# Patient Record
Sex: MALE | Race: WHITE | Employment: UNEMPLOYED | URBAN - METROPOLITAN AREA
[De-identification: names, ages, dates, MRNs, and addresses within clinical notes are randomized per-mention and may not be internally consistent; named-entity substitution may affect disease eponyms.]

---

## 2017-01-17 ENCOUNTER — HOSPITAL ENCOUNTER (OUTPATIENT)
Dept: CT IMAGING | Age: 57
Discharge: HOME | End: 2017-01-17
Attending: INTERNAL MEDICINE | Admitting: INTERNAL MEDICINE

## 2017-01-17 DIAGNOSIS — H92.02 OTALGIA OF LEFT EAR: ICD-10-CM

## 2017-01-17 DIAGNOSIS — M54.2 NECK PAIN: ICD-10-CM

## 2017-01-17 DIAGNOSIS — H92.02 LEFT EAR PAIN: ICD-10-CM

## 2017-01-17 RX ORDER — 0.9 % SODIUM CHLORIDE 0.9 %
10 VIAL (ML) INJECTION
Status: COMPLETED | OUTPATIENT
Start: 2017-01-17 | End: 2017-01-17

## 2017-01-17 RX ADMIN — Medication 10 ML: at 15:41

## 2017-03-10 ENCOUNTER — TELEPHONE (OUTPATIENT)
Dept: CARDIOLOGY CLINIC | Age: 57
End: 2017-03-10

## 2017-06-07 ENCOUNTER — TELEPHONE (OUTPATIENT)
Dept: CARDIOLOGY CLINIC | Age: 57
End: 2017-06-07

## 2017-06-07 DIAGNOSIS — I25.118 CORONARY ARTERY DISEASE OF NATIVE HEART WITH STABLE ANGINA PECTORIS, UNSPECIFIED VESSEL OR LESION TYPE (HCC): Primary | ICD-10-CM

## 2017-06-07 DIAGNOSIS — I10 ESSENTIAL HYPERTENSION: ICD-10-CM

## 2017-06-08 ENCOUNTER — PROCEDURE VISIT (OUTPATIENT)
Dept: CARDIOLOGY CLINIC | Age: 57
End: 2017-06-08

## 2017-06-08 DIAGNOSIS — I10 ESSENTIAL HYPERTENSION: ICD-10-CM

## 2017-06-08 DIAGNOSIS — R06.02 SOB (SHORTNESS OF BREATH): ICD-10-CM

## 2017-06-08 DIAGNOSIS — I25.118 CORONARY ARTERY DISEASE OF NATIVE HEART WITH STABLE ANGINA PECTORIS, UNSPECIFIED VESSEL OR LESION TYPE (HCC): ICD-10-CM

## 2017-06-08 DIAGNOSIS — R06.02 SOB (SHORTNESS OF BREATH): Primary | ICD-10-CM

## 2017-06-08 LAB
LV EF: 50 %
LVEF MODALITY: NORMAL

## 2017-06-08 PROCEDURE — 93015 CV STRESS TEST SUPVJ I&R: CPT | Performed by: INTERNAL MEDICINE

## 2017-06-08 PROCEDURE — 96372 THER/PROPH/DIAG INJ SC/IM: CPT | Performed by: INTERNAL MEDICINE

## 2017-06-08 PROCEDURE — A9500 TC99M SESTAMIBI: HCPCS | Performed by: INTERNAL MEDICINE

## 2017-06-08 PROCEDURE — 78452 HT MUSCLE IMAGE SPECT MULT: CPT | Performed by: INTERNAL MEDICINE

## 2017-06-09 ENCOUNTER — TELEPHONE (OUTPATIENT)
Dept: CARDIOLOGY CLINIC | Age: 57
End: 2017-06-09

## 2017-09-29 ENCOUNTER — HOSPITAL ENCOUNTER (OUTPATIENT)
Dept: MRI IMAGING | Age: 57
Discharge: HOME | End: 2017-09-29
Attending: PHYSICIAN ASSISTANT | Admitting: PHYSICIAN ASSISTANT

## 2017-09-29 DIAGNOSIS — S39.012A STRAIN OF LUMBAR REGION, INITIAL ENCOUNTER: ICD-10-CM

## 2017-10-19 ENCOUNTER — HOSPITAL ENCOUNTER (OUTPATIENT)
Dept: PHYSICAL THERAPY | Age: 57
Discharge: HOME | End: 2017-10-31
Attending: PHYSICIAN ASSISTANT | Admitting: PHYSICIAN ASSISTANT

## 2017-10-19 ASSESSMENT — PAIN DESCRIPTION - FREQUENCY: FREQUENCY: CONTINUOUS

## 2017-10-19 ASSESSMENT — PAIN SCALES - GENERAL: PAINLEVEL_OUTOF10: 7

## 2017-10-19 ASSESSMENT — PAIN DESCRIPTION - LOCATION: LOCATION: BACK

## 2017-10-19 ASSESSMENT — PAIN DESCRIPTION - DESCRIPTORS: DESCRIPTORS: ACHING

## 2017-10-19 ASSESSMENT — PAIN DESCRIPTION - PAIN TYPE: TYPE: CHRONIC PAIN
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
## 2017-10-23 ENCOUNTER — HOSPITAL ENCOUNTER (OUTPATIENT)
Dept: PHYSICAL THERAPY | Age: 57
Discharge: HOME | End: 2017-10-23
Admitting: PHYSICIAN ASSISTANT

## 2017-10-25 ENCOUNTER — HOSPITAL ENCOUNTER (OUTPATIENT)
Dept: PHYSICAL THERAPY | Age: 57
Discharge: HOME | End: 2017-10-25
Admitting: PHYSICIAN ASSISTANT

## 2017-10-25 NOTE — FLOWSHEET NOTE
Physical Therapy Daily Treatment Note  Date:  10/25/2017    Patient Name:  Josiah Basilio by Daniela Mcgill  :  1960  MRN: [id number]  Restrictions/Precautions:    Diagnosis: Strain of muscle, tendon, and fascia of LB    Treatment Diagnosis: Pain R LB, decreased AROM and functional use of LB; decreased sit, stand, and gait tolerance  Insurance/Certification information:  [facility], 18 visits max by 17  Next Physician Visit:  Referring Physician:    Raciel Poon  Visit# / total visits: 3 /  18 max by 17                Initial Pain level: 7/10      Subjective:  Pt continues to complain of constant numbness in his toes. Back was just a little sore after previous traction. Any changes to Ambulatory Summary Sheet?no             Goals:  Short term goals  Time Frame for Short term goals: Up to 6 wks. Short term goal 1: Ind with HEP with good compliance  Short term goal 2: Oswestry score 22/50  Short term goal 3: Pt. will be able to sit 30 min with 4/10 pain max. 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 stretches 10 ct ,x 5 reps 10 ct x 5 reps 10 ct x 5 reps    Prone prop chest on 2 pillows   Up to 10 min continue continue     Clam #1  3 ct, 3 x10 reps 3 ct, 3 x10 reps 3 ct, 3 x10 reps     Trial supine PTx.  Start with 100# pull, legs resting on stool   Intermittent 100#/60# x 15 min legs on stool Static 100# x 15 min, legs on stool    If unable to do PTx. , prone IFC to LB                                                                    Progress/goals assessment (every 10 visits) by  PT           HEP: As above Reviewed and encouraged compliance Continue as instructed    Objective   findings:  No change in numbness following traction. Minimal change in numbness 3rd toe following traction    Provider interaction:   Monitoring to ensure safe and effective activity performance Monitoring to ensure safe and effective activity performance    Response to intervention:   Limited range with ex due to R LB pain LB a little sore following traction. LB sore from traction, did have a little less numbness 3rd toe following traction. Plan for Next Session: As above Continue as tolerated. Increase poundage as tolerated with traction. Continue as tolerated. Increase poundage as tolerated with traction. Modality/intervention used:  [x] Therapeutic Exercise  [] Modalities:  [] Therapeutic Activity     [] Ultrasound  [] Elec  Stim  [] Gait Training      [] Cervical Traction [x] Lumbar Traction  [] Neuromuscular Re-education    [] Cold/hotpack [] Iontophoresis   [x] Instruction in HEP      [] Vasopneumatic     [] Manual Therapy               [x] Self care home management                    (    ) Dry needling    Post Tx Pain Rating: LB soreness following traction but did have a little less numbness 3rd toe.     Plan:(Fequency/duration/# of visits)   [x] Continue per plan of care [] Alter current plan   [x] Plan of care initiated [] Hold pending MD visit [] Discharge         Time In: 2089  Time Out: 8641  Timed Code Treatment Minutes:  21  Total Treatment Minutes: 41    Electronically signed by:  Raghavendra Jacobs 10/25/2017, 4:44 PM

## 2017-10-30 ENCOUNTER — HOSPITAL ENCOUNTER (OUTPATIENT)
Dept: PHYSICAL THERAPY | Age: 57
Discharge: HOME | End: 2017-10-30
Admitting: PHYSICIAN ASSISTANT

## 2017-10-30 NOTE — FLOWSHEET NOTE
Progress/goals assessment (every 10 visits) by  PT           HEP: As above Reviewed and encouraged compliance Continue as instructed Reports compliance   Objective   findings:  No change in numbness following traction. Minimal change in numbness 3rd toe following traction Less numbness in 2nd and 3rd toes. No change with numbness in his great toe. Provider interaction:   Monitoring to ensure safe and effective activity performance Monitoring to ensure safe and effective activity performance Monitoring to ensure safe and effective activity performance   Response to intervention:   Limited range with ex due to R LB pain LB a little sore following traction. LB sore from traction, did have a little less numbness 3rd toe following traction. LB soreness following traction   Plan for Next Session: As above Continue as tolerated. Increase poundage as tolerated with traction. Continue as tolerated. Increase poundage as tolerated with traction. Continue as tolerated. Increase poundage as tolerated with traction. Modality/intervention used:  [x] Therapeutic Exercise  [] Modalities:  [] Therapeutic Activity     [] Ultrasound  [] Elec  Stim  [] Gait Training      [] Cervical Traction [x] Lumbar Traction  [] Neuromuscular Re-education    [] Cold/hotpack [] Iontophoresis   [x] Instruction in HEP      [] Vasopneumatic     [] Manual Therapy               [x] Self care home management                    (    ) Dry needling    Post Tx Pain Ratin/10 but did have a little less numbness 2nd and 3rd toes.     Plan:(Fequency/duration/# of visits)   [x] Continue per plan of care [] Alter current plan   [x] Plan of care initiated [] Hold pending MD visit [] Discharge         Time In: 1731  Time Out: 8683  Timed Code Treatment Minutes:  23  Total Treatment Minutes: 41    Electronically signed by:  Vera Chua 10/30/2017, 4:45 PM

## 2017-11-01 ENCOUNTER — HOSPITAL ENCOUNTER (OUTPATIENT)
Dept: OTHER | Age: 57
Discharge: HOME | End: 2017-11-30
Attending: PHYSICIAN ASSISTANT | Admitting: PHYSICIAN ASSISTANT

## 2017-11-01 ENCOUNTER — HOSPITAL ENCOUNTER (OUTPATIENT)
Dept: PHYSICAL THERAPY | Age: 57
Discharge: HOME | End: 2017-11-01
Admitting: PHYSICIAN ASSISTANT

## 2017-11-01 NOTE — FLOWSHEET NOTE
Physical Therapy Daily Treatment Note  Date:  2017    Patient Name:  John Stoner by Vicki Bull  :  1960  MRN: [id number]  Restrictions/Precautions:    Diagnosis: Strain of muscle, tendon, and fascia of LB    Treatment Diagnosis: Pain R LB, decreased AROM and functional use of LB; decreased sit, stand, and gait tolerance  Insurance/Certification information:  [facility], 18 visits max by 17  Next Physician Visit:  Referring Physician:    Kvng Mendoza  Visit# / total visits:  max by 17                Initial Pain level: 8/10 right LB     Subjective:  Pt reports of pain right LB and numbness in his right great toe and 2nd and 3rd toes. Did have less numbness in his 3rd toe following last session. Any changes to Ambulatory Summary Sheet?no             Goals:  Short term goals  Time Frame for Short term goals: Up to 6 wks. Short term goal 1: Ind with HEP with good compliance  Short term goal 2: Oswestry score 22/50  Short term goal 3: Pt. will be able to sit 30 min with 4/10 pain max. 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 stretches 10 ct ,x 5 reps 10 ct x 5 reps 10 ct x 5 reps 10 ct x 5 reps    Prone prop chest on 2 pillows   Up to 10 min continue continue Reports compliance     Clam #1  3 ct, 3 x10 reps 3 ct, 3 x10 reps 3 ct, 3 x10 reps 3 ct, 3 x10 reps     Trial supine PTx.  Start with 100# pull, legs resting on stool   Intermittent 100#/60# x 15 min legs on stool Static 100# x 15 min, legs on stool Static 110# x 15 min, legs on stool Static 115# x 15 min, legs on stool   If unable to do PTx. , prone IFC to LB                                                                           Progress/goals assessment (every 10 visits) by  PT            HEP: As above Reviewed and encouraged compliance Continue as instructed Reports compliance Continue as tolerated   Objective   findings:  No change in numbness following traction. Minimal change in numbness 3rd toe following traction Less numbness in 2nd and 3rd toes. No change with numbness in his great toe. Less numbness 3rd toe. Provider interaction:   Monitoring to ensure safe and effective activity performance Monitoring to ensure safe and effective activity performance Monitoring to ensure safe and effective activity performance Monitoring to ensure safe and effective activity performance   Response to intervention:   Limited range with ex due to R LB pain LB a little sore following traction. LB sore from traction, did have a little less numbness 3rd toe following traction. LB soreness following traction Less pain   Plan for Next Session: As above Continue as tolerated. Increase poundage as tolerated with traction. Continue as tolerated. Increase poundage as tolerated with traction. Continue as tolerated. Increase poundage as tolerated with traction. Continue as tolerated. Increase poundage as tolerated with traction.      Modality/intervention used:  [] Therapeutic Exercise  [] Modalities:  [] Therapeutic Activity     [] Ultrasound  [] Elec  Stim  [] Gait Training      [] Cervical Traction [x] Lumbar Traction  [] Neuromuscular Re-education    [] Cold/hotpack [] Iontophoresis   [] Instruction in HEP      [] Vasopneumatic     [] Manual Therapy               [x] Self care home management                    (    ) Dry needling    Post Tx Pain Ratin/10 right LB, less numbness 3rd toe    Plan:(Fequency/duration/# of visits)   [x] Continue per plan of care [] Alter current plan   [x] Plan of care initiated [] Hold pending MD visit [] Discharge         Time In: 9846  Time Out: 1710  Timed Code Treatment Minutes:     Total Treatment Minutes: 28    Electronically signed by:  Parag Luke 11/1/2017, 4:42 PM

## 2017-11-06 ENCOUNTER — HOSPITAL ENCOUNTER (OUTPATIENT)
Dept: PHYSICAL THERAPY | Age: 57
Discharge: HOME | End: 2017-11-06
Admitting: PHYSICIAN ASSISTANT

## 2017-11-06 NOTE — FLOWSHEET NOTE
Physical Therapy Daily Treatment Note  Date:  2017    Patient Name:  Alethea Schwartz by Genny Michelle  :  1960  MRN: [id number]  Restrictions/Precautions:    Diagnosis: Strain of muscle, tendon, and fascia of LB    Treatment Diagnosis: Pain R LB, decreased AROM and functional use of LB; decreased sit, stand, and gait tolerance  Insurance/Certification information:  [facility], 18 visits max by 17  Next Physician Visit:  Referring Physician:    Rodolfo Duenas  Visit# / total visits:  max by 17                Initial Pain level: 8/10  LB yesterday and today     Subjective:  Pt states he did feel better after previous session lasting until yesterday. No numbness or tingling in his 2nd and 3rd toes. Only having in his great toe. Any changes to Ambulatory Summary Sheet?no             Goals:  Short term goals  Time Frame for Short term goals: Up to 6 wks. Short term goal 1: Ind with HEP with good compliance  Short term goal 2: Oswestry score 22/50  Short term goal 3: Pt. will be able to sit 30 min with 4/10 pain max. 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 stretches 10 ct ,x 5 reps 10 ct x 5 reps 10 ct x 5 reps 10 ct x 5 reps     Prone prop chest on 2 pillows   Up to 10 min continue continue Reports compliance      Clam #1  3 ct, 3 x10 reps 3 ct, 3 x10 reps 3 ct, 3 x10 reps 3 ct, 3 x10 reps      Trial supine PTx.  Start with 100# pull, legs resting on stool   Intermittent 100#/60# x 15 min legs on stool Static 100# x 15 min, legs on stool Static 110# x

## 2017-11-13 ENCOUNTER — HOSPITAL ENCOUNTER (OUTPATIENT)
Dept: PHYSICAL THERAPY | Age: 57
Discharge: HOME | End: 2017-11-13
Admitting: PHYSICIAN ASSISTANT

## 2017-11-13 NOTE — FLOWSHEET NOTE
Physical Therapy Daily Treatment Note  Date:  2017    Patient Name:  Chris Sell by Dimple Sierra  :  1960  MRN: [id number]  Restrictions/Precautions:    Diagnosis: Strain of muscle, tendon, and fascia of LB    Treatment Diagnosis: Pain R LB, decreased AROM and functional use of LB; decreased sit, stand, and gait tolerance  Insurance/Certification information:  [facility], 18 visits max by 17  Next Physician Visit:  Referring Physician:  Sara THEODORE EXPIRATION DATE  Visit# / total visits:  max by 17                Initial Pain level: 8/10  LB      Subjective:  Pt states his back and leg was sore after previous session lasting until the next evening when his leg stopped hurting. Continues to c/o tingling in his great and 2nd toe. Is scheduled to see a physician in [facility] on [facility]. Any changes to Ambulatory Summary Sheet?no             Goals:  Short term goals  Time Frame for Short term goals: Up to 6 wks. Short term goal 1: Ind with HEP with good compliance  Short term goal 2: Oswestry score 22/50  Short term goal 3: Pt. will be able to sit 30 min with 4/10 pain max. 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 Sup trunk rotat. stretches 10 ct ,x 5 reps 10 ct x 5 reps 10 ct x 5 reps 10 ct x 5 reps       Prone prop chest on 2 pillows   Up to 10 min continue continue Reports compliance        Clam #1  3 ct, 3 x10 reps 3 ct, 3 x10 reps 3 ct, 3 x10 reps 3 ct, 3 x10 reps        Trial supine PTx.  Start with Continue as tolerated. Increase poundage as tolerated with traction. Continue as tolerated. Increase poundage as tolerated with traction. Increase poundage as tolerated with traction. Increase traction pull 5# session, up to 1/2 body wt. Pt wanted to keep traction the same today due to increase pain following previous session. Possibly try adding 5# next visit if improved tolerance. Modality/intervention used:  [] Therapeutic Exercise  [] Modalities:  [] Therapeutic Activity     [] Ultrasound  [] Elec  Stim  [] Gait Training      [] Cervical Traction [x] Lumbar Traction  [] Neuromuscular Re-education    [] Cold/hotpack [] Iontophoresis   [] Instruction in HEP      [] Vasopneumatic     [] Manual Therapy               [] Self care home management                    (    ) Dry needling    Post Tx Pain Rating: persisted LB pain and tingling great toe and 2nd toe.     Plan:(Fequency/duration/# of visits)   [x] Continue per plan of care [] Alter current plan   [] Plan of care initiated [] Hold pending MD visit [] Discharge         Time In: 4813  Time Out: 5249  Timed Code Treatment Minutes:  30  Total Treatment Minutes: 30    Electronically signed by:  Emanuel Masterson 11/13/2017, 4:35 PM

## 2017-11-16 ENCOUNTER — HOSPITAL ENCOUNTER (OUTPATIENT)
Dept: PHYSICAL THERAPY | Age: 57
Discharge: HOME | End: 2017-11-16
Admitting: PHYSICIAN ASSISTANT

## 2017-11-16 NOTE — FLOWSHEET NOTE
Physical Therapy Daily Treatment Note  Date:  2017    Patient Name:  Christopher Kerr by Jeni Carmina  :  1960  MRN: [id number]  Restrictions/Precautions:    Diagnosis: Strain of muscle, tendon, and fascia of LB    Treatment Diagnosis: Pain R LB, decreased AROM and functional use of LB; decreased sit, stand, and gait tolerance  Insurance/Certification information:  [facility], 18 visits max by 17  Next Physician Visit:  Referring Physician:  Mackenzie Zaragoza  POC EXPIRATION DATE  Visit# / total visits:   max by 17                Initial Pain level: 8/10  LB      Subjective:  Pt states he will have some pain down the R leg following the traction but reports it eases after a few hours. Big toe still numb. Any changes to Ambulatory Summary Sheet?no             Goals:  Short term goals  Time Frame for Short term goals: Up to 6 wks. Short term goal 1: Ind with HEP with good compliance  Short term goal 2: Oswestry score 22/50  Short term goal 3: Pt. will be able to sit 30 min with 4/10 pain max. Patient's goal:  Skilled PT activities: Date 17 (6) 17  #7 17 (8) 17 #9     Outcome measure used  oswestry        On visit# 1       Posture and body mechanics instr with cues for reaching over mat to retreive phone          Supine knee to chest   Doing all home ex Ind, at home. Sup trunk rotat. stretches       Prone prop chest on 2 pillows          Clam #1         Trial supine PTx.  Start with 100# pull, legs resting on stool  Static 120# x 15 min, legs on stool Static 125# x 15 min, legs on stool Static 125# x 15 min, legs on stool Static 130# x 15 min with 5 min set up, legs on stool   If unable to do PTx. , prone IFC to LB                                                                    Progress/goals assessment (every 10 visits) by  PT           HEP: Reports compliance  Reports compliance continue cont   Objective   findings: No numbness or tingling in his 2nd and 3rd

## 2017-11-20 ENCOUNTER — HOSPITAL ENCOUNTER (OUTPATIENT)
Dept: PHYSICAL THERAPY | Age: 57
Discharge: HOME | End: 2017-11-20
Admitting: PHYSICIAN ASSISTANT

## 2017-11-29 ENCOUNTER — HOSPITAL ENCOUNTER (OUTPATIENT)
Dept: PHYSICAL THERAPY | Age: 57
Discharge: HOME | End: 2017-11-29
Admitting: PHYSICIAN ASSISTANT

## 2017-11-29 NOTE — FLOWSHEET NOTE
Static 130# x 17 min, pelvic Tx. Belt won't hold on  Knott room, had to reset pt 4 times and eventually transfer pt to Left room to get pelvic belt to hold. Static pelvic traction 130# x 17 min w/ 5 min set    If unable to do PTx. , prone IFC to LB                                                                                         Progress/goals assessment (every 10 visits) by  PT              HEP: Reports compliance  Reports compliance continue cont continue Cont cont   Objective   findings: No numbness or tingling in his 2nd and 3rd toes. Only having in his great toe. Limping on R leg after PTx. LB pain remained 8/10, some discomfort back of knee and still had tingling great toe and 2nd toe. Some slight decrease of pain to 7/10 but the first 2 toes on the R foot still numb Numbness/ tingling persistent  Numbness/ tingling persistent R foot medial toes    Numbness continues with L foot pain beginning   Provider interaction:  Monitoring to ensure safe and effective activity performance Monitor comfort during increase of SPTx. Monitor comfort during increase of SPTx. Monitor comfort during increase of SPTx. Monitor comfort during  SPTx. Belt kept slipping; pt. Reports recently has had more pain in buttocks Set up of pelvic traction   Response to intervention:   LB sore following traction As above As above Slight decrease LB soreness following traction Stomach soreness at end of PTx. More comfortable today on traction   Plan for Next Session: Increase poundage as tolerated with traction. Increase traction pull 5# session, up to 1/2 body wt. Pt wanted to keep traction the same today due to increase pain following previous session. Possibly try adding 5# next visit if improved tolerance. cont continue Cont, use [facility] room for pelvic tx.  Pt's C-9 ends 11/30/17 so this is pt's final treatment     Modality/intervention used:  [] Therapeutic Exercise  [] Modalities:  [] Therapeutic Activity     [] Ultrasound  [] Elec  Stim  [] Gait Training      [] Cervical Traction [x] Lumbar Traction  [] Neuromuscular Re-education    [] Cold/hotpack [] Iontophoresis   [] Instruction in HEP      [] Vasopneumatic     [] Manual Therapy               [] Self care home management                    (    ) Dry needling    Post Tx Pain Rating: not taken     Plan:(Fequency/duration/# of visits)   [] Continue per plan of care [] Alter current plan   [] Plan of care initiated [x] Hold pending MD visit [] Discharge         Time In: 105  Time Out:135  Timed Code Treatment Minutes: 0  Total Treatment Minutes: 30    Electronically signed by:  aKrla Leslie 11/29/2017, 1:08 PM

## 2017-12-01 ENCOUNTER — HOSPITAL ENCOUNTER (OUTPATIENT)
Dept: OTHER | Age: 57
Discharge: HOME | End: 2017-12-29
Attending: PHYSICIAN ASSISTANT | Admitting: PHYSICIAN ASSISTANT

## 2018-01-03 ENCOUNTER — HOSPITAL ENCOUNTER (OUTPATIENT)
Dept: PHYSICAL THERAPY | Age: 58
Discharge: HOME | End: 2018-01-31
Attending: PHYSICIAN ASSISTANT | Admitting: PHYSICIAN ASSISTANT

## 2018-01-03 ASSESSMENT — PAIN SCALES - GENERAL: PAINLEVEL_OUTOF10: 8

## 2018-01-03 ASSESSMENT — PAIN DESCRIPTION - PAIN TYPE: TYPE: CHRONIC PAIN

## 2018-01-03 NOTE — PROGRESS NOTES
Observation/Palpation  Posture: Fair  Palpation: R upper and mid trap/rhomboid, levator, scalenes. B suboccipitals. Observation: forward head. AROM RUE (degrees)  RUE AROM : WFL  AROM LUE (degrees)  LUE AROM : WFL  Spine  Cervical: 30 ext, 40 flex, 40B rot. 15 R sb, 10 Lsb. Lumbar: flex fingertip to knee. Ext 5 deg. Strength RLE  Comment: hip flex 4, quad 4, HS 4, DF 4-. Strength LLE  Comment: hip flex 4, quad 4+, HS 4+, DF 5. Strength RUE  Comment: shoulder 4/5  Strength LUE  Comment: shoulder 4/5     Additional Measures  Special Tests: R>L pain with FADER. Limited RELL B. Prone prop no change in LE symtpoms. Cannot be supine due to breathing/swallowing/acid reflux. Pain with cervical compression. pain getting to quadrant position B. Assessment   Conditions Requiring Skilled Therapeutic Intervention  Treatment Diagnosis: cervical, lumbar strain/sprain  Decision Making: Medium Complexity         Plan        G-Code  PT G-Codes  Functional Assessment Tool Used: ndi  Score: 30%  Functional Limitation: Changing and maintaining body position  Changing and Maintaining Body Position Current Status (): At least 20 percent but less than 40 percent impaired, limited or restricted  Changing and Maintaining Body Position Goal Status (): At least 1 percent but less than 20 percent impaired, limited or restricted    OutComes Score                                           Goals  Long term goals  Time Frame for Long term goals : 5 weeks  Long term goal 1: I in home program.  Long term goal 2: turn head/look over shoulders with minimal pain/difficulty. Long term goal 3: sleep without waking more than 1x/night due to neck pain. Long term goal 4: NDI of less than 20%  Patient Goals   Patient goals : decrease pain, sleep better, return to work.            Naheed Betancourt, PT

## 2018-01-05 ENCOUNTER — HOSPITAL ENCOUNTER (OUTPATIENT)
Dept: PHYSICAL THERAPY | Age: 58
Discharge: HOME | End: 2018-01-05
Admitting: PHYSICIAN ASSISTANT

## 2018-01-09 ENCOUNTER — HOSPITAL ENCOUNTER (OUTPATIENT)
Dept: PHYSICAL THERAPY | Age: 58
Discharge: HOME | End: 2018-01-09
Admitting: PHYSICIAN ASSISTANT

## 2018-01-11 ENCOUNTER — HOSPITAL ENCOUNTER (OUTPATIENT)
Dept: PHYSICAL THERAPY | Age: 58
Discharge: HOME | End: 2018-01-11
Admitting: PHYSICIAN ASSISTANT

## 2018-01-11 NOTE — FLOWSHEET NOTE
bridging 3 ct x 10     Upper back stretch 20 sec x 2 20 sec x 2       Chin tucks 5ct x 10 5 ct x 10 reps       rows         scap retraction and depression  Green TB 5 ct hold x 10 reps Try more ex next visit ending with the ES cybex rows 35# w/ 3 ct 2 x 10    CC lat pulls w/ 3 ct, 7 plates 2 x 10  cybex back ext 50 # x 10 but caused a twinge in the neck     Shoulder ER tband  Green TB B x 10 reps      cervical and lumbar lordosis posture training   Cervical pillow to put in pillow caase when sleeping provided        Corner/pec stretch           Habd        towel mobilization/AAROM for rotation          US prn  1 MHz, 1.5 w/cm2 x 10 min with 5 min set up, to B cervical, UT       Estim prn    Premod with 2 pads on the R levator/UT and 2 pads on the R LS x 15 min with 5 min set up     Progress/goals assessment (every 10 visits) by  PT           HEP: Upper back stretch, post scalene stretch, chin tuck, standing back ext.  cont cont cont   Objective   findings:  Tight B cervical garrett Tight R UT and R LS/glute ms Good tolerance of most of the new pre's except the back ext   Provider interaction:  Ed on findings, effects of posture on muscle tension/strain. Centralization/peripheralizatoin Monitored to ensure safe and effective performance STM to the R cervical paraspinals/UT and the mid to R LS into the glute x 41 min Verbal and manual cueing on proper performance of the prescribed exercise or of the designated task   Response to intervention:    No change in pain No pain in neck but pain the same in the LB but nothing down the leg Less pain following the ES   Plan for Next Session:  Cont with POC Cont with POC Advance strengthening as tolerated.   Monitor response to ES     Modality/intervention used:  [] Therapeutic Exercise  [] Modalities:  [] Therapeutic Activity     [x] Ultrasound  [] Elec  Stim  [] Gait Training      [] Cervical Traction [] Lumbar Traction  [] Neuromuscular Re-education    [] Cold/hotpack []

## 2018-01-15 ENCOUNTER — HOSPITAL ENCOUNTER (OUTPATIENT)
Dept: MRI IMAGING | Age: 58
Discharge: HOME | End: 2018-01-15
Attending: PHYSICIAN ASSISTANT | Admitting: PHYSICIAN ASSISTANT

## 2018-01-15 DIAGNOSIS — S16.1XXA STRAIN OF NECK MUSCLE, INITIAL ENCOUNTER: ICD-10-CM

## 2018-01-15 DIAGNOSIS — S16.1XXA STRAIN OF MUSCLE, FASCIA AND TENDON AT NECK LEVEL, INITIAL ENCOUNTER: ICD-10-CM

## 2018-01-16 ENCOUNTER — HOSPITAL ENCOUNTER (OUTPATIENT)
Dept: PHYSICAL THERAPY | Age: 58
Discharge: HOME | End: 2018-01-16
Admitting: PHYSICIAN ASSISTANT

## 2018-01-17 ENCOUNTER — HOSPITAL ENCOUNTER (OUTPATIENT)
Dept: PHYSICAL THERAPY | Age: 58
Discharge: HOME | End: 2018-01-17
Admitting: PHYSICIAN ASSISTANT

## 2018-01-17 NOTE — FLOWSHEET NOTE
bracing 10 ct x 10  DLSP: abd bracing w/ march  DLSP: low bridging 3 ct x 10 DLSP: abd bracing 10 ct x 10  DLSP: abd bracing w/ march 2 x 10  DLSP: low bridging 3 ct x 10 SB: pelvic tilts f/b;s/s;cw/ccw x 1 min ea  SB: marching x 1 min  SB: bridging 3 ct 2 x 10  SB: DKTC w/ legs on SB 3 ct 2 x 10     Upper back stretch 20 sec x 2 20 sec x 2         Chin tucks 5ct x 10 5 ct x 10 reps         rows           scap retraction and depression  Green TB 5 ct hold x 10 reps Try more ex next visit ending with the ES cybex rows 35# w/ 3 ct 2 x 10    CC lat pulls w/ 3 ct, 7 plates 2 x 10  cybex back ext 50 # x 10 but caused a twinge in the neck cybex rows 35# w/ 3 ct 2 x 10    CC lat pulls w/ 3 ct, 7 plates 2 x 10      Shoulder ER tband  Green TB B x 10 reps        cervical and lumbar lordosis posture training   Cervical pillow to put in pillow caase when sleeping provided          Corner/pec stretch             Habd          towel mobilization/AAROM for rotation            US prn  1 MHz, 1.5 w/cm2 x 10 min with 5 min set up, to B cervical, UT         Estim prn    Premod with 2 pads on the R levator/UT and 2 pads on the R LS x 15 min with 5 min set up B levator and B LS x 20 min with 5 min set up R LS and B UT x 15 min with 3 min set up     Progress/goals assessment (every 10 visits) by  PT             HEP: Upper back stretch, post scalene stretch, chin tuck, standing back ext.  cont cont cont cont cont   Objective   findings:  Tight B cervical garrett Tight R UT and R LS/glute ms Good tolerance of most of the new pre's except the back ext Pt was having more leg pain with the bridging today Good tolerance of the SB ex's today   Provider interaction:  Ed on findings, effects of posture on muscle tension/strain.   Centralization/peripheralizatoin Monitored to ensure safe and effective performance STM to the R cervical paraspinals/UT and the mid to R LS into the glute x 41 min Verbal and manual cueing on proper performance of the prescribed exercise or of the designated task Verbal and manual cueing on proper performance of the prescribed exercise or of the designated task Verbal and manual cueing on proper performance of the prescribed exercise or of the designated task   Response to intervention:    No change in pain No pain in neck but pain the same in the LB but nothing down the leg Less pain following the ES No pain  Pain in the R HS   Plan for Next Session:  Cont with POC Cont with POC Advance strengthening as tolerated. Monitor response to ES Advance strengthening as tolerated. Monitor response to ES Advance strengthening as tolerated. Monitor response to ES. Focus treatments on cervical and upper back.       Modality/intervention used:  [x] Therapeutic Exercise  [] Modalities:  [] Therapeutic Activity     [] Ultrasound  [x] Elec  Stim  [] Gait Training      [] Cervical Traction [] Lumbar Traction  [] Neuromuscular Re-education    [] Cold/hotpack [] Iontophoresis   [] Instruction in HEP      [] Vasopneumatic     [] Manual Therapy               [] Self care home management                    (    ) Dry needling    Post Tx Pain Ratin/10 R HS, tingling in the neck and LB    Plan:(Fequency/duration/# of visits)   [x] Continue per plan of care  [] Alter current plan   [] Plan of care initiated [] Hold pending MD visit [] Discharge         Time In:  210  Time Out: 310   Timed Code Treatment Minutes: 42  Total Treatment Minutes: 60     Electronically signed by:  Chana Rand 2018, 2:04 PM

## 2018-01-18 ENCOUNTER — HOSPITAL ENCOUNTER (OUTPATIENT)
Dept: PHYSICAL THERAPY | Age: 58
Discharge: HOME | End: 2018-01-18
Admitting: PHYSICIAN ASSISTANT

## 2018-01-25 ENCOUNTER — HOSPITAL ENCOUNTER (OUTPATIENT)
Dept: PHYSICAL THERAPY | Age: 58
Discharge: HOME | End: 2018-01-25
Admitting: PHYSICIAN ASSISTANT
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
## 2018-01-30 ENCOUNTER — HOSPITAL ENCOUNTER (OUTPATIENT)
Dept: PHYSICAL THERAPY | Age: 58
Discharge: HOME | End: 2018-01-30
Admitting: PHYSICIAN ASSISTANT
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
## 2018-02-01 ENCOUNTER — HOSPITAL ENCOUNTER (OUTPATIENT)
Dept: OTHER | Age: 58
Discharge: HOME | End: 2018-02-28
Attending: PHYSICIAN ASSISTANT | Admitting: PHYSICIAN ASSISTANT

## 2018-03-01 ENCOUNTER — HOSPITAL ENCOUNTER (OUTPATIENT)
Dept: OTHER | Age: 58
Discharge: HOME | End: 2018-03-26
Attending: PHYSICIAN ASSISTANT | Admitting: PHYSICIAN ASSISTANT

## 2018-04-23 ENCOUNTER — TELEPHONE (OUTPATIENT)
Dept: GASTROENTEROLOGY | Age: 58
End: 2018-04-23

## 2018-12-11 ENCOUNTER — APPOINTMENT (OUTPATIENT)
Dept: GENERAL RADIOLOGY | Age: 58
End: 2018-12-11
Payer: COMMERCIAL

## 2018-12-11 LAB
ANION GAP SERPL CALCULATED.3IONS-SCNC: 19 MMOL/L (ref 4–16)
BASOPHILS ABSOLUTE: 0 K/CU MM
BASOPHILS RELATIVE PERCENT: 0.4 % (ref 0–1)
BUN BLDV-MCNC: 16 MG/DL (ref 6–23)
CALCIUM SERPL-MCNC: 10.1 MG/DL (ref 8.3–10.6)
CHLORIDE BLD-SCNC: 100 MMOL/L (ref 99–110)
CO2: 20 MMOL/L (ref 21–32)
CREAT SERPL-MCNC: 1.1 MG/DL (ref 0.9–1.3)
DIFFERENTIAL TYPE: ABNORMAL
EOSINOPHILS ABSOLUTE: 0 K/CU MM
EOSINOPHILS RELATIVE PERCENT: 0 % (ref 0–3)
GFR AFRICAN AMERICAN: >60 ML/MIN/1.73M2
GFR NON-AFRICAN AMERICAN: >60 ML/MIN/1.73M2
GLUCOSE BLD-MCNC: 339 MG/DL (ref 70–99)
HCT VFR BLD CALC: 48.2 % (ref 42–52)
HEMOGLOBIN: 15.5 GM/DL (ref 13.5–18)
IMMATURE NEUTROPHIL %: 0.9 % (ref 0–0.43)
LYMPHOCYTES ABSOLUTE: 1 K/CU MM
LYMPHOCYTES RELATIVE PERCENT: 10.2 % (ref 24–44)
MCH RBC QN AUTO: 28.9 PG (ref 27–31)
MCHC RBC AUTO-ENTMCNC: 32.2 % (ref 32–36)
MCV RBC AUTO: 89.8 FL (ref 78–100)
MONOCYTES ABSOLUTE: 0.2 K/CU MM
MONOCYTES RELATIVE PERCENT: 1.6 % (ref 0–4)
NUCLEATED RBC %: 0 %
PDW BLD-RTO: 13.5 % (ref 11.7–14.9)
PLATELET # BLD: 304 K/CU MM (ref 140–440)
PMV BLD AUTO: 11 FL (ref 7.5–11.1)
POTASSIUM SERPL-SCNC: 3.5 MMOL/L (ref 3.5–5.1)
RBC # BLD: 5.37 M/CU MM (ref 4.6–6.2)
SEGMENTED NEUTROPHILS ABSOLUTE COUNT: 8.5 K/CU MM
SEGMENTED NEUTROPHILS RELATIVE PERCENT: 86.9 % (ref 36–66)
SODIUM BLD-SCNC: 139 MMOL/L (ref 135–145)
TOTAL IMMATURE NEUTOROPHIL: 0.09 K/CU MM
TOTAL NUCLEATED RBC: 0 K/CU MM
TROPONIN T: <0.01 NG/ML
WBC # BLD: 9.8 K/CU MM (ref 4–10.5)

## 2018-12-11 PROCEDURE — 71045 X-RAY EXAM CHEST 1 VIEW: CPT

## 2018-12-11 PROCEDURE — 84484 ASSAY OF TROPONIN QUANT: CPT

## 2018-12-11 PROCEDURE — 80048 BASIC METABOLIC PNL TOTAL CA: CPT

## 2018-12-11 PROCEDURE — 36415 COLL VENOUS BLD VENIPUNCTURE: CPT

## 2018-12-11 PROCEDURE — 93005 ELECTROCARDIOGRAM TRACING: CPT | Performed by: EMERGENCY MEDICINE

## 2018-12-11 PROCEDURE — 85025 COMPLETE CBC W/AUTO DIFF WBC: CPT

## 2018-12-11 ASSESSMENT — PAIN SCALES - GENERAL: PAINLEVEL_OUTOF10: 8

## 2018-12-11 ASSESSMENT — PAIN DESCRIPTION - LOCATION: LOCATION: CHEST

## 2018-12-11 ASSESSMENT — PAIN DESCRIPTION - PAIN TYPE: TYPE: ACUTE PAIN

## 2018-12-12 ENCOUNTER — TELEPHONE (OUTPATIENT)
Dept: CARDIOLOGY CLINIC | Age: 58
End: 2018-12-12

## 2018-12-12 ENCOUNTER — HOSPITAL ENCOUNTER (OUTPATIENT)
Age: 58
Setting detail: OBSERVATION
Discharge: HOME | End: 2018-12-12
Attending: EMERGENCY MEDICINE | Admitting: INTERNAL MEDICINE
Payer: COMMERCIAL

## 2018-12-12 ENCOUNTER — APPOINTMENT (OUTPATIENT)
Dept: NUCLEAR MEDICINE | Age: 58
End: 2018-12-12
Payer: COMMERCIAL

## 2018-12-12 VITALS
TEMPERATURE: 98.2 F | WEIGHT: 250.9 LBS | RESPIRATION RATE: 13 BRPM | OXYGEN SATURATION: 98 % | DIASTOLIC BLOOD PRESSURE: 116 MMHG | SYSTOLIC BLOOD PRESSURE: 171 MMHG | BODY MASS INDEX: 31.2 KG/M2 | HEIGHT: 75 IN | HEART RATE: 80 BPM

## 2018-12-12 DIAGNOSIS — R07.9 CHEST PAIN, UNSPECIFIED TYPE: Primary | ICD-10-CM

## 2018-12-12 DIAGNOSIS — R73.9 HYPERGLYCEMIA: ICD-10-CM

## 2018-12-12 LAB
ESTIMATED AVERAGE GLUCOSE: 134 MG/DL
GLUCOSE BLD-MCNC: 269 MG/DL (ref 70–99)
GLUCOSE BLD-MCNC: 274 MG/DL (ref 70–99)
HBA1C MFR BLD: 6.3 % (ref 4.2–6.3)
LV EF: 39 %
LVEF MODALITY: NORMAL
TROPONIN T: <0.01 NG/ML

## 2018-12-12 PROCEDURE — G0378 HOSPITAL OBSERVATION PER HR: HCPCS

## 2018-12-12 PROCEDURE — A9500 TC99M SESTAMIBI: HCPCS | Performed by: INTERNAL MEDICINE

## 2018-12-12 PROCEDURE — 78452 HT MUSCLE IMAGE SPECT MULT: CPT

## 2018-12-12 PROCEDURE — 93010 ELECTROCARDIOGRAM REPORT: CPT | Performed by: INTERNAL MEDICINE

## 2018-12-12 PROCEDURE — 82962 GLUCOSE BLOOD TEST: CPT

## 2018-12-12 PROCEDURE — 99243 OFF/OP CNSLTJ NEW/EST LOW 30: CPT | Performed by: INTERNAL MEDICINE

## 2018-12-12 PROCEDURE — 2580000003 HC RX 258: Performed by: INTERNAL MEDICINE

## 2018-12-12 PROCEDURE — 6370000000 HC RX 637 (ALT 250 FOR IP): Performed by: INTERNAL MEDICINE

## 2018-12-12 PROCEDURE — 3430000000 HC RX DIAGNOSTIC RADIOPHARMACEUTICAL: Performed by: INTERNAL MEDICINE

## 2018-12-12 PROCEDURE — 99285 EMERGENCY DEPT VISIT HI MDM: CPT

## 2018-12-12 PROCEDURE — 6360000002 HC RX W HCPCS: Performed by: INTERNAL MEDICINE

## 2018-12-12 PROCEDURE — 6370000000 HC RX 637 (ALT 250 FOR IP): Performed by: EMERGENCY MEDICINE

## 2018-12-12 PROCEDURE — 96361 HYDRATE IV INFUSION ADD-ON: CPT

## 2018-12-12 PROCEDURE — 93017 CV STRESS TEST TRACING ONLY: CPT

## 2018-12-12 PROCEDURE — 96365 THER/PROPH/DIAG IV INF INIT: CPT

## 2018-12-12 PROCEDURE — 83036 HEMOGLOBIN GLYCOSYLATED A1C: CPT

## 2018-12-12 PROCEDURE — 2580000003 HC RX 258: Performed by: EMERGENCY MEDICINE

## 2018-12-12 PROCEDURE — 36415 COLL VENOUS BLD VENIPUNCTURE: CPT

## 2018-12-12 PROCEDURE — 84484 ASSAY OF TROPONIN QUANT: CPT

## 2018-12-12 RX ORDER — ROPINIROLE 1 MG/1
1 TABLET, FILM COATED ORAL ONCE
Status: COMPLETED | OUTPATIENT
Start: 2018-12-12 | End: 2018-12-12

## 2018-12-12 RX ORDER — ROPINIROLE 1 MG/1
1 TABLET, FILM COATED ORAL 3 TIMES DAILY
Status: DISCONTINUED | OUTPATIENT
Start: 2018-12-12 | End: 2018-12-12 | Stop reason: HOSPADM

## 2018-12-12 RX ORDER — LISINOPRIL 20 MG/1
20 TABLET ORAL DAILY
Status: DISCONTINUED | OUTPATIENT
Start: 2018-12-12 | End: 2018-12-12

## 2018-12-12 RX ORDER — METOPROLOL SUCCINATE 100 MG/1
200 TABLET, EXTENDED RELEASE ORAL DAILY
Status: DISCONTINUED | OUTPATIENT
Start: 2018-12-12 | End: 2018-12-12 | Stop reason: HOSPADM

## 2018-12-12 RX ORDER — ASPIRIN 81 MG/1
324 TABLET, CHEWABLE ORAL ONCE
Status: COMPLETED | OUTPATIENT
Start: 2018-12-12 | End: 2018-12-12

## 2018-12-12 RX ORDER — RANOLAZINE 500 MG/1
500 TABLET, EXTENDED RELEASE ORAL 2 TIMES DAILY
Status: DISCONTINUED | OUTPATIENT
Start: 2018-12-12 | End: 2018-12-12 | Stop reason: HOSPADM

## 2018-12-12 RX ORDER — HYDROCODONE BITARTRATE AND ACETAMINOPHEN 5; 325 MG/1; MG/1
1 TABLET ORAL EVERY 4 HOURS PRN
Status: DISCONTINUED | OUTPATIENT
Start: 2018-12-12 | End: 2018-12-12 | Stop reason: HOSPADM

## 2018-12-12 RX ORDER — HYDRALAZINE HYDROCHLORIDE 20 MG/ML
10 INJECTION INTRAMUSCULAR; INTRAVENOUS EVERY 6 HOURS PRN
Status: DISCONTINUED | OUTPATIENT
Start: 2018-12-12 | End: 2018-12-12

## 2018-12-12 RX ORDER — ISOSORBIDE DINITRATE 20 MG/1
20 TABLET ORAL 3 TIMES DAILY
Status: DISCONTINUED | OUTPATIENT
Start: 2018-12-12 | End: 2018-12-12 | Stop reason: HOSPADM

## 2018-12-12 RX ORDER — NICOTINE 21 MG/24HR
1 PATCH, TRANSDERMAL 24 HOURS TRANSDERMAL DAILY
Status: DISCONTINUED | OUTPATIENT
Start: 2018-12-12 | End: 2018-12-12 | Stop reason: HOSPADM

## 2018-12-12 RX ORDER — METHOCARBAMOL 500 MG/1
500 TABLET, FILM COATED ORAL 4 TIMES DAILY
Status: DISCONTINUED | OUTPATIENT
Start: 2018-12-12 | End: 2018-12-12 | Stop reason: HOSPADM

## 2018-12-12 RX ORDER — SIMVASTATIN 40 MG
40 TABLET ORAL NIGHTLY
Status: DISCONTINUED | OUTPATIENT
Start: 2018-12-12 | End: 2018-12-12 | Stop reason: HOSPADM

## 2018-12-12 RX ORDER — GLIPIZIDE 10 MG/1
10 TABLET ORAL
Status: DISCONTINUED | OUTPATIENT
Start: 2018-12-12 | End: 2018-12-12 | Stop reason: HOSPADM

## 2018-12-12 RX ORDER — CLOPIDOGREL BISULFATE 75 MG/1
75 TABLET ORAL DAILY
Status: DISCONTINUED | OUTPATIENT
Start: 2018-12-12 | End: 2018-12-12 | Stop reason: HOSPADM

## 2018-12-12 RX ORDER — DILTIAZEM HYDROCHLORIDE 240 MG/1
240 CAPSULE, COATED, EXTENDED RELEASE ORAL DAILY
Status: DISCONTINUED | OUTPATIENT
Start: 2018-12-12 | End: 2018-12-12 | Stop reason: HOSPADM

## 2018-12-12 RX ORDER — LISINOPRIL 40 MG/1
40 TABLET ORAL DAILY
Status: DISCONTINUED | OUTPATIENT
Start: 2018-12-12 | End: 2018-12-12 | Stop reason: HOSPADM

## 2018-12-12 RX ORDER — DOXAZOSIN MESYLATE 1 MG/1
2 TABLET ORAL 2 TIMES DAILY
Status: DISCONTINUED | OUTPATIENT
Start: 2018-12-12 | End: 2018-12-12 | Stop reason: HOSPADM

## 2018-12-12 RX ORDER — 0.9 % SODIUM CHLORIDE 0.9 %
1000 INTRAVENOUS SOLUTION INTRAVENOUS ONCE
Status: COMPLETED | OUTPATIENT
Start: 2018-12-12 | End: 2018-12-12

## 2018-12-12 RX ADMIN — REGADENOSON 0.4 MG: 0.08 INJECTION, SOLUTION INTRAVENOUS at 09:46

## 2018-12-12 RX ADMIN — ISOSORBIDE DINITRATE 20 MG: 20 TABLET ORAL at 08:23

## 2018-12-12 RX ADMIN — LISINOPRIL 40 MG: 40 TABLET ORAL at 08:23

## 2018-12-12 RX ADMIN — DILTIAZEM HYDROCHLORIDE 240 MG: 240 CAPSULE, COATED, EXTENDED RELEASE ORAL at 08:23

## 2018-12-12 RX ADMIN — Medication 30 MILLICURIE: at 11:05

## 2018-12-12 RX ADMIN — SODIUM CHLORIDE 1000 ML: 9 INJECTION, SOLUTION INTRAVENOUS at 02:26

## 2018-12-12 RX ADMIN — Medication 10 MILLICURIE: at 11:05

## 2018-12-12 RX ADMIN — ROPINIROLE HYDROCHLORIDE 1 MG: 1 TABLET, FILM COATED ORAL at 04:13

## 2018-12-12 RX ADMIN — HYDRALAZINE HYDROCHLORIDE 10 MG: 20 INJECTION INTRAMUSCULAR; INTRAVENOUS at 04:39

## 2018-12-12 RX ADMIN — HYDROCODONE BITARTRATE AND ACETAMINOPHEN 1 TABLET: 5; 325 TABLET ORAL at 03:47

## 2018-12-12 RX ADMIN — ASPIRIN 81 MG 324 MG: 81 TABLET ORAL at 01:22

## 2018-12-12 RX ADMIN — METOPROLOL SUCCINATE 200 MG: 100 TABLET, EXTENDED RELEASE ORAL at 12:42

## 2018-12-12 ASSESSMENT — PAIN SCALES - GENERAL
PAINLEVEL_OUTOF10: 4
PAINLEVEL_OUTOF10: 9

## 2018-12-12 ASSESSMENT — PAIN DESCRIPTION - LOCATION: LOCATION: BACK

## 2018-12-12 ASSESSMENT — PAIN DESCRIPTION - DESCRIPTORS: DESCRIPTORS: ACHING

## 2018-12-12 ASSESSMENT — PAIN DESCRIPTION - PAIN TYPE: TYPE: ACUTE PAIN

## 2018-12-12 NOTE — ED PROVIDER NOTES
Triage Chief Complaint:   Chest Pain    [place]:  Sienna Quiles is a 63 y.o. male that presents with now resolved chest pain. He states that around 7 PM he was sitting at home when he had substernal chest pain that was sharp in nature radiating to both arms associated with shortness of breath. No triggering event, alleviating or exacerbating factors. He states that this lasted for a few hours and resolved by the time he came to the emergency department. He has not had pain like this before in the past.  He denies any fever, cough, dizziness, palpitations, abdominal pain, nausea, vomiting, lower extremity pain or swelling. Patient has extensive cardiac history. ROS:  At least 14 systems reviewed and otherwise acutely negative except as in the [address] Past Medical History:   Diagnosis Date    Atrial fibrillation [facility]) 11/2013    CAD (coronary artery disease)     Diabetes mellitus ([facility] [address]     GERD (gastroesophageal reflux disease)     H/O cardiovascular stress test 11/20/13, 3/20/2013    11/13-Observed defect is consistent with diaphragmatic attenuation. EF 58%. 3/13 -EF 51%. No ischemia. Normal Study.  H/O cardiovascular stress test 06/08/2017    EF 50% normal study    H/O Doppler ultrasound 10/14/2010    10/2010-Bilateral venous doppler of lower extremies- No DVTs.  H/O percutaneous left heart catheterization 12/22/15    No evidence of hemodynamically significant coronary artery disease    History of coronary artery stent placement 11/13/2013 11/13 -RCA - 3 stents placed    History of exercise stress test 06/08/2017    treadmill    History of Holter monitoring 1/4/2016    predominant rhythm sinus    Hyperlipidemia     Hypertension     S/P cardiac catheterization 11/13/2013 11/13/2013-EF 55%, distal LAD mild disease,CX stent patent,but distal to stent 50% stenosis. RCA severe disease.     Status post angioplasty with stent      Past Surgical History:   Procedure Laterality Date    the absence of a cardiologist)  Sinus tachycardia with normal axis. ST elevation in aVR with diffuse subtle ST depression. This is largely unchanged from 1/6/17. Incomplete right bundle-branch block. No pathologic Q waves. QTC is slightly prolonged. MDM:  Plan of care is discussed thoroughly with the patient and family if present. If performed, all imaging and lab work also discussed with patient. All relevant prior results and chart reviewed if available. Patient presents with hours of chest pain concerning for possible acute coronary syndrome given the patient's extensive cardiac history. He is hypertensive here. Initial EKG is nondiagnostic and initial troponin testing protocols are within normal limits. At the present time, I doubt pulmonary embolus secondary to the lack of tachycardia, tachypnea, or hypoxia. Similarly, I doubt aortic dissection or abdominal aortic aneurysm secondary to history and description of pain, the patient's nonfocal vascular examination in all four extremities, and CXR unremarkable for signs of mediastinal widening. I also doubt pneumothorax given good bilateral breath sounds and chest x-ray with good lung markings out to the periphery. No signs suggestive of pneumonia on history or physical exam as well. Pericarditis and myocarditis unlikely based on history, EKG findings, and normal cardiac markers. Despite this, I cannot reliably exclude a cardiac etiology here in the emergency department. Therefore, I do feel it is most reasonable to admit the patient for further evaluation, management, and possible provocative testing/further intervention. Patient is agreeable with this plan of care. Of note, the patient's metabolic panel is significant for hyperglycemia with slightly elevated anion gap. The patient is non-insulin-dependent. He'll be started on IV fluids at this time. Clinical Impression:  1. Chest pain, unspecified type    2.  Hyperglycemia      (Please

## 2018-12-12 NOTE — ED NOTES
[facility]     Read Date Phone Pager   Joe Overton Dec 11, 2018 [phone]    Routing History     Priority Sent On From To Message Type    12/11/2018 11:29 PM Jerel, [facility] Incoming Radiology Results From Annia Pineda MD Results    12/11/2018 11:24 PM [facility] Incoming Lab Results From Princess [facility]) Madan Li MD Results   Radiation Dose Estimates     No radiation information found for this patient   Narrative   EXAMINATION:   SINGLE XRAY VIEW OF THE CHEST       12/11/2018 10:28 pm       COMPARISON:   Chest radiograph dated August 8, 2017       HISTORY:   ORDERING SYSTEM PROVIDED HISTORY: chest pain   TECHNOLOGIST PROVIDED HISTORY:   Reason for exam:->chest pain   Ordering Physician Provided Reason for Exam: chest pain   Acuity: Unknown   Type of Exam: Initial   Additional signs and symptoms: none   Relevant Medical/Surgical History: A-FIB, CAD       FINDINGS:   The lungs are without acute focal process.  There is no effusion or   pneumothorax. The cardiomediastinal silhouette is without acute process.  The   osseous structures are without acute process.           Impression   No acute process.              Alma Pete RN  12/12/18 [id number]

## 2018-12-12 NOTE — CONSULTS
process. The osseous structures are without acute process. No acute process. All labs, medications and tests reviewed by myself including data  from outside source , patient and available family . Continue all other medications of all above medical condition listed as is. Impression:  Active Problems:    Chest pain  Resolved Problems:    * No resolved hospital problems. *      Assessment: 63 y. o.year old with PMH of  has a past medical history of Atrial fibrillation ([facility] [address] CAD (coronary artery disease); Diabetes mellitus ([facility] [address] GERD (gastroesophageal reflux disease); H/O cardiovascular stress test; H/O cardiovascular stress test; H/O Doppler ultrasound; H/O percutaneous left heart catheterization; History of coronary artery stent placement; History of exercise stress test; History of Holter monitoring; Hyperlipidemia; Hypertension; S/P cardiac catheterization; and Status post angioplasty with stent. Plan and Recommendations:      Chest Pain: serial cardiac enzymes, EKG reviewed, will get stress test   HTN: stable, uncontrolled Start cardura 1 mg bid ,add hctz, , is he compliant with meds ? Ok to discharge of stress test is normal   DVT prophylaxis if no contraindication  6. Dyslipidemia: continue statins           Thank you  much for consult and giving us the opportunity in contributing in the care of this patient. Please feel free to call me for any questions.        Blenda Bernheim, MD, 12/12/2018 7:12 AM

## 2018-12-14 NOTE — DISCHARGE SUMMARY
artifact. Imaging Results    Summed scores     - Summed stress score: 7     - Summed rest score: 3     - Summed difference score:    4   Rest ejection  Ejection fraction:39 %  EDV :159 ml  ESV :97 ml  Stroke volume :62 ml  Medical History   Accession#:  [id number]  Admission Data Admission date: 12/12/2018 Admission Time: 00:55 Hospital Status: Outpatient. Significant Diagnostic Studies at discharge:   CBC:   Lab Results   Component Value Date    WBC 9.8 12/11/2018    RBC 5.37 12/11/2018    HGB 15.5 12/11/2018    HCT 48.2 12/11/2018    MCV 89.8 12/11/2018    MCH 28.9 12/11/2018    MCHC 32.2 12/11/2018    RDW 13.5 12/11/2018     12/11/2018    MPV 11.0 12/11/2018     Sodium:    Lab Results   Component Value Date     12/11/2018       Patient Instructions:     Medication List      CHANGE how you take these medications    lisinopril 10 MG tablet  Commonly known as:  PRINIVIL;ZESTRIL  Take 1 tablet by mouth daily  What changed:  how much to take        CONTINUE taking these medications    clopidogrel 300 MG Tabs  Commonly known as:  PLAVIX     diltiazem 240 MG extended release capsule  Commonly known as:  CARDIZEM CD     glipiZIDE 5 MG tablet  Commonly known as:  GLUCOTROL     HYDROcodone-acetaminophen 5-325 MG per tablet  Commonly known as:  NORCO  Take 1-2 tablets by mouth every 4 hours as needed for Pain     isosorbide dinitrate 20 MG tablet  Commonly known as:  ISORDIL  Take 1 tablet by mouth 3 times daily     metFORMIN 500 MG tablet  Commonly known as:  GLUCOPHAGE     methocarbamol 500 MG tablet  Commonly known as:  ROBAXIN     methylPREDNISolone 4 MG tablet  Commonly known as:  MEDROL DOSEPACK  Take by mouth.     metoprolol succinate 200 MG extended release tablet  Commonly known as:  TOPROL XL  Take 1 tablet by mouth daily     nitroGLYCERIN 0.4 MG SL tablet  Commonly known as:  NITROSTAT  Place 1 tablet under the tongue once for 1 dose.      predniSONE 10 MG tablet  Commonly known as:  DELTASONE  5

## 2018-12-17 LAB
EKG ATRIAL RATE: 109 BPM
EKG DIAGNOSIS: NORMAL
EKG P AXIS: 64 DEGREES
EKG P-R INTERVAL: 172 MS
EKG Q-T INTERVAL: 368 MS
EKG QRS DURATION: 104 MS
EKG QTC CALCULATION (BAZETT): 495 MS
EKG R AXIS: -8 DEGREES
EKG T AXIS: 81 DEGREES
EKG VENTRICULAR RATE: 109 BPM

## 2019-03-01 ENCOUNTER — HOSPITAL ENCOUNTER (EMERGENCY)
Age: 59
Discharge: HOME | End: 2019-03-02
Attending: EMERGENCY MEDICINE
Payer: COMMERCIAL

## 2019-03-01 ENCOUNTER — APPOINTMENT (OUTPATIENT)
Dept: GENERAL RADIOLOGY | Age: 59
End: 2019-03-01
Payer: COMMERCIAL

## 2019-03-01 VITALS
HEART RATE: 83 BPM | DIASTOLIC BLOOD PRESSURE: 105 MMHG | TEMPERATURE: 99.8 F | RESPIRATION RATE: 27 BRPM | HEIGHT: 75 IN | OXYGEN SATURATION: 100 % | WEIGHT: 250 LBS | SYSTOLIC BLOOD PRESSURE: 154 MMHG | BODY MASS INDEX: 31.08 KG/M2

## 2019-03-01 DIAGNOSIS — J44.1 COPD WITH EXACERBATION (HCC): Primary | ICD-10-CM

## 2019-03-01 DIAGNOSIS — J10.1 INFLUENZA A: ICD-10-CM

## 2019-03-01 LAB
ALBUMIN SERPL-MCNC: 4.1 GM/DL (ref 3.4–5)
ALP BLD-CCNC: 64 IU/L (ref 40–128)
ALT SERPL-CCNC: 8 U/L (ref 10–40)
ANION GAP SERPL CALCULATED.3IONS-SCNC: 10 MMOL/L (ref 4–16)
AST SERPL-CCNC: 14 IU/L (ref 15–37)
BASOPHILS ABSOLUTE: 0.1 K/CU MM
BASOPHILS RELATIVE PERCENT: 0.6 % (ref 0–1)
BILIRUB SERPL-MCNC: 0.5 MG/DL (ref 0–1)
BUN BLDV-MCNC: 28 MG/DL (ref 6–23)
CALCIUM SERPL-MCNC: 9.5 MG/DL (ref 8.3–10.6)
CHLORIDE BLD-SCNC: 93 MMOL/L (ref 99–110)
CO2: 30 MMOL/L (ref 21–32)
CREAT SERPL-MCNC: 1.5 MG/DL (ref 0.9–1.3)
DIFFERENTIAL TYPE: ABNORMAL
EOSINOPHILS ABSOLUTE: 0.1 K/CU MM
EOSINOPHILS RELATIVE PERCENT: 1.3 % (ref 0–3)
GFR AFRICAN AMERICAN: 58 ML/MIN/1.73M2
GFR NON-AFRICAN AMERICAN: 48 ML/MIN/1.73M2
GLUCOSE BLD-MCNC: 155 MG/DL (ref 70–99)
HCT VFR BLD CALC: 38.6 % (ref 42–52)
HEMOGLOBIN: 12.9 GM/DL (ref 13.5–18)
IMMATURE NEUTROPHIL %: 0.3 % (ref 0–0.43)
LYMPHOCYTES ABSOLUTE: 1.1 K/CU MM
LYMPHOCYTES RELATIVE PERCENT: 11.6 % (ref 24–44)
MAGNESIUM: 1.8 MG/DL (ref 1.8–2.4)
MCH RBC QN AUTO: 30.1 PG (ref 27–31)
MCHC RBC AUTO-ENTMCNC: 33.4 % (ref 32–36)
MCV RBC AUTO: 90 FL (ref 78–100)
MONOCYTES ABSOLUTE: 1 K/CU MM
MONOCYTES RELATIVE PERCENT: 10.4 % (ref 0–4)
NUCLEATED RBC %: 0 %
PDW BLD-RTO: 13.6 % (ref 11.7–14.9)
PLATELET # BLD: 198 K/CU MM (ref 140–440)
PMV BLD AUTO: 10.4 FL (ref 7.5–11.1)
POTASSIUM SERPL-SCNC: 3.1 MMOL/L (ref 3.5–5.1)
RAPID INFLUENZA  B AGN: NEGATIVE
RAPID INFLUENZA A AGN: POSITIVE
RBC # BLD: 4.29 M/CU MM (ref 4.6–6.2)
SEGMENTED NEUTROPHILS ABSOLUTE COUNT: 7.1 K/CU MM
SEGMENTED NEUTROPHILS RELATIVE PERCENT: 75.8 % (ref 36–66)
SODIUM BLD-SCNC: 133 MMOL/L (ref 135–145)
TOTAL IMMATURE NEUTOROPHIL: 0.03 K/CU MM
TOTAL NUCLEATED RBC: 0 K/CU MM
TOTAL PROTEIN: 7.1 GM/DL (ref 6.4–8.2)
TROPONIN T: <0.01 NG/ML
WBC # BLD: 9.4 K/CU MM (ref 4–10.5)

## 2019-03-01 PROCEDURE — 94761 N-INVAS EAR/PLS OXIMETRY MLT: CPT

## 2019-03-01 PROCEDURE — 84484 ASSAY OF TROPONIN QUANT: CPT

## 2019-03-01 PROCEDURE — 93005 ELECTROCARDIOGRAM TRACING: CPT | Performed by: EMERGENCY MEDICINE

## 2019-03-01 PROCEDURE — 99284 EMERGENCY DEPT VISIT MOD MDM: CPT

## 2019-03-01 PROCEDURE — 80053 COMPREHEN METABOLIC PANEL: CPT

## 2019-03-01 PROCEDURE — 83735 ASSAY OF MAGNESIUM: CPT

## 2019-03-01 PROCEDURE — 6370000000 HC RX 637 (ALT 250 FOR IP): Performed by: EMERGENCY MEDICINE

## 2019-03-01 PROCEDURE — 87804 INFLUENZA ASSAY W/OPTIC: CPT

## 2019-03-01 PROCEDURE — 94640 AIRWAY INHALATION TREATMENT: CPT

## 2019-03-01 PROCEDURE — 85025 COMPLETE CBC W/AUTO DIFF WBC: CPT

## 2019-03-01 RX ORDER — POTASSIUM BICARBONATE 25 MEQ/1
TABLET, EFFERVESCENT ORAL
Status: DISCONTINUED
Start: 2019-03-01 | End: 2019-03-02 | Stop reason: HOSPADM

## 2019-03-01 RX ORDER — HYDROCODONE BITARTRATE AND ACETAMINOPHEN 5; 325 MG/1; MG/1
TABLET ORAL
Status: DISCONTINUED
Start: 2019-03-01 | End: 2019-03-02 | Stop reason: HOSPADM

## 2019-03-01 RX ORDER — OSELTAMIVIR PHOSPHATE 75 MG/1
CAPSULE ORAL
Status: DISCONTINUED
Start: 2019-03-01 | End: 2019-03-02 | Stop reason: HOSPADM

## 2019-03-01 RX ORDER — PREDNISONE 20 MG/1
40 TABLET ORAL ONCE
Status: COMPLETED | OUTPATIENT
Start: 2019-03-01 | End: 2019-03-01

## 2019-03-01 RX ORDER — SODIUM CHLORIDE 9 MG/ML
INJECTION, SOLUTION INTRAVENOUS
Status: DISCONTINUED
Start: 2019-03-01 | End: 2019-03-02 | Stop reason: HOSPADM

## 2019-03-01 RX ORDER — IPRATROPIUM BROMIDE AND ALBUTEROL SULFATE 2.5; .5 MG/3ML; MG/3ML
3 SOLUTION RESPIRATORY (INHALATION) ONCE
Status: COMPLETED | OUTPATIENT
Start: 2019-03-01 | End: 2019-03-01

## 2019-03-01 RX ADMIN — IPRATROPIUM BROMIDE AND ALBUTEROL SULFATE 3 AMPULE: .5; 3 SOLUTION RESPIRATORY (INHALATION) at 22:44

## 2019-03-01 RX ADMIN — PREDNISONE 40 MG: 20 TABLET ORAL at 23:00

## 2019-03-01 ASSESSMENT — PAIN DESCRIPTION - LOCATION: LOCATION: GENERALIZED

## 2019-03-01 ASSESSMENT — PAIN DESCRIPTION - PAIN TYPE: TYPE: ACUTE PAIN

## 2019-03-01 ASSESSMENT — PAIN SCALES - GENERAL: PAINLEVEL_OUTOF10: 10

## 2019-03-02 PROCEDURE — 93010 ELECTROCARDIOGRAM REPORT: CPT | Performed by: INTERNAL MEDICINE

## 2019-03-02 RX ORDER — HYDROCODONE BITARTRATE AND ACETAMINOPHEN 5; 325 MG/1; MG/1
2 TABLET ORAL ONCE
Status: DISCONTINUED | OUTPATIENT
Start: 2019-03-01 | End: 2019-03-02 | Stop reason: HOSPADM

## 2019-03-19 LAB
EKG ATRIAL RATE: 79 BPM
EKG DIAGNOSIS: NORMAL
EKG P AXIS: 12 DEGREES
EKG P-R INTERVAL: 164 MS
EKG Q-T INTERVAL: 404 MS
EKG QRS DURATION: 108 MS
EKG QTC CALCULATION (BAZETT): 463 MS
EKG R AXIS: 8 DEGREES
EKG T AXIS: 95 DEGREES
EKG VENTRICULAR RATE: 79 BPM

## 2019-05-02 ENCOUNTER — OFFICE VISIT (OUTPATIENT)
Dept: PHYSICAL MEDICINE AND REHAB | Age: 59
End: 2019-05-02
Payer: COMMERCIAL

## 2019-05-02 DIAGNOSIS — G56.20 ULNAR NEUROPATHY AT WRIST, UNSPECIFIED LATERALITY: ICD-10-CM

## 2019-05-02 DIAGNOSIS — M79.602 PARESTHESIA AND PAIN OF BOTH UPPER EXTREMITIES: ICD-10-CM

## 2019-05-02 DIAGNOSIS — M79.601 PARESTHESIA AND PAIN OF BOTH UPPER EXTREMITIES: ICD-10-CM

## 2019-05-02 DIAGNOSIS — G56.03 BILATERAL CARPAL TUNNEL SYNDROME: Primary | ICD-10-CM

## 2019-05-02 DIAGNOSIS — R20.2 PARESTHESIA AND PAIN OF BOTH UPPER EXTREMITIES: ICD-10-CM

## 2019-05-02 PROCEDURE — 95885 MUSC TST DONE W/NERV TST LIM: CPT | Performed by: PHYSICAL MEDICINE & REHABILITATION

## 2019-05-02 PROCEDURE — 95911 NRV CNDJ TEST 9-10 STUDIES: CPT | Performed by: PHYSICAL MEDICINE & REHABILITATION

## 2019-05-02 PROCEDURE — 99999 PR OFFICE/OUTPT VISIT,PROCEDURE ONLY: CPT | Performed by: PHYSICAL MEDICINE & REHABILITATION
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
## 2019-06-10 ENCOUNTER — TELEPHONE (OUTPATIENT)
Dept: CARDIOLOGY CLINIC | Age: 59
End: 2019-06-10

## 2019-06-10 DIAGNOSIS — R94.30 EJECTION FRACTION < 50%: Primary | ICD-10-CM

## 2019-06-10 NOTE — TELEPHONE ENCOUNTER
Request for DOT letter received from [address] [place] [address]. 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 Patient happened to come in to office. I advised him that he needs to see doctor. Patient agreed. Appt Fri 6/14/19 @ 9:00 with Dr. Regi Mcghee. Patient's last EF per NM 39%. Order put in for Echo.

## 2019-06-10 NOTE — TELEPHONE ENCOUNTER
Patient came to the office this morning wanting to speak to you about the status of his Cardiac Clearance letter. He would like for you to call him when you can at [phone].

## 2019-06-11 NOTE — TELEPHONE ENCOUNTER
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   Appt made for 6/14/19 @ 9:00

## 2019-06-14 ENCOUNTER — OFFICE VISIT (OUTPATIENT)
Dept: CARDIOLOGY CLINIC | Age: 59
End: 2019-06-14
Payer: COMMERCIAL

## 2019-06-14 VITALS
SYSTOLIC BLOOD PRESSURE: 162 MMHG | BODY MASS INDEX: 32.45 KG/M2 | HEART RATE: 64 BPM | WEIGHT: 251 LBS | OXYGEN SATURATION: 93 % | DIASTOLIC BLOOD PRESSURE: 98 MMHG

## 2019-06-14 DIAGNOSIS — I25.10 CORONARY ARTERY DISEASE INVOLVING NATIVE CORONARY ARTERY OF NATIVE HEART WITHOUT ANGINA PECTORIS: ICD-10-CM

## 2019-06-14 DIAGNOSIS — R94.30 EJECTION FRACTION < 50%: Primary | ICD-10-CM

## 2019-06-14 PROCEDURE — 4004F PT TOBACCO SCREEN RCVD TLK: CPT | Performed by: INTERNAL MEDICINE

## 2019-06-14 PROCEDURE — G8427 DOCREV CUR MEDS BY ELIG CLIN: HCPCS | Performed by: INTERNAL MEDICINE

## 2019-06-14 PROCEDURE — G8417 CALC BMI ABV UP PARAM F/U: HCPCS | Performed by: INTERNAL MEDICINE

## 2019-06-14 PROCEDURE — G8598 ASA/ANTIPLAT THER USED: HCPCS | Performed by: INTERNAL MEDICINE

## 2019-06-14 PROCEDURE — 93000 ELECTROCARDIOGRAM COMPLETE: CPT | Performed by: INTERNAL MEDICINE

## 2019-06-14 PROCEDURE — 99214 OFFICE O/P EST MOD 30 MIN: CPT | Performed by: INTERNAL MEDICINE

## 2019-06-14 PROCEDURE — 3017F COLORECTAL CA SCREEN DOC REV: CPT | Performed by: INTERNAL MEDICINE

## 2019-06-14 RX ORDER — CARVEDILOL 25 MG/1
25 TABLET ORAL 2 TIMES DAILY WITH MEALS
Status: ON HOLD | COMMUNITY
End: 2019-07-31 | Stop reason: HOSPADM

## 2019-06-14 RX ORDER — CHLORTHALIDONE 50 MG/1
50 TABLET ORAL DAILY
COMMUNITY
End: 2019-07-12

## 2019-06-14 RX ORDER — GLIMEPIRIDE 4 MG/1
4 TABLET ORAL
Status: ON HOLD | COMMUNITY
End: 2020-01-07 | Stop reason: SDUPTHER

## 2019-06-14 NOTE — PROGRESS NOTES
Eliza Watt MD        OFFICE  FOLLOWUP NOTE    Chief complaints: patient is here for management of CAD, DM, HTN, DYSLPIDEMIA    Subjective: patient feels better, no chest pain, no shortness of breath, no dizziness, no palpitations    HPI Joel Kent is a 63 y. o.year old who  has a past medical history of Atrial fibrillation ([facility] [address] CAD (coronary artery disease), Diabetes mellitus ([facility] [address] GERD (gastroesophageal reflux disease), H/O cardiovascular stress test, H/O cardiovascular stress test, H/O Doppler ultrasound, H/O percutaneous left heart catheterization, History of coronary artery stent placement, History of exercise stress test, History of Holter monitoring, Hyperlipidemia, Hypertension, S/P cardiac catheterization, and Status post angioplasty with stent. and presents for management of CAD, DM, HTN, and dyslpidemia, which are well controlled except for HTN, he will need DOT physical.  His stress test last yr was abnormal, his LVEF 39% with global hypokinesis and had possible inferior wall hypokinesis.       Current Outpatient Medications   Medication Sig Dispense Refill    aspirin 81 MG tablet TAKE 1 TABLET BY MOUTH EVERY DAY      carvedilol (COREG) 25 MG tablet Take 25 mg by mouth 2 times daily (with meals)      chlorthalidone (HYGROTEN) 50 MG tablet Take 50 mg by mouth daily      glimepiride (AMARYL) 4 MG tablet Take 4 mg by mouth every morning (before breakfast)      clopidogrel (PLAVIX) 300 MG TABS Take 300 mg by mouth daily      methocarbamol (ROBAXIN) 500 MG tablet Take 500 mg by mouth 4 times daily      simvastatin (ZOCOR) 40 MG tablet Take 1 tablet by mouth nightly 30 tablet 3    lisinopril (PRINIVIL;ZESTRIL) 10 MG tablet Take 1 tablet by mouth daily (Patient taking differently: Take 20 mg by mouth daily ) 30 tablet 3    metFORMIN (GLUCOPHAGE) 500 MG tablet Take 500 mg by mouth daily (with breakfast) Indications: unsure of dosage taken       nitroGLYCERIN (NITROSTAT) 0.4 MG SL tablet Place 1 tablet under the tongue once for 1 dose. 25 tablet 3     No current facility-administered medications for this visit. Allergies: Patient has no known allergies. Past Medical History:   Diagnosis Date    Atrial fibrillation [facility]) 11/2013    CAD (coronary artery disease)     Diabetes mellitus ([address]     GERD (gastroesophageal reflux disease)     H/O cardiovascular stress test 11/20/13, 3/20/2013    11/13-Observed defect is consistent with diaphragmatic attenuation. EF 58%. 3/13 -EF 51%. No ischemia. Normal Study.  H/O cardiovascular stress test 06/08/2017    EF 50% normal study    H/O Doppler ultrasound 10/14/2010    10/2010-Bilateral venous doppler of lower extremies- No DVTs.  H/O percutaneous left heart catheterization 12/22/15    No evidence of hemodynamically significant coronary artery disease    History of coronary artery stent placement 11/13/2013 11/13 -RCA - 3 stents placed    History of exercise stress test 06/08/2017    treadmill    History of Holter monitoring 1/4/2016    predominant rhythm sinus    Hyperlipidemia     Hypertension     S/P cardiac catheterization 11/13/2013 11/13/2013-EF 55%, distal LAD mild disease,CX stent patent,but distal to stent 50% stenosis. RCA severe disease.  Status post angioplasty with stent      Past Surgical History:   Procedure Laterality Date    APPENDECTOMY      CARDIAC CATHETERIZATION  11/13/2013 11/13/2013-EF 55%, distal LAD mild disease,CX stent patent,but distal to stent 50% stenosis. RCA severe disease.     CORONARY ANGIOPLASTY WITH STENT PLACEMENT      4 stents- RCA X3; CX X1    CORONARY ANGIOPLASTY WITH STENT PLACEMENT  11/13/2013 11/13/2013 -3 stents placed to RCA- Dr Dena Low History   Problem Relation Age of Onset    Cancer Mother     Diabetes Mother     Heart Disease Mother     High Blood Pressure Mother     Stroke Mother     Cancer Father     Heart Disease Father     High Blood Pressure Father     Stroke Father     Cancer Brother      Social History     Tobacco Use    Smoking status: Current Every Day Smoker     Packs/day: 4.00     Years: 44.00     Pack years: 176.00    Smokeless tobacco: Never Used   Substance Use Topics    Alcohol use: No      [unfilled]  Review of Systems:   · Constitutional: No Fever or Weight Loss   · Eyes: No Decreased Vision  · ENT: No Headaches, Hearing Loss or Vertigo  · Cardiovascular: No chest pain, dyspnea on exertion, palpitations or loss of consciousness  · Respiratory: No cough or wheezing    · Gastrointestinal: No abdominal pain, appetite loss, blood in stools, constipation, diarrhea or heartburn  · Genitourinary: No dysuria, trouble voiding, or hematuria  · Musculoskeletal:  No gait disturbance, weakness or joint complaints  · Integumentary: No rash or pruritis  · Neurological: No TIA or stroke symptoms  · Psychiatric: No anxiety or depression  · Endocrine: No malaise, fatigue or temperature intolerance  · Hematologic/Lymphatic: No bleeding problems, blood clots or swollen lymph nodes  · Allergic/Immunologic: No nasal congestion or hives  All systems negative except as marked. Objective:  BP (!) 162/98   Pulse 64   Wt 251 lb (113.9 kg)   SpO2 93%   BMI 32.45 kg/m²   Wt Readings from Last 3 Encounters:   06/14/19 251 lb (113.9 kg)   06/04/19 252 lb 12.8 oz (114.7 kg)   03/01/19 250 lb (113.4 kg)     Body mass index is 32.45 kg/m². GENERAL - Alert, oriented, pleasant, in no apparent distress,normal grooming  HEENT - pupils are reactive to light and accomodation, cornea intact, conjunctive normal color, ears are normal in exam,throat exam in normal, teeth, gum and palate are normal, oral mucosa is normal without any notation of pallor or cyanosis  Neck - Supple. No jugular venous distention noted.  No carotid bruits, no apical -carotid delay  Respiratory:  Normal breath sounds, No respiratory distress, No wheezing, No chest tenderness. ,no use of accessory muscles, diaphragm movement is normal  Cardiovascular: (PMI) apex non displaced,no lifts no thrills, no s3,no s4, Normal heart rate, Normal rhythm, No murmurs, No rubs, No gallops. Carotid arteries pulse and amplitude are normal no bruit, no abdominal bruit noted ( normal abdominal aorta ausculation), femoral arteries pulse and amplitude are normal no bruit, pedal pulses are normal  Femoral pulses have normal amplitude, no bruits   Extremities - No cyanosis, clubbing, or significant edema, no varicose veins    Abdomen - No masses, tenderness, or organomegaly, no hepato-splenomegally, no bruits  Musculoskeletal - No significant edema, no kyphosis or scoliosis, no deformity in any extremity noted, muscle strength and tone are normal  Skin: no ulcer,no scar,no stasis dermatitis   Neurologic - alert oriented times 3,Cranial nerves II through XII are grossly intact. There were no gross focal neurologic abnormalities. All sensory and motor nerves examined and were normal  Psychiatric: normal mood and affect    Lab Results   Component Value Date    CKTOTAL 107 03/10/2013    CKMB 2.4 03/10/2013    TROPONINI 0.014 06/09/2014    TROPONINI 0.015 12/03/2011     BNP:    Lab Results   Component Value Date    BNP 17 03/09/2013     PT/INR:  No results found for: PTINR  Lab Results   Component Value Date    LABA1C 6.3 12/12/2018     No results found for: CHOL, TRIG, HDL, LDLCALC, LDLDIRECT  Lab Results   Component Value Date    ALT 8 (L) 03/01/2019    AST 14 (L) 03/01/2019     TSH:  No results found for: TSH    Impression:  Dioni Almazan is a 63 y. o.year old who  has a past medical history of Atrial fibrillation ([facility] [address] CAD (coronary artery disease), Diabetes mellitus ([facility] [address] GERD (gastroesophageal reflux disease), H/O cardiovascular stress test, H/O cardiovascular stress test, H/O Doppler ultrasound, H/O percutaneous left heart catheterization, History of coronary artery stent placement, History of exercise stress test, History of Holter monitoring, Hyperlipidemia, Hypertension, S/P cardiac catheterization, and Status post angioplasty with stent. and presents with     Plan:  1. CAD: h.o RCA stent in 2013, had 2 caths after that, his last stress test was abnormal,discussed about getting [facility] however, he prefers to get stress test, will get stress test and echo Continue aspirin and plavix , continue statins, ACEinhibitors, betablockers  2. 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 DM: stable continue metformin   4. Dyslipidemia: continue statins  5. HTN: stable, continue lopressor and lisinopril medicatons  6. Health maintenance: exerise and diet  All labs, medications and tests reviewed, continue all other medications of all above medical condition listed as is.

## 2019-06-17 ENCOUNTER — PROCEDURE VISIT (OUTPATIENT)
Dept: CARDIOLOGY CLINIC | Age: 59
End: 2019-06-17
Payer: COMMERCIAL

## 2019-06-17 DIAGNOSIS — Z98.61 POST PTCA: ICD-10-CM

## 2019-06-17 DIAGNOSIS — I25.10 CORONARY ARTERY DISEASE INVOLVING NATIVE CORONARY ARTERY OF NATIVE HEART WITHOUT ANGINA PECTORIS: ICD-10-CM

## 2019-06-17 LAB
LV EF: 46 %
LVEF MODALITY: NORMAL

## 2019-06-17 PROCEDURE — 93015 CV STRESS TEST SUPVJ I&R: CPT | Performed by: INTERNAL MEDICINE

## 2019-06-17 PROCEDURE — 78452 HT MUSCLE IMAGE SPECT MULT: CPT | Performed by: INTERNAL MEDICINE

## 2019-06-17 PROCEDURE — A9500 TC99M SESTAMIBI: HCPCS | Performed by: INTERNAL MEDICINE

## 2019-06-18 ENCOUNTER — TELEPHONE (OUTPATIENT)
Dept: CARDIOLOGY CLINIC | Age: 59
End: 2019-06-18

## 2019-06-18 NOTE — TELEPHONE ENCOUNTER
Called patient to schedule echo. Left message for patient to call to schedule, authorization #[id number] EXP 7/29/19, weight 251 lbs.

## 2019-06-19 ENCOUNTER — TELEPHONE (OUTPATIENT)
Dept: CARDIOLOGY CLINIC | Age: 59
End: 2019-06-19

## 2019-06-21 ENCOUNTER — TELEPHONE (OUTPATIENT)
Dept: CARDIOLOGY CLINIC | Age: 59
End: 2019-06-21

## 2019-06-21 NOTE — TELEPHONE ENCOUNTER
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 Patient agreed.

## 2019-06-26 ENCOUNTER — PROCEDURE VISIT (OUTPATIENT)
Dept: CARDIOLOGY CLINIC | Age: 59
End: 2019-06-26
Payer: COMMERCIAL

## 2019-06-26 DIAGNOSIS — I25.10 CORONARY ARTERY DISEASE INVOLVING NATIVE CORONARY ARTERY OF NATIVE HEART WITHOUT ANGINA PECTORIS: ICD-10-CM

## 2019-06-26 LAB
LV EF: 53 %
LVEF MODALITY: NORMAL

## 2019-06-26 PROCEDURE — 93306 TTE W/DOPPLER COMPLETE: CPT | Performed by: INTERNAL MEDICINE

## 2019-06-28 ENCOUNTER — TELEPHONE (OUTPATIENT)
Dept: CARDIOLOGY CLINIC | Age: 59
End: 2019-06-28

## 2019-07-11 ENCOUNTER — APPOINTMENT (OUTPATIENT)
Dept: GENERAL RADIOLOGY | Age: 59
End: 2019-07-11

## 2019-07-11 ENCOUNTER — HOSPITAL ENCOUNTER (EMERGENCY)
Age: 59
Discharge: LEFT BEFORE DISCHARGE | End: 2019-07-11
Attending: EMERGENCY MEDICINE
Payer: COMMERCIAL

## 2019-07-11 VITALS
SYSTOLIC BLOOD PRESSURE: 173 MMHG | HEIGHT: 74 IN | WEIGHT: 251 LBS | DIASTOLIC BLOOD PRESSURE: 83 MMHG | HEART RATE: 58 BPM | TEMPERATURE: 98.5 F | RESPIRATION RATE: 12 BRPM | OXYGEN SATURATION: 99 % | BODY MASS INDEX: 32.21 KG/M2

## 2019-07-11 DIAGNOSIS — Z53.29 LEFT AGAINST MEDICAL ADVICE: ICD-10-CM

## 2019-07-11 DIAGNOSIS — R77.8 ELEVATED TROPONIN I MEASUREMENT: ICD-10-CM

## 2019-07-11 DIAGNOSIS — N17.9 AKI (ACUTE KIDNEY INJURY) (HCC): ICD-10-CM

## 2019-07-11 DIAGNOSIS — R07.9 CHEST PAIN, UNSPECIFIED TYPE: Primary | ICD-10-CM

## 2019-07-11 LAB
ALBUMIN SERPL-MCNC: 4.5 GM/DL (ref 3.4–5)
ALP BLD-CCNC: 84 IU/L (ref 40–129)
ALT SERPL-CCNC: 10 U/L (ref 10–40)
ANION GAP SERPL CALCULATED.3IONS-SCNC: 12 MMOL/L (ref 4–16)
AST SERPL-CCNC: 14 IU/L (ref 15–37)
BASOPHILS ABSOLUTE: 0.1 K/CU MM
BASOPHILS RELATIVE PERCENT: 0.8 % (ref 0–1)
BILIRUB SERPL-MCNC: 0.2 MG/DL (ref 0–1)
BUN BLDV-MCNC: 32 MG/DL (ref 6–23)
CALCIUM SERPL-MCNC: 10.1 MG/DL (ref 8.3–10.6)
CHLORIDE BLD-SCNC: 101 MMOL/L (ref 99–110)
CO2: 25 MMOL/L (ref 21–32)
CREAT SERPL-MCNC: 2.6 MG/DL (ref 0.9–1.3)
DIFFERENTIAL TYPE: ABNORMAL
EOSINOPHILS ABSOLUTE: 0.5 K/CU MM
EOSINOPHILS RELATIVE PERCENT: 3.3 % (ref 0–3)
GFR AFRICAN AMERICAN: 31 ML/MIN/1.73M2
GFR NON-AFRICAN AMERICAN: 25 ML/MIN/1.73M2
GLUCOSE BLD-MCNC: 116 MG/DL (ref 70–99)
HCT VFR BLD CALC: 40.9 % (ref 42–52)
HEMOGLOBIN: 13.3 GM/DL (ref 13.5–18)
IMMATURE NEUTROPHIL %: 0.4 % (ref 0–0.43)
LYMPHOCYTES ABSOLUTE: 2.9 K/CU MM
LYMPHOCYTES RELATIVE PERCENT: 21.7 % (ref 24–44)
MCH RBC QN AUTO: 30 PG (ref 27–31)
MCHC RBC AUTO-ENTMCNC: 32.5 % (ref 32–36)
MCV RBC AUTO: 92.1 FL (ref 78–100)
MONOCYTES ABSOLUTE: 0.8 K/CU MM
MONOCYTES RELATIVE PERCENT: 6.3 % (ref 0–4)
NUCLEATED RBC %: 0 %
PDW BLD-RTO: 13.2 % (ref 11.7–14.9)
PLATELET # BLD: 239 K/CU MM (ref 140–440)
PMV BLD AUTO: 10.7 FL (ref 7.5–11.1)
POTASSIUM SERPL-SCNC: 3.7 MMOL/L (ref 3.5–5.1)
RBC # BLD: 4.44 M/CU MM (ref 4.6–6.2)
SEGMENTED NEUTROPHILS ABSOLUTE COUNT: 9.1 K/CU MM
SEGMENTED NEUTROPHILS RELATIVE PERCENT: 67.5 % (ref 36–66)
SODIUM BLD-SCNC: 138 MMOL/L (ref 135–145)
TOTAL IMMATURE NEUTOROPHIL: 0.06 K/CU MM
TOTAL NUCLEATED RBC: 0 K/CU MM
TOTAL PROTEIN: 7.2 GM/DL (ref 6.4–8.2)
TROPONIN T: 0.03 NG/ML
WBC # BLD: 13.4 K/CU MM (ref 4–10.5)

## 2019-07-11 PROCEDURE — 85025 COMPLETE CBC W/AUTO DIFF WBC: CPT

## 2019-07-11 PROCEDURE — 6370000000 HC RX 637 (ALT 250 FOR IP): Performed by: EMERGENCY MEDICINE

## 2019-07-11 PROCEDURE — 84484 ASSAY OF TROPONIN QUANT: CPT

## 2019-07-11 PROCEDURE — 99285 EMERGENCY DEPT VISIT HI MDM: CPT

## 2019-07-11 PROCEDURE — 93005 ELECTROCARDIOGRAM TRACING: CPT | Performed by: EMERGENCY MEDICINE

## 2019-07-11 PROCEDURE — 80053 COMPREHEN METABOLIC PANEL: CPT

## 2019-07-11 PROCEDURE — 71046 X-RAY EXAM CHEST 2 VIEWS: CPT

## 2019-07-11 PROCEDURE — 36415 COLL VENOUS BLD VENIPUNCTURE: CPT

## 2019-07-11 RX ORDER — NITROGLYCERIN 0.4 MG/1
0.4 TABLET SUBLINGUAL EVERY 5 MIN PRN
Status: DISCONTINUED | OUTPATIENT
Start: 2019-07-11 | End: 2019-07-12 | Stop reason: HOSPADM

## 2019-07-11 RX ORDER — ASPIRIN 81 MG/1
324 TABLET, CHEWABLE ORAL ONCE
Status: COMPLETED | OUTPATIENT
Start: 2019-07-11 | End: 2019-07-11

## 2019-07-11 RX ADMIN — ASPIRIN 81 MG 324 MG: 81 TABLET ORAL at 23:12

## 2019-07-11 ASSESSMENT — PAIN DESCRIPTION - LOCATION: LOCATION: CHEST

## 2019-07-11 ASSESSMENT — PAIN SCALES - GENERAL: PAINLEVEL_OUTOF10: 10

## 2019-07-11 ASSESSMENT — PAIN DESCRIPTION - PAIN TYPE: TYPE: ACUTE PAIN

## 2019-07-12 ENCOUNTER — OFFICE VISIT (OUTPATIENT)
Dept: CARDIOLOGY CLINIC | Age: 59
End: 2019-07-12
Payer: COMMERCIAL

## 2019-07-12 VITALS
DIASTOLIC BLOOD PRESSURE: 82 MMHG | OXYGEN SATURATION: 98 % | SYSTOLIC BLOOD PRESSURE: 136 MMHG | BODY MASS INDEX: 32.23 KG/M2 | WEIGHT: 251 LBS | HEART RATE: 74 BPM

## 2019-07-12 DIAGNOSIS — N17.9 ACUTE RENAL FAILURE, UNSPECIFIED ACUTE RENAL FAILURE TYPE (HCC): Primary | ICD-10-CM

## 2019-07-12 PROCEDURE — G8427 DOCREV CUR MEDS BY ELIG CLIN: HCPCS | Performed by: INTERNAL MEDICINE

## 2019-07-12 PROCEDURE — 4004F PT TOBACCO SCREEN RCVD TLK: CPT | Performed by: INTERNAL MEDICINE

## 2019-07-12 PROCEDURE — 99214 OFFICE O/P EST MOD 30 MIN: CPT | Performed by: INTERNAL MEDICINE

## 2019-07-12 PROCEDURE — 3017F COLORECTAL CA SCREEN DOC REV: CPT | Performed by: INTERNAL MEDICINE

## 2019-07-12 PROCEDURE — 93010 ELECTROCARDIOGRAM REPORT: CPT | Performed by: INTERNAL MEDICINE

## 2019-07-12 PROCEDURE — G8417 CALC BMI ABV UP PARAM F/U: HCPCS | Performed by: INTERNAL MEDICINE

## 2019-07-12 PROCEDURE — G8598 ASA/ANTIPLAT THER USED: HCPCS | Performed by: INTERNAL MEDICINE

## 2019-07-12 RX ORDER — NITROGLYCERIN 0.4 MG/1
0.4 TABLET SUBLINGUAL ONCE
Qty: 25 TABLET | Refills: 3 | Status: ON HOLD | OUTPATIENT
Start: 2019-07-12 | End: 2020-01-07 | Stop reason: HOSPADM

## 2019-07-12 ASSESSMENT — ENCOUNTER SYMPTOMS
BACK PAIN: 0
COUGH: 0
SHORTNESS OF BREATH: 1
NAUSEA: 1

## 2019-07-12 NOTE — PROGRESS NOTES
(NITROSTAT) 0.4 MG SL tablet Place 1 tablet under the tongue once for 1 dose. 25 tablet 3     No current facility-administered medications for this visit. Allergies: Patient has no known allergies. Past Medical History:   Diagnosis Date    Atrial fibrillation [facility]) 11/2013    CAD (coronary artery disease)     Diabetes mellitus ([facility] [address]     GERD (gastroesophageal reflux disease)     H/O cardiovascular stress test 11/20/13, 3/20/2013    11/13-Observed defect is consistent with diaphragmatic attenuation. EF 58%. 3/13 -EF 51%. No ischemia. Normal Study.  H/O cardiovascular stress test 06/08/2017    EF 50% normal study    H/O cardiovascular stress test 06/17/2019    Normal NM    H/O Doppler ultrasound 10/14/2010    10/2010-Bilateral venous doppler of lower extremies- No DVTs.  H/O echocardiogram 06/26/2019    Left ventricular systolic function is normal with an ejection fraction of 50-55%. Mild concentric left ventricular hypertrophy. Grade I diastolic dysfunction. No significant valvular disease noted. No evidence of pericardial effusion. Essentially unremarkable echo.  H/O percutaneous left heart catheterization 12/22/15    No evidence of hemodynamically significant coronary artery disease    History of coronary artery stent placement 11/13/2013 11/13 -RCA - 3 stents placed    History of exercise stress test 06/08/2017    treadmill    History of Holter monitoring 1/4/2016    predominant rhythm sinus    Hyperlipidemia     Hypertension     S/P cardiac catheterization 11/13/2013 11/13/2013-EF 55%, distal LAD mild disease,CX stent patent,but distal to stent 50% stenosis. RCA severe disease.  Status post angioplasty with stent      Past Surgical History:   Procedure Laterality Date    APPENDECTOMY      CARDIAC CATHETERIZATION  11/13/2013 11/13/2013-EF 55%, distal LAD mild disease,CX stent patent,but distal to stent 50% stenosis. RCA severe disease.     CORONARY ANGIOPLASTY WITH STENT PLACEMENT      4 stents- RCA X3; CX X1    CORONARY ANGIOPLASTY WITH STENT PLACEMENT  11/13/2013 11/13/2013 -3 stents placed to RCA- Dr Neo Guadalupe History   Problem Relation Age of Onset    Cancer Mother     Diabetes Mother     Heart Disease Mother     High Blood Pressure Mother     Stroke Mother     Cancer Father     Heart Disease Father     High Blood Pressure Father     Stroke Father     Cancer Brother      Social History     Tobacco Use    Smoking status: Current Every Day Smoker     Packs/day: 4.00     Years: 44.00     Pack years: 176.00    Smokeless tobacco: Never Used   Substance Use Topics    Alcohol use: No      [unfilled]  Review of Systems:   · Constitutional: No Fever or Weight Loss   · Eyes: No Decreased Vision  · ENT: No Headaches, Hearing Loss or Vertigo  · Cardiovascular: No chest pain, dyspnea on exertion, palpitations or loss of consciousness  · Respiratory: No cough or wheezing    · Gastrointestinal: No abdominal pain, appetite loss, blood in stools, constipation, diarrhea or heartburn  · Genitourinary: No dysuria, trouble voiding, or hematuria  · Musculoskeletal:  No gait disturbance, weakness or joint complaints  · Integumentary: No rash or pruritis  · Neurological: No TIA or stroke symptoms  · Psychiatric: No anxiety or depression  · Endocrine: No malaise, fatigue or temperature intolerance  · Hematologic/Lymphatic: No bleeding problems, blood clots or swollen lymph nodes  · Allergic/Immunologic: No nasal congestion or hives  All systems negative except as marked. Objective:  /82   Pulse 74   Wt 251 lb (113.9 kg)   SpO2 98%   BMI 32.23 kg/m²   Wt Readings from Last 3 Encounters:   07/12/19 251 lb (113.9 kg)   07/11/19 251 lb (113.9 kg)   06/14/19 251 lb (113.9 kg)     Body mass index is 32.23 kg/m².   GENERAL - Alert, oriented, pleasant, in no apparent distress,normal grooming  HEENT - pupils are reactive to light and accomodation,

## 2019-07-12 NOTE — ED PROVIDER NOTES
HPI  52-year-old gentleman with a history of diabetes, hypertension, coronary artery disease status post stent, prior A. madiha presents to the emergency room for chest pain that occurred for past 2 days and is intermittent in nature and associated with worsening when he exerts himself. Reports to be in the bilateral chest region with numbness in the bilateral arms. He reports that about 2 to 3 hours prior to ED arrival he was mowing his lawn and felt the chest pain. Symptoms have since resolved completely. Chest pain is reported as pressure without radiation to his back. He has not tried any medications for the pain. Smokes currently. Past Medical History:   Diagnosis Date    Atrial fibrillation [facility]) 11/2013    CAD (coronary artery disease)     Diabetes mellitus ([facility] [address]     GERD (gastroesophageal reflux disease)     H/O cardiovascular stress test 11/20/13, 3/20/2013    11/13-Observed defect is consistent with diaphragmatic attenuation. EF 58%. 3/13 -EF 51%. No ischemia. Normal Study.  H/O cardiovascular stress test 06/08/2017    EF 50% normal study    H/O cardiovascular stress test 06/17/2019    Normal NM    H/O Doppler ultrasound 10/14/2010    10/2010-Bilateral venous doppler of lower extremies- No DVTs.  H/O echocardiogram 06/26/2019    Left ventricular systolic function is normal with an ejection fraction of 50-55%. Mild concentric left ventricular hypertrophy. Grade I diastolic dysfunction. No significant valvular disease noted. No evidence of pericardial effusion. Essentially unremarkable echo.     H/O percutaneous left heart catheterization 12/22/15    No evidence of hemodynamically significant coronary artery disease    History of coronary artery stent placement 11/13/2013 11/13 -RCA - 3 stents placed    History of exercise stress test 06/08/2017    treadmill    History of Holter monitoring 1/4/2016    predominant rhythm sinus    Hyperlipidemia     Hypertension     S/P cardiac reviewed. Procedures    MDM         61-year-old male presents to the emergency room for chest pain that started today at his concerning nature as it occurs with exertion. He does have significant comorbidities with stent placed in the past.  He reports the pain is completely resolved out medical intervention. He gave himself 81 mg aspirin prior to arrival.  Gave him a full dose of aspirin here. EKG was reviewed and compared to prior. EKG Interpretation. Normal sinus rhythm, heart rate 62, normal axis, normal sinus rhythm, QRS duration 100, QTc 460, reviewed prior old EKG on March 2019 and actually appears improved with less ST changes seen in V4 to V6 which at that time was noted to have more downsloping depression    On chart review he is noted to had a nuclear medicine stress test on June 17. It was read as normal tracer uptake during rest and stress. He also had a echocardiogram on June 26 that shows normal ejection fraction with grade 1 diastolic dysfunction. It is noted to have a troponin of 0.034 with worsening renal dysfunction. I discussed with the patient that he needs to be admitted into the hospital for further evaluation for his chest pain as well as his kidney dysfunction. He reports that since his chest pain has resolved he would like to go home. He is alert and oriented and has no slurred speech. Steady gait on ambulation. He reports that he understands the benefit risk and alternative treatment. He reports he will follow-up with his family and cardiologist physician. He was told to return for any changes to his symptoms or concerns. Patient signed out AMA.      Problem List Items Addressed This Visit     Chest pain - Primary      Other Visit Diagnoses     Left against medical advice        MARLYN (acute kidney injury) ([facility] [address]        Elevated troponin I measurement               Daphene Mohs, MD  07/12/19 0055

## 2019-07-18 ENCOUNTER — APPOINTMENT (OUTPATIENT)
Dept: GENERAL RADIOLOGY | Age: 59
End: 2019-07-18

## 2019-07-18 ENCOUNTER — HOSPITAL ENCOUNTER (EMERGENCY)
Age: 59
Discharge: HOME | End: 2019-07-18
Payer: COMMERCIAL

## 2019-07-18 VITALS
SYSTOLIC BLOOD PRESSURE: 156 MMHG | HEIGHT: 75 IN | BODY MASS INDEX: 31.08 KG/M2 | OXYGEN SATURATION: 99 % | WEIGHT: 250 LBS | DIASTOLIC BLOOD PRESSURE: 66 MMHG | TEMPERATURE: 98.7 F | HEART RATE: 56 BPM | RESPIRATION RATE: 18 BRPM

## 2019-07-18 DIAGNOSIS — R07.9 CHEST PAIN, UNSPECIFIED TYPE: Primary | ICD-10-CM

## 2019-07-18 DIAGNOSIS — N17.9 AKI (ACUTE KIDNEY INJURY) (HCC): ICD-10-CM

## 2019-07-18 LAB
ALBUMIN SERPL-MCNC: 4.2 GM/DL (ref 3.4–5)
ALP BLD-CCNC: 79 IU/L (ref 40–128)
ALT SERPL-CCNC: 8 U/L (ref 10–40)
ANION GAP SERPL CALCULATED.3IONS-SCNC: 14 MMOL/L (ref 4–16)
AST SERPL-CCNC: 14 IU/L (ref 15–37)
BASOPHILS ABSOLUTE: 0.1 K/CU MM
BASOPHILS RELATIVE PERCENT: 0.8 % (ref 0–1)
BILIRUB SERPL-MCNC: 0.2 MG/DL (ref 0–1)
BUN BLDV-MCNC: 37 MG/DL (ref 6–23)
CALCIUM SERPL-MCNC: 9.4 MG/DL (ref 8.3–10.6)
CHLORIDE BLD-SCNC: 103 MMOL/L (ref 99–110)
CO2: 24 MMOL/L (ref 21–32)
CREAT SERPL-MCNC: 2.4 MG/DL (ref 0.9–1.3)
DIFFERENTIAL TYPE: ABNORMAL
EKG ATRIAL RATE: 62 BPM
EKG DIAGNOSIS: NORMAL
EKG P AXIS: 31 DEGREES
EKG P-R INTERVAL: 168 MS
EKG Q-T INTERVAL: 454 MS
EKG QRS DURATION: 100 MS
EKG QTC CALCULATION (BAZETT): 460 MS
EKG R AXIS: 2 DEGREES
EKG T AXIS: 31 DEGREES
EKG VENTRICULAR RATE: 62 BPM
EOSINOPHILS ABSOLUTE: 0.4 K/CU MM
EOSINOPHILS RELATIVE PERCENT: 3.2 % (ref 0–3)
GFR AFRICAN AMERICAN: 34 ML/MIN/1.73M2
GFR NON-AFRICAN AMERICAN: 28 ML/MIN/1.73M2
GLUCOSE BLD-MCNC: 139 MG/DL (ref 70–99)
HCT VFR BLD CALC: 37.8 % (ref 42–52)
HEMOGLOBIN: 12.5 GM/DL (ref 13.5–18)
IMMATURE NEUTROPHIL %: 0.4 % (ref 0–0.43)
LYMPHOCYTES ABSOLUTE: 2.2 K/CU MM
LYMPHOCYTES RELATIVE PERCENT: 18.1 % (ref 24–44)
MCH RBC QN AUTO: 30.1 PG (ref 27–31)
MCHC RBC AUTO-ENTMCNC: 33.1 % (ref 32–36)
MCV RBC AUTO: 91.1 FL (ref 78–100)
MONOCYTES ABSOLUTE: 0.7 K/CU MM
MONOCYTES RELATIVE PERCENT: 5.4 % (ref 0–4)
NUCLEATED RBC %: 0 %
PDW BLD-RTO: 13.2 % (ref 11.7–14.9)
PLATELET # BLD: 224 K/CU MM (ref 140–440)
PMV BLD AUTO: 10.9 FL (ref 7.5–11.1)
POTASSIUM SERPL-SCNC: 3.8 MMOL/L (ref 3.5–5.1)
RBC # BLD: 4.15 M/CU MM (ref 4.6–6.2)
SEGMENTED NEUTROPHILS ABSOLUTE COUNT: 8.8 K/CU MM
SEGMENTED NEUTROPHILS RELATIVE PERCENT: 72.1 % (ref 36–66)
SODIUM BLD-SCNC: 141 MMOL/L (ref 135–145)
TOTAL IMMATURE NEUTOROPHIL: 0.05 K/CU MM
TOTAL NUCLEATED RBC: 0 K/CU MM
TOTAL PROTEIN: 7.4 GM/DL (ref 6.4–8.2)
TROPONIN T: <0.01 NG/ML
TROPONIN T: <0.01 NG/ML
WBC # BLD: 12.2 K/CU MM (ref 4–10.5)

## 2019-07-18 PROCEDURE — 2580000003 HC RX 258: Performed by: NURSE PRACTITIONER

## 2019-07-18 PROCEDURE — 93005 ELECTROCARDIOGRAM TRACING: CPT | Performed by: PHYSICIAN ASSISTANT

## 2019-07-18 PROCEDURE — 36415 COLL VENOUS BLD VENIPUNCTURE: CPT

## 2019-07-18 PROCEDURE — 6370000000 HC RX 637 (ALT 250 FOR IP): Performed by: NURSE PRACTITIONER

## 2019-07-18 PROCEDURE — 84484 ASSAY OF TROPONIN QUANT: CPT

## 2019-07-18 PROCEDURE — 93005 ELECTROCARDIOGRAM TRACING: CPT | Performed by: EMERGENCY MEDICINE

## 2019-07-18 PROCEDURE — 71046 X-RAY EXAM CHEST 2 VIEWS: CPT

## 2019-07-18 PROCEDURE — 85025 COMPLETE CBC W/AUTO DIFF WBC: CPT

## 2019-07-18 PROCEDURE — 99285 EMERGENCY DEPT VISIT HI MDM: CPT

## 2019-07-18 PROCEDURE — 80053 COMPREHEN METABOLIC PANEL: CPT

## 2019-07-18 RX ORDER — NICOTINE 21 MG/24HR
1 PATCH, TRANSDERMAL 24 HOURS TRANSDERMAL DAILY
Status: DISCONTINUED | OUTPATIENT
Start: 2019-07-18 | End: 2019-07-18 | Stop reason: HOSPADM

## 2019-07-18 RX ORDER — 0.9 % SODIUM CHLORIDE 0.9 %
1000 INTRAVENOUS SOLUTION INTRAVENOUS ONCE
Status: COMPLETED | OUTPATIENT
Start: 2019-07-18 | End: 2019-07-18

## 2019-07-18 RX ADMIN — SODIUM CHLORIDE 1000 ML: 9 INJECTION, SOLUTION INTRAVENOUS at 13:27

## 2019-07-18 ASSESSMENT — PAIN DESCRIPTION - LOCATION: LOCATION: CHEST

## 2019-07-18 NOTE — ED NOTES
Pt discharged, verbalizes understanding of instructions, ambulates self out with all belongings.      Ronda Marques RN  07/18/19 1600

## 2019-07-19 PROCEDURE — 93010 ELECTROCARDIOGRAM REPORT: CPT | Performed by: INTERNAL MEDICINE

## 2019-07-20 LAB
EKG ATRIAL RATE: 59 BPM
EKG DIAGNOSIS: NORMAL
EKG P AXIS: 24 DEGREES
EKG P-R INTERVAL: 172 MS
EKG Q-T INTERVAL: 454 MS
EKG QRS DURATION: 106 MS
EKG QTC CALCULATION (BAZETT): 449 MS
EKG R AXIS: -1 DEGREES
EKG T AXIS: -2 DEGREES
EKG VENTRICULAR RATE: 59 BPM

## 2019-07-20 PROCEDURE — 93010 ELECTROCARDIOGRAM REPORT: CPT | Performed by: INTERNAL MEDICINE

## 2019-07-22 ENCOUNTER — OFFICE VISIT (OUTPATIENT)
Dept: CARDIOLOGY CLINIC | Age: 59
End: 2019-07-22

## 2019-07-22 VITALS
SYSTOLIC BLOOD PRESSURE: 120 MMHG | HEART RATE: 64 BPM | HEIGHT: 75 IN | WEIGHT: 252.4 LBS | DIASTOLIC BLOOD PRESSURE: 84 MMHG | BODY MASS INDEX: 31.38 KG/M2

## 2019-07-22 DIAGNOSIS — R07.9 CHEST PAIN, UNSPECIFIED TYPE: ICD-10-CM

## 2019-07-22 DIAGNOSIS — I10 ESSENTIAL HYPERTENSION: Primary | ICD-10-CM

## 2019-07-22 LAB
EKG ATRIAL RATE: 50 BPM
EKG DIAGNOSIS: NORMAL
EKG P AXIS: 14 DEGREES
EKG P-R INTERVAL: 184 MS
EKG Q-T INTERVAL: 476 MS
EKG QRS DURATION: 106 MS
EKG QTC CALCULATION (BAZETT): 433 MS
EKG R AXIS: 1 DEGREES
EKG T AXIS: 3 DEGREES
EKG VENTRICULAR RATE: 50 BPM

## 2019-07-22 PROCEDURE — 99212 OFFICE O/P EST SF 10 MIN: CPT | Performed by: NURSE PRACTITIONER

## 2019-07-22 NOTE — PROGRESS NOTES
distal LAD mild disease,CX stent patent,but distal to stent 50% stenosis. RCA severe disease.  CORONARY ANGIOPLASTY WITH STENT PLACEMENT      4 stents- RCA X3; CX X1    CORONARY ANGIOPLASTY WITH STENT PLACEMENT  11/13/2013 11/13/2013 -3 stents placed to RCA- Dr Jackie Gage History   Problem Relation Age of Onset    Cancer Mother     Diabetes Mother     Heart Disease Mother     High Blood Pressure Mother     Stroke Mother     Cancer Father     Heart Disease Father     High Blood Pressure Father     Stroke Father     Cancer Brother      Social History     Tobacco Use    Smoking status: Current Every Day Smoker     Packs/day: 3.00     Years: 44.00     Pack years: 132.00    Smokeless tobacco: Never Used   Substance Use Topics    Alcohol use: No        Review of Systems - History obtained from the patient  General: negative for - fatigue, malaise, night sweats, weight gain  Psychological: negative for - anxiety, depression, sleep disturbances  Ophthalmic: negative for - blurry vision, loss of vision  ENT: negative for - headaches, vertigo, visual changes  Hematological and Lymphatic: negative for - bleeding problems, blood clots, bruising, fatigue or pallor  Endocrine: positive for-malaise/lethargy negative for - , palpitations, unexpected weight changes  Respiratory: negative for - cough, orthopnea, shortness of breath or tachypnea  Cardiovascular: positive for - chest pain, and fatigue.  No dyspnea on exertion, edema or palpitations noted  Gastrointestinal: no abdominal pain, change in bowel habits, or black or bloody stools  Genito-Urinary: no dysuria, trouble voiding, or hematuria  Musculoskeletal: negative for - gait disturbance, pain both arms and chest at times, no swelling   Neurological: negative for - confusion, dizziness, impaired coordination/balance or numbness/tingling      Objective:      Physical Exam:  /84   Pulse 64   Ht 6' 3\" (1.905 m)   Wt Testing ordered:  N/A       Chest pain    * Needs heart cath but nephrology clearance before. Continue current medications    Primary and secondary prevention discussed with patient:   -Low sodium cardiac diet     Patient's cardiovascular risk history includes: LDL goal is under 160 cardiovascular       Patient seen, interviewed and examined. Testing was reviewed. Patient visit and plan reviewed with      Lifestyle and risk factor modificatons discussed. Various goals are discussed and questions answered. Continue current medications. Appropriate prescriptions are addressed. Questions answered and patient verbalizes understanding. Call for any problems, questions, or concerns. Pt is to follow up in 2 months for Cardiac management after being evaluated by nephrology. Instructed on nitroglycerin administration if patient has CP and to go to the nearest emergency room. Electronically signed by Sarah Avelar.  TREY Beckham CNP on 7/22/2019 at 3:47 PM

## 2019-07-23 ENCOUNTER — TELEPHONE (OUTPATIENT)
Dept: CARDIOLOGY CLINIC | Age: 59
End: 2019-07-23

## 2019-07-23 NOTE — TELEPHONE ENCOUNTER
Faxed referral, demographics, ins card, and office note to Dr. Odalys Stafford office at Fax# [phone].

## 2019-07-24 ENCOUNTER — HOSPITAL ENCOUNTER (OUTPATIENT)
Age: 59
Discharge: HOME | End: 2019-07-24

## 2019-07-24 LAB
FOLATE: 10.6 NG/ML (ref 3.1–17.5)
VITAMIN B-12: 595.7 PG/ML (ref 211–911)

## 2019-07-24 PROCEDURE — 82746 ASSAY OF FOLIC ACID SERUM: CPT

## 2019-07-24 PROCEDURE — 82607 VITAMIN B-12: CPT

## 2019-07-24 PROCEDURE — 36415 COLL VENOUS BLD VENIPUNCTURE: CPT

## 2019-07-25 ENCOUNTER — HOSPITAL ENCOUNTER (OUTPATIENT)
Dept: ULTRASOUND IMAGING | Age: 59
Discharge: HOME | End: 2019-07-25

## 2019-07-25 DIAGNOSIS — N17.9 ACUTE RENAL FAILURE, UNSPECIFIED ACUTE RENAL FAILURE TYPE (HCC): ICD-10-CM

## 2019-07-25 DIAGNOSIS — I73.9 CLAUDICATION OF LOWER EXTREMITY (HCC): ICD-10-CM

## 2019-07-25 PROCEDURE — 76770 US EXAM ABDO BACK WALL COMP: CPT

## 2019-07-25 PROCEDURE — 93922 UPR/L XTREMITY ART 2 LEVELS: CPT

## 2019-07-30 ENCOUNTER — APPOINTMENT (OUTPATIENT)
Dept: GENERAL RADIOLOGY | Age: 59
DRG: 246 | End: 2019-07-30

## 2019-07-30 ENCOUNTER — HOSPITAL ENCOUNTER (INPATIENT)
Age: 59
LOS: 1 days | Discharge: HOME | DRG: 246 | End: 2019-07-31
Attending: EMERGENCY MEDICINE | Admitting: INTERNAL MEDICINE
Payer: COMMERCIAL

## 2019-07-30 DIAGNOSIS — I21.3 ST ELEVATION MYOCARDIAL INFARCTION (STEMI), UNSPECIFIED ARTERY (HCC): Primary | ICD-10-CM

## 2019-07-30 PROBLEM — I21.9 MYOCARDIAL INFARCTION ACUTE (HCC): Status: ACTIVE | Noted: 2019-07-30

## 2019-07-30 PROBLEM — R11.0 NAUSEA: Status: ACTIVE | Noted: 2019-07-30

## 2019-07-30 LAB
ACTIVATED CLOTTING TIME, LOW RANGE: 140 SEC
ACTIVATED CLOTTING TIME, LOW RANGE: 363 SEC
ALBUMIN SERPL-MCNC: 4.3 GM/DL (ref 3.4–5)
ALP BLD-CCNC: 62 IU/L (ref 40–129)
ALT SERPL-CCNC: 7 U/L (ref 10–40)
ANION GAP SERPL CALCULATED.3IONS-SCNC: 12 MMOL/L (ref 4–16)
ANION GAP SERPL CALCULATED.3IONS-SCNC: 13 MMOL/L (ref 4–16)
AST SERPL-CCNC: 13 IU/L (ref 15–37)
BASOPHILS ABSOLUTE: 0.2 K/CU MM
BASOPHILS RELATIVE PERCENT: 1 % (ref 0–1)
BILIRUB SERPL-MCNC: 0.3 MG/DL (ref 0–1)
BUN BLDV-MCNC: 28 MG/DL (ref 6–23)
BUN BLDV-MCNC: 31 MG/DL (ref 6–23)
CALCIUM SERPL-MCNC: 8.9 MG/DL (ref 8.3–10.6)
CALCIUM SERPL-MCNC: 9.9 MG/DL (ref 8.3–10.6)
CHLORIDE BLD-SCNC: 102 MMOL/L (ref 99–110)
CHLORIDE BLD-SCNC: 104 MMOL/L (ref 99–110)
CO2: 24 MMOL/L (ref 21–32)
CO2: 24 MMOL/L (ref 21–32)
CREAT SERPL-MCNC: 1.4 MG/DL (ref 0.9–1.3)
CREAT SERPL-MCNC: 1.9 MG/DL (ref 0.9–1.3)
DIFFERENTIAL TYPE: ABNORMAL
EOSINOPHILS ABSOLUTE: 0.5 K/CU MM
EOSINOPHILS RELATIVE PERCENT: 3.3 % (ref 0–3)
ESTIMATED AVERAGE GLUCOSE: 143 MG/DL
GFR AFRICAN AMERICAN: 44 ML/MIN/1.73M2
GFR AFRICAN AMERICAN: >60 ML/MIN/1.73M2
GFR NON-AFRICAN AMERICAN: 37 ML/MIN/1.73M2
GFR NON-AFRICAN AMERICAN: 52 ML/MIN/1.73M2
GLUCOSE BLD-MCNC: 157 MG/DL (ref 70–99)
GLUCOSE BLD-MCNC: 158 MG/DL (ref 70–99)
GLUCOSE BLD-MCNC: 177 MG/DL (ref 70–99)
GLUCOSE BLD-MCNC: 245 MG/DL (ref 70–99)
GLUCOSE BLD-MCNC: 288 MG/DL (ref 70–99)
HBA1C MFR BLD: 6.6 % (ref 4.2–6.3)
HCT VFR BLD CALC: 38.1 % (ref 42–52)
HEMOGLOBIN: 12.8 GM/DL (ref 13.5–18)
IMMATURE NEUTROPHIL %: 0.4 % (ref 0–0.43)
LV EF: 55 %
LVEF MODALITY: NORMAL
LYMPHOCYTES ABSOLUTE: 4 K/CU MM
LYMPHOCYTES RELATIVE PERCENT: 25.3 % (ref 24–44)
MAGNESIUM: 1.9 MG/DL (ref 1.8–2.4)
MCH RBC QN AUTO: 30 PG (ref 27–31)
MCHC RBC AUTO-ENTMCNC: 33.6 % (ref 32–36)
MCV RBC AUTO: 89.4 FL (ref 78–100)
MONOCYTES ABSOLUTE: 1.2 K/CU MM
MONOCYTES RELATIVE PERCENT: 7.8 % (ref 0–4)
NUCLEATED RBC %: 0 %
PDW BLD-RTO: 13.5 % (ref 11.7–14.9)
PLATELET # BLD: 271 K/CU MM (ref 140–440)
PMV BLD AUTO: 11.2 FL (ref 7.5–11.1)
POTASSIUM SERPL-SCNC: 3.4 MMOL/L (ref 3.5–5.1)
POTASSIUM SERPL-SCNC: 3.5 MMOL/L (ref 3.5–5.1)
RBC # BLD: 4.26 M/CU MM (ref 4.6–6.2)
SEGMENTED NEUTROPHILS ABSOLUTE COUNT: 9.8 K/CU MM
SEGMENTED NEUTROPHILS RELATIVE PERCENT: 62.2 % (ref 36–66)
SODIUM BLD-SCNC: 138 MMOL/L (ref 135–145)
SODIUM BLD-SCNC: 141 MMOL/L (ref 135–145)
TOTAL IMMATURE NEUTOROPHIL: 0.06 K/CU MM
TOTAL NUCLEATED RBC: 0 K/CU MM
TOTAL PROTEIN: 7.6 GM/DL (ref 6.4–8.2)
TROPONIN T: <0.01 NG/ML
WBC # BLD: 15.7 K/CU MM (ref 4–10.5)

## 2019-07-30 PROCEDURE — 96374 THER/PROPH/DIAG INJ IV PUSH: CPT

## 2019-07-30 PROCEDURE — C1769 GUIDE WIRE: HCPCS

## 2019-07-30 PROCEDURE — C9113 INJ PANTOPRAZOLE SODIUM, VIA: HCPCS | Performed by: RADIOLOGY

## 2019-07-30 PROCEDURE — 2720000010 HC SURG SUPPLY STERILE

## 2019-07-30 PROCEDURE — B2111ZZ FLUOROSCOPY OF MULTIPLE CORONARY ARTERIES USING LOW OSMOLAR CONTRAST: ICD-10-PCS | Performed by: INTERNAL MEDICINE

## 2019-07-30 PROCEDURE — 6360000002 HC RX W HCPCS

## 2019-07-30 PROCEDURE — 4A023N7 MEASUREMENT OF CARDIAC SAMPLING AND PRESSURE, LEFT HEART, PERCUTANEOUS APPROACH: ICD-10-PCS | Performed by: INTERNAL MEDICINE

## 2019-07-30 PROCEDURE — 2709999900 HC NON-CHARGEABLE SUPPLY

## 2019-07-30 PROCEDURE — 2580000003 HC RX 258: Performed by: INTERNAL MEDICINE

## 2019-07-30 PROCEDURE — 83735 ASSAY OF MAGNESIUM: CPT

## 2019-07-30 PROCEDURE — C1874 STENT, COATED/COV W/DEL SYS: HCPCS

## 2019-07-30 PROCEDURE — C1894 INTRO/SHEATH, NON-LASER: HCPCS

## 2019-07-30 PROCEDURE — 6360000002 HC RX W HCPCS: Performed by: RADIOLOGY

## 2019-07-30 PROCEDURE — 80053 COMPREHEN METABOLIC PANEL: CPT

## 2019-07-30 PROCEDURE — 2580000003 HC RX 258

## 2019-07-30 PROCEDURE — 85025 COMPLETE CBC W/AUTO DIFF WBC: CPT

## 2019-07-30 PROCEDURE — C8929 TTE W OR WO FOL WCON,DOPPLER: HCPCS

## 2019-07-30 PROCEDURE — 2500000003 HC RX 250 WO HCPCS

## 2019-07-30 PROCEDURE — 2100000000 HC CCU R&B

## 2019-07-30 PROCEDURE — 93010 ELECTROCARDIOGRAM REPORT: CPT | Performed by: INTERNAL MEDICINE

## 2019-07-30 PROCEDURE — 93458 L HRT ARTERY/VENTRICLE ANGIO: CPT | Performed by: INTERNAL MEDICINE

## 2019-07-30 PROCEDURE — 027035Z DILATION OF CORONARY ARTERY, ONE ARTERY WITH TWO DRUG-ELUTING INTRALUMINAL DEVICES, PERCUTANEOUS APPROACH: ICD-10-PCS | Performed by: INTERNAL MEDICINE

## 2019-07-30 PROCEDURE — 93458 L HRT ARTERY/VENTRICLE ANGIO: CPT

## 2019-07-30 PROCEDURE — 80048 BASIC METABOLIC PNL TOTAL CA: CPT

## 2019-07-30 PROCEDURE — 93005 ELECTROCARDIOGRAM TRACING: CPT | Performed by: EMERGENCY MEDICINE

## 2019-07-30 PROCEDURE — 6370000000 HC RX 637 (ALT 250 FOR IP)

## 2019-07-30 PROCEDURE — 83036 HEMOGLOBIN GLYCOSYLATED A1C: CPT

## 2019-07-30 PROCEDURE — 96375 TX/PRO/DX INJ NEW DRUG ADDON: CPT

## 2019-07-30 PROCEDURE — 6360000004 HC RX CONTRAST MEDICATION: Performed by: INTERNAL MEDICINE

## 2019-07-30 PROCEDURE — 6360000002 HC RX W HCPCS: Performed by: INTERNAL MEDICINE

## 2019-07-30 PROCEDURE — 92941 PRQ TRLML REVSC TOT OCCL AMI: CPT

## 2019-07-30 PROCEDURE — 6370000000 HC RX 637 (ALT 250 FOR IP): Performed by: INTERNAL MEDICINE

## 2019-07-30 PROCEDURE — B2151ZZ FLUOROSCOPY OF LEFT HEART USING LOW OSMOLAR CONTRAST: ICD-10-PCS | Performed by: INTERNAL MEDICINE

## 2019-07-30 PROCEDURE — 6360000004 HC RX CONTRAST MEDICATION

## 2019-07-30 PROCEDURE — 84484 ASSAY OF TROPONIN QUANT: CPT

## 2019-07-30 PROCEDURE — 99285 EMERGENCY DEPT VISIT HI MDM: CPT

## 2019-07-30 PROCEDURE — 85347 COAGULATION TIME ACTIVATED: CPT

## 2019-07-30 PROCEDURE — 99291 CRITICAL CARE FIRST HOUR: CPT | Performed by: INTERNAL MEDICINE

## 2019-07-30 PROCEDURE — 99253 IP/OBS CNSLTJ NEW/EST LOW 45: CPT | Performed by: INTERNAL MEDICINE

## 2019-07-30 PROCEDURE — 92941 PRQ TRLML REVSC TOT OCCL AMI: CPT | Performed by: INTERNAL MEDICINE

## 2019-07-30 PROCEDURE — C1725 CATH, TRANSLUMIN NON-LASER: HCPCS

## 2019-07-30 PROCEDURE — 82962 GLUCOSE BLOOD TEST: CPT

## 2019-07-30 PROCEDURE — 33210 INSERT ELECTRD/PM CATH SNGL: CPT

## 2019-07-30 RX ORDER — SODIUM CHLORIDE 9 MG/ML
INJECTION, SOLUTION INTRAVENOUS
Status: COMPLETED
Start: 2019-07-30 | End: 2019-07-30

## 2019-07-30 RX ORDER — DOPAMINE HYDROCHLORIDE 160 MG/100ML
2.5 INJECTION, SOLUTION INTRAVENOUS CONTINUOUS
Status: DISCONTINUED | OUTPATIENT
Start: 2019-07-30 | End: 2019-07-31

## 2019-07-30 RX ORDER — ATORVASTATIN CALCIUM 40 MG/1
80 TABLET, FILM COATED ORAL NIGHTLY
Status: DISCONTINUED | OUTPATIENT
Start: 2019-07-30 | End: 2019-07-31 | Stop reason: HOSPADM

## 2019-07-30 RX ORDER — PANTOPRAZOLE SODIUM 40 MG/10ML
40 INJECTION, POWDER, LYOPHILIZED, FOR SOLUTION INTRAVENOUS ONCE
Status: COMPLETED | OUTPATIENT
Start: 2019-07-30 | End: 2019-07-30

## 2019-07-30 RX ORDER — ATROPINE SULFATE 0.1 MG/ML
INJECTION INTRAVENOUS
Status: DISCONTINUED
Start: 2019-07-30 | End: 2019-07-30 | Stop reason: WASHOUT

## 2019-07-30 RX ORDER — DEXTROSE MONOHYDRATE 25 G/50ML
12.5 INJECTION, SOLUTION INTRAVENOUS PRN
Status: DISCONTINUED | OUTPATIENT
Start: 2019-07-30 | End: 2019-07-31 | Stop reason: HOSPADM

## 2019-07-30 RX ORDER — SODIUM CHLORIDE 9 MG/ML
INJECTION, SOLUTION INTRAVENOUS CONTINUOUS
Status: DISCONTINUED | OUTPATIENT
Start: 2019-07-30 | End: 2019-07-31 | Stop reason: HOSPADM

## 2019-07-30 RX ORDER — DEXTROSE MONOHYDRATE 50 MG/ML
100 INJECTION, SOLUTION INTRAVENOUS PRN
Status: DISCONTINUED | OUTPATIENT
Start: 2019-07-30 | End: 2019-07-31 | Stop reason: HOSPADM

## 2019-07-30 RX ORDER — POTASSIUM CHLORIDE 20 MEQ/1
10 TABLET, EXTENDED RELEASE ORAL ONCE
Status: COMPLETED | OUTPATIENT
Start: 2019-07-30 | End: 2019-07-30

## 2019-07-30 RX ORDER — MORPHINE SULFATE 4 MG/ML
4 INJECTION, SOLUTION INTRAMUSCULAR; INTRAVENOUS ONCE
Status: COMPLETED | OUTPATIENT
Start: 2019-07-30 | End: 2019-07-30

## 2019-07-30 RX ORDER — ONDANSETRON 2 MG/ML
INJECTION INTRAMUSCULAR; INTRAVENOUS
Status: COMPLETED
Start: 2019-07-30 | End: 2019-07-30

## 2019-07-30 RX ORDER — SUCRALFATE 1 G/1
1 TABLET ORAL EVERY 6 HOURS SCHEDULED
Status: DISCONTINUED | OUTPATIENT
Start: 2019-07-30 | End: 2019-07-31 | Stop reason: HOSPADM

## 2019-07-30 RX ORDER — MORPHINE SULFATE 4 MG/ML
2 INJECTION, SOLUTION INTRAMUSCULAR; INTRAVENOUS ONCE
Status: COMPLETED | OUTPATIENT
Start: 2019-07-30 | End: 2019-07-30

## 2019-07-30 RX ORDER — OXYCODONE HYDROCHLORIDE AND ACETAMINOPHEN 5; 325 MG/1; MG/1
1 TABLET ORAL EVERY 6 HOURS PRN
Status: DISCONTINUED | OUTPATIENT
Start: 2019-07-30 | End: 2019-07-31 | Stop reason: HOSPADM

## 2019-07-30 RX ORDER — METOCLOPRAMIDE HYDROCHLORIDE 5 MG/ML
10 INJECTION INTRAMUSCULAR; INTRAVENOUS EVERY 6 HOURS PRN
Status: DISCONTINUED | OUTPATIENT
Start: 2019-07-30 | End: 2019-07-31 | Stop reason: HOSPADM

## 2019-07-30 RX ORDER — NICOTINE POLACRILEX 4 MG
15 LOZENGE BUCCAL PRN
Status: DISCONTINUED | OUTPATIENT
Start: 2019-07-30 | End: 2019-07-31 | Stop reason: HOSPADM

## 2019-07-30 RX ORDER — ONDANSETRON 2 MG/ML
4 INJECTION INTRAMUSCULAR; INTRAVENOUS EVERY 6 HOURS PRN
Status: DISCONTINUED | OUTPATIENT
Start: 2019-07-30 | End: 2019-07-30

## 2019-07-30 RX ORDER — SODIUM CHLORIDE 0.9 % (FLUSH) 0.9 %
10 SYRINGE (ML) INJECTION EVERY 12 HOURS SCHEDULED
Status: DISCONTINUED | OUTPATIENT
Start: 2019-07-30 | End: 2019-07-31 | Stop reason: HOSPADM

## 2019-07-30 RX ORDER — ONDANSETRON 2 MG/ML
4 INJECTION INTRAMUSCULAR; INTRAVENOUS ONCE
Status: COMPLETED | OUTPATIENT
Start: 2019-07-30 | End: 2019-07-30

## 2019-07-30 RX ORDER — 0.9 % SODIUM CHLORIDE 0.9 %
1000 INTRAVENOUS SOLUTION INTRAVENOUS ONCE
Status: COMPLETED | OUTPATIENT
Start: 2019-07-30 | End: 2019-07-30

## 2019-07-30 RX ORDER — ASPIRIN 81 MG/1
81 TABLET, CHEWABLE ORAL DAILY
Status: DISCONTINUED | OUTPATIENT
Start: 2019-07-31 | End: 2019-07-31 | Stop reason: HOSPADM

## 2019-07-30 RX ORDER — SODIUM CHLORIDE 0.9 % (FLUSH) 0.9 %
10 SYRINGE (ML) INJECTION PRN
Status: DISCONTINUED | OUTPATIENT
Start: 2019-07-30 | End: 2019-07-31 | Stop reason: HOSPADM

## 2019-07-30 RX ORDER — MORPHINE SULFATE 4 MG/ML
INJECTION, SOLUTION INTRAMUSCULAR; INTRAVENOUS
Status: COMPLETED
Start: 2019-07-30 | End: 2019-07-30

## 2019-07-30 RX ORDER — NICOTINE 21 MG/24HR
1 PATCH, TRANSDERMAL 24 HOURS TRANSDERMAL DAILY
Status: DISCONTINUED | OUTPATIENT
Start: 2019-07-30 | End: 2019-07-31 | Stop reason: HOSPADM

## 2019-07-30 RX ORDER — NITROGLYCERIN 20 MG/100ML
INJECTION INTRAVENOUS
Status: COMPLETED
Start: 2019-07-30 | End: 2019-07-30

## 2019-07-30 RX ORDER — NITROGLYCERIN 20 MG/100ML
5 INJECTION INTRAVENOUS CONTINUOUS
Status: DISCONTINUED | OUTPATIENT
Start: 2019-07-30 | End: 2019-07-31

## 2019-07-30 RX ORDER — ONDANSETRON 2 MG/ML
4 INJECTION INTRAMUSCULAR; INTRAVENOUS EVERY 4 HOURS PRN
Status: DISCONTINUED | OUTPATIENT
Start: 2019-07-30 | End: 2019-07-31 | Stop reason: HOSPADM

## 2019-07-30 RX ADMIN — MORPHINE SULFATE 4 MG: 4 INJECTION, SOLUTION INTRAMUSCULAR; INTRAVENOUS at 11:23

## 2019-07-30 RX ADMIN — NITROGLYCERIN 5 MCG/MIN: 20 INJECTION INTRAVENOUS at 13:30

## 2019-07-30 RX ADMIN — INSULIN LISPRO 6 UNITS: 100 INJECTION, SOLUTION INTRAVENOUS; SUBCUTANEOUS at 14:40

## 2019-07-30 RX ADMIN — ATORVASTATIN CALCIUM 80 MG: 40 TABLET, FILM COATED ORAL at 20:26

## 2019-07-30 RX ADMIN — OXYCODONE HYDROCHLORIDE AND ACETAMINOPHEN 1 TABLET: 5; 325 TABLET ORAL at 20:26

## 2019-07-30 RX ADMIN — PERFLUTREN 1.43 MG: 6.52 INJECTION, SUSPENSION INTRAVENOUS at 16:25

## 2019-07-30 RX ADMIN — ONDANSETRON 4 MG: 2 INJECTION INTRAMUSCULAR; INTRAVENOUS at 18:30

## 2019-07-30 RX ADMIN — METOCLOPRAMIDE 10 MG: 5 INJECTION, SOLUTION INTRAMUSCULAR; INTRAVENOUS at 20:26

## 2019-07-30 RX ADMIN — SUCRALFATE 1 G: 1 TABLET ORAL at 15:24

## 2019-07-30 RX ADMIN — POTASSIUM CHLORIDE 10 MEQ: 20 TABLET, EXTENDED RELEASE ORAL at 13:52

## 2019-07-30 RX ADMIN — INSULIN LISPRO 2 UNITS: 100 INJECTION, SOLUTION INTRAVENOUS; SUBCUTANEOUS at 18:20

## 2019-07-30 RX ADMIN — SODIUM CHLORIDE 1000 ML: 9 INJECTION, SOLUTION INTRAVENOUS at 11:24

## 2019-07-30 RX ADMIN — SODIUM CHLORIDE: 9 INJECTION, SOLUTION INTRAVENOUS at 13:14

## 2019-07-30 RX ADMIN — Medication 1000 ML: at 11:24

## 2019-07-30 RX ADMIN — ONDANSETRON 4 MG: 2 INJECTION INTRAMUSCULAR; INTRAVENOUS at 20:26

## 2019-07-30 RX ADMIN — METOCLOPRAMIDE 10 MG: 5 INJECTION, SOLUTION INTRAMUSCULAR; INTRAVENOUS at 14:31

## 2019-07-30 RX ADMIN — ONDANSETRON 4 MG: 2 INJECTION INTRAMUSCULAR; INTRAVENOUS at 11:22

## 2019-07-30 RX ADMIN — Medication 10 ML: at 13:53

## 2019-07-30 RX ADMIN — MORPHINE SULFATE 2 MG: 4 INJECTION, SOLUTION INTRAMUSCULAR; INTRAVENOUS at 18:31

## 2019-07-30 RX ADMIN — TIROFIBAN 0.15 MCG/KG/MIN: 5 INJECTION, SOLUTION INTRAVENOUS at 13:49

## 2019-07-30 RX ADMIN — PANTOPRAZOLE SODIUM 40 MG: 40 INJECTION, POWDER, FOR SOLUTION INTRAVENOUS at 13:53

## 2019-07-30 RX ADMIN — DOPAMINE HYDROCHLORIDE IN DEXTROSE 10 MCG/KG/MIN: 1.6 INJECTION, SOLUTION INTRAVENOUS at 13:00

## 2019-07-30 RX ADMIN — DOPAMINE HYDROCHLORIDE IN DEXTROSE 5 MCG/KG/MIN: 1.6 INJECTION, SOLUTION INTRAVENOUS at 20:24

## 2019-07-30 ASSESSMENT — PAIN SCALES - GENERAL
PAINLEVEL_OUTOF10: 8
PAINLEVEL_OUTOF10: 10
PAINLEVEL_OUTOF10: 3
PAINLEVEL_OUTOF10: 3
PAINLEVEL_OUTOF10: 10
PAINLEVEL_OUTOF10: 8
PAINLEVEL_OUTOF10: 6
PAINLEVEL_OUTOF10: 3
PAINLEVEL_OUTOF10: 6
PAINLEVEL_OUTOF10: 3
PAINLEVEL_OUTOF10: 6

## 2019-07-30 ASSESSMENT — PAIN DESCRIPTION - ONSET
ONSET: ON-GOING
ONSET: PROGRESSIVE
ONSET: ON-GOING

## 2019-07-30 ASSESSMENT — PAIN DESCRIPTION - PROGRESSION
CLINICAL_PROGRESSION: NOT CHANGED
CLINICAL_PROGRESSION: NOT CHANGED
CLINICAL_PROGRESSION: GRADUALLY WORSENING
CLINICAL_PROGRESSION: GRADUALLY IMPROVING
CLINICAL_PROGRESSION: GRADUALLY WORSENING

## 2019-07-30 ASSESSMENT — PAIN DESCRIPTION - LOCATION
LOCATION: CHEST
LOCATION: CHEST
LOCATION: ARM;BACK;CHEST
LOCATION: CHEST

## 2019-07-30 ASSESSMENT — PAIN DESCRIPTION - ORIENTATION
ORIENTATION: MID
ORIENTATION: RIGHT;LEFT
ORIENTATION: MID;LEFT
ORIENTATION: MID;LEFT
ORIENTATION: LEFT;MID

## 2019-07-30 ASSESSMENT — PAIN DESCRIPTION - FREQUENCY
FREQUENCY: CONTINUOUS

## 2019-07-30 ASSESSMENT — PAIN DESCRIPTION - PAIN TYPE
TYPE: SURGICAL PAIN;ACUTE PAIN
TYPE: ACUTE PAIN

## 2019-07-30 ASSESSMENT — PAIN DESCRIPTION - DESCRIPTORS
DESCRIPTORS: SHARP
DESCRIPTORS: SHARP
DESCRIPTORS: ACHING
DESCRIPTORS: CONSTANT
DESCRIPTORS: ACHING

## 2019-07-30 ASSESSMENT — PAIN - FUNCTIONAL ASSESSMENT
PAIN_FUNCTIONAL_ASSESSMENT: ACTIVITIES ARE NOT PREVENTED

## 2019-07-30 NOTE — ED NOTES
Bed: ED-28  Expected date:   Expected time:   Means of arrival:   Comments:  triage     Fanny Dvaies RN  07/30/19 5027

## 2019-07-30 NOTE — FLOWSHEET NOTE
Late entry     Left femoral arterial sheath removed as ordered. . Manual pressure held for 20 minutes until hemostasis achieved. Dressing applied. 2nd RN POLY Arrieta RN at bedside. Atropine available at bedside, not needed, will return to [facility]. HR 49-61 during procedure. 1741- left groin venous sheath removed as ordered. Manual pressure held for 10 minutes until hemostasis achieved. Dressing applied. 2nd RN remained at bedside. VSS. Tolerated both procedures well. Patient eduated on strict bedrest for 6 hours post sheath removal and need to keep left leg straight.      Rene Russ RN 6:02 PM

## 2019-07-30 NOTE — ED PROVIDER NOTES
partner: Not on file     Emotionally abused: Not on file     Physically abused: Not on file     Forced sexual activity: Not on file   Other Topics Concern    Not on file   Social History Narrative    Not on file     Current Facility-Administered Medications   Medication Dose Route Frequency Provider Last Rate Last Dose    0.9 % sodium chloride bolus  1,000 mL Intravenous Once Emily Be  mL/hr at 07/30/19 1124 1,000 mL at 07/30/19 1124     Current Outpatient Medications   Medication Sig Dispense Refill    nitroGLYCERIN (NITROSTAT) 0.4 MG SL tablet Place 1 tablet under the tongue once for 1 dose 25 tablet 3    aspirin 81 MG tablet TAKE 1 TABLET BY MOUTH EVERY DAY      carvedilol (COREG) 25 MG tablet Take 25 mg by mouth 2 times daily (with meals)      glimepiride (AMARYL) 4 MG tablet Take 4 mg by mouth every morning (before breakfast)      clopidogrel (PLAVIX) 300 MG TABS Take 300 mg by mouth daily      simvastatin (ZOCOR) 40 MG tablet Take 1 tablet by mouth nightly 30 tablet 3    metFORMIN (GLUCOPHAGE) 500 MG tablet Take 500 mg by mouth daily (with breakfast) Indications: unsure of dosage taken        No Known Allergies    Nursing Notes Reviewed    Physical Exam:  Triage VS:    ED Triage Vitals   Enc Vitals Group      BP 07/30/19 1119 (!) 86/55      Pulse 07/30/19 1117 (!) 36      Resp 07/30/19 1117 26      Temp --       Temp src --       SpO2 07/30/19 1119 100 %      Weight 07/30/19 1117 252 lb (114.3 kg)      Height 07/30/19 1117 6' 3\" (1.905 m)      Head Circumference --       Peak Flow --       Pain Score --       Pain Loc --       Pain Edu? --       Excl. in [address]? --        My pulse ox interpretation is - normal    General appearance: Moderate distress, pale and diaphoretic  Skin:  Warm. diaphoretic  Eye:  Extraocular movements intact. Ears, nose, mouth and throat:  Oral mucosa moist   Neck:  Trachea midline. Extremity:  No swelling. Normal ROM     Heart:  Re bradycardic.     Perfusion: Chest Standard (2 Vw)    Result Date: 7/11/2019  EXAMINATION: TWO XRAY VIEWS OF THE CHEST 7/11/2019 9:13 pm COMPARISON: None. HISTORY: ORDERING SYSTEM PROVIDED HISTORY: chest pain TECHNOLOGIST PROVIDED HISTORY: Reason for exam:->chest pain Reason for Exam: chest pain Acuity: Unknown Type of Exam: Initial Additional signs and symptoms: none Relevant Medical/Surgical History: CAD, A-FIB FINDINGS: No infiltrate or consolidation or effusion is identified. The heart size is within normal limits. There is a mild dextrocurvature of the thoracic spine. No acute abnormality detected. Vl Lora Bilateral Limited 1-2 Levels    Result Date: 7/25/2019  EXAMINATION: LORA OF THE BILATERAL LOWER EXTREMITIES 7/25/2019 1:13 pm TECHNIQUE: Bilateral ankle brachial index with PVR waveforms. COMPARISON: None. HISTORY: ORDERING SYSTEM PROVIDED HISTORY: Claudication of lower extremity ([address] TECHNOLOGIST PROVIDED HISTORY: Reason for Exam: lower extremity claudication Acuity: Unknown Type of Exam: Initial FINDINGS: The LORA on the right is 1. 18. The LORA on the left is 1.06. PVR waveforms are within normal limits bilaterally. Normal ankle brachial index bilaterally. Us Retroperitoneal Complete    Result Date: 7/25/2019  EXAMINATION: RETROPERITONEAL ULTRASOUND OF THE KIDNEYS AND URINARY BLADDER 7/25/2019 COMPARISON: None HISTORY: ORDERING SYSTEM PROVIDED HISTORY: Acute renal failure, unspecified acute renal failure type [facility]) TECHNOLOGIST PROVIDED HISTORY: Reason for Exam: MARLYN Acuity: Unknown Type of Exam: Initial FINDINGS: Kidneys: The right kidney measures 12.8 cm in length and the left kidney measures 12.4 cm in length. There are a few tiny punctate hyperechoic foci noted within the right kidney without significant shadowing. No obstruction. No hydronephrosis is seen. No renal mass is identified. Bladder: Unremarkable appearance of the bladder.   No significant post void residual. Prevoid residual volume of 167 mL, with a postvoid residual volume of 7 mL. Normal renal length, echotexture and cortical thickness without focal mass or hydronephrosis. Few tiny punctate hyperechoic foci without shadowing seen in the right kidney. These could represent nonobstructive calculi. Unremarkable appearing bladder. No significant postvoid residual.         MDM:  Patient presented with chest pain. initial EKG consistent with an ST elevation MI, STEMI alert initiated,Patient is bradycardic, hypotensive was placed on the monitor, IV fluids initiated cardiology at bedside Cath Lab being prepared given his bradycardia and hypotension, did verbally request dopamine, prior to us receiving it patient was taken to the Cath Lab in critical condition    Total critical care time provided today was 21 minutes. This excludes seperately billable procedures and family discussion time. Critical care time provided for obtaining history, conducting a physical exam, performing and monitoring interventions, ordering, collecting and interpreting tests, and establishing medical decision-making. There was a potential for life/limb threatening pathology requiring close evaluation and intervention with concern for patient decompensation. Clinical Impression:  1. ST elevation myocardial infarction (STEMI), unspecified artery ([facility] [address]      Disposition referral (if applicable):  No follow-up provider specified. Disposition medications (if applicable):  New Prescriptions    No medications on file       Comment: Please note this report has been produced using speech recognition software and may contain errors related to that system including errors in grammar, punctuation, and spelling, as well as words and phrases that may be inappropriate. Efforts were made to edit the dictations.         Nikhil Muniz MD  07/30/19 0532

## 2019-07-30 NOTE — CONSULTS
Medication Dose Route Frequency Provider Last Rate Last Dose    0.9 % sodium chloride bolus  1,000 mL Intravenous Once Ashtyn Torres  mL/hr at 07/30/19 1124 1,000 mL at 07/30/19 1124     Current Outpatient Medications   Medication Sig Dispense Refill    nitroGLYCERIN (NITROSTAT) 0.4 MG SL tablet Place 1 tablet under the tongue once for 1 dose 25 tablet 3    aspirin 81 MG tablet TAKE 1 TABLET BY MOUTH EVERY DAY      carvedilol (COREG) 25 MG tablet Take 25 mg by mouth 2 times daily (with meals)      glimepiride (AMARYL) 4 MG tablet Take 4 mg by mouth every morning (before breakfast)      clopidogrel (PLAVIX) 300 MG TABS Take 300 mg by mouth daily      simvastatin (ZOCOR) 40 MG tablet Take 1 tablet by mouth nightly 30 tablet 3    metFORMIN (GLUCOPHAGE) 500 MG tablet Take 500 mg by mouth daily (with breakfast) Indications: unsure of dosage taken             Review of Systems:    · Constitutional: No Fever or Weight Loss    · Eyes: No Decreased Vision  · ENT: No Headaches, Hearing Loss or Vertigo  · Cardiovascular: + chest pain, dyspnea on exertion, palpitations or loss of consciousness  · Respiratory: No cough or wheezing    · Gastrointestinal: No abdominal pain, appetite loss, blood in stools, constipation, diarrhea or heartburn  · Genitourinary: No dysuria, trouble voiding, or hematuria  · Musculoskeletal: No gait disturbance, weakness or joint complaints  · Integumentary: No rash or pruritis  · Neurological: No TIA or stroke symptoms  · Psychiatric: No anxiety or depression  · Endocrine: No malaise, fatigue or temperature intolerance  · Hematologic/Lymphatic: No bleeding problems, blood clots or swollen lymph nodes  · Allergic/Immunologic: No nasal congestion or hives  All systems negative except as marked.      ·    ·    ·      Physical Examination:    Vitals:    07/30/19 1122   BP: (!) 81/55   Pulse: (!) 37   Resp: 15   SpO2: 100%      Wt Readings from Last 3 Encounters:   07/30/19 252 lb (114.3
patient is a 63 y.o. male in No acute distress. EYE: EOMI, Gross visual field was normal. Pupils reactive, The conjunctive was normal, with no erythema. ENT: no lymphadenopathy, oropharynx is without erythema, edema, or exudates, and moist mucus membranes. The nasal mucosa, septum and turbinates were normal without inflammation or edema. Neck: There was no mass on palpitation, tracheal position was in the middle of the neck and there was no enlarged thyroid. There was no JVD. Lungs: The respiratory was not in labor and the patient did not use accessory muscle. Clear to auscultation bilaterally, no wheeze/crackles. Cardiovascular: Regular rate and rhythm, normal S1 & S2, no murmurs, rubs or gallops appreciated. Peripheral pulses were normal and no tenderness. Abdomen: Soft, non-tender, no rebound or guarding or peritoneal features, no masses, no hepatosplenomegaly. Extremities: upper and lower extremities were warm and dry, no clubbing, cyanosis, edema. Neuro: CN II-XII were intact grossly. Sensation was normal on all extremities and the muscle strength was normal and symmetry. Rectum:  There was no fistular, fissure, external hemorrhoid, tenderness, abscess, erythema or discharge    Labs:  CBC  WBC   Date Value Ref Range Status   07/30/2019 15.7 (H) 4.0 - 10.5 K/CU MM Final     Hemoglobin   Date Value Ref Range Status   07/30/2019 12.8 (L) 13.5 - 18.0 GM/DL Final     Hematocrit   Date Value Ref Range Status   07/30/2019 38.1 (L) 42 - 52 % Final     MCV   Date Value Ref Range Status   07/30/2019 89.4 78 - 100 FL Final        Glucose   Date Value Ref Range Status   07/30/2019 177 (H) 70 - 99 MG/DL Final     CO2   Date Value Ref Range Status   07/30/2019 24 21 - 32 MMOL/L Final     BUN   Date Value Ref Range Status   07/30/2019 31 (H) 6 - 23 MG/DL Final     Lab Results   Component Value Date    ALT 7 07/30/2019    AST 13 07/30/2019     No results found for: AMYLASE  No results found for: LIPASE  No results

## 2019-07-31 ENCOUNTER — TELEPHONE (OUTPATIENT)
Dept: CARDIOLOGY CLINIC | Age: 59
End: 2019-07-31

## 2019-07-31 VITALS
DIASTOLIC BLOOD PRESSURE: 82 MMHG | TEMPERATURE: 97.7 F | BODY MASS INDEX: 31.03 KG/M2 | OXYGEN SATURATION: 99 % | SYSTOLIC BLOOD PRESSURE: 148 MMHG | HEIGHT: 75 IN | HEART RATE: 67 BPM | RESPIRATION RATE: 11 BRPM | WEIGHT: 249.56 LBS

## 2019-07-31 LAB
ALBUMIN SERPL-MCNC: 4.4 GM/DL (ref 3.4–5)
ALP BLD-CCNC: 63 IU/L (ref 40–129)
ALT SERPL-CCNC: 11 U/L (ref 10–40)
ANION GAP SERPL CALCULATED.3IONS-SCNC: 16 MMOL/L (ref 4–16)
AST SERPL-CCNC: 60 IU/L (ref 15–37)
BILIRUB SERPL-MCNC: 0.5 MG/DL (ref 0–1)
BILIRUBIN DIRECT: 0.2 MG/DL (ref 0–0.3)
BILIRUBIN, INDIRECT: 0.3 MG/DL (ref 0–0.7)
BUN BLDV-MCNC: 24 MG/DL (ref 6–23)
CALCIUM SERPL-MCNC: 9.6 MG/DL (ref 8.3–10.6)
CHLORIDE BLD-SCNC: 103 MMOL/L (ref 99–110)
CHOLESTEROL: 181 MG/DL
CO2: 23 MMOL/L (ref 21–32)
CREAT SERPL-MCNC: 1.3 MG/DL (ref 0.9–1.3)
ESTIMATED AVERAGE GLUCOSE: 143 MG/DL
GFR AFRICAN AMERICAN: >60 ML/MIN/1.73M2
GFR NON-AFRICAN AMERICAN: 57 ML/MIN/1.73M2
GLUCOSE BLD-MCNC: 118 MG/DL (ref 70–99)
GLUCOSE BLD-MCNC: 127 MG/DL (ref 70–99)
GLUCOSE BLD-MCNC: 171 MG/DL (ref 70–99)
GLUCOSE BLD-MCNC: 185 MG/DL (ref 70–99)
HBA1C MFR BLD: 6.6 % (ref 4.2–6.3)
HCT VFR BLD CALC: 38.3 % (ref 42–52)
HDLC SERPL-MCNC: 36 MG/DL
HEMOGLOBIN: 12.4 GM/DL (ref 13.5–18)
LDL CHOLESTEROL DIRECT: 132 MG/DL
MAGNESIUM: 2 MG/DL (ref 1.8–2.4)
MCH RBC QN AUTO: 29.5 PG (ref 27–31)
MCHC RBC AUTO-ENTMCNC: 32.4 % (ref 32–36)
MCV RBC AUTO: 91.2 FL (ref 78–100)
PDW BLD-RTO: 13.3 % (ref 11.7–14.9)
PLATELET # BLD: 242 K/CU MM (ref 140–440)
PMV BLD AUTO: 11.4 FL (ref 7.5–11.1)
POTASSIUM SERPL-SCNC: 3.7 MMOL/L (ref 3.5–5.1)
RBC # BLD: 4.2 M/CU MM (ref 4.6–6.2)
SODIUM BLD-SCNC: 142 MMOL/L (ref 135–145)
TOTAL PROTEIN: 7.5 GM/DL (ref 6.4–8.2)
TRIGL SERPL-MCNC: 162 MG/DL
WBC # BLD: 14.5 K/CU MM (ref 4–10.5)

## 2019-07-31 PROCEDURE — 6370000000 HC RX 637 (ALT 250 FOR IP): Performed by: INTERNAL MEDICINE

## 2019-07-31 PROCEDURE — 83036 HEMOGLOBIN GLYCOSYLATED A1C: CPT

## 2019-07-31 PROCEDURE — 82248 BILIRUBIN DIRECT: CPT

## 2019-07-31 PROCEDURE — 99231 SBSQ HOSP IP/OBS SF/LOW 25: CPT | Performed by: INTERNAL MEDICINE

## 2019-07-31 PROCEDURE — 83721 ASSAY OF BLOOD LIPOPROTEIN: CPT

## 2019-07-31 PROCEDURE — 83735 ASSAY OF MAGNESIUM: CPT

## 2019-07-31 PROCEDURE — 82962 GLUCOSE BLOOD TEST: CPT

## 2019-07-31 PROCEDURE — 80053 COMPREHEN METABOLIC PANEL: CPT

## 2019-07-31 PROCEDURE — 2580000003 HC RX 258: Performed by: INTERNAL MEDICINE

## 2019-07-31 PROCEDURE — 80061 LIPID PANEL: CPT

## 2019-07-31 PROCEDURE — 85027 COMPLETE CBC AUTOMATED: CPT

## 2019-07-31 PROCEDURE — 99232 SBSQ HOSP IP/OBS MODERATE 35: CPT | Performed by: INTERNAL MEDICINE

## 2019-07-31 RX ORDER — ATORVASTATIN CALCIUM 80 MG/1
80 TABLET, FILM COATED ORAL NIGHTLY
Qty: 30 TABLET | Refills: 3 | Status: SHIPPED | OUTPATIENT
Start: 2019-07-31 | End: 2019-07-31

## 2019-07-31 RX ORDER — CARVEDILOL 3.12 MG/1
3.12 TABLET ORAL 2 TIMES DAILY WITH MEALS
Qty: 60 TABLET | Refills: 3 | Status: SHIPPED | OUTPATIENT
Start: 2019-07-31 | End: 2019-07-31

## 2019-07-31 RX ORDER — PRASUGREL 10 MG/1
10 TABLET, FILM COATED ORAL DAILY
Qty: 30 TABLET | Refills: 1 | Status: ON HOLD | OUTPATIENT
Start: 2019-08-01 | End: 2020-01-07 | Stop reason: HOSPADM

## 2019-07-31 RX ORDER — ATORVASTATIN CALCIUM 80 MG/1
80 TABLET, FILM COATED ORAL NIGHTLY
Qty: 30 TABLET | Refills: 3 | Status: ON HOLD | OUTPATIENT
Start: 2019-07-31 | End: 2020-01-07 | Stop reason: SDUPTHER

## 2019-07-31 RX ORDER — CARVEDILOL 3.12 MG/1
3.12 TABLET ORAL 2 TIMES DAILY WITH MEALS
Status: DISCONTINUED | OUTPATIENT
Start: 2019-07-31 | End: 2019-07-31 | Stop reason: HOSPADM

## 2019-07-31 RX ORDER — CARVEDILOL 3.12 MG/1
3.12 TABLET ORAL 2 TIMES DAILY WITH MEALS
Qty: 60 TABLET | Refills: 3 | Status: ON HOLD | OUTPATIENT
Start: 2019-07-31 | End: 2020-01-07 | Stop reason: HOSPADM

## 2019-07-31 RX ORDER — PRASUGREL 10 MG/1
10 TABLET, FILM COATED ORAL DAILY
Status: DISCONTINUED | OUTPATIENT
Start: 2019-07-31 | End: 2019-07-31 | Stop reason: HOSPADM

## 2019-07-31 RX ORDER — PRASUGREL 10 MG/1
10 TABLET, FILM COATED ORAL DAILY
Qty: 30 TABLET | Refills: 1 | Status: SHIPPED | OUTPATIENT
Start: 2019-08-01 | End: 2019-07-31

## 2019-07-31 RX ADMIN — OXYCODONE HYDROCHLORIDE AND ACETAMINOPHEN 1 TABLET: 5; 325 TABLET ORAL at 06:36

## 2019-07-31 RX ADMIN — SODIUM CHLORIDE: 9 INJECTION, SOLUTION INTRAVENOUS at 13:02

## 2019-07-31 RX ADMIN — INSULIN LISPRO 2 UNITS: 100 INJECTION, SOLUTION INTRAVENOUS; SUBCUTANEOUS at 12:09

## 2019-07-31 RX ADMIN — PRASUGREL HYDROCHLORIDE 10 MG: 10 TABLET, FILM COATED ORAL at 09:21

## 2019-07-31 RX ADMIN — SUCRALFATE 1 G: 1 TABLET ORAL at 00:05

## 2019-07-31 RX ADMIN — ASPIRIN 81 MG 81 MG: 81 TABLET ORAL at 09:22

## 2019-07-31 RX ADMIN — INSULIN LISPRO 1 UNITS: 100 INJECTION, SOLUTION INTRAVENOUS; SUBCUTANEOUS at 00:05

## 2019-07-31 RX ADMIN — SUCRALFATE 1 G: 1 TABLET ORAL at 06:37

## 2019-07-31 RX ADMIN — SUCRALFATE 1 G: 1 TABLET ORAL at 12:09

## 2019-07-31 RX ADMIN — CARVEDILOL 3.12 MG: 3.12 TABLET, FILM COATED ORAL at 09:20

## 2019-07-31 ASSESSMENT — PAIN SCALES - GENERAL
PAINLEVEL_OUTOF10: 0
PAINLEVEL_OUTOF10: 0
PAINLEVEL_OUTOF10: 6
PAINLEVEL_OUTOF10: 3

## 2019-07-31 NOTE — CARE COORDINATION
Noted that pt does not have insurance. VIVIANE/Prudence has met with pt and she informed CM that he refused financial assistance.   Referral made to [facility]. TE

## 2019-07-31 NOTE — PROGRESS NOTES
Seen by Cath Lab nurse. Patient is alert and oriented and conversational. Discussed STEMI; involving right coronary artery and how this has affected the heart muscle. We discussed how to watch for signs and symptoms of Heart Failure and Pneumonia along with education on how to use the Heart Failure and Pneumonia stoplight. Reinforced the importance of early symptoms recognition and calling their doctor when in the yellow and red zone. Educational material left with patient in reference to discussed material. Pt and family voiced understood. Questions asked and answered.
Skin assessment completed. Scabs noted on outer left ankle and a few on left leg.
hours) at 7/31/2019 0450  Last data filed at 7/31/2019 0200  Gross per 24 hour   Intake 2412 ml   Output 2725 ml   Net -313 ml       TELEMETRY: Sinus     Physical Exam:  Constitutional:  Well developed, Well nourished, No acute distress, Non-toxic appearance. HENT:  Normocephalic, Atraumatic, Bilateral external ears normal, Oropharynx moist, No oral exudates, Nose normal. Neck- Normal range of motion, No tenderness, Supple, No stridor. Eyes:  PERRL, EOMI, Conjunctiva normal, No discharge. Respiratory:  Normal breath sounds, No respiratory distress, No wheezing, No chest tenderness. ,no use of accessory muscles, diaphragm movement is normal  Cardiovascular: (PMI) apex non displaced,no lifts no thrills, no s3,no s4, Normal heart rate, Normal rhythm, No murmurs, No rubs, No gallops. Carotid arteries pulse and amplitude are normal no bruit, no abdominal bruit noted ( normal abdominal aorta ausculation), femoral arteries pulse and amplitude are normal no bruit, pedal pulses are normal  GI:  Bowel sounds normal, Soft, No tenderness, No masses, No pulsatile masses. : External genitalia appear normal, No masses or lesions. No discharge. No CVA tenderness. Musculoskeletal:  Intact distal pulses, No edema, No tenderness, No cyanosis, No clubbing. Good range of motion in all major joints. No tenderness to palpation or major deformities noted. Back- No tenderness. Integument:  Warm, Dry, No erythema, No rash. Lymphatic:  No lymphadenopathy noted. Neurologic:  Alert & oriented x 3, Normal motor function, Normal sensory function, No focal deficits noted.    Psychiatric:  Affect normal, Judgment normal, Mood normal.     Medications:    prasugrel  10 mg Oral Daily    carvedilol  3.125 mg Oral BID WC    sodium chloride flush  10 mL Intravenous 2 times per day    aspirin  81 mg Oral Daily    insulin lispro  0-12 Units Subcutaneous TID WC    insulin lispro  0-6 Units Subcutaneous Nightly    sucralfate  1 g Oral
MG/DL Final     CO2   Date Value Ref Range Status   07/31/2019 23 21 - 32 MMOL/L Final     BUN   Date Value Ref Range Status   07/31/2019 24 (H) 6 - 23 MG/DL Final     Lab Results   Component Value Date    ALT 11 07/31/2019    AST 60 07/31/2019     No results found for: AMYLASE  No results found for: LIPASE  No results found for: ESR  No components found for: CREACTIVEPR  No results found for: BEAN No components found for: ANTISMA, ANTIMITO  No results found for: CEA  No components found for: OCCULTBLOOD, OCCBLDSINPOC, OCCULTBLOODS, OCCBLD1, OCCBLD2, OCCBLD3, OCCULTBLOOD  No results found for: IRON, FERRITIN  No results found for: HAV    Assessment     Assessment and Plan:    Nausea vomiting yesterday that had already spontaneously resolved. It might be due to sedation, acute MI, reaction to heart catheterization. Since the patient no longer had nausea vomiting, I am signing off the case    Yoandy Sutton MD PhD [facility]  [address] [place] [address]  0ffice: [phone]  Fax: [phone]

## 2019-08-01 ENCOUNTER — TELEPHONE (OUTPATIENT)
Dept: CARDIOLOGY CLINIC | Age: 59
End: 2019-08-01

## 2019-08-06 ENCOUNTER — OFFICE VISIT (OUTPATIENT)
Dept: CARDIOLOGY CLINIC | Age: 59
End: 2019-08-06

## 2019-08-06 VITALS
DIASTOLIC BLOOD PRESSURE: 60 MMHG | OXYGEN SATURATION: 93 % | HEIGHT: 75 IN | HEART RATE: 68 BPM | WEIGHT: 248.8 LBS | BODY MASS INDEX: 30.93 KG/M2 | SYSTOLIC BLOOD PRESSURE: 100 MMHG

## 2019-08-06 DIAGNOSIS — Z98.61 S/P PTCA (PERCUTANEOUS TRANSLUMINAL CORONARY ANGIOPLASTY): Primary | ICD-10-CM

## 2019-08-06 LAB
EKG ATRIAL RATE: 60 BPM
EKG DIAGNOSIS: NORMAL
EKG Q-T INTERVAL: 496 MS
EKG QRS DURATION: 110 MS
EKG QTC CALCULATION (BAZETT): 389 MS
EKG R AXIS: 11 DEGREES
EKG T AXIS: 108 DEGREES
EKG VENTRICULAR RATE: 37 BPM

## 2019-08-06 PROCEDURE — 99214 OFFICE O/P EST MOD 30 MIN: CPT | Performed by: INTERNAL MEDICINE

## 2019-08-06 NOTE — PROGRESS NOTES
[facility]) 11/2013    CAD (coronary artery disease)     COPD (chronic obstructive pulmonary disease) [facility] [address]     Diabetes mellitus ([facility] [address]     GERD (gastroesophageal reflux disease)     H/O cardiovascular stress test 11/20/13, 3/20/2013    11/13-Observed defect is consistent with diaphragmatic attenuation. EF 58%. 3/13 -EF 51%. No ischemia. Normal Study.  H/O cardiovascular stress test 06/08/2017    EF 50% normal study    H/O cardiovascular stress test 06/17/2019    Normal NM    H/O Doppler ultrasound 10/14/2010    10/2010-Bilateral venous doppler of lower extremies- No DVTs.  H/O echocardiogram 06/26/2019    Left ventricular systolic function is normal with an ejection fraction of 50-55%. Mild concentric left ventricular hypertrophy. Grade I diastolic dysfunction. No significant valvular disease noted. No evidence of pericardial effusion. Essentially unremarkable echo.  H/O percutaneous left heart catheterization 12/22/15    No evidence of hemodynamically significant coronary artery disease    History of coronary artery stent placement 11/13/2013 11/13 -RCA - 3 stents placed    History of exercise stress test 06/08/2017    treadmill    History of Holter monitoring 1/4/2016    predominant rhythm sinus    Hyperlipidemia     Hypertension     S/P cardiac catheterization 11/13/2013 11/13/2013-EF 55%, distal LAD mild disease,CX stent patent,but distal to stent 50% stenosis. RCA severe disease.  Status post angioplasty with stent      Past Surgical History:   Procedure Laterality Date    APPENDECTOMY      CARDIAC CATHETERIZATION  11/13/2013 11/13/2013-EF 55%, distal LAD mild disease,CX stent patent,but distal to stent 50% stenosis. RCA severe disease.     COLONOSCOPY      CORONARY ANGIOPLASTY WITH STENT PLACEMENT      4 stents- RCA X3; CX X1    CORONARY ANGIOPLASTY WITH STENT PLACEMENT  11/13/2013 11/13/2013 -3 stents placed to RCA- Dr Katelynn Bangura

## 2019-08-09 ENCOUNTER — TELEPHONE (OUTPATIENT)
Dept: CARDIOLOGY CLINIC | Age: 59
End: 2019-08-09

## 2019-08-16 ENCOUNTER — TELEPHONE (OUTPATIENT)
Dept: CARDIOLOGY CLINIC | Age: 59
End: 2019-08-16

## 2019-08-23 ENCOUNTER — TELEPHONE (OUTPATIENT)
Dept: CARDIOLOGY CLINIC | Age: 59
End: 2019-08-23

## 2020-01-05 ENCOUNTER — APPOINTMENT (OUTPATIENT)
Dept: GENERAL RADIOLOGY | Age: 60
DRG: 291 | End: 2020-01-05

## 2020-01-05 ENCOUNTER — HOSPITAL ENCOUNTER (INPATIENT)
Age: 60
LOS: 2 days | Discharge: HOME | DRG: 291 | End: 2020-01-07
Attending: EMERGENCY MEDICINE | Admitting: INTERNAL MEDICINE
Payer: COMMERCIAL

## 2020-01-05 ENCOUNTER — APPOINTMENT (OUTPATIENT)
Dept: CT IMAGING | Age: 60
DRG: 291 | End: 2020-01-05

## 2020-01-05 PROBLEM — J90 PLEURAL EFFUSION, BILATERAL: Status: ACTIVE | Noted: 2020-01-05

## 2020-01-05 PROBLEM — I16.0 HYPERTENSIVE URGENCY: Status: ACTIVE | Noted: 2020-01-05

## 2020-01-05 PROBLEM — I50.9 NEW ONSET OF CONGESTIVE HEART FAILURE (HCC): Status: ACTIVE | Noted: 2020-01-05

## 2020-01-05 PROBLEM — J44.1 COPD WITH ACUTE EXACERBATION (HCC): Status: ACTIVE | Noted: 2020-01-05

## 2020-01-05 PROBLEM — J81.0 ACUTE PULMONARY EDEMA (HCC): Status: ACTIVE | Noted: 2020-01-05

## 2020-01-05 LAB
ALBUMIN SERPL-MCNC: 3.6 GM/DL (ref 3.4–5)
ALP BLD-CCNC: 109 IU/L (ref 40–129)
ALT SERPL-CCNC: 16 U/L (ref 10–40)
ANION GAP SERPL CALCULATED.3IONS-SCNC: 9 MMOL/L (ref 4–16)
AST SERPL-CCNC: 19 IU/L (ref 15–37)
BASOPHILS ABSOLUTE: 0.1 K/CU MM
BASOPHILS RELATIVE PERCENT: 1.3 % (ref 0–1)
BILIRUB SERPL-MCNC: 0.2 MG/DL (ref 0–1)
BUN BLDV-MCNC: 22 MG/DL (ref 6–23)
CALCIUM SERPL-MCNC: 9.1 MG/DL (ref 8.3–10.6)
CHLORIDE BLD-SCNC: 100 MMOL/L (ref 99–110)
CO2: 25 MMOL/L (ref 21–32)
CREAT SERPL-MCNC: 1.2 MG/DL (ref 0.9–1.3)
DIFFERENTIAL TYPE: ABNORMAL
EKG ATRIAL RATE: 92 BPM
EKG DIAGNOSIS: NORMAL
EKG Q-T INTERVAL: 380 MS
EKG QRS DURATION: 104 MS
EKG QTC CALCULATION (BAZETT): 452 MS
EKG R AXIS: 12 DEGREES
EKG T AXIS: 112 DEGREES
EKG VENTRICULAR RATE: 85 BPM
EOSINOPHILS ABSOLUTE: 0.4 K/CU MM
EOSINOPHILS RELATIVE PERCENT: 4 % (ref 0–3)
GFR AFRICAN AMERICAN: >60 ML/MIN/1.73M2
GFR NON-AFRICAN AMERICAN: >60 ML/MIN/1.73M2
GLUCOSE BLD-MCNC: 137 MG/DL (ref 70–99)
GLUCOSE BLD-MCNC: 198 MG/DL (ref 70–99)
HCT VFR BLD CALC: 40.1 % (ref 42–52)
HEMOGLOBIN: 12.6 GM/DL (ref 13.5–18)
IMMATURE NEUTROPHIL %: 0.2 % (ref 0–0.43)
LYMPHOCYTES ABSOLUTE: 2.1 K/CU MM
LYMPHOCYTES RELATIVE PERCENT: 21.8 % (ref 24–44)
MCH RBC QN AUTO: 28.6 PG (ref 27–31)
MCHC RBC AUTO-ENTMCNC: 31.4 % (ref 32–36)
MCV RBC AUTO: 90.9 FL (ref 78–100)
MONOCYTES ABSOLUTE: 0.6 K/CU MM
MONOCYTES RELATIVE PERCENT: 6.4 % (ref 0–4)
NUCLEATED RBC %: 0 %
PDW BLD-RTO: 13.9 % (ref 11.7–14.9)
PLATELET # BLD: 228 K/CU MM (ref 140–440)
PMV BLD AUTO: 11.1 FL (ref 7.5–11.1)
POTASSIUM SERPL-SCNC: 3.6 MMOL/L (ref 3.5–5.1)
PRO-BNP: 2793 PG/ML
RBC # BLD: 4.41 M/CU MM (ref 4.6–6.2)
SEGMENTED NEUTROPHILS ABSOLUTE COUNT: 6.4 K/CU MM
SEGMENTED NEUTROPHILS RELATIVE PERCENT: 66.3 % (ref 36–66)
SODIUM BLD-SCNC: 134 MMOL/L (ref 135–145)
TOTAL IMMATURE NEUTOROPHIL: 0.02 K/CU MM
TOTAL NUCLEATED RBC: 0 K/CU MM
TOTAL PROTEIN: 6.7 GM/DL (ref 6.4–8.2)
TROPONIN T: <0.01 NG/ML
WBC # BLD: 9.6 K/CU MM (ref 4–10.5)

## 2020-01-05 PROCEDURE — 6370000000 HC RX 637 (ALT 250 FOR IP): Performed by: PHYSICIAN ASSISTANT

## 2020-01-05 PROCEDURE — 84484 ASSAY OF TROPONIN QUANT: CPT

## 2020-01-05 PROCEDURE — 6370000000 HC RX 637 (ALT 250 FOR IP): Performed by: NURSE PRACTITIONER

## 2020-01-05 PROCEDURE — 36415 COLL VENOUS BLD VENIPUNCTURE: CPT

## 2020-01-05 PROCEDURE — 93010 ELECTROCARDIOGRAM REPORT: CPT | Performed by: INTERNAL MEDICINE

## 2020-01-05 PROCEDURE — 85025 COMPLETE CBC W/AUTO DIFF WBC: CPT

## 2020-01-05 PROCEDURE — 2580000003 HC RX 258: Performed by: NURSE PRACTITIONER

## 2020-01-05 PROCEDURE — 2580000003 HC RX 258: Performed by: PHYSICIAN ASSISTANT

## 2020-01-05 PROCEDURE — 6360000004 HC RX CONTRAST MEDICATION: Performed by: PHYSICIAN ASSISTANT

## 2020-01-05 PROCEDURE — 94640 AIRWAY INHALATION TREATMENT: CPT

## 2020-01-05 PROCEDURE — 87486 CHLMYD PNEUM DNA AMP PROBE: CPT

## 2020-01-05 PROCEDURE — 87633 RESP VIRUS 12-25 TARGETS: CPT

## 2020-01-05 PROCEDURE — 2060000000 HC ICU INTERMEDIATE R&B

## 2020-01-05 PROCEDURE — 99285 EMERGENCY DEPT VISIT HI MDM: CPT

## 2020-01-05 PROCEDURE — 96374 THER/PROPH/DIAG INJ IV PUSH: CPT

## 2020-01-05 PROCEDURE — 87798 DETECT AGENT NOS DNA AMP: CPT

## 2020-01-05 PROCEDURE — 71045 X-RAY EXAM CHEST 1 VIEW: CPT

## 2020-01-05 PROCEDURE — 6360000002 HC RX W HCPCS: Performed by: PHYSICIAN ASSISTANT

## 2020-01-05 PROCEDURE — 80053 COMPREHEN METABOLIC PANEL: CPT

## 2020-01-05 PROCEDURE — 87581 M.PNEUMON DNA AMP PROBE: CPT

## 2020-01-05 PROCEDURE — 83036 HEMOGLOBIN GLYCOSYLATED A1C: CPT

## 2020-01-05 PROCEDURE — 93005 ELECTROCARDIOGRAM TRACING: CPT | Performed by: EMERGENCY MEDICINE

## 2020-01-05 PROCEDURE — 84443 ASSAY THYROID STIM HORMONE: CPT

## 2020-01-05 PROCEDURE — 82962 GLUCOSE BLOOD TEST: CPT

## 2020-01-05 PROCEDURE — 83880 ASSAY OF NATRIURETIC PEPTIDE: CPT

## 2020-01-05 PROCEDURE — 94761 N-INVAS EAR/PLS OXIMETRY MLT: CPT

## 2020-01-05 PROCEDURE — 71275 CT ANGIOGRAPHY CHEST: CPT

## 2020-01-05 PROCEDURE — 6360000002 HC RX W HCPCS: Performed by: NURSE PRACTITIONER

## 2020-01-05 RX ORDER — AMLODIPINE BESYLATE 5 MG/1
5 TABLET ORAL DAILY
Status: DISCONTINUED | OUTPATIENT
Start: 2020-01-06 | End: 2020-01-07

## 2020-01-05 RX ORDER — ASPIRIN 81 MG/1
324 TABLET, CHEWABLE ORAL ONCE
Status: COMPLETED | OUTPATIENT
Start: 2020-01-05 | End: 2020-01-05

## 2020-01-05 RX ORDER — IPRATROPIUM BROMIDE AND ALBUTEROL SULFATE 2.5; .5 MG/3ML; MG/3ML
1 SOLUTION RESPIRATORY (INHALATION) ONCE
Status: COMPLETED | OUTPATIENT
Start: 2020-01-05 | End: 2020-01-05

## 2020-01-05 RX ORDER — DEXTROSE MONOHYDRATE 50 MG/ML
100 INJECTION, SOLUTION INTRAVENOUS PRN
Status: DISCONTINUED | OUTPATIENT
Start: 2020-01-05 | End: 2020-01-07 | Stop reason: HOSPADM

## 2020-01-05 RX ORDER — SODIUM CHLORIDE 0.9 % (FLUSH) 0.9 %
10 SYRINGE (ML) INJECTION PRN
Status: DISCONTINUED | OUTPATIENT
Start: 2020-01-05 | End: 2020-01-05

## 2020-01-05 RX ORDER — IPRATROPIUM BROMIDE AND ALBUTEROL SULFATE 2.5; .5 MG/3ML; MG/3ML
1 SOLUTION RESPIRATORY (INHALATION)
Status: DISCONTINUED | OUTPATIENT
Start: 2020-01-06 | End: 2020-01-07 | Stop reason: HOSPADM

## 2020-01-05 RX ORDER — LISINOPRIL 5 MG/1
5 TABLET ORAL DAILY
Status: DISCONTINUED | OUTPATIENT
Start: 2020-01-06 | End: 2020-01-07 | Stop reason: HOSPADM

## 2020-01-05 RX ORDER — NITROGLYCERIN 0.4 MG/1
0.4 TABLET SUBLINGUAL ONCE
Status: CANCELLED | OUTPATIENT
Start: 2020-01-05 | End: 2020-01-05

## 2020-01-05 RX ORDER — ATORVASTATIN CALCIUM 40 MG/1
80 TABLET, FILM COATED ORAL NIGHTLY
Status: DISCONTINUED | OUTPATIENT
Start: 2020-01-05 | End: 2020-01-07 | Stop reason: HOSPADM

## 2020-01-05 RX ORDER — CARVEDILOL 6.25 MG/1
6.25 TABLET ORAL 2 TIMES DAILY WITH MEALS
Status: DISCONTINUED | OUTPATIENT
Start: 2020-01-06 | End: 2020-01-06

## 2020-01-05 RX ORDER — ONDANSETRON 2 MG/ML
4 INJECTION INTRAMUSCULAR; INTRAVENOUS EVERY 6 HOURS PRN
Status: DISCONTINUED | OUTPATIENT
Start: 2020-01-05 | End: 2020-01-07 | Stop reason: HOSPADM

## 2020-01-05 RX ORDER — NICOTINE 21 MG/24HR
1 PATCH, TRANSDERMAL 24 HOURS TRANSDERMAL DAILY
Status: DISCONTINUED | OUTPATIENT
Start: 2020-01-06 | End: 2020-01-07 | Stop reason: HOSPADM

## 2020-01-05 RX ORDER — CARVEDILOL 3.12 MG/1
3.12 TABLET ORAL ONCE
Status: COMPLETED | OUTPATIENT
Start: 2020-01-05 | End: 2020-01-05

## 2020-01-05 RX ORDER — CLOPIDOGREL BISULFATE 75 MG/1
75 TABLET ORAL ONCE
Status: COMPLETED | OUTPATIENT
Start: 2020-01-05 | End: 2020-01-05

## 2020-01-05 RX ORDER — PRASUGREL 10 MG/1
10 TABLET, FILM COATED ORAL DAILY
Status: CANCELLED | OUTPATIENT
Start: 2020-01-05

## 2020-01-05 RX ORDER — FUROSEMIDE 10 MG/ML
20 INJECTION INTRAMUSCULAR; INTRAVENOUS ONCE
Status: COMPLETED | OUTPATIENT
Start: 2020-01-05 | End: 2020-01-05

## 2020-01-05 RX ORDER — CLINDAMYCIN HYDROCHLORIDE 150 MG/1
300 CAPSULE ORAL ONCE
Status: COMPLETED | OUTPATIENT
Start: 2020-01-05 | End: 2020-01-05

## 2020-01-05 RX ORDER — DEXTROSE MONOHYDRATE 25 G/50ML
12.5 INJECTION, SOLUTION INTRAVENOUS PRN
Status: DISCONTINUED | OUTPATIENT
Start: 2020-01-05 | End: 2020-01-07 | Stop reason: HOSPADM

## 2020-01-05 RX ORDER — PREDNISONE 20 MG/1
40 TABLET ORAL DAILY
Status: DISCONTINUED | OUTPATIENT
Start: 2020-01-08 | End: 2020-01-06

## 2020-01-05 RX ORDER — AMLODIPINE BESYLATE 5 MG/1
5 TABLET ORAL ONCE
Status: COMPLETED | OUTPATIENT
Start: 2020-01-05 | End: 2020-01-05

## 2020-01-05 RX ORDER — SODIUM CHLORIDE 0.9 % (FLUSH) 0.9 %
10 SYRINGE (ML) INJECTION PRN
Status: DISCONTINUED | OUTPATIENT
Start: 2020-01-05 | End: 2020-01-07 | Stop reason: HOSPADM

## 2020-01-05 RX ORDER — METHYLPREDNISOLONE SODIUM SUCCINATE 40 MG/ML
40 INJECTION, POWDER, LYOPHILIZED, FOR SOLUTION INTRAMUSCULAR; INTRAVENOUS EVERY 6 HOURS
Status: DISCONTINUED | OUTPATIENT
Start: 2020-01-06 | End: 2020-01-06

## 2020-01-05 RX ORDER — FUROSEMIDE 10 MG/ML
40 INJECTION INTRAMUSCULAR; INTRAVENOUS DAILY
Status: DISCONTINUED | OUTPATIENT
Start: 2020-01-06 | End: 2020-01-06

## 2020-01-05 RX ORDER — NITROGLYCERIN 0.4 MG/1
0.4 TABLET SUBLINGUAL EVERY 5 MIN PRN
Status: DISCONTINUED | OUTPATIENT
Start: 2020-01-05 | End: 2020-01-07 | Stop reason: HOSPADM

## 2020-01-05 RX ORDER — GUAIFENESIN 600 MG/1
600 TABLET, EXTENDED RELEASE ORAL 2 TIMES DAILY
Status: DISCONTINUED | OUTPATIENT
Start: 2020-01-05 | End: 2020-01-07 | Stop reason: HOSPADM

## 2020-01-05 RX ORDER — ASPIRIN 81 MG/1
81 TABLET, CHEWABLE ORAL DAILY
Status: DISCONTINUED | OUTPATIENT
Start: 2020-01-06 | End: 2020-01-07 | Stop reason: HOSPADM

## 2020-01-05 RX ORDER — CLOPIDOGREL BISULFATE 75 MG/1
75 TABLET ORAL DAILY
Status: DISCONTINUED | OUTPATIENT
Start: 2020-01-06 | End: 2020-01-06

## 2020-01-05 RX ORDER — SODIUM CHLORIDE 0.9 % (FLUSH) 0.9 %
10 SYRINGE (ML) INJECTION EVERY 12 HOURS SCHEDULED
Status: DISCONTINUED | OUTPATIENT
Start: 2020-01-05 | End: 2020-01-07 | Stop reason: HOSPADM

## 2020-01-05 RX ORDER — ISOSORBIDE MONONITRATE 30 MG/1
30 TABLET, EXTENDED RELEASE ORAL DAILY
Status: DISCONTINUED | OUTPATIENT
Start: 2020-01-06 | End: 2020-01-07 | Stop reason: HOSPADM

## 2020-01-05 RX ORDER — NICOTINE POLACRILEX 4 MG
15 LOZENGE BUCCAL PRN
Status: DISCONTINUED | OUTPATIENT
Start: 2020-01-05 | End: 2020-01-07 | Stop reason: HOSPADM

## 2020-01-05 RX ORDER — PANTOPRAZOLE SODIUM 40 MG/1
40 TABLET, DELAYED RELEASE ORAL
Status: DISCONTINUED | OUTPATIENT
Start: 2020-01-06 | End: 2020-01-07 | Stop reason: HOSPADM

## 2020-01-05 RX ADMIN — CARVEDILOL 3.12 MG: 3.12 TABLET, FILM COATED ORAL at 20:35

## 2020-01-05 RX ADMIN — SODIUM CHLORIDE, PRESERVATIVE FREE 10 ML: 5 INJECTION INTRAVENOUS at 23:01

## 2020-01-05 RX ADMIN — AMLODIPINE BESYLATE 5 MG: 5 TABLET ORAL at 20:34

## 2020-01-05 RX ADMIN — IOPAMIDOL 80 ML: 755 INJECTION, SOLUTION INTRAVENOUS at 16:40

## 2020-01-05 RX ADMIN — CLINDAMYCIN HYDROCHLORIDE 300 MG: 150 CAPSULE ORAL at 20:34

## 2020-01-05 RX ADMIN — ATORVASTATIN CALCIUM 80 MG: 40 TABLET, FILM COATED ORAL at 23:01

## 2020-01-05 RX ADMIN — ASPIRIN 81 MG 324 MG: 81 TABLET ORAL at 14:48

## 2020-01-05 RX ADMIN — IPRATROPIUM BROMIDE AND ALBUTEROL SULFATE 1 AMPULE: .5; 3 SOLUTION RESPIRATORY (INHALATION) at 13:19

## 2020-01-05 RX ADMIN — GUAIFENESIN 600 MG: 600 TABLET, EXTENDED RELEASE ORAL at 23:07

## 2020-01-05 RX ADMIN — METHYLPREDNISOLONE SODIUM SUCCINATE 40 MG: 40 INJECTION, POWDER, FOR SOLUTION INTRAMUSCULAR; INTRAVENOUS at 23:01

## 2020-01-05 RX ADMIN — AZITHROMYCIN MONOHYDRATE 500 MG: 500 INJECTION, POWDER, LYOPHILIZED, FOR SOLUTION INTRAVENOUS at 23:18

## 2020-01-05 RX ADMIN — CLOPIDOGREL 75 MG: 75 TABLET, FILM COATED ORAL at 20:34

## 2020-01-05 RX ADMIN — FUROSEMIDE 20 MG: 10 INJECTION, SOLUTION INTRAMUSCULAR; INTRAVENOUS at 16:22

## 2020-01-05 RX ADMIN — Medication 10 ML: at 16:41

## 2020-01-05 RX ADMIN — CARVEDILOL 3.12 MG: 3.12 TABLET, FILM COATED ORAL at 16:24

## 2020-01-05 ASSESSMENT — PAIN SCALES - GENERAL
PAINLEVEL_OUTOF10: 0
PAINLEVEL_OUTOF10: 4
PAINLEVEL_OUTOF10: 0

## 2020-01-05 ASSESSMENT — PAIN DESCRIPTION - PAIN TYPE: TYPE: ACUTE PAIN

## 2020-01-05 ASSESSMENT — PAIN DESCRIPTION - DESCRIPTORS: DESCRIPTORS: DISCOMFORT

## 2020-01-05 ASSESSMENT — PAIN DESCRIPTION - FREQUENCY: FREQUENCY: CONTINUOUS

## 2020-01-05 ASSESSMENT — PAIN DESCRIPTION - LOCATION: LOCATION: CHEST

## 2020-01-05 NOTE — ED NOTES
Provider notified that pt being upset and results have been back. Provider stated \"ok\".       Gato Ojeda RN  01/05/20 0119

## 2020-01-05 NOTE — ED NOTES
Provider called this RN about pts repeat troponin that was to be collected at 1630. Another RN charted it off and collected at 1511. This RN was unaware that a repeat was ordered and the provider did not communicate this with RN until this point. Pt agreed to have this RN draw a repeat Troponin. Pt upset that provider has not been in to speak with them about anything. This has been communicated to provider multiple times.       Ruby Herrera RN  01/05/20 50 Rusty Nichols RN  01/05/20 6362

## 2020-01-05 NOTE — LETTER
[address] [address]  Phone: [phone]             January 7, 2020    Patient: Betina Nash   YOB: 1960   Date of Visit: 1/5/2020       To Whom It May Concern:    Dinorah Chow was seen and treated in our facility  beginning 1/5/2020 until 01/07/2020 . He may return to work on 01/10/2020.       Sincerely,       Vangie Vizcarra RN         Signature:__________________________________

## 2020-01-05 NOTE — ED NOTES
Provider updated that pt wants to leave and does not want an IV and was only here for his breathing, provider also updated on pts elevated BP. Provider states she will get to patient when she can. Pt updated on POC.       Souleymane Lugo RN  01/05/20 8097

## 2020-01-05 NOTE — ED PROVIDER NOTES
As triage Physician Assistant, I performed a medical screening history and physical exam on this patient. HISTORY OF PRESENT ILLNESS  Alberto Abarca is a 63 y.o. male  who presents with chest heaviness and shortness of breath. Onset 3 days. Patient has a history of diabetes mellitus, hyperlipidemia, hypertension, and smokes 3 packs cigarettes daily. PHYSICAL EXAM  BP (!) 184/122 Comment: has not taken BP meds in two weeks  Pulse 90   Temp 98.5 °F (36.9 °C) (Oral)   Resp 19   Ht 6' 3\" (1.905 m)   Wt 240 lb (108.9 kg)   SpO2 95%   BMI 30.00 kg/m²         On exam, the patient appears well-hydrated, well-nourished, and in no acute distress. Mucous membranes are moist. Breathing is unlabored. Skin is dry. Mental status appears normal. Moves all extremities, and is without facial droop. Speech is clear. Any necessary Labs, Imaging, and/or treatment were initiated and patient moved to an emergency department exam room. Comment: Please note this report has been produced using speech recognition software and may contain errors related to that system including errors in grammar, punctuation, and spelling, as well as words and phrases that may be inappropriate. If there are any questions or concerns please feel free to contact the dictating provider for clarification.        Suri Schroeder, [address]  01/05/20 3205

## 2020-01-05 NOTE — ED PROVIDER NOTES
EMERGENCY DEPARTMENT ENCOUNTER    [facility] EMERGENCY DEPARTMENT        TRIAGE CHIEF COMPLAINT:   Chest Pain (FOR 3 DAYS , HAS BEEN OUT OF MEDS FOR 2 WEEKS) and Shortness of Breath (SOB FOR 3 DAYS)      TUAN Lord is a 63 y.o. male that presents with shortness of breath and cough. Onset was prior to arrival x1 week ago. Patient states he has a history of CAD with stent, A. Fib, COPD, HTN and HLD. Follows with cardiology at the [place] [id number]. He reports he has been out of all his medications for the last 2 weeks. States his shortness of breath is progressively worsening, worse with exertion and laying down flat. He has no known history of CHF. Believes that he supposed be on Plavix however on chart review, he is noted to be on Effient and aspirin, no other anticoagulants noted. Has not seen cardiology since summer 2019. Denies any fever but states cough is been productive with yellow sputum. He is a persistent every day smoker. He does report yesterday he did have an episode of chest pressure as if \"something was sitting on my chest\" which has resolved and has not returned. Denying any nausea, vomiting, dizziness or diaphoresis. No calf pain or swelling or previous history of DVT/PE. Denying any palpitations.     ROS:  General:  No fevers, no chills, no weakness  Cardiovascular:  See HPI    Respiratory:  See HPI   Gastrointestinal:  No pain, no nausea, no vomiting, no diarrhea  Musculoskeletal:  No muscle pain, no joint pain  Skin:  No rash, no pruritis, no easy bruising  Neurologic:  No speech problems, no headache, no extremity numbness, no extremity tingling, no extremity weakness  Psychiatric:  No anxiety  Genitourinary:  No dysuria, no hematuria  Extremities:  No edema    Past Medical History:   Diagnosis Date    Atrial fibrillation ([address] 11/2013    CAD (coronary artery disease)     COPD (chronic obstructive pulmonary disease) ([address]     Diabetes mellitus ([address]     GERD (gastroesophageal reflux disease)     H/O cardiovascular stress test 11/20/13, 3/20/2013    11/13-Observed defect is consistent with diaphragmatic attenuation. EF 58%. 3/13 -EF 51%. No ischemia. Normal Study.  H/O cardiovascular stress test 06/08/2017    EF 50% normal study    H/O cardiovascular stress test 06/17/2019    Normal NM    H/O Doppler ultrasound 10/14/2010    10/2010-Bilateral venous doppler of lower extremies- No DVTs.  H/O echocardiogram 06/26/2019    Left ventricular systolic function is normal with an ejection fraction of 50-55%. Mild concentric left ventricular hypertrophy. Grade I diastolic dysfunction. No significant valvular disease noted. No evidence of pericardial effusion. Essentially unremarkable echo.  H/O percutaneous left heart catheterization 12/22/15    No evidence of hemodynamically significant coronary artery disease    History of coronary artery stent placement 11/13/2013 11/13 -RCA - 3 stents placed    History of exercise stress test 06/08/2017    treadmill    History of Holter monitoring 1/4/2016    predominant rhythm sinus    Hyperlipidemia     Hypertension     S/P cardiac catheterization 11/13/2013 11/13/2013-EF 55%, distal LAD mild disease,CX stent patent,but distal to stent 50% stenosis. RCA severe disease.  Status post angioplasty with stent      Past Surgical History:   Procedure Laterality Date    APPENDECTOMY      CARDIAC CATHETERIZATION  11/13/2013 11/13/2013-EF 55%, distal LAD mild disease,CX stent patent,but distal to stent 50% stenosis. RCA severe disease.     COLONOSCOPY      CORONARY ANGIOPLASTY WITH STENT PLACEMENT      4 stents- RCA X3; CX X1    CORONARY ANGIOPLASTY WITH STENT PLACEMENT  11/13/2013 11/13/2013 -3 stents placed to RCA- Dr Honorio Jarrell     Family History   Problem Relation Age of Onset    Cancer Mother     Diabetes Mother     Heart Disease Mother     High Blood Pressure Mother     Stroke Mother     Cancer Father     Heart Disease Father     High Blood Pressure Father     Stroke Father     Cancer Brother      Social History     Socioeconomic History    Marital status:      Spouse name: Not on file    Number of children: Not on file    Years of education: Not on file    Highest education level: Not on file   Occupational History    Occupation:    Social Needs    Financial resource strain: Not on file    Food insecurity:     Worry: Not on file     Inability: Not on file    Transportation needs:     Medical: Not on file     Non-medical: Not on file   Tobacco Use    Smoking status: Current Every Day Smoker     Packs/day: 4.00     Years: 44.00     Pack years: 176.00    Smokeless tobacco: Never Used   Substance and Sexual Activity    Alcohol use: No    Drug use: No    Sexual activity: Yes     Partners: Female   Lifestyle    Physical activity:     Days per week: Not on file     Minutes per session: Not on file    Stress: Not on file   Relationships    Social connections:     Talks on phone: Not on file     Gets together: Not on file     Attends Religion service: Not on file     Active member of club or organization: Not on file     Attends meetings of clubs or organizations: Not on file     Relationship status: Not on file    Intimate partner violence:     Fear of current or ex partner: Not on file     Emotionally abused: Not on file     Physically abused: Not on file     Forced sexual activity: Not on file   Other Topics Concern    Not on file   Social History Narrative    Not on file     Current Facility-Administered Medications   Medication Dose Route Frequency Provider Last Rate Last Dose    sodium chloride flush 0.9 % injection 10 mL  10 mL Intravenous PRN Korinne Lusterio, PA-C   10 mL at 01/05/20 1641    carvedilol (COREG) tablet 3.125 mg  3.125 mg Oral Once OLIVIA Yu        amLODIPine (NORVASC) tablet 5 mg  5 mg Oral Once Trini Sewell PA-C        clopidogrel (PLAVIX) tablet 75 mg  75 mg Oral Once Trini Sewell PA-C        clindamycin (CLEOCIN) capsule 300 mg  300 mg Oral Once Trini Sewell PA-C         Current Outpatient Medications   Medication Sig Dispense Refill    atorvastatin (LIPITOR) 80 MG tablet Take 1 tablet by mouth nightly 30 tablet 3    carvedilol (COREG) 3.125 MG tablet Take 1 tablet by mouth 2 times daily (with meals) 60 tablet 3    prasugrel (EFFIENT) 10 MG TABS Take 1 tablet by mouth daily 30 tablet 1    nitroGLYCERIN (NITROSTAT) 0.4 MG SL tablet Place 1 tablet under the tongue once for 1 dose 25 tablet 3    aspirin 81 MG tablet TAKE 1 TABLET BY MOUTH EVERY DAY      glimepiride (AMARYL) 4 MG tablet Take 4 mg by mouth every morning (before breakfast)      metFORMIN (GLUCOPHAGE) 500 MG tablet Take 500 mg by mouth daily (with breakfast) Indications: unsure of dosage taken        No Known Allergies    Nursing Notes Reviewed  PHYSICAL EXAM    VITAL SIGNS: BP (!) 187/119   Pulse 70 Comment: irregular   Temp 98.5 °F (36.9 °C) (Oral)   Resp 16   Ht 6' 3\" (1.905 m)   Wt 240 lb (108.9 kg)   SpO2 98%   BMI 30.00 kg/m²    Constitutional:  Well developed, Well nourished, In no acute distress  Head:  Normocephalic, Atraumatic  Eyes:  EOMI. Sclera clear. Conjunctiva normal, No discharge. Neck/Lymphatics: Supple, no JVD, No lymphadenopathy  Cardiovascular:  RRR, Normal S1 & S2 Peripheral Vascular: Distal pulses 2+, Capillary refill <2seconds  Respiratory: My lung auscultation exam is following a DuoNeb ordered in triage. Respirations nonlabored. 95% on room air. Coarse diminished air exchange with scattered rales. No audible wheezing or rhonchi. No retractions or accessory muscle use. Abdomen: Bowel sounds normal in all quadrants, Soft, Non tender/Nondistended, No palpable abdominal masses.   Musculoskeletal: BUE/BLE symmetrical without atrophy Phosphatase 109 40 - 129 IU/L    GFR Non-African American >60 >60 mL/min/1.73m2    GFR African American >60 >60 mL/min/1.73m2    Anion Gap 9 4 - 16   Troponin   Result Value Ref Range    Troponin T <0.010 <0.01 NG/ML   Brain Natriuretic Peptide   Result Value Ref Range    Pro-BNP 2,793 (H) <300 PG/ML   Troponin   Result Value Ref Range    Troponin T <0.010 <0.01 NG/ML   EKG 12 Lead   Result Value Ref Range    Ventricular Rate 85 BPM    Atrial Rate 92 BPM    QRS Duration 104 ms    Q-T Interval 380 ms    QTc Calculation (Bazett) 452 ms    R Axis 12 degrees    T Axis 112 degrees    Diagnosis       Atrial fibrillation  Nonspecific T wave abnormality  Abnormal ECG  When compared with ECG of 30-JUL-2019 11:08,  Atrial fibrillation has replaced Junctional rhythm  Vent. rate has increased BY  48 BPM  ST no longer elevated in Inferior leads  ST less depressed in Lateral leads  T wave inversion no longer evident in Anterior leads  QT has lengthened  Confirmed by HAWK Polanco ([id number]) on 1/5/2020 5:44:47 PM          Radiographs (if obtained):  [] The following radiograph was interpreted by myself in the absence of a radiologist:   [] Radiologist's Report Reviewed:  CTA CHEST W CONTRAST   Preliminary Result   No acute pulmonary embolism. Enlarged main pulmonary artery may be seen in   setting of chronic pulmonary hypertension. Cardiomegaly with findings suggestive of interstitial pulmonary edema. Mild   bilateral pleural effusions. Opacification of multiple bilateral lower lobe bronchi. Possibility of small   volume aspiration is not excluded. Mild emphysema. Nonspecific thickening of the bilateral adrenal glands, left greater than   right. Mediastinal, and bilateral hilar lymphadenopathy is nonspecific by imaging. Consider short interval follow-up versus acute symptoms have resolved.          XR CHEST PORTABLE   Final Result   Mild increased interstitial opacities present bilaterally, lower lobe   predominant. These findings could represent evolving interstitial edema or   evolving atypical pneumonitis. Radiographic follow-up recommended to assure   resolution.               CTA CHEST W CONTRAST (Preliminary result)   Result time 01/05/20 17:13:58   Preliminary result by Selma Galeano MD (01/05/20 17:13:58)                Impression:    No acute pulmonary embolism.  Enlarged main pulmonary artery may be seen in  setting of chronic pulmonary hypertension. Cardiomegaly with findings suggestive of interstitial pulmonary edema.  Mild  bilateral pleural effusions. Opacification of multiple bilateral lower lobe bronchi.  Possibility of small  volume aspiration is not excluded. Mild emphysema. Nonspecific thickening of the bilateral adrenal glands, left greater than  right. Mediastinal, and bilateral hilar lymphadenopathy is nonspecific by imaging. Consider short interval follow-up versus acute symptoms have resolved. Narrative:    EXAMINATION:  CTA OF THE CHEST 1/5/2020 4:37 pm    TECHNIQUE:  CTA of the chest was performed after the administration of intravenous  contrast.  Multiplanar reformatted images are provided for review.  MIP  images are provided for review. Dose modulation, iterative reconstruction,  and/or weight based adjustment of the mA/kV was utilized to reduce the  radiation dose to as low as reasonably achievable. COMPARISON:  January 8, 2016. HISTORY:  ORDERING SYSTEM PROVIDED HISTORY: Shortness of breath, elevated BNP wtih h/o  afib, off blood thinners for 3 days  TECHNOLOGIST PROVIDED HISTORY:  Reason for exam:->Shortness of breath, elevated BNP wtih h/o afib, off blood  thinners for 3 days  Reason for Exam: Shortness of breath, elevated BNP wtih h/o afib, off blood  thinners for 3 days  Acuity: Acute  Type of Exam: Initial  Relevant Medical/Surgical History: 80 ML ISOVUE 370 USED    FINDINGS:  Pulmonary Arteries:  The main pulmonary artery measures 3.7 increased interstitial opacities present bilaterally, lower lobe  predominant.  These findings could represent evolving interstitial edema or  evolving atypical pneumonitis.  Radiographic follow-up recommended to assure  resolution. Narrative:    EXAMINATION:  ONE XRAY VIEW OF THE CHEST    1/5/2020 1:28 pm    COMPARISON:  July 18, 2019, July 11, 2019    HISTORY:  ORDERING SYSTEM PROVIDED HISTORY: Chest pain  TECHNOLOGIST PROVIDED HISTORY:  Reason for exam:->Chest pain  Reason for Exam: chest pain;SOB;cough  Acuity: Acute  Type of Exam: Initial  Additional signs and symptoms: x 1 week    FINDINGS:  The cardiomediastinal silhouette is mildly enlarged. There are subtle increased interstitial opacities present bilaterally with  lower lobe predominance.  These appear new from previous exam.  No dense  lobar consolidation or air bronchograms. No pleural effusion or pneumothorax.                        EKG Interpretation  Please see ED physician's note for EKG interpretation      Chart review shows recent radiographs:  Xr Chest Portable    Result Date: 1/5/2020  EXAMINATION: ONE XRAY VIEW OF THE CHEST 1/5/2020 1:28 pm COMPARISON: July 18, 2019, July 11, 2019 HISTORY: ORDERING SYSTEM PROVIDED HISTORY: Chest pain TECHNOLOGIST PROVIDED HISTORY: Reason for exam:->Chest pain Reason for Exam: chest pain;SOB;cough Acuity: Acute Type of Exam: Initial Additional signs and symptoms: x 1 week FINDINGS: The cardiomediastinal silhouette is mildly enlarged. There are subtle increased interstitial opacities present bilaterally with lower lobe predominance. These appear new from previous exam.  No dense lobar consolidation or air bronchograms. No pleural effusion or pneumothorax. Mild increased interstitial opacities present bilaterally, lower lobe predominant. These findings could represent evolving interstitial edema or evolving atypical pneumonitis. Radiographic follow-up recommended to assure resolution. ED COURSE & MEDICAL DECISION MAKING       Vital signs and nursing notes reviewed during ED course. I have independently evaluated this patient . Supervising physician - Dr. Theo Bartholomew - was present in ED and available for consultation throughout entirety of patient's care. All pertinent Lab data and radiographic results reviewed with patient at bedside. The patient and/or the family were informed of the results of any tests/labs/imaging, the treatment plan, and time was allotted to answer questions. Clinical Impression:  1. Chest pain, unspecified type    2. Orthopnea    3. Elevated brain natriuretic peptide (BNP) level    4. Bilateral pleural effusion    5. Hypertension, unspecified type    6. Abnormal chest CT        Patient presents for shortness of breath, orthopnea, and chest pressure. Workup was initiated at triage. On exam,  well-appearing 27-year-old male, afebrile in no acute distress. 96% on room air. My lung auscultation exam was following a DuoNeb given at triage. Lungs with coarse air exchange with scattered bibasilar rales. No pitting edema or asymmetry of the lower legs. Abdomen exam is unremarkable. Did call patient's pharmacy to verify medications. Last refill of Plavix was given in September 2019 for 3 months, no other anticoagulation therapy. Did order his home dose Coreg 3.125 mg. CBC without significant derangement. Troponin is within normal limits however BNP is elevated 2793 which he has not had previously. EKG without acute ischemic changes. Chest x-ray shows mild increased interstitial opacities bilaterally findings concerning for interstitial edema versus an evolving atypical pneumonitis. Given patient's elevated BNP, shortness of breath and orthopnea, do have concern for possible CHF however he has been noncompliant with his home medications and does have a history of A. fib so went forward with a CTA PE study.  On chart review, last cardiac catheterization attending physician - Dr. Arnulfo Lombardo - who has also seen and evaluated this patient. He/she does agree that admission is reasonable at this time. Please see his/her note for additional details of their evaluation. Diagnosis and plan discussed in detail with patient who understands and agrees. 1945:  Hospitalist NP came to the ED to evaluate patient and saw that he was not in the room. Upon discussion with his ED nurse Vijay Walker., patient had asked to leave to smoke however had an IV in place. Patient then asked for his IV to be removed to allow him to go outside to smoke and then he would return. ED nurse then removed his IV and patient left the ED with the promise to return back and have IV replaced for admission plan. At time of this dictation, patient has not returned to the ED. Disposition referral (if applicable):  No follow-up provider specified.     Disposition medications (if applicable):  New Prescriptions    No medications on file         (Please note that portions of this note may have been completed with a voice recognition program. Efforts were made to edit the dictations but occasionally words are mis-transcribed.)         Gaston Christy PA-C  01/05/20 1934       Gaston Christy PA-C  01/05/20 1959

## 2020-01-06 LAB
ADENOVIRUS DETECTION BY PCR: NOT DETECTED
AMPHETAMINES: NEGATIVE
ANION GAP SERPL CALCULATED.3IONS-SCNC: 13 MMOL/L (ref 4–16)
BARBITURATE SCREEN URINE: NEGATIVE
BENZODIAZEPINE SCREEN, URINE: NEGATIVE
BORDETELLA PERTUSSIS PCR: NOT DETECTED
BUN BLDV-MCNC: 17 MG/DL (ref 6–23)
CALCIUM SERPL-MCNC: 9.2 MG/DL (ref 8.3–10.6)
CANNABINOID SCREEN URINE: NEGATIVE
CHLAMYDOPHILA PNEUMONIA PCR: NOT DETECTED
CHLORIDE BLD-SCNC: 100 MMOL/L (ref 99–110)
CHOLESTEROL: 153 MG/DL
CO2: 24 MMOL/L (ref 21–32)
COCAINE METABOLITE: ABNORMAL
CORONAVIRUS 229E PCR: NOT DETECTED
CORONAVIRUS HKU1 PCR: NOT DETECTED
CORONAVIRUS NL63 PCR: NOT DETECTED
CORONAVIRUS OC43 PCR: NOT DETECTED
CREAT SERPL-MCNC: 1.1 MG/DL (ref 0.9–1.3)
EKG DIAGNOSIS: NORMAL
EKG Q-T INTERVAL: 420 MS
EKG QRS DURATION: 108 MS
EKG QTC CALCULATION (BAZETT): 475 MS
EKG R AXIS: -3 DEGREES
EKG T AXIS: 86 DEGREES
EKG VENTRICULAR RATE: 77 BPM
ESTIMATED AVERAGE GLUCOSE: 151 MG/DL
GFR AFRICAN AMERICAN: >60 ML/MIN/1.73M2
GFR NON-AFRICAN AMERICAN: >60 ML/MIN/1.73M2
GLUCOSE BLD-MCNC: 217 MG/DL (ref 70–99)
GLUCOSE BLD-MCNC: 223 MG/DL (ref 70–99)
GLUCOSE BLD-MCNC: 233 MG/DL (ref 70–99)
GLUCOSE BLD-MCNC: 249 MG/DL (ref 70–99)
GLUCOSE BLD-MCNC: 266 MG/DL (ref 70–99)
HBA1C MFR BLD: 6.9 % (ref 4.2–6.3)
HCT VFR BLD CALC: 41.4 % (ref 42–52)
HDLC SERPL-MCNC: 43 MG/DL
HEMOGLOBIN: 13.2 GM/DL (ref 13.5–18)
HUMAN METAPNEUMOVIRUS PCR: NOT DETECTED
INFLUENZA A BY PCR: NOT DETECTED
INFLUENZA A H1 (2009) PCR: NOT DETECTED
INFLUENZA A H1 PANDEMIC PCR: NOT DETECTED
INFLUENZA A H3 PCR: NOT DETECTED
INFLUENZA B BY PCR: NOT DETECTED
LDL CHOLESTEROL DIRECT: 111 MG/DL
MAGNESIUM: 2 MG/DL (ref 1.8–2.4)
MCH RBC QN AUTO: 28.8 PG (ref 27–31)
MCHC RBC AUTO-ENTMCNC: 31.9 % (ref 32–36)
MCV RBC AUTO: 90.4 FL (ref 78–100)
MYCOPLASMA PNEUMONIAE PCR: NOT DETECTED
OPIATES, URINE: NEGATIVE
OXYCODONE: ABNORMAL
PARAINFLUENZA 1 PCR: NOT DETECTED
PARAINFLUENZA 2 PCR: NOT DETECTED
PARAINFLUENZA 3 PCR: NOT DETECTED
PARAINFLUENZA 4 PCR: NOT DETECTED
PDW BLD-RTO: 13.9 % (ref 11.7–14.9)
PHENCYCLIDINE, URINE: NEGATIVE
PLATELET # BLD: 234 K/CU MM (ref 140–440)
PMV BLD AUTO: 11.8 FL (ref 7.5–11.1)
POTASSIUM SERPL-SCNC: 3.8 MMOL/L (ref 3.5–5.1)
RBC # BLD: 4.58 M/CU MM (ref 4.6–6.2)
RHINOVIRUS ENTEROVIRUS PCR: NOT DETECTED
RSV PCR: NOT DETECTED
SODIUM BLD-SCNC: 137 MMOL/L (ref 135–145)
TRIGL SERPL-MCNC: 54 MG/DL
TROPONIN T: <0.01 NG/ML
TSH HIGH SENSITIVITY: 1.43 UIU/ML (ref 0.27–4.2)
WBC # BLD: 9.2 K/CU MM (ref 4–10.5)

## 2020-01-06 PROCEDURE — 84484 ASSAY OF TROPONIN QUANT: CPT

## 2020-01-06 PROCEDURE — 2060000000 HC ICU INTERMEDIATE R&B

## 2020-01-06 PROCEDURE — 82962 GLUCOSE BLOOD TEST: CPT

## 2020-01-06 PROCEDURE — 90686 IIV4 VACC NO PRSV 0.5 ML IM: CPT | Performed by: INTERNAL MEDICINE

## 2020-01-06 PROCEDURE — 6370000000 HC RX 637 (ALT 250 FOR IP): Performed by: NURSE PRACTITIONER

## 2020-01-06 PROCEDURE — 93010 ELECTROCARDIOGRAM REPORT: CPT | Performed by: INTERNAL MEDICINE

## 2020-01-06 PROCEDURE — 83721 ASSAY OF BLOOD LIPOPROTEIN: CPT

## 2020-01-06 PROCEDURE — 80048 BASIC METABOLIC PNL TOTAL CA: CPT

## 2020-01-06 PROCEDURE — 2700000000 HC OXYGEN THERAPY PER DAY

## 2020-01-06 PROCEDURE — G0008 ADMIN INFLUENZA VIRUS VAC: HCPCS | Performed by: INTERNAL MEDICINE

## 2020-01-06 PROCEDURE — 85027 COMPLETE CBC AUTOMATED: CPT

## 2020-01-06 PROCEDURE — 94761 N-INVAS EAR/PLS OXIMETRY MLT: CPT

## 2020-01-06 PROCEDURE — 36415 COLL VENOUS BLD VENIPUNCTURE: CPT

## 2020-01-06 PROCEDURE — 83735 ASSAY OF MAGNESIUM: CPT

## 2020-01-06 PROCEDURE — 80307 DRUG TEST PRSMV CHEM ANLYZR: CPT

## 2020-01-06 PROCEDURE — 6360000002 HC RX W HCPCS: Performed by: NURSE PRACTITIONER

## 2020-01-06 PROCEDURE — 93005 ELECTROCARDIOGRAM TRACING: CPT | Performed by: NURSE PRACTITIONER

## 2020-01-06 PROCEDURE — 2580000003 HC RX 258: Performed by: NURSE PRACTITIONER

## 2020-01-06 PROCEDURE — 6360000002 HC RX W HCPCS: Performed by: INTERNAL MEDICINE

## 2020-01-06 PROCEDURE — 99253 IP/OBS CNSLTJ NEW/EST LOW 45: CPT | Performed by: INTERNAL MEDICINE

## 2020-01-06 PROCEDURE — 94640 AIRWAY INHALATION TREATMENT: CPT

## 2020-01-06 PROCEDURE — 80061 LIPID PANEL: CPT

## 2020-01-06 RX ORDER — FUROSEMIDE 10 MG/ML
20 INJECTION INTRAMUSCULAR; INTRAVENOUS ONCE
Status: COMPLETED | OUTPATIENT
Start: 2020-01-06 | End: 2020-01-06

## 2020-01-06 RX ORDER — FUROSEMIDE 10 MG/ML
40 INJECTION INTRAMUSCULAR; INTRAVENOUS 2 TIMES DAILY
Status: DISCONTINUED | OUTPATIENT
Start: 2020-01-06 | End: 2020-01-07

## 2020-01-06 RX ORDER — SPIRONOLACTONE 25 MG/1
25 TABLET ORAL DAILY
Status: DISCONTINUED | OUTPATIENT
Start: 2020-01-06 | End: 2020-01-07 | Stop reason: HOSPADM

## 2020-01-06 RX ORDER — CARVEDILOL 12.5 MG/1
12.5 TABLET ORAL 2 TIMES DAILY WITH MEALS
Status: DISCONTINUED | OUTPATIENT
Start: 2020-01-06 | End: 2020-01-07 | Stop reason: HOSPADM

## 2020-01-06 RX ORDER — PREDNISONE 20 MG/1
20 TABLET ORAL DAILY
Status: DISCONTINUED | OUTPATIENT
Start: 2020-01-06 | End: 2020-01-07 | Stop reason: HOSPADM

## 2020-01-06 RX ORDER — LANOLIN ALCOHOL/MO/W.PET/CERES
3 CREAM (GRAM) TOPICAL NIGHTLY PRN
Status: DISCONTINUED | OUTPATIENT
Start: 2020-01-06 | End: 2020-01-07 | Stop reason: HOSPADM

## 2020-01-06 RX ORDER — LORAZEPAM 2 MG/ML
0.5 INJECTION INTRAMUSCULAR ONCE
Status: COMPLETED | OUTPATIENT
Start: 2020-01-06 | End: 2020-01-06

## 2020-01-06 RX ADMIN — IPRATROPIUM BROMIDE AND ALBUTEROL SULFATE 1 AMPULE: .5; 3 SOLUTION RESPIRATORY (INHALATION) at 16:23

## 2020-01-06 RX ADMIN — SODIUM CHLORIDE, PRESERVATIVE FREE 10 ML: 5 INJECTION INTRAVENOUS at 20:13

## 2020-01-06 RX ADMIN — HYDRALAZINE HYDROCHLORIDE 10 MG: 20 INJECTION INTRAMUSCULAR; INTRAVENOUS at 04:28

## 2020-01-06 RX ADMIN — INSULIN LISPRO 4 UNITS: 100 INJECTION, SOLUTION INTRAVENOUS; SUBCUTANEOUS at 18:02

## 2020-01-06 RX ADMIN — INSULIN LISPRO 4 UNITS: 100 INJECTION, SOLUTION INTRAVENOUS; SUBCUTANEOUS at 08:52

## 2020-01-06 RX ADMIN — SODIUM CHLORIDE, PRESERVATIVE FREE 10 ML: 5 INJECTION INTRAVENOUS at 08:59

## 2020-01-06 RX ADMIN — APIXABAN 5 MG: 5 TABLET, FILM COATED ORAL at 20:12

## 2020-01-06 RX ADMIN — METHYLPREDNISOLONE SODIUM SUCCINATE 40 MG: 40 INJECTION, POWDER, FOR SOLUTION INTRAMUSCULAR; INTRAVENOUS at 04:59

## 2020-01-06 RX ADMIN — IPRATROPIUM BROMIDE AND ALBUTEROL SULFATE 1 AMPULE: .5; 3 SOLUTION RESPIRATORY (INHALATION) at 20:41

## 2020-01-06 RX ADMIN — MELATONIN 3 MG: at 23:02

## 2020-01-06 RX ADMIN — FUROSEMIDE 40 MG: 10 INJECTION, SOLUTION INTRAMUSCULAR; INTRAVENOUS at 18:02

## 2020-01-06 RX ADMIN — FUROSEMIDE 20 MG: 10 INJECTION, SOLUTION INTRAVENOUS at 01:09

## 2020-01-06 RX ADMIN — HYDRALAZINE HYDROCHLORIDE 10 MG: 20 INJECTION INTRAMUSCULAR; INTRAVENOUS at 00:21

## 2020-01-06 RX ADMIN — ATORVASTATIN CALCIUM 80 MG: 40 TABLET, FILM COATED ORAL at 20:12

## 2020-01-06 RX ADMIN — GUAIFENESIN 600 MG: 600 TABLET, EXTENDED RELEASE ORAL at 20:12

## 2020-01-06 RX ADMIN — IPRATROPIUM BROMIDE AND ALBUTEROL SULFATE 1 AMPULE: .5; 3 SOLUTION RESPIRATORY (INHALATION) at 12:01

## 2020-01-06 RX ADMIN — FUROSEMIDE 40 MG: 10 INJECTION, SOLUTION INTRAMUSCULAR; INTRAVENOUS at 08:59

## 2020-01-06 RX ADMIN — IPRATROPIUM BROMIDE AND ALBUTEROL SULFATE 1 AMPULE: .5; 3 SOLUTION RESPIRATORY (INHALATION) at 08:38

## 2020-01-06 RX ADMIN — AZITHROMYCIN MONOHYDRATE 500 MG: 500 INJECTION, POWDER, LYOPHILIZED, FOR SOLUTION INTRAVENOUS at 23:02

## 2020-01-06 RX ADMIN — CARVEDILOL 12.5 MG: 12.5 TABLET, FILM COATED ORAL at 18:02

## 2020-01-06 RX ADMIN — IPRATROPIUM BROMIDE AND ALBUTEROL SULFATE 1 AMPULE: .5; 3 SOLUTION RESPIRATORY (INHALATION) at 00:45

## 2020-01-06 RX ADMIN — INFLUENZA A VIRUS A/BRISBANE/02/2018 IVR-190 (H1N1) ANTIGEN (PROPIOLACTONE INACTIVATED), INFLUENZA A VIRUS A/KANSAS/14/2017 X-327 (H3N2) ANTIGEN (PROPIOLACTONE INACTIVATED), INFLUENZA B VIRUS B/MARYLAND/15/2016 ANTIGEN (PROPIOLACTONE INACTIVATED), INFLUENZA B VIRUS B/PHUKET/3073/2013 BVR-1B ANTIGEN (PROPIOLACTONE INACTIVATED) 0.5 ML: 15; 15; 15; 15 INJECTION, SUSPENSION INTRAMUSCULAR at 08:54

## 2020-01-06 RX ADMIN — ENOXAPARIN SODIUM 40 MG: 40 INJECTION SUBCUTANEOUS at 08:54

## 2020-01-06 RX ADMIN — LORAZEPAM 0.5 MG: 2 INJECTION INTRAMUSCULAR; INTRAVENOUS at 01:09

## 2020-01-06 ASSESSMENT — PAIN SCALES - GENERAL
PAINLEVEL_OUTOF10: 0

## 2020-01-06 NOTE — ED PROVIDER NOTES
I independently examined and evaluated Jeet Murry. In brief their history revealed shortness of breath, cough. States symptoms for a week. States he has been out of his medications. Denies chest pain, reports pressure yesterday. None since then. Their focused exam revealed awake, alert, well-hydrated, well-nourished, and in no acute distress. Mucous membranes are moist. Speech is clear. Breathing is unlabored. Skin is dry. Mental status is normal. The patient has normal gait, moves all extremities, and is without facial droop. ED course: 23-year-old male with history of coronary artery disease, atrial fibrillation, COPD who presented with shortness of breath and cough. Was found to have evidence of fluid overload. Case discussed with patient's cardiologist and was recommended to be admitted to the hospital for diuresis, further cardiac evaluation. Was given Lasix in the emergency department. Troponin was negative. Reasons for admission discussed with the patient in depth. He does not want to stay, tells me if he sits down here for another hour he will leave [address] Did discuss that we have not fully ruled out any cardiac abnormalities with him, this may be cardiac etiology, would recommend further cardiac evaluation, however he does need he will leave if he waits for another hour. He feels comfortable with the level of risk, he is awake and alert not intoxicated, capable of making decision to leave [address] Did recommend if he decides to do this that he follow-up tomorrow with cardiology or return immediately for reevaluation. All diagnostic, treatment, and disposition decisions were made by myself in conjunction with the Advanced Practice Provider. For all further details of the patient's emergency department visit, please see the Advanced Practice Provider's documentation.       Caryn Kiser,   01/05/20 8038

## 2020-01-06 NOTE — ED NOTES
Pt states to ED doctor and this RN that if he does not have a bed in 1 hour he is going to leave     Bernardo Purvis, CORRIE  01/05/20 0609

## 2020-01-06 NOTE — CONSULTS
Nutrition Education    Type and Reason for Visit: Consult, Patient Education(heart failure)    Nutrition Assessment:  Provided/reviewed Heart Healthy Eating Nutrition Therapy (Nutrition Care Manual), focusing on limiting sodium to 500 mg/meal    · Verbally reviewed information with Patient. · Written educational materials provided. · Contact name and number provided. · Refer to Patient Education activity for more details.     Electronically signed by Suellen Robles RD, LD on 1/6/20 at 1:08 PM    Contact Number: [phone]

## 2020-01-06 NOTE — CARE COORDINATION
Simulation Cart Utilized:    CHF Book provided (Learning to Live with Heart failure):  [x] Yes  [] No     CHF zones/weighing daily:  [x] Yes  [] No     Type of Heart Failure/EF review:  [x] Yes  [] No     Contributing factor review:  [x] Yes  [] No     Medication review:  [x] ACE/ARB/ARNI  [x] BB  [x] Diuretic  [] Vasodilators  [x] Aldosterone blockers  [x] Nitrate  [] Lanoxin  [] Amiodarone  [] Midodrine  [x] Anticoagulant  [] Medications to avoid  [] Other:    Low sodium diet:  [x] Why should I limit sodium  [x] Low sodium food list:  [x] Foods to avoid  [x] Hidden sources of sodium  [x] Reading a food label   [] Low sodium cookbook provided    Fluid Restriction:  [x] Yes  [] No    Proper use of digital scale:  [x] Yes  [] No  I provided the patient with a scale:  [x] Yes  [] No (Patient has a functioning scale at home)    Exercise/Cardiac Rehab review:  [x] Yes  [] No   Consult placed for Cardiac Rehab: (EF less than 40%)  [] Yes  [x] No    Interactive Notebook review: (www. [email or web address].org)  [x] Yes  [] No     Follow up at CHF Clinic:  [] Yes  [x] No  Patient unable to make this appointment. Immunizations:  [x] Influenza vaccine   [] Prevnar 13 vaccine  [] Pneumococcal 23 vaccine    How do you care for yourself at home in regards to your CHF? Daily weights? [] Yes  [x] No    Sodium restriction? [] Yes  [x] No  Amount:    Fluid restriction? [] Yes  [x] No  Amount:    CHF Zones utilized:  [] Yes  [x] No    Do you currently smoke? [x] Yes      Quit date:          [] No  Smoking cessation consulted:  [x] Yes  [] No    Post Hospital care:  What Time do you prefer your follow-up appointments? [] AM  [x] PM    Do you have transportation to your appointments:  [x] Yes  [] No    Date: 1/6/20      Time spent educating: Not documented          [x] Dietary  [] [facility] smoking cessation  [] CNP  [] Physician    Date: 1/6/20            Time: 3:00  20 minutes of education provided  CN visit done.    Introduced myself and explained my role as CN. Educational information and my contact information provided. Education provided over following a low sodium diet, smoking cessation, weighing daily and following the CHF zones. Discussed the importance of medication compliance. Patient ran out of his medications before admit. Patient states several times during our visit that he should not do things he does as far as his health is concerned. No plans noted at this time to stop these behaviors. He does plan to limit his sodium intake by not using the salt shaker. A Fib recommendations discussed:  1. Avoid stimulants ( Caffeine products, Chocolate and decongestants)  2. Avoid alcohol products  3. Avoid stress  4. Avoid overworking. (Get plenty of rest balanced with activity.)  5. Take your blood thinners as directed to avoid a stroke. No questions verbalized at this time. Follow up made at the walk in clinic on  at 6 pm. Patient states he cannot miss work for appointments and he works late. This is the latest appointment available. Our pharmacy selected for medication refills. Patient will need assistance with meds. CN will follow. Date: 20          Time: 12:40  Time spent educatin minutes  Patient is being discharged to home. CN provided Teaching following the Heart Failure book, the HF Zones, and the Sodium Content pamphlet. We reviewed the HF zones, signs and symptoms to report on day 1 of onset, medication compliance, daily weights, low sodium diet, limiting fluids and smoking cessation. The patient's contributing risk factors for heart failure are identified as: HTN, COPD, AFib RVR, PHTN and CAD. Advised patient you can reduce the risk for heart failure exacerbations by modifying/controlling the risk factors they have. Pt informed to take medications as prescribed, follow a cardiac heart healthy / low sodium diet, limit fluids, exercise regularly and not to smoke.    Follow up made

## 2020-01-06 NOTE — PROGRESS NOTES
bedside labs and imaging reviewed  Patient denies chest pain or shortness of breath  Patient wants to see his cardiologist  No fever , no cough      Objective:   No intake or output data in the 24 hours ending 01/06/20 0850   Vitals:   Vitals:    01/06/20 0843   BP:    Pulse:    Resp:    Temp:    SpO2: 94%     Physical Exam:   GEN Awake.  Alert , not in respiratory distress, not in pain  HEENT: PEERLA, , supple neck,   Chest: air entry equal bilaterally, no wheezing , decreased breathing bilaterally with basal crepitation  Heart: S1 and S2 heard, no murmur, no gallop or rub, regular rate  Abdomen: soft, ND , Nt, +BS  Extremities: no cyanosis, tenderness or erythema, peripheral pulses audible  1+ pitting edema  Neurology: alert, oriented x3, able to move 4 limbs    Medications:   Medications:    aspirin  81 mg Oral Daily    atorvastatin  80 mg Oral Nightly    carvedilol  6.25 mg Oral BID WC    amLODIPine  5 mg Oral Daily    isosorbide mononitrate  30 mg Oral Daily    lisinopril  5 mg Oral Daily    furosemide  40 mg Intravenous Daily    sodium chloride flush  10 mL Intravenous 2 times per day    pantoprazole  40 mg Oral QAM AC    nicotine  1 patch Transdermal Daily    clopidogrel  75 mg Oral Daily    enoxaparin  40 mg Subcutaneous Daily    ipratropium-albuterol  1 ampule Inhalation Q4H WA    methylPREDNISolone  40 mg Intravenous Q6H    Followed by   Arley Gonzalez ON 1/8/2020] predniSONE  40 mg Oral Daily    azithromycin  500 mg Intravenous Q24H    insulin lispro  0-12 Units Subcutaneous TID     insulin lispro  0-6 Units Subcutaneous Nightly    guaiFENesin  600 mg Oral BID    influenza virus vaccine  0.5 mL Intramuscular Once      Infusions:    dextrose       PRN Meds: nitroGLYCERIN, 0.4 mg, Q5 Min PRN  sodium chloride flush, 10 mL, PRN  magnesium hydroxide, 30 mL, Daily PRN  ondansetron, 4 mg, Q6H PRN  glucose, 15 g, PRN  dextrose, 12.5 g, PRN  glucagon (rDNA), 1 mg, PRN  dextrose, 100 mL/hr,

## 2020-01-06 NOTE — H&P
History and Physical  TREY Chand-BC   Internal Medicine Hospitalist      Name:  Mary Merritt /Age/Sex: 1960  (63 y.o. male)   MRN & CSN:  [id number] & [id number] Admission Date/Time: 2020  1:46 PM   Location:  ED[facility]/ED[facility]31 PCP: Arabella Bearden MD       Hospital Day: 1      Supervising Physician: Dr. Josh Hayes      Chief Complaint: Chest Pain (FOR 3 DAYS , HAS BEEN OUT OF MEDS FOR 2 WEEKS) and Shortness of Breath (SOB FOR 3 DAYS)     Assessment and Plan:   Mary Merritt is a 63 y.o.  male who presents with New onset of congestive heart failure ([address]     SOB, multifactorial cause - could be due to new onset CHF, pulmonary edema vs COPD exacerbation but doubt for aspiration pneumonia - denied nausea and vomiting.  Suspected, New onset of CHF - denied history of heart failure, SOB, B/L leg 3+ pitting edema, CTA chest shows Cardiomegaly with findings suggestive of interstitial pulmonary edema and mild bilateral pleural effusions, BNP 2,793 (last was 122), last Echo EF 55%.  Acute pulmonary edema    Pleural effusion, bilateral         - admit inpatient, telemetry monitoring            - Cardiology, Dr. Gerard Martins consulted in ED         - c/w ASA, Lipitor, increased Coreg to 6 per Cardio         - start Lasix daily, Lisinopril                  - daily weights, monitor I/O         - consult heart failure nurse/coordinator         - check lab works in AM including rpt ECG         - monitor BNP         - pending TSH, Echo     SOB, could also be r/t Acute COPD exacerbation - expiratory wheezing throughout, CXR findings could represent evolving interstitial edema or evolving atypical pneumonitis, no fever, with nonproductive cough.          - ABx: Azithromycin         - Duoneb/HHN treatment         - Solumedrol, Mucinex         - continuous pulse ox monitoring         - pending resp PCR     Chest pain, r/o ACS - known CAD, multiple stents placement last was July, initial trop neg, last stress return home upon discharge. MDM [] Low, [] Moderate,[x]  High  Patient's risk as above due to:      [x] One or more chronic illnesses with mild exacerbation progression      [x] Two or more stable chronic illnesses      [] Undiagnosed new problem with uncertain prognosis      [] Elective major surgery      []Prescription drug management     History of Present Illness:     Principal Problem: New onset of congestive heart failure ([facility]  Jeet Murry is a 63 y.o. male who presents to the ED with complaints of worsening shortness of breath with nonproductive cough, and chest pain/discomfort related to breathing. Patient has past medical history of CAD, Afib, COPD, GERD, DM type 2, hyperlipidemia, hypertension, and  tobacco abuse. Patient reports that he is not taking any of his home medications for over 2 weeks. Stated symptoms started a week ago and progressively worsening, worse with exertion and laying down flat. He follows with cardiology at the [place] [id number]. He denied history of CHF. Given patient's elevated BNP, shortness of breath and orthopnea, do have concern for possible CHF however he has been noncompliant with his home medications and does have a history of A. Fib, not on any anticoagulant, CTA done in ED-negative for PE. Last refill of Plavix was given in September 2019 for 3 months, and not seen by cardiologist since summer 2019. Per patient's chart, last cardiac catheterization was on 7/30/19 for PCI of RCA after presenting with STEMI of RCA but patient noted to have multivessel disease including 70% LCX stenosis, 50% distal left main and a patent LAD stent with diagonal branch with 80% stenosis. Last echocardiogram was 7/30/19 show normal LV function with EF at 55%. Patient denies palpitation, fever, chills, headache, paresthesias, abdominal pain, nausea, vomiting, changes in bowels, dysuria, hematuria, and B/L weakness. Upon interview, the patient provided the history as above.     ED Course: Discussed case with ED physician prior to admission. ROS: Ten point ROS reviewed and negative, unless as noted above per HPI. Objective:   No intake or output data in the 24 hours ending 01/05/20 2039     Vitals: Temp/Oral 98.5  Vitals:    01/05/20 1551 01/05/20 1559 01/05/20 1624 01/05/20 1807   BP:  (!) 213/124  (!) 187/119   Pulse: 81  82 70   Resp: 18   16   Temp:       TempSrc:       SpO2: 95%   98%   Weight:       Height:         Physical Exam: 01/05/20    GEN  -Awake, alert, appearing anxious male, sitting upright in bed, cooperative, able to give adequate history. Appears given age. EYES -Pupils are equally round. No vision changes. No scleral erythema, discharge, or conjunctivitis. HENT -MM are moist. Oral pharynx without exudates, no evidence of thrush. NECK -Supple, no apparent thyromegaly or masses. RESP -LS CTA equal bilat, no wheezes, rales or rhonchi. Symmetric chest movement. No respiratory distress noted. C/V  -S1/S2 auscultated. RRR without appreciable M/R/G. No JVD or carotid bruits. Peripheral pulses equal bilaterally and palpable. Peripheral pulses equal bilaterally and palpable. B/L leg 3+ pitting edema. No reproducible chest wall tenderness. GI  -Abdomen is soft, round, non-distended, no significant tenderness. No masses or guarding. + BS in all quadrants. Rectal exam deferred.   -Maddox catheter is not present. LYMPH  -No palpable cervical lymphadenopathy and no hepatosplenomegaly. No petechiae or ecchymoses. MS  -B/L extremities strong muscles strength. Full movements. No gross joint deformities. No swelling, intact sensation symmetrical.   SKIN  -Normal coloration, warm, dry. No open wounds or ulcers. NEURO  -CN 2-12 appear grossly intact, normal speech, no lateralizing weakness. PSYC  -Awake, alert, oriented x 4. Anxious.      Past Medical History:      Past Medical History:   Diagnosis Date    Atrial fibrillation ([address] 11/2013    CAD (coronary artery disease)     mother. Soc HX:   Social History     Socioeconomic History    Marital status:      Spouse name: None    Number of children: None    Years of education: None    Highest education level: None   Occupational History    Occupation:    Social Needs    Financial resource strain: None    Food insecurity:     Worry: None     Inability: None    Transportation needs:     Medical: None     Non-medical: None   Tobacco Use    Smoking status: Current Every Day Smoker     Packs/day: 4.00     Years: 44.00     Pack years: 176.00    Smokeless tobacco: Never Used   Substance and Sexual Activity    Alcohol use: No    Drug use: No    Sexual activity: Yes     Partners: Female   Lifestyle    Physical activity:     Days per week: None     Minutes per session: None    Stress: None   Relationships    Social connections:     Talks on phone: None     Gets together: None     Attends Qu[employer] service: None     Active member of club or organization: None     Attends meetings of clubs or organizations: None     Relationship status: None    Intimate partner violence:     Fear of current or ex partner: None     Emotionally abused: None     Physically abused: None     Forced sexual activity: None   Other Topics Concern    None   Social History Narrative    None     TOBACCO:   reports that he has been smoking. He has a 176.00 pack-year smoking history. He has never used smokeless tobacco.  ETOH:   reports no history of alcohol use. Drugs:  reports no history of drug use. Allergies: No Known Allergies  Medications:   Medications:    Infusions:   PRN Meds: sodium chloride flush, 10 mL, PRN      Prior to Admission Meds:  Prior to Admission medications    Medication Sig Start Date End Date Taking?  Authorizing Provider   atorvastatin (LIPITOR) 80 MG tablet Take 1 tablet by mouth nightly 7/31/19   Isaiah Mejia MD   carvedilol (COREG) 3.125 MG tablet Take 1 tablet by mouth 2 times daily (with meals) 7/31/19 Viki Kaplan MD   prasugrel (EFFIENT) 10 MG TABS Take 1 tablet by mouth daily 8/1/19   Viki Kaplan MD   nitroGLYCERIN (NITROSTAT) 0.4 MG SL tablet Place 1 tablet under the tongue once for 1 dose 7/12/19 7/12/19  Rene Nelson MD   aspirin 81 MG tablet TAKE 1 TABLET BY MOUTH EVERY DAY 5/20/19   Historical Provider, MD   glimepiride (AMARYL) 4 MG tablet Take 4 mg by mouth every morning (before breakfast)    Historical Provider, MD   metFORMIN (GLUCOPHAGE) 500 MG tablet Take 500 mg by mouth daily (with breakfast) Indications: unsure of dosage taken     Historical Provider, MD     Data:     Laboratory this visit:  Reviewed  Recent Labs     01/05/20  1320   WBC 9.6   HGB 12.6*   HCT 40.1*         Recent Labs     01/05/20  1320   *   K 3.6      CO2 25   BUN 22   CREATININE 1.2     Recent Labs     01/05/20  1320   AST 19   ALT 16   BILITOT 0.2   ALKPHOS 109     No results for input(s): INR in the last 72 hours. No results for input(s): CKTOTAL, CKMB, CKMBINDEX in the last 72 hours. Invalid input(s): Daron December input(s): PRO-BNP    Radiology this visit:  Reviewed. Xr Chest Portable    Result Date: 1/5/2020  EXAMINATION: ONE XRAY VIEW OF THE CHEST 1/5/2020 1:28 pm COMPARISON: July 18, 2019, July 11, 2019 HISTORY: ORDERING SYSTEM PROVIDED HISTORY: Chest pain TECHNOLOGIST PROVIDED HISTORY: Reason for exam:->Chest pain Reason for Exam: chest pain;SOB;cough Acuity: Acute Type of Exam: Initial Additional signs and symptoms: x 1 week FINDINGS: The cardiomediastinal silhouette is mildly enlarged. There are subtle increased interstitial opacities present bilaterally with lower lobe predominance. These appear new from previous exam.  No dense lobar consolidation or air bronchograms. No pleural effusion or pneumothorax. Mild increased interstitial opacities present bilaterally, lower lobe predominant.   These findings could represent evolving interstitial edema or evolving

## 2020-01-07 ENCOUNTER — APPOINTMENT (OUTPATIENT)
Dept: GENERAL RADIOLOGY | Age: 60
DRG: 291 | End: 2020-01-07

## 2020-01-07 VITALS
WEIGHT: 239.8 LBS | OXYGEN SATURATION: 96 % | DIASTOLIC BLOOD PRESSURE: 122 MMHG | BODY MASS INDEX: 29.82 KG/M2 | HEART RATE: 68 BPM | RESPIRATION RATE: 17 BRPM | TEMPERATURE: 97.7 F | SYSTOLIC BLOOD PRESSURE: 176 MMHG | HEIGHT: 75 IN

## 2020-01-07 LAB
ALBUMIN SERPL-MCNC: 3.6 GM/DL (ref 3.4–5)
ALP BLD-CCNC: 84 IU/L (ref 40–128)
ALT SERPL-CCNC: 14 U/L (ref 10–40)
ANION GAP SERPL CALCULATED.3IONS-SCNC: 10 MMOL/L (ref 4–16)
AST SERPL-CCNC: 12 IU/L (ref 15–37)
BACTERIA: NEGATIVE /HPF
BILIRUB SERPL-MCNC: 0.4 MG/DL (ref 0–1)
BILIRUBIN URINE: NEGATIVE MG/DL
BLOOD, URINE: NEGATIVE
BUN BLDV-MCNC: 27 MG/DL (ref 6–23)
CALCIUM SERPL-MCNC: 9.1 MG/DL (ref 8.3–10.6)
CHLORIDE BLD-SCNC: 103 MMOL/L (ref 99–110)
CLARITY: CLEAR
CO2: 27 MMOL/L (ref 21–32)
COLOR: YELLOW
CREAT SERPL-MCNC: 1.4 MG/DL (ref 0.9–1.3)
CREATININE URINE: 85.5 MG/DL (ref 39–259)
GFR AFRICAN AMERICAN: >60 ML/MIN/1.73M2
GFR NON-AFRICAN AMERICAN: 52 ML/MIN/1.73M2
GLUCOSE BLD-MCNC: 117 MG/DL (ref 70–99)
GLUCOSE BLD-MCNC: 133 MG/DL (ref 70–99)
GLUCOSE BLD-MCNC: 160 MG/DL (ref 70–99)
GLUCOSE, URINE: NEGATIVE MG/DL
HCT VFR BLD CALC: 39.4 % (ref 42–52)
HEMOGLOBIN: 12.5 GM/DL (ref 13.5–18)
KETONES, URINE: NEGATIVE MG/DL
LEUKOCYTE ESTERASE, URINE: NEGATIVE
LV EF: 33 %
LVEF MODALITY: NORMAL
MAGNESIUM: 2.1 MG/DL (ref 1.8–2.4)
MCH RBC QN AUTO: 28.7 PG (ref 27–31)
MCHC RBC AUTO-ENTMCNC: 31.7 % (ref 32–36)
MCV RBC AUTO: 90.6 FL (ref 78–100)
NITRITE URINE, QUANTITATIVE: NEGATIVE
PDW BLD-RTO: 14 % (ref 11.7–14.9)
PH, URINE: 6 (ref 5–8)
PHOSPHORUS: 5 MG/DL (ref 2.5–4.9)
PLATELET # BLD: 233 K/CU MM (ref 140–440)
PMV BLD AUTO: 11.6 FL (ref 7.5–11.1)
POTASSIUM SERPL-SCNC: 3.5 MMOL/L (ref 3.5–5.1)
PROT/CREAT RATIO, UR: ABNORMAL
PROTEIN UA: 100 MG/DL
RBC # BLD: 4.35 M/CU MM (ref 4.6–6.2)
RBC URINE: ABNORMAL /HPF (ref 0–3)
SODIUM BLD-SCNC: 140 MMOL/L (ref 135–145)
SPECIFIC GRAVITY UA: 1.01 (ref 1–1.03)
TOTAL PROTEIN: 6.1 GM/DL (ref 6.4–8.2)
TRICHOMONAS: ABNORMAL /HPF
URINE TOTAL PROTEIN: 75.9 MG/DL
UROBILINOGEN, URINE: 1 MG/DL (ref 0.2–1)
WBC # BLD: 12.5 K/CU MM (ref 4–10.5)
WBC UA: ABNORMAL /HPF (ref 0–2)

## 2020-01-07 PROCEDURE — 99232 SBSQ HOSP IP/OBS MODERATE 35: CPT | Performed by: INTERNAL MEDICINE

## 2020-01-07 PROCEDURE — 6370000000 HC RX 637 (ALT 250 FOR IP): Performed by: NURSE PRACTITIONER

## 2020-01-07 PROCEDURE — 6360000002 HC RX W HCPCS: Performed by: NURSE PRACTITIONER

## 2020-01-07 PROCEDURE — 80048 BASIC METABOLIC PNL TOTAL CA: CPT

## 2020-01-07 PROCEDURE — 93306 TTE W/DOPPLER COMPLETE: CPT

## 2020-01-07 PROCEDURE — 94761 N-INVAS EAR/PLS OXIMETRY MLT: CPT

## 2020-01-07 PROCEDURE — 2580000003 HC RX 258: Performed by: NURSE PRACTITIONER

## 2020-01-07 PROCEDURE — 81001 URINALYSIS AUTO W/SCOPE: CPT

## 2020-01-07 PROCEDURE — APPSS60 APP SPLIT SHARED TIME 46-60 MINUTES: Performed by: NURSE PRACTITIONER

## 2020-01-07 PROCEDURE — 85027 COMPLETE CBC AUTOMATED: CPT

## 2020-01-07 PROCEDURE — 94664 DEMO&/EVAL PT USE INHALER: CPT

## 2020-01-07 PROCEDURE — 80053 COMPREHEN METABOLIC PANEL: CPT

## 2020-01-07 PROCEDURE — 36415 COLL VENOUS BLD VENIPUNCTURE: CPT

## 2020-01-07 PROCEDURE — 82570 ASSAY OF URINE CREATININE: CPT

## 2020-01-07 PROCEDURE — 84156 ASSAY OF PROTEIN URINE: CPT

## 2020-01-07 PROCEDURE — 84100 ASSAY OF PHOSPHORUS: CPT

## 2020-01-07 PROCEDURE — 83735 ASSAY OF MAGNESIUM: CPT

## 2020-01-07 PROCEDURE — 71045 X-RAY EXAM CHEST 1 VIEW: CPT

## 2020-01-07 PROCEDURE — 94640 AIRWAY INHALATION TREATMENT: CPT

## 2020-01-07 PROCEDURE — 82962 GLUCOSE BLOOD TEST: CPT

## 2020-01-07 PROCEDURE — 6370000000 HC RX 637 (ALT 250 FOR IP): Performed by: HOSPITALIST

## 2020-01-07 RX ORDER — SPIRONOLACTONE 25 MG/1
25 TABLET ORAL DAILY
Qty: 30 TABLET | Refills: 0 | Status: SHIPPED | OUTPATIENT
Start: 2020-01-08 | End: 2020-02-24 | Stop reason: SDUPTHER

## 2020-01-07 RX ORDER — CARVEDILOL 12.5 MG/1
12.5 TABLET ORAL 2 TIMES DAILY WITH MEALS
Qty: 60 TABLET | Refills: 0 | Status: SHIPPED | OUTPATIENT
Start: 2020-01-07 | End: 2020-02-24

## 2020-01-07 RX ORDER — ISOSORBIDE MONONITRATE 30 MG/1
30 TABLET, EXTENDED RELEASE ORAL DAILY
Qty: 30 TABLET | Refills: 0 | Status: SHIPPED | OUTPATIENT
Start: 2020-01-08 | End: 2020-02-24 | Stop reason: SDUPTHER

## 2020-01-07 RX ORDER — NICOTINE 21 MG/24HR
1 PATCH, TRANSDERMAL 24 HOURS TRANSDERMAL DAILY
Qty: 15 PATCH | Refills: 0 | Status: SHIPPED | OUTPATIENT
Start: 2020-01-08 | End: 2020-02-24

## 2020-01-07 RX ORDER — ASPIRIN 81 MG/1
81 TABLET, CHEWABLE ORAL DAILY
Qty: 30 TABLET | Refills: 0 | Status: SHIPPED | OUTPATIENT
Start: 2020-01-08 | End: 2020-06-05

## 2020-01-07 RX ORDER — NITROGLYCERIN 0.4 MG/1
TABLET SUBLINGUAL
Qty: 20 TABLET | Refills: 0 | Status: SHIPPED | OUTPATIENT
Start: 2020-01-07 | End: 2020-02-24 | Stop reason: SDUPTHER

## 2020-01-07 RX ORDER — AMLODIPINE BESYLATE 10 MG/1
10 TABLET ORAL DAILY
Status: DISCONTINUED | OUTPATIENT
Start: 2020-01-08 | End: 2020-01-07 | Stop reason: HOSPADM

## 2020-01-07 RX ORDER — AMLODIPINE BESYLATE 10 MG/1
10 TABLET ORAL DAILY
Qty: 30 TABLET | Refills: 0 | Status: SHIPPED | OUTPATIENT
Start: 2020-01-08 | End: 2020-02-24 | Stop reason: SDUPTHER

## 2020-01-07 RX ORDER — ATORVASTATIN CALCIUM 80 MG/1
80 TABLET, FILM COATED ORAL DAILY
Qty: 30 TABLET | Refills: 0 | Status: SHIPPED | OUTPATIENT
Start: 2020-01-07 | End: 2020-02-24

## 2020-01-07 RX ORDER — FUROSEMIDE 20 MG/1
20 TABLET ORAL DAILY
Status: DISCONTINUED | OUTPATIENT
Start: 2020-01-07 | End: 2020-01-07

## 2020-01-07 RX ORDER — LISINOPRIL 5 MG/1
5 TABLET ORAL DAILY
Qty: 30 TABLET | Refills: 0 | Status: SHIPPED | OUTPATIENT
Start: 2020-01-08 | End: 2020-02-24 | Stop reason: SDUPTHER

## 2020-01-07 RX ORDER — GLIMEPIRIDE 4 MG/1
4 TABLET ORAL
Qty: 30 TABLET | Refills: 0 | Status: SHIPPED | OUTPATIENT
Start: 2020-01-07 | End: 2020-02-24 | Stop reason: SDUPTHER

## 2020-01-07 RX ADMIN — FUROSEMIDE 40 MG: 10 INJECTION, SOLUTION INTRAMUSCULAR; INTRAVENOUS at 07:53

## 2020-01-07 RX ADMIN — CARVEDILOL 12.5 MG: 12.5 TABLET, FILM COATED ORAL at 07:53

## 2020-01-07 RX ADMIN — IPRATROPIUM BROMIDE AND ALBUTEROL SULFATE 1 AMPULE: .5; 3 SOLUTION RESPIRATORY (INHALATION) at 08:08

## 2020-01-07 RX ADMIN — ISOSORBIDE MONONITRATE 30 MG: 30 TABLET, EXTENDED RELEASE ORAL at 07:53

## 2020-01-07 RX ADMIN — PREDNISONE 20 MG: 20 TABLET ORAL at 07:53

## 2020-01-07 RX ADMIN — ASPIRIN 81 MG 81 MG: 81 TABLET ORAL at 07:53

## 2020-01-07 RX ADMIN — LISINOPRIL 5 MG: 5 TABLET ORAL at 07:53

## 2020-01-07 RX ADMIN — IPRATROPIUM BROMIDE AND ALBUTEROL SULFATE 1 AMPULE: .5; 3 SOLUTION RESPIRATORY (INHALATION) at 11:48

## 2020-01-07 RX ADMIN — PANTOPRAZOLE SODIUM 40 MG: 40 TABLET, DELAYED RELEASE ORAL at 06:10

## 2020-01-07 RX ADMIN — GUAIFENESIN 600 MG: 600 TABLET, EXTENDED RELEASE ORAL at 07:53

## 2020-01-07 RX ADMIN — SODIUM CHLORIDE, PRESERVATIVE FREE 10 ML: 5 INJECTION INTRAVENOUS at 07:52

## 2020-01-07 RX ADMIN — AMLODIPINE BESYLATE 5 MG: 5 TABLET ORAL at 07:53

## 2020-01-07 RX ADMIN — APIXABAN 5 MG: 5 TABLET, FILM COATED ORAL at 07:53

## 2020-01-07 RX ADMIN — SPIRONOLACTONE 25 MG: 25 TABLET ORAL at 07:53

## 2020-01-07 ASSESSMENT — PAIN SCALES - GENERAL: PAINLEVEL_OUTOF10: 0

## 2020-01-07 NOTE — CONSULTS
excluded. 4. Mild emphysema. 5. Mediastinal, and bilateral hilar lymphadenopathy is nonspecific by  imaging, but may be reactive. Consider short interval follow-up versus acute  symptoms have resolved. 6. Nonspecific thickening of the bilateral adrenal glands,   left greater than right. Medications:   Scheduled Meds:   spironolactone  25 mg Oral Daily    predniSONE  20 mg Oral Daily    apixaban  5 mg Oral BID    carvedilol  12.5 mg Oral BID WC    aspirin  81 mg Oral Daily    atorvastatin  80 mg Oral Nightly    amLODIPine  5 mg Oral Daily    isosorbide mononitrate  30 mg Oral Daily    lisinopril  5 mg Oral Daily    sodium chloride flush  10 mL Intravenous 2 times per day    pantoprazole  40 mg Oral QAM AC    nicotine  1 patch Transdermal Daily    ipratropium-albuterol  1 ampule Inhalation Q4H WA    azithromycin  500 mg Intravenous Q24H    insulin lispro  0-12 Units Subcutaneous TID WC    insulin lispro  0-6 Units Subcutaneous Nightly    guaiFENesin  600 mg Oral BID     Continuous Infusions:   dextrose       PRN Meds:.melatonin, nitroGLYCERIN, sodium chloride flush, magnesium hydroxide, ondansetron, glucose, dextrose, glucagon (rDNA), dextrose, hydrALAZINE (APRESOLINE) ivpb    Allergies:  Patient has no known allergies. Family History:       Problem Relation Age of Onset   [facility] Cancer Mother     Diabetes Mother     Heart Disease Mother     High Blood Pressure Mother     Stroke Mother     Cancer Father     Heart Disease Father     High Blood Pressure Father     Stroke Father     Cancer Brother          Physical Exam:    Vitals: BP (!) 176/122   Pulse 68   Temp 97.7 °F (36.5 °C) (Oral)   Resp 17   Ht 6' 3\" (1.905 m)   Wt 239 lb 12.8 oz (108.8 kg)   SpO2 96%   BMI 29.97 kg/m²     General appearance:  awake and answering questions appropriately  HEENT: Head: Normal, normocephalic, atraumatic.   Neck: supple, symmetrical, trachea midline  Cardiovascular: S1 and S2: normal / no rub  Pulmonary: diminished lung sounds bilaterally  Abdomen:  soft / non-tender   Extremities:  Trace edema to bilateral lower legs     CBC:   Recent Labs     01/05/20  1320 01/06/20  0406 01/07/20  0415   WBC 9.6 9.2 12.5*   HGB 12.6* 13.2* 12.5*    234 233     BMP:    Recent Labs     01/05/20  1320 01/06/20  0406 01/07/20  0415   * 137 140   K 3.6 3.8 3.5    100 103   CO2 25 24 27   BUN 22 17 27*   CREATININE 1.2 1.1 1.4*   GLUCOSE 137* 249* 133*     Hepatic:   Recent Labs     01/05/20  1320 01/07/20  0415   AST 19 12*   ALT 16 14   BILITOT 0.2 0.4   ALKPHOS 109 84     Troponin: No results for input(s): TROPONINI in the last 72 hours. Mg, Phos:   Recent Labs     01/06/20  0406 01/07/20  0415   MG 2.0 2.1   PHOS  --  5.0*       ABGs: No results found for: PHART, PO2ART, DMW4UUC  INR: No results for input(s): INR in the last 72 hours. -----------------------------------------------------------------  Patient Active Problem List   Diagnosis Code    Chest pain R07.9    Atrial fibrillation with rapid ventricular response ([facility]) I64.51    DM w/o complication type II ([facility] E11.9    New onset atrial fibrillation ([facility]) I48.91    CAD (coronary artery disease) I25.10    Post PTCA Z98.61    Unstable angina ([facility] I20.0    HTN (hypertension) I10    Angina pectoris syndrome ([facility])-unstable I20.9    Smoking F17.200    COPD (chronic obstructive pulmonary disease) ([facility]) J44.9    Atrial fibrillation with rapid ventricular response ([facility]) I48.91    Chest pain R07.9    STEMI (ST elevation myocardial infarction) ([facility] I21.3    Myocardial infarction acute (HCC) I21.9    Nausea R11.0    New onset of congestive heart failure (HCC) I50.9    COPD with acute exacerbation (HCC) J44.1    Acute pulmonary edema (HCC) J81.0    Pleural effusion, bilateral J90    Hypertensive urgency I16.0     Assessment and Recommendations     Impression   1.   MARLYN on probable stage 3 CKD  -likely multi-factorial etiology for up for dc with mild renal insuffiency and renal asked evaluate.      Objective:   Vitals: BP (!) 176/122   Pulse 68   Temp 97.7 °F (36.5 °C) (Oral)   Resp 17   Ht 6' 3\" (1.905 m)   Wt 239 lb 12.8 oz (108.8 kg)   SpO2 96%   BMI 29.97 kg/m²   Awake weak  Soft nt  Trace edema    Assessment and Plan:  IMP:  As stated above    Plan     1 as cocaine wear off the bp will improve and maintain meds for bp  2 anxiious and monitor  3 with chf maintain aldactone and ace which can increase creat slightly falsely, diureiss as need  4 on eliquis  5 drug counseling  6 work up adrenal etiology htn as outpt  7 ssi  8 fu cardaic plan outpt  Ok for [employer] and work up             Electronically signed by Cipriano Guardado MD on 1/7/2020 at 2:47 PM

## 2020-01-07 NOTE — DISCHARGE SUMMARY
Discharge Summary    Name:  Alberto Abarca /Age/Sex: 1960  (63 y.o. male)   MRN & CSN:  [id number] & [id number] Admission Date/Time: 2020  1:46 PM   Attending:  Wandy Luke MD Discharging Physician: Wandy Luke MD     Hospital Course:   Alberto Abarca is a 63 y.o.  male  who presents with New onset of congestive heart failure ([facility]    81years old male with history of hypertension, artery disease history of atrial fibrillation type 2 diabetes hyperlipidemia, tobacco abuse was admitted because of chest pain and acute shortness of breath which is likely secondary to CHF exacerbation with pulmonary edema, patient is noncompliant with the medication he does not have  insurance and is not taking anything at home, was found also to have hypertensive urgency. Resumed on blood pressure medication and started on IV Lasix for effective diuresis  Cardiologist consulted  Echocardiogram show ejection fraction 30 to 35% consistent with a acute systolic CHF  Cardiologist cleared the patient to be discharged home as serial cardiac enzymes trended negative EKG nonacute  Cardiologist recommend to stop Lasix and continue Aldactone 25 mg daily, continue Coreg and ACE inhibitor  Continue on blood pressure medication amlodipine  For atrial fibrillation has [address] continue on Coreg and Eliquis for anticoagulation  Continue on statin for dyslipidemia  Nephrologist also consulted for the serum creatinine 1.4 which is close to his baseline. At time of discharge the patient denied shortness of breath or chest pain hemodynamically stable  For smoking abuse counseled and prescribed nicotine patch          The patient expressed appropriate understanding of and agreement with the discharge recommendations, medications, and plan.      Consults this admission:  IP CONSULT TO CARDIOLOGY  IP CONSULT TO HOSPITALIST  IP CONSULT TO HEART FAILURE NURSE/COORDINATOR  IP CONSULT TO DIETITIAN  IP CONSULT TO CARDIOLOGY  IP male, sitting upright in bed in no apparent distress. Appears given age. EYES Pupils are equally round. No scleral erythema, discharge, or conjunctivitis. HENT Mucous membranes are moist. Oral pharynx without exudates, no evidence of thrush. NECK Supple, no apparent thyromegaly or masses. RESP Clear to auscultation, no wheezes, rales or rhonchi. Symmetric chest movement while on room air. CARDIO/VASC S1/S2 auscultated. Regular rate without appreciable murmurs, rubs, or gallops. No JVD or carotid bruits. Peripheral pulses equal bilaterally and palpable. No peripheral edema. GI Abdomen is soft without significant tenderness, masses, or guarding. Bowel sounds are normoactive. Rectal exam deferred. NEURO Cranial nerves appear grossly intact, normal speech, no lateralizing weakness. PSYCH Awake, alert, oriented x 4. Affect appropriate.     BMP/CBC  Recent Labs     01/05/20  1320 01/06/20  0406 01/07/20  0415   * 137 140   K 3.6 3.8 3.5    100 103   CO2 25 24 27   BUN 22 17 27*   CREATININE 1.2 1.1 1.4*   WBC 9.6 9.2 12.5*   HCT 40.1* 41.4* 39.4*    234 233       IMAGING:      Discharge Time of 35 minutes    Electronically signed by Elly Yanez MD on 1/7/2020 at 12:14 PM

## 2020-01-07 NOTE — CARE COORDINATION
Received referral for possible assistance with medications at discharge although patient is currently on [employer] flagged list.  Patient was helped with medications at discharge in July 2019 and informed that he would need to complete an application prior to receiving further assistance. Per [employer] policy voucher assistance is only offered one time at discharge and for new medications only. Ongoing assistance must be verified by eligibility and is limited to a monthly amount and not all medications are covered. Patient likely going home on several new medications and will need help with her previous meds as he has been out of them for a few weeks. I have placed an application in the mail for him to complete and return with all required documents.

## 2020-01-07 NOTE — CONSULTS
Case Management contacted DM educator to assist with resources for BG monitor and supplies. [address] notified of need and patient can  at   [address]  Miquel Major RN, BSN, CDE

## 2020-01-07 NOTE — PROGRESS NOTES
CLINICAL PHARMACY NOTE: MEDS TO [address] Select Patient?: No  Total # of Prescriptions Filled: 8   The following medications were delivered to the patient:  · Amlodipine 10mg  · Lisinopril 5mg  · Nitroglycerin 0.4mg sl  · Spironolactone 25mg  · Isosorbide mononitrate 30mg  · Glimepiride 4mg  · Carvedilol 12.5mg  · Eliquis 5mg  Total # of Interventions Completed: 2  Time Spent (min): 45    Additional Documentation:  Med assist contacted and covered all but the Eliquis today. We used a one time  coupon to cover the Eliquis. Med assist would not cover the lipitor nor the diabetic supplies as the lipitor is free at [address] [facility] and the health department can give him a diabetic machine and supplies.

## 2020-01-07 NOTE — PROGRESS NOTES
Cardiology Progress Note     Today's Plan: Okay for discharge    Admit Date:  1/5/2020    Consult reason/ Seen today for: Chest pain    Subjective and  Overnight Events:  Patient denies chest pain, no shortness of breath, no dizziness, and no edema noted. Hhvcjgorz-I-exr controlled rated  Oxygen-RA    Assessment / Plan / Recommendation:     1. Chest Pain: serial cardiac enzymes trend negative, EKG reviewed, further plan as per enzymes and clinical course. Will get echo.   2. CHF: Acute  Diastolic decompensated heart failure. Appears to be euvolemic. Will stop Lasix and continue aldactone. Please continue Gudelines reccommended medical thearpy including Coreg, ACE, and  Aldactone daily. Strict Is and Os and Daily weights. 3. CAD: Cath 7/19-3 stents of RCA, LMCA- 50% left main, LAD-stent patent but diagonal branch 80% stenosis, Cx-70% proximal stenosis. Medication compliance is of concern due to financial difficulties and lack of insurance. Further work-up and evaluation of treatment plan discussed with patient. Patient to follow up with cardiology in 1-2 weeks. Patient educated on compliance. 4. HTN: stable, continue present medications   5. Atrial Fibrillation: Chads Vasc score is 4,  Plan rate control and attempt rhythm control . Patient currently is on Coreg for rate control and Eliquis for anticoagulation.    6.   Dyslipidemia: continue statins  7. Okay for patient to be discharged, please continue all current medications on   Discharge. Future revascularization of  lcx , diagonal disease if needed. To  be followed up on as outpatient. History of Presenting Illness:    Chief complain on admission : 63 y. o.year old who is admitted for  Chief Complaint   Patient presents with    Chest Pain     FOR 3 DAYS , HAS BEEN OUT OF MEDS FOR 2 WEEKS    Shortness of Breath     SOB FOR 3 DAYS        Past medical history:    has a past medical history of Atrial fibrillation ([facility] [address] CAD (coronary artery disease), COPD (chronic obstructive pulmonary disease) ([facility] [address] Diabetes mellitus ([facility] [address] GERD (gastroesophageal reflux disease), H/O cardiovascular stress test, H/O cardiovascular stress test, H/O cardiovascular stress test, H/O Doppler ultrasound, H/O echocardiogram, H/O percutaneous left heart catheterization, History of coronary artery stent placement, History of exercise stress test, History of Holter monitoring, Hyperlipidemia, Hypertension, S/P cardiac catheterization, and Status post angioplasty with stent. Past surgical history:   has a past surgical history that includes Coronary angioplasty with stent; Appendectomy; Femur fracture surgery (1984); Coronary angioplasty with stent (11/13/2013); Cardiac catheterization (11/13/2013); Colonoscopy; and Mandible fracture surgery (1984). Social History:   reports that he has been smoking. He has a 176.00 pack-year smoking history. He has never used smokeless tobacco. He reports that he does not drink alcohol or use drugs. Family history:  family history includes Cancer in his brother, father, and mother; Diabetes in his mother; Heart Disease in his father and mother; High Blood Pressure in his father and mother; Stroke in his father and mother.     No Known Allergies    Review of Systems:   All 14 systems were reviewed and are negative  Except for the positive findings which are documented     BP (!) 176/122   Pulse 68   Temp 97.7 °F (36.5 °C) (Oral)   Resp 17   Ht 6' 3\" (1.905 m)   Wt 239 lb 12.8 oz (108.8 kg)   SpO2 96%   BMI 29.97 kg/m²       Intake/Output Summary (Last 24 hours) at 1/7/2020 1013  Last data filed at 1/7/2020 0901  Gross per 24 hour   Intake 1180 ml   Output --   Net 1180 ml       Physical Exam:  Constitutional:  Well developed, Well nourished, No acute distress  HENT:  Normocephalic, Atraumatic, Bilateral external ears normal,  Nose normal.   Neck- trachea is midline  Eyes:  PERRL, Conjunctiva normal  Respiratory:  Normal breath sounds, No respiratory distress, No wheezing, No chest tenderness. Cardiovascular:  Normal heart rate, a-fib, no murmurs appreciated, No rubs appreciated, No gallops appreciated, JVP not elevated  Abdomen/GI:  Soft, No tenderness  Musculoskeletal:  Intact distal pulses, no edema, No tenderness, No cyanosis, No clubbing. Integument:  Warm, Dry  Lymphatic:  No lymphadenopathy noted. Neurologic:  Alert & oriented  Psychiatric:  Affect and Mood :pleasant     Medications:    spironolactone  25 mg Oral Daily    predniSONE  20 mg Oral Daily    apixaban  5 mg Oral BID    carvedilol  12.5 mg Oral BID WC    aspirin  81 mg Oral Daily    atorvastatin  80 mg Oral Nightly    amLODIPine  5 mg Oral Daily    isosorbide mononitrate  30 mg Oral Daily    lisinopril  5 mg Oral Daily    sodium chloride flush  10 mL Intravenous 2 times per day    pantoprazole  40 mg Oral QAM AC    nicotine  1 patch Transdermal Daily    ipratropium-albuterol  1 ampule Inhalation Q4H WA    azithromycin  500 mg Intravenous Q24H    insulin lispro  0-12 Units Subcutaneous TID WC    insulin lispro  0-6 Units Subcutaneous Nightly    guaiFENesin  600 mg Oral BID      dextrose       melatonin, nitroGLYCERIN, sodium chloride flush, magnesium hydroxide, ondansetron, glucose, dextrose, glucagon (rDNA), dextrose, hydrALAZINE (APRESOLINE) ivpb    Lab Data:  CBC:   Recent Labs     01/05/20  1320 01/06/20  0406 01/07/20  0415   WBC 9.6 9.2 12.5*   HGB 12.6* 13.2* 12.5*   HCT 40.1* 41.4* 39.4*   MCV 90.9 90.4 90.6    234 233     BMP:   Recent Labs     01/05/20  1320 01/06/20  0406 01/07/20  0415   * 137 140   K 3.6 3.8 3.5    100 103   CO2 25 24 27   PHOS  --   --  5.0*   BUN 22 17 27*   CREATININE 1.2 1.1 1.4*     PT/INR: No results for input(s): PROTIME, INR in the last 72 hours.   BNP:    Recent Labs     01/05/20  1320   PROBNP 2,793*     TROPONIN:   Recent the assessment, diagnosis, and treatment plan with changes made by me as follows     CARDIOLOGY ATTENDING ADDENDUM    HPI:  I have reviewed the above HPI  And agree with above   Venice Duffy is a 63 y. o.year old who and presents with had concerns including Chest Pain (FOR 3 DAYS , HAS BEEN OUT OF MEDS FOR 2 WEEKS) and Shortness of Breath (SOB FOR 3 DAYS). Chief Complaint   Patient presents with    Chest Pain     FOR 3 DAYS , HAS BEEN OUT OF MEDS FOR 2 WEEKS    Shortness of Breath     SOB FOR 3 DAYS     Interval history:  No C/O    Physical Exam:  General:  Awake, alert, NAD  Head:normal  Eye:normal  Neck:  No JVD   Chest:  Clear to auscultation, respiration easy  Cardiovascular:  RRR S1S2  Abdomen:   nontender  Extremities:  NO edema  Pulses; palpable  Neuro: grossly normal      MEDICAL DECISION MAKING;    I agree with the above plan, which was planned by myself and discussed with NP.     Julian Duffy MD [facility]

## 2020-01-07 NOTE — CONSULTS
consciousness  · Respiratory: No cough or wheezing. SOB    · Gastrointestinal: No abdominal pain, appetite loss, blood in stools, constipation, diarrhea or heartburn  · Genitourinary: No dysuria, trouble voiding, or hematuria  · Musculoskeletal:  No gait disturbance, weakness or joint complaints  · Integumentary: No rash or pruritis  · Neurological: No TIA or stroke symptoms  · Psychiatric:Positive for anxiety, no  depression  · Endocrine: No malaise, fatigue or temperature intolerance  · Hematologic/Lymphatic: No bleeding problems, blood clots or swollen lymph nodes  · Allergic/Immunologic: No nasal congestion or hives    Physical Examination:    BP (!) 152/91   Pulse 91   Temp 97.7 °F (36.5 °C) (Oral)   Resp 15   Ht 6' 3\" (1.905 m)   Wt 240 lb (108.9 kg)   SpO2 96%   BMI 30.00 kg/m²      General Appearance:  Non-obese/Well Nourished  1. Skin: It is warm & dry. No rashes noted. 2. Eyes: No conjunctival Pallor seen. No jaundice noted. 3. HEENT: atraumatic / normocephalic. EOMI. Neck is supple there is no elevation of JVD. No thyromegaly  4. Respiratory:  · Resp Assessment: Normal  · Resp Auscultation: Normal breath sounds without dullness  5. Cardiovascular:  · Auscultation: Normal  · Carotid Arteries: Normal  · Abdominal Aorta: Normal   · Pedal Pulses: 2+ and equal   6. Abdomen:  · No masses or tenderness  · Liver/Spleen: No Abnormalities Noted, no organomegaly. 7. Musculoskeletal: No joint deformities. No muscle wasting  8. Extremities:  ·  No Cyanosis or Clubbing. No significant edema   9. Rectal / genital: deferred.   10. Neurological/Psychiatric:  · Oriented to time, place, and person  · Non-anxious    Lab Review   Recent Labs     01/06/20  0406   WBC 9.2   HGB 13.2*   HCT 41.4*         Recent Labs     01/06/20  0406      K 3.8      CO2 24   BUN 17   CREATININE 1.1     Recent Labs     01/05/20  1320   AST 19   ALT 16   BILITOT 0.2   ALKPHOS 109     No results for input(s): TROPONINI in the last 72 hours. Lab Results   Component Value Date    BNP 17 03/09/2013     Lab Results   Component Value Date    INR 1.08 01/07/2016    PROTIME 12.3 01/07/2016       EKG:  Atrial fibrillation   Inferior infarct , age undetermined   Abnormal ECG     Chest Xray:    1. No acute pulmonary embolism.  Enlarged main pulmonary artery may be seen   in setting of chronic pulmonary hypertension.       2. Cardiomegaly with findings suggestive of interstitial pulmonary edema. Mild bilateral pleural effusions.       3. Opacification of multiple bilateral lower lobe bronchi.  Possibility of   small volume aspiration is not excluded.       4. Mild emphysema.       5. Mediastinal, and bilateral hilar lymphadenopathy is nonspecific by   imaging, but may be reactive. Consider short interval follow-up versus acute   symptoms have resolved.       6. Nonspecific thickening of the bilateral adrenal glands, left greater than   right. ECHO:   Technically difficult examination due to patient immobility. Definity given during procedure. Left ventricular systolic function is normal   Ejection fraction is visually estimated at 55%. Mild left ventricular hypertrophy. Mild-to-moderate dilated left ventricle. Mildly dilated left atrium. Moderately dilated right ventricle. Mild mitral regurgitation is present. Mild tricuspid regurgitation. RVSP is 26 mmHg. No evidence of pericardial effusion.     Assessment:  Patient Active Problem List   Diagnosis Code    Chest pain R07.9    Atrial fibrillation with rapid ventricular response ([facility]) S63.75    DM w/o complication type II ([facility] [address] E11.9    New onset atrial fibrillation ([facility]) I48.91    CAD (coronary artery disease) I25.10    Post PTCA Z98.61    Unstable angina ([facility]) I20.0    HTN (hypertension) I10    Angina pectoris syndrome ([facility])-unstable I20.9    Smoking F17.200    COPD (chronic obstructive pulmonary disease) ([facility]) J44.9    Atrial fibrillation with rapid

## 2020-01-07 NOTE — CARE COORDINATION
Chart reviewed, in to see pt for dc planning. Introduced self and board updated. Pt was admitted for CP and SOB. States he lives at home with his wife and is independent with all needs. Explained he follows with PCP and reliable transportation per himself. Patient does not have insurance nor any money to pay for his prescriptions. States he gets paid on Friday and hopes to have enough money to buy his prescriptions and requests help with at least a few days if not a month of scripts. Explained he has approximately 60 more days until he is eligible for insurance per his employer and that he is still catching up on bills from being unemployed as he just started this job 30 days ago. Provided patient with Eliquis coupon and verified per previous AVS he has not been on it prior. Reviewed the [facility] do not help list and he is on it. Spoke with Mega Byrd with [facility] [employer] and explained situation and she states he was helped twice last year and is not eligible without an application. Saw Dr. Gt Kaur and requested scripts ASAP for cost. CM following. 10:35 AM  CM reminded Dr. Gt Kaur of need for scripts to request [employer] to help with cost of medications. 11:53 AM  Spoke with Cheo ABRAMS of [id number] and explained situation. She requested cost of medications. CM awaiting scripts per Dr. Gt Kaur. Updated Abi DENTON CN for CHF of this. 12:29 PM  Received scripts from Dr. Gt Kaur and the outpatient pharmacy is closed for lunch. CM following.       2:27 PM  Received a call from the outpatient pharmacy who states the total for all prescriptions including glucometer/strips/lancets except aspirin and nicotine patch is $172.78 and without the glucometer/supplies is $116.84. Noted patient had medication which is free at [facility], verified with the outpatient pharmacy and patient new total is $102.86.    This total was communicated to [address] [place] [address], awaiting update on

## 2020-01-09 ENCOUNTER — OFFICE VISIT (OUTPATIENT)
Dept: FAMILY MEDICINE CLINIC | Age: 60
End: 2020-01-09

## 2020-01-09 VITALS
HEIGHT: 74 IN | SYSTOLIC BLOOD PRESSURE: 130 MMHG | WEIGHT: 250.8 LBS | HEART RATE: 50 BPM | TEMPERATURE: 98.8 F | DIASTOLIC BLOOD PRESSURE: 92 MMHG | BODY MASS INDEX: 32.19 KG/M2 | OXYGEN SATURATION: 93 %

## 2020-01-09 PROCEDURE — 1111F DSCHRG MED/CURRENT MED MERGE: CPT | Performed by: NURSE PRACTITIONER

## 2020-01-09 PROCEDURE — 99215 OFFICE O/P EST HI 40 MIN: CPT | Performed by: NURSE PRACTITIONER

## 2020-01-09 ASSESSMENT — PATIENT HEALTH QUESTIONNAIRE - PHQ9
1. LITTLE INTEREST OR PLEASURE IN DOING THINGS: 0
SUM OF ALL RESPONSES TO PHQ9 QUESTIONS 1 & 2: 0
2. FEELING DOWN, DEPRESSED OR HOPELESS: 0
SUM OF ALL RESPONSES TO PHQ QUESTIONS 1-9: 0
SUM OF ALL RESPONSES TO PHQ QUESTIONS 1-9: 0

## 2020-01-09 NOTE — PROGRESS NOTES
Take 1 tablet by mouth daily 30 tablet 0    glimepiride (AMARYL) 4 MG tablet Take 1 tablet by mouth every morning (before breakfast) 30 tablet 0    metFORMIN (GLUCOPHAGE) 500 MG tablet Take 1 tablet by mouth daily (with breakfast) Indications: unsure of dosage taken Start on metformin after serum creatinine normalize 60 tablet 0    nicotine (NICODERM CQ) 21 MG/24HR Place 1 patch onto the skin daily for 15 days 15 patch 0        Medications patient taking as of now reconciled against medications ordered at time of hospital discharge: Yes    Chief Complaint   Patient presents with    [facility] 1/7/20, CHF, Afib       HPI    Inpatient course: Discharge summary reviewed- see chart. Interval history/Current status: STABLE/IMPROVING    Review of Systems   Constitutional: Positive for fatigue. Negative for activity change, chills, fever and unexpected weight change. HENT: Negative. Respiratory: Negative. Cardiovascular: Negative. Neurological: Negative. Vitals:    01/09/20 1753   BP: (!) 130/92   Pulse: 50   Temp: 98.8 °F (37.1 °C)   TempSrc: Oral   SpO2: 93%   Weight: 250 lb 12.8 oz (113.8 kg)   Height: 6' 2.25\" (1.886 m)     Body mass index is 31.98 kg/m². Wt Readings from Last 3 Encounters:   01/09/20 250 lb 12.8 oz (113.8 kg)   01/07/20 239 lb 12.8 oz (108.8 kg)   08/06/19 248 lb 12.8 oz (112.9 kg)     BP Readings from Last 3 Encounters:   01/09/20 (!) 130/92   01/07/20 (!) 176/122   08/06/19 100/60       Physical Exam  Vitals signs reviewed. Constitutional:       Appearance: He is well-developed. HENT:      Head: Normocephalic and atraumatic. Cardiovascular:      Rate and Rhythm: Normal rate and regular rhythm. Heart sounds: Normal heart sounds. Pulmonary:      Effort: Pulmonary effort is normal.      Breath sounds: Normal breath sounds. Musculoskeletal:      Right lower leg: No edema. Left lower leg: No edema.    Skin:     General: Skin is warm and dry. Neurological:      Mental Status: He is alert and oriented to person, place, and time. Psychiatric:         Mood and Affect: Mood normal.         Behavior: Behavior normal.             Assessment/Plan:  1. New onset atrial fibrillation ([facility] [address]    2. New onset of congestive heart failure ([facility] [address]    - ME DISCHARGE MEDS RECONCILED W/ CURRENT OUTPATIENT MED LIST  *IN HOSPITAL NOTE WAS NOTED PT HAD NOT BEEN ON MEDICATION. DID NOT HAVE INSURANCE AND COULD NOT AFFORD. DURING THIS TCV PT STATES HAS 30 DAYS OF MEDICATIONS. UNDERSTANDS ALL MEDICATIONS. DISCUSSED HF EDUCATION/ SNS/SX, VERBALIZES UNDERSTANDING. DISCUSSED PLAN FOR AFTER 30 DAY RX. STATES HAS STARTED BACK AT WORK AND BELIEVES WILL HAVE INSURANCE AT THAT TIME. STATES IF NOT BELIEVES WILL BE ABLE TO AFFORD AT THAT TIME. ALSO STATES HAS FILLED OUT PAPERWORK FOR MED ASSIST, BUT HAS NOT HEARD FEEDBACK.          Medical Decision Making: low complexity

## 2020-01-13 ASSESSMENT — ENCOUNTER SYMPTOMS: RESPIRATORY NEGATIVE: 1

## 2020-02-17 ENCOUNTER — TELEPHONE (OUTPATIENT)
Dept: CARDIOLOGY CLINIC | Age: 60
End: 2020-02-17

## 2020-02-24 ENCOUNTER — PROCEDURE VISIT (OUTPATIENT)
Dept: CARDIOLOGY CLINIC | Age: 60
End: 2020-02-24
Payer: COMMERCIAL

## 2020-02-24 ENCOUNTER — OFFICE VISIT (OUTPATIENT)
Dept: CARDIOLOGY CLINIC | Age: 60
End: 2020-02-24
Payer: COMMERCIAL

## 2020-02-24 VITALS
DIASTOLIC BLOOD PRESSURE: 82 MMHG | BODY MASS INDEX: 31.23 KG/M2 | HEART RATE: 80 BPM | WEIGHT: 251.2 LBS | SYSTOLIC BLOOD PRESSURE: 128 MMHG | HEIGHT: 75 IN

## 2020-02-24 LAB
LV EF: 38 %
LVEF MODALITY: NORMAL

## 2020-02-24 PROCEDURE — 93015 CV STRESS TEST SUPVJ I&R: CPT | Performed by: INTERNAL MEDICINE

## 2020-02-24 PROCEDURE — 78452 HT MUSCLE IMAGE SPECT MULT: CPT | Performed by: INTERNAL MEDICINE

## 2020-02-24 PROCEDURE — 93000 ELECTROCARDIOGRAM COMPLETE: CPT | Performed by: INTERNAL MEDICINE

## 2020-02-24 PROCEDURE — 99214 OFFICE O/P EST MOD 30 MIN: CPT | Performed by: INTERNAL MEDICINE

## 2020-02-24 PROCEDURE — A9500 TC99M SESTAMIBI: HCPCS | Performed by: INTERNAL MEDICINE

## 2020-02-24 RX ORDER — LISINOPRIL 5 MG/1
5 TABLET ORAL DAILY
Qty: 30 TABLET | Refills: 0 | Status: SHIPPED | OUTPATIENT
Start: 2020-02-24 | End: 2020-04-03

## 2020-02-24 RX ORDER — CHLORTHALIDONE 50 MG/1
TABLET ORAL
COMMUNITY
Start: 2020-02-17 | End: 2020-02-24 | Stop reason: SDUPTHER

## 2020-02-24 RX ORDER — ISOSORBIDE MONONITRATE 30 MG/1
30 TABLET, EXTENDED RELEASE ORAL DAILY
Qty: 30 TABLET | Refills: 0 | Status: SHIPPED | OUTPATIENT
Start: 2020-02-24 | End: 2020-05-26

## 2020-02-24 RX ORDER — CHLORTHALIDONE 25 MG/1
TABLET ORAL
Qty: 90 TABLET | Refills: 2 | Status: SHIPPED | OUTPATIENT
Start: 2020-02-24 | End: 2020-05-26 | Stop reason: SDUPTHER

## 2020-02-24 RX ORDER — GLIMEPIRIDE 4 MG/1
4 TABLET ORAL
Qty: 30 TABLET | Refills: 0 | Status: SHIPPED | OUTPATIENT
Start: 2020-02-24 | End: 2020-06-05

## 2020-02-24 RX ORDER — CARVEDILOL 25 MG/1
25 TABLET ORAL DAILY
COMMUNITY
End: 2020-02-24 | Stop reason: SDUPTHER

## 2020-02-24 RX ORDER — AMLODIPINE BESYLATE 10 MG/1
10 TABLET ORAL DAILY
Qty: 30 TABLET | Refills: 0 | Status: SHIPPED | OUTPATIENT
Start: 2020-02-24 | End: 2020-05-26 | Stop reason: SDUPTHER

## 2020-02-24 RX ORDER — SPIRONOLACTONE 25 MG/1
25 TABLET ORAL DAILY
Qty: 30 TABLET | Refills: 0 | Status: SHIPPED | OUTPATIENT
Start: 2020-02-24 | End: 2020-05-26

## 2020-02-24 RX ORDER — CARVEDILOL 25 MG/1
25 TABLET ORAL 2 TIMES DAILY
Qty: 180 TABLET | Refills: 3 | Status: SHIPPED | OUTPATIENT
Start: 2020-02-24 | End: 2020-05-26 | Stop reason: SDUPTHER

## 2020-02-24 RX ORDER — NITROGLYCERIN 0.4 MG/1
TABLET SUBLINGUAL
Qty: 20 TABLET | Refills: 0 | Status: ON HOLD | OUTPATIENT
Start: 2020-02-24 | End: 2020-07-07 | Stop reason: HOSPADM

## 2020-02-24 NOTE — LETTER
[address] [facility] [employer]  [address] [facility] [address]  [place], [address] [facility]  Phone: [phone]    Fax [phone]           Chanell Newman MD, Master Shearer MD, Pavel Dahl MD, MD Tashi Nunez MD Chalmer Homes, MD MALLARD FILLMORE GATES APRN-CNP               Katarina Alex, APRN-CNP         Erin Bond APRN-CNP               David Stein APRN-CNP       February 24, 2020       Patient: Esequiel Moreira   YOB: 1960   Date of Visit: 2/24/2020       To Whom It May Concern:    Patient Beronica Fay had his 2-part Nuclear Medicine stress test done today in my office to evaluate his cardiac health. If you have any questions or concerns, please don't hesitate to call.     Respectfully,          Zakiya Gong MD

## 2020-02-24 NOTE — PROGRESS NOTES
[id number] Score for Atrial Fibrillation Stroke Risk   Risk   Factors  Component Value   C CHF Yes 1   H HTN Yes 1   A2 Age >= 75 No,  (60 y.o.) 0   D DM Yes 1   S2 Prior Stroke/TIA No 0   V Vascular Disease Yes 1   A Age 74-69 No,  (60 y.o.) 0   Sc Sex male 0    [id number]  Score  4   Score last updated 8/00/27 6:45 AM    Click here for a link to the UpToDate guideline \"Atrial Fibrillation: Anticoagulation therapy to prevent embolization    Disclaimer: Risk Score calculation is dependent on accuracy of patient problem list and past encounter diagnosis.

## 2020-02-24 NOTE — LETTER
CLINICAL STAFF DOCUMENTATION    Stephanie Kraus  1960  [id number]    Have you had any Chest Pain - No    Have you had any Shortness of Breath - Yes  If Yes - When on exertion    Have you had any dizziness - No     Have you had any palpitations - No    Do you have any edema - No    Check Venous \"LEG PROBLEM Questionnaire\"    Do you have a surgery or procedure scheduled in the near future - No  If Yes- DO EKG  If Yes - Who is the surgery with?   Phone number of physician   Fax number of physician  Type of surgery   BE SURE TO GIVE THIS INFORMATION to [facility]    Ask patient if they want to sign up for MyChart if they are not already signed up    Check to see if we have an E-MAIL on file for the patient    Check medication list thoroughly!!!  BE SURE TO ASK PATIENT IF THEY NEED MEDICATION REFILLS

## 2020-02-24 NOTE — PROGRESS NOTES
Jessica Estrella MD        OFFICE  FOLLOWUP NOTE    Chief complaints: patient is here for management of CAD, DM, HTN, DYSLPIDEMIA, AFIB    Subjective: + shortness of breath, no dizziness, no palpitations    ADRIANE Trimble is a 63 y. o.year old who  has a past medical history of Atrial fibrillation ([facility] [address] CAD (coronary artery disease), COPD (chronic obstructive pulmonary disease) ([facility] [address] Diabetes mellitus ([facility] [address] GERD (gastroesophageal reflux disease), H/O cardiovascular stress test, H/O cardiovascular stress test, H/O cardiovascular stress test, H/O Doppler ultrasound, H/O echocardiogram, H/O percutaneous left heart catheterization, History of coronary artery stent placement, History of exercise stress test, History of Holter monitoring, Hyperlipidemia, Hypertension, S/P cardiac catheterization, and Status post angioplasty with stent. and presents for management of CAD, DM, HTN, DYSLPIDEMIA which are well controlled  Patient had [facility] last time 7/19 with his RCA stemi, he had distal left main 50% stenosis, LCX 70%, diagonal branch 80%, HE is compliant now with his medications, he has mixed up his coreg prescriptions  From 3 pharmacies and takes approximately 60mg coreg daily. He still smokes and has shortness of breath, his LVEF has decreased to 35% on echo which could be afib artifact and started on eliquis    Current Outpatient Medications   Medication Sig Dispense Refill    carvedilol (COREG) 25 MG tablet Take 25 mg by mouth daily      chlorthalidone (HYGROTEN) 50 MG tablet TAKE 1 TABLET BY MOUTH ONE TIME A DAY      apixaban (ELIQUIS) 5 MG TABS tablet Take 1 tablet by mouth 2 times daily 28 tablet 0    aspirin 81 MG chewable tablet Take 1 tablet by mouth daily 30 tablet 0    isosorbide mononitrate (IMDUR) 30 MG extended release tablet Take 1 tablet by mouth daily 30 tablet 0    nitroGLYCERIN (NITROSTAT) 0.4 MG SL tablet up to max of 3 total doses.  If no relief after 1 dose, call 911. 20 tablet 0    lisinopril (PRINIVIL;ZESTRIL) 5 MG tablet Take 1 tablet by mouth daily 30 tablet 0    carvedilol (COREG) 12.5 MG tablet Take 1 tablet by mouth 2 times daily (with meals) (Patient taking differently: Take 12.5 mg by mouth 2 times daily ) 60 tablet 0    amLODIPine (NORVASC) 10 MG tablet Take 1 tablet by mouth daily 30 tablet 0    spironolactone (ALDACTONE) 25 MG tablet Take 1 tablet by mouth daily 30 tablet 0    glimepiride (AMARYL) 4 MG tablet Take 1 tablet by mouth every morning (before breakfast) 30 tablet 0    metFORMIN (GLUCOPHAGE) 500 MG tablet Take 1 tablet by mouth daily (with breakfast) Indications: unsure of dosage taken Start on metformin after serum creatinine normalize 60 tablet 0     No current facility-administered medications for this visit. Allergies: Patient has no known allergies. Past Medical History:   Diagnosis Date    Atrial fibrillation ([facility] [address] 11/2013    CAD (coronary artery disease)     COPD (chronic obstructive pulmonary disease) ([facility] [address]     Diabetes mellitus ([facility] [address]     GERD (gastroesophageal reflux disease)     H/O cardiovascular stress test 11/20/13, 3/20/2013    11/13-Observed defect is consistent with diaphragmatic attenuation. EF 58%. 3/13 -EF 51%. No ischemia. Normal Study.  H/O cardiovascular stress test 06/08/2017    EF 50% normal study    H/O cardiovascular stress test 06/17/2019    Normal NM    H/O Doppler ultrasound 10/14/2010    10/2010-Bilateral venous doppler of lower extremies- No DVTs.  H/O echocardiogram 06/26/2019    Left ventricular systolic function is normal with an ejection fraction of 50-55%. Mild concentric left ventricular hypertrophy. Grade I diastolic dysfunction. No significant valvular disease noted. No evidence of pericardial effusion. Essentially unremarkable echo.     H/O percutaneous left heart catheterization 12/22/15    No evidence of hemodynamically significant coronary artery disease    History of coronary artery stent placement 11/13/2013 11/13 -RCA - 3 stents placed    History of exercise stress test 06/08/2017    treadmill    History of Holter monitoring 1/4/2016    predominant rhythm sinus    Hyperlipidemia     Hypertension     S/P cardiac catheterization 11/13/2013 11/13/2013-EF 55%, distal LAD mild disease,CX stent patent,but distal to stent 50% stenosis. RCA severe disease.  Status post angioplasty with stent      Past Surgical History:   Procedure Laterality Date    APPENDECTOMY      CARDIAC CATHETERIZATION  11/13/2013 11/13/2013-EF 55%, distal LAD mild disease,CX stent patent,but distal to stent 50% stenosis. RCA severe disease.     COLONOSCOPY      CORONARY ANGIOPLASTY WITH STENT PLACEMENT      4 stents- RCA X3; CX X1    CORONARY ANGIOPLASTY WITH STENT PLACEMENT  11/13/2013 11/13/2013 -3 stents placed to RCA- Dr Corey Hoyos     Family History   Problem Relation Age of Onset    Cancer Mother     Diabetes Mother     Heart Disease Mother     High Blood Pressure Mother     Stroke Mother     Cancer Father     Heart Disease Father     High Blood Pressure Father     Stroke Father     Cancer Brother      Social History     Tobacco Use    Smoking status: Current Every Day Smoker     Packs/day: 3.00     Years: 45.00     Pack years: 135.00    Smokeless tobacco: Never Used   Substance Use Topics    Alcohol use: No      [unfilled]  Review of Systems:   · Constitutional: No Fever or Weight Loss   · Eyes: No Decreased Vision  · ENT: No Headaches, Hearing Loss or Vertigo  · Cardiovascular: No chest pain, +dyspnea on exertion, palpitations or loss of consciousness  · Respiratory: No cough or wheezing    · Gastrointestinal: No abdominal pain, appetite loss, blood in stools, constipation, diarrhea or heartburn  · Genitourinary: No dysuria, trouble voiding, or hematuria  · Musculoskeletal:  No gait disturbance, weakness or joint complaints  · Integumentary: No 4. Dyslipidemia: continue statins  1. HTN: stable, continue coreg, will only give him 25mg bid coreg medicatons  2. Health maintenance: exerise and diet  All labs, medications and tests reviewed, continue all other medications of all above medical condition listed as is.     [unfilled]

## 2020-02-27 ENCOUNTER — TELEPHONE (OUTPATIENT)
Dept: CARDIOLOGY CLINIC | Age: 60
End: 2020-02-27

## 2020-03-06 ENCOUNTER — OFFICE VISIT (OUTPATIENT)
Dept: CARDIOLOGY CLINIC | Age: 60
End: 2020-03-06
Payer: COMMERCIAL

## 2020-03-06 VITALS
BODY MASS INDEX: 31.63 KG/M2 | WEIGHT: 254.4 LBS | HEART RATE: 58 BPM | DIASTOLIC BLOOD PRESSURE: 110 MMHG | HEIGHT: 75 IN | SYSTOLIC BLOOD PRESSURE: 200 MMHG

## 2020-03-06 PROCEDURE — 93000 ELECTROCARDIOGRAM COMPLETE: CPT | Performed by: INTERNAL MEDICINE

## 2020-03-06 PROCEDURE — 99214 OFFICE O/P EST MOD 30 MIN: CPT | Performed by: INTERNAL MEDICINE

## 2020-03-06 NOTE — PROGRESS NOTES
James Black MD        OFFICE  FOLLOWUP NOTE    Chief complaints: patient is here for management of  CAD, DM, HTN, DYSLPIDEMIA, AFIB    Stress test follow up    Subjective: patient feels better, no chest pain, +shortness of breath, no dizziness, no palpitations    ADRIANE Lo is a 63 y. o.year old who  has a past medical history of Atrial fibrillation ([facility] [address] CAD (coronary artery disease), COPD (chronic obstructive pulmonary disease) ([facility] [address] [facility] GERD (gastroesophageal reflux disease), H/O cardiovascular stress test, H/O cardiovascular stress test, H/O cardiovascular stress test, H/O Doppler ultrasound, H/O echocardiogram, H/O percutaneous left heart catheterization, History of coronary artery stent placement, History of exercise stress test, History of Holter monitoring, History of nuclear stress test, Hyperlipidemia, Hypertension, S/P cardiac catheterization, and Status post angioplasty with stent. and presents for management of  CAD, DM, HTN, DYSLPIDEMIA, AFIB which are well controlled  He had stress test which was abnormal    Current Outpatient Medications   Medication Sig Dispense Refill    metFORMIN (GLUCOPHAGE) 500 MG tablet Take 1 tablet by mouth daily (with breakfast) Indications: unsure of dosage taken Start on metformin after serum creatinine normalize 60 tablet 0    amLODIPine (NORVASC) 10 MG tablet Take 1 tablet by mouth daily 30 tablet 0    chlorthalidone (HYGROTON) 25 MG tablet TAKE 1 TABLET BY MOUTH ONE TIME A DAY 90 tablet 2    isosorbide mononitrate (IMDUR) 30 MG extended release tablet Take 1 tablet by mouth daily 30 tablet 0    nitroGLYCERIN (NITROSTAT) 0.4 MG SL tablet up to max of 3 total doses.  If no relief after 1 dose, call 911. 20 tablet 0    glimepiride (AMARYL) 4 MG tablet Take 1 tablet by mouth every morning (before breakfast) 30 tablet 0    lisinopril (PRINIVIL;ZESTRIL) 5 MG tablet Take 1 tablet by mouth daily 30 tablet 0    spironolactone (ALDACTONE) 25 MG tablet Take 1 tablet by mouth daily 30 tablet 0    carvedilol (COREG) 25 MG tablet Take 1 tablet by mouth 2 times daily 180 tablet 3    apixaban (ELIQUIS) 5 MG TABS tablet Take 1 tablet by mouth 2 times daily 28 tablet 0    aspirin 81 MG chewable tablet Take 1 tablet by mouth daily 30 tablet 0     No current facility-administered medications for this visit. Allergies: Patient has no known allergies. Past Medical History:   Diagnosis Date    Atrial fibrillation ([facility] [address] 11/2013    CAD (coronary artery disease)     [facility]     Diabetes mellitus ([facility]     GERD (gastroesophageal reflux disease)     H/O cardiovascular stress test 11/20/13, 3/20/2013    11/13-Observed defect is consistent with diaphragmatic attenuation. EF 58%. 3/13 -EF 51%. No ischemia. Normal Study.  H/O cardiovascular stress test 06/08/2017    EF 50% normal study    H/O cardiovascular stress test 06/17/2019    Normal NM    H/O Doppler ultrasound 10/14/2010    10/2010-Bilateral venous doppler of lower extremies- No DVTs.  H/O echocardiogram 06/26/2019    Left ventricular systolic function is normal with an ejection fraction of 50-55%. Mild concentric left ventricular hypertrophy. Grade I diastolic dysfunction. No significant valvular disease noted. No evidence of pericardial effusion. Essentially unremarkable echo.  H/O percutaneous left heart catheterization 12/22/15    No evidence of hemodynamically significant coronary artery disease    History of coronary artery stent placement 11/13/2013 11/13 -RCA - 3 stents placed    History of exercise stress test 06/08/2017    treadmill    History of Holter monitoring 1/4/2016    predominant rhythm sinus    History of nuclear stress test 02/24/2020    EF 38%,Myocardial perfusion scan shows moderate size, severe intensity, non  reversible perfusion defect in inferior wall.     Hyperlipidemia     Hypertension     S/P cardiac catheterization 11/13/2013 11/13/2013-EF 55%, distal LAD mild disease,CX stent patent,but distal to stent 50% stenosis. RCA severe disease.  Status post angioplasty with stent      Past Surgical History:   Procedure Laterality Date    APPENDECTOMY      CARDIAC CATHETERIZATION  11/13/2013 11/13/2013-EF 55%, distal LAD mild disease,CX stent patent,but distal to stent 50% stenosis. RCA severe disease.     COLONOSCOPY      CORONARY ANGIOPLASTY WITH STENT PLACEMENT      4 stents- RCA X3; CX X1    CORONARY ANGIOPLASTY WITH STENT PLACEMENT  11/13/2013 11/13/2013 -3 stents placed to RCA- Dr Frank Shows     Family History   Problem Relation Age of Onset    Cancer Mother     Diabetes Mother     Heart Disease Mother     High Blood Pressure Mother     Stroke Mother     Cancer Father     Heart Disease Father     High Blood Pressure Father     Stroke Father     Cancer Brother      Social History     Tobacco Use    Smoking status: Current Every Day Smoker     Packs/day: 3.00     Years: 45.00     Pack years: 135.00    Smokeless tobacco: Never Used   Substance Use Topics    Alcohol use: No      [unfilled]  Review of Systems:   · Constitutional: No Fever or Weight Loss   · Eyes: No Decreased Vision  · ENT: No Headaches, Hearing Loss or Vertigo  · Cardiovascular: No chest pain, dyspnea on exertion, palpitations or loss of consciousness  · Respiratory: No cough or wheezing    · Gastrointestinal: No abdominal pain, appetite loss, blood in stools, constipation, diarrhea or heartburn  · Genitourinary: No dysuria, trouble voiding, or hematuria  · Musculoskeletal:  No gait disturbance, weakness or joint complaints  · Integumentary: No rash or pruritis  · Neurological: No TIA or stroke symptoms  · Psychiatric: No anxiety or depression  · Endocrine: No malaise, fatigue or temperature intolerance  · Hematologic/Lymphatic: No bleeding problems, blood clots or swollen lymph nodes  · Allergic/Immunologic: No nasal congestion or hives  All systems negative except as marked. Objective:  BP (!) 200/110   Pulse 58   Ht 6' 3\" (1.905 m)   Wt 254 lb 6.4 oz (115.4 kg)   BMI 31.80 kg/m²   Wt Readings from Last 3 Encounters:   03/06/20 254 lb 6.4 oz (115.4 kg)   02/24/20 251 lb 3.2 oz (113.9 kg)   01/09/20 250 lb 12.8 oz (113.8 kg)     Body mass index is 31.8 kg/m². GENERAL - Alert, oriented, pleasant, in no apparent distress,normal grooming  HEENT - pupils are reactive to light and accomodation, cornea intact, conjunctive normal color, ears are normal in exam,throat exam in normal, teeth, gum and palate are normal, oral mucosa is normal without any notation of pallor or cyanosis  Neck - Supple. No jugular venous distention noted. No carotid bruits, no apical -carotid delay  Respiratory:  Normal breath sounds, No respiratory distress, No wheezing, No chest tenderness. ,no use of accessory muscles, diaphragm movement is normal  Cardiovascular: (PMI) apex non displaced,no lifts no thrills, no s3,no s4, Normal heart rate, Normal rhythm, No murmurs, No rubs, No gallops. Carotid arteries pulse and amplitude are normal no bruit, no abdominal bruit noted ( normal abdominal aorta ausculation), femoral arteries pulse and amplitude are normal no bruit, pedal pulses are normal  Femoral pulses have normal amplitude, no bruits   Extremities - No cyanosis, clubbing, or significant edema, no varicose veins    Abdomen - No masses, tenderness, or organomegaly, no hepato-splenomegally, no bruits  Musculoskeletal - No significant edema, no kyphosis or scoliosis, no deformity in any extremity noted, muscle strength and tone are normal  Skin: no ulcer,no scar,no stasis dermatitis   Neurologic - alert oriented times 3,Cranial nerves II through XII are grossly intact. There were no gross focal neurologic abnormalities.  All sensory and motor nerves examined and were normal  Psychiatric:

## 2020-03-06 NOTE — LETTER
CLINICAL STAFF DOCUMENTATION    Magi Levi  1960  [id number]    Have you had any Chest Pain - Yes nothing new    Have you had any Shortness of Breath - Yes    Have you had any dizziness - No    Have you had any palpitations - No    Do you have any edema - NO    Do you have a surgery or procedure scheduled in the near future - No

## 2020-03-27 ENCOUNTER — TELEPHONE (OUTPATIENT)
Dept: CARDIOLOGY CLINIC | Age: 60
End: 2020-03-27

## 2020-04-03 ENCOUNTER — OFFICE VISIT (OUTPATIENT)
Dept: CARDIOLOGY CLINIC | Age: 60
End: 2020-04-03
Payer: COMMERCIAL

## 2020-04-03 VITALS
DIASTOLIC BLOOD PRESSURE: 100 MMHG | HEIGHT: 75 IN | SYSTOLIC BLOOD PRESSURE: 160 MMHG | HEART RATE: 91 BPM | BODY MASS INDEX: 32.67 KG/M2 | WEIGHT: 262.8 LBS

## 2020-04-03 PROCEDURE — 99214 OFFICE O/P EST MOD 30 MIN: CPT | Performed by: INTERNAL MEDICINE

## 2020-04-03 RX ORDER — LISINOPRIL 5 MG/1
10 TABLET ORAL DAILY
Qty: 30 TABLET | Refills: 0 | Status: SHIPPED | OUTPATIENT
Start: 2020-04-03 | End: 2020-04-03 | Stop reason: SDUPTHER

## 2020-04-03 RX ORDER — LISINOPRIL 5 MG/1
10 TABLET ORAL DAILY
Qty: 60 TABLET | Refills: 0 | Status: SHIPPED | OUTPATIENT
Start: 2020-04-03 | End: 2020-05-26 | Stop reason: SDUPTHER

## 2020-04-03 NOTE — PROGRESS NOTES
Take 1 tablet by mouth 2 times daily 180 tablet 3    aspirin 81 MG chewable tablet Take 1 tablet by mouth daily 30 tablet 0    metFORMIN (GLUCOPHAGE) 500 MG tablet Take 1 tablet by mouth daily (with breakfast) Indications: unsure of dosage taken Start on metformin after serum creatinine normalize 60 tablet 0     No current facility-administered medications for this visit. Allergies: Patient has no known allergies. Past Medical History:   Diagnosis Date    Atrial fibrillation ([facility] 11/2013    CAD (coronary artery disease)     COPD (chronic obstructive pulmonary disease) ([facility]     Diabetes mellitus ([facility]     GERD (gastroesophageal reflux disease)     H/O cardiovascular stress test 11/20/13, 3/20/2013    11/13-Observed defect is consistent with diaphragmatic attenuation. EF 58%. 3/13 -EF 51%. No ischemia. Normal Study.  H/O cardiovascular stress test 06/08/2017    EF 50% normal study    H/O cardiovascular stress test 06/17/2019    Normal NM    H/O Doppler ultrasound 10/14/2010    10/2010-Bilateral venous doppler of lower extremies- No DVTs.  H/O echocardiogram 06/26/2019    Left ventricular systolic function is normal with an ejection fraction of 50-55%. Mild concentric left ventricular hypertrophy. Grade I diastolic dysfunction. No significant valvular disease noted. No evidence of pericardial effusion. Essentially unremarkable echo.  H/O percutaneous left heart catheterization 12/22/15    No evidence of hemodynamically significant coronary artery disease    History of coronary artery stent placement 11/13/2013 11/13 -RCA - 3 stents placed    History of exercise stress test 06/08/2017    treadmill    History of Holter monitoring 1/4/2016    predominant rhythm sinus    History of nuclear stress test 02/24/2020    EF 38%,Myocardial perfusion scan shows moderate size, severe intensity, non  reversible perfusion defect in inferior wall.     Hyperlipidemia     Hypertension     S/P cardiac normal mood and affect    Lab Results   Component Value Date    CKTOTAL 107 03/10/2013    CKMB 2.4 03/10/2013    TROPONINI 0.014 06/09/2014    TROPONINI 0.015 12/03/2011     BNP:    Lab Results   Component Value Date    BNP 17 03/09/2013     PT/INR:  No results found for: PTINR  Lab Results   Component Value Date    LABA1C 6.9 (H) 01/05/2020    LABA1C 6.6 (H) 07/31/2019     Lab Results   Component Value Date    CHOL 153 01/06/2020    TRIG 54 01/06/2020    HDL 43 01/06/2020    LDLDIRECT 111 (H) 01/06/2020     Lab Results   Component Value Date    ALT 14 01/07/2020    AST 12 (L) 01/07/2020     TSH:  No results found for: TSH    Impression:  Carlee Houser is a 63 y. o.year old who  has a past medical history of Atrial fibrillation ([facility] [address] CAD (coronary artery disease), COPD (chronic obstructive pulmonary disease) ([facility] [address] Diabetes mellitus ([facility] [address] GERD (gastroesophageal reflux disease), H/O cardiovascular stress test, H/O cardiovascular stress test, H/O cardiovascular stress test, H/O Doppler ultrasound, H/O echocardiogram, H/O percutaneous left heart catheterization, History of coronary artery stent placement, History of exercise stress test, History of Holter monitoring, History of nuclear stress test, Hyperlipidemia, Hypertension, S/P cardiac catheterization, and Status post angioplasty with stent. and presents with     Plan:  1. CAD:and shortness of breath. Stress test showed depressed LVEF 38% he still smokes and had distal left main 50% STENOSIS, LCX 70% stenosis and diagonal branch 80%, h/o  RCA stent  For STEM continue statins, betablockers, EFFIENT, HIS STRESS TEST showed inferior wall infarction with his recent RCA stemi, however, anterior wall had good perfusion therefore will hold off on  CABG for his distal left main disease, he stopped cocaine 32 days ago.  His LVEF IS 30-35%, WILL INCREASE LISINOPRIL TO 10MG DAILY  2. PATIENT WANTS CLEARANCE for his , will get his paper works from his job and see if he can

## 2020-04-15 ENCOUNTER — TELEPHONE (OUTPATIENT)
Dept: CARDIOLOGY CLINIC | Age: 60
End: 2020-04-15

## 2020-04-23 ENCOUNTER — PROCEDURE VISIT (OUTPATIENT)
Dept: CARDIOLOGY CLINIC | Age: 60
End: 2020-04-23
Payer: COMMERCIAL

## 2020-04-23 LAB
LV EF: 58 %
LVEF MODALITY: NORMAL

## 2020-04-23 PROCEDURE — A9560 TC99M LABELED RBC: HCPCS | Performed by: INTERNAL MEDICINE

## 2020-04-23 PROCEDURE — 78472 GATED HEART PLANAR SINGLE: CPT | Performed by: INTERNAL MEDICINE

## 2020-04-24 ENCOUNTER — TELEPHONE (OUTPATIENT)
Dept: CARDIOLOGY CLINIC | Age: 60
End: 2020-04-24

## 2020-04-24 RX ORDER — ISOSORBIDE MONONITRATE 30 MG/1
TABLET, EXTENDED RELEASE ORAL
Qty: 30 TABLET | Refills: 0 | OUTPATIENT
Start: 2020-04-24

## 2020-04-24 RX ORDER — LISINOPRIL 5 MG/1
TABLET ORAL
Qty: 30 TABLET | Refills: 0 | OUTPATIENT
Start: 2020-04-24

## 2020-04-24 RX ORDER — GLIMEPIRIDE 4 MG/1
TABLET ORAL
Qty: 30 TABLET | Refills: 0 | OUTPATIENT
Start: 2020-04-24

## 2020-04-24 RX ORDER — SPIRONOLACTONE 25 MG/1
TABLET ORAL
Qty: 30 TABLET | Refills: 0 | OUTPATIENT
Start: 2020-04-24

## 2020-04-24 RX ORDER — AMLODIPINE BESYLATE 10 MG/1
TABLET ORAL
Qty: 30 TABLET | Refills: 0 | OUTPATIENT
Start: 2020-04-24

## 2020-04-24 NOTE — TELEPHONE ENCOUNTER
Left ventricular ejection fraction of 58%. no wall motio abnormality noted    Frequent PACs noted during gating.    Supervising physician Dr. Hastings Bussing results to patient. DOT letter per dr Soledad Mckeon.  Patient to  today

## 2020-05-04 ENCOUNTER — OFFICE VISIT (OUTPATIENT)
Dept: PRIMARY CARE CLINIC | Age: 60
End: 2020-05-04
Payer: COMMERCIAL

## 2020-05-04 VITALS — HEART RATE: 74 BPM | OXYGEN SATURATION: 98 % | TEMPERATURE: 97.1 F

## 2020-05-04 PROCEDURE — 99212 OFFICE O/P EST SF 10 MIN: CPT | Performed by: FAMILY MEDICINE

## 2020-05-04 NOTE — PROGRESS NOTES
are sick, wash your hands before and after you interact with pets and wear a facemask. Call ahead before visiting your doctor  If you have a medical appointment, call the healthcare provider and tell them that you have or may have COVID-19. This will help the healthcare providers office take steps to keep other people from getting infected or exposed. Wear a facemask  You should wear a facemask when you are around other people (e.g., sharing a room or vehicle) or pets and before you enter a healthcare providers office. If you are not able to wear a facemask (for example, because it causes trouble breathing), then people who live with you should not stay in the same room with you, or they should wear a facemask if they enter your room. Cover your coughs and sneezes  Cover your mouth and nose with a tissue when you cough or sneeze. Throw used tissues in a lined trash can. Immediately wash your hands with soap and water for at least 20 seconds or, if soap and water are not available, clean your hands with an alcohol-based hand  that contains at least 60% alcohol. Clean your hands often  Wash your hands often with soap and water for at least 20 seconds, especially after blowing your nose, coughing, or sneezing; going to the bathroom; and before eating or preparing food. If soap and water are not readily available, use an alcohol-based hand  with at least 60% alcohol, covering all surfaces of your hands and rubbing them together until they feel dry. Soap and water are the best option if hands are visibly dirty. Avoid touching your eyes, nose, and mouth with unwashed hands. Avoid sharing personal household items  You should not share dishes, drinking glasses, cups, eating utensils, towels, or bedding with other people or pets in your home. After using these items, they should be washed thoroughly with soap and water.   Clean all high-touch surfaces everyday  High touch surfaces include counters,

## 2020-05-11 ENCOUNTER — OFFICE VISIT (OUTPATIENT)
Dept: CARDIOLOGY CLINIC | Age: 60
End: 2020-05-11
Payer: COMMERCIAL

## 2020-05-11 ENCOUNTER — HOSPITAL ENCOUNTER (OUTPATIENT)
Dept: GENERAL RADIOLOGY | Age: 60
Discharge: HOME | End: 2020-05-11
Payer: COMMERCIAL

## 2020-05-11 ENCOUNTER — HOSPITAL ENCOUNTER (OUTPATIENT)
Age: 60
Discharge: HOME | End: 2020-05-11
Payer: COMMERCIAL

## 2020-05-11 VITALS
HEART RATE: 100 BPM | BODY MASS INDEX: 33.05 KG/M2 | WEIGHT: 265.8 LBS | DIASTOLIC BLOOD PRESSURE: 100 MMHG | HEIGHT: 75 IN | SYSTOLIC BLOOD PRESSURE: 180 MMHG

## 2020-05-11 PROCEDURE — 93000 ELECTROCARDIOGRAM COMPLETE: CPT | Performed by: INTERNAL MEDICINE

## 2020-05-11 PROCEDURE — 71046 X-RAY EXAM CHEST 2 VIEWS: CPT

## 2020-05-11 PROCEDURE — 99215 OFFICE O/P EST HI 40 MIN: CPT | Performed by: INTERNAL MEDICINE

## 2020-05-11 NOTE — LETTER
Martin Goldsmith  1960  [id number]    Have you had any Chest Pain - Yes  If Yes DO EKG - How does it feel - Sharp Pain   How long does the pain last - minutes   How long have you been having the pain - Day's   Did you take a    And did it relieve the pain -     Have you had any Shortness of Breath - Yes  If Yes - When at rest and on exertion    Have you had any dizziness - No  If Yes DO ORTHOSTATIC BP - when do you feel dizzy    How long does it last     Have you had any palpitations - No  If Yes DO EKG - Do you feel your heart   How long does it last -      Is the patient on any of the following medications -   If Yes DO EKG    Do you have any edema - swelling in     If Yes - CHECK TO SEE IF THE EDEMA IS PITTING  How long have they been having edema -   If Yes - Have they worn compression stockings     Check Venous \"LEG PROBLEM Questionnaire\"    Do you have a surgery or procedure scheduled in the near future - No  If Yes- DO EKG  If Yes - Who is the surgery with?    Phone number of physician   Fax number of physician   Type of surgery   BE SURE TO GIVE THIS INFORMATION to Arron Soliman    Ask patient if they want to sign up for [facility] if they are not already signed up    Check to see if we have an E-MAIL on file for the patient    Check medication list thoroughly!!!  BE SURE TO ASK PATIENT IF THEY NEED MEDICATION REFILLS

## 2020-05-11 NOTE — PROGRESS NOTES
(NITROSTAT) 0.4 MG SL tablet up to max of 3 total doses. If no relief after 1 dose, call 911. 20 tablet 0    glimepiride (AMARYL) 4 MG tablet Take 1 tablet by mouth every morning (before breakfast) 30 tablet 0    spironolactone (ALDACTONE) 25 MG tablet Take 1 tablet by mouth daily 30 tablet 0    carvedilol (COREG) 25 MG tablet Take 1 tablet by mouth 2 times daily 180 tablet 3    aspirin 81 MG chewable tablet Take 1 tablet by mouth daily 30 tablet 0     No current facility-administered medications for this visit. Allergies: Patient has no known allergies. Past Medical History:   Diagnosis Date    Atrial fibrillation ([facility] 11/2013    CAD (coronary artery disease)     [facility]     Diabetes mellitus ([facility]     GERD (gastroesophageal reflux disease)     H/O cardiovascular stress test 11/20/13, 3/20/2013    11/13-Observed defect is consistent with diaphragmatic attenuation. EF 58%. 3/13 -EF 51%. No ischemia. Normal Study.  H/O cardiovascular stress test 06/08/2017    EF 50% normal study    H/O cardiovascular stress test 06/17/2019    Normal NM    H/O Doppler ultrasound 10/14/2010    10/2010-Bilateral venous doppler of lower extremies- No DVTs.  H/O echocardiogram 06/26/2019    Left ventricular systolic function is normal with an ejection fraction of 50-55%. Mild concentric left ventricular hypertrophy. Grade I diastolic dysfunction. No significant valvular disease noted. No evidence of pericardial effusion. Essentially unremarkable echo.     H/O percutaneous left heart catheterization 12/22/15    No evidence of hemodynamically significant coronary artery disease    Heart imaging 04/23/2020    muga - ef 58%    History of coronary artery stent placement 11/13/2013 11/13 -RCA - 3 stents placed    History of exercise stress test 06/08/2017    treadmill    History of Holter monitoring 1/4/2016    predominant rhythm sinus    History of nuclear stress test weakness or joint complaints  · Integumentary: No rash or pruritis  · Neurological: No TIA or stroke symptoms  · Psychiatric: No anxiety or depression  · Endocrine: No malaise, fatigue or temperature intolerance  · Hematologic/Lymphatic: No bleeding problems, blood clots or swollen lymph nodes  · Allergic/Immunologic: No nasal congestion or hives  All systems negative except as marked. Objective:  BP (!) 180/100   Pulse 100   Ht 6' 3\" (1.905 m)   Wt 265 lb 12.8 oz (120.6 kg)   BMI 33.22 kg/m²   Wt Readings from Last 3 Encounters:   05/11/20 265 lb 12.8 oz (120.6 kg)   04/03/20 262 lb 12.8 oz (119.2 kg)   03/06/20 254 lb 6.4 oz (115.4 kg)     Body mass index is 33.22 kg/m². GENERAL - Alert, oriented, pleasant, in no apparent distress,normal grooming  HEENT - pupils are reactive to light and accomodation, cornea intact, conjunctive normal color, ears are normal in exam,throat exam in normal, teeth, gum and palate are normal, oral mucosa is normal without any notation of pallor or cyanosis  Neck - Supple. No jugular venous distention noted. No carotid bruits, no apical -carotid delay  Respiratory: + rt lung ronchi,No respiratory distress, No wheezing, No chest tenderness. ,no use of accessory muscles, diaphragm movement is normal  Cardiovascular: (PMI) apex non displaced,no lifts no thrills, no s3,no s4, Normal heart rate, Normal rhythm, No murmurs, No rubs, No gallops.  Carotid arteries pulse and amplitude are normal no bruit, no abdominal bruit noted ( normal abdominal aorta ausculation), femoral arteries pulse and amplitude are normal no bruit, pedal pulses are normal  Femoral pulses have normal amplitude, no bruits   Extremities - No cyanosis, clubbing, or significant edema, no varicose veins    Abdomen - No masses, tenderness, or organomegaly, no hepato-splenomegally, no bruits  Musculoskeletal - No significant edema, no kyphosis or scoliosis, no deformity in any extremity noted, muscle strength and tone are normal  Skin: no ulcer,no scar,no stasis dermatitis   Neurologic - alert oriented times 3,Cranial nerves II through XII are grossly intact. There were no gross focal neurologic abnormalities. All sensory and motor nerves examined and were normal  Psychiatric: normal mood and affect    Lab Results   Component Value Date    CKTOTAL 107 03/10/2013    CKMB 2.4 03/10/2013    TROPONINI 0.014 06/09/2014    TROPONINI 0.015 12/03/2011     BNP:    Lab Results   Component Value Date    BNP 17 03/09/2013     PT/INR:  No results found for: PTINR  Lab Results   Component Value Date    LABA1C 6.9 (H) 01/05/2020    LABA1C 6.6 (H) 07/31/2019     Lab Results   Component Value Date    CHOL 153 01/06/2020    TRIG 54 01/06/2020    HDL 43 01/06/2020    LDLDIRECT 111 (H) 01/06/2020     Lab Results   Component Value Date    ALT 14 01/07/2020    AST 12 (L) 01/07/2020     TSH:  No results found for: TSH    Impression:  Robyn Alex is a 62 y. o.year old who  has a past medical history of Atrial fibrillation ([facility] CAD (coronary artery disease), COPD (chronic obstructive pulmonary disease) ([facility] Diabetes mellitus ([facility] GERD (gastroesophageal reflux disease), H/O cardiovascular stress test, H/O cardiovascular stress test, H/O cardiovascular stress test, H/O Doppler ultrasound, H/O echocardiogram, H/O percutaneous left heart catheterization, Heart imaging, History of coronary artery stent placement, History of exercise stress test, History of Holter monitoring, History of nuclear stress test, Hyperlipidemia, Hypertension, S/P cardiac catheterization, and Status post angioplasty with stent. and presents with     Plan:  1. Shortness of breath: cxr ordered, recommend to get COVID testing  2. CAD:and shortness of breath.  Stress test showed depressed LVEF 38% he still smokes and had distal left main 50% STENOSIS, LCX 70% stenosis and diagonal branch 80%, h/o  RCA stent  For STEM continue statins, betablockers, EFFIENT, HIS STRESS TEST showed inferior wall

## 2020-05-15 ENCOUNTER — TELEPHONE (OUTPATIENT)
Dept: CARDIOLOGY CLINIC | Age: 60
End: 2020-05-15

## 2020-05-15 NOTE — TELEPHONE ENCOUNTER
Pt having chest pains has eased up some   Pain going up neck , sob    no arm or jaw pain   Please call pt and advise.

## 2020-05-22 ENCOUNTER — HOSPITAL ENCOUNTER (EMERGENCY)
Age: 60
Discharge: LEFT BEFORE DISCHARGE | End: 2020-05-22
Attending: EMERGENCY MEDICINE
Payer: COMMERCIAL

## 2020-05-22 ENCOUNTER — OFFICE VISIT (OUTPATIENT)
Dept: PRIMARY CARE CLINIC | Age: 60
End: 2020-05-22

## 2020-05-22 ENCOUNTER — APPOINTMENT (OUTPATIENT)
Dept: GENERAL RADIOLOGY | Age: 60
End: 2020-05-22
Payer: COMMERCIAL

## 2020-05-22 VITALS
RESPIRATION RATE: 22 BRPM | BODY MASS INDEX: 33.32 KG/M2 | HEIGHT: 75 IN | HEART RATE: 86 BPM | SYSTOLIC BLOOD PRESSURE: 184 MMHG | OXYGEN SATURATION: 96 % | DIASTOLIC BLOOD PRESSURE: 112 MMHG | WEIGHT: 268 LBS | TEMPERATURE: 98 F

## 2020-05-22 LAB
ALBUMIN SERPL-MCNC: 3.5 GM/DL (ref 3.4–5)
ALP BLD-CCNC: 102 IU/L (ref 40–128)
ALT SERPL-CCNC: 10 U/L (ref 10–40)
ANION GAP SERPL CALCULATED.3IONS-SCNC: 13 MMOL/L (ref 4–16)
AST SERPL-CCNC: 15 IU/L (ref 15–37)
BASOPHILS ABSOLUTE: 0.1 K/CU MM
BASOPHILS RELATIVE PERCENT: 1.3 % (ref 0–1)
BILIRUB SERPL-MCNC: 0.2 MG/DL (ref 0–1)
BUN BLDV-MCNC: 13 MG/DL (ref 6–23)
CALCIUM SERPL-MCNC: 9 MG/DL (ref 8.3–10.6)
CHLORIDE BLD-SCNC: 101 MMOL/L (ref 99–110)
CO2: 26 MMOL/L (ref 21–32)
CREAT SERPL-MCNC: 1.4 MG/DL (ref 0.9–1.3)
DIFFERENTIAL TYPE: ABNORMAL
EOSINOPHILS ABSOLUTE: 0.4 K/CU MM
EOSINOPHILS RELATIVE PERCENT: 4.3 % (ref 0–3)
GFR AFRICAN AMERICAN: >60 ML/MIN/1.73M2
GFR NON-AFRICAN AMERICAN: 52 ML/MIN/1.73M2
GLUCOSE BLD-MCNC: 263 MG/DL (ref 70–99)
HCT VFR BLD CALC: 40.9 % (ref 42–52)
HEMOGLOBIN: 13.6 GM/DL (ref 13.5–18)
IMMATURE NEUTROPHIL %: 0.3 % (ref 0–0.43)
LYMPHOCYTES ABSOLUTE: 2.3 K/CU MM
LYMPHOCYTES RELATIVE PERCENT: 24.1 % (ref 24–44)
MAGNESIUM: 1.9 MG/DL (ref 1.8–2.4)
MCH RBC QN AUTO: 30.7 PG (ref 27–31)
MCHC RBC AUTO-ENTMCNC: 33.3 % (ref 32–36)
MCV RBC AUTO: 92.3 FL (ref 78–100)
MONOCYTES ABSOLUTE: 0.7 K/CU MM
MONOCYTES RELATIVE PERCENT: 6.9 % (ref 0–4)
NUCLEATED RBC %: 0 %
PDW BLD-RTO: 14.4 % (ref 11.7–14.9)
PLATELET # BLD: 230 K/CU MM (ref 140–440)
PMV BLD AUTO: 11.4 FL (ref 7.5–11.1)
POTASSIUM SERPL-SCNC: 3.5 MMOL/L (ref 3.5–5.1)
PRO-BNP: 1697 PG/ML
RBC # BLD: 4.43 M/CU MM (ref 4.6–6.2)
SEGMENTED NEUTROPHILS ABSOLUTE COUNT: 6.1 K/CU MM
SEGMENTED NEUTROPHILS RELATIVE PERCENT: 63.1 % (ref 36–66)
SODIUM BLD-SCNC: 140 MMOL/L (ref 135–145)
TOTAL IMMATURE NEUTOROPHIL: 0.03 K/CU MM
TOTAL NUCLEATED RBC: 0 K/CU MM
TOTAL PROTEIN: 6.4 GM/DL (ref 6.4–8.2)
TROPONIN T: 0.01 NG/ML
WBC # BLD: 9.7 K/CU MM (ref 4–10.5)

## 2020-05-22 PROCEDURE — 84484 ASSAY OF TROPONIN QUANT: CPT

## 2020-05-22 PROCEDURE — 83735 ASSAY OF MAGNESIUM: CPT

## 2020-05-22 PROCEDURE — 85025 COMPLETE CBC W/AUTO DIFF WBC: CPT

## 2020-05-22 PROCEDURE — 93005 ELECTROCARDIOGRAM TRACING: CPT | Performed by: EMERGENCY MEDICINE

## 2020-05-22 PROCEDURE — 80053 COMPREHEN METABOLIC PANEL: CPT

## 2020-05-22 PROCEDURE — 99285 EMERGENCY DEPT VISIT HI MDM: CPT

## 2020-05-22 PROCEDURE — U0002 COVID-19 LAB TEST NON-CDC: HCPCS

## 2020-05-22 PROCEDURE — 71045 X-RAY EXAM CHEST 1 VIEW: CPT

## 2020-05-22 PROCEDURE — 83880 ASSAY OF NATRIURETIC PEPTIDE: CPT

## 2020-05-22 PROCEDURE — 93010 ELECTROCARDIOGRAM REPORT: CPT | Performed by: INTERNAL MEDICINE

## 2020-05-22 ASSESSMENT — ENCOUNTER SYMPTOMS
WHEEZING: 0
ABDOMINAL PAIN: 0
NAUSEA: 0
SORE THROAT: 0
BACK PAIN: 0
COUGH: 1
VOMITING: 0
SHORTNESS OF BREATH: 1
RHINORRHEA: 0
EYE REDNESS: 0

## 2020-05-22 ASSESSMENT — PAIN DESCRIPTION - PAIN TYPE: TYPE: ACUTE PAIN

## 2020-05-22 ASSESSMENT — PAIN SCALES - GENERAL: PAINLEVEL_OUTOF10: 7

## 2020-05-22 ASSESSMENT — PAIN DESCRIPTION - LOCATION: LOCATION: CHEST

## 2020-05-22 NOTE — ED PROVIDER NOTES
Blood Pressure Father     Stroke Father     Cancer Brother      Social History     Socioeconomic History    Marital status:      Spouse name: Not on file    Number of children: Not on file    Years of education: Not on file    Highest education level: Not on file   Occupational History    Occupation:    Social Needs    Financial resource strain: Not on file    Food insecurity     Worry: Not on file     Inability: Not on file    Transportation needs     Medical: Not on file     Non-medical: Not on file   Tobacco Use    Smoking status: Current Every Day Smoker     Packs/day: 1.50     Years: 45.00     Pack years: 67.50    Smokeless tobacco: Never Used   Substance and Sexual Activity    Alcohol use: No     Comment: caffeine 1 cup of coffee a day and at least 6 bottles of pop a day    Drug use: No    Sexual activity: Yes     Partners: Female   Lifestyle    Physical activity     Days per week: Not on file     Minutes per session: Not on file    Stress: Not on file   Relationships    Social connections     Talks on phone: Not on file     Gets together: Not on file     Attends [employer] service: Not on file     Active member of club or organization: Not on file     Attends meetings of clubs or organizations: Not on file     Relationship status: Not on file    Intimate partner violence     Fear of current or ex partner: Not on file     Emotionally abused: Not on file     Physically abused: Not on file     Forced sexual activity: Not on file   Other Topics Concern    Not on file   Social History Narrative    Not on file     No current facility-administered medications for this encounter.       Current Outpatient Medications   Medication Sig Dispense Refill    lisinopril (PRINIVIL;ZESTRIL) 5 MG tablet Take 2 tablets by mouth daily 60 tablet 0    apixaban (ELIQUIS) 5 MG TABS tablet Take 1 tablet by mouth 2 times daily 28 tablet 0    metFORMIN (GLUCOPHAGE) 500 MG tablet Take 1 tablet by for patient though it is rate controlled. Basic labs are obtained and show a slightly elevated creatinine level though this appears to be at patient's baseline. BNP is slightly elevated though improved from previous. Patient does not appear short of breath even though is complaining of this and has normal oxygen saturations on room air throughout his stay in the emergency department. Troponin is detectable, though only minimally so. He has not had a detectable troponin previously. As patient has contact with dual workers and has multiple core morbidities a COVID test was sent. Chest x-ray shows no acute abnormalities. No mediastinal widening. Equal and intact peripheral pulses. Pain is not tearing in quality or migratory. No neurologic deficits. I do not suspect an aortic dissection. Clinical history not consistent with a pulmonary embolism and he has few risk factors for pulmonary embolism. He is maintaining normal oxygen saturations on room air in the emergency department, I have a low suspicion that pulmonary embolism is causing patient symptoms. Heart Score = 5   0-3 Delta troponin and discharge  4-6 Observation chest pain protocol  7+ Admission to cardiology  History   Highly suspicious -- +2   Moderately suspicious -- +1   Slightly suspicious -- 0   EKG   Significant ST depression -- +2   Nonspecific repolarization disturbance -- +1   Normal -- 0   Age   ? 65 -- +2   45-65 -- +1   ? 45 -- 0  Risk Factors   Risk factors include:   Hypercholesterolemia   Hypertension   Diabetes Mellitus   Cigarette smoking   Positive family history   Obesity (BMI > 30)  ? 3 risk factors or history of atherosclerotic disease -- +2   1-2 risk factors -- +1   No risk factors known -- 0  Troponin   ?  3× normal limit -- +2   1-3× normal limit -- +1   ? normal limit -- 0  *The HEART Score is a prospectively studied scoring system to help emergency physicians risk-stratify chest pain patients who are at risk for all-cause mortality, myocardial infarction, or coronary revascularization in the next 6 weeks. Low risk patients have a score 0-3 and have a less than 2% risk of major event at 6 weeks. *    Given patient's her risk factors and elevated heart score, I recommended admission to the hospital for further cardiac evaluation. Patient does not want to stay and states he will follow-up with his cardiologist.  I offered patient a repeat troponin to at least confirm the troponin levels going down and not up but he does not want to stay for repeat testing either. I discussed the risks of this including heart attack, disability or even death. Patient understands these risk factors and would like to go home in spite of them. He will leave against medical advice. Patient advised that leaving the hospital is not recommended and could result in worsening injury, illness, or death. Patient expressed an understanding and was able to repeat my warning back to me. I believe that the patient fully understood the conversation and is capable of making this decision. I informed the patient that they are welcome to return to the ED for further evaluation if they choose to do so. I did don appropriate PPE (including N95 face mask, protective eye ware/safety glasses, gloves, hair covering, and - isolation gown), as recommended by the health facility/national standard best practice, during my bedside interactions with the patient. Clinical Impression:  1. Chest pain, unspecified type    2. Shortness of breath          Alondra Jimenez MD       Please note that portions of this note may have been complete with a voice recognition program.  Effortswere made to edit the dictations, but occasional words are mis-transcribed.           Alondra Jimenez MD  05/22/20 [id number]

## 2020-05-22 NOTE — ED TRIAGE NOTES
Pt states he came to the er this a.m. due to increased soa, per pt he is always soa but it got worse yesterday he could not lay down and sleep .  Skin w/d pink a/ox4 no distress

## 2020-05-23 LAB
SARS-COV-2: NOT DETECTED
SOURCE: NORMAL

## 2020-05-26 ENCOUNTER — OFFICE VISIT (OUTPATIENT)
Dept: CARDIOLOGY CLINIC | Age: 60
End: 2020-05-26
Payer: COMMERCIAL

## 2020-05-26 VITALS
HEART RATE: 84 BPM | BODY MASS INDEX: 32.67 KG/M2 | SYSTOLIC BLOOD PRESSURE: 130 MMHG | HEIGHT: 75 IN | WEIGHT: 262.8 LBS | DIASTOLIC BLOOD PRESSURE: 82 MMHG

## 2020-05-26 PROBLEM — I50.22 CHRONIC SYSTOLIC CONGESTIVE HEART FAILURE (HCC): Status: ACTIVE | Noted: 2020-05-26

## 2020-05-26 PROCEDURE — 99214 OFFICE O/P EST MOD 30 MIN: CPT | Performed by: NURSE PRACTITIONER

## 2020-05-26 RX ORDER — CHLORTHALIDONE 25 MG/1
TABLET ORAL
Qty: 90 TABLET | Refills: 2 | Status: SHIPPED | OUTPATIENT
Start: 2020-05-26 | End: 2020-06-05 | Stop reason: ALTCHOICE

## 2020-05-26 RX ORDER — CARVEDILOL 25 MG/1
25 TABLET ORAL 2 TIMES DAILY
Qty: 180 TABLET | Refills: 3 | Status: ON HOLD | OUTPATIENT
Start: 2020-05-26 | End: 2020-07-07 | Stop reason: HOSPADM

## 2020-05-26 RX ORDER — LISINOPRIL 5 MG/1
5 TABLET ORAL DAILY
Qty: 30 TABLET | Refills: 3 | Status: SHIPPED | OUTPATIENT
Start: 2020-05-26 | End: 2020-06-05 | Stop reason: ALTCHOICE

## 2020-05-26 RX ORDER — ISOSORBIDE MONONITRATE 30 MG/1
30 TABLET, EXTENDED RELEASE ORAL DAILY
Qty: 30 TABLET | Refills: 0 | Status: SHIPPED | OUTPATIENT
Start: 2020-05-26 | End: 2020-06-24 | Stop reason: SDUPTHER

## 2020-05-26 RX ORDER — AMLODIPINE BESYLATE 10 MG/1
10 TABLET ORAL DAILY
Qty: 30 TABLET | Refills: 0 | Status: SHIPPED | OUTPATIENT
Start: 2020-05-26 | End: 2020-06-24 | Stop reason: SDUPTHER

## 2020-05-26 NOTE — PROGRESS NOTES
[address] [facility],Third [address]  Phone: [phone]    Fax [phone]                  Mary Carnye MD, Cheri Easton MD, Michela Murray MD, Maryjane Hoops, MD Marney Libman, MD     [place], MD Joslyn Skiff, TREY Krause, TREY Soto, TREY Watson, APRCLEMENT    CARDIOLOGY  NOTE      5/26/2020    RE: Portia Boland  (1960)                               TO:  Dr. Maribel Kurtz MD  The primary cardiologist is Dr. Adalid Starkey     CC:   1. Longstanding persistent atrial fibrillation    2. Coronary artery disease of native heart with stable angina pectoris, unspecified vessel or lesion type ([facility]    3. Essential hypertension    4. Smoking    5. Chronic systolic congestive heart failure ([facility]    6. Type 2 diabetes mellitus without complication, without long-term current use of insulin ([facility]        Patient denies all of the following:  Chest Pain  Palpitations  Edema  Dizziness  Syncope      HPI: Thank you for involving me in taking care of your patient Portia Boland, who is a  62y.o. year old male with a history as listed above. Patient presents to the office for follow up on CAD, HTN, tobacco abuse, CHF, DM, A-fib, and hyperlipidemia. Shortness of breath has gotten worse over the last couple weeks. Patient was COVID tested which was negative. Patient has had multiple episodes in the last couple months of recurrent shortness of breath and chest pain. He reports being out of medications for a couple months. He had a relapse in April with cocaine abuse. Patient has multivessel CAD which is being monitored. Patient is an active male who does not walk regularly. Patient is not compliant with his medications.      Vitals:    05/26/20 1618   BP: 130/82   Pulse: 84       Current Outpatient Medications   Medication Sig Dispense Refill    isosorbide mononitrate (IMDUR) 30 MG extended release tablet Take 1 tablet by is normal with an ejection fraction of 50-55%. Mild concentric left ventricular hypertrophy. Grade I diastolic dysfunction. No significant valvular disease noted. No evidence of pericardial effusion. Essentially unremarkable echo.  H/O percutaneous left heart catheterization 12/22/15    No evidence of hemodynamically significant coronary artery disease    Heart imaging 04/23/2020    muga - ef 58%    History of coronary artery stent placement 11/13/2013 11/13 -RCA - 3 stents placed    History of exercise stress test 06/08/2017    treadmill    History of Holter monitoring 1/4/2016    predominant rhythm sinus    History of nuclear stress test 02/24/2020    EF 38%,Myocardial perfusion scan shows moderate size, severe intensity, non  reversible perfusion defect in inferior wall.  Hyperlipidemia     Hypertension     S/P cardiac catheterization 11/13/2013 11/13/2013-EF 55%, distal LAD mild disease,CX stent patent,but distal to stent 50% stenosis. RCA severe disease.  Status post angioplasty with stent      Past Surgical History:   Procedure Laterality Date    APPENDECTOMY      CARDIAC CATHETERIZATION  11/13/2013 11/13/2013-EF 55%, distal LAD mild disease,CX stent patent,but distal to stent 50% stenosis. RCA severe disease.     COLONOSCOPY      CORONARY ANGIOPLASTY WITH STENT PLACEMENT      4 stents- RCA X3; CX X1    CORONARY ANGIOPLASTY WITH STENT PLACEMENT  11/13/2013 11/13/2013 -3 stents placed to RCA- Dr Radha Zhang     Family History   Problem Relation Age of Onset    Cancer Mother     Diabetes Mother     Heart Disease Mother     High Blood Pressure Mother     Stroke Mother     Cancer Father     Heart Disease Father     High Blood Pressure Father     Stroke Father     Cancer Brother      Social History     Tobacco Use    Smoking status: Current Every Day Smoker     Packs/day: 1.50     Years: 45.00     Pack years: 67.50  Smokeless tobacco: Never Used   Substance Use Topics    Alcohol use: No     Comment: caffeine 1 cup of coffee a day and at least 6 bottles of pop a day        Review of Systems - History obtained from the patient  General: negative for - fatigue, malaise, night sweats, weight gain  Psychological: negative for - anxiety, depression, sleep disturbances  Ophthalmic: negative for - blurry vision, loss of vision  ENT: negative for - headaches, vertigo, visual changes  Hematological and Lymphatic: negative for - bleeding problems, blood clots, bruising, fatigue or pallor  Endocrine: negative for - malaise/lethargy, palpitations, unexpected weight changes  Respiratory: negative for - cough, orthopnea, shortness of breath or tachypnea  Cardiovascular: negative for - chest pain, dyspnea on exertion, edema or palpitations  Gastrointestinal: no abdominal pain, change in bowel habits, or black or bloody stools  Genito-Urinary: no dysuria, trouble voiding, or hematuria  Musculoskeletal: negative for - gait disturbance, pain, swelling    Neurological: negative for - confusion, dizziness, impaired coordination/balance or numbness/tingling    Objective:      Physical Exam:  /82   Pulse 84   Ht 6' 3\" (1.905 m)   Wt 262 lb 12.8 oz (119.2 kg)   BMI 32.85 kg/m²   Wt Readings from Last 3 Encounters:   05/26/20 262 lb 12.8 oz (119.2 kg)   05/22/20 268 lb (121.6 kg)   05/11/20 265 lb 12.8 oz (120.6 kg)     Body mass index is 32.85 kg/m². GENERAL - Alert, oriented, pleasant, in no apparent distress. Head unremarkable  Eyes - pupils equal and reactive to light - bilateral conjunctiva are pink: sclera are white   ENT - external ears intact, nose is intact:  tongue is midline pink and moist  Neck - Supple. No jugular venous distention noted. No carotid bruits appreciated. Cardiovascular - Normal S1 and S2:  murmur appreciated No, No gallop.  Regular rate- Yes,  rhythm regular-No.   Extremities - No cyanosis, clubbing, no edema to lower legs. Pulmonary - No respiratory distress. No wheezes or rales. Chest is clear  Pulses: Bilateral radial and pedal pulses normal  Abdomen -  Soft no tenderness, non distended   Musculoskeletal - Normal movement of all extremities   Neurologic - alert and oriented: There are no gross focal neurologic abnormalities. Skin-  No rash: No echymosis   Affect- normal mood and pleasant       DATA:  Lab Results   Component Value Date    CKTOTAL 107 03/10/2013    CKMB 2.4 03/10/2013    TROPONINI 0.014 06/09/2014    TROPONINI 0.015 12/03/2011     BNP:    Lab Results   Component Value Date    BNP 17 03/09/2013     PT/INR:  No results found for: PTINR  Lab Results   Component Value Date    LABA1C 6.9 (H) 01/05/2020    LABA1C 6.6 (H) 07/31/2019     Lab Results   Component Value Date    CHOL 153 01/06/2020    TRIG 54 01/06/2020    HDL 43 01/06/2020    LDLDIRECT 111 (H) 01/06/2020     Lab Results   Component Value Date    ALT 10 05/22/2020    AST 15 05/22/2020     TSH:  No results found for: TSH    Echo:1/7/2020  Left ventricular systolic function is abnormal.   Ejection fraction is visually estimated at 30 to 35 %. Moderate left ventricular hypertrophy. Cannot assess diastolic function due to AFib. Moderately dilated atrium bilaterally. No evidence of any pericardial effusion. Inferior vena cava is dilated, measuring at 2.8 cm, and does not collapse   with respiration. Cath: 7/30/19  PATIENT presenting with STEMI of RCA, s/p successful PCI of RCA   with LEOLA, HAS multivessel disease as described above. The ASCVD Risk score (Wellington Parikh, et al., 2013) failed to calculate for the following reasons: The patient has a prior MI or stroke diagnosis    Assessment/ Plan:     CAD (coronary artery disease)  -Patient unsure of medications. Cath in 7/30/19 PCI of RCA with multivessel disease. LMCA: Abnormal.50% distal Left main, LAD: Diffuse irregularity. patent LAD stent, diagonal branch has 80%

## 2020-05-26 NOTE — ASSESSMENT & PLAN NOTE
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 Being drug free would also be a requirement.     -Optimize medications at this time.

## 2020-05-26 NOTE — LETTER
Madison Maharaj Syed Townsendboris  1960  [id number]    Have you had any Chest Pain - No  If Yes DO EKG - How does it feel -   How long does the pain last -   How long have you been having the pain -    Did you take a    And did it relieve the pain -     Have you had any Shortness of Breath - Yes  If Yes - When on exertion    Have you had any dizziness - No  If Yes DO ORTHOSTATIC BP - when do you feel dizzy    How long does it last     Have you had any palpitations - No  If Yes DO EKG - Do you feel your heart   How long does it last -      Is the patient on any of the following medications -   If Yes DO EKG    Do you have any edema - swelling in     If Yes - CHECK TO SEE IF THE EDEMA IS PITTING  How long have they been having edema -   If Yes - Have they worn compression stockings     Check Venous \"LEG PROBLEM Questionnaire\"    Do you have a surgery or procedure scheduled in the near future - No  If Yes- DO EKG  If Yes - Who is the surgery with?    Phone number of physician   Fax number of physician   Type of surgery   BE SURE TO GIVE THIS INFORMATION to [facility]    Ask patient if they want to sign up for amaysimhart if they are not already signed up    Check to see if we have an E-MAIL on file for the patient    Check medication list thoroughly!!!  BE SURE TO ASK PATIENT IF THEY NEED MEDICATION REFILLS

## 2020-05-29 LAB
EKG DIAGNOSIS: NORMAL
EKG Q-T INTERVAL: 406 MS
EKG QRS DURATION: 102 MS
EKG QTC CALCULATION (BAZETT): 510 MS
EKG R AXIS: -17 DEGREES
EKG T AXIS: 103 DEGREES
EKG VENTRICULAR RATE: 95 BPM

## 2020-06-02 ENCOUNTER — HOSPITAL ENCOUNTER (OUTPATIENT)
Age: 60
Setting detail: OBSERVATION
Discharge: LEFT BEFORE DISCHARGE | End: 2020-06-02
Attending: EMERGENCY MEDICINE | Admitting: INTERNAL MEDICINE
Payer: COMMERCIAL

## 2020-06-02 ENCOUNTER — APPOINTMENT (OUTPATIENT)
Dept: GENERAL RADIOLOGY | Age: 60
End: 2020-06-02
Payer: COMMERCIAL

## 2020-06-02 ENCOUNTER — APPOINTMENT (OUTPATIENT)
Dept: CT IMAGING | Age: 60
End: 2020-06-02
Payer: COMMERCIAL

## 2020-06-02 VITALS
HEART RATE: 90 BPM | TEMPERATURE: 98.5 F | DIASTOLIC BLOOD PRESSURE: 115 MMHG | WEIGHT: 262 LBS | OXYGEN SATURATION: 100 % | RESPIRATION RATE: 13 BRPM | HEIGHT: 75 IN | BODY MASS INDEX: 32.58 KG/M2 | SYSTOLIC BLOOD PRESSURE: 162 MMHG

## 2020-06-02 PROBLEM — G45.9 TIA (TRANSIENT ISCHEMIC ATTACK): Status: ACTIVE | Noted: 2020-06-02

## 2020-06-02 LAB
ALBUMIN SERPL-MCNC: 4.4 GM/DL (ref 3.4–5)
ALP BLD-CCNC: 84 IU/L (ref 40–129)
ALT SERPL-CCNC: 11 U/L (ref 10–40)
ANION GAP SERPL CALCULATED.3IONS-SCNC: 11 MMOL/L (ref 4–16)
APTT: 39.2 SECONDS (ref 25.1–37.1)
AST SERPL-CCNC: 17 IU/L (ref 15–37)
BASOPHILS ABSOLUTE: 0.1 K/CU MM
BASOPHILS RELATIVE PERCENT: 1.1 % (ref 0–1)
BILIRUB SERPL-MCNC: 0.5 MG/DL (ref 0–1)
BUN BLDV-MCNC: 29 MG/DL (ref 6–23)
CALCIUM SERPL-MCNC: 9.5 MG/DL (ref 8.3–10.6)
CHLORIDE BLD-SCNC: 97 MMOL/L (ref 99–110)
CO2: 30 MMOL/L (ref 21–32)
CREAT SERPL-MCNC: 2.4 MG/DL (ref 0.9–1.3)
DIFFERENTIAL TYPE: ABNORMAL
EOSINOPHILS ABSOLUTE: 0.4 K/CU MM
EOSINOPHILS RELATIVE PERCENT: 3.1 % (ref 0–3)
GFR AFRICAN AMERICAN: 34 ML/MIN/1.73M2
GFR NON-AFRICAN AMERICAN: 28 ML/MIN/1.73M2
GLUCOSE BLD-MCNC: 191 MG/DL (ref 70–99)
GLUCOSE BLD-MCNC: 215 MG/DL (ref 70–99)
HCT VFR BLD CALC: 43.9 % (ref 42–52)
HEMOGLOBIN: 14.6 GM/DL (ref 13.5–18)
IMMATURE NEUTROPHIL %: 0.6 % (ref 0–0.43)
INR BLD: 1.2 INDEX
LYMPHOCYTES ABSOLUTE: 2.4 K/CU MM
LYMPHOCYTES RELATIVE PERCENT: 20.2 % (ref 24–44)
MAGNESIUM: 1.9 MG/DL (ref 1.8–2.4)
MCH RBC QN AUTO: 30.7 PG (ref 27–31)
MCHC RBC AUTO-ENTMCNC: 33.3 % (ref 32–36)
MCV RBC AUTO: 92.4 FL (ref 78–100)
MONOCYTES ABSOLUTE: 0.8 K/CU MM
MONOCYTES RELATIVE PERCENT: 6.7 % (ref 0–4)
NUCLEATED RBC %: 0 %
PDW BLD-RTO: 14 % (ref 11.7–14.9)
PLATELET # BLD: 260 K/CU MM (ref 140–440)
PMV BLD AUTO: 11.6 FL (ref 7.5–11.1)
POTASSIUM SERPL-SCNC: 4.2 MMOL/L (ref 3.5–5.1)
PRO-BNP: 1108 PG/ML
PROTHROMBIN TIME: 14.5 SECONDS (ref 11.7–14.5)
RBC # BLD: 4.75 M/CU MM (ref 4.6–6.2)
SEGMENTED NEUTROPHILS ABSOLUTE COUNT: 8.1 K/CU MM
SEGMENTED NEUTROPHILS RELATIVE PERCENT: 68.3 % (ref 36–66)
SODIUM BLD-SCNC: 138 MMOL/L (ref 135–145)
TOTAL CK: 61 IU/L (ref 38–174)
TOTAL IMMATURE NEUTOROPHIL: 0.07 K/CU MM
TOTAL NUCLEATED RBC: 0 K/CU MM
TOTAL PROTEIN: 7.6 GM/DL (ref 6.4–8.2)
TROPONIN T: 0.02 NG/ML
TROPONIN T: <0.01 NG/ML
TSH HIGH SENSITIVITY: 1.39 UIU/ML (ref 0.27–4.2)
WBC # BLD: 11.8 K/CU MM (ref 4–10.5)

## 2020-06-02 PROCEDURE — G0378 HOSPITAL OBSERVATION PER HR: HCPCS

## 2020-06-02 PROCEDURE — 71045 X-RAY EXAM CHEST 1 VIEW: CPT

## 2020-06-02 PROCEDURE — 85730 THROMBOPLASTIN TIME PARTIAL: CPT

## 2020-06-02 PROCEDURE — 6360000004 HC RX CONTRAST MEDICATION: Performed by: EMERGENCY MEDICINE

## 2020-06-02 PROCEDURE — 6370000000 HC RX 637 (ALT 250 FOR IP): Performed by: NURSE PRACTITIONER

## 2020-06-02 PROCEDURE — 85610 PROTHROMBIN TIME: CPT

## 2020-06-02 PROCEDURE — 82962 GLUCOSE BLOOD TEST: CPT

## 2020-06-02 PROCEDURE — 82550 ASSAY OF CK (CPK): CPT

## 2020-06-02 PROCEDURE — 70450 CT HEAD/BRAIN W/O DYE: CPT

## 2020-06-02 PROCEDURE — 84484 ASSAY OF TROPONIN QUANT: CPT

## 2020-06-02 PROCEDURE — 83735 ASSAY OF MAGNESIUM: CPT

## 2020-06-02 PROCEDURE — 99285 EMERGENCY DEPT VISIT HI MDM: CPT

## 2020-06-02 PROCEDURE — 83880 ASSAY OF NATRIURETIC PEPTIDE: CPT

## 2020-06-02 PROCEDURE — 84443 ASSAY THYROID STIM HORMONE: CPT

## 2020-06-02 PROCEDURE — 85025 COMPLETE CBC W/AUTO DIFF WBC: CPT

## 2020-06-02 PROCEDURE — 80053 COMPREHEN METABOLIC PANEL: CPT

## 2020-06-02 PROCEDURE — 93005 ELECTROCARDIOGRAM TRACING: CPT | Performed by: EMERGENCY MEDICINE

## 2020-06-02 PROCEDURE — 2580000003 HC RX 258: Performed by: NURSE PRACTITIONER

## 2020-06-02 PROCEDURE — 36415 COLL VENOUS BLD VENIPUNCTURE: CPT

## 2020-06-02 PROCEDURE — 70496 CT ANGIOGRAPHY HEAD: CPT

## 2020-06-02 RX ORDER — CARVEDILOL 25 MG/1
25 TABLET ORAL 2 TIMES DAILY
Status: DISCONTINUED | OUTPATIENT
Start: 2020-06-02 | End: 2020-06-02 | Stop reason: HOSPADM

## 2020-06-02 RX ORDER — ONDANSETRON 2 MG/ML
4 INJECTION INTRAMUSCULAR; INTRAVENOUS EVERY 6 HOURS PRN
Status: DISCONTINUED | OUTPATIENT
Start: 2020-06-02 | End: 2020-06-02 | Stop reason: HOSPADM

## 2020-06-02 RX ORDER — PANTOPRAZOLE SODIUM 40 MG/1
40 TABLET, DELAYED RELEASE ORAL
Status: DISCONTINUED | OUTPATIENT
Start: 2020-06-03 | End: 2020-06-02 | Stop reason: HOSPADM

## 2020-06-02 RX ORDER — GLIMEPIRIDE 2 MG/1
4 TABLET ORAL
Status: DISCONTINUED | OUTPATIENT
Start: 2020-06-03 | End: 2020-06-02 | Stop reason: HOSPADM

## 2020-06-02 RX ORDER — PROMETHAZINE HYDROCHLORIDE 25 MG/1
12.5 TABLET ORAL EVERY 6 HOURS PRN
Status: DISCONTINUED | OUTPATIENT
Start: 2020-06-02 | End: 2020-06-02 | Stop reason: HOSPADM

## 2020-06-02 RX ORDER — LANOLIN ALCOHOL/MO/W.PET/CERES
3 CREAM (GRAM) TOPICAL NIGHTLY PRN
Status: DISCONTINUED | OUTPATIENT
Start: 2020-06-02 | End: 2020-06-02 | Stop reason: HOSPADM

## 2020-06-02 RX ORDER — AMLODIPINE BESYLATE 10 MG/1
10 TABLET ORAL DAILY
Status: DISCONTINUED | OUTPATIENT
Start: 2020-06-02 | End: 2020-06-02 | Stop reason: HOSPADM

## 2020-06-02 RX ORDER — CHLORTHALIDONE 25 MG/1
25 TABLET ORAL DAILY
Status: DISCONTINUED | OUTPATIENT
Start: 2020-06-02 | End: 2020-06-02 | Stop reason: HOSPADM

## 2020-06-02 RX ORDER — DEXTROSE MONOHYDRATE 50 MG/ML
100 INJECTION, SOLUTION INTRAVENOUS PRN
Status: DISCONTINUED | OUTPATIENT
Start: 2020-06-02 | End: 2020-06-02 | Stop reason: HOSPADM

## 2020-06-02 RX ORDER — LABETALOL 20 MG/4 ML (5 MG/ML) INTRAVENOUS SYRINGE
10 EVERY 10 MIN PRN
Status: DISCONTINUED | OUTPATIENT
Start: 2020-06-02 | End: 2020-06-02 | Stop reason: HOSPADM

## 2020-06-02 RX ORDER — ISOSORBIDE MONONITRATE 30 MG/1
30 TABLET, EXTENDED RELEASE ORAL DAILY
Status: DISCONTINUED | OUTPATIENT
Start: 2020-06-02 | End: 2020-06-02 | Stop reason: HOSPADM

## 2020-06-02 RX ORDER — NITROGLYCERIN 0.4 MG/1
0.4 TABLET SUBLINGUAL EVERY 5 MIN PRN
Status: DISCONTINUED | OUTPATIENT
Start: 2020-06-02 | End: 2020-06-02 | Stop reason: HOSPADM

## 2020-06-02 RX ORDER — NICOTINE POLACRILEX 4 MG
15 LOZENGE BUCCAL PRN
Status: DISCONTINUED | OUTPATIENT
Start: 2020-06-02 | End: 2020-06-02 | Stop reason: HOSPADM

## 2020-06-02 RX ORDER — ATORVASTATIN CALCIUM 40 MG/1
80 TABLET, FILM COATED ORAL NIGHTLY
Status: DISCONTINUED | OUTPATIENT
Start: 2020-06-02 | End: 2020-06-02 | Stop reason: HOSPADM

## 2020-06-02 RX ORDER — POLYETHYLENE GLYCOL 3350 17 G/17G
17 POWDER, FOR SOLUTION ORAL DAILY PRN
Status: DISCONTINUED | OUTPATIENT
Start: 2020-06-02 | End: 2020-06-02 | Stop reason: HOSPADM

## 2020-06-02 RX ORDER — SODIUM CHLORIDE 0.9 % (FLUSH) 0.9 %
10 SYRINGE (ML) INJECTION EVERY 12 HOURS SCHEDULED
Status: DISCONTINUED | OUTPATIENT
Start: 2020-06-02 | End: 2020-06-02 | Stop reason: HOSPADM

## 2020-06-02 RX ORDER — SODIUM CHLORIDE 0.9 % (FLUSH) 0.9 %
10 SYRINGE (ML) INJECTION PRN
Status: DISCONTINUED | OUTPATIENT
Start: 2020-06-02 | End: 2020-06-02 | Stop reason: HOSPADM

## 2020-06-02 RX ORDER — DEXTROSE MONOHYDRATE 25 G/50ML
12.5 INJECTION, SOLUTION INTRAVENOUS PRN
Status: DISCONTINUED | OUTPATIENT
Start: 2020-06-02 | End: 2020-06-02 | Stop reason: HOSPADM

## 2020-06-02 RX ORDER — NICOTINE 21 MG/24HR
1 PATCH, TRANSDERMAL 24 HOURS TRANSDERMAL DAILY
Status: DISCONTINUED | OUTPATIENT
Start: 2020-06-02 | End: 2020-06-02 | Stop reason: HOSPADM

## 2020-06-02 RX ORDER — ASPIRIN 81 MG/1
81 TABLET, CHEWABLE ORAL DAILY
Status: DISCONTINUED | OUTPATIENT
Start: 2020-06-02 | End: 2020-06-02 | Stop reason: HOSPADM

## 2020-06-02 RX ORDER — SODIUM CHLORIDE 9 MG/ML
INJECTION, SOLUTION INTRAVENOUS CONTINUOUS
Status: DISCONTINUED | OUTPATIENT
Start: 2020-06-02 | End: 2020-06-02 | Stop reason: HOSPADM

## 2020-06-02 RX ADMIN — SODIUM CHLORIDE: 9 INJECTION, SOLUTION INTRAVENOUS at 18:42

## 2020-06-02 RX ADMIN — IOPAMIDOL 80 ML: 755 INJECTION, SOLUTION INTRAVENOUS at 13:49

## 2020-06-02 ASSESSMENT — ENCOUNTER SYMPTOMS
EYES NEGATIVE: 1
RESPIRATORY NEGATIVE: 1
ALLERGIC/IMMUNOLOGIC NEGATIVE: 1
GASTROINTESTINAL NEGATIVE: 1

## 2020-06-02 NOTE — ED NOTES
Spoke with Domenica RILEY with the hospitalist group. States that she has not seen the pt as yet. States that she does not want to come discuss options with the pt as she does not wish to charge pt with hospitalist services if the pt is going to leave AMA. Niles Araya RN  06/02/20 0088  Dr. Gerson Sarmiento notified.      Niles Araya RN  06/02/20 [id number]

## 2020-06-02 NOTE — H&P
Years of education: None    Highest education level: None   Occupational History    Occupation:    Social Needs    Financial resource strain: None    Food insecurity     Worry: None     Inability: None    Transportation needs     Medical: None     Non-medical: None   Tobacco Use    Smoking status: Current Every Day Smoker     Packs/day: 1.50     Years: 45.00     Pack years: 67.50    Smokeless tobacco: Never Used   Substance and Sexual Activity    Alcohol use: No     Comment: caffeine 1 cup of coffee a day and at least 6 bottles of pop a day    Drug use: Yes     Types: Cocaine     Comment: Used last two weeks ago.  Sexual activity: Yes     Partners: Female   Lifestyle    Physical activity     Days per week: None     Minutes per session: None    Stress: None   Relationships    Social connections     Talks on phone: None     Gets together: None     Attends Gnosticist service: None     Active member of club or organization: None     Attends meetings of clubs or organizations: None     Relationship status: None    Intimate partner violence     Fear of current or ex partner: None     Emotionally abused: None     Physically abused: None     Forced sexual activity: None   Other Topics Concern    None   Social History Narrative    None     TOBACCO:   reports that he has been smoking. He has a 67.50 pack-year smoking history. He has never used smokeless tobacco.  ETOH:   reports no history of alcohol use. Drugs:  reports current drug use. Drug: Cocaine. Allergies: No Known Allergies  Medications:   Medications:    nicotine  1 patch Transdermal Daily      Infusions:   PRN Meds:    Prior to Admission Meds:  Prior to Admission medications    Medication Sig Start Date End Date Taking? Authorizing Provider   isosorbide mononitrate (IMDUR) 30 MG extended release tablet Take 1 tablet by mouth daily 5/26/20 6/25/20  Julio Snyder, APRN - CNP   lisinopril (PRINIVIL;ZESTRIL) 5 MG tablet Take 1 stroke alert/L sided weakness Acuity: Acute Type of Exam: Initial FINDINGS: The lungs are without acute focal process. There is no effusion or pneumothorax. The cardiomediastinal silhouette is stable. The osseous structures are stable. No acute process. Xr Chest Portable    Result Date: 5/22/2020  EXAMINATION: ONE XRAY VIEW OF THE CHEST 5/22/2020 2:46 am COMPARISON: 05/11/2020 HISTORY: Acute chest pain and shortness of breath. FINDINGS: Cardiomediastinal silhouette and pulmonary vasculature are normal. No consolidation, pleural effusion, or pneumothorax. No acute abnormality. Cta Head Neck W Contrast    Result Date: 6/2/2020  EXAMINATION: CTA OF THE HEAD AND NECK WITH CONTRAST 6/2/2020 1:29 pm: TECHNIQUE: CTA of the head and neck was performed with the administration of intravenous contrast. Multiplanar reformatted images are provided for review. MIP images are provided for review. Stenosis of the internal carotid arteries measured using NASCET criteria. Dose modulation, iterative reconstruction, and/or weight based adjustment of the mA/kV was utilized to reduce the radiation dose to as low as reasonably achievable. COMPARISON: None. HISTORY: ORDERING SYSTEM PROVIDED HISTORY: stroke TECHNOLOGIST PROVIDED HISTORY: Reason for exam:->stroke Reason for Exam: stroke Acuity: Unknown Type of Exam: Unknown FINDINGS: CTA NECK: AORTIC ARCH/ARCH VESSELS: No dissection or arterial injury. No significant stenosis of the brachiocephalic or subclavian arteries. CAROTID ARTERIES: No dissection, arterial injury, or hemodynamically significant stenosis by NASCET criteria. VERTEBRAL ARTERIES: No dissection, arterial injury, or significant stenosis. SOFT TISSUES: The lung apices are clear. No cervical or superior mediastinal lymphadenopathy. The larynx and pharynx are unremarkable. No acute abnormality of the salivary and thyroid glands. BONES: No acute osseous abnormality.   There is moderate cervical spondylosis

## 2020-06-02 NOTE — ED NOTES
Pt now agreeable to admission. [facility] notified.      Radha Lee RN  06/02/20 [id number]

## 2020-06-02 NOTE — CODE DOCUMENTATION
Pt presents to the ED with complaint of Left Sided Numbness, Weakness, Left Sided Facial Droop, and Slurred Speech. Pt reports LKW at 0900 today.

## 2020-06-02 NOTE — ED PROVIDER NOTES
[facility]      TRIAGE CHIEF COMPLAINT:   Aphasia and Numbness (Left Sided)      [place]:  Maranda Sanchez is a 62 y.o. male that presents with a complaint of left-sided weakness numbness difficulty talking. Patient feels like he may have Bell's palsy symptoms started 9 AM.  He has a history of CAD is on Eliquis denies history of stroke. On arrival patient has slurred speech left-sided paresthesias stroke alert called on arrival.  Patient taken to CT scan he denies any headache chest pain shortness of breath nausea vomiting dental pain new back pain. No other questions or concerns. No vision changes    REVIEW OF SYSTEMS:  At least 10 systems reviewed and otherwise acutely negative except as in the [address] Review of Systems   Constitutional: Negative. HENT: Negative. Eyes: Negative. Respiratory: Negative. Cardiovascular: Negative. Gastrointestinal: Negative. Endocrine: Negative. Genitourinary: Negative. Musculoskeletal: Negative. Skin: Negative. Allergic/Immunologic: Negative. Neurological: Positive for facial asymmetry, speech difficulty, weakness and numbness. Hematological: Negative. Psychiatric/Behavioral: Negative. All other systems reviewed and are negative. Past Medical History:   Diagnosis Date    Atrial fibrillation ([facility] 11/2013    CAD (coronary artery disease)     COPD (chronic obstructive pulmonary disease) ([facility]     Diabetes mellitus ([facility]     GERD (gastroesophageal reflux disease)     H/O cardiovascular stress test 11/20/13, 3/20/2013    11/13-Observed defect is consistent with diaphragmatic attenuation. EF 58%. 3/13 -EF 51%. No ischemia. Normal Study.  H/O cardiovascular stress test 06/08/2017    EF 50% normal study    H/O cardiovascular stress test 06/17/2019    Normal NM    H/O Doppler ultrasound 10/14/2010    10/2010-Bilateral venous doppler of lower extremies- No DVTs.     H/O echocardiogram 06/26/2019    Left Number of children: Not on file    Years of education: Not on file    Highest education level: Not on file   Occupational History    Occupation:    Social Needs    Financial resource strain: Not on file    Food insecurity     Worry: Not on file     Inability: Not on file   [employer] needs     Medical: Not on file     Non-medical: Not on file   Tobacco Use    Smoking status: Current Every Day Smoker     Packs/day: 1.50     Years: 45.00     Pack years: 67.50    Smokeless tobacco: Never Used   Substance and Sexual Activity    Alcohol use: No     Comment: caffeine 1 cup of coffee a day and at least 6 bottles of pop a day    Drug use: Yes     Types: Cocaine     Comment: Used last two weeks ago.  Sexual activity: Yes     Partners: Female   Lifestyle    Physical activity     Days per week: Not on file     Minutes per session: Not on file    Stress: Not on file   Relationships    Social connections     Talks on phone: Not on file     Gets together: Not on file     Attends Taoism service: Not on file     Active member of club or organization: Not on file     Attends meetings of clubs or organizations: Not on file     Relationship status: Not on file    Intimate partner violence     Fear of current or ex partner: Not on file     Emotionally abused: Not on file     Physically abused: Not on file     Forced sexual activity: Not on file   Other Topics Concern    Not on file   Social History Narrative    Not on file     No current facility-administered medications for this encounter.       Current Outpatient Medications   Medication Sig Dispense Refill    isosorbide mononitrate (IMDUR) 30 MG extended release tablet Take 1 tablet by mouth daily 30 tablet 0    lisinopril (PRINIVIL;ZESTRIL) 5 MG tablet Take 1 tablet by mouth daily 30 tablet 3    amLODIPine (NORVASC) 10 MG tablet Take 1 tablet by mouth daily 30 tablet 0    chlorthalidone (HYGROTON) 25 MG tablet TAKE 1 TABLET BY MOUTH ONE tablet up to max of 3 total doses. If no relief after 1 dose, call 911. 20 tablet 0    glimepiride (AMARYL) 4 MG tablet Take 1 tablet by mouth every morning (before breakfast) 30 tablet 0    aspirin 81 MG chewable tablet Take 1 tablet by mouth daily 30 tablet 0       Nursing Notes Reviewed    VITAL SIGNS:  ED Triage Vitals   Enc Vitals Group      BP 06/02/20 1314 (!) 126/96      Pulse 06/02/20 1314 77      Resp 06/02/20 1314 12      Temp --       Temp src --       SpO2 06/02/20 1314 97 %      Weight 06/02/20 1317 262 lb (118.8 kg)      Height 06/02/20 1317 6' 3\" (1.905 m)      Head Circumference --       Peak Flow --       Pain Score --       Pain Loc --       Pain Edu? --       Excl. in [address]? --        PHYSICAL EXAM:  Physical Exam  Vitals signs and nursing note reviewed. Constitutional:       General: He is not in acute distress. Appearance: Normal appearance. He is well-developed and well-groomed. He is not ill-appearing, toxic-appearing or diaphoretic. HENT:      Head: Normocephalic and atraumatic. Right Ear: External ear normal.      Left Ear: External ear normal.      Mouth/Throat:      Mouth: Mucous membranes are moist.      Pharynx: No oropharyngeal exudate or posterior oropharyngeal erythema. Eyes:      General: No scleral icterus. Right eye: No discharge. Left eye: No discharge. Extraocular Movements: Extraocular movements intact. Conjunctiva/sclera: Conjunctivae normal.   Neck:      Musculoskeletal: Full passive range of motion without pain and normal range of motion. Normal range of motion. No edema, erythema, neck rigidity, crepitus, injury or pain with movement. Vascular: No JVD. Cardiovascular:      Rate and Rhythm: Normal rate and regular rhythm. Pulses: Normal pulses. Heart sounds: Normal heart sounds. No murmur. No friction rub. No gallop. Pulmonary:      Effort: Pulmonary effort is normal. No respiratory distress. Breath sounds:  No medications on file          [employer], [facility],   06/02/20 [id number]

## 2020-06-03 ENCOUNTER — OFFICE VISIT (OUTPATIENT)
Dept: CARDIOLOGY CLINIC | Age: 60
End: 2020-06-03
Payer: COMMERCIAL

## 2020-06-03 VITALS
BODY MASS INDEX: 32.38 KG/M2 | HEART RATE: 76 BPM | SYSTOLIC BLOOD PRESSURE: 118 MMHG | WEIGHT: 260.4 LBS | DIASTOLIC BLOOD PRESSURE: 82 MMHG | HEIGHT: 75 IN

## 2020-06-03 PROCEDURE — 99214 OFFICE O/P EST MOD 30 MIN: CPT | Performed by: NURSE PRACTITIONER

## 2020-06-03 PROCEDURE — 93010 ELECTROCARDIOGRAM REPORT: CPT | Performed by: INTERNAL MEDICINE

## 2020-06-03 ASSESSMENT — ENCOUNTER SYMPTOMS
PHOTOPHOBIA: 0
SHORTNESS OF BREATH: 1
ABDOMINAL PAIN: 0
EYE DISCHARGE: 0
BLOATING: 0
COUGH: 0

## 2020-06-03 NOTE — PROGRESS NOTES
percutaneous left heart catheterization 12/22/15    No evidence of hemodynamically significant coronary artery disease    Heart imaging 04/23/2020    muga - ef 58%    History of coronary artery stent placement 11/13/2013 11/13 -RCA - 3 stents placed    History of exercise stress test 06/08/2017    treadmill    History of Holter monitoring 1/4/2016    predominant rhythm sinus    History of nuclear stress test 02/24/2020    EF 38%,Myocardial perfusion scan shows moderate size, severe intensity, non  reversible perfusion defect in inferior wall.  Hyperlipidemia     Hypertension     S/P cardiac catheterization 11/13/2013 11/13/2013-EF 55%, distal LAD mild disease,CX stent patent,but distal to stent 50% stenosis. RCA severe disease.  Status post angioplasty with stent      Past Surgical History:   Procedure Laterality Date    APPENDECTOMY      CARDIAC CATHETERIZATION  11/13/2013 11/13/2013-EF 55%, distal LAD mild disease,CX stent patent,but distal to stent 50% stenosis. RCA severe disease.  COLONOSCOPY      CORONARY ANGIOPLASTY WITH STENT PLACEMENT      4 stents- RCA X3; CX X1    CORONARY ANGIOPLASTY WITH STENT PLACEMENT  11/13/2013 11/13/2013 -3 stents placed to RCA- Dr Georges Lutz     Family History   Problem Relation Age of Onset    Cancer Mother     Diabetes Mother     Heart Disease Mother     High Blood Pressure Mother     Stroke Mother     Cancer Father     Heart Disease Father     High Blood Pressure Father     Stroke Father     Cancer Brother      Social History     Tobacco Use    Smoking status: Current Every Day Smoker     Packs/day: 1.50     Years: 45.00     Pack years: 67.50    Smokeless tobacco: Never Used   Substance Use Topics    Alcohol use: No     Comment: caffeine 1 cup of coffee a day and at least 6 bottles of pop a day        Review of Systems   Constitution: Negative for diaphoresis and fever. with symptoms of stroke  He refused admission  CT negative for acute CVA  He did not stay for MRI  Discussed importance of follow up with patient regarding CVA- he declines going back to ED at this time states he does not like hospitals   He continues with subtle  left facial droop; His speech is slow-  MRI without contrast ordered :  Bun 29  Creatinine 2.4  GFR 28  Request neurology consult - order placed - Dr Aleks Lamb, CLOV[facility] L,     Atrial fib   Irregular rate and rhythm   He is  on anticoagulation  He is to continue anti rhythmic / rate control medications  Consider ISMAEL to r/o cardio-embolic source      CAD    H/o: pci  stable  Had two RX bottles of coreg - 25 mg and 3.125- was taking both - to take 25 mg coreg bid  Will continue with Imdur and asa    HTN    Controlled   Had two bottle of lisinopril  5 mg and 20 mg tablets- states he has been taking both  Will hold the 20 mg dose as creatinine is elevated- continue with 5 mg lisinopril   Creatinine is elevated  Will recheck BP in 1 months to reassess   Keep well hydrated    Hyperlipidemia  At or near goal Yes    He is to continue current medications    illicit drug use  Admits to using cocaine about 2 weeks ago  [place] given     To remain off work until at least Monday         Lifestyle and risk factor modificatons discussed. Various goals are discussed and questions answered. Continue current medications. Appropriate prescriptions are addressed. Questions answered and patient verbalizes understanding. Call for any problems, questions, or concerns.   OV

## 2020-06-03 NOTE — LETTER
[address] [address]  [address] [address]  Phone: [phone]  Fax: [phone]    TREY Welch CNP        Yesica 3, 2020     Patient: Portia Boland   YOB: 1960   Date of Visit: 6/3/2020       To Whom It May Concern: It is my medical opinion that Rachel Duncan may return to work on 06/08/2020. If you have any questions or concerns, please don't hesitate to call.     Sincerely,        TREY Welch CNP

## 2020-06-03 NOTE — LETTER
CLINICAL STAFF DOCUMENTATION    Vaishnavi Valles  1960  [id number]    Have you had any Chest Pain - No    Have you had any Shortness of Breath - Yes  If Yes - When at rest and on exertion     Have you had any dizziness- No   Have you had any palpitations - No    Do you have any edema - No  Do you have a surgery or procedure scheduled in the near future - No

## 2020-06-04 ENCOUNTER — TELEPHONE (OUTPATIENT)
Dept: CARDIOLOGY CLINIC | Age: 60
End: 2020-06-04

## 2020-06-04 ENCOUNTER — HOSPITAL ENCOUNTER (OUTPATIENT)
Dept: MRI IMAGING | Age: 60
Discharge: HOME | End: 2020-06-04
Payer: COMMERCIAL

## 2020-06-04 LAB
GFR AFRICAN AMERICAN: 31 ML/MIN/1.73M2
GFR NON-AFRICAN AMERICAN: 25 ML/MIN/1.73M2
POC CREATININE: 2.6 MG/DL (ref 0.9–1.3)

## 2020-06-04 PROCEDURE — 70551 MRI BRAIN STEM W/O DYE: CPT

## 2020-06-04 RX ORDER — CYCLOBENZAPRINE HCL 10 MG
TABLET ORAL
Status: ON HOLD | COMMUNITY
Start: 2020-05-28 | End: 2020-07-07 | Stop reason: HOSPADM

## 2020-06-05 PROBLEM — N18.30 CHRONIC KIDNEY DISEASE, STAGE III (MODERATE) (HCC): Status: ACTIVE | Noted: 2020-06-05

## 2020-06-09 LAB
EKG ATRIAL RATE: 394 BPM
EKG DIAGNOSIS: NORMAL
EKG Q-T INTERVAL: 428 MS
EKG QRS DURATION: 108 MS
EKG QTC CALCULATION (BAZETT): 481 MS
EKG R AXIS: -17 DEGREES
EKG T AXIS: 31 DEGREES
EKG VENTRICULAR RATE: 76 BPM

## 2020-06-24 RX ORDER — AMLODIPINE BESYLATE 10 MG/1
10 TABLET ORAL DAILY
Qty: 30 TABLET | Refills: 11 | Status: SHIPPED | OUTPATIENT
Start: 2020-06-24 | End: 2020-07-23

## 2020-06-24 RX ORDER — ISOSORBIDE MONONITRATE 30 MG/1
30 TABLET, EXTENDED RELEASE ORAL DAILY
Qty: 30 TABLET | Refills: 11 | Status: ON HOLD | OUTPATIENT
Start: 2020-06-24 | End: 2020-07-07 | Stop reason: HOSPADM

## 2020-06-29 ENCOUNTER — APPOINTMENT (OUTPATIENT)
Dept: ULTRASOUND IMAGING | Age: 60
DRG: 229 | End: 2020-06-29
Payer: COMMERCIAL

## 2020-06-29 ENCOUNTER — HOSPITAL ENCOUNTER (INPATIENT)
Age: 60
LOS: 8 days | Discharge: HOME HEALTH | DRG: 229 | End: 2020-07-07
Attending: INTERNAL MEDICINE | Admitting: INTERNAL MEDICINE
Payer: COMMERCIAL

## 2020-06-29 ENCOUNTER — APPOINTMENT (OUTPATIENT)
Dept: GENERAL RADIOLOGY | Age: 60
DRG: 229 | End: 2020-06-29
Payer: COMMERCIAL

## 2020-06-29 LAB
ALBUMIN SERPL-MCNC: 4 GM/DL (ref 3.4–5)
ALP BLD-CCNC: 136 IU/L (ref 40–128)
ALT SERPL-CCNC: 10 U/L (ref 10–40)
ANION GAP SERPL CALCULATED.3IONS-SCNC: 10 MMOL/L (ref 4–16)
AST SERPL-CCNC: 17 IU/L (ref 15–37)
BACTERIA: NEGATIVE /HPF
BASE EXCESS MIXED: 2.8 (ref 0–1.2)
BASOPHILS ABSOLUTE: 0.1 K/CU MM
BASOPHILS RELATIVE PERCENT: 1.4 % (ref 0–1)
BILIRUB SERPL-MCNC: 0.2 MG/DL (ref 0–1)
BILIRUBIN URINE: NEGATIVE MG/DL
BLOOD, URINE: NEGATIVE
BUN BLDV-MCNC: 27 MG/DL (ref 6–23)
CALCIUM SERPL-MCNC: 9.8 MG/DL (ref 8.3–10.6)
CARBON MONOXIDE, BLOOD: 3.6 % (ref 0–5)
CHLORIDE BLD-SCNC: 101 MMOL/L (ref 99–110)
CLARITY: CLEAR
CO2 CONTENT: 28.4 MMOL/L (ref 19–24)
CO2: 27 MMOL/L (ref 21–32)
COLOR: YELLOW
COMMENT: ABNORMAL
CREAT SERPL-MCNC: 1.5 MG/DL (ref 0.9–1.3)
DIFFERENTIAL TYPE: ABNORMAL
EOSINOPHILS ABSOLUTE: 0.4 K/CU MM
EOSINOPHILS RELATIVE PERCENT: 4.2 % (ref 0–3)
ESTIMATED AVERAGE GLUCOSE: 209 MG/DL
GFR AFRICAN AMERICAN: 58 ML/MIN/1.73M2
GFR NON-AFRICAN AMERICAN: 48 ML/MIN/1.73M2
GLUCOSE BLD-MCNC: 221 MG/DL (ref 70–99)
GLUCOSE BLD-MCNC: 233 MG/DL (ref 70–99)
GLUCOSE BLD-MCNC: 238 MG/DL (ref 70–99)
GLUCOSE BLD-MCNC: 303 MG/DL (ref 70–99)
GLUCOSE BLD-MCNC: 353 MG/DL (ref 70–99)
GLUCOSE, URINE: >500 MG/DL
HBA1C MFR BLD: 8.9 % (ref 4.2–6.3)
HCO3 ARTERIAL: 27.2 MMOL/L (ref 18–23)
HCT VFR BLD CALC: 42.8 % (ref 42–52)
HEMOGLOBIN: 14.6 GM/DL (ref 13.5–18)
IMMATURE NEUTROPHIL %: 0.3 % (ref 0–0.43)
KETONES, URINE: NEGATIVE MG/DL
LEUKOCYTE ESTERASE, URINE: NEGATIVE
LV EF: 38 %
LV EF: 43 %
LVEF MODALITY: NORMAL
LVEF MODALITY: NORMAL
LYMPHOCYTES ABSOLUTE: 2.7 K/CU MM
LYMPHOCYTES RELATIVE PERCENT: 28.3 % (ref 24–44)
MCH RBC QN AUTO: 30.7 PG (ref 27–31)
MCHC RBC AUTO-ENTMCNC: 34.1 % (ref 32–36)
MCV RBC AUTO: 90.1 FL (ref 78–100)
METHEMOGLOBIN ARTERIAL: 0.8 %
MONOCYTES ABSOLUTE: 0.6 K/CU MM
MONOCYTES RELATIVE PERCENT: 6.5 % (ref 0–4)
NITRITE URINE, QUANTITATIVE: NEGATIVE
NUCLEATED RBC %: 0 %
O2 SATURATION: 92.4 % (ref 96–97)
PCO2 ARTERIAL: 40 MMHG (ref 32–45)
PDW BLD-RTO: 13.2 % (ref 11.7–14.9)
PH BLOOD: 7.44 (ref 7.34–7.45)
PH, URINE: 5 (ref 5–8)
PLATELET # BLD: 229 K/CU MM (ref 140–440)
PMV BLD AUTO: 11.6 FL (ref 7.5–11.1)
PO2 ARTERIAL: 67 MMHG (ref 75–100)
POTASSIUM SERPL-SCNC: 3.4 MMOL/L (ref 3.5–5.1)
PROTEIN UA: 30 MG/DL
RBC # BLD: 4.75 M/CU MM (ref 4.6–6.2)
RBC URINE: ABNORMAL /HPF (ref 0–3)
SEGMENTED NEUTROPHILS ABSOLUTE COUNT: 5.6 K/CU MM
SEGMENTED NEUTROPHILS RELATIVE PERCENT: 59.3 % (ref 36–66)
SODIUM BLD-SCNC: 138 MMOL/L (ref 135–145)
SPECIFIC GRAVITY UA: 1.03 (ref 1–1.03)
SQUAMOUS EPITHELIAL: <1 /HPF
TOTAL IMMATURE NEUTOROPHIL: 0.03 K/CU MM
TOTAL NUCLEATED RBC: 0 K/CU MM
TOTAL PROTEIN: 7.7 GM/DL (ref 6.4–8.2)
TRICHOMONAS: ABNORMAL /HPF
TROPONIN T: 0.03 NG/ML
TROPONIN T: <0.01 NG/ML
TROPONIN T: <0.01 NG/ML
UROBILINOGEN, URINE: NORMAL MG/DL (ref 0.2–1)
WBC # BLD: 9.5 K/CU MM (ref 4–10.5)
WBC UA: <1 /HPF (ref 0–2)

## 2020-06-29 PROCEDURE — 85025 COMPLETE CBC W/AUTO DIFF WBC: CPT

## 2020-06-29 PROCEDURE — C1894 INTRO/SHEATH, NON-LASER: HCPCS

## 2020-06-29 PROCEDURE — 99285 EMERGENCY DEPT VISIT HI MDM: CPT

## 2020-06-29 PROCEDURE — 83735 ASSAY OF MAGNESIUM: CPT

## 2020-06-29 PROCEDURE — 93010 ELECTROCARDIOGRAM REPORT: CPT | Performed by: INTERNAL MEDICINE

## 2020-06-29 PROCEDURE — 83036 HEMOGLOBIN GLYCOSYLATED A1C: CPT

## 2020-06-29 PROCEDURE — 93971 EXTREMITY STUDY: CPT

## 2020-06-29 PROCEDURE — 81001 URINALYSIS AUTO W/SCOPE: CPT

## 2020-06-29 PROCEDURE — 93880 EXTRACRANIAL BILAT STUDY: CPT

## 2020-06-29 PROCEDURE — 86901 BLOOD TYPING SEROLOGIC RH(D): CPT

## 2020-06-29 PROCEDURE — 93005 ELECTROCARDIOGRAM TRACING: CPT | Performed by: EMERGENCY MEDICINE

## 2020-06-29 PROCEDURE — 94010 BREATHING CAPACITY TEST: CPT

## 2020-06-29 PROCEDURE — 2500000003 HC RX 250 WO HCPCS

## 2020-06-29 PROCEDURE — 2580000003 HC RX 258: Performed by: INTERNAL MEDICINE

## 2020-06-29 PROCEDURE — 82962 GLUCOSE BLOOD TEST: CPT

## 2020-06-29 PROCEDURE — 80053 COMPREHEN METABOLIC PANEL: CPT

## 2020-06-29 PROCEDURE — 36415 COLL VENOUS BLD VENIPUNCTURE: CPT

## 2020-06-29 PROCEDURE — 82803 BLOOD GASES ANY COMBINATION: CPT

## 2020-06-29 PROCEDURE — 4A023N7 MEASUREMENT OF CARDIAC SAMPLING AND PRESSURE, LEFT HEART, PERCUTANEOUS APPROACH: ICD-10-PCS | Performed by: INTERNAL MEDICINE

## 2020-06-29 PROCEDURE — 93306 TTE W/DOPPLER COMPLETE: CPT

## 2020-06-29 PROCEDURE — 6360000002 HC RX W HCPCS

## 2020-06-29 PROCEDURE — 6370000000 HC RX 637 (ALT 250 FOR IP): Performed by: SURGERY

## 2020-06-29 PROCEDURE — 93458 L HRT ARTERY/VENTRICLE ANGIO: CPT | Performed by: INTERNAL MEDICINE

## 2020-06-29 PROCEDURE — G0480 DRUG TEST DEF 1-7 CLASSES: HCPCS

## 2020-06-29 PROCEDURE — 2000000000 HC ICU R&B

## 2020-06-29 PROCEDURE — 6370000000 HC RX 637 (ALT 250 FOR IP): Performed by: PHYSICIAN ASSISTANT

## 2020-06-29 PROCEDURE — 2709999900 HC NON-CHARGEABLE SUPPLY

## 2020-06-29 PROCEDURE — 6360000004 HC RX CONTRAST MEDICATION

## 2020-06-29 PROCEDURE — 71046 X-RAY EXAM CHEST 2 VIEWS: CPT

## 2020-06-29 PROCEDURE — 86900 BLOOD TYPING SEROLOGIC ABO: CPT

## 2020-06-29 PROCEDURE — 99254 IP/OBS CNSLTJ NEW/EST MOD 60: CPT | Performed by: INTERNAL MEDICINE

## 2020-06-29 PROCEDURE — 80307 DRUG TEST PRSMV CHEM ANLYZR: CPT

## 2020-06-29 PROCEDURE — 86922 COMPATIBILITY TEST ANTIGLOB: CPT

## 2020-06-29 PROCEDURE — B2151ZZ FLUOROSCOPY OF LEFT HEART USING LOW OSMOLAR CONTRAST: ICD-10-PCS | Performed by: INTERNAL MEDICINE

## 2020-06-29 PROCEDURE — 6370000000 HC RX 637 (ALT 250 FOR IP): Performed by: INTERNAL MEDICINE

## 2020-06-29 PROCEDURE — P9047 ALBUMIN (HUMAN), 25%, 50ML: HCPCS

## 2020-06-29 PROCEDURE — 93458 L HRT ARTERY/VENTRICLE ANGIO: CPT

## 2020-06-29 PROCEDURE — 94640 AIRWAY INHALATION TREATMENT: CPT

## 2020-06-29 PROCEDURE — 6360000002 HC RX W HCPCS: Performed by: PHYSICIAN ASSISTANT

## 2020-06-29 PROCEDURE — 6370000000 HC RX 637 (ALT 250 FOR IP): Performed by: THORACIC SURGERY (CARDIOTHORACIC VASCULAR SURGERY)

## 2020-06-29 PROCEDURE — B2141ZZ FLUOROSCOPY OF RIGHT HEART USING LOW OSMOLAR CONTRAST: ICD-10-PCS | Performed by: INTERNAL MEDICINE

## 2020-06-29 PROCEDURE — 94761 N-INVAS EAR/PLS OXIMETRY MLT: CPT

## 2020-06-29 PROCEDURE — B2111ZZ FLUOROSCOPY OF MULTIPLE CORONARY ARTERIES USING LOW OSMOLAR CONTRAST: ICD-10-PCS | Performed by: INTERNAL MEDICINE

## 2020-06-29 PROCEDURE — 87081 CULTURE SCREEN ONLY: CPT

## 2020-06-29 PROCEDURE — 86850 RBC ANTIBODY SCREEN: CPT

## 2020-06-29 PROCEDURE — 2580000003 HC RX 258: Performed by: NURSE PRACTITIONER

## 2020-06-29 PROCEDURE — 84484 ASSAY OF TROPONIN QUANT: CPT

## 2020-06-29 RX ORDER — ASPIRIN 81 MG/1
81 TABLET, CHEWABLE ORAL DAILY
Status: DISCONTINUED | OUTPATIENT
Start: 2020-06-30 | End: 2020-06-29

## 2020-06-29 RX ORDER — IPRATROPIUM BROMIDE AND ALBUTEROL SULFATE 2.5; .5 MG/3ML; MG/3ML
SOLUTION RESPIRATORY (INHALATION)
Status: DISPENSED
Start: 2020-06-29 | End: 2020-06-30

## 2020-06-29 RX ORDER — NITROGLYCERIN 0.4 MG/1
0.4 TABLET SUBLINGUAL EVERY 5 MIN PRN
Status: DISCONTINUED | OUTPATIENT
Start: 2020-06-29 | End: 2020-07-02 | Stop reason: SDUPTHER

## 2020-06-29 RX ORDER — CEFAZOLIN SODIUM 2 G/50ML
2 SOLUTION INTRAVENOUS
Status: CANCELLED | OUTPATIENT
Start: 2020-07-01 | End: 2020-07-01

## 2020-06-29 RX ORDER — PROMETHAZINE HYDROCHLORIDE 25 MG/1
12.5 TABLET ORAL EVERY 6 HOURS PRN
Status: DISCONTINUED | OUTPATIENT
Start: 2020-06-29 | End: 2020-07-03

## 2020-06-29 RX ORDER — ACETAMINOPHEN 650 MG/1
650 SUPPOSITORY RECTAL EVERY 6 HOURS PRN
Status: DISCONTINUED | OUTPATIENT
Start: 2020-06-29 | End: 2020-06-29

## 2020-06-29 RX ORDER — ONDANSETRON 2 MG/ML
4 INJECTION INTRAMUSCULAR; INTRAVENOUS EVERY 6 HOURS PRN
Status: DISCONTINUED | OUTPATIENT
Start: 2020-06-29 | End: 2020-07-03

## 2020-06-29 RX ORDER — SODIUM CHLORIDE 0.9 % (FLUSH) 0.9 %
10 SYRINGE (ML) INJECTION PRN
Status: DISCONTINUED | OUTPATIENT
Start: 2020-06-29 | End: 2020-07-03

## 2020-06-29 RX ORDER — SODIUM CHLORIDE 9 MG/ML
INJECTION, SOLUTION INTRAVENOUS CONTINUOUS
Status: DISCONTINUED | OUTPATIENT
Start: 2020-06-29 | End: 2020-06-29

## 2020-06-29 RX ORDER — OXYCODONE HYDROCHLORIDE AND ACETAMINOPHEN 5; 325 MG/1; MG/1
1 TABLET ORAL ONCE
Status: COMPLETED | OUTPATIENT
Start: 2020-06-29 | End: 2020-06-29

## 2020-06-29 RX ORDER — ISOSORBIDE MONONITRATE 30 MG/1
30 TABLET, EXTENDED RELEASE ORAL DAILY
Status: DISCONTINUED | OUTPATIENT
Start: 2020-06-29 | End: 2020-07-03

## 2020-06-29 RX ORDER — HYDRALAZINE HYDROCHLORIDE 20 MG/ML
10 INJECTION INTRAMUSCULAR; INTRAVENOUS EVERY 4 HOURS PRN
Status: DISCONTINUED | OUTPATIENT
Start: 2020-06-29 | End: 2020-07-03

## 2020-06-29 RX ORDER — CHLORHEXIDINE GLUCONATE 0.12 MG/ML
30 RINSE ORAL SEE ADMIN INSTRUCTIONS
Status: CANCELLED | OUTPATIENT
Start: 2020-06-30

## 2020-06-29 RX ORDER — LIDOCAINE 4 G/G
1 PATCH TOPICAL DAILY
Status: DISCONTINUED | OUTPATIENT
Start: 2020-06-29 | End: 2020-07-03

## 2020-06-29 RX ORDER — POLYETHYLENE GLYCOL 3350 17 G/17G
17 POWDER, FOR SOLUTION ORAL DAILY PRN
Status: DISCONTINUED | OUTPATIENT
Start: 2020-06-29 | End: 2020-07-03

## 2020-06-29 RX ORDER — NICOTINE POLACRILEX 4 MG
15 LOZENGE BUCCAL PRN
Status: DISCONTINUED | OUTPATIENT
Start: 2020-06-29 | End: 2020-07-03

## 2020-06-29 RX ORDER — SODIUM CHLORIDE 0.9 % (FLUSH) 0.9 %
10 SYRINGE (ML) INJECTION EVERY 12 HOURS SCHEDULED
Status: DISCONTINUED | OUTPATIENT
Start: 2020-06-29 | End: 2020-07-03

## 2020-06-29 RX ORDER — SODIUM PHOSPHATE, DIBASIC AND SODIUM PHOSPHATE, MONOBASIC 7; 19 G/133ML; G/133ML
1 ENEMA RECTAL ONCE
Status: COMPLETED | OUTPATIENT
Start: 2020-06-30 | End: 2020-07-03

## 2020-06-29 RX ORDER — SODIUM CHLORIDE 0.9 % (FLUSH) 0.9 %
10 SYRINGE (ML) INJECTION PRN
Status: DISCONTINUED | OUTPATIENT
Start: 2020-06-29 | End: 2020-06-29

## 2020-06-29 RX ORDER — DEXTROSE MONOHYDRATE 25 G/50ML
12.5 INJECTION, SOLUTION INTRAVENOUS PRN
Status: DISCONTINUED | OUTPATIENT
Start: 2020-06-29 | End: 2020-07-03

## 2020-06-29 RX ORDER — CARVEDILOL 3.12 MG/1
3.12 TABLET ORAL ONCE
Status: CANCELLED | OUTPATIENT
Start: 2020-07-01

## 2020-06-29 RX ORDER — AMLODIPINE BESYLATE 10 MG/1
10 TABLET ORAL DAILY
Status: DISCONTINUED | OUTPATIENT
Start: 2020-06-29 | End: 2020-07-03

## 2020-06-29 RX ORDER — CARVEDILOL 25 MG/1
25 TABLET ORAL 2 TIMES DAILY WITH MEALS
Status: DISCONTINUED | OUTPATIENT
Start: 2020-06-29 | End: 2020-07-03

## 2020-06-29 RX ORDER — ACETAMINOPHEN 325 MG/1
650 TABLET ORAL EVERY 4 HOURS PRN
Status: DISCONTINUED | OUTPATIENT
Start: 2020-06-29 | End: 2020-07-03

## 2020-06-29 RX ORDER — SODIUM CHLORIDE 9 MG/ML
INJECTION, SOLUTION INTRAVENOUS CONTINUOUS
Status: DISCONTINUED | OUTPATIENT
Start: 2020-06-29 | End: 2020-06-30

## 2020-06-29 RX ORDER — DEXTROSE MONOHYDRATE 50 MG/ML
100 INJECTION, SOLUTION INTRAVENOUS PRN
Status: DISCONTINUED | OUTPATIENT
Start: 2020-06-29 | End: 2020-07-03

## 2020-06-29 RX ORDER — CYCLOBENZAPRINE HCL 10 MG
10 TABLET ORAL 3 TIMES DAILY PRN
Status: DISCONTINUED | OUTPATIENT
Start: 2020-06-29 | End: 2020-07-03

## 2020-06-29 RX ORDER — ASPIRIN 81 MG/1
324 TABLET, CHEWABLE ORAL ONCE
Status: COMPLETED | OUTPATIENT
Start: 2020-06-29 | End: 2020-06-29

## 2020-06-29 RX ORDER — ATORVASTATIN CALCIUM 40 MG/1
40 TABLET, FILM COATED ORAL NIGHTLY
Status: DISCONTINUED | OUTPATIENT
Start: 2020-06-29 | End: 2020-07-02 | Stop reason: DRUGHIGH

## 2020-06-29 RX ORDER — IPRATROPIUM BROMIDE AND ALBUTEROL SULFATE 2.5; .5 MG/3ML; MG/3ML
1 SOLUTION RESPIRATORY (INHALATION) EVERY 4 HOURS PRN
Status: DISCONTINUED | OUTPATIENT
Start: 2020-06-29 | End: 2020-07-03

## 2020-06-29 RX ORDER — SODIUM CHLORIDE 0.9 % (FLUSH) 0.9 %
10 SYRINGE (ML) INJECTION EVERY 12 HOURS SCHEDULED
Status: DISCONTINUED | OUTPATIENT
Start: 2020-06-29 | End: 2020-06-29

## 2020-06-29 RX ORDER — ACETAMINOPHEN 325 MG/1
650 TABLET ORAL EVERY 6 HOURS PRN
Status: DISCONTINUED | OUTPATIENT
Start: 2020-06-29 | End: 2020-06-29

## 2020-06-29 RX ORDER — TRAZODONE HYDROCHLORIDE 50 MG/1
50 TABLET ORAL NIGHTLY PRN
Status: DISCONTINUED | OUTPATIENT
Start: 2020-06-29 | End: 2020-07-03

## 2020-06-29 RX ORDER — NICOTINE 21 MG/24HR
1 PATCH, TRANSDERMAL 24 HOURS TRANSDERMAL DAILY
Status: DISCONTINUED | OUTPATIENT
Start: 2020-06-29 | End: 2020-07-03

## 2020-06-29 RX ORDER — ASPIRIN 81 MG/1
81 TABLET ORAL DAILY
Status: DISCONTINUED | OUTPATIENT
Start: 2020-06-29 | End: 2020-07-03

## 2020-06-29 RX ORDER — CHLORHEXIDINE GLUCONATE 4 G/100ML
SOLUTION TOPICAL SEE ADMIN INSTRUCTIONS
Status: CANCELLED | OUTPATIENT
Start: 2020-06-30

## 2020-06-29 RX ADMIN — TRAZODONE HYDROCHLORIDE 50 MG: 50 TABLET ORAL at 22:12

## 2020-06-29 RX ADMIN — HYDRALAZINE HYDROCHLORIDE 10 MG: 20 INJECTION INTRAMUSCULAR; INTRAVENOUS at 13:23

## 2020-06-29 RX ADMIN — IPRATROPIUM BROMIDE AND ALBUTEROL SULFATE 1 AMPULE: .5; 3 SOLUTION RESPIRATORY (INHALATION) at 22:32

## 2020-06-29 RX ADMIN — CYCLOBENZAPRINE 10 MG: 10 TABLET, FILM COATED ORAL at 22:39

## 2020-06-29 RX ADMIN — SODIUM CHLORIDE: 9 INJECTION, SOLUTION INTRAVENOUS at 08:59

## 2020-06-29 RX ADMIN — ASPIRIN 81 MG: 81 TABLET, COATED ORAL at 13:25

## 2020-06-29 RX ADMIN — OXYCODONE HYDROCHLORIDE AND ACETAMINOPHEN 1 TABLET: 5; 325 TABLET ORAL at 11:54

## 2020-06-29 RX ADMIN — INSULIN LISPRO 2 UNITS: 100 INJECTION, SOLUTION INTRAVENOUS; SUBCUTANEOUS at 08:55

## 2020-06-29 RX ADMIN — SODIUM CHLORIDE: 9 INJECTION, SOLUTION INTRAVENOUS at 12:44

## 2020-06-29 RX ADMIN — SODIUM CHLORIDE: 9 INJECTION, SOLUTION INTRAVENOUS at 22:39

## 2020-06-29 RX ADMIN — INSULIN LISPRO 2 UNITS: 100 INJECTION, SOLUTION INTRAVENOUS; SUBCUTANEOUS at 13:17

## 2020-06-29 RX ADMIN — ASPIRIN 81 MG CHEWABLE TABLET 324 MG: 81 TABLET CHEWABLE at 01:48

## 2020-06-29 RX ADMIN — SODIUM CHLORIDE, PRESERVATIVE FREE 10 ML: 5 INJECTION INTRAVENOUS at 09:01

## 2020-06-29 RX ADMIN — ATORVASTATIN CALCIUM 40 MG: 40 TABLET, FILM COATED ORAL at 22:13

## 2020-06-29 RX ADMIN — CARVEDILOL 25 MG: 25 TABLET, FILM COATED ORAL at 17:51

## 2020-06-29 RX ADMIN — INSULIN LISPRO 2 UNITS: 100 INJECTION, SOLUTION INTRAVENOUS; SUBCUTANEOUS at 17:55

## 2020-06-29 ASSESSMENT — PAIN SCALES - GENERAL
PAINLEVEL_OUTOF10: 0
PAINLEVEL_OUTOF10: 8
PAINLEVEL_OUTOF10: 4
PAINLEVEL_OUTOF10: 3
PAINLEVEL_OUTOF10: 8
PAINLEVEL_OUTOF10: 7
PAINLEVEL_OUTOF10: 5

## 2020-06-29 ASSESSMENT — HEART SCORE: ECG: 1

## 2020-06-29 ASSESSMENT — PAIN DESCRIPTION - ONSET: ONSET: ON-GOING

## 2020-06-29 ASSESSMENT — PAIN DESCRIPTION - LOCATION: LOCATION: BACK

## 2020-06-29 ASSESSMENT — PAIN DESCRIPTION - PAIN TYPE: TYPE: CHRONIC PAIN

## 2020-06-29 ASSESSMENT — PAIN DESCRIPTION - DESCRIPTORS: DESCRIPTORS: ACHING;DISCOMFORT

## 2020-06-29 ASSESSMENT — PAIN DESCRIPTION - ORIENTATION: ORIENTATION: LOWER

## 2020-06-29 ASSESSMENT — PAIN - FUNCTIONAL ASSESSMENT: PAIN_FUNCTIONAL_ASSESSMENT: ACTIVITIES ARE NOT PREVENTED

## 2020-06-29 ASSESSMENT — PAIN DESCRIPTION - FREQUENCY: FREQUENCY: INTERMITTENT

## 2020-06-29 ASSESSMENT — PAIN DESCRIPTION - PROGRESSION: CLINICAL_PROGRESSION: NOT CHANGED

## 2020-06-29 NOTE — ED NOTES
Pt resting in bed, no needs voiced at this time. Will continue to monitor.      Son Oropeza RN  06/29/20 [id number]

## 2020-06-29 NOTE — PROGRESS NOTES
DR Phoebe Macias  Here to talked to patient regarding  CABG surgery on Wednesday. B/p  High.   New order for  Hydralazine, will give when available

## 2020-06-29 NOTE — ED PROVIDER NOTES
eMERGENCY dEPARTMENT eNCOUnter        PCP: Tripp Urias MD    [address] [facility]    Chief Complaint   Patient presents with    Chest Pain     Pt was not seen by physician    HPI    Luisa Hendricks is a 62 y.o. male who presents with chest pain. Patient reports that around 6:00 yesterday evening he was eating dinner when he developed some substernal left-sided chest pain. Did take some nitro at home. Pain radiated into his left arm. He took 3 doses of nitro and then presented to the ED. He does state that this is now starting to ease up but he still has chest tightness. He denied associated shortness of breath. He does take Eliquis secondary to history of atrial fibrillation. He does also have history of hypertension and is a current smoker. Does have history of cardiac catheterization and stent placement last year. Primary cardiologist is Dr. Tiffany Moreno. REVIEW OF SYSTEMS    Constitutional:  Denies fever, chills. HENT:  Denies sore throat or ear pain   Cardiovascular:  See HPI. Denies palpitations,  Denies syncope   Respiratory:    See HPI. Denies shortness of breath, or hemoptysis    GI:  Denies abdominal pain, nausea, vomiting, or diarrhea  :  Denies any urinary symptoms   Musculoskeletal:   Denies back pain,   Skin:  Denies rash  Neurologic:  Denies lightheadedness, dizziness, headache, focal weakness or sensory changes   Endocrine:  Denies polyuria or polydypsia   Lymphatic:  Denies swollen glands     All other review of systems are negative  See HPI and nursing notes for additional information     PAST MEDICAL & SURGICAL HISTORY    Past Medical History:   Diagnosis Date    Atrial fibrillation ([facility] [address] 11/2013    CAD (coronary artery disease)     COPD (chronic obstructive pulmonary disease) ([facility] [address]     Diabetes mellitus ([facility] [address]     GERD (gastroesophageal reflux disease)     H/O cardiovascular stress test 11/20/13, 3/20/2013    11/13-Observed defect is consistent with diaphragmatic attenuation.  EF 58%. 3/13 -EF 51%. No ischemia. Normal Study.  H/O cardiovascular stress test 06/08/2017    EF 50% normal study    H/O cardiovascular stress test 06/17/2019    Normal NM    H/O Doppler ultrasound 10/14/2010    10/2010-Bilateral venous doppler of lower extremies- No DVTs.  H/O echocardiogram 06/26/2019    Left ventricular systolic function is normal with an ejection fraction of 50-55%. Mild concentric left ventricular hypertrophy. Grade I diastolic dysfunction. No significant valvular disease noted. No evidence of pericardial effusion. Essentially unremarkable echo.  H/O percutaneous left heart catheterization 12/22/15    No evidence of hemodynamically significant coronary artery disease    Heart imaging 04/23/2020    muga - ef 58%    History of coronary artery stent placement 11/13/2013 11/13 -RCA - 3 stents placed    History of exercise stress test 06/08/2017    treadmill    History of Holter monitoring 1/4/2016    predominant rhythm sinus    History of nuclear stress test 02/24/2020    EF 38%,Myocardial perfusion scan shows moderate size, severe intensity, non  reversible perfusion defect in inferior wall.  Hyperlipidemia     Hypertension     S/P cardiac catheterization 11/13/2013 11/13/2013-EF 55%, distal LAD mild disease,CX stent patent,but distal to stent 50% stenosis. RCA severe disease.  Status post angioplasty with stent      Past Surgical History:   Procedure Laterality Date    APPENDECTOMY      CARDIAC CATHETERIZATION  11/13/2013 11/13/2013-EF 55%, distal LAD mild disease,CX stent patent,but distal to stent 50% stenosis. RCA severe disease.     COLONOSCOPY      CORONARY ANGIOPLASTY WITH STENT PLACEMENT      4 stents- RCA X3; CX X1    CORONARY ANGIOPLASTY WITH STENT PLACEMENT  11/13/2013 11/13/2013 -3 stents placed to RCA- Dr Raza Álvarez Other Topics Concern    None   Social History Narrative    None     Family History   Problem Relation Age of Onset    Cancer Mother     Diabetes Mother     Heart Disease Mother     High Blood Pressure Mother     Stroke Mother     Cancer Father     Heart Disease Father     High Blood Pressure Father     Stroke Father     Cancer Brother        PHYSICAL EXAM    VITAL SIGNS: BP (!) 152/99   Pulse 74   Temp 98.2 °F (36.8 °C) (Oral)   Resp 16   Ht 6' 3\" (1.905 m)   Wt 257 lb (116.6 kg)   SpO2 98%   BMI 32.12 kg/m²    Constitutional:  Well developed, well nourished, no acute distress   HENT:  Atraumatic, moist mucus membranes  Neck/Lymphatics: supple, no JVD, no swollen nodes  Respiratory:  Lungs Clear, no retractions   Cardiovascular:  Rate regular, irregularly irregular Rhythm,  no murmurs/rubs/gallops  No carotid bruits or murmurs heard in carotids. Vascular: Radial pulses 2+ equal bilaterally  GI:  Soft, nontender, normal bowel sounds  Musculoskeletal:   No edema, no deformities  Integument:  Skin warm and dry, no petechiae   Neurologic:  Alert & oriented, normal speech  Psych: Pleasant affect, no hallucinations      EKG Interpretation  Please see ED physician's note for EKG interpretation      RADIOLOGY/PROCEDURES    CXR:   Xr Chest Standard (2 Vw)    Result Date: 6/29/2020  EXAMINATION: TWO XRAY VIEWS OF THE CHEST 6/29/2020 1:02 am COMPARISON: Chest radiograph dated June 2, 2020 HISTORY: ORDERING SYSTEM PROVIDED HISTORY: cp TECHNOLOGIST PROVIDED HISTORY: Reason for exam:->cp Reason for Exam: chest  pain Acuity: Acute Type of Exam: Initial FINDINGS: The lungs are without acute focal process. There is no effusion or pneumothorax. The cardiomediastinal silhouette is without acute process. The osseous structures are without acute process. No acute process.      Ct Head Wo Contrast    Result Date: 6/2/2020  EXAMINATION: CT OF THE HEAD WITHOUT CONTRAST  6/2/2020 1:24 pm TECHNIQUE: CT of the head was performed without the administration of intravenous contrast. Dose modulation, iterative reconstruction, and/or weight based adjustment of the mA/kV was utilized to reduce the radiation dose to as low as reasonably achievable. COMPARISON: 09/08/2017 HISTORY: ORDERING SYSTEM PROVIDED HISTORY: stroke? aphasia/L facial droop TECHNOLOGIST PROVIDED HISTORY: Reason for exam:->stroke? aphasia/L facial droop Has a \"code stroke\" or \"stroke alert\" been called? ->Yes Reason for Exam: stroke? aphasia/L facial droop Acuity: Acute Type of Exam: Initial FINDINGS: BRAIN/VENTRICLES: The ventricles and sulci are diffusely enlarged. Low attenuation is seen in the periventricular and subcortical white matter. No acute intracranial hemorrhage or acute infarct is identified. ORBITS: The visualized portion of the orbits demonstrate no acute abnormality. SINUSES: The visualized paranasal sinuses and mastoid air cells demonstrate no acute abnormality. SOFT TISSUES/SKULL:  No acute abnormality of the visualized skull or soft tissues. No acute intracranial abnormality. Findings were discussed with Rosina Robertson at 1:32 pm on 6/2/2020. Xr Chest Portable    Result Date: 6/2/2020  EXAMINATION: ONE XRAY VIEW OF THE CHEST 6/2/2020 1:27 pm COMPARISON: 05/22/2020 HISTORY: ORDERING SYSTEM PROVIDED HISTORY: stroke alert/L sided weakness TECHNOLOGIST PROVIDED HISTORY: Reason for exam:->stroke alert/L sided weakness Reason for Exam: stroke alert/L sided weakness Acuity: Acute Type of Exam: Initial FINDINGS: The lungs are without acute focal process. There is no effusion or pneumothorax. The cardiomediastinal silhouette is stable. The osseous structures are stable. No acute process.      Cta Head Neck W Contrast    Result Date: 6/2/2020  EXAMINATION: CTA OF THE HEAD AND NECK WITH CONTRAST 6/2/2020 1:29 pm: TECHNIQUE: CTA of the head and neck was performed with the administration of intravenous contrast. Multiplanar reformatted images FL    Differential Type AUTOMATED DIFFERENTIAL     Segs Relative 59.3 36 - 66 %    Lymphocytes % 28.3 24 - 44 %    Monocytes % 6.5 (H) 0 - 4 %    Eosinophils % 4.2 (H) 0 - 3 %    Basophils % 1.4 (H) 0 - 1 %    Segs Absolute 5.6 K/CU MM    Lymphocytes Absolute 2.7 K/CU MM    Monocytes Absolute 0.6 K/CU MM    Eosinophils Absolute 0.4 K/CU MM    Basophils Absolute 0.1 K/CU MM    Nucleated RBC % 0.0 %    Total Nucleated RBC 0.0 K/CU MM    Total Immature Neutrophil 0.03 K/CU MM    Immature Neutrophil % 0.3 0 - 0.43 %   CMP   Result Value Ref Range    Sodium 138 135 - 145 MMOL/L    Potassium 3.4 (L) 3.5 - 5.1 MMOL/L    Chloride 101 99 - 110 mMol/L    CO2 27 21 - 32 MMOL/L    BUN 27 (H) 6 - 23 MG/DL    CREATININE 1.5 (H) 0.9 - 1.3 MG/DL    Glucose 303 (H) 70 - 99 MG/DL    Calcium 9.8 8.3 - 10.6 MG/DL    Alb 4.0 3.4 - 5.0 GM/DL    Total Protein 7.7 6.4 - 8.2 GM/DL    Total Bilirubin 0.2 0.0 - 1.0 MG/DL    ALT 10 10 - 40 U/L    AST 17 15 - 37 IU/L    Alkaline Phosphatase 136 (H) 40 - 128 IU/L    GFR Non- 48 (L) >60 mL/min/1.73m2    GFR  58 (L) >60 mL/min/1.73m2    Anion Gap 10 4 - 16   Troponin   Result Value Ref Range    Troponin T <0.010 <0.01 NG/ML   EKG 12 Lead   Result Value Ref Range    Ventricular Rate 92 BPM    Atrial Rate 129 BPM    QRS Duration 104 ms    Q-T Interval 400 ms    QTc Calculation (Bazett) 494 ms    R Axis 15 degrees    T Axis 91 degrees    Diagnosis       Atrial fibrillation  Nonspecific ST abnormality  Abnormal QRS-T angle, consider primary T wave abnormality  Abnormal ECG  When compared with ECG of 02-JUN-2020 13:51,  ST now depressed in Anterior leads             ED COURSE & MEDICAL DECISION MAKING     Vital signs and nursing notes reviewed during ED course. Patient care and presentation staffed with supervising MD.  All pertinent Lab data and radiographic results reviewed with patient at bedside.        The patient and/or the family were informed of the results of any tests/labs/imaging, the treatment plan, and time was allotted to answer questions. See chart for details of medications given during the ED stay. Differential Diagnosis: Acute bronchitis, Musculoskeletal chest pain, Pleurisy, Pulmonary edema, Congestive Heart Failure, Myocardial Infarction, Pulmonary Embolus, Thoracic Dissection, Pericarditis, Pericardial Effusion, Pneumonia, Pneumothorax, Boerhaave's syndrome, other GI cause, other. Clinical  IMPRESSION    1. Chest pain, unspecified type    2. ST segment changes on electrocardiogram      Patient presents as above. Heart score is 5. Attending physician did interpret EKG does see some new lateral ST depression that is new since earlier in June of this year. Considering patient's complaint of chest pain, continued pain, heart score will admit him for ACS rule out. Spoke to patient about this plan and he is in agreement. Spoke to the hospitalist service who graciously agreed to admit. Comment: Please note this report has been produced using speech recognition software and may contain errors related to that system including errors in grammar, punctuation, and spelling, as well as words and phrases that may be inappropriate. If there are any questions or concerns please feel free to contact the dictating provider for clarification.       Rubi Vance PA-C  06/29/20 2198

## 2020-06-29 NOTE — PROGRESS NOTES
Pt belongings taken to room 2128. Belongings include cell phone, wallet, black shorts, red shirt, and brown slippers. Pt also has bag of medications. Taken down to room 2128 as well.

## 2020-06-29 NOTE — H&P
History and Physical  Internal Medicine Hospitalist      Name:  Gustavo Christopher /Age/Sex: 1960  (62 y.o. male)   MRN & CSN:  [id number] & [id number] Admission Date/Time: 2020  1:21 AM   Location:  ED18/ED-18 PCP: Meaghan Pickering MD       Hospital Day: 1          Chief Complaint: Chest Pain     Assessment and Plan:     --- Chest pain ? ACS  History of multivessel CAD   Had RCA STEMI s/p PCI 2019. Initial troponin negative. ECG shows atrial fibrillation with what looks like new ST depression in anterior leads. Already received 325 mg aspirin in the ED  Plan  Admit  For observation  Continue aspirin 81 mg daily, statin  Trend troponin  Cardiology consultation  Keep n.p.o. for possible stress test later this morning. Other diagnoses  Current active cigarette smoker, 1.5 pack/day  Hypertension -continue home antihypertensives. Hyperlipidemia -statin. Type 2 diabetes mellitus, well controlled. Hemoglobin A1c 6.9%. We will cover blood sugars with sliding scale insulin. Chronic atrial fibrillation- rate controlled on beta-blockers. Continue apixaban. CKD 3- creatinine at baseline. Current diagnosis and plan of management discussed with the patient at the time of admission in lay language who agree to the above plan and disposition of admission for further care. All concerns and questions addressed. Diet  n.p.o.   DVT Prophylaxis [] Lovenox, []  Heparin, [] SCDs, [] Ambulation  [x] Long term AC   GI Prophylaxis [] PPI,  [] H2 Blocker,  [] Carafate,  [] Diet,  [] No GI prophylaxis, N/A: patient is not under significant medical stress, non-ICU or is receiving a diet/tube feeds   Code Status  full code   Disposition  home after observation   MDM [] Low, [x] Moderate,[]  High     History of Present Illness:     Principal Problem: Chest pain x1 day   Gustavo Christopher is a 62 y.o. male who presents to the ED with above complaint.   Patient has PMH of atrial fibrillation, CAD s/p PCI, GERD, Medical History:      Past Medical History:   Diagnosis Date    Atrial fibrillation ([facility] [address] 11/2013    CAD (coronary artery disease)     COPD (chronic obstructive pulmonary disease) ([facility] [address]     Diabetes mellitus ([facility] [address]     GERD (gastroesophageal reflux disease)     H/O cardiovascular stress test 11/20/13, 3/20/2013    11/13-Observed defect is consistent with diaphragmatic attenuation. EF 58%. 3/13 -EF 51%. No ischemia. Normal Study.  H/O cardiovascular stress test 06/08/2017    EF 50% normal study    H/O cardiovascular stress test 06/17/2019    Normal NM    H/O Doppler ultrasound 10/14/2010    10/2010-Bilateral venous doppler of lower extremies- No DVTs.  H/O echocardiogram 06/26/2019    Left ventricular systolic function is normal with an ejection fraction of 50-55%. Mild concentric left ventricular hypertrophy. Grade I diastolic dysfunction. No significant valvular disease noted. No evidence of pericardial effusion. Essentially unremarkable echo.  H/O percutaneous left heart catheterization 12/22/15    No evidence of hemodynamically significant coronary artery disease    Heart imaging 04/23/2020    muga - ef 58%    History of coronary artery stent placement 11/13/2013 11/13 -RCA - 3 stents placed    History of exercise stress test 06/08/2017    treadmill    History of Holter monitoring 1/4/2016    predominant rhythm sinus    History of nuclear stress test 02/24/2020    EF 38%,Myocardial perfusion scan shows moderate size, severe intensity, non  reversible perfusion defect in inferior wall.  Hyperlipidemia     Hypertension     S/P cardiac catheterization 11/13/2013 11/13/2013-EF 55%, distal LAD mild disease,CX stent patent,but distal to stent 50% stenosis. RCA severe disease.  Status post angioplasty with stent      Past Surgery History:  Patient  has a past surgical history that includes Coronary angioplasty with stent; Appendectomy; Femur fracture surgery (1984);  Coronary angioplasty use.  Drugs:  reports current drug use. Drug: Cocaine. Allergies: No Known Allergies  Medications:   Medications:    Infusions:   PRN Meds:   Prior to Admission Meds:  Prior to Admission medications    Medication Sig Start Date End Date Taking? Authorizing Provider   amLODIPine (NORVASC) 10 MG tablet Take 1 tablet by mouth daily 6/24/20   TREY Rosas CNP   apixaban (ELIQUIS) 5 MG TABS tablet Take 1 tablet by mouth 2 times daily 6/24/20   TREY Rosas CNP   isosorbide mononitrate (IMDUR) 30 MG extended release tablet Take 1 tablet by mouth daily 6/24/20 7/24/20  TREY Rosas CNP   cyclobenzaprine (FLEXERIL) 10 MG tablet  5/28/20   Historical Provider, MD   carvedilol (COREG) 25 MG tablet Take 1 tablet by mouth 2 times daily 5/26/20   TREY Rosas CNP   nitroGLYCERIN (NITROSTAT) 0.4 MG SL tablet up to max of 3 total doses. If no relief after 1 dose, call 911. 2/24/20   Mateus Campos MD     Data:     Laboratory this visit:  Reviewed  Recent Labs     06/29/20  0030   WBC 9.5   HGB 14.6   HCT 42.8         Recent Labs     06/29/20  0030      K 3.4*      CO2 27   BUN 27*   CREATININE 1.5*     Recent Labs     06/29/20  0030   AST 17   ALT 10   BILITOT 0.2   ALKPHOS 136*     No results for input(s): INR in the last 72 hours. No results for input(s): CKTOTAL, CKMB, CKMBINDEX in the last 72 hours. Invalid input(s): Tonja Cook input(s): PRO-BNP    Radiology this visit:  Reviewed. Xr Chest Standard (2 Vw)    Result Date: 6/29/2020  EXAMINATION: TWO XRAY VIEWS OF THE CHEST 6/29/2020 1:02 am COMPARISON: Chest radiograph dated June 2, 2020 HISTORY: ORDERING SYSTEM PROVIDED HISTORY: cp TECHNOLOGIST PROVIDED HISTORY: Reason for exam:->cp Reason for Exam: chest  pain Acuity: Acute Type of Exam: Initial FINDINGS: The lungs are without acute focal process. There is no effusion or pneumothorax.  The cardiomediastinal silhouette is without acute Contrast    Result Date: 6/2/2020  EXAMINATION: CTA OF THE HEAD AND NECK WITH CONTRAST 6/2/2020 1:29 pm: TECHNIQUE: CTA of the head and neck was performed with the administration of intravenous contrast. Multiplanar reformatted images are provided for review. MIP images are provided for review. Stenosis of the internal carotid arteries measured using NASCET criteria. Dose modulation, iterative reconstruction, and/or weight based adjustment of the mA/kV was utilized to reduce the radiation dose to as low as reasonably achievable. COMPARISON: None. HISTORY: ORDERING SYSTEM PROVIDED HISTORY: stroke TECHNOLOGIST PROVIDED HISTORY: Reason for exam:->stroke Reason for Exam: stroke Acuity: Unknown Type of Exam: Unknown FINDINGS: CTA NECK: AORTIC ARCH/ARCH VESSELS: No dissection or arterial injury. No significant stenosis of the brachiocephalic or subclavian arteries. CAROTID ARTERIES: No dissection, arterial injury, or hemodynamically significant stenosis by NASCET criteria. VERTEBRAL ARTERIES: No dissection, arterial injury, or significant stenosis. SOFT TISSUES: The lung apices are clear. No cervical or superior mediastinal lymphadenopathy. The larynx and pharynx are unremarkable. No acute abnormality of the salivary and thyroid glands. BONES: No acute osseous abnormality. There is moderate cervical spondylosis with bilateral facet arthropathy, worst on the right at C3-4. There is metallic surgical wire at the left mandibular angle. CTA HEAD: ANTERIOR CIRCULATION: No significant stenosis of the intracranial internal carotid, anterior cerebral, or middle cerebral arteries. No aneurysm. POSTERIOR CIRCULATION: No significant stenosis of the vertebral, basilar, or posterior cerebral arteries. No aneurysm. OTHER: No dural venous sinus thrombosis on this non-dedicated study. BRAIN: No mass effect or midline shift. No extra-axial fluid collection. The gray-white differentiation is maintained.      Unremarkable CTA of the

## 2020-06-29 NOTE — ED PROVIDER NOTES
EKG: Atrial fibrillation with normal axis. ST depression in the lateral leads. No ST elevation. QTc is 494. No pathologic Q waves.   New changes from 6/2/2020     Tabitha Lagunas MD  06/29/20 0111       Tabitha Lagunas MD  06/29/20 0111

## 2020-06-29 NOTE — PLAN OF CARE
Problem: Cardiac:  Goal: Ability to maintain an adequate cardiac output will improve  Description: Ability to maintain an adequate cardiac output will improve  6/29/2020 1001 by Reji Almonte RN  Outcome: Ongoing  6/29/2020 0446 by Elizabeth Reyse RN  Outcome: Ongoing  Goal: Complications related to the disease process, condition or treatment will be avoided or minimized  Description: Complications related to the disease process, condition or treatment will be avoided or minimized  6/29/2020 1001 by Reji Almonte RN  Outcome: Ongoing  6/29/2020 0446 by Elizabeth Reyes RN  Outcome: Ongoing  Goal: Hemodynamic stability will improve  Description: Hemodynamic stability will improve  6/29/2020 1001 by Reji Almonte RN  Outcome: Ongoing  6/29/2020 0446 by Elizabeth Reyes RN  Outcome: Ongoing  Goal: Risk factors for ineffective tissue perfusion will decrease  Description: Risk factors for ineffective tissue perfusion will decrease  6/29/2020 1001 by Reji Almonte RN  Outcome: Ongoing  6/29/2020 0446 by Elizabeth Reyes RN  Outcome: Ongoing     Problem: Fluid Volume:  Goal: Will show no signs or symptoms of fluid imbalance  Description: Will show no signs or symptoms of fluid imbalance  6/29/2020 1001 by Reji Almonte RN  Outcome: Ongoing  6/29/2020 0446 by Elizabeth Reyes RN  Outcome: Ongoing     Problem: Infection:  Goal: Will remain free from infection  Description: Will remain free from infection  6/29/2020 1001 by Reji Almonte RN  Outcome: Ongoing  6/29/2020 0446 by Elizabeth Reyes RN  Outcome: Ongoing     Problem: Safety:  Goal: Free from accidental physical injury  Description: Free from accidental physical injury  6/29/2020 1001 by Reji Almonte RN  Outcome: Ongoing  6/29/2020 0446 by Elizabeth Reyes RN  Outcome: Ongoing  Goal: Free from intentional harm  Description: Free from intentional harm  6/29/2020 1001 by Reji Almonte RN  Outcome: Ongoing  6/29/2020 0446 by Elizabeth Reyes RN  Outcome: Ongoing Problem: Daily Care:  Goal: Daily care needs are met  Description: Daily care needs are met  6/29/2020 1001 by Babak Verdin RN  Outcome: Ongoing  6/29/2020 0446 by John Pena RN  Outcome: Ongoing     Problem: Pain:  Goal: Patient's pain/discomfort is manageable  Description: Patient's pain/discomfort is manageable  6/29/2020 1001 by Babak Verdin RN  Outcome: Ongoing  6/29/2020 0446 by John Pena RN  Outcome: Ongoing     Problem: Skin Integrity:  Goal: Skin integrity will stabilize  Description: Skin integrity will stabilize  6/29/2020 1001 by Babak Verdin RN  Outcome: Ongoing  6/29/2020 0446 by John Pena RN  Outcome: Ongoing     Problem: Discharge Planning:  Goal: Patients continuum of care needs are met  Description: Patients continuum of care needs are met  6/29/2020 1001 by Babak Verdin RN  Outcome: Ongoing  6/29/2020 0446 by John Pena RN  Outcome: Ongoing

## 2020-06-29 NOTE — PROGRESS NOTES
Pt arrived to room 3126 from ED, via bed. Pt walked to unit bed. Pt was connected to unit telemetry. Pt is being admitted for c/o chest pain. No current s/s of distress.

## 2020-06-29 NOTE — PROGRESS NOTES
Patient admitted to room 2128. Assessed cath groin site with cath lab RN's, dressing clean,dry, and intact.

## 2020-06-29 NOTE — CONSULTS
CARDIOLOGY CONSULT NOTE    Reason for consultation: Chest pain     Referring physician: Eligio Cotto MD      Primary care physician: Stephani Ram MD        Dear Eligio Cotto MD   Thanks for the consult.     History of present illness:Josiah is a 62 y. o.year old who  presents with  chest pain for last few days, happening daily, intermittent for 15 to 20 mins and aggravated with activity substernal also,not reproducible with palpation, radiated to shoulder, 6/10, non tender to touch,associated with shortness of breath, + sweating, nausea, did not get NTG in ED. No fever, no chills, no nausea no vomiting. Blood pressure, cholesterol, blood glucose and weight are well controlled.     Chief Complaint   Patient presents with    Chest Pain       Past medical history:    has a past medical history of Atrial fibrillation ([facility] [address] CAD (coronary artery disease), COPD (chronic obstructive pulmonary disease) ([facility] [address] Diabetes mellitus ([facility] [address] GERD (gastroesophageal reflux disease), H/O cardiovascular stress test, H/O cardiovascular stress test, H/O cardiovascular stress test, H/O Doppler ultrasound, H/O echocardiogram, H/O percutaneous left heart catheterization, Heart imaging, History of coronary artery stent placement, History of exercise stress test, History of Holter monitoring, History of nuclear stress test, Hyperlipidemia, Hypertension, S/P cardiac catheterization, and Status post angioplasty with stent. Past surgical history:   has a past surgical history that includes Coronary angioplasty with stent; Appendectomy; Femur fracture surgery (1984); Coronary angioplasty with stent (11/13/2013); Cardiac catheterization (11/13/2013); Colonoscopy; and Mandible fracture surgery (1984). Social History:   reports that he has been smoking. He has a 67.50 pack-year smoking history. He has never used smokeless tobacco. He reports current drug use. Drug: Cocaine. He reports that he does not drink alcohol.   Family history: CREATININE  0.8           Recent Labs       09/28/16  0010    AST  12*    ALT  10    BILITOT  0.4    ALKPHOS  124       No results for input(s): TROPONINI in the last 72 hours. No results found for: BNP  No results found for: INR, PROTIME        EKG:nsr     Chest Xray:nad     ECHO:pending  Labs, echo, meds reviewed  Assessment:      Recommendations:       1. Chest pain CAD:and shortness of breath. [facility] scheduled, Stress test showed depressed LVEF 38% he still smokes and had distal left main 50% STENOSIS, LCX 70% stenosis and diagonal branch 80%, h/o  RCA stent  For STEM continue statins, betablockers, EFFIENT, HIS STRESS TEST showed inferior wall infarction with his recent RCA stemi, however, anterior wall had good perfusion therefore will hold off on  CABG for his distal left main disease, he stopped cocaine .   2. AFIB; on eliquis, last dose eliquis was yesterday morning  3. DM: stable continue metformin   4. Dyslipidemia: continue statins  5. HTN: UNstable, continue coreg, will only give him 25mg bid coreg, lisnopril and HCTZ, IMDUR, aldactone, recommend to recheck blood pressure again and call me with the blood pressure  1.  Health maintenance: exerise and diet  All labs, medications and tests reviewed, continue all other medications of all above medical condition listed as is.          Isai Shafer MD,   Isai Shafer MD, 6/29/2020 8:32 AM

## 2020-06-29 NOTE — ED TRIAGE NOTES
Pt presents to ED c/o midsternal CP radiating to left arm since 1800. Pt rates pain 8/10. Pt is alert and oriented. Took three doses of nitro, no relief.

## 2020-06-29 NOTE — CONSULTS
Department of Cardiovascular & Thoracic Surgery   Consult Note        Reason for Consult:  CAD  Requesting Physician:  Dr. CHANTELL ORTIZ [facility]    Date of Consult: 20      History Obtained From:  patient, electronic medical record,     HISTORY OF PRESENT ILLNESS:    The patient is a  62 y.o. male who presented with chest pain that did not resolve after taking nitro yesterday. He said it came up after he ate a large steak. No N/V. No F/C. Admits to dyspnea and orthopnea and some issues with peripheral edema. Of note, he has a h/o of cocaine use and admits last use was just 2 wks ago. His sister also had CAD and underwent CABG, but  2 wks postop suspected due cocaine use. [facility] which was signif for LM, Cx, LAD, and RCA disease. Cardiologist requesting consult for evaluation of CAD. Past Medical History:        Diagnosis Date    Atrial fibrillation ([address] 2013    CAD (coronary artery disease)     COPD (chronic obstructive pulmonary disease) ([address]     Diabetes mellitus ([facility] [address]     GERD (gastroesophageal reflux disease)     H/O cardiovascular stress test 13, 3/20/2013    11/13-Observed defect is consistent with diaphragmatic attenuation. EF 58%. 3/13 -EF 51%. No ischemia. Normal Study.  H/O cardiovascular stress test 2017    EF 50% normal study    H/O cardiovascular stress test 2019    Normal NM    H/O Doppler ultrasound 10/14/2010    10/2010-Bilateral venous doppler of lower extremies- No DVTs.  H/O echocardiogram 2019    Left ventricular systolic function is normal with an ejection fraction of 50-55%. Mild concentric left ventricular hypertrophy. Grade I diastolic dysfunction. No significant valvular disease noted. No evidence of pericardial effusion. Essentially unremarkable echo.     H/O percutaneous left heart catheterization 12/22/15    No evidence of hemodynamically significant coronary artery disease    Heart imaging 2020    muga - ef 58%    History of coronary artery stent placement 11/13/2013 11/13 -RCA - 3 stents placed    History of exercise stress test 06/08/2017    treadmill    History of Holter monitoring 1/4/2016    predominant rhythm sinus    History of nuclear stress test 02/24/2020    EF 38%,Myocardial perfusion scan shows moderate size, severe intensity, non  reversible perfusion defect in inferior wall.  Hyperlipidemia     Hypertension     S/P cardiac catheterization 11/13/2013 11/13/2013-EF 55%, distal LAD mild disease,CX stent patent,but distal to stent 50% stenosis. RCA severe disease.  Status post angioplasty with stent        Past Surgical History:        Procedure Laterality Date    APPENDECTOMY      CARDIAC CATHETERIZATION  11/13/2013 11/13/2013-EF 55%, distal LAD mild disease,CX stent patent,but distal to stent 50% stenosis. RCA severe disease.     COLONOSCOPY      CORONARY ANGIOPLASTY WITH STENT PLACEMENT      4 stents- RCA X3; CX X1    CORONARY ANGIOPLASTY WITH STENT PLACEMENT  11/13/2013 11/13/2013 -3 stents placed to RCA- Dr Felecia Lerma       Current Medications:   Current Facility-Administered Medications: amLODIPine (NORVASC) tablet 10 mg, 10 mg, Oral, Daily  carvedilol (COREG) tablet 25 mg, 25 mg, Oral, BID   isosorbide mononitrate (IMDUR) extended release tablet 30 mg, 30 mg, Oral, Daily  polyethylene glycol (GLYCOLAX) packet 17 g, 17 g, Oral, Daily PRN  promethazine (PHENERGAN) tablet 12.5 mg, 12.5 mg, Oral, Q6H PRN **OR** ondansetron (ZOFRAN) injection 4 mg, 4 mg, Intravenous, Q6H PRN  atorvastatin (LIPITOR) tablet 40 mg, 40 mg, Oral, Nightly  glucose (GLUTOSE) 40 % oral gel 15 g, 15 g, Oral, PRN  dextrose 50 % IV solution, 12.5 g, Intravenous, PRN  glucagon (rDNA) injection 1 mg, 1 mg, Intramuscular, PRN  dextrose 5 % solution, 100 mL/hr, Intravenous, PRN  insulin lispro (HUMALOG) injection vial 0-6 Units, 0-6 Units, Subcutaneous, TID   insulin lispro (HUMALOG) injection vial 0-3 Units, 0-3 Units, Subcutaneous, Nightly  0.9 % sodium chloride infusion, , Intravenous, Continuous  sodium chloride flush 0.9 % injection 10 mL, 10 mL, Intravenous, 2 times per day  sodium chloride flush 0.9 % injection 10 mL, 10 mL, Intravenous, PRN  acetaminophen (TYLENOL) tablet 650 mg, 650 mg, Oral, Q4H PRN  sodium chloride flush 0.9 % injection 10 mL, 10 mL, Intravenous, 2 times per day  sodium chloride flush 0.9 % injection 10 mL, 10 mL, Intravenous, PRN  nitroGLYCERIN (NITROSTAT) SL tablet 0.4 mg, 0.4 mg, Sublingual, Q5 Min PRN  aspirin EC tablet 81 mg, 81 mg, Oral, Daily  [START ON 6/30/2020] fleet rectal enema 1 enema, 1 enema, Rectal, Once  hydrALAZINE (APRESOLINE) injection 10 mg, 10 mg, Intravenous, Q4H PRN    Allergies:  No Known Allergies    Social History:   Social History     Socioeconomic History    Marital status:      Spouse name: Not on file    Number of children: Not on file    Years of education: Not on file    Highest education level: Not on file   Occupational History    Occupation:    Social Needs    Financial resource strain: Not on file    Food insecurity     Worry: Not on file     Inability: Not on file    Transportation needs     Medical: Not on file     Non-medical: Not on file   Tobacco Use    Smoking status: Current Every Day Smoker     Packs/day: 1.50     Years: 45.00     Pack years: 67.50    Smokeless tobacco: Never Used   Substance and Sexual Activity    Alcohol use: No     Comment: caffeine 1 cup of coffee a day and at least 6 bottles of pop a day    Drug use: Yes     Types: Cocaine     Comment: Used last two weeks ago.     Sexual activity: Yes     Partners: Female   Lifestyle    Physical activity     Days per week: Not on file     Minutes per session: Not on file    Stress: Not on file   Relationships    Social connections     Talks on phone: Not on file     Gets together: Not on file     Attends [employer]

## 2020-06-30 ENCOUNTER — APPOINTMENT (OUTPATIENT)
Dept: GENERAL RADIOLOGY | Age: 60
DRG: 229 | End: 2020-06-30
Payer: COMMERCIAL

## 2020-06-30 PROBLEM — I25.10 3-VESSEL CORONARY ARTERY DISEASE: Status: ACTIVE | Noted: 2020-06-30

## 2020-06-30 LAB
AMPHETAMINES: NEGATIVE
ANION GAP SERPL CALCULATED.3IONS-SCNC: 8 MMOL/L (ref 4–16)
APTT: 33.4 SECONDS (ref 25.1–37.1)
BARBITURATE SCREEN URINE: NEGATIVE
BENZODIAZEPINE SCREEN, URINE: NEGATIVE
BUN BLDV-MCNC: 22 MG/DL (ref 6–23)
CALCIUM IONIZED: 4.6 MG/DL (ref 4.48–5.28)
CALCIUM SERPL-MCNC: 9.4 MG/DL (ref 8.3–10.6)
CANNABINOID SCREEN URINE: NEGATIVE
CHLORIDE BLD-SCNC: 102 MMOL/L (ref 99–110)
CO2: 28 MMOL/L (ref 21–32)
COCAINE METABOLITE: ABNORMAL
CREAT SERPL-MCNC: 1.4 MG/DL (ref 0.9–1.3)
CULTURE: NORMAL
GFR AFRICAN AMERICAN: >60 ML/MIN/1.73M2
GFR NON-AFRICAN AMERICAN: 52 ML/MIN/1.73M2
GLUCOSE BLD-MCNC: 207 MG/DL (ref 70–99)
GLUCOSE BLD-MCNC: 220 MG/DL (ref 70–99)
GLUCOSE BLD-MCNC: 248 MG/DL (ref 70–99)
GLUCOSE BLD-MCNC: 254 MG/DL (ref 70–99)
GLUCOSE BLD-MCNC: 255 MG/DL (ref 70–99)
HCT VFR BLD CALC: 44.2 % (ref 42–52)
HEMOGLOBIN: 14.6 GM/DL (ref 13.5–18)
INR BLD: 0.99 INDEX
IONIZED CA: 1.15 MMOL/L (ref 1.12–1.32)
Lab: NORMAL
MCH RBC QN AUTO: 30 PG (ref 27–31)
MCHC RBC AUTO-ENTMCNC: 33 % (ref 32–36)
MCV RBC AUTO: 90.9 FL (ref 78–100)
OPIATES, URINE: NEGATIVE
OXYCODONE: ABNORMAL
PDW BLD-RTO: 13.2 % (ref 11.7–14.9)
PHENCYCLIDINE, URINE: NEGATIVE
PLATELET # BLD: 193 K/CU MM (ref 140–440)
PMV BLD AUTO: 11.3 FL (ref 7.5–11.1)
POTASSIUM SERPL-SCNC: 3.6 MMOL/L (ref 3.5–5.1)
PROTHROMBIN TIME: 12 SECONDS (ref 11.7–14.5)
RBC # BLD: 4.86 M/CU MM (ref 4.6–6.2)
SODIUM BLD-SCNC: 138 MMOL/L (ref 135–145)
SPECIMEN: NORMAL
WBC # BLD: 8.7 K/CU MM (ref 4–10.5)

## 2020-06-30 PROCEDURE — 82330 ASSAY OF CALCIUM: CPT

## 2020-06-30 PROCEDURE — 94640 AIRWAY INHALATION TREATMENT: CPT

## 2020-06-30 PROCEDURE — 2580000003 HC RX 258: Performed by: PHYSICIAN ASSISTANT

## 2020-06-30 PROCEDURE — 6370000000 HC RX 637 (ALT 250 FOR IP): Performed by: THORACIC SURGERY (CARDIOTHORACIC VASCULAR SURGERY)

## 2020-06-30 PROCEDURE — 6370000000 HC RX 637 (ALT 250 FOR IP): Performed by: SURGERY

## 2020-06-30 PROCEDURE — 80307 DRUG TEST PRSMV CHEM ANLYZR: CPT

## 2020-06-30 PROCEDURE — 80048 BASIC METABOLIC PNL TOTAL CA: CPT

## 2020-06-30 PROCEDURE — 2580000003 HC RX 258: Performed by: INTERNAL MEDICINE

## 2020-06-30 PROCEDURE — 85730 THROMBOPLASTIN TIME PARTIAL: CPT

## 2020-06-30 PROCEDURE — 71046 X-RAY EXAM CHEST 2 VIEWS: CPT

## 2020-06-30 PROCEDURE — 6360000002 HC RX W HCPCS: Performed by: PHYSICIAN ASSISTANT

## 2020-06-30 PROCEDURE — 85610 PROTHROMBIN TIME: CPT

## 2020-06-30 PROCEDURE — 94761 N-INVAS EAR/PLS OXIMETRY MLT: CPT

## 2020-06-30 PROCEDURE — 2580000003 HC RX 258: Performed by: SURGERY

## 2020-06-30 PROCEDURE — 2500000003 HC RX 250 WO HCPCS: Performed by: SURGERY

## 2020-06-30 PROCEDURE — 82962 GLUCOSE BLOOD TEST: CPT

## 2020-06-30 PROCEDURE — 6360000002 HC RX W HCPCS: Performed by: SURGERY

## 2020-06-30 PROCEDURE — 94150 VITAL CAPACITY TEST: CPT

## 2020-06-30 PROCEDURE — 6370000000 HC RX 637 (ALT 250 FOR IP): Performed by: INTERNAL MEDICINE

## 2020-06-30 PROCEDURE — 2000000000 HC ICU R&B

## 2020-06-30 PROCEDURE — 85027 COMPLETE CBC AUTOMATED: CPT

## 2020-06-30 PROCEDURE — 2700000000 HC OXYGEN THERAPY PER DAY

## 2020-06-30 RX ORDER — NITROGLYCERIN 20 MG/100ML
3 INJECTION INTRAVENOUS CONTINUOUS
Status: DISCONTINUED | OUTPATIENT
Start: 2020-06-30 | End: 2020-06-30 | Stop reason: SDUPTHER

## 2020-06-30 RX ORDER — NITROGLYCERIN 20 MG/100ML
5 INJECTION INTRAVENOUS CONTINUOUS
Status: DISCONTINUED | OUTPATIENT
Start: 2020-06-30 | End: 2020-07-03

## 2020-06-30 RX ORDER — MORPHINE SULFATE 2 MG/ML
2 INJECTION, SOLUTION INTRAMUSCULAR; INTRAVENOUS ONCE
Status: COMPLETED | OUTPATIENT
Start: 2020-06-30 | End: 2020-06-30

## 2020-06-30 RX ORDER — MORPHINE SULFATE 2 MG/ML
2 INJECTION, SOLUTION INTRAMUSCULAR; INTRAVENOUS ONCE
Status: DISCONTINUED | OUTPATIENT
Start: 2020-06-30 | End: 2020-07-03

## 2020-06-30 RX ADMIN — SODIUM CHLORIDE, PRESERVATIVE FREE 10 ML: 5 INJECTION INTRAVENOUS at 08:18

## 2020-06-30 RX ADMIN — IPRATROPIUM BROMIDE AND ALBUTEROL SULFATE 1 AMPULE: .5; 3 SOLUTION RESPIRATORY (INHALATION) at 07:33

## 2020-06-30 RX ADMIN — NITROGLYCERIN 10 MCG/MIN: 20 INJECTION INTRAVENOUS at 08:27

## 2020-06-30 RX ADMIN — HYDRALAZINE HYDROCHLORIDE 10 MG: 20 INJECTION INTRAMUSCULAR; INTRAVENOUS at 00:13

## 2020-06-30 RX ADMIN — ISOSORBIDE MONONITRATE 30 MG: 30 TABLET, EXTENDED RELEASE ORAL at 08:12

## 2020-06-30 RX ADMIN — Medication: at 13:01

## 2020-06-30 RX ADMIN — IPRATROPIUM BROMIDE AND ALBUTEROL SULFATE 1 AMPULE: .5; 3 SOLUTION RESPIRATORY (INHALATION) at 19:13

## 2020-06-30 RX ADMIN — AMLODIPINE BESYLATE 10 MG: 10 TABLET ORAL at 08:12

## 2020-06-30 RX ADMIN — TRAZODONE HYDROCHLORIDE 50 MG: 50 TABLET ORAL at 21:50

## 2020-06-30 RX ADMIN — ASPIRIN 81 MG: 81 TABLET, COATED ORAL at 08:12

## 2020-06-30 RX ADMIN — CALCIUM GLUCONATE 1 G: 98 INJECTION, SOLUTION INTRAVENOUS at 14:35

## 2020-06-30 RX ADMIN — SODIUM CHLORIDE, PRESERVATIVE FREE 10 ML: 5 INJECTION INTRAVENOUS at 08:46

## 2020-06-30 RX ADMIN — CYCLOBENZAPRINE 10 MG: 10 TABLET, FILM COATED ORAL at 05:51

## 2020-06-30 RX ADMIN — CARVEDILOL 25 MG: 25 TABLET, FILM COATED ORAL at 08:12

## 2020-06-30 RX ADMIN — IPRATROPIUM BROMIDE AND ALBUTEROL SULFATE 1 AMPULE: .5; 3 SOLUTION RESPIRATORY (INHALATION) at 12:50

## 2020-06-30 RX ADMIN — HYDRALAZINE HYDROCHLORIDE 10 MG: 20 INJECTION INTRAMUSCULAR; INTRAVENOUS at 08:12

## 2020-06-30 RX ADMIN — CYCLOBENZAPRINE 10 MG: 10 TABLET, FILM COATED ORAL at 21:50

## 2020-06-30 RX ADMIN — MORPHINE SULFATE 2 MG: 2 INJECTION, SOLUTION INTRAMUSCULAR; INTRAVENOUS at 08:18

## 2020-06-30 RX ADMIN — SODIUM CHLORIDE: 9 INJECTION, SOLUTION INTRAVENOUS at 13:00

## 2020-06-30 RX ADMIN — CARVEDILOL 25 MG: 25 TABLET, FILM COATED ORAL at 18:18

## 2020-06-30 RX ADMIN — ATORVASTATIN CALCIUM 40 MG: 40 TABLET, FILM COATED ORAL at 20:15

## 2020-06-30 ASSESSMENT — PAIN SCALES - GENERAL
PAINLEVEL_OUTOF10: 0
PAINLEVEL_OUTOF10: 8
PAINLEVEL_OUTOF10: 5
PAINLEVEL_OUTOF10: 6
PAINLEVEL_OUTOF10: 0

## 2020-06-30 ASSESSMENT — PAIN DESCRIPTION - FREQUENCY: FREQUENCY: CONTINUOUS

## 2020-06-30 ASSESSMENT — PAIN DESCRIPTION - PROGRESSION: CLINICAL_PROGRESSION: GRADUALLY IMPROVING

## 2020-06-30 ASSESSMENT — PAIN DESCRIPTION - DESCRIPTORS: DESCRIPTORS: ACHING;CONSTANT

## 2020-06-30 ASSESSMENT — PAIN - FUNCTIONAL ASSESSMENT: PAIN_FUNCTIONAL_ASSESSMENT: PREVENTS OR INTERFERES SOME ACTIVE ACTIVITIES AND ADLS

## 2020-06-30 ASSESSMENT — PAIN DESCRIPTION - LOCATION: LOCATION: CHEST

## 2020-06-30 ASSESSMENT — PAIN DESCRIPTION - PAIN TYPE: TYPE: ACUTE PAIN

## 2020-06-30 ASSESSMENT — PAIN DESCRIPTION - ONSET: ONSET: ON-GOING

## 2020-06-30 ASSESSMENT — PAIN DESCRIPTION - ORIENTATION: ORIENTATION: LEFT;MID

## 2020-06-30 NOTE — CARE COORDINATION
CABG planned for 7/1. He is from home; stated [facility] PTA. He has a PCP and insurance that assist with Rx when needed. He was positive for cocaine and oxycodone on 6/29. Awaiting post op needs.  Dedrick Landau RN

## 2020-06-30 NOTE — PROGRESS NOTES
Patient very upset ab out  Not having surgery tomorrow. Trying to decide if he wants to leave AMA. Zoraida Mathis talking to patient about starting him on  meds to help his heart, b/p  And getting him fine tuned. Talked to him about having to have a negative urine tox for cocaine before they would be able to do the surgery. Patient very upset, ambulated  Patient in [place]. Discussed need to stay tonight so we can get him meds ajusted, so it will be safe to send him home. .  Starting to calm down, states he is not mad at anyone here, that he knows he has done it to himself. Returned to bed. Has agreed to stay tonight. Remains on NTG gtt. .  On monitors

## 2020-06-30 NOTE — PROGRESS NOTES
Continues to c/o chest pain. States better  After breathing treatment.   Started NTg gtt at 10/ min as per Dr Marcos Luna order

## 2020-06-30 NOTE — FLOWSHEET NOTE
Pt complaining of restless legs and states he takes flexeril. Pt also has BG of 353, Dr. Marlyn Carvalho on call made aware and orders received.

## 2020-06-30 NOTE — PROGRESS NOTES
Labs     06/30/20  0520   INR 0.99       Radiology Review:  preop imaging reviewed   UDS + for cocaine and oxycodone      ASSESSMENT:  Patient Active Problem List   Diagnosis    Chest pain    DM w/o complication type II (HCC)    Longstanding persistent atrial fibrillation    CAD (coronary artery disease)    Post PTCA    Unstable angina ([address]    HTN (hypertension)    Angina pectoris syndrome (HCC)-unstable    Smoking    COPD (chronic obstructive pulmonary disease) (HCC)    [facility] with [facility] response ([facility]    Chest pain    STEMI (ST elevation myocardial infarction) ([address]    Myocardial infarction acute ([address]    Nausea    New onset of congestive heart failure ([address]    COPD with acute exacerbation (HCC)    Acute pulmonary edema ([facility]    Pleural effusion, bilateral    Hypertensive urgency    Chronic systolic congestive heart failure ([address]    TIA (transient ischemic attack)    Chronic kidney disease, stage III (moderate) ([facility])    3-vessel coronary artery disease       CAD  Cocaine abuse    PLAN:     Lengthy discussion with pt about positive UDS for cocaine. We are cancelling surgery for tomorrow and will discuss with cardiology regarding plan for CABG in the future. Pt is threatening to leave AMA. Counseled him at length about the risk of MI or death if he leave as he has multiple blockages and ongoing CP. Discussed with Dr. Crow Lopez who will discuss further with cardiology. Adrianne Lisa PA-C      As above. Cocaine positive. Reviewed with team.  All are in agreement to cancel surgery in AM while future plans are carefully considered. Discussed with Dr. Pam Olivas. Concerns for cardiac event/periop complications after cocaine use is significant and thus careful consideration needs to be undertaken.

## 2020-06-30 NOTE — FLOWSHEET NOTE
Pt feeling short of breath and saturation at 98% on room air. 2L NC and pt states he still feels short of breath. Lungs diminished, MD paged for possible breathing tx.

## 2020-07-01 LAB
AMPHETAMINES: NEGATIVE
BARBITURATE SCREEN URINE: NEGATIVE
BENZODIAZEPINE SCREEN, URINE: NEGATIVE
CANNABINOID SCREEN URINE: NEGATIVE
COCAINE METABOLITE: ABNORMAL
GLUCOSE BLD-MCNC: 121 MG/DL (ref 70–99)
GLUCOSE BLD-MCNC: 234 MG/DL (ref 70–99)
GLUCOSE BLD-MCNC: 289 MG/DL (ref 70–99)
GLUCOSE BLD-MCNC: 384 MG/DL (ref 70–99)
OPIATES, URINE: NEGATIVE
OXYCODONE: NEGATIVE
PHENCYCLIDINE, URINE: NEGATIVE

## 2020-07-01 PROCEDURE — 2580000003 HC RX 258: Performed by: SURGERY

## 2020-07-01 PROCEDURE — 6370000000 HC RX 637 (ALT 250 FOR IP): Performed by: INTERNAL MEDICINE

## 2020-07-01 PROCEDURE — 80307 DRUG TEST PRSMV CHEM ANLYZR: CPT

## 2020-07-01 PROCEDURE — 6370000000 HC RX 637 (ALT 250 FOR IP): Performed by: SURGERY

## 2020-07-01 PROCEDURE — 2580000003 HC RX 258: Performed by: INTERNAL MEDICINE

## 2020-07-01 PROCEDURE — 2000000000 HC ICU R&B

## 2020-07-01 PROCEDURE — 94761 N-INVAS EAR/PLS OXIMETRY MLT: CPT

## 2020-07-01 PROCEDURE — 82962 GLUCOSE BLOOD TEST: CPT

## 2020-07-01 PROCEDURE — 6370000000 HC RX 637 (ALT 250 FOR IP): Performed by: THORACIC SURGERY (CARDIOTHORACIC VASCULAR SURGERY)

## 2020-07-01 PROCEDURE — 94640 AIRWAY INHALATION TREATMENT: CPT

## 2020-07-01 RX ADMIN — Medication: at 08:51

## 2020-07-01 RX ADMIN — AMLODIPINE BESYLATE 10 MG: 10 TABLET ORAL at 08:51

## 2020-07-01 RX ADMIN — CYCLOBENZAPRINE 10 MG: 10 TABLET, FILM COATED ORAL at 21:58

## 2020-07-01 RX ADMIN — ISOSORBIDE MONONITRATE 30 MG: 30 TABLET, EXTENDED RELEASE ORAL at 08:52

## 2020-07-01 RX ADMIN — SODIUM CHLORIDE, PRESERVATIVE FREE 10 ML: 5 INJECTION INTRAVENOUS at 21:59

## 2020-07-01 RX ADMIN — Medication: at 14:21

## 2020-07-01 RX ADMIN — CARVEDILOL 25 MG: 25 TABLET, FILM COATED ORAL at 18:52

## 2020-07-01 RX ADMIN — IPRATROPIUM BROMIDE AND ALBUTEROL SULFATE 1 AMPULE: .5; 3 SOLUTION RESPIRATORY (INHALATION) at 16:15

## 2020-07-01 RX ADMIN — SODIUM CHLORIDE, PRESERVATIVE FREE 10 ML: 5 INJECTION INTRAVENOUS at 08:52

## 2020-07-01 RX ADMIN — ASPIRIN 81 MG: 81 TABLET, COATED ORAL at 08:52

## 2020-07-01 RX ADMIN — ATORVASTATIN CALCIUM 40 MG: 40 TABLET, FILM COATED ORAL at 21:58

## 2020-07-01 RX ADMIN — TRAZODONE HYDROCHLORIDE 50 MG: 50 TABLET ORAL at 21:58

## 2020-07-01 RX ADMIN — CARVEDILOL 25 MG: 25 TABLET, FILM COATED ORAL at 08:52

## 2020-07-01 RX ADMIN — Medication: at 21:58

## 2020-07-01 ASSESSMENT — PAIN DESCRIPTION - ONSET
ONSET: ON-GOING
ONSET: ON-GOING

## 2020-07-01 ASSESSMENT — PAIN SCALES - GENERAL
PAINLEVEL_OUTOF10: 0
PAINLEVEL_OUTOF10: 1
PAINLEVEL_OUTOF10: 0
PAINLEVEL_OUTOF10: 6

## 2020-07-01 ASSESSMENT — PAIN DESCRIPTION - PAIN TYPE: TYPE: ACUTE PAIN

## 2020-07-01 ASSESSMENT — PAIN DESCRIPTION - LOCATION
LOCATION: CHEST
LOCATION: CHEST

## 2020-07-01 ASSESSMENT — PAIN DESCRIPTION - PROGRESSION
CLINICAL_PROGRESSION: GRADUALLY WORSENING
CLINICAL_PROGRESSION: GRADUALLY IMPROVING

## 2020-07-01 ASSESSMENT — PAIN DESCRIPTION - FREQUENCY
FREQUENCY: CONTINUOUS
FREQUENCY: INTERMITTENT

## 2020-07-01 ASSESSMENT — PAIN DESCRIPTION - DESCRIPTORS
DESCRIPTORS: ACHING
DESCRIPTORS: ACHING

## 2020-07-01 ASSESSMENT — PAIN DESCRIPTION - ORIENTATION
ORIENTATION: MID
ORIENTATION: MID;UPPER

## 2020-07-01 ASSESSMENT — PAIN - FUNCTIONAL ASSESSMENT
PAIN_FUNCTIONAL_ASSESSMENT: ACTIVITIES ARE NOT PREVENTED
PAIN_FUNCTIONAL_ASSESSMENT: ACTIVITIES ARE NOT PREVENTED

## 2020-07-01 NOTE — PLAN OF CARE
Problem: Cardiac:  Goal: Ability to maintain an adequate cardiac output will improve  Description: Ability to maintain an adequate cardiac output will improve  Outcome: Ongoing  Goal: Complications related to the disease process, condition or treatment will be avoided or minimized  Description: Complications related to the disease process, condition or treatment will be avoided or minimized  Outcome: Ongoing  Goal: Hemodynamic stability will improve  Description: Hemodynamic stability will improve  Outcome: Ongoing  Goal: Risk factors for ineffective tissue perfusion will decrease  Description: Risk factors for ineffective tissue perfusion will decrease  Outcome: Ongoing     Problem: Fluid Volume:  Goal: Will show no signs or symptoms of fluid imbalance  Description: Will show no signs or symptoms of fluid imbalance  Outcome: Ongoing     Problem: Infection:  Goal: Will remain free from infection  Description: Will remain free from infection  Outcome: Ongoing     Problem: Safety:  Goal: Free from accidental physical injury  Description: Free from accidental physical injury  Outcome: Ongoing  Goal: Free from intentional harm  Description: Free from intentional harm  Outcome: Ongoing     Problem: Daily Care:  Goal: Daily care needs are met  Description: Daily care needs are met  Outcome: Ongoing     Problem: Pain:  Goal: Patient's pain/discomfort is manageable  Description: Patient's pain/discomfort is manageable  Outcome: Ongoing     Problem: Skin Integrity:  Goal: Skin integrity will stabilize  Description: Skin integrity will stabilize  Outcome: Ongoing     Problem: Discharge Planning:  Goal: Patients continuum of care needs are met  Description: Patients continuum of care needs are met  Outcome: Ongoing

## 2020-07-01 NOTE — PROGRESS NOTES
Kenneth Perez here. Discussed positive  Urine tox screen. Plane to  Keep patient here, abnd do  Urine tox screemn in Am  Then plan for surgery. Order to stop NTG drip and  See if patient tolerated being off.   States to restart if patient develops chest pain

## 2020-07-01 NOTE — PROGRESS NOTES
Hospitalist Progress Note      Name:  Shilpi Vizcarra /Age/Sex: 1960  (62 y.o. male)   MRN & CSN:  [id number] & [id number] Admission Date/Time: 2020  1:21 AM   Location:  -A PCP: Michael Dee MD         Hospital Day: 3    Assessment and Plan:   Shilpi Vizcarra is a 62 y.o.  male  who presents with Angina pectoris syndrome ([address]       --- Chest pain due to NSTEMI  --- Multivessel disease on left heart cath 2020 [70% left main stenosis, 60 to 70% ostial LAD, 70 to 80% proximal LAD and 80 to 90% diagonal, 79 to 80% ostial left circumflex, 99% RCA stenosis]  Plan  Continue aspirin 81 mg daily, statin  On nitroglycerin drip for anginal pain control. Cardiology on board. CABG postponed for now because Ur tox 2020 was positive for cocaine. CT surgery to schedule CABG after U tox clear. Drug screen to be repeated today.        Other diagnoses  Current active cigarette smoker, 1.5 pack/day nicotine patch.-   Hypertension -continue amlodipine, Imdur and carvedilol  Hyperlipidemia -statin. Type 2 diabetes mellitus, well controlled. Hemoglobin A1c 6.9%. We will cover blood sugars with sliding scale insulin. Chronic atrial fibrillation- rate controlled on beta-blockers. On chronic anticoagulation with apixaban but currently held due to planned CABG. CKD 3- creatinine at baseline. Cocaine user    Diet DIET CARDIAC;   DVT Prophylaxis [] Lovenox, []  Heparin, [] SCDs, [] Ambulation   GI Prophylaxis [] PPI,  [] H2 Blocker,  [] Carafate,  [] Diet/Tube Feeds   Code Status Full Code   Disposition  to be determined   MDM [] Low, [] Moderate,[x]  High     History of Present Illness:     Chief Complaint: Angina pectoris syndrome [facility])    Patient seen and examined. No chest pain this morning but he is on nitroglycerin drip. CABG postponed until Utox clean. Ten point ROS reviewed negative, unless as noted above    Objective:        Intake/Output Summary (Last 24 hours) at 2020 [address] [facility] filed at 7/1/2020 1000  Gross per 24 hour   Intake 1537 ml   Output 1500 ml   Net 37 ml      Vitals:   Vitals:    07/01/20 1002   BP: 134/86   Pulse: 90   Resp: 27   Temp:    SpO2:      Physical Exam:   GEN Awake male, sitting upright in bed. RESP breathing comfortably on room air. CARDIO/VASC S1/S2 auscultated. Regular rate without appreciable murmurs. No peripheral edema. GI Abdomen is soft without significant tenderness, masses, or guarding. Bowel sounds are normoactive. MSK No gross joint deformities. SKIN Normal coloration, warm, dry. NEURO normal speech, no lateralizing weakness. PSYCH Awake, alert, oriented x 3.     Medications:   Medications:    morphine  2 mg Intravenous Once    mupirocin   Topical TID    amLODIPine  10 mg Oral Daily    carvedilol  25 mg Oral BID     isosorbide mononitrate  30 mg Oral Daily    atorvastatin  40 mg Oral Nightly    sodium chloride flush  10 mL Intravenous 2 times per day    sodium chloride flush  10 mL Intravenous 2 times per day    aspirin  81 mg Oral Daily    fleet  1 enema Rectal Once    lidocaine  1 patch Transdermal Daily    nicotine  1 patch Transdermal Daily    insulin lispro  0-18 Units Subcutaneous TID     insulin lispro  0-9 Units Subcutaneous Nightly      Infusions:    nitroGLYCERIN 10 mcg/min (06/30/20 0827)    dextrose       PRN Meds: polyethylene glycol, 17 g, Daily PRN  promethazine, 12.5 mg, Q6H PRN    Or  ondansetron, 4 mg, Q6H PRN  glucose, 15 g, PRN  dextrose, 12.5 g, PRN  glucagon (rDNA), 1 mg, PRN  dextrose, 100 mL/hr, PRN  sodium chloride flush, 10 mL, PRN  acetaminophen, 650 mg, Q4H PRN  sodium chloride flush, 10 mL, PRN  nitroGLYCERIN, 0.4 mg, Q5 Min PRN  hydrALAZINE, 10 mg, Q4H PRN  traZODone, 50 mg, Nightly PRN  ipratropium-albuterol, 1 ampule, Q4H PRN  cyclobenzaprine, 10 mg, TID PRN        CBC   Recent Labs     06/29/20  0030 06/30/20  0520   WBC 9.5 8.7   HGB 14.6 14.6   HCT 42.8 44.2    193 BMP   Recent Labs     06/29/20  0030 06/30/20  0520    138   K 3.4* 3.6    102   CO2 27 28   BUN 27* 22   CREATININE 1.5* 1.4*       Radiology report reviewed:   [facility]      Procedure Summary   1. Severe three vessel CAD with significant Left Main disease. 2. Regional & global WMA with moderate reduction of LV function. LVEF is 35 to 40 %.       Electronically signed by Burton Pugh MD on 7/1/2020 at 11:22 AM

## 2020-07-01 NOTE — PROGRESS NOTES
PATIENT NAME: Jayne Buenrostro    TODAY'S DATE: 07/01/20    Reason for today's visit: CAD    SUBJECTIVE:    Pt is feeling well. Having some mild SOB and wants a breathing treatment. Denies CP but still on nitro. OBJECTIVE:   VITALS:    Vitals:    07/01/20 1302   BP: (!) 136/93   Pulse: 76   Resp:    Temp:    SpO2:      INTAKE/OUTPUT:    Date 07/01/20 0000 - 07/01/20 2359   Shift 1285-5789 7622-0212 0957-2364 24 Hour Total   INTAKE   P.O.  550  550   I. V.(mL/kg/hr) 153(0.2)   153   Shift Total(mL/kg) 153(1.3) 550(4.7)  703(6)   OUTPUT   Urine(mL/kg/hr) 400(0.4) 550  950   Shift Total(mL/kg) 400(3.4) 550(4.7)  950(8.1)   Weight (kg) 116.7 116.7 116.7 116.7      Patient Vitals for the past 96 hrs (Last 3 readings):   Weight   06/29/20 1202 257 lb 4.4 oz (116.7 kg)   06/29/20 0020 257 lb (116.6 kg)       EXAM:  Constitutional: Blood pressure (!) 136/93, pulse 76, temperature 98.1 °F (36.7 °C), temperature source Oral, resp. rate 11, height 6' 3\" (1.905 m), weight 257 lb 4.4 oz (116.7 kg), SpO2 97 %. No apparent distress, appears stated age and cooperative. Neurologic: follows commands, no focal weakness noted   Lungs: Good respiratory effort. Clear to auscultation,   CV: Regular rate/ rhythm , no peripheral edema, feet warm and well perfused  GI: Soft, non-tender in all four quadrants, non-distended, + bowel sounds, spleen and liver no palpable masses   : bladder nondistended   MSK: no obvious deformity   Skin: warm, pink and dry       Data:  CBC:   Recent Labs     06/29/20  0030 06/30/20  0520   WBC 9.5 8.7   HGB 14.6 14.6   HCT 42.8 44.2    193     BMP:    Recent Labs     06/29/20  0030 06/30/20  0520    138   K 3.4* 3.6    102   CO2 27 28   BUN 27* 22   CREATININE 1.5* 1.4*   GLUCOSE 303* 207*     Hepatic:   Recent Labs     06/29/20  0030   AST 17   ALT 10   BILITOT 0.2   ALKPHOS 136*     Mag:    No results for input(s): MG in the last 72 hours.    Phos:   No results for input(s): PHOS in the last 72 hours. INR:   Recent Labs     06/30/20  0520   INR 0.99     UDS    Drug screen multi urine [[id number]] (Abnormal)    Collected: 07/01/20 1043    Updated: 07/01/20 1153    Specimen Source: Urine, clean catch     Cannabinoid Scrn, Ur NEGATIVE    Amphetamines NEGATIVE    Cocaine Metabolite UNCONFIRMED POSITIVEAbnormal     Benzodiazepine Screen, Urine NEGATIVE    Barbiturate Screen, Ur NEGATIVE    Opiates, Urine NEGATIVE    Phencyclidine, Urine NEGATIVE    Oxycodone NEGATIVE           ASSESSMENT:  Patient Active Problem List   Diagnosis    Chest pain    DM w/o complication type II (HCC)    Longstanding persistent atrial fibrillation    CAD (coronary artery disease)    Post PTCA    [facility]    HTN (hypertension)    Angina pectoris syndrome ([facility]unstable    Smoking    COPD (chronic obstructive pulmonary disease) ([facility]    Atrial fibrillation with rapid ventricular response ([facility]    Chest pain    STEMI (ST elevation myocardial infarction) ([facility]    Myocardial infarction acute ([facility]    Nausea    New onset of congestive heart failure ([facility])    COPD with acute exacerbation ([facility])    Acute pulmonary edema ([facility]    Pleural effusion, bilateral    Hypertensive urgency    Chronic systolic congestive heart failure ([facility])    TIA (transient ischemic attack)    Chronic kidney disease, stage III (moderate) ([facility])    3-vessel coronary artery disease       3 vessel CAD  Cocaine abuse    PLAN:     Repeat UDS today was again positive for cocaine. Will plan to repeat tomorrow and recheck labs. anesthesia is too high risk while positive for cocaine. Wean nitro gtt. Optimize medically.      Carolina Peterson PA-C

## 2020-07-02 ENCOUNTER — ANESTHESIA EVENT (OUTPATIENT)
Dept: OPERATING ROOM | Age: 60
DRG: 229 | End: 2020-07-02
Payer: COMMERCIAL

## 2020-07-02 LAB
AMPHETAMINES PLASMA: NEGATIVE NG/ML
AMPHETAMINES: NEGATIVE
ANION GAP SERPL CALCULATED.3IONS-SCNC: 10 MMOL/L (ref 4–16)
APTT: 33.3 SECONDS (ref 25.1–37.1)
APTT: 36.7 SECONDS (ref 25.1–37.1)
BACTERIA: NEGATIVE /HPF
BARBITURATE SCREEN URINE: NEGATIVE
BARBITURATES: NEGATIVE NG/ML
BENZODIAZEPINE SCREEN, URINE: NEGATIVE
BENZODIAZEPINES SCREEN SERUM: NEGATIVE NG/ML
BILIRUBIN URINE: NEGATIVE MG/DL
BLOOD, URINE: NEGATIVE
BUN BLDV-MCNC: 19 MG/DL (ref 6–23)
BUPRENORPHINE: NEGATIVE NG/ML
CALCIUM SERPL-MCNC: 9.3 MG/DL (ref 8.3–10.6)
CANNABINOID PLASMA: NEGATIVE NG/ML
CANNABINOID SCREEN URINE: NEGATIVE
CHLORIDE BLD-SCNC: 102 MMOL/L (ref 99–110)
CLARITY: CLEAR
CO2: 26 MMOL/L (ref 21–32)
COCAINE METABOLITE: NEGATIVE
COCAINE PLASMA: POSITIVE NG/ML
COLOR: ABNORMAL
CREAT SERPL-MCNC: 1.1 MG/DL (ref 0.9–1.3)
GFR AFRICAN AMERICAN: >60 ML/MIN/1.73M2
GFR NON-AFRICAN AMERICAN: >60 ML/MIN/1.73M2
GLUCOSE BLD-MCNC: 221 MG/DL (ref 70–99)
GLUCOSE BLD-MCNC: 225 MG/DL (ref 70–99)
GLUCOSE BLD-MCNC: 245 MG/DL (ref 70–99)
GLUCOSE BLD-MCNC: 250 MG/DL (ref 70–99)
GLUCOSE, URINE: >500 MG/DL
HCT VFR BLD CALC: 40.1 % (ref 42–52)
HEMOGLOBIN: 13 GM/DL (ref 13.5–18)
KETONES, URINE: NEGATIVE MG/DL
LEUKOCYTE ESTERASE, URINE: NEGATIVE
Lab: NORMAL
MCH RBC QN AUTO: 30 PG (ref 27–31)
MCHC RBC AUTO-ENTMCNC: 32.4 % (ref 32–36)
MCV RBC AUTO: 92.4 FL (ref 78–100)
METHADONE PLASMA: NEGATIVE NG/ML
METHAMPHETAMINE: NEGATIVE NG/ML
MUCUS: ABNORMAL HPF
NITRITE URINE, QUANTITATIVE: NEGATIVE
OPIATES PLASMA: NEGATIVE NG/ML
OPIATES, URINE: NEGATIVE
OXYCODONE: NEGATIVE
OXYCODONE: NEGATIVE NG/ML
PDW BLD-RTO: 13.1 % (ref 11.7–14.9)
PH, URINE: 6 (ref 5–8)
PHENCYCLIDINE PLASMA: NEGATIVE NG/ML
PHENCYCLIDINE, URINE: NEGATIVE
PLATELET # BLD: 195 K/CU MM (ref 140–440)
PMV BLD AUTO: 11.4 FL (ref 7.5–11.1)
POTASSIUM SERPL-SCNC: 3.7 MMOL/L (ref 3.5–5.1)
PROTEIN UA: NEGATIVE MG/DL
RBC # BLD: 4.34 M/CU MM (ref 4.6–6.2)
RBC URINE: ABNORMAL /HPF (ref 0–3)
SODIUM BLD-SCNC: 138 MMOL/L (ref 135–145)
SPECIFIC GRAVITY UA: 1 (ref 1–1.03)
TRICHOMONAS: ABNORMAL /HPF
UROBILINOGEN, URINE: NORMAL MG/DL (ref 0.2–1)
WBC # BLD: 8.3 K/CU MM (ref 4–10.5)
WBC UA: ABNORMAL /HPF (ref 0–2)

## 2020-07-02 PROCEDURE — 85730 THROMBOPLASTIN TIME PARTIAL: CPT

## 2020-07-02 PROCEDURE — 2000000000 HC ICU R&B

## 2020-07-02 PROCEDURE — 94761 N-INVAS EAR/PLS OXIMETRY MLT: CPT

## 2020-07-02 PROCEDURE — 85027 COMPLETE CBC AUTOMATED: CPT

## 2020-07-02 PROCEDURE — 86900 BLOOD TYPING SEROLOGIC ABO: CPT

## 2020-07-02 PROCEDURE — 81001 URINALYSIS AUTO W/SCOPE: CPT

## 2020-07-02 PROCEDURE — 6360000002 HC RX W HCPCS: Performed by: SURGERY

## 2020-07-02 PROCEDURE — 6360000002 HC RX W HCPCS: Performed by: PHYSICIAN ASSISTANT

## 2020-07-02 PROCEDURE — 6370000000 HC RX 637 (ALT 250 FOR IP): Performed by: SURGERY

## 2020-07-02 PROCEDURE — 86901 BLOOD TYPING SEROLOGIC RH(D): CPT

## 2020-07-02 PROCEDURE — 6370000000 HC RX 637 (ALT 250 FOR IP): Performed by: INTERNAL MEDICINE

## 2020-07-02 PROCEDURE — 80048 BASIC METABOLIC PNL TOTAL CA: CPT

## 2020-07-02 PROCEDURE — 86850 RBC ANTIBODY SCREEN: CPT

## 2020-07-02 PROCEDURE — 82962 GLUCOSE BLOOD TEST: CPT

## 2020-07-02 PROCEDURE — 80307 DRUG TEST PRSMV CHEM ANLYZR: CPT

## 2020-07-02 PROCEDURE — 2500000003 HC RX 250 WO HCPCS: Performed by: SURGERY

## 2020-07-02 PROCEDURE — 94640 AIRWAY INHALATION TREATMENT: CPT

## 2020-07-02 PROCEDURE — 87081 CULTURE SCREEN ONLY: CPT

## 2020-07-02 PROCEDURE — 6370000000 HC RX 637 (ALT 250 FOR IP): Performed by: THORACIC SURGERY (CARDIOTHORACIC VASCULAR SURGERY)

## 2020-07-02 RX ORDER — SODIUM CHLORIDE 0.9 % (FLUSH) 0.9 %
10 SYRINGE (ML) INJECTION PRN
Status: DISCONTINUED | OUTPATIENT
Start: 2020-07-02 | End: 2020-07-03

## 2020-07-02 RX ORDER — HEPARIN SODIUM 10000 [USP'U]/100ML
8.6 INJECTION, SOLUTION INTRAVENOUS CONTINUOUS
Status: DISCONTINUED | OUTPATIENT
Start: 2020-07-02 | End: 2020-07-03 | Stop reason: ALTCHOICE

## 2020-07-02 RX ORDER — HEPARIN SODIUM 1000 [USP'U]/ML
2000 INJECTION, SOLUTION INTRAVENOUS; SUBCUTANEOUS ONCE
Status: COMPLETED | OUTPATIENT
Start: 2020-07-02 | End: 2020-07-02

## 2020-07-02 RX ORDER — CHLORHEXIDINE GLUCONATE 0.12 MG/ML
30 RINSE ORAL SEE ADMIN INSTRUCTIONS
Status: COMPLETED | OUTPATIENT
Start: 2020-07-02 | End: 2020-07-03

## 2020-07-02 RX ORDER — SODIUM CHLORIDE 0.9 % (FLUSH) 0.9 %
10 SYRINGE (ML) INJECTION EVERY 12 HOURS SCHEDULED
Status: DISCONTINUED | OUTPATIENT
Start: 2020-07-02 | End: 2020-07-03

## 2020-07-02 RX ORDER — CARVEDILOL 3.12 MG/1
3.12 TABLET ORAL ONCE
Status: COMPLETED | OUTPATIENT
Start: 2020-07-03 | End: 2020-07-03

## 2020-07-02 RX ORDER — CEFAZOLIN SODIUM 2 G/50ML
2 SOLUTION INTRAVENOUS
Status: COMPLETED | OUTPATIENT
Start: 2020-07-03 | End: 2020-07-03

## 2020-07-02 RX ORDER — HEPARIN SODIUM 1000 [USP'U]/ML
4000 INJECTION, SOLUTION INTRAVENOUS; SUBCUTANEOUS PRN
Status: DISCONTINUED | OUTPATIENT
Start: 2020-07-02 | End: 2020-07-03 | Stop reason: ALTCHOICE

## 2020-07-02 RX ORDER — HEPARIN SODIUM 1000 [USP'U]/ML
2000 INJECTION, SOLUTION INTRAVENOUS; SUBCUTANEOUS PRN
Status: DISCONTINUED | OUTPATIENT
Start: 2020-07-02 | End: 2020-07-03 | Stop reason: ALTCHOICE

## 2020-07-02 RX ORDER — HEPARIN SODIUM 1000 [USP'U]/ML
4000 INJECTION, SOLUTION INTRAVENOUS; SUBCUTANEOUS ONCE
Status: COMPLETED | OUTPATIENT
Start: 2020-07-02 | End: 2020-07-02

## 2020-07-02 RX ORDER — ATORVASTATIN CALCIUM 10 MG/1
10 TABLET, FILM COATED ORAL NIGHTLY
Status: DISCONTINUED | OUTPATIENT
Start: 2020-07-02 | End: 2020-07-03

## 2020-07-02 RX ORDER — NITROGLYCERIN 0.4 MG/1
0.4 TABLET SUBLINGUAL EVERY 5 MIN PRN
Status: DISCONTINUED | OUTPATIENT
Start: 2020-07-02 | End: 2020-07-03

## 2020-07-02 RX ADMIN — HEPARIN SODIUM 4000 UNITS: 1000 INJECTION, SOLUTION INTRAVENOUS; SUBCUTANEOUS at 11:15

## 2020-07-02 RX ADMIN — ATORVASTATIN CALCIUM 10 MG: 10 TABLET, FILM COATED ORAL at 21:13

## 2020-07-02 RX ADMIN — Medication: at 10:00

## 2020-07-02 RX ADMIN — NITROGLYCERIN 60 MCG/MIN: 20 INJECTION INTRAVENOUS at 16:42

## 2020-07-02 RX ADMIN — IPRATROPIUM BROMIDE AND ALBUTEROL SULFATE 1 AMPULE: .5; 3 SOLUTION RESPIRATORY (INHALATION) at 15:14

## 2020-07-02 RX ADMIN — Medication: at 21:13

## 2020-07-02 RX ADMIN — AMLODIPINE BESYLATE 10 MG: 10 TABLET ORAL at 10:00

## 2020-07-02 RX ADMIN — ASPIRIN 81 MG: 81 TABLET, COATED ORAL at 10:00

## 2020-07-02 RX ADMIN — HEPARIN SODIUM AND DEXTROSE 8.6 UNITS/KG/HR: 10000; 5 INJECTION INTRAVENOUS at 11:16

## 2020-07-02 RX ADMIN — HEPARIN SODIUM 2000 UNITS: 1000 INJECTION INTRAVENOUS; SUBCUTANEOUS at 19:06

## 2020-07-02 RX ADMIN — ISOSORBIDE MONONITRATE 30 MG: 30 TABLET, EXTENDED RELEASE ORAL at 10:00

## 2020-07-02 ASSESSMENT — PAIN DESCRIPTION - PROGRESSION: CLINICAL_PROGRESSION: NOT CHANGED

## 2020-07-02 ASSESSMENT — PAIN SCALES - GENERAL
PAINLEVEL_OUTOF10: 2
PAINLEVEL_OUTOF10: 0
PAINLEVEL_OUTOF10: 1
PAINLEVEL_OUTOF10: 0

## 2020-07-02 ASSESSMENT — PAIN DESCRIPTION - ORIENTATION: ORIENTATION: MID

## 2020-07-02 ASSESSMENT — PAIN DESCRIPTION - DESCRIPTORS: DESCRIPTORS: SHARP

## 2020-07-02 ASSESSMENT — PAIN - FUNCTIONAL ASSESSMENT: PAIN_FUNCTIONAL_ASSESSMENT: ACTIVITIES ARE NOT PREVENTED

## 2020-07-02 ASSESSMENT — PAIN DESCRIPTION - LOCATION: LOCATION: CHEST

## 2020-07-02 ASSESSMENT — LIFESTYLE VARIABLES: SMOKING_STATUS: 1

## 2020-07-02 ASSESSMENT — PAIN DESCRIPTION - ONSET: ONSET: SUDDEN

## 2020-07-02 ASSESSMENT — PAIN DESCRIPTION - PAIN TYPE: TYPE: ACUTE PAIN

## 2020-07-02 ASSESSMENT — PAIN DESCRIPTION - FREQUENCY: FREQUENCY: INTERMITTENT

## 2020-07-02 NOTE — PROGRESS NOTES
Dr. Dickson Shook at bedside and explains surgery to patient. Consents signed. Patient verbalizes understanding.

## 2020-07-02 NOTE — PROGRESS NOTES
Patient is complaining of jaw pain on left side 2/10 described as tightness. Sara SOLORIO at bedside. Nitro increased to 40 mcg/min and verbal order to start heparin. LISS VAUGHAN [facility] PA notified surgeons.

## 2020-07-02 NOTE — PROGRESS NOTES
Patient is having some periods of heart rate in 30's with recovery to 80', patient complaining of slight chest pain and increased SOB. Sara SOLORIO on unit and aware. Nitro increased to 20 mcg/min.

## 2020-07-02 NOTE — FLOWSHEET NOTE
Spoke with patient about postop cardiac requirements for going home after he posed the question. Emotional support given. Questions asked and answered. Emotional support given. Teaching done about nicotine, cocaine and diabetes and the effect it has on his vessels. Patient stated understanding.  Will monitor

## 2020-07-02 NOTE — PROGRESS NOTES
Hospitalist Progress Note      Name:  Bk Cox /Age/Sex: 1960  (62 y.o. male)   MRN & CSN:  [id number] & [id number] Admission Date/Time: 2020  1:21 AM   Location:  -A PCP: Kevin Gonzalez MD         Hospital Day: 4    Assessment and Plan:   Bk Cox is a 62 y.o.  male  who presents with Angina pectoris syndrome ([address]        1) NSTEMI with Multi-vessel disease  -Left heart cath 2020 [70% left main stenosis, 60 to 70% ostial LAD, 70 to 80% proximal LAD and 80 to 90% diagonal, 79 to 80% ostial left circumflex, 99% RCA stenosis]  -Continue ASA, Lipitor, IV Heparin  -CTS on board; for CABG on 2020  -Last UDS today was neg        Other diagnoses  -Nicotine use disorder: continue NRT   -Hypertension; continue amlodipine, Imdur and carvedilol  -Hyperlipidemia: statin. -Type 2 diabetes mellitus, well controlled.  Hemoglobin A1c 6.9%.  Continue insulin coverage  -Chronic atrial fibrillation- rate controlled on beta-blockers.    On chronic anticoagulation with apixaban but currently held due to planned CABG. -CKD 3- creatinine at baseline.  -Cocaine use disorder: Counseled    Diet DIET CARDIAC;   DVT Prophylaxis [] Lovenox, []  Heparin, [] SCDs, [] Ambulation   GI Prophylaxis [] PPI,  [] H2 Blocker,  [] Carafate,  [] Diet/Tube Feeds   Code Status Full Code   Disposition TBD   MDM      History of Present Illness:     Patient was seen and examined  Denied further chest pain  No worsening SOB, palpitations  No fever, chills, N/V/D       Ten point ROS reviewed negative, unless as noted above    Objective: Intake/Output Summary (Last 24 hours) at 2020 1303  Last data filed at 2020 0900  Gross per 24 hour   Intake 1646 ml   Output 2650 ml   Net -1004 ml      Vitals:   Vitals:    20 1233   BP: (!) 147/95   Pulse: 73   Resp: 9   Temp:    SpO2:      Physical Exam:   GEN Awake male, sitting upright in bed in no apparent distress. Appears given age.   EYES Pupils are equally round. No scleral erythema, discharge, or conjunctivitis. HENT Mucous membranes are moist. Oral pharynx without exudates, no evidence of thrush. NECK Supple, no apparent thyromegaly or masses. RESP Clear to auscultation, no wheezes, rales or rhonchi. Symmetric chest movement while on room air. CARDIO/VASC S1/S2 auscultated. Regular rate without appreciable murmurs, rubs, or gallops. No JVD or carotid bruits. Peripheral pulses equal bilaterally and palpable. No peripheral edema. GI Abdomen is soft without significant tenderness, masses, or guarding. Bowel sounds are normoactive. Rectal exam deferred.  No costovertebral angle tenderness. Normal appearing external genitalia. Maddox catheter is not present. HEME/LYMPH No palpable cervical lymphadenopathy and no hepatosplenomegaly. No petechiae or ecchymoses. MSK No gross joint deformities. SKIN Normal coloration, warm, dry. NEURO Cranial nerves appear grossly intact, normal speech, no lateralizing weakness. PSYCH Awake, alert, oriented x 4. Affect appropriate.     Medications:   Medications:    morphine  2 mg Intravenous Once    mupirocin   Topical TID    amLODIPine  10 mg Oral Daily    carvedilol  25 mg Oral BID     isosorbide mononitrate  30 mg Oral Daily    atorvastatin  40 mg Oral Nightly    sodium chloride flush  10 mL Intravenous 2 times per day    sodium chloride flush  10 mL Intravenous 2 times per day    aspirin  81 mg Oral Daily    fleet  1 enema Rectal Once    lidocaine  1 patch Transdermal Daily    nicotine  1 patch Transdermal Daily    insulin lispro  0-18 Units Subcutaneous TID     insulin lispro  0-9 Units Subcutaneous Nightly      Infusions:    heparin (porcine) 8.6 Units/kg/hr (07/02/20 1116)    nitroGLYCERIN 50 mcg/min (07/02/20 1046)    dextrose       PRN Meds: heparin (porcine), 4,000 Units, PRN  heparin (porcine), 2,000 Units, PRN  polyethylene glycol, 17 g, Daily PRN  promethazine, 12.5 mg, Q6H PRN Or  ondansetron, 4 mg, Q6H PRN  glucose, 15 g, PRN  dextrose, 12.5 g, PRN  glucagon (rDNA), 1 mg, PRN  dextrose, 100 mL/hr, PRN  sodium chloride flush, 10 mL, PRN  acetaminophen, 650 mg, Q4H PRN  sodium chloride flush, 10 mL, PRN  nitroGLYCERIN, 0.4 mg, Q5 Min PRN  hydrALAZINE, 10 mg, Q4H PRN  traZODone, 50 mg, Nightly PRN  ipratropium-albuterol, 1 ampule, Q4H PRN  cyclobenzaprine, 10 mg, TID PRN          Electronically signed by Jovani King MD on 7/2/2020 at 1:03 PM

## 2020-07-03 ENCOUNTER — ANESTHESIA (OUTPATIENT)
Dept: OPERATING ROOM | Age: 60
DRG: 229 | End: 2020-07-03
Payer: COMMERCIAL

## 2020-07-03 ENCOUNTER — APPOINTMENT (OUTPATIENT)
Dept: GENERAL RADIOLOGY | Age: 60
DRG: 229 | End: 2020-07-03
Payer: COMMERCIAL

## 2020-07-03 ENCOUNTER — APPOINTMENT (OUTPATIENT)
Dept: CT IMAGING | Age: 60
DRG: 229 | End: 2020-07-03
Payer: COMMERCIAL

## 2020-07-03 VITALS
SYSTOLIC BLOOD PRESSURE: 138 MMHG | OXYGEN SATURATION: 99 % | DIASTOLIC BLOOD PRESSURE: 117 MMHG | TEMPERATURE: 98.4 F | RESPIRATION RATE: 1 BRPM

## 2020-07-03 LAB
ABO/RH: NORMAL
ANION GAP SERPL CALCULATED.3IONS-SCNC: 9 MMOL/L (ref 4–16)
ANTIBODY SCREEN: NEGATIVE
APTT: 28.8 SECONDS (ref 25.1–37.1)
APTT: 52 SECONDS (ref 25.1–37.1)
BASE EXCESS MIXED: 0.4 (ref 0–1.2)
BASE EXCESS MIXED: 0.5 (ref 0–1.2)
BASE EXCESS MIXED: 1.3 (ref 0–1.2)
BASE EXCESS MIXED: 1.4 (ref 0–1.2)
BASE EXCESS MIXED: 2.5 (ref 0–1.2)
BASE EXCESS MIXED: 2.7 (ref 0–1.2)
BASE EXCESS MIXED: 3.2 (ref 0–1.2)
BASE EXCESS MIXED: 3.2 (ref 0–1.2)
BASE EXCESS MIXED: 3.3 (ref 0–1.2)
BASE EXCESS MIXED: 3.3 (ref 0–1.2)
BASE EXCESS MIXED: 3.4 (ref 0–1.2)
BASE EXCESS MIXED: 3.4 (ref 0–1.2)
BASE EXCESS MIXED: 3.7 (ref 0–1.2)
BASE EXCESS MIXED: 3.8 (ref 0–1.2)
BASE EXCESS MIXED: 4.7 (ref 0–1.2)
BASE EXCESS MIXED: 4.8 (ref 0–1.2)
BASE EXCESS: ABNORMAL (ref 0–3.3)
BUN BLDV-MCNC: 18 MG/DL (ref 6–23)
CALCIUM SERPL-MCNC: 8.7 MG/DL (ref 8.3–10.6)
CHLORIDE BLD-SCNC: 108 MMOL/L (ref 99–110)
CO2 CONTENT: 27.4 MMOL/L (ref 19–24)
CO2: 23 MMOL/L (ref 21–32)
CO2: 23 MMOL/L (ref 21–32)
CO2: 24 MMOL/L (ref 21–32)
CO2: 25 MMOL/L (ref 21–32)
CO2: 25 MMOL/L (ref 21–32)
CO2: 26 MMOL/L (ref 21–32)
CO2: 26 MMOL/L (ref 21–32)
CO2: 27 MMOL/L (ref 21–32)
CO2: 28 MMOL/L (ref 21–32)
COMPONENT: NORMAL
COMPONENT: NORMAL
CREAT SERPL-MCNC: 1.4 MG/DL (ref 0.9–1.3)
CROSSMATCH RESULT: NORMAL
CROSSMATCH RESULT: NORMAL
GFR AFRICAN AMERICAN: 45 ML/MIN/1.73M2
GFR AFRICAN AMERICAN: 45 ML/MIN/1.73M2
GFR AFRICAN AMERICAN: 47 ML/MIN/1.73M2
GFR AFRICAN AMERICAN: >60 ML/MIN/1.73M2
GFR AFRICAN AMERICAN: >60 ML/MIN/1.73M2
GFR NON-AFRICAN AMERICAN: 37 ML/MIN/1.73M2
GFR NON-AFRICAN AMERICAN: 37 ML/MIN/1.73M2
GFR NON-AFRICAN AMERICAN: 39 ML/MIN/1.73M2
GFR NON-AFRICAN AMERICAN: 52 ML/MIN/1.73M2
GFR NON-AFRICAN AMERICAN: 52 ML/MIN/1.73M2
GLUCOSE BLD-MCNC: 111 MG/DL (ref 70–99)
GLUCOSE BLD-MCNC: 115 MG/DL (ref 70–99)
GLUCOSE BLD-MCNC: 116 MG/DL (ref 70–99)
GLUCOSE BLD-MCNC: 119 MG/DL (ref 70–99)
GLUCOSE BLD-MCNC: 126 MG/DL (ref 70–99)
GLUCOSE BLD-MCNC: 126 MG/DL (ref 70–99)
GLUCOSE BLD-MCNC: 127 MG/DL (ref 70–99)
GLUCOSE BLD-MCNC: 140 MG/DL (ref 70–99)
GLUCOSE BLD-MCNC: 143 MG/DL (ref 70–99)
GLUCOSE BLD-MCNC: 160 MG/DL (ref 70–99)
GLUCOSE BLD-MCNC: 168 MG/DL (ref 70–99)
GLUCOSE BLD-MCNC: 174 MG/DL (ref 70–99)
GLUCOSE BLD-MCNC: 193 MG/DL (ref 70–99)
GLUCOSE BLD-MCNC: 194 MG/DL (ref 70–99)
GLUCOSE BLD-MCNC: 198 MG/DL (ref 70–99)
GLUCOSE BLD-MCNC: 201 MG/DL (ref 70–99)
GLUCOSE BLD-MCNC: 206 MG/DL (ref 70–99)
GLUCOSE BLD-MCNC: 208 MG/DL (ref 70–99)
GLUCOSE BLD-MCNC: 228 MG/DL (ref 70–99)
HCO3 ARTERIAL: 21.8 MMOL/L (ref 18–23)
HCO3 ARTERIAL: 22.1 MMOL/L (ref 18–23)
HCO3 ARTERIAL: 22.2 MMOL/L (ref 18–23)
HCO3 ARTERIAL: 22.3 MMOL/L (ref 18–23)
HCO3 ARTERIAL: 22.7 MMOL/L (ref 18–23)
HCO3 ARTERIAL: 22.8 MMOL/L (ref 18–23)
HCO3 ARTERIAL: 22.8 MMOL/L (ref 18–23)
HCO3 ARTERIAL: 23.3 MMOL/L (ref 18–23)
HCO3 ARTERIAL: 23.4 MMOL/L (ref 18–23)
HCO3 ARTERIAL: 24.3 MMOL/L (ref 18–23)
HCO3 ARTERIAL: 24.4 MMOL/L (ref 18–23)
HCO3 ARTERIAL: 25.1 MMOL/L (ref 18–23)
HCO3 ARTERIAL: 25.6 MMOL/L (ref 18–23)
HCO3 ARTERIAL: 25.8 MMOL/L (ref 18–23)
HCO3 ARTERIAL: 26.4 MMOL/L (ref 18–23)
HCO3 ARTERIAL: 26.5 MMOL/L (ref 18–23)
HCT VFR BLD CALC: 29 % (ref 42–52)
HCT VFR BLD CALC: 29 % (ref 42–52)
HCT VFR BLD CALC: 30 % (ref 42–52)
HCT VFR BLD CALC: 31 % (ref 42–52)
HCT VFR BLD CALC: 33 % (ref 42–52)
HCT VFR BLD CALC: 34 % (ref 42–52)
HCT VFR BLD CALC: 35 % (ref 42–52)
HCT VFR BLD CALC: 35 % (ref 42–52)
HCT VFR BLD CALC: 37 % (ref 42–52)
HCT VFR BLD CALC: 40 % (ref 42–52)
HCT VFR BLD CALC: 40.1 % (ref 42–52)
HCT VFR BLD CALC: 42 % (ref 42–52)
HEMOGLOBIN: 10.2 GM/DL (ref 13.5–18)
HEMOGLOBIN: 10.4 GM/DL (ref 13.5–18)
HEMOGLOBIN: 11.3 GM/DL (ref 13.5–18)
HEMOGLOBIN: 11.4 GM/DL (ref 13.5–18)
HEMOGLOBIN: 11.5 GM/DL (ref 13.5–18)
HEMOGLOBIN: 11.7 GM/DL (ref 13.5–18)
HEMOGLOBIN: 11.7 GM/DL (ref 13.5–18)
HEMOGLOBIN: 11.9 GM/DL (ref 13.5–18)
HEMOGLOBIN: 12.5 GM/DL (ref 13.5–18)
HEMOGLOBIN: 12.5 GM/DL (ref 13.5–18)
HEMOGLOBIN: 12.6 GM/DL (ref 13.5–18)
HEMOGLOBIN: 12.7 GM/DL (ref 13.5–18)
HEMOGLOBIN: 13.1 GM/DL (ref 13.5–18)
HEMOGLOBIN: 13.6 GM/DL (ref 13.5–18)
HEMOGLOBIN: 14.3 GM/DL (ref 13.5–18)
HEMOGLOBIN: 9.7 GM/DL (ref 13.5–18)
HEMOGLOBIN: 9.8 GM/DL (ref 13.5–18)
INR BLD: 1.12 INDEX
MAGNESIUM: 2.1 MG/DL (ref 1.8–2.4)
MAGNESIUM: 2.7 MG/DL (ref 1.8–2.4)
MCH RBC QN AUTO: 30.6 PG (ref 27–31)
MCHC RBC AUTO-ENTMCNC: 32.7 % (ref 32–36)
MCV RBC AUTO: 93.7 FL (ref 78–100)
O2 SATURATION: 100 % (ref 96–97)
O2 SATURATION: 83.1 % (ref 96–97)
O2 SATURATION: 84.4 % (ref 96–97)
O2 SATURATION: 88.2 % (ref 96–97)
O2 SATURATION: 89.2 % (ref 96–97)
O2 SATURATION: 90.1 % (ref 96–97)
O2 SATURATION: 92.1 % (ref 96–97)
O2 SATURATION: 93 % (ref 96–97)
O2 SATURATION: 93.5 % (ref 96–97)
O2 SATURATION: 94.5 % (ref 96–97)
O2 SATURATION: 95.5 % (ref 96–97)
O2 SATURATION: 97.1 % (ref 96–97)
O2 SATURATION: 99.4 % (ref 96–97)
PCO2 ARTERIAL: 42 MMHG (ref 32–45)
PCO2 ARTERIAL: 42.2 MMHG (ref 32–45)
PCO2 ARTERIAL: 43.5 MMHG (ref 32–45)
PCO2 ARTERIAL: 44 MMHG (ref 32–45)
PCO2 ARTERIAL: 44.3 MMHG (ref 32–45)
PCO2 ARTERIAL: 44.4 MMHG (ref 32–45)
PCO2 ARTERIAL: 44.9 MMHG (ref 32–45)
PCO2 ARTERIAL: 45.7 MMHG (ref 32–45)
PCO2 ARTERIAL: 47.2 MMHG (ref 32–45)
PCO2 ARTERIAL: 48.1 MMHG (ref 32–45)
PCO2 ARTERIAL: 48.2 MMHG (ref 32–45)
PCO2 ARTERIAL: 48.6 MMHG (ref 32–45)
PCO2 ARTERIAL: 50.1 MMHG (ref 32–45)
PCO2 ARTERIAL: 52.6 MMHG (ref 32–45)
PCO2 ARTERIAL: 60.7 MMHG (ref 32–45)
PCO2 ARTERIAL: 64.2 MMHG (ref 32–45)
PDW BLD-RTO: 13.2 % (ref 11.7–14.9)
PH BLOOD: 7.21 (ref 7.34–7.45)
PH BLOOD: 7.23 (ref 7.34–7.45)
PH BLOOD: 7.27 (ref 7.34–7.45)
PH BLOOD: 7.29 (ref 7.34–7.45)
PH BLOOD: 7.3 (ref 7.34–7.45)
PH BLOOD: 7.3 (ref 7.34–7.45)
PH BLOOD: 7.31 (ref 7.34–7.45)
PH BLOOD: 7.31 (ref 7.34–7.45)
PH BLOOD: 7.32 (ref 7.34–7.45)
PH BLOOD: 7.32 (ref 7.34–7.45)
PH BLOOD: 7.33 (ref 7.34–7.45)
PH BLOOD: 7.34 (ref 7.34–7.45)
PH BLOOD: 7.36 (ref 7.34–7.45)
PLATELET # BLD: 198 K/CU MM (ref 140–440)
PMV BLD AUTO: 11.9 FL (ref 7.5–11.1)
PO2 ARTERIAL: 179.7 MMHG (ref 75–100)
PO2 ARTERIAL: 418 MMHG (ref 75–100)
PO2 ARTERIAL: 495.2 MMHG (ref 75–100)
PO2 ARTERIAL: 51.2 MMHG (ref 75–100)
PO2 ARTERIAL: 514.6 MMHG (ref 75–100)
PO2 ARTERIAL: 547.1 MMHG (ref 75–100)
PO2 ARTERIAL: 60.7 MMHG (ref 75–100)
PO2 ARTERIAL: 60.9 MMHG (ref 75–100)
PO2 ARTERIAL: 63.7 MMHG (ref 75–100)
PO2 ARTERIAL: 63.8 MMHG (ref 75–100)
PO2 ARTERIAL: 70.8 MMHG (ref 75–100)
PO2 ARTERIAL: 72.1 MMHG (ref 75–100)
PO2 ARTERIAL: 78.6 MMHG (ref 75–100)
PO2 ARTERIAL: 84.5 MMHG (ref 75–100)
PO2 ARTERIAL: 88 MMHG (ref 75–100)
PO2 ARTERIAL: 97.4 MMHG (ref 75–100)
POC ACT PLUS: 104 SEC
POC ACT PLUS: 431 SEC
POC ACT PLUS: 455 SEC
POC ACT PLUS: 547 SEC
POC ACT PLUS: 562 SEC
POC ACT PLUS: 97 SEC
POC CALCIUM: 1.15 MMOL/L (ref 1.12–1.32)
POC CALCIUM: 1.2 MMOL/L (ref 1.12–1.32)
POC CALCIUM: 1.22 MMOL/L (ref 1.12–1.32)
POC CALCIUM: 1.23 MMOL/L (ref 1.12–1.32)
POC CALCIUM: 1.24 MMOL/L (ref 1.12–1.32)
POC CALCIUM: 1.24 MMOL/L (ref 1.12–1.32)
POC CALCIUM: 1.26 MMOL/L (ref 1.12–1.32)
POC CALCIUM: 1.26 MMOL/L (ref 1.12–1.32)
POC CALCIUM: 1.28 MMOL/L (ref 1.12–1.32)
POC CALCIUM: 1.3 MMOL/L (ref 1.12–1.32)
POC CALCIUM: 1.31 MMOL/L (ref 1.12–1.32)
POC CALCIUM: 1.31 MMOL/L (ref 1.12–1.32)
POC CALCIUM: 1.35 MMOL/L (ref 1.12–1.32)
POC CALCIUM: 1.37 MMOL/L (ref 1.12–1.32)
POC CHLORIDE: 102 MMOL/L (ref 98–109)
POC CHLORIDE: 105 MMOL/L (ref 98–109)
POC CHLORIDE: 106 MMOL/L (ref 98–109)
POC CHLORIDE: 107 MMOL/L (ref 98–109)
POC CHLORIDE: 109 MMOL/L (ref 98–109)
POC CHLORIDE: 110 MMOL/L (ref 98–109)
POC CHLORIDE: 111 MMOL/L (ref 98–109)
POC CHLORIDE: 111 MMOL/L (ref 98–109)
POC CREATININE: 1.1 MG/DL (ref 0.9–1.3)
POC CREATININE: 1.1 MG/DL (ref 0.9–1.3)
POC CREATININE: 1.2 MG/DL (ref 0.9–1.3)
POC CREATININE: 1.2 MG/DL (ref 0.9–1.3)
POC CREATININE: 1.3 MG/DL (ref 0.9–1.3)
POC CREATININE: 1.4 MG/DL (ref 0.9–1.3)
POC CREATININE: 1.6 MG/DL (ref 0.9–1.3)
POC CREATININE: 1.7 MG/DL (ref 0.9–1.3)
POC CREATININE: 1.8 MG/DL (ref 0.9–1.3)
POC CREATININE: 1.8 MG/DL (ref 0.9–1.3)
POC CREATININE: 1.9 MG/DL (ref 0.9–1.3)
POC CREATININE: 1.9 MG/DL (ref 0.9–1.3)
POTASSIUM SERPL-SCNC: 3.4 MMOL/L (ref 3.5–4.5)
POTASSIUM SERPL-SCNC: 3.5 MMOL/L (ref 3.5–4.5)
POTASSIUM SERPL-SCNC: 3.6 MMOL/L (ref 3.5–4.5)
POTASSIUM SERPL-SCNC: 3.8 MMOL/L (ref 3.5–4.5)
POTASSIUM SERPL-SCNC: 3.8 MMOL/L (ref 3.5–5.1)
POTASSIUM SERPL-SCNC: 4 MMOL/L (ref 3.5–4.5)
POTASSIUM SERPL-SCNC: 4 MMOL/L (ref 3.5–4.5)
POTASSIUM SERPL-SCNC: 4.2 MMOL/L (ref 3.5–4.5)
POTASSIUM SERPL-SCNC: 4.2 MMOL/L (ref 3.5–4.5)
POTASSIUM SERPL-SCNC: 4.3 MMOL/L (ref 3.5–5.1)
POTASSIUM SERPL-SCNC: 4.7 MMOL/L (ref 3.5–4.5)
POTASSIUM SERPL-SCNC: 4.8 MMOL/L (ref 3.5–4.5)
POTASSIUM SERPL-SCNC: 4.8 MMOL/L (ref 3.5–4.5)
PROTHROMBIN TIME: 13.6 SECONDS (ref 11.7–14.5)
RBC # BLD: 4.28 M/CU MM (ref 4.6–6.2)
REASON FOR REJECTION: NORMAL
REASON FOR REJECTION: NORMAL
REJECTED TEST: NORMAL
REJECTED TEST: NORMAL
SODIUM BLD-SCNC: 138 MMOL/L (ref 138–146)
SODIUM BLD-SCNC: 139 MMOL/L (ref 138–146)
SODIUM BLD-SCNC: 140 MMOL/L (ref 138–146)
SODIUM BLD-SCNC: 140 MMOL/L (ref 138–146)
SODIUM BLD-SCNC: 141 MMOL/L (ref 135–145)
SODIUM BLD-SCNC: 141 MMOL/L (ref 138–146)
SODIUM BLD-SCNC: 142 MMOL/L (ref 138–146)
SODIUM BLD-SCNC: 143 MMOL/L (ref 138–146)
SODIUM BLD-SCNC: 143 MMOL/L (ref 138–146)
SODIUM BLD-SCNC: 144 MMOL/L (ref 138–146)
SODIUM BLD-SCNC: 144 MMOL/L (ref 138–146)
SODIUM BLD-SCNC: 145 MMOL/L (ref 138–146)
SODIUM BLD-SCNC: 145 MMOL/L (ref 138–146)
SOURCE, BLOOD GAS: ABNORMAL
STATUS: NORMAL
STATUS: NORMAL
THERMAL AMPLITUDE STUDY: NORMAL
TRANSFUSION STATUS: NORMAL
TRANSFUSION STATUS: NORMAL
UNIT DIVISION: 0
UNIT DIVISION: 0
UNIT NUMBER: NORMAL
UNIT NUMBER: NORMAL
WBC # BLD: 26.6 K/CU MM (ref 4–10.5)

## 2020-07-03 PROCEDURE — P9016 RBC LEUKOCYTES REDUCED: HCPCS

## 2020-07-03 PROCEDURE — 2580000003 HC RX 258: Performed by: THORACIC SURGERY (CARDIOTHORACIC VASCULAR SURGERY)

## 2020-07-03 PROCEDURE — 2500000003 HC RX 250 WO HCPCS: Performed by: SURGERY

## 2020-07-03 PROCEDURE — 6370000000 HC RX 637 (ALT 250 FOR IP): Performed by: SURGERY

## 2020-07-03 PROCEDURE — 82962 GLUCOSE BLOOD TEST: CPT

## 2020-07-03 PROCEDURE — 6360000002 HC RX W HCPCS: Performed by: THORACIC SURGERY (CARDIOTHORACIC VASCULAR SURGERY)

## 2020-07-03 PROCEDURE — 6370000000 HC RX 637 (ALT 250 FOR IP)

## 2020-07-03 PROCEDURE — 2500000003 HC RX 250 WO HCPCS

## 2020-07-03 PROCEDURE — 83735 ASSAY OF MAGNESIUM: CPT

## 2020-07-03 PROCEDURE — 6360000002 HC RX W HCPCS: Performed by: PHYSICIAN ASSISTANT

## 2020-07-03 PROCEDURE — 6360000002 HC RX W HCPCS: Performed by: SURGERY

## 2020-07-03 PROCEDURE — 85730 THROMBOPLASTIN TIME PARTIAL: CPT

## 2020-07-03 PROCEDURE — 2500000003 HC RX 250 WO HCPCS: Performed by: THORACIC SURGERY (CARDIOTHORACIC VASCULAR SURGERY)

## 2020-07-03 PROCEDURE — 86850 RBC ANTIBODY SCREEN: CPT

## 2020-07-03 PROCEDURE — 2720000010 HC SURG SUPPLY STERILE: Performed by: THORACIC SURGERY (CARDIOTHORACIC VASCULAR SURGERY)

## 2020-07-03 PROCEDURE — 3600000008 HC SURGERY OHS BASE: Performed by: THORACIC SURGERY (CARDIOTHORACIC VASCULAR SURGERY)

## 2020-07-03 PROCEDURE — 94640 AIRWAY INHALATION TREATMENT: CPT

## 2020-07-03 PROCEDURE — 2500000003 HC RX 250 WO HCPCS: Performed by: NURSE ANESTHETIST, CERTIFIED REGISTERED

## 2020-07-03 PROCEDURE — 2700000000 HC OXYGEN THERAPY PER DAY

## 2020-07-03 PROCEDURE — 2580000003 HC RX 258: Performed by: SURGERY

## 2020-07-03 PROCEDURE — 71250 CT THORAX DX C-: CPT

## 2020-07-03 PROCEDURE — 2580000003 HC RX 258: Performed by: PHYSICIAN ASSISTANT

## 2020-07-03 PROCEDURE — 7100000000 HC PACU RECOVERY - FIRST 15 MIN

## 2020-07-03 PROCEDURE — C1729 CATH, DRAINAGE: HCPCS | Performed by: THORACIC SURGERY (CARDIOTHORACIC VASCULAR SURGERY)

## 2020-07-03 PROCEDURE — 3600000018 HC SURGERY OHS ADDTL 15MIN: Performed by: THORACIC SURGERY (CARDIOTHORACIC VASCULAR SURGERY)

## 2020-07-03 PROCEDURE — 85014 HEMATOCRIT: CPT

## 2020-07-03 PROCEDURE — 94761 N-INVAS EAR/PLS OXIMETRY MLT: CPT

## 2020-07-03 PROCEDURE — 85610 PROTHROMBIN TIME: CPT

## 2020-07-03 PROCEDURE — 94002 VENT MGMT INPAT INIT DAY: CPT

## 2020-07-03 PROCEDURE — 2580000003 HC RX 258

## 2020-07-03 PROCEDURE — 3700000001 HC ADD 15 MINUTES (ANESTHESIA): Performed by: THORACIC SURGERY (CARDIOTHORACIC VASCULAR SURGERY)

## 2020-07-03 PROCEDURE — 6370000000 HC RX 637 (ALT 250 FOR IP): Performed by: THORACIC SURGERY (CARDIOTHORACIC VASCULAR SURGERY)

## 2020-07-03 PROCEDURE — 85347 COAGULATION TIME ACTIVATED: CPT

## 2020-07-03 PROCEDURE — 36592 COLLECT BLOOD FROM PICC: CPT

## 2020-07-03 PROCEDURE — P9045 ALBUMIN (HUMAN), 5%, 250 ML: HCPCS | Performed by: PHYSICIAN ASSISTANT

## 2020-07-03 PROCEDURE — 86900 BLOOD TYPING SEROLOGIC ABO: CPT

## 2020-07-03 PROCEDURE — 3700000000 HC ANESTHESIA ATTENDED CARE: Performed by: THORACIC SURGERY (CARDIOTHORACIC VASCULAR SURGERY)

## 2020-07-03 PROCEDURE — 6370000000 HC RX 637 (ALT 250 FOR IP): Performed by: PHYSICIAN ASSISTANT

## 2020-07-03 PROCEDURE — 2709999900 HC NON-CHARGEABLE SUPPLY: Performed by: THORACIC SURGERY (CARDIOTHORACIC VASCULAR SURGERY)

## 2020-07-03 PROCEDURE — 85025 COMPLETE CBC W/AUTO DIFF WBC: CPT

## 2020-07-03 PROCEDURE — 2780000010 HC IMPLANT OTHER: Performed by: THORACIC SURGERY (CARDIOTHORACIC VASCULAR SURGERY)

## 2020-07-03 PROCEDURE — 82803 BLOOD GASES ANY COMBINATION: CPT

## 2020-07-03 PROCEDURE — 6360000002 HC RX W HCPCS: Performed by: NURSE ANESTHETIST, CERTIFIED REGISTERED

## 2020-07-03 PROCEDURE — 6360000002 HC RX W HCPCS

## 2020-07-03 PROCEDURE — 84132 ASSAY OF SERUM POTASSIUM: CPT

## 2020-07-03 PROCEDURE — 85027 COMPLETE CBC AUTOMATED: CPT

## 2020-07-03 PROCEDURE — 84295 ASSAY OF SERUM SODIUM: CPT

## 2020-07-03 PROCEDURE — 94750 HC PULMONARY COMPLIANCE STUDY: CPT

## 2020-07-03 PROCEDURE — 71045 X-RAY EXAM CHEST 1 VIEW: CPT

## 2020-07-03 PROCEDURE — 85018 HEMOGLOBIN: CPT

## 2020-07-03 PROCEDURE — 37799 UNLISTED PX VASCULAR SURGERY: CPT

## 2020-07-03 PROCEDURE — 2500000003 HC RX 250 WO HCPCS: Performed by: PHYSICIAN ASSISTANT

## 2020-07-03 PROCEDURE — 2580000003 HC RX 258: Performed by: NURSE ANESTHETIST, CERTIFIED REGISTERED

## 2020-07-03 PROCEDURE — 86922 COMPATIBILITY TEST ANTIGLOB: CPT

## 2020-07-03 PROCEDURE — 80048 BASIC METABOLIC PNL TOTAL CA: CPT

## 2020-07-03 PROCEDURE — 82330 ASSAY OF CALCIUM: CPT

## 2020-07-03 PROCEDURE — 2000000000 HC ICU R&B

## 2020-07-03 PROCEDURE — 86901 BLOOD TYPING SEROLOGIC RH(D): CPT

## 2020-07-03 DEVICE — ZINACTIVE USE 2540323 DEVICE OCCL CLP L50MM SM FOOTPRINT 1 HND APPL SUTURELESS W/: Type: IMPLANTABLE DEVICE | Site: HEART | Status: FUNCTIONAL

## 2020-07-03 RX ORDER — DOPAMINE HYDROCHLORIDE 160 MG/100ML
INJECTION, SOLUTION INTRAVENOUS CONTINUOUS PRN
Status: DISCONTINUED | OUTPATIENT
Start: 2020-07-03 | End: 2020-07-03 | Stop reason: SDUPTHER

## 2020-07-03 RX ORDER — SODIUM CHLORIDE 450 MG/100ML
INJECTION, SOLUTION INTRAVENOUS CONTINUOUS
Status: DISCONTINUED | OUTPATIENT
Start: 2020-07-03 | End: 2020-07-04

## 2020-07-03 RX ORDER — DEXTROSE MONOHYDRATE 50 MG/ML
100 INJECTION, SOLUTION INTRAVENOUS PRN
Status: DISCONTINUED | OUTPATIENT
Start: 2020-07-03 | End: 2020-07-06 | Stop reason: SDUPTHER

## 2020-07-03 RX ORDER — ATORVASTATIN CALCIUM 20 MG/1
20 TABLET, FILM COATED ORAL NIGHTLY
Status: DISCONTINUED | OUTPATIENT
Start: 2020-07-04 | End: 2020-07-07 | Stop reason: HOSPADM

## 2020-07-03 RX ORDER — HYDRALAZINE HYDROCHLORIDE 20 MG/ML
5 INJECTION INTRAMUSCULAR; INTRAVENOUS EVERY 5 MIN PRN
Status: DISCONTINUED | OUTPATIENT
Start: 2020-07-03 | End: 2020-07-04

## 2020-07-03 RX ORDER — PHENYLEPHRINE HYDROCHLORIDE 10 MG/ML
INJECTION INTRAVENOUS PRN
Status: DISCONTINUED | OUTPATIENT
Start: 2020-07-03 | End: 2020-07-03 | Stop reason: SDUPTHER

## 2020-07-03 RX ORDER — SODIUM CHLORIDE 9 MG/ML
INJECTION, SOLUTION INTRAVENOUS CONTINUOUS PRN
Status: DISCONTINUED | OUTPATIENT
Start: 2020-07-03 | End: 2020-07-03 | Stop reason: SDUPTHER

## 2020-07-03 RX ORDER — FENTANYL CITRATE 50 UG/ML
100 INJECTION, SOLUTION INTRAMUSCULAR; INTRAVENOUS ONCE
Status: COMPLETED | OUTPATIENT
Start: 2020-07-03 | End: 2020-07-03

## 2020-07-03 RX ORDER — IPRATROPIUM BROMIDE AND ALBUTEROL SULFATE 2.5; .5 MG/3ML; MG/3ML
1 SOLUTION RESPIRATORY (INHALATION)
Status: DISCONTINUED | OUTPATIENT
Start: 2020-07-03 | End: 2020-07-07 | Stop reason: HOSPADM

## 2020-07-03 RX ORDER — PROTAMINE SULFATE 10 MG/ML
INJECTION, SOLUTION INTRAVENOUS PRN
Status: DISCONTINUED | OUTPATIENT
Start: 2020-07-03 | End: 2020-07-03 | Stop reason: SDUPTHER

## 2020-07-03 RX ORDER — ROCURONIUM BROMIDE 10 MG/ML
INJECTION, SOLUTION INTRAVENOUS PRN
Status: DISCONTINUED | OUTPATIENT
Start: 2020-07-03 | End: 2020-07-03 | Stop reason: SDUPTHER

## 2020-07-03 RX ORDER — POLYETHYLENE GLYCOL 3350 17 G/17G
17 POWDER, FOR SOLUTION ORAL DAILY
Status: DISCONTINUED | OUTPATIENT
Start: 2020-07-03 | End: 2020-07-07 | Stop reason: HOSPADM

## 2020-07-03 RX ORDER — SODIUM PHOSPHATE, DIBASIC AND SODIUM PHOSPHATE, MONOBASIC 7; 19 G/133ML; G/133ML
1 ENEMA RECTAL DAILY PRN
Status: DISCONTINUED | OUTPATIENT
Start: 2020-07-03 | End: 2020-07-07 | Stop reason: HOSPADM

## 2020-07-03 RX ORDER — METOPROLOL TARTRATE 5 MG/5ML
2.5 INJECTION INTRAVENOUS EVERY 10 MIN PRN
Status: DISCONTINUED | OUTPATIENT
Start: 2020-07-03 | End: 2020-07-07 | Stop reason: HOSPADM

## 2020-07-03 RX ORDER — FUROSEMIDE 10 MG/ML
20 INJECTION INTRAMUSCULAR; INTRAVENOUS
Status: ACTIVE | OUTPATIENT
Start: 2020-07-03 | End: 2020-07-03

## 2020-07-03 RX ORDER — SUFENTANIL CITRATE 50 UG/ML
INJECTION EPIDURAL; INTRAVENOUS PRN
Status: DISCONTINUED | OUTPATIENT
Start: 2020-07-03 | End: 2020-07-03 | Stop reason: SDUPTHER

## 2020-07-03 RX ORDER — SODIUM CHLORIDE 0.9 % (FLUSH) 0.9 %
10 SYRINGE (ML) INJECTION EVERY 12 HOURS SCHEDULED
Status: DISCONTINUED | OUTPATIENT
Start: 2020-07-03 | End: 2020-07-07 | Stop reason: HOSPADM

## 2020-07-03 RX ORDER — POTASSIUM CHLORIDE 29.8 MG/ML
20 INJECTION INTRAVENOUS PRN
Status: DISCONTINUED | OUTPATIENT
Start: 2020-07-03 | End: 2020-07-07 | Stop reason: HOSPADM

## 2020-07-03 RX ORDER — ONDANSETRON 2 MG/ML
INJECTION INTRAMUSCULAR; INTRAVENOUS PRN
Status: DISCONTINUED | OUTPATIENT
Start: 2020-07-03 | End: 2020-07-03 | Stop reason: SDUPTHER

## 2020-07-03 RX ORDER — NITROGLYCERIN 20 MG/100ML
10 INJECTION INTRAVENOUS CONTINUOUS PRN
Status: DISCONTINUED | OUTPATIENT
Start: 2020-07-03 | End: 2020-07-07 | Stop reason: HOSPADM

## 2020-07-03 RX ORDER — HYDROCODONE BITARTRATE AND ACETAMINOPHEN 5; 325 MG/1; MG/1
2 TABLET ORAL EVERY 4 HOURS PRN
Status: DISCONTINUED | OUTPATIENT
Start: 2020-07-03 | End: 2020-07-04

## 2020-07-03 RX ORDER — HYDROCODONE BITARTRATE AND ACETAMINOPHEN 5; 325 MG/1; MG/1
1 TABLET ORAL EVERY 4 HOURS PRN
Status: DISCONTINUED | OUTPATIENT
Start: 2020-07-03 | End: 2020-07-04

## 2020-07-03 RX ORDER — FENTANYL CITRATE 50 UG/ML
25 INJECTION, SOLUTION INTRAMUSCULAR; INTRAVENOUS
Status: DISCONTINUED | OUTPATIENT
Start: 2020-07-03 | End: 2020-07-03

## 2020-07-03 RX ORDER — CARVEDILOL 3.12 MG/1
3.12 TABLET ORAL 2 TIMES DAILY
Status: DISCONTINUED | OUTPATIENT
Start: 2020-07-03 | End: 2020-07-06

## 2020-07-03 RX ORDER — AMIODARONE HYDROCHLORIDE 200 MG/1
200 TABLET ORAL 3 TIMES DAILY
Status: DISCONTINUED | OUTPATIENT
Start: 2020-07-03 | End: 2020-07-07 | Stop reason: HOSPADM

## 2020-07-03 RX ORDER — ONDANSETRON 2 MG/ML
4 INJECTION INTRAMUSCULAR; INTRAVENOUS EVERY 8 HOURS PRN
Status: DISCONTINUED | OUTPATIENT
Start: 2020-07-03 | End: 2020-07-07 | Stop reason: HOSPADM

## 2020-07-03 RX ORDER — M-VIT,TX,IRON,MINS/CALC/FOLIC 27MG-0.4MG
1 TABLET ORAL
Status: DISCONTINUED | OUTPATIENT
Start: 2020-07-04 | End: 2020-07-07 | Stop reason: HOSPADM

## 2020-07-03 RX ORDER — DEXTROSE MONOHYDRATE 25 G/50ML
12.5 INJECTION, SOLUTION INTRAVENOUS PRN
Status: DISCONTINUED | OUTPATIENT
Start: 2020-07-03 | End: 2020-07-06 | Stop reason: SDUPTHER

## 2020-07-03 RX ORDER — MIDAZOLAM HYDROCHLORIDE 5 MG/ML
INJECTION INTRAMUSCULAR; INTRAVENOUS PRN
Status: DISCONTINUED | OUTPATIENT
Start: 2020-07-03 | End: 2020-07-03 | Stop reason: SDUPTHER

## 2020-07-03 RX ORDER — HEPARIN SODIUM 1000 [USP'U]/ML
INJECTION, SOLUTION INTRAVENOUS; SUBCUTANEOUS PRN
Status: DISCONTINUED | OUTPATIENT
Start: 2020-07-03 | End: 2020-07-03 | Stop reason: SDUPTHER

## 2020-07-03 RX ORDER — FUROSEMIDE 10 MG/ML
20 INJECTION INTRAMUSCULAR; INTRAVENOUS ONCE
Status: COMPLETED | OUTPATIENT
Start: 2020-07-03 | End: 2020-07-03

## 2020-07-03 RX ORDER — VASOPRESSIN 20 U/ML
INJECTION PARENTERAL PRN
Status: DISCONTINUED | OUTPATIENT
Start: 2020-07-03 | End: 2020-07-03 | Stop reason: SDUPTHER

## 2020-07-03 RX ORDER — PROPOFOL 10 MG/ML
INJECTION, EMULSION INTRAVENOUS PRN
Status: DISCONTINUED | OUTPATIENT
Start: 2020-07-03 | End: 2020-07-03 | Stop reason: SDUPTHER

## 2020-07-03 RX ORDER — AMIODARONE HYDROCHLORIDE 200 MG/1
200 TABLET ORAL DAILY
Status: DISCONTINUED | OUTPATIENT
Start: 2020-07-07 | End: 2020-07-07 | Stop reason: HOSPADM

## 2020-07-03 RX ORDER — BISACODYL 10 MG
10 SUPPOSITORY, RECTAL RECTAL DAILY PRN
Status: DISCONTINUED | OUTPATIENT
Start: 2020-07-03 | End: 2020-07-07 | Stop reason: HOSPADM

## 2020-07-03 RX ORDER — PANTOPRAZOLE SODIUM 40 MG/1
40 TABLET, DELAYED RELEASE ORAL DAILY
Status: DISCONTINUED | OUTPATIENT
Start: 2020-07-03 | End: 2020-07-05

## 2020-07-03 RX ORDER — NICOTINE POLACRILEX 4 MG
15 LOZENGE BUCCAL PRN
Status: DISCONTINUED | OUTPATIENT
Start: 2020-07-03 | End: 2020-07-06 | Stop reason: SDUPTHER

## 2020-07-03 RX ORDER — ASPIRIN 81 MG/1
81 TABLET ORAL DAILY
Status: DISCONTINUED | OUTPATIENT
Start: 2020-07-03 | End: 2020-07-07 | Stop reason: HOSPADM

## 2020-07-03 RX ORDER — ACETAMINOPHEN 325 MG/1
650 TABLET ORAL EVERY 4 HOURS PRN
Status: DISCONTINUED | OUTPATIENT
Start: 2020-07-03 | End: 2020-07-07 | Stop reason: HOSPADM

## 2020-07-03 RX ORDER — CEFAZOLIN SODIUM 2 G/50ML
2 SOLUTION INTRAVENOUS EVERY 8 HOURS
Status: COMPLETED | OUTPATIENT
Start: 2020-07-03 | End: 2020-07-04

## 2020-07-03 RX ORDER — SODIUM CHLORIDE 0.9 % (FLUSH) 0.9 %
10 SYRINGE (ML) INJECTION PRN
Status: DISCONTINUED | OUTPATIENT
Start: 2020-07-03 | End: 2020-07-07 | Stop reason: HOSPADM

## 2020-07-03 RX ORDER — ALBUMIN, HUMAN INJ 5% 5 %
25 SOLUTION INTRAVENOUS PRN
Status: DISCONTINUED | OUTPATIENT
Start: 2020-07-03 | End: 2020-07-07 | Stop reason: HOSPADM

## 2020-07-03 RX ORDER — DOBUTAMINE HYDROCHLORIDE 200 MG/100ML
2.5 INJECTION INTRAVENOUS CONTINUOUS
Status: DISCONTINUED | OUTPATIENT
Start: 2020-07-03 | End: 2020-07-07 | Stop reason: HOSPADM

## 2020-07-03 RX ORDER — DEXAMETHASONE SODIUM PHOSPHATE 4 MG/ML
INJECTION, SOLUTION INTRA-ARTICULAR; INTRALESIONAL; INTRAMUSCULAR; INTRAVENOUS; SOFT TISSUE PRN
Status: DISCONTINUED | OUTPATIENT
Start: 2020-07-03 | End: 2020-07-03 | Stop reason: SDUPTHER

## 2020-07-03 RX ORDER — MAGNESIUM SULFATE IN WATER 40 MG/ML
2 INJECTION, SOLUTION INTRAVENOUS PRN
Status: DISCONTINUED | OUTPATIENT
Start: 2020-07-03 | End: 2020-07-07 | Stop reason: HOSPADM

## 2020-07-03 RX ADMIN — SUFENTANIL CITRATE 20 MCG: 50 INJECTION EPIDURAL; INTRAVENOUS at 07:32

## 2020-07-03 RX ADMIN — DEXMEDETOMIDINE HYDROCHLORIDE 0.4 MCG/KG/HR: 100 INJECTION, SOLUTION INTRAVENOUS at 23:40

## 2020-07-03 RX ADMIN — ONDANSETRON 4 MG: 2 INJECTION INTRAMUSCULAR; INTRAVENOUS at 10:30

## 2020-07-03 RX ADMIN — SODIUM CHLORIDE, PRESERVATIVE FREE 10 ML: 5 INJECTION INTRAVENOUS at 21:30

## 2020-07-03 RX ADMIN — PHENYLEPHRINE HYDROCHLORIDE 100 MCG: 10 INJECTION INTRAVENOUS at 11:02

## 2020-07-03 RX ADMIN — MIDAZOLAM 2 MG: 5 INJECTION INTRAMUSCULAR; INTRAVENOUS at 07:20

## 2020-07-03 RX ADMIN — PHENYLEPHRINE HYDROCHLORIDE 100 MCG: 10 INJECTION INTRAVENOUS at 10:59

## 2020-07-03 RX ADMIN — FENTANYL CITRATE 100 MCG: 50 INJECTION, SOLUTION INTRAMUSCULAR; INTRAVENOUS at 13:00

## 2020-07-03 RX ADMIN — ROCURONIUM BROMIDE 60 MG: 10 INJECTION INTRAVENOUS at 07:32

## 2020-07-03 RX ADMIN — PROTAMINE SULFATE 400 MG: 10 INJECTION, SOLUTION INTRAVENOUS at 10:55

## 2020-07-03 RX ADMIN — CARVEDILOL 3.12 MG: 3.12 TABLET, FILM COATED ORAL at 21:53

## 2020-07-03 RX ADMIN — SUFENTANIL CITRATE 10 MCG: 50 INJECTION EPIDURAL; INTRAVENOUS at 07:10

## 2020-07-03 RX ADMIN — CHLORHEXIDINE GLUCONATE 0.12% ORAL RINSE 30 ML: 1.2 LIQUID ORAL at 05:41

## 2020-07-03 RX ADMIN — FENTANYL CITRATE 25 MCG: 50 INJECTION INTRAMUSCULAR; INTRAVENOUS at 16:26

## 2020-07-03 RX ADMIN — DOBUTAMINE HYDROCHLORIDE 5 MCG/KG/MIN: 200 INJECTION INTRAVENOUS at 21:53

## 2020-07-03 RX ADMIN — SODIUM CHLORIDE: 900 INJECTION INTRAVENOUS at 07:10

## 2020-07-03 RX ADMIN — CARVEDILOL 3.12 MG: 3.12 TABLET, FILM COATED ORAL at 05:42

## 2020-07-03 RX ADMIN — NITROGLYCERIN 110 MCG/MIN: 20 INJECTION INTRAVENOUS at 01:02

## 2020-07-03 RX ADMIN — DEXMEDETOMIDINE HYDROCHLORIDE 0.2 MCG/KG/HR: 100 INJECTION, SOLUTION INTRAVENOUS at 18:10

## 2020-07-03 RX ADMIN — SODIUM CHLORIDE 5 UNITS/HR: 9 INJECTION, SOLUTION INTRAVENOUS at 09:22

## 2020-07-03 RX ADMIN — AMINOCAPROIC ACID 1 G: 250 INJECTION, SOLUTION INTRAVENOUS at 09:15

## 2020-07-03 RX ADMIN — SUFENTANIL CITRATE 20 MCG: 50 INJECTION EPIDURAL; INTRAVENOUS at 07:34

## 2020-07-03 RX ADMIN — FENTANYL CITRATE 25 MCG: 50 INJECTION INTRAMUSCULAR; INTRAVENOUS at 20:33

## 2020-07-03 RX ADMIN — PROPOFOL 100 MG: 10 INJECTION, EMULSION INTRAVENOUS at 07:32

## 2020-07-03 RX ADMIN — ONDANSETRON 4 MG: 2 INJECTION INTRAMUSCULAR; INTRAVENOUS at 22:09

## 2020-07-03 RX ADMIN — VASOPRESSIN 1 UNITS: 20 INJECTION INTRAVENOUS at 11:14

## 2020-07-03 RX ADMIN — Medication: at 21:53

## 2020-07-03 RX ADMIN — CEFAZOLIN SODIUM 2 G: 2 SOLUTION INTRAVENOUS at 18:10

## 2020-07-03 RX ADMIN — NOREPINEPHRINE BITARTRATE 5.3 MCG/MIN: 1 INJECTION INTRAVENOUS at 11:02

## 2020-07-03 RX ADMIN — TRAZODONE HYDROCHLORIDE 50 MG: 50 TABLET ORAL at 01:01

## 2020-07-03 RX ADMIN — AMIODARONE HYDROCHLORIDE 150 MG: 50 INJECTION, SOLUTION INTRAVENOUS at 08:37

## 2020-07-03 RX ADMIN — PHENYLEPHRINE HYDROCHLORIDE 100 MCG: 10 INJECTION INTRAVENOUS at 11:03

## 2020-07-03 RX ADMIN — CEFAZOLIN SODIUM 2 G: 2 SOLUTION INTRAVENOUS at 07:30

## 2020-07-03 RX ADMIN — AMIODARONE HYDROCHLORIDE 1 MG/MIN: 50 INJECTION, SOLUTION INTRAVENOUS at 08:48

## 2020-07-03 RX ADMIN — HEPARIN SODIUM 35000 UNITS: 1000 INJECTION, SOLUTION INTRAVENOUS; SUBCUTANEOUS at 08:26

## 2020-07-03 RX ADMIN — SODIUM CHLORIDE 5.9 UNITS/HR: 9 INJECTION, SOLUTION INTRAVENOUS at 23:40

## 2020-07-03 RX ADMIN — PROPOFOL 100 MG: 10 INJECTION, EMULSION INTRAVENOUS at 07:31

## 2020-07-03 RX ADMIN — EPINEPHRINE 3.3 MCG/MIN: 1 INJECTION, SOLUTION, CONCENTRATE INTRAVENOUS at 10:29

## 2020-07-03 RX ADMIN — SUFENTANIL CITRATE 20 MCG: 50 INJECTION EPIDURAL; INTRAVENOUS at 07:33

## 2020-07-03 RX ADMIN — AMINOCAPROIC ACID 1 G: 250 INJECTION, SOLUTION INTRAVENOUS at 10:15

## 2020-07-03 RX ADMIN — DOPAMINE HYDROCHLORIDE 1.5 MCG/KG/MIN: 160 INJECTION, SOLUTION INTRAVENOUS at 11:12

## 2020-07-03 RX ADMIN — AMINOCAPROIC ACID 1 G: 250 INJECTION, SOLUTION INTRAVENOUS at 08:15

## 2020-07-03 RX ADMIN — MIDAZOLAM 3 MG: 5 INJECTION INTRAMUSCULAR; INTRAVENOUS at 07:31

## 2020-07-03 RX ADMIN — FENTANYL CITRATE 25 MCG: 50 INJECTION INTRAMUSCULAR; INTRAVENOUS at 22:33

## 2020-07-03 RX ADMIN — ALBUMIN (HUMAN) 25 G: 12.5 INJECTION, SOLUTION INTRAVENOUS at 12:36

## 2020-07-03 RX ADMIN — PHENYLEPHRINE HYDROCHLORIDE 100 MCG: 10 INJECTION INTRAVENOUS at 10:56

## 2020-07-03 RX ADMIN — MIDAZOLAM 5 MG: 5 INJECTION INTRAMUSCULAR; INTRAVENOUS at 10:30

## 2020-07-03 RX ADMIN — POTASSIUM CHLORIDE 20 MEQ: 400 INJECTION, SOLUTION INTRAVENOUS at 14:06

## 2020-07-03 RX ADMIN — NITROGLYCERIN 50 MCG/MIN: 20 INJECTION INTRAVENOUS at 12:35

## 2020-07-03 RX ADMIN — FENTANYL CITRATE 100 MCG: 50 INJECTION, SOLUTION INTRAMUSCULAR; INTRAVENOUS at 11:55

## 2020-07-03 RX ADMIN — SODIUM CHLORIDE: 4.5 INJECTION, SOLUTION INTRAVENOUS at 12:37

## 2020-07-03 RX ADMIN — FUROSEMIDE 20 MG: 10 INJECTION, SOLUTION INTRAVENOUS at 21:53

## 2020-07-03 RX ADMIN — VASOPRESSIN 1 UNITS: 20 INJECTION INTRAVENOUS at 11:21

## 2020-07-03 RX ADMIN — SODIUM PHOSPHATE, DIBASIC AND SODIUM PHOSPHATE, MONOBASIC 1 ENEMA: 7; 19 ENEMA RECTAL at 00:30

## 2020-07-03 RX ADMIN — CYCLOBENZAPRINE 10 MG: 10 TABLET, FILM COATED ORAL at 01:01

## 2020-07-03 RX ADMIN — PHENYLEPHRINE HYDROCHLORIDE 100 MCG: 10 INJECTION INTRAVENOUS at 10:54

## 2020-07-03 RX ADMIN — CHLORHEXIDINE GLUCONATE 0.12% ORAL RINSE 30 ML: 1.2 LIQUID ORAL at 00:30

## 2020-07-03 RX ADMIN — PROPOFOL 50 MG: 10 INJECTION, EMULSION INTRAVENOUS at 08:11

## 2020-07-03 RX ADMIN — DEXAMETHASONE SODIUM PHOSPHATE 4 MG: 4 INJECTION, SOLUTION INTRAMUSCULAR; INTRAVENOUS at 08:13

## 2020-07-03 RX ADMIN — AMINOCAPROIC ACID 1 G: 250 INJECTION, SOLUTION INTRAVENOUS at 07:59

## 2020-07-03 RX ADMIN — SUFENTANIL CITRATE 30 MCG: 50 INJECTION EPIDURAL; INTRAVENOUS at 07:31

## 2020-07-03 RX ADMIN — IPRATROPIUM BROMIDE AND ALBUTEROL SULFATE 1 AMPULE: .5; 3 SOLUTION RESPIRATORY (INHALATION) at 21:09

## 2020-07-03 RX ADMIN — Medication: at 05:42

## 2020-07-03 RX ADMIN — AMIODARONE HYDROCHLORIDE 0.5 MG/MIN: 50 INJECTION, SOLUTION INTRAVENOUS at 18:11

## 2020-07-03 RX ADMIN — ALBUMIN (HUMAN) 25 G: 12.5 INJECTION, SOLUTION INTRAVENOUS at 14:01

## 2020-07-03 RX ADMIN — ROCURONIUM BROMIDE 50 MG: 10 INJECTION INTRAVENOUS at 10:30

## 2020-07-03 RX ADMIN — ROCURONIUM BROMIDE 40 MG: 10 INJECTION INTRAVENOUS at 08:20

## 2020-07-03 RX ADMIN — HYDROMORPHONE HYDROCHLORIDE 1 MG: 1 INJECTION, SOLUTION INTRAMUSCULAR; INTRAVENOUS; SUBCUTANEOUS at 18:05

## 2020-07-03 ASSESSMENT — PULMONARY FUNCTION TESTS
PIF_VALUE: 0
PIF_VALUE: 16
PIF_VALUE: 19
PIF_VALUE: 19
PIF_VALUE: 18
PIF_VALUE: 0
PIF_VALUE: 17
PIF_VALUE: 20
PIF_VALUE: 1
PIF_VALUE: 0
PIF_VALUE: 18
PIF_VALUE: 19
PIF_VALUE: 0
PIF_VALUE: 23
PIF_VALUE: 18
PIF_VALUE: 19
PIF_VALUE: 8
PIF_VALUE: 17
PIF_VALUE: 14
PIF_VALUE: 20
PIF_VALUE: 19
PIF_VALUE: 19
PIF_VALUE: 1
PIF_VALUE: 1
PIF_VALUE: 0
PIF_VALUE: 1
PIF_VALUE: 17
PIF_VALUE: 0
PIF_VALUE: 0
PIF_VALUE: 18
PIF_VALUE: 0
PIF_VALUE: 1
PIF_VALUE: 0
PIF_VALUE: 20
PIF_VALUE: 19
PIF_VALUE: 0
PIF_VALUE: 0
PIF_VALUE: 20
PIF_VALUE: 18
PIF_VALUE: 20
PIF_VALUE: 0
PIF_VALUE: 19
PIF_VALUE: 16
PIF_VALUE: 18
PIF_VALUE: 0
PIF_VALUE: 0
PIF_VALUE: 19
PIF_VALUE: 14
PIF_VALUE: 0
PIF_VALUE: 18
PIF_VALUE: 18
PIF_VALUE: 1
PIF_VALUE: 20
PIF_VALUE: 15
PIF_VALUE: 9
PIF_VALUE: 0
PIF_VALUE: 0
PIF_VALUE: 17
PIF_VALUE: 19
PIF_VALUE: 17
PIF_VALUE: 1
PIF_VALUE: 20
PIF_VALUE: 0
PIF_VALUE: 0
PIF_VALUE: 16
PIF_VALUE: 0
PIF_VALUE: 16
PIF_VALUE: 17
PIF_VALUE: 14
PIF_VALUE: 0
PIF_VALUE: 16
PIF_VALUE: 0
PIF_VALUE: 17
PIF_VALUE: 16
PIF_VALUE: 16
PIF_VALUE: 0
PIF_VALUE: 17
PIF_VALUE: 0
PIF_VALUE: 4
PIF_VALUE: 2
PIF_VALUE: 20
PIF_VALUE: 20
PIF_VALUE: 0
PIF_VALUE: 17
PIF_VALUE: 19
PIF_VALUE: 0
PIF_VALUE: 19
PIF_VALUE: 5
PIF_VALUE: 0
PIF_VALUE: 17
PIF_VALUE: 0
PIF_VALUE: 19
PIF_VALUE: 0
PIF_VALUE: 19
PIF_VALUE: 19
PIF_VALUE: 0
PIF_VALUE: 20
PIF_VALUE: 0
PIF_VALUE: 0
PIF_VALUE: 16
PIF_VALUE: 1
PIF_VALUE: 1
PIF_VALUE: 0
PIF_VALUE: 14
PIF_VALUE: 15
PIF_VALUE: 0
PIF_VALUE: 0
PIF_VALUE: 20
PIF_VALUE: 2
PIF_VALUE: 19
PIF_VALUE: 16
PIF_VALUE: 23
PIF_VALUE: 19
PIF_VALUE: 0
PIF_VALUE: 18
PIF_VALUE: 20
PIF_VALUE: 17
PIF_VALUE: 19
PIF_VALUE: 4
PIF_VALUE: 15
PIF_VALUE: 20
PIF_VALUE: 0
PIF_VALUE: 20
PIF_VALUE: 19
PIF_VALUE: 0
PIF_VALUE: 15
PIF_VALUE: 18
PIF_VALUE: 14
PIF_VALUE: 0
PIF_VALUE: 0
PIF_VALUE: 16
PIF_VALUE: 0
PIF_VALUE: 18
PIF_VALUE: 17
PIF_VALUE: 1
PIF_VALUE: 0
PIF_VALUE: 18
PIF_VALUE: 12
PIF_VALUE: 17
PIF_VALUE: 15
PIF_VALUE: 0
PIF_VALUE: 18
PIF_VALUE: 20
PIF_VALUE: 19
PIF_VALUE: 19
PIF_VALUE: 0
PIF_VALUE: 17
PIF_VALUE: 0
PIF_VALUE: 16
PIF_VALUE: 20
PIF_VALUE: 0
PIF_VALUE: 3
PIF_VALUE: 19
PIF_VALUE: 19
PIF_VALUE: 0
PIF_VALUE: 20
PIF_VALUE: 17
PIF_VALUE: 0
PIF_VALUE: 1
PIF_VALUE: 0
PIF_VALUE: 19
PIF_VALUE: 16
PIF_VALUE: 16
PIF_VALUE: 0
PIF_VALUE: 0
PIF_VALUE: 17
PIF_VALUE: 0
PIF_VALUE: 20
PIF_VALUE: 5
PIF_VALUE: 15
PIF_VALUE: 17
PIF_VALUE: 0
PIF_VALUE: 16
PIF_VALUE: 0
PIF_VALUE: 0
PIF_VALUE: 17
PIF_VALUE: 17
PIF_VALUE: 0
PIF_VALUE: 20
PIF_VALUE: 18
PIF_VALUE: 0
PIF_VALUE: 20
PIF_VALUE: 17
PIF_VALUE: 0
PIF_VALUE: 37
PIF_VALUE: 0
PIF_VALUE: 20
PIF_VALUE: 18
PIF_VALUE: 19
PIF_VALUE: 2
PIF_VALUE: 19
PIF_VALUE: 17
PIF_VALUE: 20
PIF_VALUE: 0
PIF_VALUE: 0
PIF_VALUE: 1
PIF_VALUE: 0
PIF_VALUE: 20
PIF_VALUE: 0
PIF_VALUE: 15
PIF_VALUE: 1
PIF_VALUE: 17
PIF_VALUE: 1
PIF_VALUE: 17
PIF_VALUE: 20
PIF_VALUE: 20
PIF_VALUE: 18
PIF_VALUE: 12
PIF_VALUE: 16
PIF_VALUE: 1
PIF_VALUE: 20
PIF_VALUE: 0
PIF_VALUE: 0
PIF_VALUE: 17
PIF_VALUE: 0
PIF_VALUE: 17
PIF_VALUE: 16
PIF_VALUE: 0
PIF_VALUE: 16
PIF_VALUE: 0
PIF_VALUE: 1
PIF_VALUE: 17
PIF_VALUE: 17
PIF_VALUE: 20
PIF_VALUE: 16
PIF_VALUE: 18
PIF_VALUE: 16
PIF_VALUE: 0
PIF_VALUE: 16
PIF_VALUE: 17
PIF_VALUE: 15
PIF_VALUE: 17
PIF_VALUE: 2
PIF_VALUE: 20
PIF_VALUE: 0
PIF_VALUE: 20
PIF_VALUE: 0
PIF_VALUE: 20
PIF_VALUE: 18
PIF_VALUE: 17

## 2020-07-03 ASSESSMENT — PAIN SCALES - GENERAL
PAINLEVEL_OUTOF10: 0
PAINLEVEL_OUTOF10: 10
PAINLEVEL_OUTOF10: 9
PAINLEVEL_OUTOF10: 0
PAINLEVEL_OUTOF10: 0
PAINLEVEL_OUTOF10: 10
PAINLEVEL_OUTOF10: 0
PAINLEVEL_OUTOF10: 0
PAINLEVEL_OUTOF10: 10
PAINLEVEL_OUTOF10: 0

## 2020-07-03 ASSESSMENT — PAIN DESCRIPTION - ONSET
ONSET: SUDDEN
ONSET: ON-GOING
ONSET: ON-GOING

## 2020-07-03 ASSESSMENT — PAIN DESCRIPTION - FREQUENCY
FREQUENCY: CONTINUOUS

## 2020-07-03 ASSESSMENT — PAIN - FUNCTIONAL ASSESSMENT
PAIN_FUNCTIONAL_ASSESSMENT: PREVENTS OR INTERFERES SOME ACTIVE ACTIVITIES AND ADLS
PAIN_FUNCTIONAL_ASSESSMENT: ACTIVITIES ARE NOT PREVENTED
PAIN_FUNCTIONAL_ASSESSMENT: ACTIVITIES ARE NOT PREVENTED

## 2020-07-03 ASSESSMENT — PAIN DESCRIPTION - PROGRESSION
CLINICAL_PROGRESSION: GRADUALLY WORSENING

## 2020-07-03 ASSESSMENT — PAIN DESCRIPTION - DIRECTION
RADIATING_TOWARDS: BACK
RADIATING_TOWARDS: BACK

## 2020-07-03 ASSESSMENT — PAIN DESCRIPTION - PAIN TYPE
TYPE: SURGICAL PAIN

## 2020-07-03 ASSESSMENT — PAIN DESCRIPTION - DESCRIPTORS
DESCRIPTORS: ACHING;DISCOMFORT;PENETRATING;PRESSURE
DESCRIPTORS: PRESSURE;SHARP;SHOOTING;SORE
DESCRIPTORS: ACHING;DISCOMFORT;PENETRATING;PRESSURE

## 2020-07-03 ASSESSMENT — PAIN DESCRIPTION - LOCATION
LOCATION: CHEST

## 2020-07-03 ASSESSMENT — PAIN DESCRIPTION - ORIENTATION
ORIENTATION: MID

## 2020-07-03 NOTE — PROGRESS NOTES
Unable to control patient's blood pressure with Nitro, up to 90 mcg/min. Dr Junior Berrios notified with telephone order of Cleviprex .

## 2020-07-03 NOTE — PROGRESS NOTES
Vent settings changed to AC/VC 20, 500, 100%, +5 because patient in respiratory acidosis and low O2 sats and PaO2. Will repeat ABG and adjust vent as necessary.

## 2020-07-03 NOTE — FLOWSHEET NOTE
Cardiac Surgery Risk Factor Worksheet      STS Criteria  Possible Score Yes/No Score ICD 10 Notes   Emergency Case 6 n 0     Serum Creatinine         >1.2 0 n 0 Cr 1.1    >1.6 to <1.8mg/dl 1 n 0     >.1.9 4 n 0     Acute/Chronic Renal Failure/Renal Insufficiency 0 n 0     Left Ventricular Dysfunction(EF<40%) 3   No 0     Operative Mitral Valve Insufficiency 3   No 0     Reoperation 3 No 0     Age >65 and <74 years 1  No   0     Age >75 years 2 No 0     Prior Vascular Surgery 2   No 0     COPD +History of Patient Use of Bronchodilators 2   No 0     COPD 0 Yes 0     Current Smoker of History of Smoking  0   Yes 0     Anemia (Hemotocrit<0.34) 2   No 0     ASA / Anticoagulants/ Bleeding Tendiencies / Antiplatelets 0   Yes 0     Operative Aortic Valve Stenosis 1 No 0       Weight<143 lbs (  BMI<18.5) 1   No 0     Weight >200 lbs (BMI >30    0   Yes 0     Diabetes Oral or Insulin Therapy 1   Yes 1     Cerebrovascular Disease 1   No 0     Impaired Mobility 0 No 0     Walker/Cane/Wheelchair 0 No 0     Recent MI 0 No 0     Hypertension 0 Yes 0     Peripheral Vascular Disease 0 No 0     Lives Alone 0 No 0     Other (GI Auto Immune Hepatic etc) 0 No 0     TOTAL   1     Note The surgeon must be notified for all risk scores >7       7/2/2020

## 2020-07-03 NOTE — ANESTHESIA PRE PROCEDURE
Oral Nightly Bria Mitchell MD        morphine (PF) injection 2 mg  2 mg Intravenous Once Lennie Black MD        nitroGLYCERIN 50 mg in dextrose 5% 250 mL infusion  5 mcg/min Intravenous Continuous Jacques Janelle Nails MD 33 mL/hr at 07/02/20 1916 110 mcg/min at 07/02/20 1916    mupirocin (BACTROBAN) 2 % ointment   Topical TID Leila Gonzalez MD        amLODIPine (NORVASC) tablet 10 mg  10 mg Oral Daily Kartik Sung MD   10 mg at 07/02/20 1000    carvedilol (COREG) tablet 25 mg  25 mg Oral BID WC Kartik Sung MD   Stopped at 07/02/20 3750    isosorbide mononitrate (IMDUR) extended release tablet 30 mg  30 mg Oral Daily Kartik Sung MD   30 mg at 07/02/20 1000    polyethylene glycol (GLYCOLAX) packet 17 g  17 g Oral Daily PRN Kartik Sung MD        promethazine (PHENERGAN) tablet 12.5 mg  12.5 mg Oral Q6H PRN Kartik Sung MD        Or    ondansetron [facility] injection 4 mg  4 mg Intravenous Q6H PRN Kartik Sung MD        glucose (GLUTOSE) 40 % oral gel 15 g  15 g Oral PRN Kartik Sung MD        dextrose 50 % IV solution  12.5 g Intravenous PRN Kartik Sung MD        glucagon (rDNA) injection 1 mg  1 mg Intramuscular PRN Kartik Sung MD        dextrose 5 % solution  100 mL/hr Intravenous PRN Kartik Sung MD        sodium chloride flush 0.9 % injection 10 mL  10 mL Intravenous 2 times per day Kartik Sung MD   10 mL at 07/01/20 0852    sodium chloride flush 0.9 % injection 10 mL  10 mL Intravenous PRN Kartik Sung MD        acetaminophen (TYLENOL) tablet 650 mg  650 mg Oral Q4H PRN Kartik Sung MD        sodium chloride flush 0.9 % injection 10 mL  10 mL Intravenous 2 times per day Leila Gonzalez MD   10 mL at 07/01/20 2159    sodium chloride flush 0.9 % injection 10 mL  10 mL Intravenous PRN Leila Gonzalez MD        aspirin EC tablet 81 mg  81 mg Oral Daily Leila Gonzalez MD   81 mg at 07/02/20 1026 [facility] rectal enema 1 enema  1 enema Rectal Once Aamir Clarke MD        hydrALAZINE (APRESOLINE) injection 10 mg  10 mg Intravenous Q4H PRN José Miguel Pereira PA-C   10 mg at 06/30/20 2400    lidocaine 4 % external patch 1 patch  1 patch Transdermal Daily Javier Workman MD   Stopped at 07/02/20 0937    traZODone (DESYREL) tablet 50 mg  50 mg Oral Nightly PRN Javier Workman MD   50 mg at 07/01/20 2158    nicotine (NICODERM CQ) 21 MG/24HR 1 patch  1 patch Transdermal Daily Charly Sanches MD   1 patch at 07/01/20 2159    ipratropium-albuterol (DUONEB) nebulizer solution 1 ampule  1 ampule Inhalation Q4H PRN Aamir Clarke MD   1 ampule at 07/02/20 1514    cyclobenzaprine (FLEXERIL) tablet 10 mg  10 mg Oral TID PRN Jacques Stern MD   10 mg at 07/01/20 2158    insulin lispro (HUMALOG) injection vial 0-18 Units  0-18 Units Subcutaneous TID  Jacques Stern MD   9 Units at 07/02/20 1635    insulin lispro (HUMALOG) injection vial 0-9 Units  0-9 Units Subcutaneous Nightly Charly Sanches MD   5 Units at 07/01/20 2210       Allergies:  No Known Allergies    Problem List:    Patient Active Problem List   Diagnosis Code    Chest pain I52.3    DM w/o complication type II (HCC) E11.9    Longstanding persistent atrial fibrillation I48.11    CAD (coronary artery disease) I25.10    Post PTCA Z98.61    Unstable angina (HCC) I20.0    HTN (hypertension) I10    Angina pectoris syndrome (HCC)-unstable I20.9    Smoking F17.200    COPD (chronic obstructive pulmonary disease) ([facility] J44.9    Atrial fibrillation with rapid ventricular response (HCC) I48.91    Chest pain R07.9    STEMI (ST elevation myocardial infarction) ([facility] I21.3    Myocardial infarction acute ([facility] I21.9    Nausea R11.0    New onset of congestive heart failure (HCC) I50.9    COPD with acute exacerbation ([facility] J44.1    Acute pulmonary edema (HCC) J81.0    Pleural effusion, bilateral J90    Hypertensive urgency I16.0    Chronic systolic congestive heart History:        Procedure Laterality Date    APPENDECTOMY      CARDIAC CATHETERIZATION  11/13/2013 11/13/2013-EF 55%, distal LAD mild disease,CX stent patent,but distal to stent 50% stenosis. RCA severe disease.  COLONOSCOPY      CORONARY ANGIOPLASTY WITH STENT PLACEMENT      4 stents- RCA X3; CX X1    CORONARY ANGIOPLASTY WITH STENT PLACEMENT  11/13/2013 11/13/2013 -3 stents placed to RCA- Dr Delaney Rdz       Social History:    Social History     Tobacco Use    Smoking status: Current Every Day Smoker     Packs/day: 1.50     Years: 45.00     Pack years: 67.50    Smokeless tobacco: Never Used   Substance Use Topics    Alcohol use: No     Comment: caffeine 1 cup of coffee a day and at least 6 bottles of pop a day                                Ready to quit: Not Answered  Counseling given: Not Answered      Vital Signs (Current):   Vitals:    07/02/20 1633 07/02/20 1703 07/02/20 1733 07/02/20 1903   BP: (!) 149/100 (!) 144/101 (!) 148/100 (!) 134/110   Pulse: 78 75 78 86   Resp: 12 9  15   Temp:       TempSrc:       SpO2:       Weight:       Height:                                                  BP Readings from Last 3 Encounters:   07/02/20 (!) 134/110   06/15/20 122/80   06/05/20 112/62       NPO Status:                                                                                 BMI:   Wt Readings from Last 3 Encounters:   06/29/20 257 lb 4.4 oz (116.7 kg)   06/15/20 259 lb 9.6 oz (117.8 kg)   06/05/20 261 lb 3.2 oz (118.5 kg)     Body mass index is 32.16 kg/m².     CBC:   Lab Results   Component Value Date    WBC 8.3 07/02/2020    RBC 4.34 07/02/2020    HGB 13.0 07/02/2020    HCT 40.1 07/02/2020    MCV 92.4 07/02/2020    RDW 13.1 07/02/2020     07/02/2020       CMP:   Lab Results   Component Value Date     07/02/2020    K 3.7 07/02/2020     07/02/2020    CO2 26 07/02/2020    BUN 19 07/02/2020    CREATININE 1.1 07/02/2020    GFRAA >60 07/02/2020    LABGLOM >60 07/02/2020    GLUCOSE 221 07/02/2020    PROT 7.7 06/29/2020    PROT 7.7 03/09/2013    CALCIUM 9.3 07/02/2020    BILITOT 0.2 06/29/2020    ALKPHOS 136 06/29/2020    AST 17 06/29/2020    ALT 10 06/29/2020       POC Tests:   Recent Labs     07/02/20  1629   POCGLU 250*       Coags:   Lab Results   Component Value Date    PROTIME 12.0 06/30/2020    PROTIME 11.4 06/06/2011    INR 0.99 06/30/2020    APTT 36.7 07/02/2020       HCG (If Applicable): No results found for: PREGTESTUR, PREGSERUM, HCG, HCGQUANT     ABGs:   Lab Results   Component Value Date    PO2ART 67 06/29/2020    DLA6YDS 40.0 06/29/2020    IAW4OAT 27.2 06/29/2020        Type & Screen (If Applicable):  No results found for: LABABO, LABRH    Drug/Infectious Status (If Applicable):  No results found for: HIV, HEPCAB    COVID-19 Screening (If Applicable):   Lab Results   Component Value Date    COVID19 NOT DETECTED 05/22/2020         Anesthesia Evaluation  Patient summary reviewed no history of anesthetic complications:   Airway: Mallampati: III  TM distance: >3 FB   Neck ROM: full  Mouth opening: < 3 FB Dental:    (+) edentulous      Pulmonary:   (+) COPD:  decreased breath sounds,  current smoker                           Cardiovascular:    (+) hypertension:, angina:, past MI:, CAD:, CABG/stent:, dysrhythmias: atrial fibrillation, CHF:,         Rhythm: irregular                   ROS comment: 06/2020 echo   Summary   Left ventricular systolic function is abnormal.   Ejection fraction is visually estimated at 40-45%   Moderate left ventricular hypertrophy. Inferolateral hypokinesis; global hypokinesis. Mild bilateral atrial enlargement. No evidence of any pericardial effusion. 06/2020   [facility]  Procedure Summary   1. Severe three vessel CAD with significant Left Main disease. 2. Regional & global WMA with moderate reduction of LV function. LVEF is 35 to 40 %.         Neuro/Psych:   (+) TIA, GI/Hepatic/Renal:   (+) GERD:, morbid obesity          Endo/Other:    (+) DiabetesType II DM, poorly controlled, , blood dyscrasia: anticoagulation therapy:., .                  ROS comment: Drug hx, + cocaine this admit Abdominal:   (+) obese,         Vascular:           ROS comment: Carotid us  1. The right internal carotid artery demonstrates 0-50% stenosis .     2. The left internal carotid artery demonstrates 0-50% stenosis .     3. Bilateral vertebral arteries are patent with flow in the normal direction. .                         Anesthesia Plan      general     ASA 4 - emergent       Induction: intravenous. arterial line, BIS, central line, CVP, PA catheter and ISMAEL  MIPS: Postoperative opioids intended and Postoperative ventilation. Anesthetic plan and risks discussed with patient. Use of blood products discussed with patient whom consented to blood products. Plan discussed with CRNA.     Attending anesthesiologist reviewed and agrees with TREY Michelle CRNA   7/2/2020

## 2020-07-03 NOTE — FLOWSHEET NOTE
Rapid COVID test not needed per hospital medical director. This was reported to dayshift RN by the ICU manager who spoke with the medical director and updated CT Surgeon.

## 2020-07-03 NOTE — PLAN OF CARE
Problem: Cardiac:  Goal: Ability to maintain an adequate cardiac output will improve  Description: Ability to maintain an adequate cardiac output will improve  Outcome: Completed  Goal: Complications related to the disease process, condition or treatment will be avoided or minimized  Description: Complications related to the disease process, condition or treatment will be avoided or minimized  Outcome: Completed  Goal: Hemodynamic stability will improve  Description: Hemodynamic stability will improve  Outcome: Completed  Goal: Risk factors for ineffective tissue perfusion will decrease  Description: Risk factors for ineffective tissue perfusion will decrease  Outcome: Completed     Problem: Fluid Volume:  Goal: Will show no signs or symptoms of fluid imbalance  Description: Will show no signs or symptoms of fluid imbalance  Outcome: Completed     Problem: Safety:  Goal: Free from accidental physical injury  Description: Free from accidental physical injury  Outcome: Completed  Goal: Free from intentional harm  Description: Free from intentional harm  Outcome: Completed     Problem: Daily Care:  Goal: Daily care needs are met  Description: Daily care needs are met  Outcome: Completed     Problem: Pain:  Goal: Patient's pain/discomfort is manageable  Description: Patient's pain/discomfort is manageable  Outcome: Completed     Problem: Discharge Planning:  Goal: Patients continuum of care needs are met  Description: Patients continuum of care needs are met  Outcome: Completed     Problem: Skin Integrity:  Goal: Skin integrity will stabilize  Description: Skin integrity will stabilize  Outcome: Completed     Problem: Discharge Planning:  Goal: Discharged to appropriate level of care  Description: Discharged to appropriate level of care  Outcome: Ongoing     Problem:  Activity Intolerance:  Goal: Able to perform prescribed physical activity  Description: Able to perform prescribed physical activity  Outcome: Ongoing  Goal: Ability to tolerate increased activity will improve  Description: Ability to tolerate increased activity will improve  Outcome: Ongoing     Problem: Anxiety:  Goal: Level of anxiety will decrease  Description: Level of anxiety will decrease  Outcome: Ongoing     Problem: Cardiac Output - Decreased:  Goal: Cardiac output within specified parameters  Description: Cardiac output within specified parameters  Outcome: Ongoing  Goal: Hemodynamic stability will improve  Description: Hemodynamic stability will improve  Outcome: Ongoing     Problem: Fluid Volume - Imbalance:  Goal: Ability to achieve a balanced intake and output will improve  Description: Ability to achieve a balanced intake and output will improve  Outcome: Ongoing  Goal: Chest tube drainage is within specified parameters  Description: Chest tube drainage is within specified parameters  Outcome: Ongoing     Problem: Gas Exchange - Impaired:  Goal: Levels of oxygenation will improve  Description: Levels of oxygenation will improve  Outcome: Ongoing  Goal: Ability to maintain adequate ventilation will improve  Description: Ability to maintain adequate ventilation will improve  Outcome: Ongoing     Problem: Pain:  Goal: Pain level will decrease  Description: Pain level will decrease  Outcome: Ongoing  Goal: Control of acute pain  Description: Control of acute pain  Outcome: Ongoing  Goal: Control of chronic pain  Description: Control of chronic pain  Outcome: Ongoing     Problem: Tissue Perfusion - Cardiopulmonary, Altered:  Goal: Absence of angina  Description: Absence of angina  Outcome: Ongoing  Goal: Hemodynamic stability will improve  Description: Hemodynamic stability will improve  Outcome: Ongoing  Goal: Will show no evidence of cardiac arrhythmias  Description: Will show no evidence of cardiac arrhythmias  Outcome: Ongoing     Problem: Tobacco Use:  Goal: Will participate in inpatient tobacco-use cessation counseling  Description: Will

## 2020-07-03 NOTE — PROGRESS NOTES
Patient meets parameters  for extubation. Patient is able to follow commands, able to lift head off of pillow, and Nif -21 and RSBI 19 at this time. Patient tolerating CPAP. ABG's WNL. Patient educated on Extubation criteria. Respiratory at bedside. Patient extubated, 4 liters of oxygen applied per NC. Breathing treatment administered per MAR. Vital signs per protocol. Will continue to monitor patient. Electronically signed by Phyllis Holt on 7/3/2020 at 5:55 PM

## 2020-07-03 NOTE — PROGRESS NOTES
Patient extubated to 4L nasal cannula at this time.   Respiratory vitals were as follows:    RR 17    VE 13.8  RSBI 19  NIF -21

## 2020-07-03 NOTE — ANESTHESIA PROCEDURE NOTES
Procedure Performed: ISMAEL       Start Time:  7/3/2020 7:36 AM       End Time:   7/3/2020 7:48 AM    Preanesthesia Checklist:  Patient identified, IV assessed, risks and benefits discussed, monitors and equipment assessed, procedure being performed at surgeon's request and anesthesia consent obtained.     General Procedure Information  Diagnostic Indications for Echo:  assessment of ascending aorta, assessment of surgical repair, hemodynamic monitoring and assessment of valve function  Physician Requesting Echo: Collin Scruggs MD  Location performed:  OR  Intubated  Bite block placed  Heart visualized  Probe Insertion:  Easy  Probe Type:  3D  Modalities:  2D only and color flow mapping        Anesthesia Information      Echocardiogram Comments:       Atraumatic, no complications  Dr Amalia White at bedside during ISMAEL

## 2020-07-03 NOTE — PROGRESS NOTES
To the OR by bed with chart and Ancef was given to Deb Tamayo, [address] [place] [address].   Bailey Hairston RN

## 2020-07-03 NOTE — PROGRESS NOTES
Patient arrived in CVICU from [address] [place] [address] with OR RN and anesthesia. Patient bagged per Anesthesia. Intubated pt connected to vent per Mello RT at bedside. Patient connected to CVICU monitor. Vital signs obtained. Assessment completed, see documentation. Labs and ABG's obtained. Drips infusing from OR:  Levo @6, EPI @3 Dopamine @2. A/V pacing wires in place. Connected/ connected to pacer box pacing at a rate of 70. Report received from [place] at bedside. Out from OR:  [id number], cell saver 1100, and urine 450. Recovery process in progress from 1145. Will continue to monitor closely. Electronically signed by Rolando Lerner on 7/3/2020 at 12:50 PM

## 2020-07-03 NOTE — BRIEF OP NOTE
Brief Postoperative Note      Patient: Savannah Hodgkins  YOB: 1960  MRN: [id number]    Date of Procedure: 7/3/2020    Pre-Op Diagnosis: CAD    Post-Op Diagnosis: Same       Procedure(s):  INTRAOPERATIVE ISMAEL, CABG X4 LIMA to LAD and diag, SVG to OM, SVG to PDA  WITH LIMA  AND LEFT LEG ENDOVEIN HARVEST, INDUCED HYPOTHERMIA, MAZE BIPOLAR AND CRYO PROCEDURE, LEFT ATRIAL APPENDAGE CLIPPING    Surgeon(s):  MD Jacques Lopes MD    Assistant:  Physician Assistant: Rohan Encinas PA-C    Anesthesia: General    Estimated Blood Loss (mL): 863    Complications: None    Specimens:   * No specimens in log *    Implants:  * No implants in log *      Drains:   Chest Tube 2 Anterior Pleural 24 Setswana (Active)   Suction -20 cm H2O 7/3/2020 10:41 AM   Drainage Description Serous 7/3/2020 10:42 AM   Dressing Type Split gauze 7/3/2020 10:41 AM       Chest Tube 1 Anterior Mediastinal (Active)   Drainage Description Serous 7/3/2020 10:43 AM   Dressing Type Split gauze 7/3/2020 10:43 AM       Urethral Catheter Straight-tip; Non-latex 16 fr (Active)   Urine Color Yellow 7/3/2020 10:48 AM   Urine Appearance Clear 7/3/2020 10:48 AM   Output (mL) 150 mL 7/3/2020 10:48 AM       Findings:     Electronically signed by Rohan Encinas PA-C on 7/3/2020 at 11:26 AM

## 2020-07-03 NOTE — PROGRESS NOTES
Patient going for CABG this morning  Will sign off today  Hospitalist service will be available as needed

## 2020-07-03 NOTE — PROGRESS NOTES
Patient arrived s/p CABG x3. Patient had 8.0 ET tube placed at 20cm @ lip and placed on AC/VC 16, 500, 40%, +5.

## 2020-07-03 NOTE — PROGRESS NOTES
Dr. Nasrin Mohamud at bedside and reviewed blood gases. Adjustments to Vt to 600. Nitro paused d/t sudden drop in pressure.

## 2020-07-04 ENCOUNTER — APPOINTMENT (OUTPATIENT)
Dept: GENERAL RADIOLOGY | Age: 60
DRG: 229 | End: 2020-07-04
Payer: COMMERCIAL

## 2020-07-04 LAB
ANION GAP SERPL CALCULATED.3IONS-SCNC: 8 MMOL/L (ref 4–16)
BACTERIA: ABNORMAL /HPF
BASE EXCESS MIXED: 2.4 (ref 0–1.2)
BASE EXCESS MIXED: 3.1 (ref 0–1.2)
BASE EXCESS MIXED: 3.3 (ref 0–1.2)
BASE EXCESS MIXED: 3.5 (ref 0–1.2)
BASE EXCESS MIXED: 3.6 (ref 0–1.2)
BASE EXCESS: ABNORMAL (ref 0–3.3)
BILIRUBIN URINE: NEGATIVE MG/DL
BLOOD, URINE: ABNORMAL
BUN BLDV-MCNC: 24 MG/DL (ref 6–23)
CALCIUM IONIZED: 4.8 MG/DL (ref 4.48–5.28)
CALCIUM SERPL-MCNC: 8.6 MG/DL (ref 8.3–10.6)
CHLORIDE BLD-SCNC: 106 MMOL/L (ref 99–110)
CLARITY: CLEAR
CO2: 22 MMOL/L (ref 21–32)
CO2: 24 MMOL/L (ref 21–32)
CO2: 25 MMOL/L (ref 21–32)
COCAINE, CONFIRM, URINE: >1000 NG/ML
COLOR: YELLOW
COMMENT: 33 NG/ML
CREAT SERPL-MCNC: 1.7 MG/DL (ref 0.9–1.3)
CREATININE URINE: 116.8 MG/DL (ref 39–259)
GFR AFRICAN AMERICAN: 47 ML/MIN/1.73M2
GFR AFRICAN AMERICAN: 49 ML/MIN/1.73M2
GFR AFRICAN AMERICAN: 50 ML/MIN/1.73M2
GFR AFRICAN AMERICAN: 50 ML/MIN/1.73M2
GFR AFRICAN AMERICAN: 51 ML/MIN/1.73M2
GFR NON-AFRICAN AMERICAN: 39 ML/MIN/1.73M2
GFR NON-AFRICAN AMERICAN: 41 ML/MIN/1.73M2
GFR NON-AFRICAN AMERICAN: 42 ML/MIN/1.73M2
GLUCOSE BLD-MCNC: 102 MG/DL (ref 70–99)
GLUCOSE BLD-MCNC: 109 MG/DL (ref 70–99)
GLUCOSE BLD-MCNC: 112 MG/DL (ref 70–99)
GLUCOSE BLD-MCNC: 115 MG/DL (ref 70–99)
GLUCOSE BLD-MCNC: 115 MG/DL (ref 70–99)
GLUCOSE BLD-MCNC: 117 MG/DL (ref 70–99)
GLUCOSE BLD-MCNC: 124 MG/DL (ref 70–99)
GLUCOSE BLD-MCNC: 125 MG/DL (ref 70–99)
GLUCOSE BLD-MCNC: 126 MG/DL (ref 70–99)
GLUCOSE BLD-MCNC: 126 MG/DL (ref 70–99)
GLUCOSE BLD-MCNC: 127 MG/DL (ref 70–99)
GLUCOSE BLD-MCNC: 129 MG/DL (ref 70–99)
GLUCOSE BLD-MCNC: 133 MG/DL (ref 70–99)
GLUCOSE BLD-MCNC: 133 MG/DL (ref 70–99)
GLUCOSE BLD-MCNC: 134 MG/DL (ref 70–99)
GLUCOSE BLD-MCNC: 160 MG/DL (ref 70–99)
GLUCOSE BLD-MCNC: 194 MG/DL (ref 70–99)
GLUCOSE BLD-MCNC: 87 MG/DL (ref 70–99)
GLUCOSE BLD-MCNC: 91 MG/DL (ref 70–99)
GLUCOSE BLD-MCNC: 95 MG/DL (ref 70–99)
GLUCOSE BLD-MCNC: 95 MG/DL (ref 70–99)
GLUCOSE BLD-MCNC: 96 MG/DL (ref 70–99)
GLUCOSE, URINE: NEGATIVE MG/DL
HCO3 ARTERIAL: 22.4 MMOL/L (ref 18–23)
HCO3 ARTERIAL: 22.7 MMOL/L (ref 18–23)
HCO3 ARTERIAL: 22.7 MMOL/L (ref 18–23)
HCO3 ARTERIAL: 22.8 MMOL/L (ref 18–23)
HCO3 ARTERIAL: 23.5 MMOL/L (ref 18–23)
HCT VFR BLD CALC: 31 % (ref 42–52)
HCT VFR BLD CALC: 32 % (ref 42–52)
HCT VFR BLD CALC: 33 % (ref 42–52)
HCT VFR BLD CALC: 34.3 % (ref 42–52)
HEMOGLOBIN: 10.7 GM/DL (ref 13.5–18)
HEMOGLOBIN: 10.7 GM/DL (ref 13.5–18)
HEMOGLOBIN: 10.8 GM/DL (ref 13.5–18)
HEMOGLOBIN: 11 GM/DL (ref 13.5–18)
HEMOGLOBIN: 11.3 GM/DL (ref 13.5–18)
HEMOGLOBIN: 11.3 GM/DL (ref 13.5–18)
HYALINE CASTS: 5 /LPF
IONIZED CA: 1.2 MMOL/L (ref 1.12–1.32)
KETONES, URINE: NEGATIVE MG/DL
LEUKOCYTE ESTERASE, URINE: NEGATIVE
MAGNESIUM: 2.4 MG/DL (ref 1.8–2.4)
MCH RBC QN AUTO: 30.7 PG (ref 27–31)
MCHC RBC AUTO-ENTMCNC: 32.9 % (ref 32–36)
MCV RBC AUTO: 93.2 FL (ref 78–100)
MUCUS: ABNORMAL HPF
NITRITE URINE, QUANTITATIVE: NEGATIVE
O2 SATURATION: 87 % (ref 96–97)
O2 SATURATION: 89.6 % (ref 96–97)
O2 SATURATION: 89.9 % (ref 96–97)
O2 SATURATION: 90.6 % (ref 96–97)
O2 SATURATION: 90.8 % (ref 96–97)
PCO2 ARTERIAL: 43.2 MMHG (ref 32–45)
PCO2 ARTERIAL: 43.5 MMHG (ref 32–45)
PCO2 ARTERIAL: 43.9 MMHG (ref 32–45)
PCO2 ARTERIAL: 44.4 MMHG (ref 32–45)
PCO2 ARTERIAL: 45 MMHG (ref 32–45)
PDW BLD-RTO: 13.4 % (ref 11.7–14.9)
PH BLOOD: 7.31 (ref 7.34–7.45)
PH BLOOD: 7.32 (ref 7.34–7.45)
PH BLOOD: 7.32 (ref 7.34–7.45)
PH BLOOD: 7.33 (ref 7.34–7.45)
PH BLOOD: 7.33 (ref 7.34–7.45)
PH, URINE: 5 (ref 5–8)
PLATELET # BLD: 147 K/CU MM (ref 140–440)
PMV BLD AUTO: 11.8 FL (ref 7.5–11.1)
PO2 ARTERIAL: 58 MMHG (ref 75–100)
PO2 ARTERIAL: 62.3 MMHG (ref 75–100)
PO2 ARTERIAL: 63.2 MMHG (ref 75–100)
PO2 ARTERIAL: 64.6 MMHG (ref 75–100)
PO2 ARTERIAL: 64.9 MMHG (ref 75–100)
POC CALCIUM: 1.19 MMOL/L (ref 1.12–1.32)
POC CALCIUM: 1.24 MMOL/L (ref 1.12–1.32)
POC CALCIUM: 1.24 MMOL/L (ref 1.12–1.32)
POC CALCIUM: 1.25 MMOL/L (ref 1.12–1.32)
POC CALCIUM: 1.27 MMOL/L (ref 1.12–1.32)
POC CHLORIDE: 107 MMOL/L (ref 98–109)
POC CHLORIDE: 107 MMOL/L (ref 98–109)
POC CHLORIDE: 108 MMOL/L (ref 98–109)
POC CHLORIDE: 108 MMOL/L (ref 98–109)
POC CHLORIDE: 109 MMOL/L (ref 98–109)
POC CREATININE: 1.7 MG/DL (ref 0.9–1.3)
POC CREATININE: 1.8 MG/DL (ref 0.9–1.3)
POTASSIUM SERPL-SCNC: 4.1 MMOL/L (ref 3.5–4.5)
POTASSIUM SERPL-SCNC: 4.2 MMOL/L (ref 3.5–4.5)
POTASSIUM SERPL-SCNC: 4.5 MMOL/L (ref 3.5–4.5)
POTASSIUM SERPL-SCNC: 4.6 MMOL/L (ref 3.5–4.5)
POTASSIUM SERPL-SCNC: 4.8 MMOL/L (ref 3.5–4.5)
POTASSIUM SERPL-SCNC: 4.8 MMOL/L (ref 3.5–5.1)
PROT/CREAT RATIO, UR: 0.4
PROTEIN UA: 30 MG/DL
RBC # BLD: 3.68 M/CU MM (ref 4.6–6.2)
RBC URINE: 48 /HPF (ref 0–3)
SODIUM BLD-SCNC: 136 MMOL/L (ref 135–145)
SODIUM BLD-SCNC: 138 MMOL/L (ref 138–146)
SODIUM BLD-SCNC: 139 MMOL/L (ref 138–146)
SODIUM URINE: 17 MMOL/L (ref 35–167)
SOURCE, BLOOD GAS: ABNORMAL
SPECIFIC GRAVITY UA: 1.01 (ref 1–1.03)
SQUAMOUS EPITHELIAL: <1 /HPF
TRICHOMONAS: ABNORMAL /HPF
URINE TOTAL PROTEIN: 45.5 MG/DL
UROBILINOGEN, URINE: NORMAL MG/DL (ref 0.2–1)
WBC # BLD: 18.7 K/CU MM (ref 4–10.5)
WBC UA: <1 /HPF (ref 0–2)

## 2020-07-04 PROCEDURE — 83735 ASSAY OF MAGNESIUM: CPT

## 2020-07-04 PROCEDURE — 82570 ASSAY OF URINE CREATININE: CPT

## 2020-07-04 PROCEDURE — 6370000000 HC RX 637 (ALT 250 FOR IP): Performed by: THORACIC SURGERY (CARDIOTHORACIC VASCULAR SURGERY)

## 2020-07-04 PROCEDURE — 2500000003 HC RX 250 WO HCPCS: Performed by: THORACIC SURGERY (CARDIOTHORACIC VASCULAR SURGERY)

## 2020-07-04 PROCEDURE — 97530 THERAPEUTIC ACTIVITIES: CPT

## 2020-07-04 PROCEDURE — 97163 PT EVAL HIGH COMPLEX 45 MIN: CPT

## 2020-07-04 PROCEDURE — 2580000003 HC RX 258: Performed by: INTERNAL MEDICINE

## 2020-07-04 PROCEDURE — 6360000002 HC RX W HCPCS: Performed by: PHYSICIAN ASSISTANT

## 2020-07-04 PROCEDURE — 97166 OT EVAL MOD COMPLEX 45 MIN: CPT

## 2020-07-04 PROCEDURE — 85027 COMPLETE CBC AUTOMATED: CPT

## 2020-07-04 PROCEDURE — 2580000003 HC RX 258: Performed by: THORACIC SURGERY (CARDIOTHORACIC VASCULAR SURGERY)

## 2020-07-04 PROCEDURE — 6370000000 HC RX 637 (ALT 250 FOR IP): Performed by: PHYSICIAN ASSISTANT

## 2020-07-04 PROCEDURE — 84156 ASSAY OF PROTEIN URINE: CPT

## 2020-07-04 PROCEDURE — 2700000000 HC OXYGEN THERAPY PER DAY

## 2020-07-04 PROCEDURE — 82330 ASSAY OF CALCIUM: CPT

## 2020-07-04 PROCEDURE — 80048 BASIC METABOLIC PNL TOTAL CA: CPT

## 2020-07-04 PROCEDURE — 82962 GLUCOSE BLOOD TEST: CPT

## 2020-07-04 PROCEDURE — 71045 X-RAY EXAM CHEST 1 VIEW: CPT

## 2020-07-04 PROCEDURE — 94761 N-INVAS EAR/PLS OXIMETRY MLT: CPT

## 2020-07-04 PROCEDURE — 6360000002 HC RX W HCPCS: Performed by: INTERNAL MEDICINE

## 2020-07-04 PROCEDURE — 81001 URINALYSIS AUTO W/SCOPE: CPT

## 2020-07-04 PROCEDURE — 94640 AIRWAY INHALATION TREATMENT: CPT

## 2020-07-04 PROCEDURE — 2580000003 HC RX 258: Performed by: PHYSICIAN ASSISTANT

## 2020-07-04 PROCEDURE — 94660 CPAP INITIATION&MGMT: CPT

## 2020-07-04 PROCEDURE — 2000000000 HC ICU R&B

## 2020-07-04 PROCEDURE — 6360000002 HC RX W HCPCS: Performed by: THORACIC SURGERY (CARDIOTHORACIC VASCULAR SURGERY)

## 2020-07-04 PROCEDURE — 84300 ASSAY OF URINE SODIUM: CPT

## 2020-07-04 RX ORDER — OXYCODONE HYDROCHLORIDE AND ACETAMINOPHEN 5; 325 MG/1; MG/1
2 TABLET ORAL EVERY 4 HOURS PRN
Status: DISCONTINUED | OUTPATIENT
Start: 2020-07-04 | End: 2020-07-07 | Stop reason: HOSPADM

## 2020-07-04 RX ORDER — FUROSEMIDE 10 MG/ML
80 INJECTION INTRAMUSCULAR; INTRAVENOUS 3 TIMES DAILY
Status: DISCONTINUED | OUTPATIENT
Start: 2020-07-04 | End: 2020-07-06

## 2020-07-04 RX ORDER — FUROSEMIDE 10 MG/ML
60 INJECTION INTRAMUSCULAR; INTRAVENOUS ONCE
Status: COMPLETED | OUTPATIENT
Start: 2020-07-04 | End: 2020-07-04

## 2020-07-04 RX ORDER — SODIUM CHLORIDE 450 MG/100ML
INJECTION, SOLUTION INTRAVENOUS CONTINUOUS
Status: DISCONTINUED | OUTPATIENT
Start: 2020-07-04 | End: 2020-07-07 | Stop reason: HOSPADM

## 2020-07-04 RX ADMIN — ATORVASTATIN CALCIUM 20 MG: 20 TABLET, FILM COATED ORAL at 19:59

## 2020-07-04 RX ADMIN — SODIUM CHLORIDE: 4.5 INJECTION, SOLUTION INTRAVENOUS at 00:27

## 2020-07-04 RX ADMIN — HYDROMORPHONE HYDROCHLORIDE 1 MG: 1 INJECTION, SOLUTION INTRAMUSCULAR; INTRAVENOUS; SUBCUTANEOUS at 13:21

## 2020-07-04 RX ADMIN — CEFAZOLIN SODIUM 2 G: 2 SOLUTION INTRAVENOUS at 02:21

## 2020-07-04 RX ADMIN — SODIUM CHLORIDE, PRESERVATIVE FREE 10 ML: 5 INJECTION INTRAVENOUS at 09:45

## 2020-07-04 RX ADMIN — HYDROMORPHONE HYDROCHLORIDE 1 MG: 1 INJECTION, SOLUTION INTRAMUSCULAR; INTRAVENOUS; SUBCUTANEOUS at 04:02

## 2020-07-04 RX ADMIN — ASPIRIN 81 MG: 81 TABLET, COATED ORAL at 09:44

## 2020-07-04 RX ADMIN — IPRATROPIUM BROMIDE AND ALBUTEROL SULFATE 1 AMPULE: .5; 3 SOLUTION RESPIRATORY (INHALATION) at 20:18

## 2020-07-04 RX ADMIN — ONDANSETRON 4 MG: 2 INJECTION INTRAMUSCULAR; INTRAVENOUS at 18:28

## 2020-07-04 RX ADMIN — ONDANSETRON 4 MG: 2 INJECTION INTRAMUSCULAR; INTRAVENOUS at 09:43

## 2020-07-04 RX ADMIN — IPRATROPIUM BROMIDE AND ALBUTEROL SULFATE 1 AMPULE: .5; 3 SOLUTION RESPIRATORY (INHALATION) at 15:26

## 2020-07-04 RX ADMIN — OXYCODONE HYDROCHLORIDE AND ACETAMINOPHEN 2 TABLET: 5; 325 TABLET ORAL at 19:59

## 2020-07-04 RX ADMIN — POLYETHYLENE GLYCOL (3350) 17 G: 17 POWDER, FOR SOLUTION ORAL at 09:44

## 2020-07-04 RX ADMIN — FUROSEMIDE 60 MG: 10 INJECTION, SOLUTION INTRAVENOUS at 05:40

## 2020-07-04 RX ADMIN — Medication: at 19:59

## 2020-07-04 RX ADMIN — SODIUM CHLORIDE: 4.5 INJECTION, SOLUTION INTRAVENOUS at 13:38

## 2020-07-04 RX ADMIN — FUROSEMIDE 80 MG: 10 INJECTION, SOLUTION INTRAMUSCULAR; INTRAVENOUS at 13:23

## 2020-07-04 RX ADMIN — SODIUM CHLORIDE 4 UNITS/HR: 9 INJECTION, SOLUTION INTRAVENOUS at 17:21

## 2020-07-04 RX ADMIN — HYDROMORPHONE HYDROCHLORIDE 1 MG: 1 INJECTION, SOLUTION INTRAMUSCULAR; INTRAVENOUS; SUBCUTANEOUS at 17:42

## 2020-07-04 RX ADMIN — DEXMEDETOMIDINE HYDROCHLORIDE 0.5 MCG/KG/HR: 100 INJECTION, SOLUTION INTRAVENOUS at 15:34

## 2020-07-04 RX ADMIN — MULTIPLE VITAMINS W/ MINERALS TAB 1 TABLET: TAB at 09:44

## 2020-07-04 RX ADMIN — DEXMEDETOMIDINE HYDROCHLORIDE 0.5 MCG/KG/HR: 100 INJECTION, SOLUTION INTRAVENOUS at 07:18

## 2020-07-04 RX ADMIN — HYDROMORPHONE HYDROCHLORIDE 1 MG: 1 INJECTION, SOLUTION INTRAMUSCULAR; INTRAVENOUS; SUBCUTANEOUS at 07:04

## 2020-07-04 RX ADMIN — PANTOPRAZOLE SODIUM 40 MG: 40 TABLET, DELAYED RELEASE ORAL at 09:44

## 2020-07-04 RX ADMIN — Medication: at 09:44

## 2020-07-04 RX ADMIN — AMIODARONE HYDROCHLORIDE 200 MG: 200 TABLET ORAL at 19:59

## 2020-07-04 RX ADMIN — HYDROMORPHONE HYDROCHLORIDE 1 MG: 1 INJECTION, SOLUTION INTRAMUSCULAR; INTRAVENOUS; SUBCUTANEOUS at 10:21

## 2020-07-04 RX ADMIN — IPRATROPIUM BROMIDE AND ALBUTEROL SULFATE 1 AMPULE: .5; 3 SOLUTION RESPIRATORY (INHALATION) at 11:32

## 2020-07-04 RX ADMIN — HYDROMORPHONE HYDROCHLORIDE 1 MG: 1 INJECTION, SOLUTION INTRAMUSCULAR; INTRAVENOUS; SUBCUTANEOUS at 21:26

## 2020-07-04 RX ADMIN — DEXMEDETOMIDINE HYDROCHLORIDE 0.5 MCG/KG/HR: 100 INJECTION, SOLUTION INTRAVENOUS at 22:16

## 2020-07-04 RX ADMIN — AMIODARONE HYDROCHLORIDE 200 MG: 200 TABLET ORAL at 09:44

## 2020-07-04 RX ADMIN — OXYCODONE HYDROCHLORIDE AND ACETAMINOPHEN 2 TABLET: 5; 325 TABLET ORAL at 15:02

## 2020-07-04 RX ADMIN — FUROSEMIDE 80 MG: 10 INJECTION, SOLUTION INTRAMUSCULAR; INTRAVENOUS at 19:58

## 2020-07-04 RX ADMIN — HYDROMORPHONE HYDROCHLORIDE 1 MG: 1 INJECTION, SOLUTION INTRAMUSCULAR; INTRAVENOUS; SUBCUTANEOUS at 00:27

## 2020-07-04 RX ADMIN — AMIODARONE HYDROCHLORIDE 0.5 MG/MIN: 50 INJECTION, SOLUTION INTRAVENOUS at 08:55

## 2020-07-04 RX ADMIN — AMIODARONE HYDROCHLORIDE 200 MG: 200 TABLET ORAL at 13:23

## 2020-07-04 ASSESSMENT — PAIN - FUNCTIONAL ASSESSMENT
PAIN_FUNCTIONAL_ASSESSMENT: PREVENTS OR INTERFERES SOME ACTIVE ACTIVITIES AND ADLS
PAIN_FUNCTIONAL_ASSESSMENT: ACTIVITIES ARE NOT PREVENTED
PAIN_FUNCTIONAL_ASSESSMENT: PREVENTS OR INTERFERES SOME ACTIVE ACTIVITIES AND ADLS
PAIN_FUNCTIONAL_ASSESSMENT: PREVENTS OR INTERFERES SOME ACTIVE ACTIVITIES AND ADLS
PAIN_FUNCTIONAL_ASSESSMENT: ACTIVITIES ARE NOT PREVENTED
PAIN_FUNCTIONAL_ASSESSMENT: PREVENTS OR INTERFERES SOME ACTIVE ACTIVITIES AND ADLS
PAIN_FUNCTIONAL_ASSESSMENT: PREVENTS OR INTERFERES SOME ACTIVE ACTIVITIES AND ADLS
PAIN_FUNCTIONAL_ASSESSMENT: ACTIVITIES ARE NOT PREVENTED
PAIN_FUNCTIONAL_ASSESSMENT: PREVENTS OR INTERFERES SOME ACTIVE ACTIVITIES AND ADLS
PAIN_FUNCTIONAL_ASSESSMENT: ACTIVITIES ARE NOT PREVENTED

## 2020-07-04 ASSESSMENT — PAIN DESCRIPTION - ONSET
ONSET: GRADUAL
ONSET: ON-GOING
ONSET: GRADUAL
ONSET: GRADUAL
ONSET: PROGRESSIVE
ONSET: GRADUAL
ONSET: PROGRESSIVE
ONSET: GRADUAL
ONSET: SUDDEN
ONSET: ON-GOING
ONSET: ON-GOING
ONSET: GRADUAL

## 2020-07-04 ASSESSMENT — PAIN DESCRIPTION - PROGRESSION
CLINICAL_PROGRESSION: GRADUALLY WORSENING
CLINICAL_PROGRESSION: NOT CHANGED
CLINICAL_PROGRESSION: RAPIDLY WORSENING
CLINICAL_PROGRESSION: NOT CHANGED
CLINICAL_PROGRESSION: GRADUALLY WORSENING
CLINICAL_PROGRESSION: RAPIDLY WORSENING
CLINICAL_PROGRESSION: GRADUALLY WORSENING

## 2020-07-04 ASSESSMENT — PAIN DESCRIPTION - ORIENTATION
ORIENTATION: MID
ORIENTATION: ANTERIOR;UPPER
ORIENTATION: ANTERIOR;MID
ORIENTATION: ANTERIOR;UPPER;MID
ORIENTATION: MID
ORIENTATION: MID

## 2020-07-04 ASSESSMENT — PAIN DESCRIPTION - LOCATION
LOCATION: STERNUM
LOCATION: STERNUM
LOCATION: CHEST
LOCATION: STERNUM
LOCATION: CHEST
LOCATION: STERNUM
LOCATION: STERNUM
LOCATION: CHEST
LOCATION: STERNUM
LOCATION: CHEST

## 2020-07-04 ASSESSMENT — PAIN DESCRIPTION - FREQUENCY
FREQUENCY: CONTINUOUS

## 2020-07-04 ASSESSMENT — PAIN SCALES - GENERAL
PAINLEVEL_OUTOF10: 9
PAINLEVEL_OUTOF10: 8
PAINLEVEL_OUTOF10: 9
PAINLEVEL_OUTOF10: 5
PAINLEVEL_OUTOF10: 10
PAINLEVEL_OUTOF10: 10
PAINLEVEL_OUTOF10: 8
PAINLEVEL_OUTOF10: 8
PAINLEVEL_OUTOF10: 9
PAINLEVEL_OUTOF10: 5
PAINLEVEL_OUTOF10: 10
PAINLEVEL_OUTOF10: 8
PAINLEVEL_OUTOF10: 5
PAINLEVEL_OUTOF10: 9
PAINLEVEL_OUTOF10: 5
PAINLEVEL_OUTOF10: 5
PAINLEVEL_OUTOF10: 10
PAINLEVEL_OUTOF10: 4
PAINLEVEL_OUTOF10: 7

## 2020-07-04 ASSESSMENT — PAIN DESCRIPTION - PAIN TYPE
TYPE: SURGICAL PAIN
TYPE: SURGICAL PAIN;ACUTE PAIN
TYPE: SURGICAL PAIN
TYPE: ACUTE PAIN;SURGICAL PAIN
TYPE: ACUTE PAIN;SURGICAL PAIN
TYPE: SURGICAL PAIN

## 2020-07-04 ASSESSMENT — PAIN DESCRIPTION - DESCRIPTORS
DESCRIPTORS: ACHING;BURNING;CONSTANT
DESCRIPTORS: ACHING;BURNING;CONSTANT
DESCRIPTORS: ACHING;SHARP
DESCRIPTORS: ACHING;BURNING;CONSTANT
DESCRIPTORS: DISCOMFORT;PRESSURE;TIGHTNESS
DESCRIPTORS: ACHING;BURNING;CONSTANT
DESCRIPTORS: ACHING;BURNING;CONSTANT
DESCRIPTORS: DISCOMFORT;PENETRATING;PRESSURE
DESCRIPTORS: ACHING;SHOOTING;SHARP
DESCRIPTORS: BURNING;CONSTANT;THROBBING
DESCRIPTORS: ACHING;BURNING;CONSTANT
DESCRIPTORS: ACHING

## 2020-07-04 ASSESSMENT — PAIN DESCRIPTION - DIRECTION: RADIATING_TOWARDS: BACK

## 2020-07-04 NOTE — FLOWSHEET NOTE
Dr. Jhon Aguilar checking to see if Dobutrex helped. Informed it elevated his HR, and B/P, and uo. But I had to hang nitro for the pressure being elevated in the 200's due to the pain and meds.  Informed of abg's correcting slowly

## 2020-07-04 NOTE — FLOWSHEET NOTE
Patient anxious and trying to sit up. Instructed not to use his arms. HOB up to 45 degrees and patient moaning and grunting out in pain. Explained to the patient to purse lip breathe and try to relax his mind and body. Patient did the breathing but then stated \" I can't , I can't. \" giving emotional support patient stated not helping and to shut up.  Precidex gtt increased to .3 will monitor

## 2020-07-04 NOTE — PROGRESS NOTES
Mandible fracture surgery (1984); Cardiac catheterization (11/13/2013); and Cardiac surgery (07/03/2020). Restrictions  Restrictions/Precautions  Restrictions/Precautions: (Needed moderate cuing throughout session to follow cardiac precautions even though pt was aware of what they were and was able to verbally state them)  Position Activity Restriction  Sternal Precautions: No Pushing, No Pulling, 8# Lifting Restrictions    Subjective   General  Chart Reviewed: Yes, Progress Notes, History and Physical, Operative Notes, Orders  Patient assessed for rehabilitation services?: Yes  Family / Caregiver Present: No  Referring Practitioner: Dr. Honor Dakins  Diagnosis: CABG  Subjective  Subjective: PT stated \"I'm a wimp when it comes to pain but I need to get up\"  General Comment  Comments: PT overall cooperative but rather anxious  Patient Currently in Pain: Yes  Pain Assessment  Pain Assessment: Respiratory Rate >10  Pain Level: 8  Patient's Stated Pain Goal: No pain  Pain Type: Surgical pain  Pain Location: Chest  Pain Orientation: Mid  Pain Descriptors: Burning;Constant; Throbbing  Pain Frequency: Continuous  Pain Onset: On-going  Clinical Progression: Not changed  Functional Pain Assessment: Activities are not prevented  Non-Pharmaceutical Pain Intervention(s): Emotional support;Repositioned  Response to Pain Intervention: (pain did improve once patient was out of bed and resting in chair)  POSS Score (Patient Ctrl Analgesia): 2  Pre Treatment Pain Screening  Intervention List: Patient able to continue with treatment;Nurse/Physician notified  Vital Signs  Temp: 97 °F (36.1 °C)  Temp Source: Core  Pulse: 72  Heart Rate Source: Monitor; Apical  BP: 117/78  BP Location: Right upper arm  BP Upper/Lower: Upper  Patient Position: Semi fowlers  Level of Consciousness: Responds to Voice or New Confusion or Agitation  Patient Currently in Pain: Yes  Oxygen Therapy  Pulse Oximeter Device Mode: Continuous  Pulse Oximeter Device Location: Finger  O2 Device: PAP (positive airway pressure)(10/5)  FiO2 : 40 %  Social/Functional History  Social/Functional History  Lives With: Spouse  Type of Home: House  Bathroom Toilet: Standard  Home Equipment: (pt states he owns no AE or DME)  ADL Assistance: Independent  Homemaking Assistance: Independent  Homemaking Responsibilities: Yes  Ambulation Assistance: Independent  Transfer Assistance: Independent  Active : Yes  Mode of Transportation: Car  Occupation: Full time employment  Type of occupation: Pt works full time as a  involving loading and unloading the truck. Objective   Vision: Within Functional Limits  Hearing: Within functional limits    Orientation  Overall Orientation Status: Within Normal Limits     Balance  Sitting Balance: Contact guard assistance  Standing Balance: Minimal assistance  Standing Balance  Time: Stood bedside approx 3 minutes. He stated he felt it was easier to breathe when standing  Functional Mobility  Functional - Mobility Device: No device  Assist Level: Minimal assistance  Functional Mobility Comments: Stepped from bed into chair positioned beside bed. Min A. ADL  Feeding: Setup  Grooming: Minimal assistance  UE Bathing: Moderate assistance  LE Bathing: Maximum assistance  UE Dressing: Moderate assistance  LE Dressing: Maximum assistance  Toileting:  Moderate assistance        Bed mobility  Rolling to Left: Minimal assistance  Supine to Sit: Minimal assistance  Scooting: Minimal assistance  Comment: moderate cues needed throughout movement to follow sternal precautions even though he was able to verbally state what his precautions were  Transfers  Sit Pivot Transfers: Minimal assistance  Sit to stand: Contact guard assistance  Stand to sit: Contact guard assistance  Vision - Basic Assessment  Prior Vision: (WFL)  Cognition  Overall Cognitive Status: WNL  Perception  Overall Perceptual Status: WFL              Right Hand AROM (degrees)  Right Hand General AROM: Current ROM not tested secondary to sternal precautions. Pt states PTA no ROM deficits bilaterally. RUE Strength  RUE Strength Comment: UE strength not tested secondary to sternal precautions. WNL PTA  L Fingers  Left Finger Strength Comment: WFL  R Fingers  Right Finger Strength Comment: WFL                Plan   Plan  Times per week: 3x/week  Current Treatment Recommendations: Endurance Training, Pain Management, Patient/Caregiver Education & Training, Home Management Training, Functional Mobility Training, Safety Education & Training, Self-Care / ADL    G-Code     OutComes Score                                                  AM-PAC Score             Goals  Short term goals  Time Frame for Short term goals: 1 week or until discharge  Short term goal 1: Pt will perform toilet transfer, toilet hygiene and clothing management with supervision with AE PRN  Short term goal 2: Pt will dress UB and LB with minimal A  Short term goal 3: Pt will bathe self at sink with minimal A for LE only  Short term goal 4: PT will stand at sink for 5 minutes while performing grooming tasks with SBA  Patient Goals   Patient goals :  \"To get home\"       Therapy Time   Individual Concurrent Group Co-treatment   Time In 0487         Time Out 0920         Minutes Tin 1808, OTR/L

## 2020-07-04 NOTE — FLOWSHEET NOTE
Dr. Esvin Wilson updated on patients abg's and vs. Patient becoming tired in the chair with pressure dropping into the 90's and oxygen sat falling as he falls asleep with his mouth open to the 90 range on 3L/NC

## 2020-07-04 NOTE — CONSULTS
Nutrition Assessment (Low Risk)    Type and Reason for Visit: Initial, Consult    RD consulted for Ernesto score. Pt weight is stable, he is consuming 100% of his meals, and he does not have any non healing wounds. Please advance pt diet as soon as bale otherwise pt does not have any nutritional needs at this time.  Will assess again per protocol    Electronically signed by Mario Majano RD, ANTONETTE on 5/6/63 at 10:36 AM EDT    Contact Number: [phone]

## 2020-07-04 NOTE — CONSULTS
[address], [facility]                                  CONSULTATION    PATIENT NAME: Corey Wallace                       :        1960  MED REC NO:   [id number]                          ROOM:       2128  ACCOUNT NO:   [de-identified]                           ADMIT DATE: 2020  PROVIDER:     Lara Walker MD    CONSULT DATE:  2020    CONSULT REQUESTED BY:  Ajay Pierson MD    REASON FOR CONSULT:  Acute kidney on top of at least CKD stage III, but  he probably also had underlying cardiorenal syndrome, at least type 2  before and now could be type 1. BRIEF HISTORY:  The patient is a 36-year-old overweight white male with  history of diabetes, hypertension, and prior history of coronary artery  disease, who presented in the ER with left substernal chest pain  radiating to arm, unresolved with the nitroglycerin x3 at home on  2020. In the emergency room, the patient was hemodynamically  stable and was subsequently admitted after several diagnostic tests. So far, his hospital course is complicated by multivessel coronary  artery disease. He underwent CABG x4 vessels as well as maze procedure  yesterday. He is also found to have a little low systolic function,  about 45% EF or so. Procedure went uneventful and he is currently off pressor. He is on a  small dose of insulin and Precedex and amiodarone drip because of his  chronic atrial fibrillation. His dobutamine has been discontinued. He  is on half normal saline. His urine output dropped along with increased  creatinine from 1.1 to 1.7, hence the renal consult was requested. I did review his prior data. He does see my partner, Dr. Chuck Zimmerman, in the office. His creatinine does fluctuate between 1.2 and  1.9 even 2.4 suggesting that he probably does have underlying  cardiorenal syndrome type 2, sometimes is transforming to type 1. At  the time of presentation, his creatinine was 1.5. He was here not too  long ago and the peak creatinine was 2.4 by the way. We just also have  to make sure that he does not have any other reason for fluctuation of  creatinine, it is mainly the intermittent bladder obstruction, or any  other nephrotoxin. PAST MEDICAL HISTORY:  1. At least CKD stage III. 2.  Coronary artery disease. He had revascularization before, but of  course, now underwent CABG x4 vessels and maze procedure. 3.  Chronic atrial fibrillation. 4.  Diabetes, longstanding. 5.  Hypertension. 6.  Hyperlipidemia. 7.  Substance abuse history. He might have other chronic disease, but he is not in a state of telling  me all these things. He is complaining of some pain, moaning and  groaning, and was rather irritated when I asked him all these questions,  so I did the best I could. HABITS:  He obviously smokes. He said he was smoking four packs per  day, but now doing a pack and a half. He is saying he is doing for a  long time. He denies any alcohol, but apparently was doing cocaine in  the distant past.  The urine drug screen on 07/02/2020 actually was  negative. He said he is abstaining from all these things, maybe he is  right. FAMILY MEDICAL HISTORY:  Apparently significant for coronary artery  disease. He might have other family history, but he is not in a state  of mind or he is not feeling good to tell me that, I understand it, he  just had major surgery yesterday. SOCIAL HISTORY:  The patient is  for 23 years. He does have two  biological children. He is a semi-. ALLERGIES:  No known drug allergies. REVIEW OF SYSTEMS:  Currently, he is moaning and groaning from pain. He  does have a Maddox catheter and he is on insulin drip. There are no  urinary symptoms. There is no apparent shortness of breath, but he did  gain about 22 pounds if the Epic record is accurate.   Rest of the review  of systems is negative or as in previous paragraph. CURRENT MEDICATIONS:  He is on amiodarone drip as well as insulin drip  and Precedex. He had intermittent Lasix dosed 20, 60, etc., and he is  on half normal saline at 75 mL an hour. He is also on Protonix. PHYSICAL EXAMINATION:  VITAL SIGNS:  At the time of examination, temperature, his T-max is  97.2; blood pressure 110/80, he is not on any pressor; pulse 64;  respiratory rate 17; saturating about 96%. GENERAL:  The patient is moaning and groaning. His head of the bed is  elevated about 45 degrees. HEENT:  Head and neck:  Normocephalic and atraumatic. Eyes:  1+  conjunctival pallor. Ears, Mouth, and Throat:  Unable to examine. CARDIOVASCULAR:  Irregularly irregular rhythm. RESPIRATORY:  Some adventitious breath sounds. ABDOMEN:  Soft. He has not had any bowel movement. EXTREMITIES:  Edema bilaterally. He does have a Maddox catheter. In  addition to that, he had a left subclavian CVC. He is of course wearing  oxygen per nasal cannula. LABORATORY VALUES AND ANCILLARY SERVICES:  As mentioned earlier. His pH  is 7.33, this might be a faux pH. IMPRESSION:  A 52-year-old male with acute kidney injury. 1.  Acute kidney injury on top of CKD stage III (nonoliguric). Available data suggests toxic and ischemic acute tubular injury and  acute decompensated heart failure. He seems like fluid overloaded again  about 22 pounds and his chest x-ray looked pulmonary edema in my eyes,  probably causing renal vein congestion, less likely AIN, but we will  keep an eye on that. He is on proton pump inhibitor, of course. 2.  Coronary artery disease, status post CABG x4 vessels and maze  procedure. 3.  Underlying diabetes, chronic AFib, etc.    PLAN:  1. Start with UA and urinary indices. Stop IV fluid. 2.  Lasix 80 mg t.i.d.  3.  Discontinue any nonessential medications to see how he does and labs  tomorrow morning.         Queta Chance MD    D:

## 2020-07-04 NOTE — PROGRESS NOTES
Dr. Bharath Dudley at bedside for daily rounds.  Orders received:    DC swan  Transduce CVP  Keep art line   DC Dobutamine  DC hard chest tube

## 2020-07-04 NOTE — FLOWSHEET NOTE
Karlos Gavel RT here to place patient on bipap 10/5 and 40%, explained to patient the sensation and to let the machine give him breaths to help open his lungs to prevent pneumonia and to oxygenate better. Patient agreed and is constantly touching the mask. Emotional support given and increased his precidex gtt to . 5mcg/min to help with his anxiety.

## 2020-07-04 NOTE — FLOWSHEET NOTE
Dr. Marina Bermudez called and informed of patients abg's and increased oxygen consumption due to po2 decreasing on abgs. Given results of hemodynamics, uo, chest tube air leak. Orders obtained.

## 2020-07-04 NOTE — FLOWSHEET NOTE
Dr. Lavon Prince checking on patient status. Updated on HR , b/p, uo and asked for abg's so ran an epoc an updated him on the patient status.

## 2020-07-04 NOTE — FLOWSHEET NOTE
Dobutrex gtt decreased to 2.5 mcg/kg/min per order. Patient remains grunting and moaning but decreasing his tone. Epic reviewed  Script sent for daily dosing  Notes indicate twice a week.    Routed to Dr Siddiqi   Please advise on dosing of Estrace

## 2020-07-04 NOTE — FLOWSHEET NOTE
Patient remains impatient and anxious in pain. Informed patient I will be getting him up in the chair shortly after his nausea and b/p subsides. Texted Dr Beronica Villa and updated him on the Dobutrex, uo, nitro and precidex settings. Precidex increased to . 4mcg

## 2020-07-04 NOTE — PROGRESS NOTES
PATIENT NAME: Lauri Phan    TODAY'S DATE: 07/04/20    Reason for visit: Postoperative CABG and Maze procedure    SUBJECTIVE:    Pt having extreme pain but sitting in a chair on Precedex drip. Overnight he had low urine output issues. His blood pressure was labile and he eventually ended up on both dobutamine and nitroglycerin. His cardiac index increased to 3 with this combination. OBJECTIVE:   VITALS:    Vitals:    07/04/20 0907   BP: 117/78   Pulse: 72   Resp:    Temp:    SpO2:      INTAKE/OUTPUT:    Date 07/04/20 0000 - 07/04/20 2359   Shift 0987-9751 0478-1561 1643-2882 24 Hour Total   INTAKE   P.O. 240   240   I. V.(mL/kg/hr) 1599(1.6)   1599   Shift Total(mL/kg) 6891(36.3)   1282(31.7)   OUTPUT   Urine(mL/kg/hr) 438(0.4) 40  478   Chest Tube 170 0  170   Shift Total(mL/kg) 608(4.8) 40(0.3)  648(5.1)   Weight (kg) 126.6 126.6 126.6 126.6        EXAM:  Blood pressure 117/78, pulse 72, temperature 97 °F (36.1 °C), temperature source Core, resp. rate 13, height 6' 3\" (1.905 m), weight 279 lb 1.6 oz (126.6 kg), SpO2 95 %. General appearance: No apparent distress, appears stated age and cooperative. Skin: unremarkable  HEENT Normocephalic, atraumatic without obvious deformity. Neck: Supple, Trachea midline   Lungs: Good respiratory effort. Clear to auscultation, bilaterally  Heart: Regular rate/ rhythm sternum stable Inc c/d/i  Abdomen: Soft, softly distended  Extremities: Bilateral lower extremity edema warm well perfused  Neurologic: Alert, grossly intact  Mental status: normal affect      Data:  CBC:   Recent Labs     07/02/20  1025  07/03/20  1200  07/04/20  0430 07/04/20  0510 07/04/20  0623 07/04/20  0724   WBC 8.3  --  26.6*  --  18.7*  --   --   --    HGB 13.0*   < > 13.1*   < > 11.3* 10.7* 10.7* 10.8*   HCT 40.1*   < > 40.1*   < > 34.3* 31.0* 32.0* 32.0*     --  198  --  147  --   --   --     < > = values in this interval not displayed.      BMP:    Recent Labs     07/02/20  0710 07/03/20  1200  07/04/20  0430 07/04/20  0510 07/04/20  0623 07/04/20  0724      < > 141   < > 136 138 138 139   K 3.7   < > 3.8   < > 4.8 4.5 4.2 4.1     --  108  --  106  --   --   --    CO2 26   < > 24   < > 22 24 24 25   BUN 19  --  18  --  24*  --   --   --    CREATININE 1.1   < > 1.4*   < > 1.7* 1.7* 1.7* 1.7*   GLUCOSE 221*  --  201*  --  95  --   --   --     < > = values in this interval not displayed. Hepatic: No results for input(s): AST, ALT, ALB, BILITOT, ALKPHOS in the last 72 hours. Mag:      Recent Labs     07/03/20  0120 07/03/20  1200 07/04/20  0430   MG 2.1 2.7* 2.4      Phos:   No results for input(s): PHOS in the last 72 hours. INR:   Recent Labs     07/03/20  1200   INR 1.12     Radiology Review:  CXR low lung volumes      ASSESSMENT AND PLAN:  S/P CABG maze    1 Day Post-Op  Patient Active Problem List   Diagnosis    Chest pain    DM w/o complication type II ([facility])    Longstanding persistent atrial fibrillation    CAD (coronary artery disease)    Post PTCA    Unstable angina ([facility] [address] [facility]    HTN (hypertension)    Angina pectoris syndrome ([facility])-unstable    Smoking    COPD (chronic obstructive pulmonary disease) ([facility])    Atrial fibrillation with rapid ventricular response ([facility])    Chest pain    STEMI (ST elevation myocardial infarction) ([facility] [address]    Myocardial infarction acute ([facility] [address]    Nausea    New onset of congestive heart failure ([facility] [address]    COPD with acute exacerbation (H[facility])    Acute pulmonary edema ([facility])    Pleural effusion, bilateral    Hypertensive urgency    Chronic systolic congestive heart failure ([facility])    TIA (transient ischemic attack)    Chronic kidney disease, stage III (moderate) ([facility])    3-vessel coronary artery disease       1. Cardio: Nitroglycerin stopped, we will most likely wean off the dobutamine as well if everything stays stable. Remove his PA catheter once dobutamine is off.   Keep a line for need for blood pressure management and need for ABG   Current drips: Dobutamine and nitroglycerin, insulin maintenance IV  2. Pulm: Hypoxia started on BiPAP with significant improvement. He is currently off BiPAP as he is sitting in a chair eating that he may benefit from this  3. GI: P.o. diet  4. ID: Perioperative antibiotics  5. Heme: Hemoglobin and platelets are stable  6. Neuro: Pain poorly controlled, on Dilaudid IV, Precedex drip and will need to transition to p.o. Percocet  7. Renal: Nephrology consulted. He is known to Dr. King Case, keep Maddox catheter, increase in BUN/creatinine though he did have a similar elevation sometime in the past.  Lasix given this morning  8.  Activity: PT/OT  Electronically signed by Regina Daniel MD on 7/4/2020 at 10:37 AM

## 2020-07-04 NOTE — CONSULTS
[address] [facility],[address] ACUTE CARE PHYSICAL THERAPY EVALUATION  Alva Camacho, 1960, 2128/2128-A, 7/4/2020  History  [place]:  The primary encounter diagnosis was Chest pain, unspecified type. A diagnosis of ST segment changes on electrocardiogram was also pertinent to this visit. Patient  has a past medical history of Atrial fibrillation ([address] CAD (coronary artery disease), COPD (chronic obstructive pulmonary disease) ([address] Diabetes mellitus ([address] GERD (gastroesophageal reflux disease), H/O cardiovascular stress test, H/O cardiovascular stress test, H/O cardiovascular stress test, H/O Doppler ultrasound, H/O echocardiogram, H/O percutaneous left heart catheterization, Heart imaging, History of coronary artery stent placement, History of exercise stress test, History of Holter monitoring, History of nuclear stress test, Hyperlipidemia, Hypertension, S/P cardiac catheterization, and Status post angioplasty with stent. Patient  has a past surgical history that includes Coronary angioplasty with stent; Appendectomy; Femur fracture surgery (1984); Coronary angioplasty with stent (11/13/2013); Colonoscopy; Mandible fracture surgery (1984); Cardiac catheterization (11/13/2013); and Cardiac surgery (07/03/2020). Therapy Hx and additional comorbidities:  n/a    Restrictions:  sternal precautions and cardiac precautions  Communication with other providers:  cleared for tx, discussed with nursing staff and discussed with therapy staff  Subjective:  Patient states:  Agreeable to eval.  Pain:  Very painful today in chest, 9/10.   Patient goal:  Less pain, go home  Occupational profile (relevant social history and personal factors):  lives with spouse, typically independent mobility  and independent with ADLs  Employed as semi-  Social/Functional History  Lives With: Spouse  Type of Home: House  Bathroom Toilet: Standard  Home Equipment: (pt states he owns no AE or DME)  ADL Assistance: Independent  Homemaking Score : 39.45  Mobility Inpatient CMS 0-100% Score: 57.7  Mobility Inpatient CMS G-Code Modifier : CK  AMPAC 6 Clicks Goal:  24  [place] Nursing Mobility Assessment Tool Score:  3  patient can stand, with or without assistive device, for at least 5 seconds -- use STEDY and/or AD  BMAT Goal:  4    Assessment:  Conditions Requiring Skilled Therapeutic Intervention  Body structures, Functions, Activity limitations: Decreased functional mobility , Decreased high-level IADLs, Decreased ADL status, Decreased endurance, Decreased ROM, Decreased strength, Increased pain, Decreased safe awareness  Assessment: pleasant male with medically unstable features upon admission which ultimately led to open heart surgery. patient with significant impairments today, as well as recent bradycardia (per RN) and spO2 desaturation. patient is impulsive today and has habits which do not follow new restrictions. requires significant acute care PT service. Treatment Diagnosis: impaired functional mobility  Prognosis: Good  Decision Making: High Complexity  REQUIRES PT FOLLOW UP: Yes  Skilled physical therapy services are appropriate in the acute care setting. There are no significant educational impairments or barriers to learning which would prevent participation with service. Discharge recommendations:  home with assistance. .      Equipment recommendations:  n/a  Goals:  Achieve goals within one week. bed mobility and sup-sit independently  sit-stand and SPT independently  ambulate 150 feet with modified independence and least restrictive device  independent with HEP  compliant with sternal surgical precautions  independent recall of signs and symptoms of cardiac intolerance  Plan:  Plan  Times per week: 6+  Treatment to include patient/caregiver education, therapeutic activity training, gait training, neuromuscular re-education, therapeutic exercise and self care training for home management.   Recommendations for nursing mobility:

## 2020-07-04 NOTE — PROGRESS NOTES
Dr. Murali Brandon at bedside for consult. Pt examined and history reviewed. Pt very painful and giving limited history.  Orders received:    .45 Sodium Chloride infusion @ 10 ml hr  Obtain Urine sample

## 2020-07-04 NOTE — PROGRESS NOTES
Order received to remove chest tube per Dr. Jody Devine. Pt informed of CT removal process. CT removed from suction. Pt instructed to take deep breath and hold. Hard CT pulled. Instructed pt to breath. Suture tied off. Vaseline gauze and ABD applied. Drsg secured occlusively with silk tape. Pt tolerated well.

## 2020-07-04 NOTE — FLOWSHEET NOTE
Patient resting quietly until assessment done then awakens and c/o discomfort. Emotional support given and informed patient will medicate when due. Assisted patient in deep breathing exercises ( purse lipped breathing) to assist with discomfort. Noted crepitus in left upper pectoralis region upon assessment but no airleak noted in CT's. Will monitor.

## 2020-07-04 NOTE — FLOWSHEET NOTE
Increased the nitro gtt to 40 mcg/ min for b/p 181/82. Patient continues to moan and now asking for water.  Given per request and taking well

## 2020-07-04 NOTE — FLOWSHEET NOTE
Patient resting well after placing arms on pillows and resting his head back. Tolerating the bipap well and emotional support given.  Explained to him that in a couple hours we will take it off and give him a drink of fluid

## 2020-07-04 NOTE — FLOWSHEET NOTE
Assisted patient up to the chair with assist x2. Patient stated that it feels better sitting in the chair. Encouraged coughing and deep breathing. Emotional support given. Patient asking for water. Taking well.

## 2020-07-04 NOTE — FLOWSHEET NOTE
Dobutrex gtt hung at 5mcg/kg/min per order and 20mg of Lasix given per order. Patient restless and c/o discomfort reminded him of the pain med given earlier. He looked at me and stated \" I am a pussy and can't take the pain\" emotional support given and attempted to rub his neck to help him relax but he stated it did not help.  Stopped rubbing his neck per request.

## 2020-07-04 NOTE — FLOWSHEET NOTE
Patient medicated with Dilaudid 1mg ivp per c/o discomfort.  Encouraged to rest. Duard Najjar turned off per request.

## 2020-07-05 ENCOUNTER — APPOINTMENT (OUTPATIENT)
Dept: GENERAL RADIOLOGY | Age: 60
DRG: 229 | End: 2020-07-05
Payer: COMMERCIAL

## 2020-07-05 LAB
ALBUMIN SERPL-MCNC: 3.7 GM/DL (ref 3.4–5)
ALP BLD-CCNC: 53 IU/L (ref 40–128)
ALT SERPL-CCNC: 8 U/L (ref 10–40)
ANION GAP SERPL CALCULATED.3IONS-SCNC: 9 MMOL/L (ref 4–16)
AST SERPL-CCNC: 35 IU/L (ref 15–37)
BASOPHILS ABSOLUTE: 0 K/CU MM
BASOPHILS RELATIVE PERCENT: 0.2 % (ref 0–1)
BILIRUB SERPL-MCNC: 0.4 MG/DL (ref 0–1)
BUN BLDV-MCNC: 25 MG/DL (ref 6–23)
CALCIUM IONIZED: 4.68 MG/DL (ref 4.48–5.28)
CALCIUM SERPL-MCNC: 8.7 MG/DL (ref 8.3–10.6)
CHLORIDE BLD-SCNC: 104 MMOL/L (ref 99–110)
CO2: 22 MMOL/L (ref 21–32)
CREAT SERPL-MCNC: 1.7 MG/DL (ref 0.9–1.3)
DIFFERENTIAL TYPE: ABNORMAL
EOSINOPHILS ABSOLUTE: 0.1 K/CU MM
EOSINOPHILS RELATIVE PERCENT: 0.4 % (ref 0–3)
GFR AFRICAN AMERICAN: 50 ML/MIN/1.73M2
GFR NON-AFRICAN AMERICAN: 41 ML/MIN/1.73M2
GLUCOSE BLD-MCNC: 100 MG/DL (ref 70–99)
GLUCOSE BLD-MCNC: 103 MG/DL (ref 70–99)
GLUCOSE BLD-MCNC: 108 MG/DL (ref 70–99)
GLUCOSE BLD-MCNC: 111 MG/DL (ref 70–99)
GLUCOSE BLD-MCNC: 121 MG/DL (ref 70–99)
GLUCOSE BLD-MCNC: 121 MG/DL (ref 70–99)
GLUCOSE BLD-MCNC: 126 MG/DL (ref 70–99)
GLUCOSE BLD-MCNC: 128 MG/DL (ref 70–99)
GLUCOSE BLD-MCNC: 128 MG/DL (ref 70–99)
GLUCOSE BLD-MCNC: 129 MG/DL (ref 70–99)
GLUCOSE BLD-MCNC: 129 MG/DL (ref 70–99)
GLUCOSE BLD-MCNC: 133 MG/DL (ref 70–99)
GLUCOSE BLD-MCNC: 133 MG/DL (ref 70–99)
GLUCOSE BLD-MCNC: 138 MG/DL (ref 70–99)
GLUCOSE BLD-MCNC: 149 MG/DL (ref 70–99)
GLUCOSE BLD-MCNC: 165 MG/DL (ref 70–99)
GLUCOSE BLD-MCNC: 91 MG/DL (ref 70–99)
HCT VFR BLD CALC: 33.5 % (ref 42–52)
HEMOGLOBIN: 11 GM/DL (ref 13.5–18)
IMMATURE NEUTROPHIL %: 0.6 % (ref 0–0.43)
IONIZED CA: 1.17 MMOL/L (ref 1.12–1.32)
LYMPHOCYTES ABSOLUTE: 1.8 K/CU MM
LYMPHOCYTES RELATIVE PERCENT: 10.5 % (ref 24–44)
MAGNESIUM: 2.3 MG/DL (ref 1.8–2.4)
MCH RBC QN AUTO: 31 PG (ref 27–31)
MCHC RBC AUTO-ENTMCNC: 32.8 % (ref 32–36)
MCV RBC AUTO: 94.4 FL (ref 78–100)
MONOCYTES ABSOLUTE: 1.6 K/CU MM
MONOCYTES RELATIVE PERCENT: 8.9 % (ref 0–4)
NUCLEATED RBC %: 0 %
PDW BLD-RTO: 13.5 % (ref 11.7–14.9)
PHOSPHORUS: 4.5 MG/DL (ref 2.5–4.9)
PLATELET # BLD: 143 K/CU MM (ref 140–440)
PMV BLD AUTO: 11.6 FL (ref 7.5–11.1)
POTASSIUM SERPL-SCNC: 4.4 MMOL/L (ref 3.5–5.1)
RBC # BLD: 3.55 M/CU MM (ref 4.6–6.2)
SEGMENTED NEUTROPHILS ABSOLUTE COUNT: 13.9 K/CU MM
SEGMENTED NEUTROPHILS RELATIVE PERCENT: 79.4 % (ref 36–66)
SODIUM BLD-SCNC: 135 MMOL/L (ref 135–145)
TOTAL IMMATURE NEUTOROPHIL: 0.1 K/CU MM
TOTAL NUCLEATED RBC: 0 K/CU MM
TOTAL PROTEIN: 5.9 GM/DL (ref 6.4–8.2)
WBC # BLD: 17.5 K/CU MM (ref 4–10.5)

## 2020-07-05 PROCEDURE — 2580000003 HC RX 258: Performed by: PHYSICIAN ASSISTANT

## 2020-07-05 PROCEDURE — 83735 ASSAY OF MAGNESIUM: CPT

## 2020-07-05 PROCEDURE — 94664 DEMO&/EVAL PT USE INHALER: CPT

## 2020-07-05 PROCEDURE — 85025 COMPLETE CBC W/AUTO DIFF WBC: CPT

## 2020-07-05 PROCEDURE — 99231 SBSQ HOSP IP/OBS SF/LOW 25: CPT | Performed by: INTERNAL MEDICINE

## 2020-07-05 PROCEDURE — 2580000003 HC RX 258: Performed by: THORACIC SURGERY (CARDIOTHORACIC VASCULAR SURGERY)

## 2020-07-05 PROCEDURE — C9113 INJ PANTOPRAZOLE SODIUM, VIA: HCPCS | Performed by: INTERNAL MEDICINE

## 2020-07-05 PROCEDURE — 82962 GLUCOSE BLOOD TEST: CPT

## 2020-07-05 PROCEDURE — 06BQ4ZZ EXCISION OF LEFT SAPHENOUS VEIN, PERCUTANEOUS ENDOSCOPIC APPROACH: ICD-10-PCS | Performed by: THORACIC SURGERY (CARDIOTHORACIC VASCULAR SURGERY)

## 2020-07-05 PROCEDURE — 2500000003 HC RX 250 WO HCPCS: Performed by: THORACIC SURGERY (CARDIOTHORACIC VASCULAR SURGERY)

## 2020-07-05 PROCEDURE — 97116 GAIT TRAINING THERAPY: CPT

## 2020-07-05 PROCEDURE — 94150 VITAL CAPACITY TEST: CPT

## 2020-07-05 PROCEDURE — 94640 AIRWAY INHALATION TREATMENT: CPT

## 2020-07-05 PROCEDURE — 6360000002 HC RX W HCPCS: Performed by: PHYSICIAN ASSISTANT

## 2020-07-05 PROCEDURE — 6370000000 HC RX 637 (ALT 250 FOR IP): Performed by: PHYSICIAN ASSISTANT

## 2020-07-05 PROCEDURE — 02580ZZ DESTRUCTION OF CONDUCTION MECHANISM, OPEN APPROACH: ICD-10-PCS | Performed by: THORACIC SURGERY (CARDIOTHORACIC VASCULAR SURGERY)

## 2020-07-05 PROCEDURE — 2000000000 HC ICU R&B

## 2020-07-05 PROCEDURE — 94761 N-INVAS EAR/PLS OXIMETRY MLT: CPT

## 2020-07-05 PROCEDURE — 97530 THERAPEUTIC ACTIVITIES: CPT

## 2020-07-05 PROCEDURE — 71045 X-RAY EXAM CHEST 1 VIEW: CPT

## 2020-07-05 PROCEDURE — 6370000000 HC RX 637 (ALT 250 FOR IP): Performed by: INTERNAL MEDICINE

## 2020-07-05 PROCEDURE — 2700000000 HC OXYGEN THERAPY PER DAY

## 2020-07-05 PROCEDURE — 6370000000 HC RX 637 (ALT 250 FOR IP): Performed by: THORACIC SURGERY (CARDIOTHORACIC VASCULAR SURGERY)

## 2020-07-05 PROCEDURE — 02100Z9 BYPASS CORONARY ARTERY, ONE ARTERY FROM LEFT INTERNAL MAMMARY, OPEN APPROACH: ICD-10-PCS | Performed by: THORACIC SURGERY (CARDIOTHORACIC VASCULAR SURGERY)

## 2020-07-05 PROCEDURE — 5A1221Z PERFORMANCE OF CARDIAC OUTPUT, CONTINUOUS: ICD-10-PCS | Performed by: THORACIC SURGERY (CARDIOTHORACIC VASCULAR SURGERY)

## 2020-07-05 PROCEDURE — 02HV33Z INSERTION OF INFUSION DEVICE INTO SUPERIOR VENA CAVA, PERCUTANEOUS APPROACH: ICD-10-PCS | Performed by: THORACIC SURGERY (CARDIOTHORACIC VASCULAR SURGERY)

## 2020-07-05 PROCEDURE — 6360000002 HC RX W HCPCS: Performed by: THORACIC SURGERY (CARDIOTHORACIC VASCULAR SURGERY)

## 2020-07-05 PROCEDURE — 021209W BYPASS CORONARY ARTERY, THREE ARTERIES FROM AORTA WITH AUTOLOGOUS VENOUS TISSUE, OPEN APPROACH: ICD-10-PCS | Performed by: THORACIC SURGERY (CARDIOTHORACIC VASCULAR SURGERY)

## 2020-07-05 PROCEDURE — 6360000002 HC RX W HCPCS: Performed by: INTERNAL MEDICINE

## 2020-07-05 PROCEDURE — 80053 COMPREHEN METABOLIC PANEL: CPT

## 2020-07-05 PROCEDURE — 02L70CK OCCLUSION OF LEFT ATRIAL APPENDAGE WITH EXTRALUMINAL DEVICE, OPEN APPROACH: ICD-10-PCS | Performed by: THORACIC SURGERY (CARDIOTHORACIC VASCULAR SURGERY)

## 2020-07-05 PROCEDURE — 84100 ASSAY OF PHOSPHORUS: CPT

## 2020-07-05 PROCEDURE — 82330 ASSAY OF CALCIUM: CPT

## 2020-07-05 PROCEDURE — 94669 MECHANICAL CHEST WALL OSCILL: CPT

## 2020-07-05 RX ORDER — METOCLOPRAMIDE HYDROCHLORIDE 5 MG/ML
5 INJECTION INTRAMUSCULAR; INTRAVENOUS EVERY 6 HOURS PRN
Status: DISCONTINUED | OUTPATIENT
Start: 2020-07-05 | End: 2020-07-07 | Stop reason: HOSPADM

## 2020-07-05 RX ORDER — GUAIFENESIN 600 MG/1
600 TABLET, EXTENDED RELEASE ORAL 2 TIMES DAILY
Status: DISCONTINUED | OUTPATIENT
Start: 2020-07-05 | End: 2020-07-07 | Stop reason: HOSPADM

## 2020-07-05 RX ORDER — PANTOPRAZOLE SODIUM 40 MG/10ML
40 INJECTION, POWDER, LYOPHILIZED, FOR SOLUTION INTRAVENOUS
Status: DISCONTINUED | OUTPATIENT
Start: 2020-07-05 | End: 2020-07-07

## 2020-07-05 RX ORDER — METOLAZONE 5 MG/1
5 TABLET ORAL 2 TIMES DAILY
Status: DISCONTINUED | OUTPATIENT
Start: 2020-07-05 | End: 2020-07-06

## 2020-07-05 RX ADMIN — OXYCODONE HYDROCHLORIDE AND ACETAMINOPHEN 2 TABLET: 5; 325 TABLET ORAL at 20:32

## 2020-07-05 RX ADMIN — ONDANSETRON 4 MG: 2 INJECTION INTRAMUSCULAR; INTRAVENOUS at 14:23

## 2020-07-05 RX ADMIN — Medication: at 08:00

## 2020-07-05 RX ADMIN — PANTOPRAZOLE SODIUM 40 MG: 40 TABLET, DELAYED RELEASE ORAL at 08:00

## 2020-07-05 RX ADMIN — METOLAZONE 5 MG: 5 TABLET ORAL at 20:20

## 2020-07-05 RX ADMIN — CARVEDILOL 3.12 MG: 3.12 TABLET, FILM COATED ORAL at 20:20

## 2020-07-05 RX ADMIN — AMIODARONE HYDROCHLORIDE 200 MG: 200 TABLET ORAL at 12:16

## 2020-07-05 RX ADMIN — MULTIPLE VITAMINS W/ MINERALS TAB 1 TABLET: TAB at 08:00

## 2020-07-05 RX ADMIN — OXYCODONE HYDROCHLORIDE AND ACETAMINOPHEN 2 TABLET: 5; 325 TABLET ORAL at 08:00

## 2020-07-05 RX ADMIN — IPRATROPIUM BROMIDE AND ALBUTEROL SULFATE 1 AMPULE: .5; 3 SOLUTION RESPIRATORY (INHALATION) at 12:12

## 2020-07-05 RX ADMIN — FUROSEMIDE 80 MG: 10 INJECTION, SOLUTION INTRAMUSCULAR; INTRAVENOUS at 07:59

## 2020-07-05 RX ADMIN — FUROSEMIDE 80 MG: 10 INJECTION, SOLUTION INTRAMUSCULAR; INTRAVENOUS at 20:20

## 2020-07-05 RX ADMIN — ASPIRIN 81 MG: 81 TABLET, COATED ORAL at 08:00

## 2020-07-05 RX ADMIN — Medication: at 20:20

## 2020-07-05 RX ADMIN — AMIODARONE HYDROCHLORIDE 0.5 MG/MIN: 50 INJECTION, SOLUTION INTRAVENOUS at 03:43

## 2020-07-05 RX ADMIN — CARVEDILOL 3.12 MG: 3.12 TABLET, FILM COATED ORAL at 08:00

## 2020-07-05 RX ADMIN — AMIODARONE HYDROCHLORIDE 200 MG: 200 TABLET ORAL at 20:20

## 2020-07-05 RX ADMIN — ATORVASTATIN CALCIUM 20 MG: 20 TABLET, FILM COATED ORAL at 20:20

## 2020-07-05 RX ADMIN — OXYCODONE HYDROCHLORIDE AND ACETAMINOPHEN 2 TABLET: 5; 325 TABLET ORAL at 04:12

## 2020-07-05 RX ADMIN — POLYETHYLENE GLYCOL (3350) 17 G: 17 POWDER, FOR SOLUTION ORAL at 08:01

## 2020-07-05 RX ADMIN — HYDROMORPHONE HYDROCHLORIDE 1 MG: 1 INJECTION, SOLUTION INTRAMUSCULAR; INTRAVENOUS; SUBCUTANEOUS at 01:51

## 2020-07-05 RX ADMIN — GUAIFENESIN 600 MG: 600 TABLET, EXTENDED RELEASE ORAL at 12:16

## 2020-07-05 RX ADMIN — IPRATROPIUM BROMIDE AND ALBUTEROL SULFATE 1 AMPULE: .5; 3 SOLUTION RESPIRATORY (INHALATION) at 19:54

## 2020-07-05 RX ADMIN — AMIODARONE HYDROCHLORIDE 200 MG: 200 TABLET ORAL at 08:00

## 2020-07-05 RX ADMIN — CALCIUM GLUCONATE 2 G: 98 INJECTION, SOLUTION INTRAVENOUS at 11:20

## 2020-07-05 RX ADMIN — GUAIFENESIN 600 MG: 600 TABLET, EXTENDED RELEASE ORAL at 20:20

## 2020-07-05 RX ADMIN — DEXMEDETOMIDINE HYDROCHLORIDE 0.5 MCG/KG/HR: 100 INJECTION, SOLUTION INTRAVENOUS at 06:45

## 2020-07-05 RX ADMIN — METOCLOPRAMIDE HYDROCHLORIDE 5 MG: 5 INJECTION INTRAMUSCULAR; INTRAVENOUS at 18:37

## 2020-07-05 RX ADMIN — FUROSEMIDE 80 MG: 10 INJECTION, SOLUTION INTRAMUSCULAR; INTRAVENOUS at 12:16

## 2020-07-05 RX ADMIN — OXYCODONE HYDROCHLORIDE AND ACETAMINOPHEN 2 TABLET: 5; 325 TABLET ORAL at 00:00

## 2020-07-05 RX ADMIN — IPRATROPIUM BROMIDE AND ALBUTEROL SULFATE 1 AMPULE: .5; 3 SOLUTION RESPIRATORY (INHALATION) at 08:33

## 2020-07-05 RX ADMIN — METOLAZONE 5 MG: 5 TABLET ORAL at 08:00

## 2020-07-05 RX ADMIN — SODIUM CHLORIDE, PRESERVATIVE FREE 10 ML: 5 INJECTION INTRAVENOUS at 08:01

## 2020-07-05 RX ADMIN — IPRATROPIUM BROMIDE AND ALBUTEROL SULFATE 1 AMPULE: .5; 3 SOLUTION RESPIRATORY (INHALATION) at 15:53

## 2020-07-05 RX ADMIN — PANTOPRAZOLE SODIUM 40 MG: 40 INJECTION, POWDER, FOR SOLUTION INTRAVENOUS at 18:37

## 2020-07-05 RX ADMIN — OXYCODONE HYDROCHLORIDE AND ACETAMINOPHEN 2 TABLET: 5; 325 TABLET ORAL at 16:31

## 2020-07-05 RX ADMIN — OXYCODONE HYDROCHLORIDE AND ACETAMINOPHEN 2 TABLET: 5; 325 TABLET ORAL at 12:17

## 2020-07-05 ASSESSMENT — PAIN DESCRIPTION - DIRECTION
RADIATING_TOWARDS: NO

## 2020-07-05 ASSESSMENT — PAIN SCALES - GENERAL
PAINLEVEL_OUTOF10: 0
PAINLEVEL_OUTOF10: 8
PAINLEVEL_OUTOF10: 10
PAINLEVEL_OUTOF10: 5
PAINLEVEL_OUTOF10: 0
PAINLEVEL_OUTOF10: 7
PAINLEVEL_OUTOF10: 0
PAINLEVEL_OUTOF10: 8
PAINLEVEL_OUTOF10: 7
PAINLEVEL_OUTOF10: 5
PAINLEVEL_OUTOF10: 10
PAINLEVEL_OUTOF10: 10
PAINLEVEL_OUTOF10: 5
PAINLEVEL_OUTOF10: 8
PAINLEVEL_OUTOF10: 5
PAINLEVEL_OUTOF10: 0
PAINLEVEL_OUTOF10: 5
PAINLEVEL_OUTOF10: 0
PAINLEVEL_OUTOF10: 5
PAINLEVEL_OUTOF10: 10
PAINLEVEL_OUTOF10: 5
PAINLEVEL_OUTOF10: 7
PAINLEVEL_OUTOF10: 8
PAINLEVEL_OUTOF10: 0
PAINLEVEL_OUTOF10: 0
PAINLEVEL_OUTOF10: 5
PAINLEVEL_OUTOF10: 10

## 2020-07-05 ASSESSMENT — PAIN DESCRIPTION - PROGRESSION
CLINICAL_PROGRESSION: GRADUALLY WORSENING

## 2020-07-05 ASSESSMENT — PAIN - FUNCTIONAL ASSESSMENT
PAIN_FUNCTIONAL_ASSESSMENT: PREVENTS OR INTERFERES SOME ACTIVE ACTIVITIES AND ADLS
PAIN_FUNCTIONAL_ASSESSMENT: ACTIVITIES ARE NOT PREVENTED
PAIN_FUNCTIONAL_ASSESSMENT: PREVENTS OR INTERFERES SOME ACTIVE ACTIVITIES AND ADLS
PAIN_FUNCTIONAL_ASSESSMENT: ACTIVITIES ARE NOT PREVENTED
PAIN_FUNCTIONAL_ASSESSMENT: ACTIVITIES ARE NOT PREVENTED
PAIN_FUNCTIONAL_ASSESSMENT: PREVENTS OR INTERFERES SOME ACTIVE ACTIVITIES AND ADLS

## 2020-07-05 ASSESSMENT — PAIN DESCRIPTION - ORIENTATION
ORIENTATION: MID

## 2020-07-05 ASSESSMENT — PAIN DESCRIPTION - ONSET
ONSET: GRADUAL
ONSET: ON-GOING
ONSET: GRADUAL
ONSET: ON-GOING
ONSET: GRADUAL
ONSET: ON-GOING

## 2020-07-05 ASSESSMENT — PAIN DESCRIPTION - FREQUENCY
FREQUENCY: CONTINUOUS

## 2020-07-05 ASSESSMENT — PAIN DESCRIPTION - DESCRIPTORS
DESCRIPTORS: ACHING;BURNING;CONSTANT
DESCRIPTORS: ACHING;DISCOMFORT
DESCRIPTORS: ACHING;BURNING;CONSTANT
DESCRIPTORS: ACHING;DISCOMFORT
DESCRIPTORS: ACHING;BURNING;CONSTANT
DESCRIPTORS: ACHING;DISCOMFORT

## 2020-07-05 ASSESSMENT — PAIN DESCRIPTION - LOCATION
LOCATION: CHEST
LOCATION: CHEST
LOCATION: STERNUM
LOCATION: STERNUM
LOCATION: CHEST
LOCATION: STERNUM

## 2020-07-05 ASSESSMENT — PAIN DESCRIPTION - PAIN TYPE
TYPE: SURGICAL PAIN

## 2020-07-05 NOTE — FLOWSHEET NOTE
Physical Therapy Treatment Note  Name: Chelsea Oro MRN: [id number] :   1960   Date:  2020   Admission Date: 2020 Room:  [address] [place] [address]   Restrictions/Precautions:  Restrictions/Precautions  Restrictions/Precautions: (Needed moderate cuing throughout session to follow cardiac precautions even though pt was aware of what they were and was able to verbally state them)      Communication with other providers:  PT ok per nsg. Subjective:  Patient states: \"I am so painful today and I did not sleep well last night. \"   Pain:   Location, Type, Intensity (0/10 to 10/10):  10/10 (chest incision)   Objective:    Observation:  Pt seen in semi-padgett's position in bed at beginning of treatment. Agreeable to therapy. Nsg present to assist. Pt on 4 L of O2. Pt left in semi-padgett's position in bed with call light at end of treatment. Treatment, including education/measures:  Vitals: HR 57 bpm; O2 sats 97%; /94     Transfers:   supine->sit min A   Sit->supine min A   Sit<->stand CGA  Vcs for sternal precautions    Gait: pt amb 150' 2-person assist: CGA x 1 + SBA x 1 (for managing lines) with cardiac walker. Pt required vcs for pursed lip breathing d/t SOB.     Assessment / Impression:       Patient's tolerance of treatment:  Fair   Adverse Reaction: no   Significant change in status and impact:  No   Barriers to improvement:  Increased pain; Decreased overall endurance   Plan for Next Session:    Gait; exercise; transfers     Time in:  940  Time out:  1005  Timed treatment minutes:  25  Total treatment time:  25    Previously filed items:  Social/Functional History  Lives With: Spouse  Type of Home: House  Bathroom Toilet: Standard  Home Equipment: (pt states he owns no AE or DME)  ADL Assistance: Independent  Homemaking Assistance: Independent  Homemaking Responsibilities: Yes  Ambulation Assistance: Independent  Transfer Assistance: Independent  Active : Yes  Mode of Transportation: Car  Occupation: Full time employment  Type of occupation: Pt works full time as a  involving loading and unloading the truck.           Electronically signed by:    NIKOLAI [id number]  7/5/2020, 8:45 AM

## 2020-07-05 NOTE — PROGRESS NOTES
Notified Dr. David Wong about pt's off/on upset stomach- pt has hypoactive bowel sounds and stomach is slightly firm and distended- pt stated his stomach is always firm and distended, that  is nothing new for him. No vomiting noted, but a lot of belching   Orders received for Reglan and IVP Protonix.  Notified him for renal dose orders

## 2020-07-05 NOTE — PROGRESS NOTES
Nephrology Progress Note  7/5/2020 7:12 AM        Subjective:   Admit Date: 6/29/2020  PCP: Nilson Valentin MD    Interval History: siting / sleeping in his chair     Diet: ? some    ROS:  C/O surgical area pain - no BP but passing  Gas , UOP better , with prece dex/iodarone drip- insulin drip     Data:     Current med's:    furosemide  80 mg Intravenous TID    sodium chloride flush  10 mL Intravenous 2 times per day    polyethylene glycol  17 g Oral Daily    pantoprazole  40 mg Oral Daily    carvedilol  3.125 mg Oral BID    amiodarone  200 mg Oral TID    mupirocin   Nasal BID    therapeutic multivitamin-minerals  1 tablet Oral Daily with breakfast    atorvastatin  20 mg Oral Nightly    aspirin  81 mg Oral Daily    ipratropium-albuterol  1 ampule Inhalation Q4H WA    [START ON 7/7/2020] amiodarone  200 mg Oral Daily      sodium chloride 10 mL/hr at 07/04/20 1338    amiodarone 0.5 mg/min (07/05/20 0343)    vasopressin (Septic Shock) infusion      norepinephrine Stopped (07/03/20 1155)    EPINEPHrine infusion 2 mcg/min (07/03/20 1321)    nitroGLYCERIN 20 mcg/min (07/04/20 1033)    insulin 3.8 Units/hr (07/05/20 0706)    dextrose      clevidipine Stopped (07/03/20 1629)    dexmedetomidine (PRECEDEX) IV infusion 0.5 mcg/kg/hr (07/05/20 0645)    DOBUTamine Stopped (07/04/20 1033)         I/O last 3 completed shifts: In: 8978 [P.O.:740; I.V.:1028]  Out: 6602 [Urine:1540; Chest Tube:110]    CBC:   Recent Labs     07/03/20  1200  07/04/20  0430  07/04/20  0623 07/04/20  0724 07/05/20  0420   WBC 26.6*  --  18.7*  --   --   --  17.5*   HGB 13.1*   < > 11.3*   < > 10.7* 10.8* 11.0*     --  147  --   --   --  143    < > = values in this interval not displayed.           Recent Labs     07/03/20  1200  07/04/20  0430  07/04/20  0623 07/04/20  0724 07/05/20  0420      < > 136   < > 138 139 135   K 3.8   < > 4.8   < > 4.2 4.1 4.4     --  106  --   --   --  104   CO2 24   < > 22   < > 24

## 2020-07-05 NOTE — OP NOTE
[address], [address] [place] [address]                                OPERATIVE REPORT    PATIENT NAME: Dayan Oliva                       :        1960  MED REC NO:   [id number]                          ROOM:       2128  ACCOUNT NO:   [de-identified]                           ADMIT DATE: 2020  PROVIDER:     Homer Owen MD    DATE OF PROCEDURE:  2020    PREOPERATIVE DIAGNOSES:  1. Acute coronary syndrome. 2.  Paroxysmal atrial fibrillation. POSTOPERATIVE DIAGNOSES:  1. Acute coronary syndrome. 2.  Paroxysmal atrial fibrillation. PROCEDURES:  1. Coronary artery bypass grafting x4; left internal mammary artery  sequence to diagonal branch artery and LAD, reverse saphenous vein graft  anastomosed to obtuse marginal artery, reverse saphenous vein graft  anastomosed to posterior descending artery. 2.  Maze procedure including bipolar bilateral pulmonary vein isolation  with epicardial cryotherapy of the roof and floor lines and left atrial  appendage occlusion using a 50 AtriClip. 3.  Ultrasound vein mapping of left greater saphenous. 4.  Endoscopic vein harvest of left greater saphenous vein. 5.  Left [place]-Azra introducer and central venous catheter placement. SURGEON:  Homer Owen MD    ASSISTANT SURGEON:  Danya Danielle MD    ASSISTANT:  Patrick Freeman PA-C    EBL: unable to assess due to CPB    PREOP:  This is a 58-year-old male who presented to the hospital with  unstable angina. He was found to have multivessel coronary artery  disease. He has a history of an ejection fraction of approximately 30%  in the past.  He had a previous STEMI, approximately one year prior with  stenting of the right coronary artery. He also was found to be cocaine  positive. The patient was admitted to the hospital and a long  discussion was held about timing of his operation.   He had intermittent  chest pain, some this was worsening. Unfortunately, he was cocaine  positive and therefore there was delay in management as there was  concern by Anesthesia of sedating a patient who is cocaine positive. His drug tox cleared and we urgently planned for surgery once it had  cleared. The risks and benefits of surgery were discussed in detail  with the patient. The patient understood and agreed to proceed. His  previous history was of ejection fraction of 30%, though the echo  preoperatively showed slightly better than that, approximately 40% on  this admission. FINDINGS:  The LAD was diffusely diseased, but was grafted in its  lis-wn-rnxnua portion. Diagonal branch artery was heavily calcified and  was grafted fairly far on the tip right after a bifurcation. The obtuse  marginal artery was grafted proximally and was a good target. The right  coronary artery was diffusely diseased and was grafted into the  posterior descending artery where it was smaller; however, it was away  from his previous stents. Preop biventricular function was as noted  previously. Postop biventricular function was stable. DESCRIPTION OF PROCEDURE:  he patient was identified, brought to the  operating room, laid in supine position. After successful induction  general of anesthesia, the patient was intubated by Anesthesia staff,  prepped and draped in normal sterile fashion. Prior to incision,  timeout was performed and antibiotics were given. We first began by  performing ultrasound vein mapping of left greater saphenous vein. Left  [place]-Azra introducer and central venous catheter were placed using  Seldinger technique. A median sternotomy was performed in normal  fashion. Pericardium was opened from the innominate vein and T'd off at  diaphragm. Left internal mammary artery was harvested in normal  fashion. At the same time, left greater saphenous vein was  endoscopically harvested.   Prior to transection, the patient was  systemically heparinized. Pericardial cradle was formed. When  appropriate ACT was achieved, two concentric purse-strings were placed  in the distal ascending aorta. Aorta was cannulated using 20-[place]  Medtronic EOPA soft-flow aortic cannula. Right atrium was cannulated  using three-stage venous cannula. Root vent was placed in the ascending  aorta. Aorta was dissected away from pulmonary artery. The patient was  placed on cardiopulmonary bypass. We then dissected out both the  oblique and transverse sinuses. We encircled the right pulmonary veins  and bipolar clamp was placed across this and we completed the pulmonary  vein isolation lesion on the right side. There was some question on ISMAEL  whether there was a clot at the far apex of the left atrial appendage;  therefore, we did not attempt to do our left-sided veins or identify our  distal targets. Cross clamp was placed. Heart was arrested using  antegrade cardioplegia. When appropriate arrest was achieved, we then  performed our left-sided vein isolation after transecting the ligament  of John. Bipolar clamp was placed along the left-sided veins as  well. We then measured our left atrial appendage. We performed a  cryotherapy line on the roof, which was for a three-minute burn with  temperature at 142 degrees negative. We then performed our floor lesion  for three minutes with a similar 142 degrees negative. At this point, a  48 left AtriClip was placed across the atrial appendage. We dissected  out our obtuse marginal artery. It was entered using 11-blade. Arteriotomy was extended both proximally and distally using micro Szymanski  scissors. End-to-side anastomosis was created between reverse saphenous  vein graft and the obtuse marginal artery using a 6-0 Prolene suture in  a running fashion. At the completion of anastomosis, there was no  significant bleeding. We carried this around the obtuse margin of the  head.   A 4-mm punch was used to create tight proximal anastomosis. End-to-side anastomosis was created between reverse saphenous vein graft  and ascending aorta using a 6-0 Prolene suture in a running fashion. At  the completion of anastomosis, there was no significant bleeding. We  then dissected out the posterior descending artery. It was entered  using an 11-blade. Arteriotomy was extended both proximally and  distally using micro Szymanski scissors. End-to-side anastomosis was  created between reverse saphenous vein graft and the posterior  descending artery using a 6-0 Prolene suture in a fashion. At the  completion of anastomosis, there was no significant bleeding. We then  carried this around the acute margin of the heart. A 4-mm punch was  used to create tight proximal anastomosis. End-to-side anastomosis was  created between reverse saphenous vein graft and ascending aorta using a  6-0 Prolene suture in a running fashion. At the completion of  anastomosis, there was no significant bleeding. We then brought the  internal mammary into the mediastinum. We dissected out both the  diagonal branch artery and the LAD. Both were entered using an  11-blade. Arteriotomy was extended both proximally and distally using  micro Szymanski scissors. First we performed a side-to-side anastomosis  between the left internal mammary and the diagonal branch artery using a  7-0 Prolene suture in a fashion. At the completion of anastomosis,  there was great flow in the distal mammary artery and the vessel filled  with cardioplegia. At this point, we performed an end-to-side  anastomosis with the distal left internal mammary artery to the LAD  using a 7-0 Prolene suture in a fashion. At the completion of  anastomosis, there was no significant bleeding. The pedicle was tacked  to the epicardium using a 6-0 Prolene suture. The patient was placed in  Trendelenburg position, cross clamp was removed, and heart was allowed  to perfuse.   Our distal targets were evaluated for bleeding. There was  no significant bleeding. Ventricular pacing wire was placed in the  inferior border of the heart. Eventually, an atrial pacing wire was  also placed. The lungs were allowed to ventilate and the patient was  weaned from cardiopulmonary bypass. After successful separation from  cardiopulmonary bypass, protamine was given. Cannula was removed. Cannulation sites were repaired using pledgeted Prolene suture. Bilateral pleural cavities and mediastinum were policed for bleeding. Once we were satisfied there was no significant bleeding, bilateral  pleural Blakes were placed. The pericardium was closed. Hard chest  tube was placed in the sternum. Sternum was re-approximated using  sternal wires. Presternal fascia was closed with 0 Vicryl sutures,  subcutaneous tissue was closed with 2-0 Vicryl suture, and skin was  closed using 4-0 Monocryl in subcuticular fashion. Sponge count, needle  count, and instrument count were correct. The patient tolerated the  procedure well. He was transported to the CVICU in stable but critical  condition on vasopressor support. Estimated blood loss was unable to be  assessed due to cardiopulmonary bypass.         Regina Daniel MD    D: 07/05/2020 11:32:13       T: 07/05/2020 11:36:23     [email or web address]  Job#: [id number]     Doc#: [id number]    CC:

## 2020-07-05 NOTE — PROGRESS NOTES
Scale, 2nd pair of [facility], bar of Dial soap all in belonging bag for when pt is discharged

## 2020-07-05 NOTE — PROGRESS NOTES
4.4     --  106  --   --   --  104   CO2 24   < > 22   < > 24 25 22   BUN 18  --  24*  --   --   --  25*   CREATININE 1.4*   < > 1.7*   < > 1.7* 1.7* 1.7*   GLUCOSE 201*  --  95  --   --   --  100*    < > = values in this interval not displayed. Hepatic:   Recent Labs     07/05/20  0420   AST 35   ALT 8*   BILITOT 0.4   ALKPHOS 53     Mag:      Recent Labs     07/03/20  1200 07/04/20  0430 07/05/20  0420   MG 2.7* 2.4 2.3      Phos:     Recent Labs     07/05/20  0420   PHOS 4.5      INR:   Recent Labs     07/03/20  1200   INR 1.12       Radiology Review:  CXR  Impression:     1. No significant interval change since previous examination, as detailed  above. 2. Support hardware, as detailed above. ASSESSMENT AND PLAN:    Patient Active Problem List   Diagnosis    Chest pain    DM w/o complication type II (HCC)    Longstanding persistent atrial fibrillation    CAD (coronary artery disease)    Post PTCA    Unstable angina (HCC)    HTN (hypertension)    Angina pectoris syndrome (HCC)-unstable    Smoking    [facility]chronic obstructive [facility] disease) [facility]    Atrial fibrillation with rapid ventricular response ([facility])    Chest pain    STEMI (ST elevation myocardial infarction) ([address]    Myocardial infarction acute ([address]    Nausea    New onset of congestive heart failure ([address]    COPD with acute exacerbation (HCC)    Acute pulmonary edema ([facility])    Pleural effusion, bilateral    Hypertensive urgency    Chronic systolic congestive heart failure ([facility])    TIA (transient ischemic attack)    Chronic kidney disease, stage III (moderate) ([facility])    3-vessel coronary artery disease       S/P CABG x 4    Cardio: stable in NSR, DC amio gtt. On coreg and PO amio. Pulm: on 6L NC, did not tolerated BiPAP, encourage IS and wean O2 as tolerated  GI: suppository today  Renal: creatinine plateauing at 1.7, adequate UOP, on lasix 80 TID.    Tubes/Lines: DC CT, velasco, art line  Wound: gregorio Schultz PA-C

## 2020-07-05 NOTE — PROGRESS NOTES
CARDIOLOGY  NOTE        Name:  Lacie Huitron /Age/Sex: 1960  (62 y.o. male)   MRN & CSN:  [id number] & [id number] Admission Date/Time: 2020  1:21 AM   Location:  -A PCP: Haylie Shine MD       Hospital Day: 7        PLAN FROM CARDIOLOGY FOR TODAY:   CPM      - cardiology consult is for:  CAD    -  Interval history:  Post CABG    · ASSESSMENT/ PLAN:  1.     CAD, post CABG: stable  2. Risk factor modification  3. On Amio per CTS  4. Hyperlipidimea on Statin          Subjective: Todays complain: Patient  ambulating    HPI:  Hedy Gardner is a 62 y. o.year old who and presents with had concerns including Chest Pain. Chief Complaint   Patient presents with    Chest Pain           Objective: Temperature:  Current - Temp: 98 °F (36.7 °C);  Max - Temp  Av.9 °F (36.6 °C)  Min: 97.6 °F (36.4 °C)  Max: 98.1 °F (36.7 °C)    Respiratory Rate : Resp  Av  Min: 9  Max: 25    Pulse Range: Pulse  Av  Min: 57  Max: 65    Blood Presuure Range:  Systolic (12LDF), STM:196 , Min:92 , KRA:713   ; Diastolic (73ALK), ZXF:41, Min:62, Max:105      Pulse ox Range: SpO2  Av.3 %  Min: 87 %  Max: 100 %    24hr I & O:      Intake/Output Summary (Last 24 hours) at 2020 1550  Last data filed at 2020 1510  Gross per 24 hour   Intake 3851 ml   Output 4516 ml   Net -665 ml         /67   Pulse 63   Temp 98 °F (36.7 °C) (Oral)   Resp 16   Ht 6' 3\" (1.905 m)   Wt 285 lb 7.9 oz (129.5 kg)   SpO2 (!) 89%   BMI 35.68 kg/m²           Review of Systems:  All 14 systems reviewed, all negative       TELEMETRY: Sinus    has a past medical history of Atrial fibrillation (HCC), CAD (coronary artery disease), COPD (chronic obstructive pulmonary disease) ([facility] Diabetes mellitus ([facility] GERD (gastroesophageal reflux disease), H/O cardiovascular stress test, H/O cardiovascular stress test, H/O cardiovascular stress test, H/O Doppler ultrasound, H/O echocardiogram, H/O percutaneous left heart catheterization, Heart imaging, History of coronary artery stent placement, History of exercise stress test, History of Holter monitoring, History of nuclear stress test, Hyperlipidemia, Hypertension, S/P cardiac catheterization, and Status post angioplasty with stent. has a past surgical history that includes Coronary angioplasty with stent; Appendectomy; Femur fracture surgery (1984); Coronary angioplasty with stent (11/13/2013); Colonoscopy; Mandible fracture surgery (1984); Cardiac catheterization (11/13/2013); and Cardiac surgery (07/03/2020).   Physical Exam:  General:  Awake, alert, NAD  Head:normal  Eye:normal  Neck:  No JVD   Chest:  Clear to auscultation, respiration easy  Cardiovascular:  RRR S1S2  Abdomen:   nontender  Extremities:  no edema  Pulses; palpable  Neuro: grossly normal    Medications:    metOLazone  5 mg Oral BID    guaiFENesin  600 mg Oral BID    furosemide  80 mg Intravenous TID    sodium chloride flush  10 mL Intravenous 2 times per day    polyethylene glycol  17 g Oral Daily    pantoprazole  40 mg Oral Daily    carvedilol  3.125 mg Oral BID    amiodarone  200 mg Oral TID    mupirocin   Nasal BID    therapeutic multivitamin-minerals  1 tablet Oral Daily with breakfast    atorvastatin  20 mg Oral Nightly    aspirin  81 mg Oral Daily    ipratropium-albuterol  1 ampule Inhalation Q4H WA    [START ON 7/7/2020] amiodarone  200 mg Oral Daily      sodium chloride 10 mL/hr at 07/04/20 1338    vasopressin (Septic Shock) infusion      norepinephrine Stopped (07/03/20 1155)    EPINEPHrine infusion 2 mcg/min (07/03/20 1321)    nitroGLYCERIN 20 mcg/min (07/04/20 1033)    insulin 1.9 Units/hr (07/05/20 1420)    dextrose      clevidipine Stopped (07/03/20 1629)    dexmedetomidine (PRECEDEX) IV infusion Stopped (07/05/20 1400)    DOBUTamine Stopped (07/04/20 1033)     oxyCODONE-acetaminophen, sodium chloride flush, potassium chloride, magnesium sulfate, calcium gluconate, acetaminophen, bisacodyl, fleet, ondansetron, metoprolol, albumin human, norepinephrine, EPINEPHrine infusion, nitroGLYCERIN, glucose, dextrose, glucagon (rDNA), dextrose, HYDROmorphone    Lab Data:  CBC:   Recent Labs     07/03/20  1200  07/04/20  0430  07/04/20  0623 07/04/20 0724 07/05/20  0420   WBC 26.6*  --  18.7*  --   --   --  17.5*   HGB 13.1*   < > 11.3*   < > 10.7* 10.8* 11.0*   HCT 40.1*   < > 34.3*   < > 32.0* 32.0* 33.5*   MCV 93.7  --  93.2  --   --   --  94.4     --  147  --   --   --  143    < > = values in this interval not displayed. BMP:   Recent Labs     07/03/20  1200  07/04/20  0430  07/04/20 0623 07/04/20 0724 07/05/20  0420      < > 136   < > 138 139 135   K 3.8   < > 4.8   < > 4.2 4.1 4.4     --  106  --   --   --  104   CO2 24   < > 22   < > 24 25 22   PHOS  --   --   --   --   --   --  4.5   BUN 18  --  24*  --   --   --  25*   CREATININE 1.4*   < > 1.7*   < > 1.7* 1.7* 1.7*    < > = values in this interval not displayed. LIVER PROFILE:   Recent Labs     07/05/20  0420   AST 35   ALT 8*   BILITOT 0.4   ALKPHOS 53     PT/INR:   Recent Labs     07/03/20  1200   PROTIME 13.6   INR 1.12     APTT:   Recent Labs     07/02/20  1731 07/03/20  0120 07/03/20  1200   APTT 36.7 52.0* 28.8     BNP:  No results for input(s): BNP in the last 72 hours.   TROPONIN: @TROPONINI:3@  Labs, consult, tests reviewed    Assessment/ PLAN:    As above                  Sameera Willard MD 7/5/2020 3:50 PM

## 2020-07-05 NOTE — PROGRESS NOTES
2 MS CT's d/c'd per protocol and Dr's order. New DSG applied. Sternal DSG changed. INC well approx, no redness or drainage noted. Betadine/[place]hes[place] DSG applied. Pt tolerated all well.

## 2020-07-05 NOTE — PLAN OF CARE
RN  Outcome: Ongoing  7/5/2020 1118 by Ashley Quan RN  Outcome: Ongoing     Problem: Fluid Volume - Imbalance:  Goal: Ability to achieve a balanced intake and output will improve  Description: Ability to achieve a balanced intake and output will improve  7/5/2020 1951 by Bryan Ross RN  Outcome: Ongoing  7/5/2020 1118 by Ashley Quan RN  Outcome: Ongoing  Goal: Chest tube drainage is within specified parameters  Description: Chest tube drainage is within specified parameters  7/5/2020 1951 by Bryan Ross RN  Outcome: Ongoing  7/5/2020 1118 by Ashley Quan RN  Outcome: Ongoing     Problem: Gas Exchange - Impaired:  Goal: Levels of oxygenation will improve  Description: Levels of oxygenation will improve  7/5/2020 1951 by Bryan Ross RN  Outcome: Ongoing  7/5/2020 1118 by Ashley Quan RN  Outcome: Ongoing  Goal: Ability to maintain adequate ventilation will improve  Description: Ability to maintain adequate ventilation will improve  7/5/2020 1951 by Bryan Ross RN  Outcome: Ongoing  7/5/2020 1118 by Ashley Quan RN  Outcome: Ongoing     Problem: Pain:  Goal: Pain level will decrease  Description: Pain level will decrease  7/5/2020 1951 by Bryan Ross RN  Outcome: Ongoing  7/5/2020 1118 by Ashley Quan RN  Outcome: Ongoing  Goal: Control of acute pain  Description: Control of acute pain  7/5/2020 1951 by Bryan Ross RN  Outcome: Ongoing  7/5/2020 1118 by Ashley Quan RN  Outcome: Ongoing  Goal: Control of chronic pain  Description: Control of chronic pain  7/5/2020 1951 by Bryan Ross RN  Outcome: Ongoing  7/5/2020 1118 by Ashley Quan RN  Outcome: Ongoing     Problem: Tissue Perfusion - Cardiopulmonary, Altered:  Goal: Absence of angina  Description: Absence of angina  7/5/2020 1951 by Bryan Ross RN  Outcome: Ongoing  7/5/2020 1118 by Ashley Quan RN  Outcome: Ongoing  Goal: Hemodynamic stability will improve  Description: Hemodynamic stability will improve  7/5/2020 1951 by Levon Evans RN  Outcome: Ongoing  7/5/2020 1118 by Patrick Amaro RN  Outcome: Ongoing  Goal: Will show no evidence of cardiac arrhythmias  Description: Will show no evidence of cardiac arrhythmias  7/5/2020 1951 by Levon Evans RN  Outcome: Ongoing  7/5/2020 1118 by Patrick Amaro RN  Outcome: Ongoing     Problem: Tobacco Use:  Goal: Will participate in inpatient tobacco-use cessation counseling  Description: Will participate in inpatient tobacco-use cessation counseling  7/5/2020 1951 by Levon Evans RN  Outcome: Ongoing  7/5/2020 1118 by Patrick Amaro RN  Outcome: Ongoing     Problem: Falls - Risk of:  Goal: Will remain free from falls  Description: Will remain free from falls  7/5/2020 1951 by Levon Evans RN  Outcome: Ongoing  7/5/2020 1118 by Patrick Amaro RN  Outcome: Ongoing  Goal: Absence of physical injury  Description: Absence of physical injury  7/5/2020 1951 by Levon Evans RN  Outcome: Ongoing  7/5/2020 1118 by Patrick Amaro RN  Outcome: Ongoing     Problem: Skin Integrity:  Goal: Will show no infection signs and symptoms  Description: Will show no infection signs and symptoms  7/5/2020 1951 by Levon Evans RN  Outcome: Ongoing  7/5/2020 1118 by Patrick Amaro RN  Outcome: Ongoing  Goal: Absence of new skin breakdown  Description: Absence of new skin breakdown  7/5/2020 1951 by Levon Evans RN  Outcome: Ongoing  7/5/2020 1118 by Patrick Amaro RN  Outcome: Ongoing

## 2020-07-06 ENCOUNTER — APPOINTMENT (OUTPATIENT)
Dept: GENERAL RADIOLOGY | Age: 60
DRG: 229 | End: 2020-07-06
Payer: COMMERCIAL

## 2020-07-06 LAB
ALBUMIN SERPL-MCNC: 3.6 GM/DL (ref 3.4–5)
ALP BLD-CCNC: 64 IU/L (ref 40–128)
ALT SERPL-CCNC: 7 U/L (ref 10–40)
ANION GAP SERPL CALCULATED.3IONS-SCNC: 10 MMOL/L (ref 4–16)
AST SERPL-CCNC: 24 IU/L (ref 15–37)
BILIRUB SERPL-MCNC: 0.6 MG/DL (ref 0–1)
BUN BLDV-MCNC: 25 MG/DL (ref 6–23)
CALCIUM IONIZED: 4.68 MG/DL (ref 4.48–5.28)
CALCIUM SERPL-MCNC: 9.2 MG/DL (ref 8.3–10.6)
CHLORIDE BLD-SCNC: 95 MMOL/L (ref 99–110)
CO2: 31 MMOL/L (ref 21–32)
CREAT SERPL-MCNC: 1.6 MG/DL (ref 0.9–1.3)
GFR AFRICAN AMERICAN: 54 ML/MIN/1.73M2
GFR NON-AFRICAN AMERICAN: 44 ML/MIN/1.73M2
GLUCOSE BLD-MCNC: 115 MG/DL (ref 70–99)
GLUCOSE BLD-MCNC: 122 MG/DL (ref 70–99)
GLUCOSE BLD-MCNC: 129 MG/DL (ref 70–99)
GLUCOSE BLD-MCNC: 141 MG/DL (ref 70–99)
GLUCOSE BLD-MCNC: 154 MG/DL (ref 70–99)
GLUCOSE BLD-MCNC: 156 MG/DL (ref 70–99)
GLUCOSE BLD-MCNC: 157 MG/DL (ref 70–99)
GLUCOSE BLD-MCNC: 157 MG/DL (ref 70–99)
GLUCOSE BLD-MCNC: 185 MG/DL (ref 70–99)
IONIZED CA: 1.17 MMOL/L (ref 1.12–1.32)
MAGNESIUM: 2 MG/DL (ref 1.8–2.4)
PHOSPHORUS: 4.4 MG/DL (ref 2.5–4.9)
POTASSIUM SERPL-SCNC: 3.5 MMOL/L (ref 3.5–5.1)
SODIUM BLD-SCNC: 136 MMOL/L (ref 135–145)
TOTAL PROTEIN: 6.1 GM/DL (ref 6.4–8.2)

## 2020-07-06 PROCEDURE — 6370000000 HC RX 637 (ALT 250 FOR IP): Performed by: SURGERY

## 2020-07-06 PROCEDURE — 80053 COMPREHEN METABOLIC PANEL: CPT

## 2020-07-06 PROCEDURE — 94761 N-INVAS EAR/PLS OXIMETRY MLT: CPT

## 2020-07-06 PROCEDURE — 94150 VITAL CAPACITY TEST: CPT

## 2020-07-06 PROCEDURE — 2000000000 HC ICU R&B

## 2020-07-06 PROCEDURE — 6370000000 HC RX 637 (ALT 250 FOR IP): Performed by: THORACIC SURGERY (CARDIOTHORACIC VASCULAR SURGERY)

## 2020-07-06 PROCEDURE — 94640 AIRWAY INHALATION TREATMENT: CPT

## 2020-07-06 PROCEDURE — 2700000000 HC OXYGEN THERAPY PER DAY

## 2020-07-06 PROCEDURE — 6370000000 HC RX 637 (ALT 250 FOR IP): Performed by: PHYSICIAN ASSISTANT

## 2020-07-06 PROCEDURE — 6360000002 HC RX W HCPCS: Performed by: PHYSICIAN ASSISTANT

## 2020-07-06 PROCEDURE — 6370000000 HC RX 637 (ALT 250 FOR IP): Performed by: INTERNAL MEDICINE

## 2020-07-06 PROCEDURE — 84100 ASSAY OF PHOSPHORUS: CPT

## 2020-07-06 PROCEDURE — C9113 INJ PANTOPRAZOLE SODIUM, VIA: HCPCS | Performed by: INTERNAL MEDICINE

## 2020-07-06 PROCEDURE — 6360000002 HC RX W HCPCS: Performed by: INTERNAL MEDICINE

## 2020-07-06 PROCEDURE — 97110 THERAPEUTIC EXERCISES: CPT

## 2020-07-06 PROCEDURE — 83735 ASSAY OF MAGNESIUM: CPT

## 2020-07-06 PROCEDURE — 82962 GLUCOSE BLOOD TEST: CPT

## 2020-07-06 PROCEDURE — 82330 ASSAY OF CALCIUM: CPT

## 2020-07-06 PROCEDURE — 71046 X-RAY EXAM CHEST 2 VIEWS: CPT

## 2020-07-06 PROCEDURE — 2580000003 HC RX 258: Performed by: PHYSICIAN ASSISTANT

## 2020-07-06 PROCEDURE — 97116 GAIT TRAINING THERAPY: CPT

## 2020-07-06 RX ORDER — FUROSEMIDE 10 MG/ML
40 INJECTION INTRAMUSCULAR; INTRAVENOUS 3 TIMES DAILY
Status: DISCONTINUED | OUTPATIENT
Start: 2020-07-06 | End: 2020-07-07

## 2020-07-06 RX ORDER — GLIPIZIDE 5 MG/1
10 TABLET ORAL
Status: DISCONTINUED | OUTPATIENT
Start: 2020-07-07 | End: 2020-07-07 | Stop reason: HOSPADM

## 2020-07-06 RX ORDER — METOLAZONE 5 MG/1
2.5 TABLET ORAL 2 TIMES DAILY
Status: DISCONTINUED | OUTPATIENT
Start: 2020-07-06 | End: 2020-07-07

## 2020-07-06 RX ORDER — NICOTINE POLACRILEX 4 MG
15 LOZENGE BUCCAL PRN
Status: DISCONTINUED | OUTPATIENT
Start: 2020-07-06 | End: 2020-07-07 | Stop reason: HOSPADM

## 2020-07-06 RX ORDER — ACETAMINOPHEN 325 MG/1
650 TABLET ORAL EVERY 6 HOURS
Status: DISCONTINUED | OUTPATIENT
Start: 2020-07-06 | End: 2020-07-07 | Stop reason: HOSPADM

## 2020-07-06 RX ORDER — CARVEDILOL 6.25 MG/1
6.25 TABLET ORAL 2 TIMES DAILY
Status: DISCONTINUED | OUTPATIENT
Start: 2020-07-06 | End: 2020-07-07 | Stop reason: HOSPADM

## 2020-07-06 RX ORDER — AMLODIPINE BESYLATE 5 MG/1
5 TABLET ORAL DAILY
Status: DISCONTINUED | OUTPATIENT
Start: 2020-07-06 | End: 2020-07-07

## 2020-07-06 RX ORDER — ALOGLIPTIN 12.5 MG/1
12.5 TABLET, FILM COATED ORAL DAILY
Status: DISCONTINUED | OUTPATIENT
Start: 2020-07-06 | End: 2020-07-07 | Stop reason: HOSPADM

## 2020-07-06 RX ORDER — DEXTROSE MONOHYDRATE 25 G/50ML
12.5 INJECTION, SOLUTION INTRAVENOUS PRN
Status: DISCONTINUED | OUTPATIENT
Start: 2020-07-06 | End: 2020-07-07 | Stop reason: HOSPADM

## 2020-07-06 RX ORDER — DEXTROSE MONOHYDRATE 50 MG/ML
100 INJECTION, SOLUTION INTRAVENOUS PRN
Status: DISCONTINUED | OUTPATIENT
Start: 2020-07-06 | End: 2020-07-07 | Stop reason: HOSPADM

## 2020-07-06 RX ORDER — INSULIN GLARGINE 100 [IU]/ML
0.15 INJECTION, SOLUTION SUBCUTANEOUS NIGHTLY
Status: DISCONTINUED | OUTPATIENT
Start: 2020-07-06 | End: 2020-07-06

## 2020-07-06 RX ORDER — ALOGLIPTIN 25 MG/1
25 TABLET, FILM COATED ORAL DAILY
Status: DISCONTINUED | OUTPATIENT
Start: 2020-07-06 | End: 2020-07-06

## 2020-07-06 RX ADMIN — FUROSEMIDE 80 MG: 10 INJECTION, SOLUTION INTRAMUSCULAR; INTRAVENOUS at 08:29

## 2020-07-06 RX ADMIN — INSULIN LISPRO 2 UNITS: 100 INJECTION, SOLUTION INTRAVENOUS; SUBCUTANEOUS at 20:35

## 2020-07-06 RX ADMIN — APIXABAN 5 MG: 5 TABLET, FILM COATED ORAL at 20:39

## 2020-07-06 RX ADMIN — POLYETHYLENE GLYCOL (3350) 17 G: 17 POWDER, FOR SOLUTION ORAL at 08:36

## 2020-07-06 RX ADMIN — Medication: at 08:28

## 2020-07-06 RX ADMIN — AMIODARONE HYDROCHLORIDE 200 MG: 200 TABLET ORAL at 08:28

## 2020-07-06 RX ADMIN — METOCLOPRAMIDE HYDROCHLORIDE 5 MG: 5 INJECTION INTRAMUSCULAR; INTRAVENOUS at 23:53

## 2020-07-06 RX ADMIN — AMLODIPINE BESYLATE 5 MG: 5 TABLET ORAL at 14:04

## 2020-07-06 RX ADMIN — ATORVASTATIN CALCIUM 20 MG: 20 TABLET, FILM COATED ORAL at 20:39

## 2020-07-06 RX ADMIN — SODIUM CHLORIDE, PRESERVATIVE FREE 10 ML: 5 INJECTION INTRAVENOUS at 08:29

## 2020-07-06 RX ADMIN — CARVEDILOL 6.25 MG: 6.25 TABLET, FILM COATED ORAL at 08:28

## 2020-07-06 RX ADMIN — CALCIUM GLUCONATE 2 G: 98 INJECTION, SOLUTION INTRAVENOUS at 10:20

## 2020-07-06 RX ADMIN — FUROSEMIDE 40 MG: 10 INJECTION, SOLUTION INTRAMUSCULAR; INTRAVENOUS at 14:04

## 2020-07-06 RX ADMIN — OXYCODONE HYDROCHLORIDE AND ACETAMINOPHEN 2 TABLET: 5; 325 TABLET ORAL at 08:05

## 2020-07-06 RX ADMIN — GUAIFENESIN 600 MG: 600 TABLET, EXTENDED RELEASE ORAL at 08:29

## 2020-07-06 RX ADMIN — AMIODARONE HYDROCHLORIDE 200 MG: 200 TABLET ORAL at 14:04

## 2020-07-06 RX ADMIN — FUROSEMIDE 40 MG: 10 INJECTION, SOLUTION INTRAMUSCULAR; INTRAVENOUS at 20:38

## 2020-07-06 RX ADMIN — METOLAZONE 5 MG: 5 TABLET ORAL at 08:28

## 2020-07-06 RX ADMIN — MULTIPLE VITAMINS W/ MINERALS TAB 1 TABLET: TAB at 08:29

## 2020-07-06 RX ADMIN — Medication: at 20:38

## 2020-07-06 RX ADMIN — BISACODYL 10 MG: 10 SUPPOSITORY RECTAL at 11:01

## 2020-07-06 RX ADMIN — PANTOPRAZOLE SODIUM 40 MG: 40 INJECTION, POWDER, FOR SOLUTION INTRAVENOUS at 08:36

## 2020-07-06 RX ADMIN — GUAIFENESIN 600 MG: 600 TABLET, EXTENDED RELEASE ORAL at 20:39

## 2020-07-06 RX ADMIN — ASPIRIN 81 MG: 81 TABLET, COATED ORAL at 08:28

## 2020-07-06 RX ADMIN — ACETAMINOPHEN 650 MG: 325 TABLET ORAL at 20:39

## 2020-07-06 RX ADMIN — SODIUM CHLORIDE, PRESERVATIVE FREE 10 ML: 5 INJECTION INTRAVENOUS at 20:41

## 2020-07-06 RX ADMIN — ACETAMINOPHEN 650 MG: 325 TABLET ORAL at 11:58

## 2020-07-06 RX ADMIN — METOCLOPRAMIDE HYDROCHLORIDE 5 MG: 5 INJECTION INTRAMUSCULAR; INTRAVENOUS at 08:38

## 2020-07-06 RX ADMIN — CARVEDILOL 6.25 MG: 6.25 TABLET, FILM COATED ORAL at 20:39

## 2020-07-06 RX ADMIN — ONDANSETRON 4 MG: 2 INJECTION INTRAMUSCULAR; INTRAVENOUS at 22:00

## 2020-07-06 RX ADMIN — IPRATROPIUM BROMIDE AND ALBUTEROL SULFATE 1 AMPULE: .5; 3 SOLUTION RESPIRATORY (INHALATION) at 11:11

## 2020-07-06 RX ADMIN — OXYCODONE HYDROCHLORIDE AND ACETAMINOPHEN 2 TABLET: 5; 325 TABLET ORAL at 20:07

## 2020-07-06 RX ADMIN — OXYCODONE HYDROCHLORIDE AND ACETAMINOPHEN 1 TABLET: 5; 325 TABLET ORAL at 16:11

## 2020-07-06 RX ADMIN — METOLAZONE 2.5 MG: 5 TABLET ORAL at 20:38

## 2020-07-06 RX ADMIN — AMIODARONE HYDROCHLORIDE 200 MG: 200 TABLET ORAL at 20:38

## 2020-07-06 RX ADMIN — OXYCODONE HYDROCHLORIDE AND ACETAMINOPHEN 2 TABLET: 5; 325 TABLET ORAL at 01:18

## 2020-07-06 ASSESSMENT — PAIN DESCRIPTION - LOCATION
LOCATION: CHEST
LOCATION: CHEST
LOCATION: STERNUM
LOCATION: CHEST

## 2020-07-06 ASSESSMENT — PAIN DESCRIPTION - PROGRESSION
CLINICAL_PROGRESSION: GRADUALLY WORSENING
CLINICAL_PROGRESSION: GRADUALLY IMPROVING
CLINICAL_PROGRESSION: GRADUALLY WORSENING
CLINICAL_PROGRESSION: GRADUALLY WORSENING
CLINICAL_PROGRESSION: GRADUALLY IMPROVING
CLINICAL_PROGRESSION: GRADUALLY WORSENING
CLINICAL_PROGRESSION: GRADUALLY WORSENING
CLINICAL_PROGRESSION: GRADUALLY IMPROVING
CLINICAL_PROGRESSION: GRADUALLY WORSENING
CLINICAL_PROGRESSION: NOT CHANGED

## 2020-07-06 ASSESSMENT — PAIN - FUNCTIONAL ASSESSMENT
PAIN_FUNCTIONAL_ASSESSMENT: ACTIVITIES ARE NOT PREVENTED
PAIN_FUNCTIONAL_ASSESSMENT: PREVENTS OR INTERFERES SOME ACTIVE ACTIVITIES AND ADLS
PAIN_FUNCTIONAL_ASSESSMENT: PREVENTS OR INTERFERES SOME ACTIVE ACTIVITIES AND ADLS
PAIN_FUNCTIONAL_ASSESSMENT: ACTIVITIES ARE NOT PREVENTED
PAIN_FUNCTIONAL_ASSESSMENT: PREVENTS OR INTERFERES SOME ACTIVE ACTIVITIES AND ADLS
PAIN_FUNCTIONAL_ASSESSMENT: ACTIVITIES ARE NOT PREVENTED

## 2020-07-06 ASSESSMENT — PAIN DESCRIPTION - FREQUENCY
FREQUENCY: INTERMITTENT
FREQUENCY: CONTINUOUS
FREQUENCY: INTERMITTENT
FREQUENCY: INTERMITTENT
FREQUENCY: CONTINUOUS
FREQUENCY: INTERMITTENT
FREQUENCY: INTERMITTENT
FREQUENCY: CONTINUOUS
FREQUENCY: INTERMITTENT

## 2020-07-06 ASSESSMENT — PAIN DESCRIPTION - ONSET
ONSET: ON-GOING
ONSET: GRADUAL
ONSET: ON-GOING

## 2020-07-06 ASSESSMENT — PAIN DESCRIPTION - PAIN TYPE
TYPE: SURGICAL PAIN
TYPE: ACUTE PAIN
TYPE: SURGICAL PAIN

## 2020-07-06 ASSESSMENT — PAIN DESCRIPTION - DESCRIPTORS
DESCRIPTORS: ACHING
DESCRIPTORS: ACHING;BURNING
DESCRIPTORS: ACHING
DESCRIPTORS: ACHING
DESCRIPTORS: DISCOMFORT
DESCRIPTORS: ACHING
DESCRIPTORS: DISCOMFORT

## 2020-07-06 ASSESSMENT — PAIN SCALES - GENERAL
PAINLEVEL_OUTOF10: 5
PAINLEVEL_OUTOF10: 0
PAINLEVEL_OUTOF10: 4
PAINLEVEL_OUTOF10: 7
PAINLEVEL_OUTOF10: 3
PAINLEVEL_OUTOF10: 0
PAINLEVEL_OUTOF10: 3
PAINLEVEL_OUTOF10: 9
PAINLEVEL_OUTOF10: 0
PAINLEVEL_OUTOF10: 8
PAINLEVEL_OUTOF10: 5
PAINLEVEL_OUTOF10: 0
PAINLEVEL_OUTOF10: 5
PAINLEVEL_OUTOF10: 9
PAINLEVEL_OUTOF10: 5
PAINLEVEL_OUTOF10: 3
PAINLEVEL_OUTOF10: 0
PAINLEVEL_OUTOF10: 0

## 2020-07-06 ASSESSMENT — PAIN DESCRIPTION - ORIENTATION
ORIENTATION: MID

## 2020-07-06 NOTE — PROGRESS NOTES
Criteria met per clinical pathway to remove epicardial pacing wires. MD order obtained. Procedure explained to patient. INR is less than 2.0. Pacing wire site within  normal limits. Cleansed with Betadine. Ventricular pacing wires removed per policy without difficulty. No tissue noted. Atrial pacer wires removed per policy, without difficulty. No tissue noted. Dry sterile dressing applied. Vital signs taken every 15 min x 4, every 30 min x 2, and every hour x 1 and recorded in Doc Flowsheets. Patient tolerated procedure well, heart tones easily auscultated, oxygen saturation within normal limits. No signs/symptoms of cardiac tamponade.

## 2020-07-06 NOTE — PROGRESS NOTES
Physical Therapy    Physical Therapy Treatment Note  Name: Chelsea Oro MRN: [id number] :   1960   Date:  2020   Admission Date: 2020 Room:  [address] [place] [address]   Restrictions/Precautions:  Restrictions/Precautions  Restrictions/Precautions: (Needed moderate cuing throughout session to follow cardiac precautions even though pt was aware of what they were and was able to verbally state them)     O2 on 6 L Of O2 NC, tele  Communication with other providers:  Celeste Ross RN states pt is ok to see for therapy  Subjective:  Patient states:  He feels so sleepy but can't sleep  Pain:   Location, Type, Intensity (0/10 to 10/10):  0/10  Objective:    Observation:  Pt was beginning his walk with nursing  Treatment, including education/measures:  Gait:  Pt amb with CW for 200 ft with CGA  Pt needed VC's for posture and PLB, also to increase his distance  Vital Signs  HR: 68, B/P 145/82, O2 94% on 6 L NC  Education:  Pt was instructed in Sternal Precautions, Purpose of Exercise Program, Emiliano Scale and Signs and Symptoms of Exercise Intolerance per Cardiac Protocol  Pt was instructed in Intermediate Cardiac ex program:sitting  Overhead Side Stretch x 10  Ankle Pumps/ Heel Raises x 10  Marching Seated x 10  Forward Arm Raises x 10  Side Arm Raises x 10  Arm Crosses x 10  Arm Circles Forwards and Backwards x 10  SittingTrunkTwist x 10  Safety  Patient left safely in the chair, with call light/phone in reach. Gait belt was used for transfers and gait.   Assessment / Impression:     Patient's tolerance of treatment:  Fair to good, pt is very sleepy but repeats information back to you   Adverse Reaction: none  Significant change in status and impact:  none  Barriers to improvement:  Sleepiness,   Plan for Next Session:    Will cont to work towards pt's goals per his tolerance  Time in:  915  Time out:  955  Timed treatment minutes:  40  Total treatment time:  40  Previously filed items:  Social/Functional History  Lives With: Spouse  Type of Home: House  Bathroom Toilet: Standard  Home Equipment: (pt states he owns no AE or DME)  ADL Assistance: Independent  Homemaking Assistance: Independent  Homemaking Responsibilities: Yes  Ambulation Assistance: Independent  Transfer Assistance: Independent  Active : Yes  Mode of Transportation: Car  Occupation: Full time employment  Type of occupation: Pt works full time as a  involving loading and unloading the truck. Electronically signed by:     Sally Rice PTA  7/6/2020, 9:50 AM

## 2020-07-06 NOTE — PROGRESS NOTES
7/6/2020 1:29 PM  Patient Room #: 2128/2128-A  Patient Name: Suzanna Sarkar    (Step 1 Done by RN if possible otherwise call Pulmonary Diagnostics)  1. Place patient on room air at rest for at least 30 minutes. If patient falls below 88% before 30 minutes then you can record the level and stop. Record room air saturation level __ %. If patient is at 88% or below, they will qualify for home oxygen and you can stop. If level does not fall below 88%, fill in level above. If indicated continue to Step 2. Signature:_____ Date: ___  (Step 2&3 Done by P)  2. Ambulate patient on room air until saturation falls below 89%. Record level of room air saturation with ambulation___ %. Next, place patient back on ___lpm oxygen and ambulate, record level __%. (Note:  this level must show improvement from room air level done with ambulation.)  If patients saturation on room air with ambulation is 88% or below AND patient shows improvement with oxygen during ambulation, they will qualify for home oxygen and you can stop. If patient does not drop below 89%, then patient should have an overnight oximetry trending on room air to see if level falls below 88%. Complete level in Step 3 below. 3. Room air overnight oximetry level 88 % for___  cumulative minutes. If patients room air oxygen level is < 89% for at least 5 cumulative minutes, patient will qualify for home oxygen and you can stop. (Attach Night Trending Report)    Complete order below: Diagnosis:_COPD, CHF___  Home oxygen at:  Length of Need: ? Lifetime ?  3 Months     ___lpm or __%   via  [] nasal cannula  []mask  [] other:___         []continuous []  with activity  []  Nocturnal   [] Portable Tanks []  Concentrator        Therapist Signature:____________     Date:  _____  Physician Signature:  __Electronically Signed in EMR_    Date: ____    Physician Printed Name: Ordering User: Milly Gonzalez PA-C  NPI:  [id number]       [] Patient Qualifies      [] Patient Does NOT qualify

## 2020-07-06 NOTE — PROGRESS NOTES
Nutrition Assessment    Type and Reason for Visit: Reassess    Nutrition Recommendations:   · Continue current diet  · Start carb controlled diet    Nutrition Assessment: Pt now assessed due to a los. He has been consuming 100% of his meals but is now having hallucinations. Will start supplements and continue to follow. Malnutrition Assessment:  · Malnutrition Status: At risk for malnutrition  · Context: Acute illness or injury  · Findings of the 6 clinical characteristics of malnutrition (Minimum of 2 out of 6 clinical characteristics is required to make the diagnosis of moderate or severe Protein Calorie Malnutrition based on AND/ASPEN Guidelines):  1. Energy Intake-Less than or equal to 75% of estimated energy requirement, Greater than or equal to 7 days    2. Weight Loss-No significant weight loss, in 6 months  3. Fat Loss-No significant subcutaneous fat loss, Orbital  4. Muscle Loss-No significant muscle mass loss, Clavicles (pectoralis and deltoids)  5. Fluid Accumulation-Moderate to severe fluid accumulation, Generalized  6.  Strength-Not measured    Nutrition Risk Level:  Moderate    Nutrient Needs:  · Estimated Daily Total Kcal: 2935-1371  · Estimated Daily Protein (g): 107-125  · Estimated Daily Total Fluid (ml/day): 1899-7431    Nutrition Diagnosis:   · Problem: Increased nutrient needs  · Etiology: related to Cardiac dysfunction     Signs and symptoms:  as evidenced by Presence of wounds    Objective Information:  · Wound Type: Surgical Wound  · Current Nutrition Therapies:  · Oral Diet Orders: Carb Control 5 Carbs/Meal   · Oral Diet intake: %, 26-50%  · Oral Nutrition Supplement (ONS) Orders: None  · Anthropometric Measures:  · Ht: 6' 3\" (190.5 cm)   · Current Body Wt: 285 lb (129.3 kg)  · Admission Body Wt: 257 lb (116.6 kg)  · Usual Body Wt: 265 lb (120.2 kg)  · % Weight Change: none noted- fluid gain during los  · Ideal Body Wt: 196 lb (88.9 kg), % Ideal Body 145%  · BMI Classification: BMI 35.0 - 39.9 Obese Class II    Nutrition Interventions:   Continue current diet, Start ONS  Continued Inpatient Monitoring, Coordination of Care, Education not appropriate at this time    Nutrition Evaluation:   · Evaluation: Goals set   · Goals: pt will consume greater than 75% of his meals and supplements    · Monitoring: Meal Intake, Supplement Intake, Pertinent Labs, Weight, Wound Healing      Electronically signed by Jenny Chapman RD, LD on 5/7/47 at 9:56 AM EDT    Contact Number: [phone]

## 2020-07-06 NOTE — PROGRESS NOTES
Nephrology Progress Note  7/6/2020 9:04 AM        Subjective:   Admit Date: 6/29/2020  PCP: Radha Mayers MD    Interval History: sitting in chair; reports continued stomach discomfort  Which he had reported yesterday evening. Started on protonix       Data:     Current med's:    carvedilol  6.25 mg Oral BID    metOLazone  5 mg Oral BID    guaiFENesin  600 mg Oral BID    pantoprazole  40 mg Intravenous QAM AC    furosemide  80 mg Intravenous TID    sodium chloride flush  10 mL Intravenous 2 times per day    polyethylene glycol  17 g Oral Daily    amiodarone  200 mg Oral TID    mupirocin   Nasal BID    therapeutic multivitamin-minerals  1 tablet Oral Daily with breakfast    atorvastatin  20 mg Oral Nightly    aspirin  81 mg Oral Daily    ipratropium-albuterol  1 ampule Inhalation Q4H WA    [START ON 7/7/2020] amiodarone  200 mg Oral Daily      sodium chloride 10 mL/hr at 07/04/20 1338    vasopressin (Septic Shock) infusion      norepinephrine Stopped (07/03/20 1155)    EPINEPHrine infusion 2 mcg/min (07/03/20 1321)    nitroGLYCERIN 20 mcg/min (07/04/20 1033)    insulin 4.4 Units/hr (07/06/20 0810)    dextrose      clevidipine Stopped (07/03/20 1629)    dexmedetomidine (PRECEDEX) IV infusion Stopped (07/05/20 1400)    DOBUTamine Stopped (07/04/20 1033)         I/O last 3 completed shifts: In: 2666 [P.O.:1020; I.V.:1546; IV Piggyback:100]  Out: 5964 [Urine:4050; Chest Tube:30]    CBC:   Recent Labs     07/03/20  1200  07/04/20  0430  07/04/20  0623 07/04/20  0724 07/05/20  0420   WBC 26.6*  --  18.7*  --   --   --  17.5*   HGB 13.1*   < > 11.3*   < > 10.7* 10.8* 11.0*     --  147  --   --   --  143    < > = values in this interval not displayed.           Recent Labs     07/04/20  0430  07/04/20  0724 07/05/20  0420 07/06/20  0735      < > 139 135 136   K 4.8   < > 4.1 4.4 3.5     --   --  104 95*   CO2 22   < > 25 22 31   BUN 24*  --   --  25* 25*   CREATININE 1.7*   < > 1. 7* 1.7* 1.6*   GLUCOSE 95  --   --  100* 115*    < > = values in this interval not displayed. Lab Results   Component Value Date    CALCIUM 9.2 07/06/2020    PHOS 4.4 07/06/2020       Objective:     Vitals: BP (!) 140/86   Pulse 65   Temp 98.3 °F (36.8 °C) (Oral)   Resp 13   Ht 6' 3\" (1.905 m)   Wt 285 lb 7.9 oz (129.5 kg)   SpO2 95%   BMI 35.68 kg/m²     General appearance:  awake and verbally interactive   HEENT: Head: normocephalic, atraumatic. Neck: supple, symmetrical, trachea midline  Cardiovascular: normal S1 and S2; midline surgical dressing   Pulmonary: diminished lung sounds bilaterally   Abdomen:  soft / non-tender   Extremities: + edema to the bilateral lower legs     Impression :     1.  MARLYN on stage 3 CKD: likely due to ATI and renal vein congestion   2. Acute fluid overload / pulmonary edema   3. SP CABG / CAD  4. Atrial Fib   5. Anemia   6. HTN     Recommendation/Plan  :     1.   -uop: 4 liters in the last 24 hours  -stable serum creatinine with normal: Na / K / CO2  2.   -excellent urine output   -maintain negative fluid balance  -on IV lasix 80 mg TID and metolazone  3.   -followed by CT-S   -continue medical therapy for CAD  4.   -recent pulse rate trend: 65 - 68 bpm   -on amiodarone and coreg   5.   -latest Hb: 11.0; follow hemoglobin trend   6. -BP trend: systolic BP's have recently been 140 - 165; on coreg     Electronically signed by TREY Rowley - CNP         Nephrology Attending Progress Note  7/6/2020 1:55 PM  Subjective:   Admit Date: 6/29/2020  PCP: Kiesha Miller MD    Interval History: I have personally performed face to face diagnostic evaluation on this patient. I have personally reviewed pertinent labs and imaging and agree with the care plan above.  My additional findings are as follows:  Pt state doing a little better and weak but did get OOB to chair    Objective:   Vitals: BP (!) 158/121   Pulse 68   Temp 98.6 °F (37 °C) (Oral)   Resp 17   Ht 6' 3\" (1.905 m)   Wt 285 lb 7.9 oz (129.5 kg)   SpO2 (!) 74%   BMI 35.68 kg/m²   Weak awake  Decrease bs  Soft obese  Trace edema    Assessment and Plan:  IMP:  As stated above    Plan     1 adjust bp meds and monitor as also pain  2 sp cabg and healing  3 OOB to chair as able for rehab  4 stable uop and decrease diuretic dose fu K level  5 hb stable  6 hr stable monitor           Electronically signed by Pierce Romano MD on 7/6/2020 at 1:55 PM

## 2020-07-06 NOTE — PROGRESS NOTES
PATIENT NAME: Jose Alejandro Holman    TODAY'S DATE: 07/06/20    SUBJECTIVE:    Pt is POD # 3 s/p CABG x 4. Pt feeling better today. He has been working on his IS. He is ambulating well. No BM yet. OBJECTIVE:   VITALS:    Vitals:    07/06/20 1113   BP:    Pulse:    Resp:    Temp:    SpO2: 94%     INTAKE/OUTPUT:    Date 07/06/20 0000 - 07/06/20 2359   Shift 2180-3240 6773-5234 8168-6537 24 Hour Total   INTAKE   Shift Total(mL/kg)       OUTPUT   Urine(mL/kg/hr)  150  150   Shift Total(mL/kg)  150(1.2)  150(1.2)   Weight (kg) 129.5 129.5 129.5 129.5      Patient Vitals for the past 96 hrs (Last 3 readings):   Weight   07/05/20 0500 285 lb 7.9 oz (129.5 kg)   07/04/20 0640 279 lb 1.6 oz (126.6 kg)   07/03/20 0400 262 lb 12.6 oz (119.2 kg)       EXAM:  Blood pressure (!) 145/82, pulse 68, temperature 98.6 °F (37 °C), temperature source Oral, resp. rate 14, height 6' 3\" (1.905 m), weight 285 lb 7.9 oz (129.5 kg), SpO2 94 %. General appearance: No apparent distress, appears stated age and cooperative. Skin: unremarkable  HEENT Normocephalic, atraumatic without obvious deformity. Neck: Supple, Trachea midline   Lungs: Good respiratory effort. Clear to auscultation, bilaterally  Heart: Regular rate/ rhythm inc c/d/i  Abdomen: Soft, non-tender distended   Extremities: min edema warm well perfused  Neurologic: Alert, grossly intact  Mental status: normal affect      Data:  CBC:   Recent Labs     07/04/20  0430  07/04/20  0623 07/04/20  0724 07/05/20  0420   WBC 18.7*  --   --   --  17.5*   HGB 11.3*   < > 10.7* 10.8* 11.0*   HCT 34.3*   < > 32.0* 32.0* 33.5*     --   --   --  143    < > = values in this interval not displayed.      BMP:    Recent Labs     07/04/20  0430  07/04/20  0724 07/05/20  0420 07/06/20  0735      < > 139 135 136   K 4.8   < > 4.1 4.4 3.5     --   --  104 95*   CO2 22   < > 25 22 31   BUN 24*  --   --  25* 25*   CREATININE 1.7*   < > 1.7* 1.7* 1.6*   GLUCOSE 95  --   --  100* 115*    < > = values in this interval not displayed. Hepatic:   Recent Labs     07/05/20  0420 07/06/20  0735   AST 35 24   ALT 8* 7*   BILITOT 0.4 0.6   ALKPHOS 53 64     Mag:      Recent Labs     07/04/20  0430 07/05/20  0420 07/06/20  0735   MG 2.4 2.3 2.0      Phos:     Recent Labs     07/05/20  0420 07/06/20  0735   PHOS 4.5 4.4      INR:   No results for input(s): INR in the last 72 hours. Radiology Review:  CXR  Impression:     1. No significant interval change since previous examination, as detailed  above. 2. Support hardware, as detailed above. ASSESSMENT AND PLAN:    Patient Active Problem List   Diagnosis    Chest pain    DM w/o complication type II (HCC)    Longstanding persistent atrial fibrillation    CAD (coronary artery disease)    Post PTCA    Unstable angina (HCC)    HTN (hypertension)    Angina pectoris syndrome (HCC)-unstable    Smoking    COPD (chronic obstructive pulmonary disease) (HCC)    Atrial fibrillation with rapid ventricular response ([facility])    Chest pain    STEMI (ST elevation myocardial infarction) ([address]    Myocardial infarction acute ([address]    Nausea    New onset of congestive heart failure ([address]    COPD with acute exacerbation (HCC)    Acute pulmonary edema (HCC)    Pleural effusion, bilateral    Hypertensive urgency    Chronic systolic congestive heart failure ([facility])    TIA (transient ischemic attack)    Chronic kidney disease, stage III (moderate) ([facility])    3-vessel coronary artery disease       S/P CABG x 4 and maze    Cardio: stable On coreg and PO amio. HTN add norvasc. Start [facility] [place] for a fib s/p maze  Pulm: still on 6L NC, encourage IS and wean O2 as tolerated. Home O2 eval.   GI: repeat suppository today  Renal: creatinine plateauing at 1.6, adequate UOP, on lasix 40 TID.    Tubes/Lines: DC pacing wires  Wound: stable     Home 1-2 days    Amanda Rodney PA-C

## 2020-07-06 NOTE — PROGRESS NOTES
Patient is having some visual hallucinations tonight. Dr. Honor Dakins notified. No new orders at this time. Will continue to monitor.

## 2020-07-06 NOTE — CARE COORDINATION
Follow up visit with pt. Pt is POD#3 CABG x 4 and Maze Procedure. Cm spoke with Izabella SOLORIO who advised that discharge is anticipated in 1-2 days. Pt eran reviewed and recommendation for home with assist at discharge. Jay discussed with pt that MD will order [facility]. at discharge. Cm discussed HC agency options/choices. Pt has no HC agency preference therefore referral sent to [address] [place] [address] via "[employer]. Pt lives with spouse but they are not on good terms and pt has plan to move out when able. Pt does believe that his wife will pick him up from hospital and transport home. 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 Pt declines any substance abuse treatment resource info as he is very familiar. Pt has PCP and insurance although he expects it to end due to his not being able to work and no way to pay the 100.00/week premium. Pt does not expect to be able to afford his Rxs at discharge. CM contacted [facility] [employer] and pt is ineligible due to being helped in august 2019 and January 2020 with failure to turn in application.  Plan home with HC.

## 2020-07-06 NOTE — CONSULTS
Endocrinology   Consult Note  Dear Doctor Daphne Mendoza    Thank You for the Consult     Pt. Was Admitted for : Chest pain CAD and underwent CABG    Reason for Consult: Better control of blood glucose      History Obtained From:  Patient/ EMR       HISTORY OF PRESENT ILLNESS:                The patient is a 62 y.o. male with significant past medical history of CAD, stent, COPD, hypertension, hyperlipidemia, sleep apnea, diabetes mellitus was admitted to hospital for chest pain and has multiple coronary artery disease blockage and this time underwent CABG. 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 I was  consulted for better control of blood glucose. ROS:   Pt's ROS done in detail. Abnormal ROS are noted in Medical and Surgical History Section below: Other Medical History:        Diagnosis Date    Atrial fibrillation ([facility] 11/2013    CAD (coronary artery disease)     COPD (chronic obstructive pulmonary disease) ([facility]     Diabetes mellitus ([facility]     GERD (gastroesophageal reflux disease)     H/O cardiovascular stress test 11/20/13, 3/20/2013    11/13-Observed defect is consistent with diaphragmatic attenuation. EF 58%. 3/13 -EF 51%. No ischemia. Normal Study.  H/O cardiovascular stress test 06/08/2017    EF 50% normal study    H/O cardiovascular stress test 06/17/2019    Normal NM    H/O Doppler ultrasound 10/14/2010    10/2010-Bilateral venous doppler of lower extremies- No DVTs.  H/O echocardiogram 06/26/2019    Left ventricular systolic function is normal with an ejection fraction of 50-55%. Mild concentric left ventricular hypertrophy. Grade I diastolic dysfunction. No significant valvular disease noted. No evidence of pericardial effusion. Essentially unremarkable echo.     H/O percutaneous left heart catheterization 12/22/15    No evidence of hemodynamically significant age   EYES:  vision intact Fundoscopic Exam not performed   ENT:Normal  NECK:  Supple, No JVD. Thyroid Exam:Normal   LUNGS:  Has Vesicular Breath Sounds,   CARDIOVASCULAR: Atrial fibrillation   ABDOMEN:  No scars, normal bowel sounds, soft, non-distended, non-tender, no masses palpated, no hepatolienomegaly  Musculoskeletal: Normal  Extremities: Normal, peripheral pulses normal, , has no edema   NEUROLOGIC:  Awake, alert, oriented to name, place and time. Cranial nerves II-XII are grossly intact. Motor is  intact. Sensory neuropathy. ,  and gait is normal.    DATA:    CBC:   Recent Labs     07/04/20  0430 07/04/20  0623 07/04/20  0724 07/05/20  0420   WBC 18.7*  --   --   --  17.5*   HGB 11.3*   < > 10.7* 10.8* 11.0*     --   --   --  143    < > = values in this interval not displayed. CMP:  Recent Labs     07/04/20 0430 07/04/20  0724 07/05/20  0420 07/06/20  0735      < > 139 135 136   K 4.8   < > 4.1 4.4 3.5     --   --  104 95*   CO2 22   < > 25 22 31   BUN 24*  --   --  25* 25*   CREATININE 1.7*   < > 1.7* 1.7* 1.6*   CALCIUM 8.6  --   --  8.7 9.2   PROT  --   --   --  5.9* 6.1*   LABALBU  --   --   --  3.7 3.6   BILITOT  --   --   --  0.4 0.6   ALKPHOS  --   --   --  53 64   AST  --   --   --  35 24   ALT  --   --   --  8* 7*    < > = values in this interval not displayed.      Lipids:   Lab Results   Component Value Date    CHOL 153 01/06/2020    HDL 43 01/06/2020    TRIG 54 01/06/2020     Glucose:   Recent Labs     07/06/20  0809 07/06/20  1156 07/06/20  1801   POCGLU 122* 157* 185*     Hemoglobin A1C:   Lab Results   Component Value Date    LABA1C 8.9 06/29/2020     Free T4: No results found for: T4FREE  Free T3: No results found for: FT3  TSH High Sensitivity:   Lab Results   Component Value Date    TSHHS 1.390 06/02/2020       Xr Chest Standard (2 Vw)    Result Date: 7/6/2020  EXAMINATION: TWO XRAY VIEWS OF THE CHEST 7/6/2020 5:42 pm COMPARISON: 07/05/2020 HISTORY: ORDERING SYSTEM PROVIDED HISTORY: p/o cabg TECHNOLOGIST PROVIDED HISTORY: Reason for exam:->p/o cabg Reason for Exam: p/o cabg Relevant Medical/Surgical History: COPD FINDINGS: Status post median sternotomy. Small bilateral pleural effusions with adjacent atelectasis. Left subclavian central venous catheter tip projects over the distal brachiocephalic vein versus proximal SVC. Stable cardiomegaly. Interval improvement in pulmonary edema. No discrete pneumothorax. The osseous structures are stable. Left thoracostomy tube has been removed. Interval removal of left thoracostomy tube. No discrete pneumothorax. Small bilateral pleural effusions with adjacent atelectasis. Overall improvement in pulmonary edema. Xr Chest Standard (2 Vw)    Result Date: 6/30/2020  EXAMINATION: TWO XRAY VIEWS OF THE CHEST 6/30/2020 7:27 am COMPARISON: Chest radiograph 06/29/2020. HISTORY: ORDERING SYSTEM PROVIDED HISTORY: preop Cabg TECHNOLOGIST PROVIDED HISTORY: Reason for exam:->preop Cabg Reason for Exam: preop cabg FINDINGS: The lungs are without acute focal process. There is no effusion or pneumothorax. The cardiomediastinal silhouette is without acute process. The osseous structures are without acute process. No acute process. Ct Chest Wo Contrast    Result Date: 7/3/2020  EXAMINATION: CT OF THE CHEST WITHOUT CONTRAST 7/3/2020 12:50 am TECHNIQUE: CT of the chest was performed without the administration of intravenous contrast. Multiplanar reformatted images are provided for review. Dose modulation, iterative reconstruction, and/or weight based adjustment of the mA/kV was utilized to reduce the radiation dose to as low as reasonably achievable. COMPARISON: CT chest 01/05/2020, 01/08/2016 HISTORY: ORDERING SYSTEM PROVIDED HISTORY: evaluate mediastinal LAD TECHNOLOGIST PROVIDED HISTORY: This needs done tonight.   Pt having surgery first thing in AM Reason for exam:->evaluate mediastinal LAD Reason for Exam: evaluate catheter. Mediastinal drain/chest tubes are noted. Bilateral perihilar and basilar airspace opacities are seen. Bilateral perihilar and basilar airspace opacities favored to represent atelectasis in the postoperative setting. No measurable pneumothorax. Xr Chest Portable    Result Date: 7/6/2020  EXAMINATION: ONE X-RAY VIEW OF THE CHEST 7/3/2020 12:32 pm COMPARISON: June 30, 2020 HISTORY: ORDERING SYSTEM PROVIDED HISTORY: Post op open heart surgery. TECHNOLOGIST PROVIDED HISTORY: Reason for exam:->Post op open heart surgery. Reason for Exam: Open heart FINDINGS: Lines and tubes include an endotracheal tube measuring 7.3 cm above the mely, left subclavian central venous catheter with the tip terminating in the upper superior vena cava, [place] catheter with the tip terminating in the pulmonary outflow tract, mediastinal drain, and left chest tube. The cardiomediastinal silhouette is enlarged, compatible with recent postoperative status. The lungs are low in volume with mild edema. No obvious pneumothorax. Status post sternotomy. 1. Lines and tubes as described. 2. Expected postoperative changes status post open heart surgery. No obvious complication. Xr Chest Portable    Result Date: 7/5/2020  EXAMINATION: ONE XRAY VIEW OF THE CHEST 7/5/2020 4:35 am COMPARISON: 07/04/2020. HISTORY: ORDERING SYSTEM PROVIDED HISTORY: TECHNOLOGIST PROVIDED HISTORY: Reason for Exam:->Post op open heart surgery Reason for Exam: Post op open heart surgery Acuity: Unknown Type of Exam: Subsequent/Follow-up Additional signs and symptoms: Post op open heart surgery Relevant Medical/Surgical History: Post op open heart surgery FINDINGS: Left subclavian central venous catheter tip terminating within the SVC. Left-sided chest tube appears in unchanged position. Status post median sternotomy. Cardiac and mediastinal contours are enlarged but unchanged. Unchanged findings of pulmonary interstitial edema.   Small bilateral pleural effusions. Unchanged bibasilar pulmonary opacities. No evidence of pneumothorax. No new osseous abnormalities. 1. No significant interval change since previous examination, as detailed above. 2. Support hardware, as detailed above. RECOMMENDATION: Continued short interval radiographic follow-up. Vascular Carotid Duplex Bilateral    Result Date: 6/29/2020  EXAMINATION: ULTRASOUND EVALUATION OF THE CAROTID ARTERIES 6/29/2020 COMPARISON: None. HISTORY: ORDERING SYSTEM PROVIDED HISTORY: Preop CABG TECHNOLOGIST PROVIDED HISTORY: Make sure patient has not already had done as outpatient. Reason for exam:->Preop CABG Reason for Exam: Preop CABG Acuity: Acute Type of Exam: Ongoing FINDINGS: RIGHT: The right common carotid artery demonstrates peak systolic velocities of 76 and 57 cm/sec in the proximal and distal segments respectively. The right internal carotid artery demonstrates the systolic velocities of 36, 55, and 50 cm/sec in the proximal, mid and distal segments respectively. The external carotid artery is patent. The vertebral artery demonstrates normal antegrade flow. ICA/CCA ratio of 0.7. LEFT: The left common carotid artery demonstrates peak systolic velocities of 89 and 50 cm/sec in the proximal and distal segments respectively. The left internal carotid artery demonstrates the systolic velocities of 43, 72, and 72 cm/sec in the proximal, mid and distal segments respectively. The external carotid artery is patent. The vertebral artery demonstrates normal antegrade flow. ICA/CCA ratio of 0.8. 1. The right internal carotid artery demonstrates 0-50% stenosis . 2. The left internal carotid artery demonstrates 0-50% stenosis . 3. Bilateral vertebral arteries are patent with flow in the normal direction.      Us Lower Extremity Unilateral Vein Mapping    Result Date: 6/29/2020  EXAMINATION: ULTRASOUND OF THE LEFT LOWER EXTREMITY FOR VEIN MAPPING 6/29/2020 8:10 pm TECHNIQUE: Sonographic evaluation of the greater saphenous vein was performed. Color flow Doppler imaging was also acquired. COMPARISON: None. HISTORY: ORDERING SYSTEM PROVIDED HISTORY: cabg 7/1 TECHNOLOGIST PROVIDED HISTORY: Reason for exam:->cabg 7/1 Reason for Exam: Preop CABG Acuity: Acute Type of Exam: Ongoing FINDINGS: Sapheno-femoral junction: 4.2mm. Proximal thigh:  4.2mm. Mid thigh:  4.2mm. Distal thigh:  3.7mm. Knee:  3.1mm. Proximal calf: 3.3mm. Mid calf:  2.8mm. Ankle:  2.7mm. Greater saphenous vein is patent with measurements ranging from 2.7-4.2mm as discussed above.        Scheduled Medicines   Medications:    carvedilol  6.25 mg Oral BID    acetaminophen  650 mg Oral Q6H    furosemide  40 mg Intravenous TID    nicotine  1 patch Transdermal Daily    amLODIPine  5 mg Oral Daily    metOLazone  2.5 mg Oral BID    apixaban  5 mg Oral BID    [START ON 7/7/2020] glipiZIDE  10 mg Oral QAM AC    insulin lispro  0-12 Units Subcutaneous TID WC    insulin lispro  0-6 Units Subcutaneous 2 times per day    alogliptin  12.5 mg Oral Daily    guaiFENesin  600 mg Oral BID    pantoprazole  40 mg Intravenous QAM AC    sodium chloride flush  10 mL Intravenous 2 times per day    polyethylene glycol  17 g Oral Daily    amiodarone  200 mg Oral TID    mupirocin   Nasal BID    therapeutic multivitamin-minerals  1 tablet Oral Daily with breakfast    atorvastatin  20 mg Oral Nightly    aspirin  81 mg Oral Daily    ipratropium-albuterol  1 ampule Inhalation Q4H WA    [START ON 7/7/2020] amiodarone  200 mg Oral Daily      Infusions:    dextrose      sodium chloride 10 mL/hr at 07/04/20 1338    vasopressin (Septic Shock) infusion      norepinephrine Stopped (07/03/20 1155)    EPINEPHrine infusion 2 mcg/min (07/03/20 1321)    nitroGLYCERIN 20 mcg/min (07/04/20 1033)    clevidipine Stopped (07/03/20 1629)    dexmedetomidine (PRECEDEX) IV infusion Stopped (07/05/20 1400)    DOBUTamine Stopped (07/04/20 1033) IMPRESSION    Patient Active Problem List   Diagnosis    Chest pain    DM w/o complication type II (HCC)    Longstanding persistent atrial fibrillation    CAD (coronary artery disease)    Post PTCA    Unstable angina ([address]    HTN (hypertension)    Angina pectoris syndrome (HCC)-unstable    Smoking    COPD (chronic obstructive pulmonary disease) (HCC)    Atrial fibrillation with rapid ventricular response (HCC)    Chest pain    STEMI (ST elevation myocardial infarction) ([address]    Myocardial infarction acute ([address]    Nausea    New onset of congestive heart failure ([address]    COPD with acute exacerbation (HCC)    Acute pulmonary edema (HCC)    Pleural effusion, bilateral    Hypertensive urgency    Chronic systolic congestive heart failure (HCC)    TIA (transient ischemic attack)    Chronic kidney disease, stage III (moderate) ([address]    3-vessel coronary artery disease         CABG 7/3/2020    RECOMMENDATIONS:      1. Reviewed POC blood glucose . Labs and X ray results   2. Reviewed Home and Current Medicines   3. Will Start On Correction bolus Humalog Insulin regime / OHGD   4. As patient does normally take Lantus regular insulin's  5. 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 Monitor Blood glucose frequently   7. Modify  the dose of Insulin/ OHGD  as needed        Will follow with you  Again thank you for sharing pt's care with me.      Truly yours,       Neri Duran MD

## 2020-07-06 NOTE — CARE COORDINATION
Referral received from [address] [facility], [address]. Patient was helped in July/Aug 2019 and again Jan 2020. An application was mailed to him each time and to date he has never completed and returned. I will place another application in the mail however he will remain on flagged list until he completes and provides us will all required documents.

## 2020-07-07 ENCOUNTER — APPOINTMENT (OUTPATIENT)
Dept: GENERAL RADIOLOGY | Age: 60
DRG: 229 | End: 2020-07-07
Payer: COMMERCIAL

## 2020-07-07 VITALS
OXYGEN SATURATION: 90 % | RESPIRATION RATE: 15 BRPM | TEMPERATURE: 97.8 F | HEART RATE: 63 BPM | BODY MASS INDEX: 35.5 KG/M2 | SYSTOLIC BLOOD PRESSURE: 155 MMHG | WEIGHT: 285.5 LBS | HEIGHT: 75 IN | DIASTOLIC BLOOD PRESSURE: 90 MMHG

## 2020-07-07 LAB
ABO/RH: NORMAL
ANION GAP SERPL CALCULATED.3IONS-SCNC: 11 MMOL/L (ref 4–16)
ANTIBODY SCREEN: NEGATIVE
BUN BLDV-MCNC: 34 MG/DL (ref 6–23)
CALCIUM IONIZED: 4.24 MG/DL (ref 4.48–5.28)
CALCIUM SERPL-MCNC: 9.4 MG/DL (ref 8.3–10.6)
CHLORIDE BLD-SCNC: 90 MMOL/L (ref 99–110)
CO2: 33 MMOL/L (ref 21–32)
COMMENT: NORMAL
COMPONENT: NORMAL
COMPONENT: NORMAL
CREAT SERPL-MCNC: 1.7 MG/DL (ref 0.9–1.3)
CROSSMATCH RESULT: NORMAL
CROSSMATCH RESULT: NORMAL
GFR AFRICAN AMERICAN: 50 ML/MIN/1.73M2
GFR NON-AFRICAN AMERICAN: 41 ML/MIN/1.73M2
GLUCOSE BLD-MCNC: 142 MG/DL (ref 70–99)
GLUCOSE BLD-MCNC: 146 MG/DL (ref 70–99)
GLUCOSE BLD-MCNC: 150 MG/DL (ref 70–99)
GLUCOSE BLD-MCNC: 153 MG/DL (ref 70–99)
GLUCOSE BLD-MCNC: 171 MG/DL (ref 70–99)
HCT VFR BLD CALC: 33 % (ref 42–52)
HEMOGLOBIN: 10.8 GM/DL (ref 13.5–18)
INR BLD: 1.71 INDEX
IONIZED CA: 1.06 MMOL/L (ref 1.12–1.32)
MAGNESIUM: 2.1 MG/DL (ref 1.8–2.4)
MCH RBC QN AUTO: 30.5 PG (ref 27–31)
MCHC RBC AUTO-ENTMCNC: 32.7 % (ref 32–36)
MCV RBC AUTO: 93.2 FL (ref 78–100)
PDW BLD-RTO: 13.3 % (ref 11.7–14.9)
PLATELET # BLD: 174 K/CU MM (ref 140–440)
PMV BLD AUTO: 11.5 FL (ref 7.5–11.1)
POTASSIUM SERPL-SCNC: 3 MMOL/L (ref 3.5–5.1)
PROTHROMBIN TIME: 20.8 SECONDS (ref 11.7–14.5)
RBC # BLD: 3.54 M/CU MM (ref 4.6–6.2)
SODIUM BLD-SCNC: 134 MMOL/L (ref 135–145)
STATUS: NORMAL
STATUS: NORMAL
TRANSFUSION STATUS: NORMAL
TRANSFUSION STATUS: NORMAL
UNIT DIVISION: 0
UNIT DIVISION: 0
UNIT NUMBER: NORMAL
UNIT NUMBER: NORMAL
WBC # BLD: 10.7 K/CU MM (ref 4–10.5)

## 2020-07-07 PROCEDURE — 2580000003 HC RX 258: Performed by: NURSE PRACTITIONER

## 2020-07-07 PROCEDURE — 82962 GLUCOSE BLOOD TEST: CPT

## 2020-07-07 PROCEDURE — 6360000002 HC RX W HCPCS: Performed by: INTERNAL MEDICINE

## 2020-07-07 PROCEDURE — 85027 COMPLETE CBC AUTOMATED: CPT

## 2020-07-07 PROCEDURE — 97116 GAIT TRAINING THERAPY: CPT

## 2020-07-07 PROCEDURE — 2700000000 HC OXYGEN THERAPY PER DAY

## 2020-07-07 PROCEDURE — 82330 ASSAY OF CALCIUM: CPT

## 2020-07-07 PROCEDURE — 94761 N-INVAS EAR/PLS OXIMETRY MLT: CPT

## 2020-07-07 PROCEDURE — 6370000000 HC RX 637 (ALT 250 FOR IP): Performed by: INTERNAL MEDICINE

## 2020-07-07 PROCEDURE — 97110 THERAPEUTIC EXERCISES: CPT

## 2020-07-07 PROCEDURE — 2580000003 HC RX 258: Performed by: PHYSICIAN ASSISTANT

## 2020-07-07 PROCEDURE — 6370000000 HC RX 637 (ALT 250 FOR IP): Performed by: PHYSICIAN ASSISTANT

## 2020-07-07 PROCEDURE — 83735 ASSAY OF MAGNESIUM: CPT

## 2020-07-07 PROCEDURE — 6370000000 HC RX 637 (ALT 250 FOR IP): Performed by: THORACIC SURGERY (CARDIOTHORACIC VASCULAR SURGERY)

## 2020-07-07 PROCEDURE — 71045 X-RAY EXAM CHEST 1 VIEW: CPT

## 2020-07-07 PROCEDURE — 6360000002 HC RX W HCPCS: Performed by: NURSE PRACTITIONER

## 2020-07-07 PROCEDURE — 6360000002 HC RX W HCPCS: Performed by: PHYSICIAN ASSISTANT

## 2020-07-07 PROCEDURE — 80048 BASIC METABOLIC PNL TOTAL CA: CPT

## 2020-07-07 PROCEDURE — 94150 VITAL CAPACITY TEST: CPT

## 2020-07-07 PROCEDURE — 85610 PROTHROMBIN TIME: CPT

## 2020-07-07 RX ORDER — PANTOPRAZOLE SODIUM 40 MG/1
40 TABLET, DELAYED RELEASE ORAL
Status: DISCONTINUED | OUTPATIENT
Start: 2020-07-07 | End: 2020-07-07 | Stop reason: HOSPADM

## 2020-07-07 RX ORDER — AMIODARONE HYDROCHLORIDE 200 MG/1
200 TABLET ORAL 2 TIMES DAILY
Qty: 60 TABLET | Refills: 1 | Status: SHIPPED | OUTPATIENT
Start: 2020-07-07 | End: 2020-11-25

## 2020-07-07 RX ORDER — AMLODIPINE BESYLATE 10 MG/1
10 TABLET ORAL DAILY
Status: DISCONTINUED | OUTPATIENT
Start: 2020-07-08 | End: 2020-07-07 | Stop reason: HOSPADM

## 2020-07-07 RX ORDER — ATORVASTATIN CALCIUM 20 MG/1
20 TABLET, FILM COATED ORAL NIGHTLY
Qty: 30 TABLET | Refills: 3 | Status: SHIPPED | OUTPATIENT
Start: 2020-07-07 | End: 2020-10-14

## 2020-07-07 RX ORDER — FUROSEMIDE 40 MG/1
40 TABLET ORAL 3 TIMES DAILY
Status: DISCONTINUED | OUTPATIENT
Start: 2020-07-07 | End: 2020-07-07 | Stop reason: HOSPADM

## 2020-07-07 RX ORDER — WARFARIN SODIUM 5 MG/1
5 TABLET ORAL DAILY
Qty: 30 TABLET | Refills: 3 | Status: SHIPPED
Start: 2020-07-07 | End: 2020-07-07 | Stop reason: HOSPADM

## 2020-07-07 RX ORDER — GLIPIZIDE 10 MG/1
10 TABLET ORAL
Qty: 60 TABLET | Refills: 3 | Status: SHIPPED | OUTPATIENT
Start: 2020-07-08 | End: 2022-07-17

## 2020-07-07 RX ORDER — WARFARIN SODIUM 5 MG/1
5 TABLET ORAL DAILY
Status: DISCONTINUED | OUTPATIENT
Start: 2020-07-07 | End: 2020-07-07

## 2020-07-07 RX ORDER — WARFARIN SODIUM 5 MG/1
2.5 TABLET ORAL DAILY
Status: DISCONTINUED | OUTPATIENT
Start: 2020-07-07 | End: 2020-07-07 | Stop reason: HOSPADM

## 2020-07-07 RX ORDER — ASPIRIN 81 MG/1
81 TABLET ORAL DAILY
Qty: 30 TABLET | Refills: 3 | Status: SHIPPED | OUTPATIENT
Start: 2020-07-08 | End: 2021-03-16

## 2020-07-07 RX ORDER — POTASSIUM CHLORIDE 20 MEQ/1
40 TABLET, EXTENDED RELEASE ORAL 2 TIMES DAILY
Qty: 60 TABLET | Refills: 1 | Status: SHIPPED | OUTPATIENT
Start: 2020-07-07 | End: 2022-02-07

## 2020-07-07 RX ORDER — POTASSIUM CHLORIDE 20 MEQ/1
20 TABLET, EXTENDED RELEASE ORAL 2 TIMES DAILY WITH MEALS
Status: DISCONTINUED | OUTPATIENT
Start: 2020-07-07 | End: 2020-07-07 | Stop reason: HOSPADM

## 2020-07-07 RX ORDER — ALOGLIPTIN 12.5 MG/1
12.5 TABLET, FILM COATED ORAL DAILY
Qty: 60 TABLET | Refills: 3 | Status: SHIPPED
Start: 2020-07-08 | End: 2020-07-07 | Stop reason: HOSPADM

## 2020-07-07 RX ORDER — WARFARIN SODIUM 5 MG/1
2.5 TABLET ORAL DAILY
Qty: 30 TABLET | Refills: 3 | Status: SHIPPED
Start: 2020-07-07 | End: 2020-09-24 | Stop reason: DRUGHIGH

## 2020-07-07 RX ORDER — FUROSEMIDE 40 MG/1
40 TABLET ORAL 3 TIMES DAILY
Qty: 90 TABLET | Refills: 1 | Status: SHIPPED | OUTPATIENT
Start: 2020-07-07 | End: 2022-02-07 | Stop reason: SDUPTHER

## 2020-07-07 RX ORDER — CARVEDILOL 6.25 MG/1
6.25 TABLET ORAL 2 TIMES DAILY
Qty: 60 TABLET | Refills: 3 | Status: SHIPPED | OUTPATIENT
Start: 2020-07-07 | End: 2020-11-25 | Stop reason: SDUPTHER

## 2020-07-07 RX ADMIN — GLIPIZIDE 10 MG: 5 TABLET ORAL at 05:44

## 2020-07-07 RX ADMIN — ACETAMINOPHEN 650 MG: 325 TABLET ORAL at 05:58

## 2020-07-07 RX ADMIN — ALOGLIPTIN 12.5 MG: 12.5 TABLET, FILM COATED ORAL at 09:41

## 2020-07-07 RX ADMIN — ASPIRIN 81 MG: 81 TABLET, COATED ORAL at 09:35

## 2020-07-07 RX ADMIN — CALCIUM GLUCONATE 2 G: 98 INJECTION, SOLUTION INTRAVENOUS at 09:32

## 2020-07-07 RX ADMIN — AMIODARONE HYDROCHLORIDE 200 MG: 200 TABLET ORAL at 09:41

## 2020-07-07 RX ADMIN — OXYCODONE HYDROCHLORIDE AND ACETAMINOPHEN 1 TABLET: 5; 325 TABLET ORAL at 09:33

## 2020-07-07 RX ADMIN — INSULIN LISPRO 2 UNITS: 100 INJECTION, SOLUTION INTRAVENOUS; SUBCUTANEOUS at 17:12

## 2020-07-07 RX ADMIN — ACETAMINOPHEN 650 MG: 325 TABLET ORAL at 00:14

## 2020-07-07 RX ADMIN — POTASSIUM CHLORIDE 20 MEQ: 1500 TABLET, EXTENDED RELEASE ORAL at 12:23

## 2020-07-07 RX ADMIN — POTASSIUM CHLORIDE 20 MEQ: 400 INJECTION, SOLUTION INTRAVENOUS at 05:52

## 2020-07-07 RX ADMIN — WARFARIN SODIUM 2.5 MG: 5 TABLET ORAL at 17:36

## 2020-07-07 RX ADMIN — METOLAZONE: 5 TABLET ORAL at 09:35

## 2020-07-07 RX ADMIN — FUROSEMIDE 40 MG: 10 INJECTION, SOLUTION INTRAMUSCULAR; INTRAVENOUS at 09:32

## 2020-07-07 RX ADMIN — SODIUM PHOSPHATE, DIBASIC AND SODIUM PHOSPHATE, MONOBASIC 1 ENEMA: 7; 19 ENEMA RECTAL at 16:35

## 2020-07-07 RX ADMIN — POLYETHYLENE GLYCOL (3350) 17 G: 17 POWDER, FOR SOLUTION ORAL at 09:34

## 2020-07-07 RX ADMIN — INSULIN LISPRO 2 UNITS: 100 INJECTION, SOLUTION INTRAVENOUS; SUBCUTANEOUS at 09:31

## 2020-07-07 RX ADMIN — ACETAMINOPHEN 650 MG: 325 TABLET ORAL at 14:50

## 2020-07-07 RX ADMIN — OXYCODONE HYDROCHLORIDE AND ACETAMINOPHEN 1 TABLET: 5; 325 TABLET ORAL at 02:39

## 2020-07-07 RX ADMIN — BISACODYL 10 MG: 10 SUPPOSITORY RECTAL at 00:00

## 2020-07-07 RX ADMIN — POTASSIUM CHLORIDE 20 MEQ: 1500 TABLET, EXTENDED RELEASE ORAL at 17:36

## 2020-07-07 RX ADMIN — SODIUM CHLORIDE, PRESERVATIVE FREE 10 ML: 5 INJECTION INTRAVENOUS at 09:34

## 2020-07-07 RX ADMIN — Medication: at 09:38

## 2020-07-07 RX ADMIN — MULTIPLE VITAMINS W/ MINERALS TAB 1 TABLET: TAB at 09:52

## 2020-07-07 RX ADMIN — FUROSEMIDE 40 MG: 40 TABLET ORAL at 14:50

## 2020-07-07 RX ADMIN — INSULIN LISPRO 2 UNITS: 100 INJECTION, SOLUTION INTRAVENOUS; SUBCUTANEOUS at 12:27

## 2020-07-07 RX ADMIN — CARVEDILOL 6.25 MG: 6.25 TABLET, FILM COATED ORAL at 09:37

## 2020-07-07 RX ADMIN — BISACODYL 10 MG: 10 SUPPOSITORY RECTAL at 12:33

## 2020-07-07 RX ADMIN — GUAIFENESIN 600 MG: 600 TABLET, EXTENDED RELEASE ORAL at 09:33

## 2020-07-07 RX ADMIN — PANTOPRAZOLE SODIUM 40 MG: 40 TABLET, DELAYED RELEASE ORAL at 09:52

## 2020-07-07 RX ADMIN — APIXABAN 5 MG: 5 TABLET, FILM COATED ORAL at 09:35

## 2020-07-07 ASSESSMENT — PAIN DESCRIPTION - PROGRESSION
CLINICAL_PROGRESSION: GRADUALLY WORSENING

## 2020-07-07 ASSESSMENT — PAIN DESCRIPTION - LOCATION
LOCATION: CHEST

## 2020-07-07 ASSESSMENT — PAIN DESCRIPTION - ONSET
ONSET: PROGRESSIVE
ONSET: ON-GOING
ONSET: GRADUAL

## 2020-07-07 ASSESSMENT — PAIN - FUNCTIONAL ASSESSMENT
PAIN_FUNCTIONAL_ASSESSMENT: PREVENTS OR INTERFERES SOME ACTIVE ACTIVITIES AND ADLS
PAIN_FUNCTIONAL_ASSESSMENT: ACTIVITIES ARE NOT PREVENTED
PAIN_FUNCTIONAL_ASSESSMENT: PREVENTS OR INTERFERES SOME ACTIVE ACTIVITIES AND ADLS

## 2020-07-07 ASSESSMENT — PAIN SCALES - GENERAL
PAINLEVEL_OUTOF10: 7
PAINLEVEL_OUTOF10: 0
PAINLEVEL_OUTOF10: 0
PAINLEVEL_OUTOF10: 5
PAINLEVEL_OUTOF10: 8
PAINLEVEL_OUTOF10: 8
PAINLEVEL_OUTOF10: 0
PAINLEVEL_OUTOF10: 6
PAINLEVEL_OUTOF10: 5

## 2020-07-07 ASSESSMENT — PAIN DESCRIPTION - FREQUENCY
FREQUENCY: CONTINUOUS
FREQUENCY: INTERMITTENT
FREQUENCY: INTERMITTENT

## 2020-07-07 ASSESSMENT — PAIN DESCRIPTION - PAIN TYPE
TYPE: SURGICAL PAIN

## 2020-07-07 ASSESSMENT — PAIN DESCRIPTION - DESCRIPTORS
DESCRIPTORS: ACHING

## 2020-07-07 ASSESSMENT — PAIN DESCRIPTION - ORIENTATION
ORIENTATION: MID

## 2020-07-07 NOTE — PROGRESS NOTES
Physical Therapy    Physical Therapy Treatment Note  Name: Gail Walden MRN: [id number] :   1960   Date:  2020   Admission Date: 2020 Room:  [address] [place], [place] [address]   Restrictions/Precautions:  Restrictions/Precautions  Restrictions/Precautions: cardiac/sternal  Communication with other providers:  Abraham Larsen RN states pt is ok to see for therapy  Subjective:  Patient states:  He thinks he is going home today  Pain:   Location, Type, Intensity (0/10 to 10/10):  0/10  Objective:    Observation:  Pt was sitting up in the chair  Treatment, including education/measures:  Transfers  Mod I and VC's for sternal precautions  Gait:  Pt amb with no AD for 200 ft with CGA  Pt needed VC's for posture and PLB, also to increase his distance at home each time he walks  Vital Signs  HR: 65, B/P 151/91, O2 97% on room air  Education:  Pt was instructed in Sternal Precautions, Purpose of Exercise Program, Emiliano Scale and Signs and Symptoms of Exercise Intolerance per Cardiac Protocol  Pt was instructed in Intermediate Cardiac ex program:sitting  Overhead Side Stretch x 10  Ankle Pumps/ Heel Raises x 10  Marching Seated x 10  Forward Arm Raises x 10  Side Arm Raises x 10  Arm Crosses x 10  Arm Circles Forwards and Backwards x 10  SittingTrunkTwist x 10  Safety  Patient left safely in the chair, with call light/phone in reach. Gait belt was used for transfers and gait.   Assessment / Impression:     Patient's tolerance of treatment: Good, pt is improving with sternal precautions  Adverse Reaction: none  Significant change in status and impact:  none  Barriers to improvement:  precautions   Plan for Next Session:    Will cont to work towards pt's goals per his tolerance  Time in:  1030  Time out:  1123  Timed treatment minutes:  53  Total treatment time:  48  Previously filed items:  Social/Functional History  Lives With: Spouse  Type of Home: House  Bathroom Toilet: Standard  Home Equipment: (pt states he owns no AE or DME)  ADL Assistance: Independent  Homemaking Assistance: Independent  Homemaking Responsibilities: Yes  Ambulation Assistance: Independent  Transfer Assistance: Independent  Active : Yes  Mode of Transportation: Car  Occupation: Full time employment  Type of occupation: Pt works full time as a  involving loading and unloading the truck. Electronically signed by:     Michael Orozco PTA  7/7/2020, 11:14 AM

## 2020-07-07 NOTE — FLOWSHEET NOTE
Patient discharged to home per wheel chair with all belingings including meds, key and cell phone--pt verbalizes understanding of discharge instructions, activity, meds and follow up appts.

## 2020-07-07 NOTE — PROGRESS NOTES
Noelle Paris is a 62 y.o. male on warfarin therapy for A-fib and s/p MAZE. Pharmacy consulted by LYNDSEY Alvarado / Dr. Juan Sarabia for monitoring and adjustment of treatment. Indication for anticoagulation: A-fib, s/p MAZE  INR goal: 2-3  Warfarin dose prior to admission: none    Pertinent Laboratory Values   Recent Labs     07/05/20  0420 07/07/20  0435 07/07/20  1215   INR  --   --  1.71   HGB 11.0* 10.8*  --    HCT 33.5* 33.0*  --     174  --        Assessment/Plan:   Drug Interactions:  o Amiodarone can increase effects of warfarin   INR 1.71, previously on apixaban (apixaban affects the INR)   Plan to initiate warfarin 5 mg daily and adjust as appropriate per INR trends, interacting medications, and other clinical factors. [address] [place] [address] will continue to monitor and adjust warfarin therapy as indicated.     Thank you for the consult,    Vidal Tanner, SandiD, [facility]

## 2020-07-07 NOTE — CARE COORDINATION
Cm updated pt that Med Assist with be unable to assist pt with meds at discharge. Pt advised CM that he will be going to the [facility] address which is where his wife from whom he is  lives and he has been staying at [address] [facility]. Pt will be on coumadin after discharge. [facility] will follow for PT/INRs and when [facility] is completed pt has been given referral for coumadin clinic. Coumadin clinic referral also faxed to clinic fax# [phone].

## 2020-07-07 NOTE — PROGRESS NOTES
CLINICAL PHARMACY NOTE: MEDS TO [address] Patient?: No  Total # of Prescriptions Filled: 9   The following medications were delivered to the patient:  · Warfarin 5mg  · Potassium chloride er 20meq  · Carvedilol 6.25mg  · Glipizide 10mg  · Atorvastatin 20mg  · Furosemide 40mg  · Aspirin 81mg  · Amiodarone 200mg  · Tradjenta 5mg  Total # of Interventions Completed: 1  Time Spent (min): 60    Additional Documentation:  His insurance does not cover alogliptan. Mary served [employer] [employer] and she approved change to Tradjenta 5mg.

## 2020-07-07 NOTE — PROGRESS NOTES
Nephrology Progress Note  7/7/2020 7:03 AM        Subjective:   Admit Date: 6/29/2020  PCP: Sandy Willams MD    Interval History: sitting in chair; has been passing gas  -reports chest pain during intervals of coughing     Data:     Current med's:    pantoprazole  40 mg Oral QAM AC    carvedilol  6.25 mg Oral BID    acetaminophen  650 mg Oral Q6H    furosemide  40 mg Intravenous TID    nicotine  1 patch Transdermal Daily    amLODIPine  5 mg Oral Daily    metOLazone  2.5 mg Oral BID    apixaban  5 mg Oral BID    glipiZIDE  10 mg Oral QAM AC    insulin lispro  0-12 Units Subcutaneous TID WC    insulin lispro  0-6 Units Subcutaneous 2 times per day    alogliptin  12.5 mg Oral Daily    guaiFENesin  600 mg Oral BID    sodium chloride flush  10 mL Intravenous 2 times per day    polyethylene glycol  17 g Oral Daily    amiodarone  200 mg Oral TID    mupirocin   Nasal BID    therapeutic multivitamin-minerals  1 tablet Oral Daily with breakfast    atorvastatin  20 mg Oral Nightly    aspirin  81 mg Oral Daily    ipratropium-albuterol  1 ampule Inhalation Q4H WA    amiodarone  200 mg Oral Daily      dextrose      sodium chloride 10 mL/hr at 07/04/20 1338    vasopressin (Septic Shock) infusion      norepinephrine Stopped (07/03/20 1155)    EPINEPHrine infusion 2 mcg/min (07/03/20 1321)    nitroGLYCERIN 20 mcg/min (07/04/20 1033)    clevidipine Stopped (07/03/20 1629)    dexmedetomidine (PRECEDEX) IV infusion Stopped (07/05/20 1400)    DOBUTamine Stopped (07/04/20 1033)         I/O last 3 completed shifts:   In: 1125 [P.O.:1025; IV Piggyback:100]  Out: 1850 [Urine:1850]    CBC:   Recent Labs     07/04/20  0724 07/05/20  0420 07/07/20  0435   WBC  --  17.5* 10.7*   HGB 10.8* 11.0* 10.8*   PLT  --  143 174          Recent Labs     07/05/20  0420 07/06/20  0735 07/07/20  0435    136 134*   K 4.4 3.5 3.0*    95* 90*   CO2 22 31 33*   BUN 25* 25* 34*   CREATININE 1.7* 1.6* 1.7*   GLUCOSE 100* 115* 171*       Lab Results   Component Value Date    CALCIUM 9.4 07/07/2020    PHOS 4.4 07/06/2020       Objective:     Vitals: BP (!) 147/87   Pulse 65   Temp 98.1 °F (36.7 °C) (Oral)   Resp 18   Ht 6' 3\" (1.905 m)   Wt 285 lb 7.9 oz (129.5 kg)   SpO2 92%   BMI 35.68 kg/m²     General appearance:  awake and verbally interactive   HEENT: Head: normocephalic, atraumatic. Neck: supple, symmetrical, trachea midline  Cardiovascular: normal S1 and S2; midline sternal dressing   Pulmonary: diminished lung sounds bilaterally   Abdomen:  soft / non-tender   Extremities: + edema to the bilateral lower legs     Impression :     1.  MARLYN on stage 3 CKD: likely due to ATI and renal vein congestion   2. Acute fluid overload / pulmonary edema   3. SP CABG / CAD  4. Atrial Fib   5. Anemia   6. HTN   7. Hypokalemia   8. Hypocalcemia   9. DM    Recommendation/Plan  :     1.   -uop: 1.85 liters in the last 24 hours   -stable serum creatinine   2.   -reasonable urine output  -on lasix TID and metolazone  -maintain negative fluid balance  3.   -followed by CT-S  -continue medical therapy for CAD  4.   -recent pulse rate trend: 56 - 65 bpm   -amiodarone and eliquis and coreg   5.   -latest Hb: 10.8; follow hemoglobin trend   6. -BP trend: systolic BP's have recently been 138 - 147  -on coreg / amlodipine   7 .  -latest serum potassium: 3.0; and magnesium: 2.1; following trend  -on potassium replacement protocol   8.   -latest ionized calcium: 1.06; IV repletion ordered for this am   9.   -on glipizide and nesina and SSI     Electronically signed by TREY Lopez - [facility]       Nephrology Attending Progress Note  7/7/2020 10:51 AM  Subjective:   Admit Date: 6/29/2020  PCP: Bryan Merino MD    Interval History: I have personally performed face to face diagnostic evaluation on this patient. I have personally reviewed pertinent labs and imaging and agree with the care plan above.  My additional findings are as follows:  Pt state doing ok and walking more and + BM and want go home    Objective:   Vitals: BP (!) 151/91   Pulse 65   Temp 97.9 °F (36.6 °C) (Oral)   Resp 15   Ht 6' 3\" (1.905 m)   Wt 285 lb 7.9 oz (129.5 kg)   SpO2 (!) 51%   BMI 35.68 kg/m²   Awake weak  Soft obese  Edema    Assessment and Plan:  IMP:  As stated above    Plan     1 bp monitor as contine uf with diuretic  2 sp cabg and renal stable creat 1.6-1.7 and not uremic and stable uop  3 change to po lasix 40mg po tid and replete K and change to kdur 20meq bid  4 affect monitor  5 ssi  6 hb monitor  Stable to dc with po meds from renal and fu 2 week             Electronically signed by Christina Gonzalez MD on 7/7/2020 at 10:51 AM

## 2020-07-07 NOTE — DISCHARGE SUMMARY
Discharge Summary     Patient ID  Spencer Camacho   1960  [id number]      Primary Care Doctor:  González Garrett MD    Admit date: 6/29/2020   Discharge date: 7/7/2020      Admitting Physician: Elis Dobbs MD   Discharge Physician: Porsche Womack PA-C    Discharge Diagnoses: Active Hospital Problems    Diagnosis Date Noted    Longstanding persistent atrial fibrillation [I48.11] 11/10/2013     Priority: High    3-vessel coronary artery disease [I25.10] 06/30/2020    Chronic kidney disease, stage III (moderate) (HCC) [N18.3] 06/05/2020    TIA (transient ischemic attack) [G45.9] 06/02/2020    Chronic systolic congestive heart failure ([facility] [I50.22] 05/26/2020    COPD with acute exacerbation (HCC) [J44.1] 01/05/2020    Myocardial infarction acute ([facility] [I21.9] 07/30/2019    STEMI (ST elevation myocardial infarction) ([facility] [I21.3] 07/30/2019    Chest pain [R07.9] 12/21/2015    Angina pectoris syndrome (HCC)-unstable [I20.9] 10/31/2014    Smoking [F17.200] 10/31/2014    HTN (hypertension) [I10] 10/30/2014    Post PTCA [Z98.61]     DM w/o complication type II ([facility] [E11.9] 03/10/2013     Discharged Condition: stable. Hospital Course:  Patient was admitted for chest pain. Underwent LHC and was found to have severe 3 vessel CAD. He had positive UDS for cocaine which was checked multiple times prior to surgery. Once negative, underwent surgery of CABG x 4, maze after discussion and preparation. Post op ; monitored rhythm, O2 sat, I/O, Labs, CXRs serially  Neuro status , BP and vital signs monitored  Nephrology consulted and aggressively diuresed. He was weaned from O2. Started on coumadin for a fib and maze, referred to coumadin clinic at [place]. Started on diet and activity. At discharge, pt ambulating, on RA. Special concerns: [facility] to draw INR daily until therapeutic between 2-3, then weekly. Pt was also referred to coumadin clinic for long term follow up.  Counseled pt multiple times on [address] [facility] [address], [address]    Phone:  [phone]   · linagliptin 5 MG tablet     These medications were sent to [address] [place] [address] 25, [address] [id number]    Phone:  [phone]   · amiodarone 200 MG tablet  · aspirin 81 MG EC tablet  · atorvastatin 20 MG tablet  · carvedilol 6.25 MG tablet  · furosemide 40 MG tablet  · glipiZIDE 10 MG tablet  · potassium chloride 20 MEQ extended release tablet  · warfarin 5 MG tablet       Activity: activity as tolerated and no lifting, Driving, or Strenuous exercise until seen by physician in office  Diet: cardiac diet  Wound Care: as directed    Follow-up as directed at discharge    Signed: Ac Reza    Time spent on discharge 35 minutes

## 2020-07-07 NOTE — PROGRESS NOTES
Progress Note( Dr. Michell Sagastume)  7/7/2020  Subjective:   Admit Date: 6/29/2020  PCP: Figueroa Sarmiento MD    Admitted For : Chest pain CAD and underwent CABG      Consulted For:  Chest pain CAD and underwent CABG    Interval History:Better control of blood glucose    Denies any chest pains,   Denies SOB . Denies nausea or vomiting. No new bowel or bladder symptoms. Intake/Output Summary (Last 24 hours) at 7/7/2020 0640  Last data filed at 7/7/2020 0600  Gross per 24 hour   Intake 1125 ml   Output 1850 ml   Net -725 ml       DATA    CBC:   Recent Labs     07/04/20  0724 07/05/20  0420 07/07/20  0435   WBC  --  17.5* 10.7*   HGB 10.8* 11.0* 10.8*   PLT  --  143 174    CMP:  Recent Labs     07/05/20  0420 07/06/20  0735 07/07/20  0435    136 134*   K 4.4 3.5 3.0*    95* 90*   CO2 22 31 33*   BUN 25* 25* 34*   CREATININE 1.7* 1.6* 1.7*   CALCIUM 8.7 9.2 9.4   PROT 5.9* 6.1*  --    LABALBU 3.7 3.6  --    BILITOT 0.4 0.6  --    ALKPHOS 53 64  --    AST 35 24  --    ALT 8* 7*  --      Lipids:   Lab Results   Component Value Date    CHOL 153 01/06/2020    HDL 43 01/06/2020    TRIG 54 01/06/2020     Glucose:  Recent Labs     07/06/20  1801 07/06/20 2034 07/07/20  0159   POCGLU 185* 141* 150*     [id number]:  Lab Results   Component Value Date    LABA1C 8.9 06/29/2020     High Sensitivity TSH:   Lab Results   Component Value Date    TSHHS 1.390 06/02/2020     Free T3: No results found for: FT3  Free T4:No results found for: T4FREE    Xr Chest Standard (2 Vw)    Result Date: 7/6/2020  EXAMINATION: TWO XRAY VIEWS OF THE CHEST 7/6/2020 5:42 pm COMPARISON: 07/05/2020 HISTORY: ORDERING SYSTEM PROVIDED HISTORY: p/o cabg TECHNOLOGIST PROVIDED HISTORY: Reason for exam:->p/o cabg Reason for Exam: p/o cabg Relevant Medical/Surgical History: COPD FINDINGS: Status post median sternotomy. Small bilateral pleural effusions with adjacent atelectasis.   Left subclavian central venous catheter tip projects over the distal brachiocephalic vein versus proximal SVC. Stable cardiomegaly. Interval improvement in pulmonary edema. No discrete pneumothorax. The osseous structures are stable. Left thoracostomy tube has been removed. Interval removal of left thoracostomy tube. No discrete pneumothorax. Small bilateral pleural effusions with adjacent atelectasis. Overall improvement in pulmonary edema. Xr Chest Standard (2 Vw)    Result Date: 6/30/2020  EXAMINATION: TWO XRAY VIEWS OF THE CHEST 6/30/2020 7:27 am COMPARISON: Chest radiograph 06/29/2020. HISTORY: ORDERING SYSTEM PROVIDED HISTORY: preop Cabg TECHNOLOGIST PROVIDED HISTORY: Reason for exam:->preop Cabg Reason for Exam: preop cabg FINDINGS: The lungs are without acute focal process. There is no effusion or pneumothorax. The cardiomediastinal silhouette is without acute process. The osseous structures are without acute process. No acute process. Ct Chest Wo Contrast    Result Date: 7/3/2020  EXAMINATION: CT OF THE CHEST WITHOUT CONTRAST 7/3/2020 12:50 am TECHNIQUE: CT of the chest was performed without the administration of intravenous contrast. Multiplanar reformatted images are provided for review. Dose modulation, iterative reconstruction, and/or weight based adjustment of the mA/kV was utilized to reduce the radiation dose to as low as reasonably achievable. COMPARISON: CT chest 01/05/2020, 01/08/2016 HISTORY: ORDERING SYSTEM PROVIDED HISTORY: evaluate mediastinal LAD TECHNOLOGIST PROVIDED HISTORY: This needs done tonight. Pt having surgery first thing in AM Reason for exam:->evaluate mediastinal LAD Reason for Exam: evaluate mediastinal LAD Acuity: Acute Type of Exam: Initial Additional signs and symptoms: pt having open heart sx in the  AM FINDINGS: Mediastinum: No enlarged lymph nodes. Normal caliber ascending thoracic aorta. Diffusely ectatic descending thoracic aorta measuring up to 3.5 cm in caliber.   Mild four-chamber cardiomegaly with multivessel coronary calcifications and coronary stents present. No pericardial effusion. Unremarkable esophagus and included thyroid. Lungs/pleura: No pneumothorax, pleural effusion or consolidative airspace disease. Mild centrilobular and paraseptal emphysema. Mild interlobular septal thickening at the lung apices and lung bases. Bronchial walls are thickened. Unchanged sub 5 mm nodular focus in the subpleural left upper lobe. Upper Abdomen: Unchanged nonspecific left adrenal gland thickening, probably benign adenomatous hypertrophy given low attenuation. Soft Tissues/Bones: No acute soft tissue or osseous abnormality. Resolution of previously seen mediastinal lymphadenopathy. This was presumably reactive. Overall improved volume status/heart failure, with minimal interstitial pulmonary edema. Effusions have resolved. Stable enlargement of the main pulmonary artery which may indicate underlying pulmonary hypertension. Mild centrilobular and paraseptal emphysema. Unchanged sub 5 mm nodular focus in the subpleural left upper lobe, perhaps a small nidus of infection or inflammation versus volume averaging of a benign parenchymal lymph node. Xr Chest Portable    Result Date: 7/6/2020  EXAMINATION: ONE XRAY VIEW OF THE CHEST 7/4/2020 4:05 am COMPARISON: 07/03/2020 HISTORY: ORDERING SYSTEM PROVIDED HISTORY: TECHNOLOGIST PROVIDED HISTORY: Reason for Exam:->Post op open heart surgery Reason for Exam: post open heart surgery FINDINGS: The heart is stable in size. There is blunting of the left costophrenic sulcus. No pneumothorax is seen. Stable appearance of the pulmonary arterial catheter. Mediastinal drain/chest tubes are noted. Bilateral perihilar and basilar airspace opacities are seen. Bilateral perihilar and basilar airspace opacities favored to represent atelectasis in the postoperative setting. No measurable pneumothorax.      Xr Chest Portable    Result Date: 7/6/2020  EXAMINATION: ONE X-RAY VIEW OF THE CHEST 7/3/2020 12:32 pm COMPARISON: June 30, 2020 HISTORY: ORDERING SYSTEM PROVIDED HISTORY: Post op open heart surgery. TECHNOLOGIST PROVIDED HISTORY: Reason for exam:->Post op open heart surgery. Reason for Exam: Open heart FINDINGS: Lines and tubes include an endotracheal tube measuring 7.3 cm above the mely, left subclavian central venous catheter with the tip terminating in the upper superior vena cava, [place]-Azra catheter with the tip terminating in the pulmonary outflow tract, mediastinal drain, and left chest tube. The cardiomediastinal silhouette is enlarged, compatible with recent postoperative status. The lungs are low in volume with mild edema. No obvious pneumothorax. Status post sternotomy. 1. Lines and tubes as described. 2. Expected postoperative changes status post open heart surgery. No obvious complication. Xr Chest Portable    Result Date: 7/5/2020  EXAMINATION: ONE XRAY VIEW OF THE CHEST 7/5/2020 4:35 am COMPARISON: 07/04/2020. HISTORY: ORDERING SYSTEM PROVIDED HISTORY: TECHNOLOGIST PROVIDED HISTORY: Reason for Exam:->Post op open heart surgery Reason for Exam: Post op open heart surgery Acuity: Unknown Type of Exam: Subsequent/Follow-up Additional signs and symptoms: Post op open heart surgery Relevant Medical/Surgical History: Post op open heart surgery FINDINGS: Left subclavian central venous catheter tip terminating within the SVC. Left-sided chest tube appears in unchanged position. Status post median sternotomy. Cardiac and mediastinal contours are enlarged but unchanged. Unchanged findings of pulmonary interstitial edema. Small bilateral pleural effusions. Unchanged bibasilar pulmonary opacities. No evidence of pneumothorax. No new osseous abnormalities. 1. No significant interval change since previous examination, as detailed above. 2. Support hardware, as detailed above. RECOMMENDATION: Continued short interval radiographic follow-up.        Scheduled Medicines Medications:    pantoprazole  40 mg Oral QAM AC    carvedilol  6.25 mg Oral BID    acetaminophen  650 mg Oral Q6H    furosemide  40 mg Intravenous TID    nicotine  1 patch Transdermal Daily    amLODIPine  5 mg Oral Daily    metOLazone  2.5 mg Oral BID    apixaban  5 mg Oral BID    glipiZIDE  10 mg Oral QAM AC    insulin lispro  0-12 Units Subcutaneous TID WC    insulin lispro  0-6 Units Subcutaneous 2 times per day    alogliptin  12.5 mg Oral Daily    guaiFENesin  600 mg Oral BID    sodium chloride flush  10 mL Intravenous 2 times per day    polyethylene glycol  17 g Oral Daily    amiodarone  200 mg Oral TID    mupirocin   Nasal BID    therapeutic multivitamin-minerals  1 tablet Oral Daily with breakfast    atorvastatin  20 mg Oral Nightly    aspirin  81 mg Oral Daily    ipratropium-albuterol  1 ampule Inhalation Q4H WA    amiodarone  200 mg Oral Daily      Infusions:    dextrose      sodium chloride 10 mL/hr at 07/04/20 1338    vasopressin (Septic Shock) infusion      norepinephrine Stopped (07/03/20 1155)    EPINEPHrine infusion 2 mcg/min (07/03/20 1321)    nitroGLYCERIN 20 mcg/min (07/04/20 1033)    clevidipine Stopped (07/03/20 1629)    dexmedetomidine (PRECEDEX) IV infusion Stopped (07/05/20 1400)    DOBUTamine Stopped (07/04/20 1033)         Objective:   Vitals: BP (!) 147/87   Pulse 65   Temp 98.1 °F (36.7 °C) (Oral)   Resp 18   Ht 6' 3\" (1.905 m)   Wt 285 lb 7.9 oz (129.5 kg)   SpO2 92%   BMI 35.68 kg/m²   General appearance: alert and cooperative with exam  Neck: no JVD or bruit  Thyroid : Normal lobes   Lungs: Has Vesicular Breath sounds   Heart:  regular rate and rhythm  Abdomen: soft, non-tender; bowel sounds normal; no masses,  no organomegaly  Musculoskeletal: Normal  Extremities: extremities normal, , no edema  Neurologic:  Awake, alert, oriented to name, place and time. Cranial nerves II-XII are grossly intact. Motor is  intact. Sensory is intact. ,  and gait

## 2020-07-08 ENCOUNTER — TELEPHONE (OUTPATIENT)
Dept: CARDIOLOGY CLINIC | Age: 60
End: 2020-07-08

## 2020-07-08 ENCOUNTER — TELEPHONE (OUTPATIENT)
Dept: PHARMACY | Age: 60
End: 2020-07-08

## 2020-07-08 LAB
EKG ATRIAL RATE: 129 BPM
EKG DIAGNOSIS: NORMAL
EKG Q-T INTERVAL: 400 MS
EKG QRS DURATION: 104 MS
EKG QTC CALCULATION (BAZETT): 494 MS
EKG R AXIS: 15 DEGREES
EKG T AXIS: 91 DEGREES
EKG VENTRICULAR RATE: 92 BPM
INR BLD: 1.3

## 2020-07-08 NOTE — TELEPHONE ENCOUNTER
Patient called states he was sent home with no pain meds and his chest is hurting . I saw thst Dr. Nely Fajardo did the surgery and the patient needed to call his office to get medication and tell them about the chest pains. I gave the patient the name and the number to the office. And he thank you.

## 2020-07-08 NOTE — TELEPHONE ENCOUNTER
New patient. Received call from Camille Moseley with [facility]. Received faxed referral form from case management at [facility] signed by MATT Michael. Medication Management Service  [address] [phone]    Visit Date: 7/8/2020   Subjective:       Miky Guevara is a 62 y.o. male who is having INR checked by [facility]. INR results were called in to the clinic for anticoagulation monitoring and adjustment by Allyson Navarro RN who can be reached at [phone]. Patient results called in to clinic for warfarin management due to  Indication:   atrial fibrillation. INR goal: of 2.0-3.0. Duration of therapy: indefinite. Patient reports the following:   Adherent with regimen  Missed or extra doses:  None   Bleeding or thromboembolic side effects:  None  Significant medication changes:  None  Significant dietary changes: None  Significant alcohol or tobacco changes: None  Significant recent illness, disease state changes, or hospitalization:  None  Upcoming surgeries or procedures:  None  Falls: None           Assessment and Plan     PT/INR performed by [address] INR today is 1.3, subtherapeutic. Plan: Take warfarin 5mg daily for 2 days. .     Recheck INR in 2 days. Patient's home care nurse verbalized understanding of dosing directions and information discussed and will provide information to patient. Patient's home care nurse acknowledges working in consult agreement with pharmacist as referred by patient's physician on behalf of patient.       Electronically signed by DARA Sen [facility] on 7/8/20 at 3:03 PM EDT    CLINICAL PHARMACY CONSULT: MED RECONCILIATION/REVIEW [facility]  22. Tracking Only    PHSO: No  Total # of Interventions Recommended: 1  - Increased Dose #: 1  - Maintenance Safety Lab Monitoring #: 1  Total Interventions Accepted: 1  Time Spent (min): [place] Clementina Finn, PharmD

## 2020-07-10 ENCOUNTER — ANTI-COAG VISIT (OUTPATIENT)
Dept: PHARMACY | Age: 60
End: 2020-07-10

## 2020-07-10 LAB — INR BLD: 2.1

## 2020-07-10 NOTE — PROGRESS NOTES
Medication Management Service  [address] [phone]    Visit Date: 7/10/2020   Subjective:       Spencer Camacho is a 62 y.o. male who is having INR checked by [facility]. INR results were called in to the clinic for anticoagulation monitoring and adjustment by Vu Ponce RN who can be reached at [phone]. Patient results called in to clinic for warfarin management due to  Indication:   atrial fibrillation. INR goal: of 2.0-3.0. Duration of therapy: indefinite. Patient reports the following:   Adherent with regimen  Missed or extra doses:  None   Bleeding or thromboembolic side effects:  None  Significant medication changes:  None  Significant dietary changes: None  Significant alcohol or tobacco changes: None  Significant recent illness, disease state changes, or hospitalization:  None  Upcoming surgeries or procedures:  None  Falls: None           Assessment and Plan     PT/INR performed by [address] INR today is 2.1, therapeutic, but rising quickly. Patient already has taken 5mg today. Plan:  Take warfarin 2.5mg daily with close monitoring. Recheck INR in 3 days. Patient's home care nurse verbalized understanding of dosing directions and information discussed and will provide information to patient. Patient's home care nurse acknowledges working in consult agreement with pharmacist as referred by patient's physician on behalf of patient.       Electronically signed by Nanda Flores [address] [place] [address] on 7/10/20 at 12:58 PM EDT    CLINICAL PHARMACY CONSULT: MED RECONCILIATION/REVIEW [facility]  22. Tracking Only    PHSO: No  Total # of Interventions Recommended: 1  - Decreased Dose #: 1  - Maintenance Safety Lab Monitoring #: 1    Total Interventions Accepted: 1  Time Spent (min): 5    Nanda Flores PharmD

## 2020-07-13 ENCOUNTER — ANTI-COAG VISIT (OUTPATIENT)
Dept: PHARMACY | Age: 60
End: 2020-07-13

## 2020-07-13 ENCOUNTER — TELEPHONE (OUTPATIENT)
Dept: PHARMACY | Age: 60
End: 2020-07-13

## 2020-07-13 LAB — INR BLD: 2.9

## 2020-07-13 NOTE — PROGRESS NOTES
Medication Management Service  [address] [phone]    Visit Date: 7/13/2020   Subjective:       Barbara Chance is a 62 y.o. male who is having INR checked by [facility]. INR results were called in to the clinic for anticoagulation monitoring and adjustment by Abdirizak Ojeda RN who can be reached at [phone]. Patient results called in to clinic for warfarin management due to  Indication:   atrial fibrillation. INR goal: of 2.0-3.0. Duration of therapy: indefinite. Patient reports the following:   Adherent with regimen  Missed or extra doses:  None   Bleeding or thromboembolic side effects:  None  Significant medication changes:  None  Significant dietary changes: None  Significant alcohol or tobacco changes: None  Significant recent illness, disease state changes, or hospitalization:  None  Upcoming surgeries or procedures:  None  Falls: None           Assessment and Plan     PT/INR performed by [address] INR today is 2.9, therapeutic. Rising quickly after 5mg for 2 days  7/9 and 7/10        Plan: Will continue current regimen of warfarin 2.5mg daily. Recheck INR in 3 days. Patient's home care nurse verbalized understanding of dosing directions and information discussed and will provide information to patient. Patient's home care nurse acknowledges working in consult agreement with pharmacist as referred by patient's physician on behalf of patient.       Electronically signed by Evelyn Maharaj, [address] [place] [address] on 7/13/20 at 7:17 AM EDT    CLINICAL PHARMACY CONSULT: MED RECONCILIATION/REVIEW [facility]  22. Tracking Only    PHSO: No  Total # of Interventions Recommended: 0    - Maintenance Safety Lab Monitoring #: 1  Total Interventions Accepted: 0  Time Spent (min): 5    Evelyn Maharaj, PharmD

## 2020-07-13 NOTE — TELEPHONE ENCOUNTER
Received call from Hartly forest with [facility]. Reviewed orders given to Will on Friday. Hartly forest will see patient later today. Patient concerned about insurance coverage of home visits. Patient's prior PCP leaving office. Hartly forest will check if patient has new PCP.      CLINICAL PHARMACY CONSULT: MED RECONCILIATION/REVIEW ADDENDUM    For Pharmacy Admin Tracking Only    PHSO: No  Total # of Interventions Recommended: 0  Total Interventions Accepted: 0  Time Spent (min): 5    Chris Zacarias, SandiD

## 2020-07-14 NOTE — ADT AUTH CERT
Coronary Artery Bypass Graft (CABG) - Care Day 3 (7/6/2020) by Jessenia Menard RN         Review Entered  Review Status    7/14/2020 10:30  Completed        Criteria Review       Care Day: 3 Care Date: 7/6/2020 Level of Care:    Guideline Day 2    Level Of Care    (X) ICU    7/14/2020 10:30 AM EDT by [facility]      ICU    Clinical Status    (X) * Procedure completed    7/14/2020 10:30 AM EDT by [facility]      7/3/20    (X) * Hemodynamic stability    7/14/2020 10:30 AM EDT by [facility]      HRs 60-70s  Some hypertension 148/135, 153/122    ( ) * No evidence of myocardial ischemia    7/14/2020 10:30 AM EDT by [facility]      admitted with MI    (X) * Mechanical ventilation absent    Activity    ( ) * Up to chair    Routes    (X) * Clear liquid diet, advance as tolerated    (X) IV fluids    (X) Oral or parenteral medications    7/14/2020 10:30 AM EDT by [facility]      Amidoraone 200mg po tid, Norvasc 5mg po daily, Eliquis 5mg po bid, ASA 81mg po daily, Lipitor 20mg po nightly, Coreg 6.25mg po bid, Lasix 40mg IV tid, Sliding scale insulin, Zaroxolyn 5mg po bid, D5 @ 100cc hr-dc'd, Insulin gtt-dc'd, meds continued    Interventions    (X) Oxygen    (X) Glucose control    Medications    (X) Aspirin    (X) Beta-blocker    (X) Statin medication    * Milestone    Additional Notes    7/6/20    SUBJECTIVE:      Pt is POD # 3 s/p CABG x 4.  Pt feeling better today. He has been working on his IS. He is ambulating well. No BM yet. Meds continued-calcium glu 2g IV, Nitro and Levophed gtt, Zofran 4mg IV x 1, Percocet 2 tabs x 4       EXAM:    Blood pressure (!) 145/82, pulse 68, temperature 98.6 °F (37 °C), temperature source Oral, resp. rate 14, height 6' 3\" (1.905 m), weight 285 lb 7.9 oz (129.5 kg), SpO2 94 %. General appearance: No apparent distress, appears stated age and cooperative. Skin: unremarkable    HEENT Normocephalic, atraumatic without obvious deformity.     Neck: Supple, Trachea midline    Lungs: Good respiratory effort. Clear to auscultation, bilaterally    Heart: Regular rate/ rhythm inc c/d/i    Abdomen: Soft, non-tender distended    Extremities: min edema warm well perfused    Neurologic: Alert, grossly intact    Mental status: normal affect          Chl 95, bun 25 creat 1.6 glucose 115    ASSESSMENT AND PLAN:            Patient Active Problem List    Diagnosis    o Chest pain    o DM w/o complication type II (HCC)    o Longstanding persistent atrial fibrillation    o CAD (coronary artery disease)    o Post PTCA    o Unstable angina (HCC)    o HTN (hypertension)    o Angina pectoris syndrome (HCC)-unstable    o Smoking    o COPD (chronic obstructive pulmonary disease) (HCC)    o Atrial fibrillation with rapid ventricular response (HCC)    o Chest pain    o STEMI (ST elevation myocardial infarction) ([address]    o Myocardial infarction acute ([address]    o Nausea    o New onset of congestive heart failure (HCC)    o COPD with acute exacerbation (HCC)    o Acute pulmonary edema (HCC)    o Pleural effusion, bilateral    o Hypertensive urgency    o Chronic systolic congestive heart failure (HCC)    o TIA (transient ischemic attack)    o Chronic kidney disease, stage III (moderate) (HCC)    o 3-vessel coronary artery disease            S/P CABG x 4 and maze         Cardio: stable On coreg and PO amio. HTN add norvasc. Start [facility] for a fib s/p maze    Pulm: still on 6L NC, encourage IS and wean O2 as tolerated. Home O2 eval.    GI: repeat suppository today    Renal: creatinine plateauing at 1.6, adequate UOP, on lasix 40 TID.     Tubes/Lines: DC pacing wires    Wound: stable           Coronary Artery Bypass Graft (CABG) - Care Day 1 (7/4/2020) by Carla Roberson RN         Review Entered  Review Status    7/14/2020 10:20  Completed        Criteria Review       Care Day: 1 Care Date: 7/4/2020 Level of Care:    Guideline Day 2    Level Of Care    (X) ICU    7/14/2020 10:20 AM EDT by Jocy Longo      ICU    Clinical Status (X) * Procedure completed    (X) * Hemodynamic stability    7/14/2020 10:20 AM EDT by Del Chan      HR 60s /77    ( ) * No evidence of myocardial ischemia    7/14/2020 10:20 AM EDT by Del Chan      Pt admitted with Mi    (X) * Mechanical ventilation absent    Activity    ( ) * Up to chair    7/14/2020 10:20 AM EDT by Дмитрий Adan Activities performed today included bed mobility training, sup-sit, sit-stand, SPT. Routes    (X) * Clear liquid diet, advance as tolerated    7/14/2020 10:20 AM EDT by Del Chan      cardiac    (X) IV fluids    7/14/2020 10:20 AM EDT by Del Chan      1/2 NS @ 75cc/hr    (X) Oral or parenteral medications    7/14/2020 10:20 AM EDT by Del Chan      Amidorone gtt, ASA 81mg po daily, Lipitor 20mg po nightly, Coreg 3.125mg po bid, Ancef 2g IV q8hrs, Lasix 60mg IV x 1, Lasix 80mg IV tid,Precedex gtt, Insulin gtt, Epinephrine gtt, Heparin gtt, Ntiro gtt, Levophed gtt, Dilaudid 1mg IV x 7, Zofran 4mg    Interventions    (X) Oxygen    7/14/2020 10:20 AM EDT by Del Chan      4-8 lpm    (X) Glucose control    7/14/2020 10:20 AM EDT by Del Chan      insulin gtt    Medications    (X) Aspirin    7/14/2020 10:20 AM EDT by Дмитрий Adan see meds    (X) Beta-blocker    7/14/2020 10:20 AM EDT by Дмитрий Adan see meds    (X) Statin medication    7/14/2020 10:20 AM EDT by Del Chan      see meds    * Milestone    Additional Notes    7/4/20 7/4/20     Reason for visit: Postoperative CABG and Maze procedure     Wbc 18.7 hgb 11.3 hct 34.3 bun 24 creat 1.7    SUBJECTIVE:      Pt having extreme pain but sitting in a chair on Precedex drip.  Overnight he had low urine output issues.  His blood pressure was labile and he eventually ended up on both dobutamine and nitroglycerin.  His cardiac index increased to 3 with this combination.         EXAM:    Blood pressure 117/78, pulse 72, temperature 97 °F (36.1 °C), temperature source Core, resp.  rate 13, height 6' 3\" (1.905 m), weight 279 lb 1.6 oz (126.6 kg), SpO2 95 %. General appearance: No apparent distress, appears stated age and cooperative. Skin: unremarkable    HEENT Normocephalic, atraumatic without obvious deformity. Neck: Supple, Trachea midline    Lungs: Good respiratory effort. Clear to auscultation, bilaterally    Heart: Regular rate/ rhythm sternum stable Inc c/d/i    Abdomen: Soft, softly distended    Extremities: Bilateral lower extremity edema warm well perfused    Neurologic: Alert, grossly intact    Mental status: normal affect         Nephrology consult    PLAN:    1.  Start with UA and urinary indices.  Stop IV fluid. 2.  Lasix 80 mg t.i.d.    3.  Discontinue any nonessential medications to see how he does and labs    tomorrow morning.         ASSESSMENT AND PLAN:    S/P CABG maze         1 Day Post-Op       Patient Active Problem List    Diagnosis    o Chest pain    o DM w/o complication type II (HCC)    o Longstanding persistent atrial fibrillation    o CAD (coronary artery disease)    o Post PTCA    o Unstable angina (HCC)    o HTN (hypertension)    o Angina pectoris syndrome (HCC)-unstable    o Smoking    o COPD (chronic obstructive pulmonary disease) (HCC)    o Atrial fibrillation with rapid ventricular response (HCC)    o Chest pain    o STEMI (ST elevation myocardial infarction) ([address]    o Myocardial infarction acute ([address]    o Nausea    o New onset of congestive heart failure (HCC)    o COPD with acute exacerbation (HCC)    o Acute pulmonary edema (HCC)    o Pleural effusion, bilateral    o Hypertensive urgency    o Chronic systolic congestive heart failure (HCC)    o TIA (transient ischemic attack)    o Chronic kidney disease, stage III (moderate) (HCC)    o 3-vessel coronary artery disease            1.  Cardio: Nitroglycerin stopped, we will most likely wean off the dobutamine as well if everything stays stable.  Remove his PA catheter once dobutamine is off.  Keep a line for need for blood pressure management and need for ABG                Current drips: Dobutamine and nitroglycerin, insulin maintenance IV    2. Pulm: Hypoxia started on BiPAP with significant improvement.  He is currently off BiPAP as he is sitting in a chair eating that he may benefit from this    3. GI: P.o. diet    4. ID: Perioperative antibiotics    5. Heme: Hemoglobin and platelets are stable    6. Neuro: Pain poorly controlled, on Dilaudid IV, Precedex drip and will need to transition to p.o. Percocet    7. Renal: Nephrology consulted. Gogo Haider is known to Dr. Ollie Whipple, keep Maddox catheter, increase in BUN/creatinine though he did have a similar elevation sometime in the past.  Lasix given this morning    8.  Activity: PT/OT

## 2020-07-16 ENCOUNTER — ANTI-COAG VISIT (OUTPATIENT)
Dept: PHARMACY | Age: 60
End: 2020-07-16

## 2020-07-16 LAB — INR BLD: 2.8

## 2020-07-16 NOTE — PROGRESS NOTES
Medication Management Service  [address] [phone]    Visit Date: 7/16/2020   Subjective:       Belkis Philip is a 62 y.o. male who is having INR checked by [facility]. INR results were called in to the clinic for anticoagulation monitoring and adjustment by Mari Pierce RN who can be reached at [phone]. Patient results called in to clinic for warfarin management due to  Indication:   atrial fibrillation. INR goal: of 2.0-3.0. Duration of therapy: indefinite. Patient reports the following:   Adherent with regimen  Missed or extra doses:  None   Bleeding or thromboembolic side effects:  None  Significant medication changes:  None  Significant dietary changes: None  Significant alcohol or tobacco changes: None  Significant recent illness, disease state changes, or hospitalization:  None  Upcoming surgeries or procedures:  None  Falls: None           Assessment and Plan     PT/INR performed by [address] INR today is 2.8, therapeutic. Plan: Will continue current regimen of warfarin 2.5mg daily. Recheck INR in 1 week(s). Patient's home care nurse verbalized understanding of dosing directions and information discussed and will provide information to patient. Patient's home care nurse acknowledges working in consult agreement with pharmacist as referred by patient's physician on behalf of patient.       Electronically signed by Chris [facility] on 7/16/20 at 12:19 PM EDT    CLINICAL PHARMACY CONSULT: MED RECONCILIATION/REVIEW [place]  22. Tracking Only    PHSO: No  Total # of Interventions Recommended: 0    - Maintenance Safety Lab Monitoring #: 1     Total Interventions Accepted: 0  Time Spent (min): Sandi MariD

## 2020-07-19 ENCOUNTER — HOSPITAL ENCOUNTER (OUTPATIENT)
Age: 60
Setting detail: OBSERVATION
LOS: 1 days | Discharge: HOME | End: 2020-07-20
Attending: EMERGENCY MEDICINE | Admitting: SURGERY
Payer: COMMERCIAL

## 2020-07-19 ENCOUNTER — APPOINTMENT (OUTPATIENT)
Dept: GENERAL RADIOLOGY | Age: 60
End: 2020-07-19
Payer: COMMERCIAL

## 2020-07-19 LAB
APTT: 51.6 SECONDS (ref 25.1–37.1)
BASOPHILS ABSOLUTE: 0.2 K/CU MM
BASOPHILS RELATIVE PERCENT: 1.2 % (ref 0–1)
DIFFERENTIAL TYPE: ABNORMAL
EOSINOPHILS ABSOLUTE: 0.4 K/CU MM
EOSINOPHILS RELATIVE PERCENT: 3 % (ref 0–3)
HCT VFR BLD CALC: 34.3 % (ref 42–52)
HEMOGLOBIN: 10.6 GM/DL (ref 13.5–18)
IMMATURE NEUTROPHIL %: 0.7 % (ref 0–0.43)
INR BLD: 4.17 INDEX
LYMPHOCYTES ABSOLUTE: 2.3 K/CU MM
LYMPHOCYTES RELATIVE PERCENT: 18.5 % (ref 24–44)
MCH RBC QN AUTO: 29.9 PG (ref 27–31)
MCHC RBC AUTO-ENTMCNC: 30.9 % (ref 32–36)
MCV RBC AUTO: 96.6 FL (ref 78–100)
MONOCYTES ABSOLUTE: 0.9 K/CU MM
MONOCYTES RELATIVE PERCENT: 7.4 % (ref 0–4)
NUCLEATED RBC %: 0 %
PDW BLD-RTO: 13.8 % (ref 11.7–14.9)
PLATELET # BLD: 447 K/CU MM (ref 140–440)
PMV BLD AUTO: 9.8 FL (ref 7.5–11.1)
PROTHROMBIN TIME: 51.2 SECONDS (ref 11.7–14.5)
RBC # BLD: 3.55 M/CU MM (ref 4.6–6.2)
SEGMENTED NEUTROPHILS ABSOLUTE COUNT: 8.5 K/CU MM
SEGMENTED NEUTROPHILS RELATIVE PERCENT: 69.2 % (ref 36–66)
TOTAL IMMATURE NEUTOROPHIL: 0.09 K/CU MM
TOTAL NUCLEATED RBC: 0 K/CU MM
WBC # BLD: 12.2 K/CU MM (ref 4–10.5)

## 2020-07-19 PROCEDURE — 99285 EMERGENCY DEPT VISIT HI MDM: CPT

## 2020-07-19 PROCEDURE — 82550 ASSAY OF CK (CPK): CPT

## 2020-07-19 PROCEDURE — 83605 ASSAY OF LACTIC ACID: CPT

## 2020-07-19 PROCEDURE — 83880 ASSAY OF NATRIURETIC PEPTIDE: CPT

## 2020-07-19 PROCEDURE — 96374 THER/PROPH/DIAG INJ IV PUSH: CPT

## 2020-07-19 PROCEDURE — 83735 ASSAY OF MAGNESIUM: CPT

## 2020-07-19 PROCEDURE — 83690 ASSAY OF LIPASE: CPT

## 2020-07-19 PROCEDURE — 85025 COMPLETE CBC W/AUTO DIFF WBC: CPT

## 2020-07-19 PROCEDURE — 82805 BLOOD GASES W/O2 SATURATION: CPT

## 2020-07-19 PROCEDURE — 84484 ASSAY OF TROPONIN QUANT: CPT

## 2020-07-19 PROCEDURE — 6360000002 HC RX W HCPCS: Performed by: EMERGENCY MEDICINE

## 2020-07-19 PROCEDURE — 6370000000 HC RX 637 (ALT 250 FOR IP): Performed by: EMERGENCY MEDICINE

## 2020-07-19 PROCEDURE — 85610 PROTHROMBIN TIME: CPT

## 2020-07-19 PROCEDURE — 96375 TX/PRO/DX INJ NEW DRUG ADDON: CPT

## 2020-07-19 PROCEDURE — 93005 ELECTROCARDIOGRAM TRACING: CPT | Performed by: EMERGENCY MEDICINE

## 2020-07-19 PROCEDURE — 80053 COMPREHEN METABOLIC PANEL: CPT

## 2020-07-19 PROCEDURE — 85730 THROMBOPLASTIN TIME PARTIAL: CPT

## 2020-07-19 RX ORDER — ONDANSETRON 2 MG/ML
4 INJECTION INTRAMUSCULAR; INTRAVENOUS EVERY 30 MIN PRN
Status: DISCONTINUED | OUTPATIENT
Start: 2020-07-19 | End: 2020-07-20 | Stop reason: HOSPADM

## 2020-07-19 RX ORDER — NITROGLYCERIN 0.4 MG/1
0.4 TABLET SUBLINGUAL EVERY 5 MIN PRN
Status: COMPLETED | OUTPATIENT
Start: 2020-07-19 | End: 2020-07-20

## 2020-07-19 RX ORDER — MORPHINE SULFATE 4 MG/ML
4 INJECTION, SOLUTION INTRAMUSCULAR; INTRAVENOUS EVERY 30 MIN PRN
Status: DISCONTINUED | OUTPATIENT
Start: 2020-07-19 | End: 2020-07-20

## 2020-07-19 RX ADMIN — MORPHINE SULFATE 4 MG: 4 INJECTION, SOLUTION INTRAMUSCULAR; INTRAVENOUS at 23:41

## 2020-07-19 RX ADMIN — ONDANSETRON 4 MG: 2 INJECTION INTRAMUSCULAR; INTRAVENOUS at 23:41

## 2020-07-19 RX ADMIN — NITROGLYCERIN 0.4 MG: 0.4 TABLET SUBLINGUAL at 23:56

## 2020-07-19 RX ADMIN — NITROGLYCERIN 0.4 MG: 0.4 TABLET SUBLINGUAL at 23:51

## 2020-07-19 ASSESSMENT — ENCOUNTER SYMPTOMS
CHEST TIGHTNESS: 1
ALLERGIC/IMMUNOLOGIC NEGATIVE: 1
EYES NEGATIVE: 1
SHORTNESS OF BREATH: 1

## 2020-07-19 ASSESSMENT — PAIN DESCRIPTION - FREQUENCY: FREQUENCY: CONTINUOUS

## 2020-07-19 ASSESSMENT — PAIN SCALES - GENERAL
PAINLEVEL_OUTOF10: 9
PAINLEVEL_OUTOF10: 9

## 2020-07-19 ASSESSMENT — PAIN DESCRIPTION - LOCATION: LOCATION: CHEST

## 2020-07-19 ASSESSMENT — PAIN DESCRIPTION - PAIN TYPE: TYPE: ACUTE PAIN

## 2020-07-19 ASSESSMENT — PAIN DESCRIPTION - DESCRIPTORS: DESCRIPTORS: SHARP

## 2020-07-20 ENCOUNTER — APPOINTMENT (OUTPATIENT)
Dept: CT IMAGING | Age: 60
End: 2020-07-20
Payer: COMMERCIAL

## 2020-07-20 ENCOUNTER — APPOINTMENT (OUTPATIENT)
Dept: GENERAL RADIOLOGY | Age: 60
End: 2020-07-20
Payer: COMMERCIAL

## 2020-07-20 VITALS
WEIGHT: 270.06 LBS | TEMPERATURE: 98 F | RESPIRATION RATE: 21 BRPM | HEIGHT: 75 IN | SYSTOLIC BLOOD PRESSURE: 135 MMHG | HEART RATE: 76 BPM | DIASTOLIC BLOOD PRESSURE: 88 MMHG | OXYGEN SATURATION: 95 % | BODY MASS INDEX: 33.58 KG/M2

## 2020-07-20 LAB
ALBUMIN SERPL-MCNC: 3.8 GM/DL (ref 3.4–5)
ALP BLD-CCNC: 114 IU/L (ref 40–128)
ALT SERPL-CCNC: 27 U/L (ref 10–40)
AMPHETAMINES: NEGATIVE
ANION GAP SERPL CALCULATED.3IONS-SCNC: 10 MMOL/L (ref 4–16)
AST SERPL-CCNC: 18 IU/L (ref 15–37)
BACTERIA: NEGATIVE /HPF
BARBITURATE SCREEN URINE: NEGATIVE
BENZODIAZEPINE SCREEN, URINE: NEGATIVE
BILIRUB SERPL-MCNC: 0.2 MG/DL (ref 0–1)
BILIRUBIN URINE: NEGATIVE MG/DL
BLOOD, URINE: NEGATIVE
BUN BLDV-MCNC: 25 MG/DL (ref 6–23)
CALCIUM SERPL-MCNC: 9.3 MG/DL (ref 8.3–10.6)
CANNABINOID SCREEN URINE: NEGATIVE
CHLORIDE BLD-SCNC: 100 MMOL/L (ref 99–110)
CLARITY: CLEAR
CO2: 29 MMOL/L (ref 21–32)
COCAINE METABOLITE: NEGATIVE
COLOR: YELLOW
CREAT SERPL-MCNC: 1.8 MG/DL (ref 0.9–1.3)
GFR AFRICAN AMERICAN: 47 ML/MIN/1.73M2
GFR NON-AFRICAN AMERICAN: 39 ML/MIN/1.73M2
GLUCOSE BLD-MCNC: 116 MG/DL (ref 70–99)
GLUCOSE, URINE: NEGATIVE MG/DL
KETONES, URINE: NEGATIVE MG/DL
LACTATE: 0.8 MMOL/L (ref 0.4–2)
LEUKOCYTE ESTERASE, URINE: NEGATIVE
LIPASE: 34 IU/L (ref 13–60)
MAGNESIUM: 2.3 MG/DL (ref 1.8–2.4)
NITRITE URINE, QUANTITATIVE: NEGATIVE
OPIATES, URINE: ABNORMAL
OXYCODONE: NEGATIVE
PH, URINE: 5 (ref 5–8)
PHENCYCLIDINE, URINE: NEGATIVE
POTASSIUM SERPL-SCNC: 4.7 MMOL/L (ref 3.5–5.1)
PRO-BNP: 3364 PG/ML
PROTEIN UA: 100 MG/DL
RBC URINE: ABNORMAL /HPF (ref 0–3)
SODIUM BLD-SCNC: 139 MMOL/L (ref 135–145)
SPECIFIC GRAVITY UA: 1.04 (ref 1–1.03)
TOTAL CK: 47 IU/L (ref 38–174)
TOTAL PROTEIN: 7.5 GM/DL (ref 6.4–8.2)
TRICHOMONAS: ABNORMAL /HPF
TROPONIN T: 0.07 NG/ML
UROBILINOGEN, URINE: NORMAL MG/DL (ref 0.2–1)
WBC UA: 1 /HPF (ref 0–2)

## 2020-07-20 PROCEDURE — 2580000003 HC RX 258: Performed by: EMERGENCY MEDICINE

## 2020-07-20 PROCEDURE — 71275 CT ANGIOGRAPHY CHEST: CPT

## 2020-07-20 PROCEDURE — 93010 ELECTROCARDIOGRAM REPORT: CPT | Performed by: INTERNAL MEDICINE

## 2020-07-20 PROCEDURE — 81001 URINALYSIS AUTO W/SCOPE: CPT

## 2020-07-20 PROCEDURE — 80307 DRUG TEST PRSMV CHEM ANLYZR: CPT

## 2020-07-20 PROCEDURE — 96376 TX/PRO/DX INJ SAME DRUG ADON: CPT

## 2020-07-20 PROCEDURE — 6360000002 HC RX W HCPCS: Performed by: EMERGENCY MEDICINE

## 2020-07-20 PROCEDURE — 6370000000 HC RX 637 (ALT 250 FOR IP): Performed by: SURGERY

## 2020-07-20 PROCEDURE — 96375 TX/PRO/DX INJ NEW DRUG ADDON: CPT

## 2020-07-20 PROCEDURE — G0378 HOSPITAL OBSERVATION PER HR: HCPCS

## 2020-07-20 PROCEDURE — 71045 X-RAY EXAM CHEST 1 VIEW: CPT

## 2020-07-20 PROCEDURE — 96366 THER/PROPH/DIAG IV INF ADDON: CPT

## 2020-07-20 PROCEDURE — 96365 THER/PROPH/DIAG IV INF INIT: CPT

## 2020-07-20 PROCEDURE — 6370000000 HC RX 637 (ALT 250 FOR IP): Performed by: EMERGENCY MEDICINE

## 2020-07-20 PROCEDURE — 6360000004 HC RX CONTRAST MEDICATION: Performed by: EMERGENCY MEDICINE

## 2020-07-20 PROCEDURE — 2500000003 HC RX 250 WO HCPCS: Performed by: EMERGENCY MEDICINE

## 2020-07-20 RX ORDER — WARFARIN SODIUM 5 MG/1
5 TABLET ORAL DAILY
Status: DISCONTINUED | OUTPATIENT
Start: 2020-07-21 | End: 2020-07-20 | Stop reason: HOSPADM

## 2020-07-20 RX ORDER — GLIPIZIDE 5 MG/1
10 TABLET ORAL
Status: DISCONTINUED | OUTPATIENT
Start: 2020-07-20 | End: 2020-07-20 | Stop reason: HOSPADM

## 2020-07-20 RX ORDER — LORAZEPAM 2 MG/ML
1 INJECTION INTRAMUSCULAR ONCE
Status: COMPLETED | OUTPATIENT
Start: 2020-07-20 | End: 2020-07-20

## 2020-07-20 RX ORDER — FUROSEMIDE 10 MG/ML
40 INJECTION INTRAMUSCULAR; INTRAVENOUS ONCE
Status: COMPLETED | OUTPATIENT
Start: 2020-07-20 | End: 2020-07-20

## 2020-07-20 RX ORDER — TRAMADOL HYDROCHLORIDE 50 MG/1
50 TABLET ORAL EVERY 8 HOURS PRN
Qty: 30 TABLET | Refills: 0 | Status: SHIPPED | OUTPATIENT
Start: 2020-07-20 | End: 2020-07-25

## 2020-07-20 RX ORDER — DEXTROSE MONOHYDRATE 25 G/50ML
12.5 INJECTION, SOLUTION INTRAVENOUS PRN
Status: DISCONTINUED | OUTPATIENT
Start: 2020-07-20 | End: 2020-07-20 | Stop reason: HOSPADM

## 2020-07-20 RX ORDER — AMLODIPINE BESYLATE 10 MG/1
10 TABLET ORAL DAILY
Status: DISCONTINUED | OUTPATIENT
Start: 2020-07-20 | End: 2020-07-20 | Stop reason: HOSPADM

## 2020-07-20 RX ORDER — POTASSIUM CHLORIDE 20 MEQ/1
40 TABLET, EXTENDED RELEASE ORAL 2 TIMES DAILY WITH MEALS
Status: DISCONTINUED | OUTPATIENT
Start: 2020-07-20 | End: 2020-07-20 | Stop reason: HOSPADM

## 2020-07-20 RX ORDER — SODIUM CHLORIDE 9 MG/ML
INJECTION, SOLUTION INTRAVENOUS CONTINUOUS
Status: DISCONTINUED | OUTPATIENT
Start: 2020-07-20 | End: 2020-07-20 | Stop reason: HOSPADM

## 2020-07-20 RX ORDER — ALOGLIPTIN 25 MG/1
25 TABLET, FILM COATED ORAL DAILY
Status: DISCONTINUED | OUTPATIENT
Start: 2020-07-20 | End: 2020-07-20 | Stop reason: HOSPADM

## 2020-07-20 RX ORDER — TRAMADOL HYDROCHLORIDE 50 MG/1
100 TABLET ORAL EVERY 6 HOURS PRN
Status: DISCONTINUED | OUTPATIENT
Start: 2020-07-20 | End: 2020-07-20

## 2020-07-20 RX ORDER — NITROGLYCERIN 20 MG/100ML
5 INJECTION INTRAVENOUS CONTINUOUS
Status: DISCONTINUED | OUTPATIENT
Start: 2020-07-20 | End: 2020-07-20 | Stop reason: HOSPADM

## 2020-07-20 RX ORDER — TRAMADOL HYDROCHLORIDE 50 MG/1
100 TABLET ORAL EVERY 8 HOURS PRN
Status: DISCONTINUED | OUTPATIENT
Start: 2020-07-20 | End: 2020-07-20 | Stop reason: HOSPADM

## 2020-07-20 RX ORDER — FUROSEMIDE 40 MG/1
40 TABLET ORAL 3 TIMES DAILY
Status: DISCONTINUED | OUTPATIENT
Start: 2020-07-20 | End: 2020-07-20 | Stop reason: HOSPADM

## 2020-07-20 RX ORDER — CARVEDILOL 6.25 MG/1
6.25 TABLET ORAL 2 TIMES DAILY WITH MEALS
Status: DISCONTINUED | OUTPATIENT
Start: 2020-07-20 | End: 2020-07-20 | Stop reason: HOSPADM

## 2020-07-20 RX ORDER — NICOTINE POLACRILEX 4 MG
15 LOZENGE BUCCAL PRN
Status: DISCONTINUED | OUTPATIENT
Start: 2020-07-20 | End: 2020-07-20 | Stop reason: HOSPADM

## 2020-07-20 RX ORDER — AMIODARONE HYDROCHLORIDE 200 MG/1
200 TABLET ORAL 2 TIMES DAILY
Status: DISCONTINUED | OUTPATIENT
Start: 2020-07-20 | End: 2020-07-20 | Stop reason: HOSPADM

## 2020-07-20 RX ORDER — LABETALOL HYDROCHLORIDE 5 MG/ML
10 INJECTION, SOLUTION INTRAVENOUS EVERY 4 HOURS PRN
Status: DISCONTINUED | OUTPATIENT
Start: 2020-07-20 | End: 2020-07-20 | Stop reason: HOSPADM

## 2020-07-20 RX ORDER — ACETAMINOPHEN 325 MG/1
650 TABLET ORAL EVERY 4 HOURS PRN
Status: DISCONTINUED | OUTPATIENT
Start: 2020-07-20 | End: 2020-07-20 | Stop reason: HOSPADM

## 2020-07-20 RX ORDER — ATORVASTATIN CALCIUM 20 MG/1
20 TABLET, FILM COATED ORAL NIGHTLY
Status: DISCONTINUED | OUTPATIENT
Start: 2020-07-20 | End: 2020-07-20 | Stop reason: HOSPADM

## 2020-07-20 RX ORDER — HYDROCODONE BITARTRATE AND ACETAMINOPHEN 5; 325 MG/1; MG/1
1 TABLET ORAL EVERY 8 HOURS PRN
Qty: 20 TABLET | Refills: 0 | Status: SHIPPED | OUTPATIENT
Start: 2020-07-20 | End: 2020-07-25

## 2020-07-20 RX ORDER — DEXTROSE MONOHYDRATE 50 MG/ML
100 INJECTION, SOLUTION INTRAVENOUS PRN
Status: DISCONTINUED | OUTPATIENT
Start: 2020-07-20 | End: 2020-07-20 | Stop reason: HOSPADM

## 2020-07-20 RX ADMIN — POTASSIUM CHLORIDE 40 MEQ: 1500 TABLET, EXTENDED RELEASE ORAL at 07:52

## 2020-07-20 RX ADMIN — AMIODARONE HYDROCHLORIDE 200 MG: 200 TABLET ORAL at 07:52

## 2020-07-20 RX ADMIN — ACETAMINOPHEN 650 MG: 325 TABLET ORAL at 05:44

## 2020-07-20 RX ADMIN — ALOGLIPTIN 25 MG: 25 TABLET, FILM COATED ORAL at 07:53

## 2020-07-20 RX ADMIN — NITROGLYCERIN 0.4 MG: 0.4 TABLET SUBLINGUAL at 00:03

## 2020-07-20 RX ADMIN — FUROSEMIDE 40 MG: 40 TABLET ORAL at 07:52

## 2020-07-20 RX ADMIN — ASPIRIN 325 MG: 325 TABLET, COATED ORAL at 07:53

## 2020-07-20 RX ADMIN — GLIPIZIDE 10 MG: 5 TABLET ORAL at 07:53

## 2020-07-20 RX ADMIN — CARVEDILOL 6.25 MG: 6.25 TABLET, FILM COATED ORAL at 07:51

## 2020-07-20 RX ADMIN — MORPHINE SULFATE 4 MG: 4 INJECTION, SOLUTION INTRAMUSCULAR; INTRAVENOUS at 00:28

## 2020-07-20 RX ADMIN — IOPAMIDOL 100 ML: 755 INJECTION, SOLUTION INTRAVENOUS at 01:18

## 2020-07-20 RX ADMIN — SODIUM CHLORIDE: 9 INJECTION, SOLUTION INTRAVENOUS at 02:10

## 2020-07-20 RX ADMIN — TRAMADOL HYDROCHLORIDE 100 MG: 50 TABLET, FILM COATED ORAL at 05:45

## 2020-07-20 RX ADMIN — AMLODIPINE BESYLATE 10 MG: 10 TABLET ORAL at 07:52

## 2020-07-20 RX ADMIN — NITROGLYCERIN 5 MCG/MIN: 20 INJECTION INTRAVENOUS at 02:11

## 2020-07-20 RX ADMIN — LORAZEPAM 1 MG: 2 INJECTION INTRAMUSCULAR; INTRAVENOUS at 02:08

## 2020-07-20 RX ADMIN — FUROSEMIDE 40 MG: 10 INJECTION, SOLUTION INTRAVENOUS at 01:56

## 2020-07-20 ASSESSMENT — PAIN DESCRIPTION - DESCRIPTORS
DESCRIPTORS: ACHING
DESCRIPTORS: ACHING

## 2020-07-20 ASSESSMENT — PAIN DESCRIPTION - PAIN TYPE
TYPE: ACUTE PAIN;CHRONIC PAIN;SURGICAL PAIN
TYPE: ACUTE PAIN;SURGICAL PAIN

## 2020-07-20 ASSESSMENT — PAIN DESCRIPTION - FREQUENCY
FREQUENCY: CONTINUOUS
FREQUENCY: CONTINUOUS

## 2020-07-20 ASSESSMENT — PAIN DESCRIPTION - ORIENTATION
ORIENTATION: MID
ORIENTATION: MID

## 2020-07-20 ASSESSMENT — PAIN DESCRIPTION - LOCATION
LOCATION: CHEST
LOCATION: CHEST

## 2020-07-20 ASSESSMENT — PAIN SCALES - GENERAL
PAINLEVEL_OUTOF10: 8
PAINLEVEL_OUTOF10: 8
PAINLEVEL_OUTOF10: 7
PAINLEVEL_OUTOF10: 5
PAINLEVEL_OUTOF10: 9
PAINLEVEL_OUTOF10: 8

## 2020-07-20 ASSESSMENT — PAIN - FUNCTIONAL ASSESSMENT
PAIN_FUNCTIONAL_ASSESSMENT: PREVENTS OR INTERFERES SOME ACTIVE ACTIVITIES AND ADLS
PAIN_FUNCTIONAL_ASSESSMENT: PREVENTS OR INTERFERES SOME ACTIVE ACTIVITIES AND ADLS

## 2020-07-20 ASSESSMENT — PAIN DESCRIPTION - PROGRESSION
CLINICAL_PROGRESSION: RAPIDLY WORSENING
CLINICAL_PROGRESSION: RAPIDLY WORSENING

## 2020-07-20 ASSESSMENT — PAIN DESCRIPTION - ONSET
ONSET: ON-GOING
ONSET: ON-GOING

## 2020-07-20 NOTE — ED PROVIDER NOTES
diastolic dysfunction. No significant valvular disease noted. No evidence of pericardial effusion. Essentially unremarkable echo.  H/O percutaneous left heart catheterization 12/22/15    No evidence of hemodynamically significant coronary artery disease    Heart imaging 04/23/2020    muga - ef 58%    History of coronary artery stent placement 11/13/2013 11/13 -RCA - 3 stents placed    History of exercise stress test 06/08/2017    treadmill    History of Holter monitoring 1/4/2016    predominant rhythm sinus    History of nuclear stress test 02/24/2020    EF 38%,Myocardial perfusion scan shows moderate size, severe intensity, non  reversible perfusion defect in inferior wall.  Hyperlipidemia     Hypertension     S/P cardiac catheterization 11/13/2013 11/13/2013-EF 55%, distal LAD mild disease,CX stent patent,but distal to stent 50% stenosis. RCA severe disease.  Status post angioplasty with stent      Past Surgical History:   Procedure Laterality Date    APPENDECTOMY      CARDIAC CATHETERIZATION  11/13/2013 11/13/2013-EF 55%, distal LAD mild disease,CX stent patent,but distal to stent 50% stenosis. RCA severe disease.     CARDIAC SURGERY  07/03/2020    CABG X 3 LIMA-OM-PDA-LAD, Maze, LT Atrial Appendage clipping    COLONOSCOPY      CORONARY ANGIOPLASTY WITH STENT PLACEMENT      4 stents- RCA X3; CX X1    CORONARY ANGIOPLASTY WITH STENT PLACEMENT  11/13/2013 11/13/2013 -3 stents placed to RCA- Dr Rozena Cooks N/A 7/3/2020    INTRAOPERATIVE ISMAEL, CABG X3   WITH LIMA  AND LEFT LEG ENDOVEIN HARVEST, INDUCED HYPOTHERMIA, MAZE BIPOLAR AND CRYO PROCEDURE, LEFT ATRIAL APPENDAGE CLIPPING performed by Cassia Veronica MD at [facility]     Family History   Problem Relation Age of Onset    Cancer Mother     Diabetes Mother     Heart Disease Mother     High Blood Pressure Mother     Stroke Mother     Cancer Father     Heart Disease Father     High Blood Pressure Father     Stroke Father     Cancer Brother      Social History     Socioeconomic History    Marital status:      Spouse name: Not on file    Number of children: Not on file    Years of education: Not on file    Highest education level: Not on file   Occupational History    Occupation:    Social Needs    Financial resource strain: Not on file    Food insecurity     Worry: Not on file     Inability: Not on file   [employer] needs     Medical: Not on file     Non-medical: Not on file   Tobacco Use    Smoking status: Current Every Day Smoker     Packs/day: 1.50     Years: 45.00     Pack years: 67.50    Smokeless tobacco: Never Used   Substance and Sexual Activity    Alcohol use: No     Comment: caffeine 1 cup of coffee a day and at least 6 bottles of pop a day    Drug use: Yes     Types: Cocaine     Comment: Used last two weeks ago.     Sexual activity: Yes     Partners: Female   Lifestyle    Physical activity     Days per week: Not on file     Minutes per session: Not on file    Stress: Not on file   Relationships    Social connections     Talks on phone: Not on file     Gets together: Not on file     Attends [employer] service: Not on file     Active member of club or organization: Not on file     Attends meetings of clubs or organizations: Not on file     Relationship status: Not on file    Intimate partner violence     Fear of current or ex partner: Not on file     Emotionally abused: Not on file     Physically abused: Not on file     Forced sexual activity: Not on file   Other Topics Concern    Not on file   Social History Narrative    Not on file     Current Facility-Administered Medications   Medication Dose Route Frequency Provider Last Rate Last Dose    0.9 % sodium chloride infusion   Intravenous Continuous Suzanne Cornea, DO        LORazepam (ATIVAN) injection 1 mg  1 mg Intravenous Once Suzanne Cornea, DO        nitroGLYCERIN 50 mg in dextrose 5% 250 mL infusion  5 mcg/min Intravenous Continuous Master Mcgill DO        ondansetron (ZOFRAN) injection 4 mg  4 mg Intravenous Q30 Min PRN Alcides Vielka, DO   4 mg at 07/19/20 2341    morphine sulfate (PF) injection 4 mg  4 mg Intravenous Q30 Min PRN Alcides Vielka, DO   4 mg at 07/20/20 0028     Current Outpatient Medications   Medication Sig Dispense Refill    aspirin 81 MG EC tablet Take 1 tablet by mouth daily 30 tablet 3    amiodarone (CORDARONE) 200 MG tablet Take 1 tablet by mouth 2 times daily 60 tablet 1    glipiZIDE (GLUCOTROL) 10 MG tablet Take 1 tablet by mouth every morning (before breakfast) 60 tablet 3    atorvastatin (LIPITOR) 20 MG tablet Take 1 tablet by mouth nightly 30 tablet 3    carvedilol (COREG) 6.25 MG tablet Take 1 tablet by mouth 2 times daily 60 tablet 3    furosemide (LASIX) 40 MG tablet Take 1 tablet by mouth 3 times daily 90 tablet 1    potassium chloride (KLOR-CON M) 20 MEQ extended release tablet Take 2 tablets by mouth 2 times daily 60 tablet 1    linagliptin (TRADJENTA) 5 MG tablet Take 1 tablet by mouth daily 30 tablet 3    warfarin (COUMADIN) 5 MG tablet Take 0.5 tablets by mouth daily 30 tablet 3    amLODIPine (NORVASC) 10 MG tablet Take 1 tablet by mouth daily 30 tablet 11      No Known Allergies  Current Facility-Administered Medications   Medication Dose Route Frequency Provider Last Rate Last Dose    0.9 % sodium chloride infusion   Intravenous Continuous Master Mcgill DO        LORazepam (ATIVAN) injection 1 mg  1 mg Intravenous Once Alcides Vora DO        nitroGLYCERIN 50 mg in dextrose 5% 250 mL infusion  5 mcg/min Intravenous Continuous Master Mcgill DO        ondansetron (ZOFRAN) injection 4 mg  4 mg Intravenous Q30 Min PRN Alcides Vielka, DO   4 mg at 07/19/20 2341    morphine sulfate (PF) injection 4 mg  4 mg Intravenous Q30 Min PRN Alcides Vielka, DO   4 mg at 07/20/20 0028     Current Outpatient Medications   Medication Sig Dispense Refill    aspirin 81 MG EC tablet Take 1 tablet by mouth daily 30 tablet 3    amiodarone (CORDARONE) 200 MG tablet Take 1 tablet by mouth 2 times daily 60 tablet 1    glipiZIDE (GLUCOTROL) 10 MG tablet Take 1 tablet by mouth every morning (before breakfast) 60 tablet 3    atorvastatin (LIPITOR) 20 MG tablet Take 1 tablet by mouth nightly 30 tablet 3    carvedilol (COREG) 6.25 MG tablet Take 1 tablet by mouth 2 times daily 60 tablet 3    furosemide (LASIX) 40 MG tablet Take 1 tablet by mouth 3 times daily 90 tablet 1    potassium chloride (KLOR-CON M) 20 MEQ extended release tablet Take 2 tablets by mouth 2 times daily 60 tablet 1    linagliptin (TRADJENTA) 5 MG tablet Take 1 tablet by mouth daily 30 tablet 3    warfarin (COUMADIN) 5 MG tablet Take 0.5 tablets by mouth daily 30 tablet 3    amLODIPine (NORVASC) 10 MG tablet Take 1 tablet by mouth daily 30 tablet 11       Nursing Notes Reviewed    VITAL SIGNS:  ED Triage Vitals [07/19/20 2315]   Enc Vitals Group      BP       Pulse       Resp       Temp       Temp src       SpO2       Weight 285 lb (129.3 kg)      Height 6' 3\" (1.905 m)      Head Circumference       Peak Flow       Pain Score       Pain Loc       Pain Edu? Excl. in [address]? PHYSICAL EXAM:  Physical Exam  Vitals signs and nursing note reviewed. Constitutional:       General: He is not in acute distress. Appearance: Normal appearance. He is well-developed and well-groomed. He is not ill-appearing, toxic-appearing or diaphoretic. HENT:      Head: Normocephalic and atraumatic. Right Ear: External ear normal.      Left Ear: External ear normal.      Nose: No congestion or rhinorrhea. Eyes:      Extraocular Movements: Extraocular movements intact. Conjunctiva/sclera: Conjunctivae normal.   Neck:      Musculoskeletal: Full passive range of motion without pain and normal range of motion. Normal range of motion.  No edema, erythema, neck rigidity, crepitus or injury. Vascular: No JVD. Trachea: Phonation normal.   Cardiovascular:      Rate and Rhythm: Normal rate and regular rhythm. Pulses: Normal pulses. Heart sounds: Normal heart sounds. No murmur. No friction rub. No gallop. Pulmonary:      Effort: Pulmonary effort is normal. No respiratory distress. Breath sounds: No stridor. Rhonchi and rales present. No wheezing. Chest:      Chest wall: Tenderness present. Abdominal:      General: Bowel sounds are normal. There is no distension. Palpations: Abdomen is soft. There is no mass or pulsatile mass. Tenderness: There is no abdominal tenderness. There is no guarding or rebound. Negative signs include Bach's sign, Rovsing's sign and McBurney's sign. Hernia: No hernia is present. Musculoskeletal:         General: Swelling and tenderness present. No deformity or signs of injury. Right lower leg: Edema present. Left lower leg: Edema present. Skin:     General: Skin is warm. Coloration: Skin is not jaundiced. Findings: No bruising or lesion. Neurological:      General: No focal deficit present. Mental Status: He is alert and oriented to person, place, and time. GCS: GCS eye subscore is 4. GCS verbal subscore is 5. GCS motor subscore is 6. Cranial Nerves: Cranial nerves are intact. No cranial nerve deficit, dysarthria or facial asymmetry. Sensory: Sensation is intact. No sensory deficit. Motor: Motor function is intact. No weakness, tremor, atrophy, abnormal muscle tone or seizure activity. Coordination: Coordination normal.   Psychiatric:         Mood and Affect: Mood normal.         Behavior: Behavior normal. Behavior is cooperative. Thought Content:  Thought content normal.         Judgment: Judgment normal.           I have reviewed andinterpreted all of the currently available lab results from this visit (if SYSTEM PROVIDED HISTORY: cp TECHNOLOGIST PROVIDED HISTORY: Reason for exam:->cp Reason for Exam: chest  pain Acuity: Acute Type of Exam: Initial FINDINGS: The lungs are without acute focal process. There is no effusion or pneumothorax. The cardiomediastinal silhouette is without acute process. The osseous structures are without acute process. No acute process. Ct Chest Wo Contrast    Result Date: 7/3/2020  EXAMINATION: CT OF THE CHEST WITHOUT CONTRAST 7/3/2020 12:50 am TECHNIQUE: CT of the chest was performed without the administration of intravenous contrast. Multiplanar reformatted images are provided for review. Dose modulation, iterative reconstruction, and/or weight based adjustment of the mA/kV was utilized to reduce the radiation dose to as low as reasonably achievable. COMPARISON: CT chest 01/05/2020, 01/08/2016 HISTORY: ORDERING SYSTEM PROVIDED HISTORY: evaluate mediastinal LAD TECHNOLOGIST PROVIDED HISTORY: This needs done tonight. Pt having surgery first thing in AM Reason for exam:->evaluate mediastinal LAD Reason for Exam: evaluate mediastinal LAD Acuity: Acute Type of Exam: Initial Additional signs and symptoms: pt having open heart sx in the  AM FINDINGS: Mediastinum: No enlarged lymph nodes. Normal caliber ascending thoracic aorta. Diffusely ectatic descending thoracic aorta measuring up to 3.5 cm in caliber. Mild four-chamber cardiomegaly with multivessel coronary calcifications and coronary stents present. No pericardial effusion. Unremarkable esophagus and included thyroid. Lungs/pleura: No pneumothorax, pleural effusion or consolidative airspace disease. Mild centrilobular and paraseptal emphysema. Mild interlobular septal thickening at the lung apices and lung bases. Bronchial walls are thickened. Unchanged sub 5 mm nodular focus in the subpleural left upper lobe.  Upper Abdomen: Unchanged nonspecific left adrenal gland thickening, probably benign adenomatous hypertrophy given low attenuation. Soft Tissues/Bones: No acute soft tissue or osseous abnormality. Resolution of previously seen mediastinal lymphadenopathy. This was presumably reactive. Overall improved volume status/heart failure, with minimal interstitial pulmonary edema. Effusions have resolved. Stable enlargement of the main pulmonary artery which may indicate underlying pulmonary hypertension. Mild centrilobular and paraseptal emphysema. Unchanged sub 5 mm nodular focus in the subpleural left upper lobe, perhaps a small nidus of infection or inflammation versus volume averaging of a benign parenchymal lymph node. Xr Chest Portable    Result Date: 7/7/2020  EXAMINATION: ONE XRAY VIEW OF THE CHEST 7/7/2020 5:40 am COMPARISON: 07/06/2020, 07/04/2020 HISTORY: ORDERING SYSTEM PROVIDED HISTORY: cabg TECHNOLOGIST PROVIDED HISTORY: Reason for exam:->cabg Reason for Exam: CABG Acuity: Unknown Type of Exam: Unknown FINDINGS: Median sternotomy wires are identified. Postsurgical changes in the chest. A left subclavian central venous line is seen with the catheter tip overlying the mid SVC. No pneumothorax is identified. Cardiomegaly. Small left and trace right pleural effusion. Bibasilar atelectasis. Mild improved aeration at the left lung base. No acute osseous abnormality is identified. Cardiomegaly with trace right and small left pleural effusions with basilar atelectasis, mildly improved on the left. Xr Chest Portable    Result Date: 7/6/2020  EXAMINATION: ONE XRAY VIEW OF THE CHEST 7/4/2020 4:05 am COMPARISON: 07/03/2020 HISTORY: ORDERING SYSTEM PROVIDED HISTORY: TECHNOLOGIST PROVIDED HISTORY: Reason for Exam:->Post op open heart surgery Reason for Exam: post open heart surgery FINDINGS: The heart is stable in size. There is blunting of the left costophrenic sulcus. No pneumothorax is seen. Stable appearance of the pulmonary arterial catheter. Mediastinal drain/chest tubes are noted.  Bilateral perihilar and basilar airspace opacities are seen. Bilateral perihilar and basilar airspace opacities favored to represent atelectasis in the postoperative setting. No measurable pneumothorax. Xr Chest Portable    Result Date: 7/6/2020  EXAMINATION: ONE X-RAY VIEW OF THE CHEST 7/3/2020 12:32 pm COMPARISON: June 30, 2020 HISTORY: ORDERING SYSTEM PROVIDED HISTORY: Post op open heart surgery. TECHNOLOGIST PROVIDED HISTORY: Reason for exam:->Post op open heart surgery. Reason for Exam: Open heart FINDINGS: Lines and tubes include an endotracheal tube measuring 7.3 cm above the mely, left subclavian central venous catheter with the tip terminating in the upper superior vena cava, [place] catheter with the tip terminating in the pulmonary outflow tract, mediastinal drain, and left chest tube. The cardiomediastinal silhouette is enlarged, compatible with recent postoperative status. The lungs are low in volume with mild edema. No obvious pneumothorax. Status post sternotomy. 1. Lines and tubes as described. 2. Expected postoperative changes status post open heart surgery. No obvious complication. Xr Chest Portable    Result Date: 7/5/2020  EXAMINATION: ONE XRAY VIEW OF THE CHEST 7/5/2020 4:35 am COMPARISON: 07/04/2020. HISTORY: ORDERING SYSTEM PROVIDED HISTORY: TECHNOLOGIST PROVIDED HISTORY: Reason for Exam:->Post op open heart surgery Reason for Exam: Post op open heart surgery Acuity: Unknown Type of Exam: Subsequent/Follow-up Additional signs and symptoms: Post op open heart surgery Relevant Medical/Surgical History: Post op open heart surgery FINDINGS: Left subclavian central venous catheter tip terminating within the SVC. Left-sided chest tube appears in unchanged position. Status post median sternotomy. Cardiac and mediastinal contours are enlarged but unchanged. Unchanged findings of pulmonary interstitial edema. Small bilateral pleural effusions. Unchanged bibasilar pulmonary opacities.  No evidence was also acquired. COMPARISON: None. HISTORY: ORDERING SYSTEM PROVIDED HISTORY: cabg 7/1 TECHNOLOGIST PROVIDED HISTORY: Reason for exam:->cabg 7/1 Reason for Exam: Preop CABG Acuity: Acute Type of Exam: Ongoing FINDINGS: Sapheno-femoral junction: 4.2mm. Proximal thigh:  4.2mm. Mid thigh:  4.2mm. Distal thigh:  3.7mm. Knee:  3.1mm. Proximal calf: 3.3mm. Mid calf:  2.8mm. Ankle:  2.7mm. Greater saphenous vein is patent with measurements ranging from 2.7-4.2mm as discussed above. EKG (if obtained): (All EKG's are interpreted by myself in the absence of a cardiologist)    12 lead EKG per my interpretation:  Atrial Fibrillation 87  Axis is   Normal  QTc is  462  There is no specific T wave changes appreciated. There is no specific ST wave changes appreciated. Prior EKG to compare with was not available      Total critical care time today provided was at least 40 minutes. This excludes seperately billable procedure. Critical care time provided for reviewing labs, images, giving nitro, oxygen,  Lasix, consulting CTS that required close evaluation and/or intervention with concern for patient decompensation. MDM:    Patient here with chest pain shortness of breath. Again patient has a history of CABG done on the seventh having constant chest pain ever since going home. Denies any fevers nausea vomiting does have chest pain shortness of breath he has pain and edema. Will get labs, imaging will consult cardiothoracic surgery EKG is negative will give pain nausea medicine he is hypertensive will give him nitro. Patient still having chest pain according to cardiothoracic surgery he does use cocaine and given Ativan nitro drip. Imaging also negative also given Lasix for CHF. I did talk to cardiothoracic surgery will admit to CVICU otherwise patient stable admitted.     CLINICAL IMPRESSION:  Final diagnoses:   Chest pain, unspecified type   Dyspnea and respiratory abnormalities   Leukocytosis, unspecified type   Elevated brain natriuretic peptide (BNP) level   Troponin level elevated   Serum creatinine raised   History of drug use   Elevated blood pressure reading       (Please note that portions of this note may have been completed with a voice recognition program. Efforts were made to edit the dictations but occasionally words aremis-transcribed.)    DISPOSITION REFERRAL (if applicable):  No follow-up provider specified.     DISPOSITION MEDICATIONS (if applicable):  New Prescriptions    No medications on file          Adonay [address] DO  07/20/20 0200

## 2020-07-20 NOTE — PROGRESS NOTES
Patient states that prn pain med is not working, cont to c/o pain around mid chest incision area, BP 160s/90s.  NTG GTT INCREASED TO 20 MICS, A Fib/flutter, V rate 80, NC 02 refused by patient , removed by sushma, [id number]

## 2020-07-20 NOTE — PROGRESS NOTES
Discharge instructions given, explained. He verbalized understanding. Prescription and copies given. Called Dr. Taye Quiñones office- message left to cancel today's appointment.  Also, pt's number left so they can call him back direct to reschedule his appointment

## 2020-07-20 NOTE — ED NOTES
Bed: ED-14  Expected date:   Expected time:   Means of arrival:   Comments:  EMS     Daniela Hassan RN  07/19/20 3211

## 2020-07-23 ENCOUNTER — OFFICE VISIT (OUTPATIENT)
Dept: CARDIOLOGY CLINIC | Age: 60
End: 2020-07-23
Payer: COMMERCIAL

## 2020-07-23 ENCOUNTER — ANTI-COAG VISIT (OUTPATIENT)
Dept: PHARMACY | Age: 60
End: 2020-07-23

## 2020-07-23 VITALS
HEIGHT: 75 IN | SYSTOLIC BLOOD PRESSURE: 164 MMHG | DIASTOLIC BLOOD PRESSURE: 92 MMHG | WEIGHT: 271.4 LBS | HEART RATE: 80 BPM | BODY MASS INDEX: 33.74 KG/M2

## 2020-07-23 LAB — INR BLD: 5

## 2020-07-23 PROCEDURE — 99214 OFFICE O/P EST MOD 30 MIN: CPT | Performed by: INTERNAL MEDICINE

## 2020-07-23 PROCEDURE — 93000 ELECTROCARDIOGRAM COMPLETE: CPT | Performed by: INTERNAL MEDICINE

## 2020-07-23 RX ORDER — LISINOPRIL 10 MG/1
10 TABLET ORAL DAILY
Qty: 90 TABLET | Refills: 1 | Status: SHIPPED | OUTPATIENT
Start: 2020-07-23 | End: 2020-10-08

## 2020-07-23 RX ORDER — WARFARIN SODIUM 2 MG/1
2 TABLET ORAL DAILY
Qty: 30 TABLET | Refills: 3 | Status: CANCELLED | OUTPATIENT
Start: 2020-07-23

## 2020-07-23 NOTE — PROGRESS NOTES
Nini Hewitt MD        OFFICE  FOLLOWUP NOTE    Chief complaints: patient is here for management of  CAD s/p CABG, DM, HTN, DYSLPIDEMIA, AFIB    POST CABG VISIT    Subjective:  + chest pain, no shortness of breath, no dizziness, no palpitations    ADRIANE Briseno is a 62 y. o.year old who  has a past medical history of Atrial fibrillation ([facility] CAD (coronary artery disease), CHF (congestive heart failure) ([facility] COPD (chronic obstructive pulmonary disease) [facility] Diabetes mellitus ([facility] GERD (gastroesophageal reflux disease), H/O cardiovascular stress test, H/O cardiovascular stress test, H/O cardiovascular stress test, H/O Doppler ultrasound, H/O echocardiogram, H/O percutaneous left heart catheterization, Heart imaging, History of coronary artery stent placement, History of exercise stress test, History of Holter monitoring, History of nuclear stress test, Hyperlipidemia, Hypertension, S/P cardiac catheterization, and Status post angioplasty with stent. and presents for management ofCAD s/p CABG X4, DM, HTN, DYSLPIDEMIA, AFIB which are well controlled, HE had CABG, quit job. Current Outpatient Medications   Medication Sig Dispense Refill    traMADol (ULTRAM) 50 MG tablet Take 1 tablet by mouth every 8 hours as needed for Pain for up to 5 days. 30 tablet 0    HYDROcodone-acetaminophen (NORCO) 5-325 MG per tablet Take 1 tablet by mouth every 8 hours as needed for Pain for up to 5 days. Intended supply: 5 days.  Take lowest dose possible to manage pain 20 tablet 0    aspirin 81 MG EC tablet Take 1 tablet by mouth daily 30 tablet 3    amiodarone (CORDARONE) 200 MG tablet Take 1 tablet by mouth 2 times daily 60 tablet 1    glipiZIDE (GLUCOTROL) 10 MG tablet Take 1 tablet by mouth every morning (before breakfast) 60 tablet 3    atorvastatin (LIPITOR) 20 MG tablet Take 1 tablet by mouth nightly 30 tablet 3    potassium chloride (KLOR-CON M) 20 MEQ extended release tablet Take 2 tablets by mouth 2 times daily 60 tablet 1    linagliptin (TRADJENTA) 5 MG tablet Take 1 tablet by mouth daily 30 tablet 3    warfarin (COUMADIN) 5 MG tablet Take 0.5 tablets by mouth daily 30 tablet 3    amLODIPine (NORVASC) 10 MG tablet Take 1 tablet by mouth daily 30 tablet 11    carvedilol (COREG) 6.25 MG tablet Take 1 tablet by mouth 2 times daily 60 tablet 3    furosemide (LASIX) 40 MG tablet Take 1 tablet by mouth 3 times daily 90 tablet 1     No current facility-administered medications for this visit. Allergies: Patient has no known allergies. Past Medical History:   Diagnosis Date    Atrial fibrillation ([facility] 11/2013    CAD (coronary artery disease)     [facility]est[facility]     COPD (ch[facility] obstructive [facility] disease[facility] ([facility]     Diabetes mellitus ([facility]     GERD (gastroesophageal reflux disease)     H/O cardiovascular stress test 11/20/13, 3/20/2013    11/13-Observed defect is consistent with diaphragmatic attenuation. EF 58%. 3/13 -EF 51%. No ischemia. Normal Study.  H/O cardiovascular stress test 06/08/2017    EF 50% normal study    H/O cardiovascular stress test 06/17/2019    Normal NM    H/O Doppler ultrasound 10/14/2010    10/2010-Bilateral venous doppler of lower extremies- No DVTs.  H/O echocardiogram 06/26/2019    Left ventricular systolic function is normal with an ejection fraction of 50-55%. Mild concentric left ventricular hypertrophy. Grade I diastolic dysfunction. No significant valvular disease noted. No evidence of pericardial effusion. Essentially unremarkable echo.     H/O percutaneous left heart catheterization 12/22/15    No evidence of hemodynamically significant coronary artery disease    Heart imaging 04/23/2020    muga - ef 58%    History of coronary artery stent placement 11/13/2013 11/13 -RCA - 3 stents placed    History of exercise stress test 06/08/2017    treadmill    History of Holter monitoring 1/4/2016    predominant rhythm sinus    History of nuclear stress test 02/24/2020    EF 38%,Myocardial perfusion scan shows moderate size, severe intensity, non  reversible perfusion defect in inferior wall.  Hyperlipidemia     Hypertension     S/P cardiac catheterization 11/13/2013 11/13/2013-EF 55%, distal LAD mild disease,CX stent patent,but distal to stent 50% stenosis. RCA severe disease.  Status post angioplasty with stent      Past Surgical History:   Procedure Laterality Date    APPENDECTOMY      CARDIAC CATHETERIZATION  11/13/2013 11/13/2013-EF 55%, distal LAD mild disease,CX stent patent,but distal to stent 50% stenosis. RCA severe disease.     CARDIAC SURGERY  07/03/2020    CABG X 3 LIMA-OM-PDA-LAD, Maze, LT Atrial Appendage clipping    COLONOSCOPY      CORONARY ANGIOPLASTY WITH STENT PLACEMENT      4 stents- RCA X3; CX X1    CORONARY ANGIOPLASTY WITH STENT PLACEMENT  11/13/2013 11/13/2013 -3 stents placed to RCA- Dr Rozena Cooks N/A 7/3/2020    INTRAOPERATIVE ISMAEL, CABG X3   WITH LIMA  AND LEFT LEG ENDOVEIN HARVEST, INDUCED HYPOTHERMIA, MAZE BIPOLAR AND CRYO PROCEDURE, LEFT ATRIAL APPENDAGE CLIPPING performed by Cassia Veronica MD at [facility]     Family History   Problem Relation Age of Onset    Cancer Mother     Diabetes Mother     Heart Disease Mother     High Blood Pressure Mother     Stroke Mother     Cancer Father     Heart Disease Father     High Blood Pressure Father     Stroke Father     Cancer Brother      Social History     Tobacco Use    Smoking status: Former Smoker     Packs/day: 3.50     Years: 45.00     Pack years: 157.50     Types: Cigarettes    Smokeless tobacco: Never Used    Tobacco comment: sushma malik he smoked sm par [employer] today   Substance Use Topics    Alcohol use: No     Comment: caffeine 1 cup of coffee a day and at least 6 bottles of pop a day      [unfilled]  Review of Systems:   · Constitutional: No no bruit, no abdominal bruit noted ( normal abdominal aorta ausculation), femoral arteries pulse and amplitude are normal no bruit, pedal pulses are normal  Femoral pulses have normal amplitude, no bruits   Extremities - No cyanosis, clubbing, or significant edema, no varicose veins    Abdomen - No masses, tenderness, or organomegaly, no hepato-splenomegally, no bruits  Musculoskeletal - No significant edema, no kyphosis or scoliosis, no deformity in any extremity noted, muscle strength and tone are normal  Skin: no ulcer,no scar,no stasis dermatitis   Neurologic - alert oriented times 3,Cranial nerves II through XII are grossly intact. There were no gross focal neurologic abnormalities. All sensory and motor nerves examined and were normal  Psychiatric: normal mood and affect    Lab Results   Component Value Date    CKTOTAL 47 07/19/2020    CKMB 2.4 03/10/2013    TROPONINI 0.014 06/09/2014    TROPONINI 0.015 12/03/2011     BNP:    Lab Results   Component Value Date    BNP 17 03/09/2013     PT/INR:  No results found for: PTINR  Lab Results   Component Value Date    LABA1C 8.9 (H) 06/29/2020    LABA1C 6.9 (H) 01/05/2020     Lab Results   Component Value Date    CHOL 153 01/06/2020    TRIG 54 01/06/2020    HDL 43 01/06/2020    LDLDIRECT 111 (H) 01/06/2020     Lab Results   Component Value Date    ALT 27 07/19/2020    AST 18 07/19/2020     TSH:  No results found for: TSH    Impression:  Isreal Kelly is a 62 y. o.year old who  has a past medical history of Atrial fibrillation ([facility] CAD (coronary artery disease), CHF (congestive heart failure) ([facility] COPD (chronic obstructive pulmonary disease) ([facility] Diabetes mellitus ([facility] GERD (gastroesophageal reflux disease), H/O cardiovascular stress test, H/O cardiovascular stress test, H/O cardiovascular stress test, H/O Doppler ultrasound, H/O echocardiogram, H/O percutaneous left heart catheterization, Heart imaging, History of coronary artery stent placement, History of exercise stress test, History of Holter monitoring, History of nuclear stress test, Hyperlipidemia, Hypertension, S/P cardiac catheterization, and Status post angioplasty with stent. and presents with     Plan:  1. CAD:s/p CABG X4  Continue aspirin , continue statins, betablockers, stress test and cardiac rehab, LVEF 40-45%, ADD LISINOPRIL AND DC NORVASC, POST CABG EKG IS NSR   2. DM: stable continue glipizide  3. Paroxysmal afib: continue COUAMDIN AND ASPIRIN, amidarone  4. Dyslipidemia: continue statins  5. HTN: stable, continue COREG medicatons  6. Health maintenance: exerise and diet  All labs, medications and tests reviewed, continue all other medications of all above medical condition listed as is.     @[facility]@

## 2020-07-23 NOTE — LETTER
Mary ALEXANDER Casandra Carlisle  1960  [id number]    Have you had any Chest Pain - Yes, every day since CABG  If Yes DO EKG - How does it feel - Sharp Pain and tightness  How long does the pain last - continuous   How long have you been having the pain - weeks  Did you take a Tramodol and Hydrocodone   And did it relieve the pain - Yes    Have you had any Shortness of Breath - Yes  If Yes - When at rest and on exertion    Have you had any dizziness - No       Have you had any palpitations - No       Is the patient on any of the following medications - Amiodarone (Cordarone  If Yes DO EKG    Do you have any edema - swelling in legs to feet   If Yes - CHECK TO SEE IF THE EDEMA IS PITTING  How long have they been having edema - Weeks  If Yes - Have they worn compression stockings No    Check Venous \"LEG PROBLEM Questionnaire\"    Do you have a surgery or procedure scheduled in the near future - No  If Yes- DO EKG    BE SURE TO GIVE THIS INFORMATION to [facility]    Ask patient if they [facility] if [facility] are not already signed [facility]    Check to see if we have an E-MAIL on file for the patient    Check medication list thoroughly!!!  BE SURE TO ASK PATIENT IF THEY NEED MEDICATION REFILLS

## 2020-07-23 NOTE — PROGRESS NOTES
[id number] Score for Atrial Fibrillation Stroke Risk   Risk   Factors  Component Value   C CHF Yes 1   H HTN Yes 1   A2 Age >= 75 No,  (65 y.o.) 0   D DM Yes 1   S2 Prior Stroke/TIA Yes 2   V Vascular Disease Yes 1   A Age 74-69 No,  (65 y.o.) 0   Sc Sex male 0    [id number]  Score  6   Score last updated 7/23/20 9:57 PM EDT    Click here for a link to the UpToDate guideline \"Atrial Fibrillation: Anticoagulation therapy to prevent embolization    Disclaimer: Risk Score calculation is dependent on accuracy of patient problem list and past encounter diagnosis.           When Pt was started on Amiodarone: 6/30/2020    Test                     Date of last test  EKG                     [x]  7/23/2020  PFT                      [] None in chart                                                                        CXR                     [x] CTA 7/20/2020                                                                        THYROID            [] None in chart                                                LFT                      [x] 7/19/2020                                          EYE EXAM          [x] 9/2019

## 2020-07-23 NOTE — PROGRESS NOTES
Medication Management Service  [address] [phone]    Visit Date: 7/23/2020   Subjective:       Rae Adler is a 62 y.o. male who is having INR checked by [facility]. INR results were called in to the clinic for anticoagulation monitoring and adjustment by Cristino Hinojosa RN who can be reached at [phone]. Patient results called in to clinic for warfarin management due to  Indication:   atrial fibrillation. INR goal: of 2.0-3.0. Duration of therapy: indefinite. Patient reports the following:   Adherent with regimen  Missed or extra doses:  None   Bleeding or thromboembolic side effects:  None  Significant medication changes:  None  Significant dietary changes: None  Significant alcohol or tobacco changes: None  Significant recent illness, disease state changes, or hospitalization:  None  Upcoming surgeries or procedures:  None  Falls: None           Assessment and Plan     PT/INR performed by [address] INR today is 5.0, supratherapeutic. Patient went to ED on 7/19 and is now taking tramadol and Norco, which can increase INR. Patient did start amiodarone 7/7 during hospitalization. Patient only has 5mg tablets. Left message with patient to call clinic. Patient will need prescription for new strength of warfarin- left voicemail for patient to find out pharmacy. Patient has already taken warfarin today. Plan: Hold warfarin for 2 days and take warfarin 2.5mg on 7/26 and 7/27. Recheck INR in 4 days. Patient's home care nurse verbalized understanding of dosing directions and information discussed and will provide information to patient. Patient's home care nurse acknowledges working in consult agreement with pharmacist as referred by patient's physician on behalf of patient.       Electronically signed by DARA Enamorado [facility] on 7/23/20 at 1:01 PM EDT    CLINICAL PHARMACY CONSULT: MED RECONCILIATION/REVIEW ADDENDUM    For Pharmacy Admin Tracking Only    PHSO: No  Total # of Interventions Recommended: 1  - Decreased Dose #: 1  - Maintenance Safety Lab Monitoring #: 1  :   Total Interventions Accepted: 1  Time Spent (min): 15    Eyal Brady, SandiD

## 2020-07-24 LAB
EKG ATRIAL RATE: 111 BPM
EKG DIAGNOSIS: NORMAL
EKG Q-T INTERVAL: 384 MS
EKG QRS DURATION: 92 MS
EKG QTC CALCULATION (BAZETT): 462 MS
EKG R AXIS: -5 DEGREES
EKG T AXIS: 46 DEGREES
EKG VENTRICULAR RATE: 87 BPM

## 2020-07-27 ENCOUNTER — TELEPHONE (OUTPATIENT)
Dept: CARDIOLOGY CLINIC | Age: 60
End: 2020-07-27

## 2020-07-27 ENCOUNTER — TELEPHONE (OUTPATIENT)
Dept: PHARMACY | Age: 60
End: 2020-07-27

## 2020-07-27 NOTE — TELEPHONE ENCOUNTER
Called patient again to find out pharmacy of choice and check if home care visit had happened yet. Patient states he never heard back from home care nurse and no one has called him today about visit. I left patient a voicemail on 7/23 to call clinic. Patient states he has been taking warfarin 2.5mg every day and already took dose today. Patient was supposed to hold warfarin for 2 days. Patient states Medicaid coverage is pending. Looked up coverage and current coverage is through [employer]. Added to patient's account. Patient using [facility]. Called Perri Lnagley, home care nurse that saw patient 7/23 and left VM to call clinic today. [facility] office and per Scarlett Goodrich, patient was discharged from home care on 7/25/20. No INR was checked 7/25 prior to discharge. Called patient back. Patient can not come in today. Patient can go to [facility] lab Tuesday morning. Advised patient that he will get call in afternoon with results and directions on warfarin, likely from an [employer] phone number. Advised patient to HOLD warfarin on Tuesday until he received directions from clinic. Patient states he is familiar with lab location.      CLINICAL PHARMACY CONSULT: MED RECONCILIATION/REVIEW ADDENDUM    For Pharmacy Admin Tracking Only    PHSO: No  Total # of Interventions Recommended: 0    - Maintenance Safety Lab Monitoring #: 0  Total Interventions Accepted: 0  Time Spent (min): 30    Eyal Brady, SandiD

## 2020-07-27 NOTE — TELEPHONE ENCOUNTER
Called pt to confirm treadmill test, said he cannot do a treadmill. Paige said it is for rehab. Please discuss with doctor and call pt to let him know next step.

## 2020-07-28 ENCOUNTER — ANTI-COAG VISIT (OUTPATIENT)
Dept: PHARMACY | Age: 60
End: 2020-07-28
Payer: COMMERCIAL

## 2020-07-28 ENCOUNTER — HOSPITAL ENCOUNTER (OUTPATIENT)
Age: 60
Discharge: HOME | End: 2020-07-28
Payer: COMMERCIAL

## 2020-07-28 LAB
INR BLD: 2.51 INDEX
PROTHROMBIN TIME: 30.6 SECONDS (ref 11.7–14.5)

## 2020-07-28 PROCEDURE — 99211 OFF/OP EST MAY X REQ PHY/QHP: CPT

## 2020-07-28 PROCEDURE — 85610 PROTHROMBIN TIME: CPT

## 2020-07-28 PROCEDURE — 36415 COLL VENOUS BLD VENIPUNCTURE: CPT

## 2020-07-28 NOTE — TELEPHONE ENCOUNTER
Will ask  tomorrow if he wants pt to have a Lexiscan NM stress test d/t he is unable to walk long enough for the GXT for S/P CABG x 4.

## 2020-07-28 NOTE — PROGRESS NOTES
Medication Management Service  [address] [phone]  Betina james [phone]    Visit Date: 7/28/2020   Subjective: All appointments have been changed to telephone visits at this time due to COVID-19. Miky Guevara is a 62 y.o. male who is having INR checked by [address] [place] [address] Lab. INR results were sent to the clinic for anticoagulation monitoring and adjustment. Patient results called in to clinic for warfarin management due to  Indication:   atrial fibrillation. INR goal: of 2.0-3.0. Duration of therapy: indefinite. Patient reports the following:   Adherent with regimen  Missed or extra doses:  None   Bleeding or thromboembolic side effects:  None  Significant medication changes:  None  Significant dietary changes: None  Significant alcohol or tobacco changes: None  Significant recent illness, disease state changes, or hospitalization:  None  Upcoming surgeries or procedures:  None  Falls: None           Assessment and Plan     PT/INR performed by Lab. INR today is 2.51, therapeutic. Plan:  Continue warfarin 2.5mg daily. Recheck INR in 1 week(s). Patient has standing lab orders for PT/INR. Patient verbalized understanding of dosing directions and information discussed. Progress note sent to referring office. Patient acknowledges working in consult agreement with pharmacist as referred by his/her physician. Patient accepted that for purposes of billing, this is a virtual visit with your provider for which we will submit a claim for reimbursement with your insurance company. You may be responsible for any copays, coinsurance amounts or other amounts not covered by your insurance company.      Electronically signed by Krysta Delgado, [facility] on 7/28/20 at 2:00 PM EDT    CLINICAL PHARMACY CONSULT: MED RECONCILIATION/REVIEW [address] Tracking Only    PHSO: No  Total # of Interventions Recommended: 0  - Maintenance Safety Lab Monitoring #: 1  Total Interventions Accepted: 0  Time Spent (min): 15    Pati Crowley, PharmD

## 2020-08-02 ENCOUNTER — APPOINTMENT (OUTPATIENT)
Dept: CT IMAGING | Age: 60
End: 2020-08-02
Payer: COMMERCIAL

## 2020-08-02 ENCOUNTER — HOSPITAL ENCOUNTER (EMERGENCY)
Age: 60
Discharge: HOME | End: 2020-08-02
Attending: EMERGENCY MEDICINE
Payer: COMMERCIAL

## 2020-08-02 VITALS
HEART RATE: 76 BPM | DIASTOLIC BLOOD PRESSURE: 104 MMHG | RESPIRATION RATE: 18 BRPM | WEIGHT: 267 LBS | HEIGHT: 75 IN | SYSTOLIC BLOOD PRESSURE: 187 MMHG | OXYGEN SATURATION: 97 % | TEMPERATURE: 98.5 F | BODY MASS INDEX: 33.2 KG/M2

## 2020-08-02 LAB
ALBUMIN SERPL-MCNC: 4.1 GM/DL (ref 3.4–5)
ALP BLD-CCNC: 130 IU/L (ref 40–129)
ALT SERPL-CCNC: 10 U/L (ref 10–40)
ANION GAP SERPL CALCULATED.3IONS-SCNC: 11 MMOL/L (ref 4–16)
AST SERPL-CCNC: 12 IU/L (ref 15–37)
BASOPHILS ABSOLUTE: 0.1 K/CU MM
BASOPHILS RELATIVE PERCENT: 1.1 % (ref 0–1)
BILIRUB SERPL-MCNC: 0.2 MG/DL (ref 0–1)
BUN BLDV-MCNC: 19 MG/DL (ref 6–23)
CALCIUM SERPL-MCNC: 9.6 MG/DL (ref 8.3–10.6)
CHLORIDE BLD-SCNC: 101 MMOL/L (ref 99–110)
CO2: 27 MMOL/L (ref 21–32)
CREAT SERPL-MCNC: 1.9 MG/DL (ref 0.9–1.3)
DIFFERENTIAL TYPE: ABNORMAL
EOSINOPHILS ABSOLUTE: 0.3 K/CU MM
EOSINOPHILS RELATIVE PERCENT: 3.7 % (ref 0–3)
GFR AFRICAN AMERICAN: 44 ML/MIN/1.73M2
GFR NON-AFRICAN AMERICAN: 36 ML/MIN/1.73M2
GLUCOSE BLD-MCNC: 170 MG/DL (ref 70–99)
HCT VFR BLD CALC: 38 % (ref 42–52)
HEMOGLOBIN: 12 GM/DL (ref 13.5–18)
IMMATURE NEUTROPHIL %: 0.4 % (ref 0–0.43)
INR BLD: 1.85 INDEX
LYMPHOCYTES ABSOLUTE: 1.5 K/CU MM
LYMPHOCYTES RELATIVE PERCENT: 18.7 % (ref 24–44)
MCH RBC QN AUTO: 29.1 PG (ref 27–31)
MCHC RBC AUTO-ENTMCNC: 31.6 % (ref 32–36)
MCV RBC AUTO: 92.2 FL (ref 78–100)
MONOCYTES ABSOLUTE: 0.6 K/CU MM
MONOCYTES RELATIVE PERCENT: 7.5 % (ref 0–4)
PDW BLD-RTO: 14.8 % (ref 11.7–14.9)
PLATELET # BLD: 267 K/CU MM (ref 140–440)
PMV BLD AUTO: 9.7 FL (ref 7.5–11.1)
POTASSIUM SERPL-SCNC: 3.6 MMOL/L (ref 3.5–5.1)
PROTHROMBIN TIME: 21 SECONDS (ref 11.7–14.5)
RBC # BLD: 4.12 M/CU MM (ref 4.6–6.2)
SEGMENTED NEUTROPHILS ABSOLUTE COUNT: 5.6 K/CU MM
SEGMENTED NEUTROPHILS RELATIVE PERCENT: 68.6 % (ref 36–66)
SODIUM BLD-SCNC: 139 MMOL/L (ref 135–145)
TOTAL IMMATURE NEUTOROPHIL: 0.03 K/CU MM
TOTAL PROTEIN: 7.8 GM/DL (ref 6.4–8.2)
TROPONIN T: 0.01 NG/ML
TROPONIN T: 0.02 NG/ML
WBC # BLD: 8.1 K/CU MM (ref 4–10.5)

## 2020-08-02 PROCEDURE — 70450 CT HEAD/BRAIN W/O DYE: CPT

## 2020-08-02 PROCEDURE — 85025 COMPLETE CBC W/AUTO DIFF WBC: CPT

## 2020-08-02 PROCEDURE — 93005 ELECTROCARDIOGRAM TRACING: CPT | Performed by: EMERGENCY MEDICINE

## 2020-08-02 PROCEDURE — 80053 COMPREHEN METABOLIC PANEL: CPT

## 2020-08-02 PROCEDURE — 85610 PROTHROMBIN TIME: CPT

## 2020-08-02 PROCEDURE — 93010 ELECTROCARDIOGRAM REPORT: CPT | Performed by: INTERNAL MEDICINE

## 2020-08-02 PROCEDURE — 99284 EMERGENCY DEPT VISIT MOD MDM: CPT

## 2020-08-02 PROCEDURE — 84484 ASSAY OF TROPONIN QUANT: CPT

## 2020-08-02 PROCEDURE — 6370000000 HC RX 637 (ALT 250 FOR IP): Performed by: EMERGENCY MEDICINE

## 2020-08-02 RX ORDER — ACETAMINOPHEN 500 MG
1000 TABLET ORAL ONCE
Status: COMPLETED | OUTPATIENT
Start: 2020-08-02 | End: 2020-08-02

## 2020-08-02 RX ORDER — HYDROCODONE BITARTRATE AND ACETAMINOPHEN 5; 325 MG/1; MG/1
1-2 TABLET ORAL EVERY 8 HOURS PRN
Qty: 9 TABLET | Refills: 0 | Status: SHIPPED | OUTPATIENT
Start: 2020-08-02 | End: 2020-08-05

## 2020-08-02 RX ADMIN — ACETAMINOPHEN 1000 MG: 500 TABLET, FILM COATED ORAL at 14:24

## 2020-08-02 ASSESSMENT — ENCOUNTER SYMPTOMS
ABDOMINAL PAIN: 0
WHEEZING: 0
COUGH: 0
STRIDOR: 0
ABDOMINAL DISTENTION: 0
ANAL BLEEDING: 0
RHINORRHEA: 0
VOMITING: 0
VOICE CHANGE: 0
EYE REDNESS: 0
SHORTNESS OF BREATH: 0
NAUSEA: 0
BLOOD IN STOOL: 0
DIARRHEA: 0
CHEST TIGHTNESS: 0
TROUBLE SWALLOWING: 0
FACIAL SWELLING: 0
CONSTIPATION: 0
BACK PAIN: 0
SINUS PRESSURE: 0
PHOTOPHOBIA: 0
EYE DISCHARGE: 0
SORE THROAT: 0
EYE PAIN: 0
EYE ITCHING: 0

## 2020-08-02 ASSESSMENT — PAIN DESCRIPTION - ORIENTATION: ORIENTATION: LEFT

## 2020-08-02 ASSESSMENT — PAIN DESCRIPTION - PAIN TYPE: TYPE: ACUTE PAIN

## 2020-08-02 ASSESSMENT — PAIN SCALES - GENERAL
PAINLEVEL_OUTOF10: 8
PAINLEVEL_OUTOF10: 8

## 2020-08-02 ASSESSMENT — PAIN DESCRIPTION - LOCATION: LOCATION: EYE

## 2020-08-02 NOTE — ED TRIAGE NOTES
Reports left side facial droop x 4 days. Reports pain \"in my sinus\" on left side of nose. Pt had cabg about 1 month ago. Pt also reports hx of bell's palsy.

## 2020-08-02 NOTE — ED NOTES
Warm compress provided for c/o \"sinus pain\". Physician notified of pt's report of pain.       Thu Dominguez RN  08/02/20 9700

## 2020-08-02 NOTE — ED PROVIDER NOTES
Rae Adler is a 62year old diabetic who presents to the ED with inability to open his left eye since last Monday (7 days). He denies any injury or trauma; no headache reported. He has a history of Bell's palsy on that side, as well as a cataract that has not yet been surgically corrected. He reports that his vision is \"off\" from baseline. He had CABG x3 four weeks ago, and reports a history of diabetes, HTN, COPD, CHF. He denies chest pain or SOB at this time. He had some significant peripheral edema after his hospitalization but this has gone done in the last week. BP (!) 187/104   Pulse 76   Temp 98.5 °F (36.9 °C)   Resp 18   Ht 6' 3\" (1.905 m)   Wt 267 lb (121.1 kg)   SpO2 97%   BMI 33.37 kg/m²     I have reviewed the following from the nursing documentation:      Prior to Admission medications    Medication Sig Start Date End Date Taking?  Authorizing Provider   lisinopril (PRINIVIL;ZESTRIL) 10 MG tablet Take 1 tablet by mouth daily 7/23/20   Charlotte De Dios MD   aspirin 81 MG EC tablet Take 1 tablet by mouth daily 7/8/20   Nathaniel Unger PA-C   amiodarone (CORDARONE) 200 MG tablet Take 1 tablet by mouth 2 times daily 7/7/20   Nathaniel Unger PA-C   glipiZIDE (GLUCOTROL) 10 MG tablet Take 1 tablet by mouth every morning (before breakfast) 7/8/20   Nathaniel Unger PA-C   atorvastatin (LIPITOR) 20 MG tablet Take 1 tablet by mouth nightly 7/7/20   Nathaniel Unger PA-C   carvedilol (COREG) 6.25 MG tablet Take 1 tablet by mouth 2 times daily 7/7/20   Nathaniel Unger PA-C   furosemide (LASIX) 40 MG tablet Take 1 tablet by mouth 3 times daily 7/7/20   Nathaniel Unger PA-C   potassium chloride (KLOR-CON M) 20 MEQ extended release tablet Take 2 tablets by mouth 2 times daily 7/7/20   Nathaniel Unger PA-C   linagliptin (TRADJENTA) 5 MG tablet Take 1 tablet by mouth daily 7/7/20   Nathaniel Unger PA-C   warfarin (COUMADIN) 5 MG tablet Take 0.5 tablets by mouth daily 7/7/20   Nathaniel Unger PA-C       Allergies as of 08/02/2020    (No Known Allergies)       Past Medical History:   Diagnosis Date    Atrial fibrillation ([facility] [address] 11/2013    CAD (coronary artery disease)     [facility]est[facility] failure[facility]     COPD [facility]chronic obstructive [facility] disease) ([facility]     Diabetes mellitus ([facility]     GERD (gastroesophageal reflux disease)     H/O cardiovascular stress test 11/20/13, 3/20/2013    11/13-Observed defect is consistent with diaphragmatic attenuation. EF 58%. 3/13 -EF 51%. No ischemia. Normal Study.  H/O cardiovascular stress test 06/08/2017    EF 50% normal study    H/O cardiovascular stress test 06/17/2019    Normal NM    H/O Doppler ultrasound 10/14/2010    10/2010-Bilateral venous doppler of lower extremies- No DVTs.  H/O echocardiogram 06/26/2019    Left ventricular systolic function is normal with an ejection fraction of 50-55%. Mild concentric left ventricular hypertrophy. Grade I diastolic dysfunction. No significant valvular disease noted. No evidence of pericardial effusion. Essentially unremarkable echo.  H/O percutaneous left heart catheterization 12/22/15    No evidence of hemodynamically significant coronary artery disease    Heart imaging 04/23/2020    muga - ef 58%    History of coronary artery stent placement 11/13/2013 11/13 -RCA - 3 stents placed    History of exercise stress test 06/08/2017    treadmill    History of Holter monitoring 1/4/2016    predominant rhythm sinus    History of nuclear stress test 02/24/2020    EF 38%,Myocardial perfusion scan shows moderate size, severe intensity, non  reversible perfusion defect in inferior wall.  Hyperlipidemia     Hypertension     S/P cardiac catheterization 11/13/2013 11/13/2013-EF 55%, distal LAD mild disease,CX stent patent,but distal to stent 50% stenosis. RCA severe disease.     Status post angioplasty with stent         Surgical History:   Past Surgical History:   Procedure Laterality Date    APPENDECTOMY      CARDIAC pre op    Sexual activity: Yes     Partners: Female   Lifestyle    Physical activity     Days per week: Not on file     Minutes per session: Not on file    Stress: Not on file   Relationships    Social connections     Talks on phone: Not on file     Gets together: Not on file     Attends Muslim service: Not on file     Active member of club or organization: Not on file     Attends meetings of clubs or organizations: Not on file     Relationship status: Not on file    Intimate partner violence     Fear of current or ex partner: Not on file     Emotionally abused: Not on file     Physically abused: Not on file     Forced sexual activity: Not on file   Other Topics Concern    Not on file   Social History Narrative    Not on file         Review of Systems   Constitutional: Negative for activity change, appetite change, chills, diaphoresis, fatigue and fever. HENT: Negative. Negative for congestion, dental problem, ear pain, facial swelling, rhinorrhea, sinus pressure, sneezing, sore throat, tinnitus, trouble swallowing and voice change. Eyes: Positive for visual disturbance. Negative for photophobia, pain, discharge, redness and itching. Respiratory: Negative for cough, chest tightness, shortness of breath, wheezing and stridor. Cardiovascular: Negative for chest pain, palpitations and leg swelling. Gastrointestinal: Negative for abdominal distention, abdominal pain, anal bleeding, blood in stool, constipation, diarrhea, nausea and vomiting. Genitourinary: Negative for difficulty urinating, discharge, dysuria, frequency, hematuria, testicular pain and urgency. Musculoskeletal: Negative for back pain, joint swelling, neck pain and neck stiffness. Skin: Negative for rash and wound. Neurological: Positive for facial asymmetry and weakness. Negative for dizziness, syncope, speech difficulty, numbness and headaches. Hematological: Does not bruise/bleed easily.    Psychiatric/Behavioral: Negative for agitation, confusion, hallucinations, self-injury, sleep disturbance and suicidal ideas. The patient is not nervous/anxious. All other systems reviewed and are negative. Physical Exam  Vitals signs and nursing note reviewed. Constitutional:       General: He is not in acute distress. Appearance: He is well-developed. HENT:      Head: Normocephalic and atraumatic. Right Ear: External ear normal.      Left Ear: External ear normal.      Nose: Nose normal.      Mouth/Throat:      Pharynx: No oropharyngeal exudate. Eyes:      General: No visual field deficit or scleral icterus. Right eye: No discharge. Left eye: No discharge. Conjunctiva/sclera: Conjunctivae normal.      Pupils: Pupils are equal, round, and reactive to light. Neck:      Musculoskeletal: Normal range of motion and neck supple. Vascular: No JVD. Trachea: No tracheal deviation. Cardiovascular:      Rate and Rhythm: Normal rate and regular rhythm. Heart sounds: Normal heart sounds. No murmur. No friction rub. No gallop. Pulmonary:      Effort: Pulmonary effort is normal. No respiratory distress. Breath sounds: Normal breath sounds. No wheezing or rales. Abdominal:      General: Bowel sounds are normal. There is no distension. Palpations: Abdomen is soft. There is no mass. Tenderness: There is no abdominal tenderness. There is no guarding or rebound. Musculoskeletal: Normal range of motion. General: No tenderness. Lymphadenopathy:      Cervical: No cervical adenopathy. Skin:     General: Skin is warm and dry. Coloration: Skin is not pale. Findings: No erythema or rash. Neurological:      Mental Status: He is alert and oriented to person, place, and time. Cranial Nerves: Cranial nerve deficit and facial asymmetry present. Sensory: Sensation is intact. Motor: Motor function is intact. No abnormal muscle tone. Coordination: Coordination is intact. Coordination normal.      Gait: Gait is intact. Deep Tendon Reflexes: Reflexes are normal and symmetric. Reflexes normal.      Reflex Scores:       Bicep reflexes are 2+ on the right side and 2+ on the left side. Patellar reflexes are 2+ on the right side and 2+ on the left side. Comments: Coordination, gait, speech, balance and cognition are intact. There is no nuchal rigidity or evidence of meningismus. Negative Kernig's and Brudzinski's signs. All sensory and motor components of the brachial/lumbosacral plexus tested are symmetric and intact. No focal deficits appreciated. There is a Cranial Nerve III defect on the left. No afferent pupillary light reflex abnormality. No anhydrosis of the skin appreciated. EOM testing shows ptosis of the left upper eyelid, and inability to look medially past midline on the left. Lateral gaze is \"DOWN and OUT\", indicating unopposed lateral rectus muscle. Psychiatric:         Behavior: Behavior normal.         Thought Content:  Thought content normal.         Judgment: Judgment normal.          Procedures     MDM   Results for orders placed or performed during the hospital encounter of 08/02/20   CBC auto diff   Result Value Ref Range    WBC 8.1 4.0 - 10.5 K/CU MM    RBC 4.12 (L) 4.6 - 6.2 M/CU MM    Hemoglobin 12.0 (L) 13.5 - 18.0 GM/DL    Hematocrit 38.0 (L) 42 - 52 %    MCV 92.2 78 - 100 FL    MCH 29.1 27 - 31 PG    MCHC 31.6 (L) 32.0 - 36.0 %    RDW 14.8 11.7 - 14.9 %    Platelets 822 124 - 088 K/CU MM    MPV 9.7 7.5 - 11.1 FL    Differential Type AUTOMATED DIFFERENTIAL     Segs Relative 68.6 (H) 36 - 66 %    Lymphocytes % 18.7 (L) 24 - 44 %    Monocytes % 7.5 (H) 0 - 4 %    Eosinophils % 3.7 (H) 0 - 3 %    Basophils % 1.1 (H) 0 - 1 %    Segs Absolute 5.6 K/CU MM    Lymphocytes Absolute 1.5 K/CU MM    Monocytes Absolute 0.6 K/CU MM    Eosinophils Absolute 0.3 K/CU MM    Basophils Absolute 0.1 K/CU MM    Immature Neutrophil % 0.4 0 - 0.43 %    Total Immature Neutrophil 0.03 K/CU MM   CMP   Result Value Ref Range    Sodium 139 135 - 145 MMOL/L    Potassium 3.6 3.5 - 5.1 MMOL/L    Chloride 101 99 - 110 mMol/L    CO2 27 21 - 32 MMOL/L    BUN 19 6 - 23 MG/DL    CREATININE 1.9 (H) 0.9 - 1.3 MG/DL    Glucose 170 (H) 70 - 99 MG/DL    Calcium 9.6 8.3 - 10.6 MG/DL    Alb 4.1 3.4 - 5.0 GM/DL    Total Protein 7.8 6.4 - 8.2 GM/DL    Total Bilirubin 0.2 0.0 - 1.0 MG/DL    ALT 10 10 - 40 U/L    AST 12 (L) 15 - 37 IU/L    Alkaline Phosphatase 130 (H) 40 - 129 IU/L    GFR Non- 36 (L) >60 mL/min/1.73m2    GFR  44 (L) >60 mL/min/1.73m2    Anion Gap 11 4 - 16   Troponin   Result Value Ref Range    Troponin T 0.016 (H) <0.01 NG/ML   PT - INR   Result Value Ref Range    Protime 21.0 (H) 11.7 - 14.5 SECONDS    INR 1.85 INDEX   EKG 12 Lead   Result Value Ref Range    Ventricular Rate 73 BPM    Atrial Rate 73 BPM    P-R Interval 198 ms    QRS Duration 108 ms    Q-T Interval 438 ms    QTc Calculation (Bazett) 482 ms    P Axis 37 degrees    R Axis -11 degrees    T Axis 114 degrees    Diagnosis       Normal sinus rhythm  Inferior infarct (cited on or before 19-JUL-2020)  ST & T wave abnormality, consider lateral ischemia  Abnormal ECG  When compared with ECG of 19-JUL-2020 23:16,  Sinus rhythm has replaced Atrial fibrillation         I estimate there is LOW risk for SUBARACHNOID HEMORRHAGE, MENINGITIS, INTRACRANIAL HEMORRHAGE, SUBDURAL HEMATOMA, OR STROKE, thus I consider the discharge disposition reasonable. Fredrick Xiong and I have discussed the diagnosis and risks, and we agree with discharging home to follow-up with their primary doctor. We also discussed returning to the Emergency Department immediately if new or worsening symptoms occur. We have discussed the symptoms which are most concerning (e.g., changing or worsening pain, weakness, vomiting, fever) that necessitate immediate return. Final Impression    1. Cranial nerve III palsy, left    2. Elevated troponin    3. Hyperglycemia    4. Warfarin-induced coagulopathy ([facility] [address]        Discharge Vital Signs:  Blood pressure (!) 187/104, pulse 76, temperature 98.5 °F (36.9 °C), resp. rate 18, height 6' 3\" (1.905 m), weight 267 lb (121.1 kg), SpO2 97 %. Radiology    Ct Head Wo Contrast    Result Date: 8/2/2020  Cerebral atrophy. Moderate chronic small vessel ischemic disease. No acute brain parenchymal abnormality. EKG Interpretation. The Ekg interpreted by me in the absence of a cardiologist shows. normal sinus rhythm with a rate of 73  Axis is   Normal  QTc is  482 ms  Intervals and Durations are unremarkable. No specific ST-T wave changes appreciated. No evidence of acute ischemia. No old EKG available for comparison. 3:00 pm Consultation with Dr. Donald Peres, ophthalmologist. He will see the patient in his office tomorrow (Monday) for further evaluation.  He has no further recommendations for treatment     Raeford Duane, MD  08/02/20 [id number]

## 2020-08-03 ENCOUNTER — ANTI-COAG VISIT (OUTPATIENT)
Dept: PHARMACY | Age: 60
End: 2020-08-03
Payer: COMMERCIAL

## 2020-08-03 VITALS — TEMPERATURE: 98.1 F

## 2020-08-03 LAB
INTERNATIONAL NORMALIZATION RATIO, POC: 2.5
POC INR: 2.5 INDEX
PROTHROMBIN TIME, POC: 30.2 SECONDS (ref 10–14.3)

## 2020-08-03 PROCEDURE — 36416 COLLJ CAPILLARY BLOOD SPEC: CPT

## 2020-08-03 PROCEDURE — 99213 OFFICE O/P EST LOW 20 MIN: CPT

## 2020-08-03 PROCEDURE — 85610 PROTHROMBIN TIME: CPT

## 2020-08-05 LAB
EKG ATRIAL RATE: 73 BPM
EKG DIAGNOSIS: NORMAL
EKG P AXIS: 37 DEGREES
EKG P-R INTERVAL: 198 MS
EKG Q-T INTERVAL: 438 MS
EKG QRS DURATION: 108 MS
EKG QTC CALCULATION (BAZETT): 482 MS
EKG R AXIS: -11 DEGREES
EKG T AXIS: 114 DEGREES
EKG VENTRICULAR RATE: 73 BPM

## 2020-08-11 ENCOUNTER — HOSPITAL ENCOUNTER (OUTPATIENT)
Age: 60
Discharge: HOME | End: 2020-08-11
Payer: COMMERCIAL

## 2020-08-11 ENCOUNTER — HOSPITAL ENCOUNTER (OUTPATIENT)
Dept: MRI IMAGING | Age: 60
Discharge: HOME | End: 2020-08-11
Payer: COMMERCIAL

## 2020-08-11 LAB
C-REACTIVE PROTEIN, HIGH SENSITIVITY: 3.7 MG/L
ERYTHROCYTE SEDIMENTATION RATE: 23 MM/HR (ref 0–20)

## 2020-08-11 PROCEDURE — 70544 MR ANGIOGRAPHY HEAD W/O DYE: CPT

## 2020-08-11 PROCEDURE — 36415 COLL VENOUS BLD VENIPUNCTURE: CPT

## 2020-08-11 PROCEDURE — 86140 C-REACTIVE PROTEIN: CPT

## 2020-08-11 PROCEDURE — 85652 RBC SED RATE AUTOMATED: CPT

## 2020-08-13 ENCOUNTER — OFFICE VISIT (OUTPATIENT)
Dept: SURGERY | Age: 60
End: 2020-08-13
Payer: COMMERCIAL

## 2020-08-13 VITALS
WEIGHT: 270 LBS | TEMPERATURE: 98 F | SYSTOLIC BLOOD PRESSURE: 140 MMHG | HEIGHT: 75 IN | HEART RATE: 76 BPM | OXYGEN SATURATION: 99 % | BODY MASS INDEX: 33.57 KG/M2 | DIASTOLIC BLOOD PRESSURE: 90 MMHG

## 2020-08-13 PROCEDURE — 99202 OFFICE O/P NEW SF 15 MIN: CPT | Performed by: SURGERY

## 2020-08-13 PROCEDURE — 1036F TOBACCO NON-USER: CPT | Performed by: SURGERY

## 2020-08-13 PROCEDURE — 1111F DSCHRG MED/CURRENT MED MERGE: CPT | Performed by: SURGERY

## 2020-08-13 PROCEDURE — G8417 CALC BMI ABV UP PARAM F/U: HCPCS | Performed by: SURGERY

## 2020-08-13 PROCEDURE — 3017F COLORECTAL CA SCREEN DOC REV: CPT | Performed by: SURGERY

## 2020-08-13 PROCEDURE — G8427 DOCREV CUR MEDS BY ELIG CLIN: HCPCS | Performed by: SURGERY

## 2020-08-13 ASSESSMENT — ENCOUNTER SYMPTOMS
ABDOMINAL DISTENTION: 0
COUGH: 0
ABDOMINAL PAIN: 0
BACK PAIN: 0
CHOKING: 0
CHEST TIGHTNESS: 0

## 2020-08-13 NOTE — PROGRESS NOTES
rhythm sinus    History of MRI of brain and brain stem 06/04/2020    No evidence of an acute infarct, mod chronic microvascular white matter ischemic disease with associated cerebral parenchymal volume loss    History of nuclear stress test 02/24/2020    EF 38%,Myocardial perfusion scan shows moderate size, severe intensity, non  reversible perfusion defect in inferior wall.  Hyperlipidemia     Hypertension     S/P cardiac catheterization 11/13/2013 11/13/2013-EF 55%, distal LAD mild disease,CX stent patent,but distal to stent 50% stenosis. RCA severe disease.  Status post angioplasty with stent      Past Surgical History:    Past Surgical History:   Procedure Laterality Date    APPENDECTOMY      CARDIAC CATHETERIZATION  11/13/2013 11/13/2013-EF 55%, distal LAD mild disease,CX stent patent,but distal to stent 50% stenosis. RCA severe disease.     CARDIAC SURGERY  07/03/2020    CABG X 3 LIMA-OM-PDA-LAD, Maze, LT Atrial Appendage clipping    COLONOSCOPY      CORONARY ANGIOPLASTY WITH STENT PLACEMENT      4 stents- RCA X3; CX X1    CORONARY ANGIOPLASTY WITH STENT PLACEMENT  11/13/2013 11/13/2013 -3 stents placed to RCA- Dr Tai Lazcano N/A 7/3/2020    INTRAOPERATIVE ISMAEL, CABG X3   WITH LIMA  AND LEFT LEG ENDOVEIN HARVEST, INDUCED HYPOTHERMIA, MAZE BIPOLAR AND CRYO PROCEDURE, LEFT ATRIAL APPENDAGE CLIPPING performed by Anu Fatima MD at [facility]     Current Medications:   Current Outpatient Medications   Medication Sig Dispense Refill    lisinopril (PRINIVIL;ZESTRIL) 10 MG tablet Take 1 tablet by mouth daily 90 tablet 1    aspirin 81 MG EC tablet Take 1 tablet by mouth daily 30 tablet 3    amiodarone (CORDARONE) 200 MG tablet Take 1 tablet by mouth 2 times daily 60 tablet 1    glipiZIDE (GLUCOTROL) 10 MG tablet Take 1 tablet by mouth every morning (before breakfast) 60 tablet 3    atorvastatin (LIPITOR) 20 MG tablet Take 1 tablet by mouth nightly 30 tablet 3    carvedilol (COREG) 6.25 MG tablet Take 1 tablet by mouth 2 times daily 60 tablet 3    furosemide (LASIX) 40 MG tablet Take 1 tablet by mouth 3 times daily 90 tablet 1    potassium chloride (KLOR-CON M) 20 MEQ extended release tablet Take 2 tablets by mouth 2 times daily 60 tablet 1    linagliptin (TRADJENTA) 5 MG tablet Take 1 tablet by mouth daily 30 tablet 3    warfarin (COUMADIN) 5 MG tablet Take 0.5 tablets by mouth daily 30 tablet 3     No current facility-administered medications for this visit. Allergies:  Patient has no known allergies.     Social History:   Social History     Socioeconomic History    Marital status:      Spouse name: Not on file    Number of children: Not on file    Years of education: Not on file    Highest education level: Not on file   Occupational History    Occupation:    Social Needs    Financial resource strain: Not on file    Food insecurity     Worry: Not on file     Inability: Not on file    Transportation needs     Medical: Not on file     Non-medical: Not on file   Tobacco Use    Smoking status: Former Smoker     Packs/day: 3.50     Years: 45.00     Pack years: 157.50     Types: Cigarettes    Smokeless tobacco: Never Used    Tobacco comment: sushma gan he smoked sm par [employer] today   Substance and Sexual Activity    Alcohol use: No     Comment: caffeine 1 cup of coffee a day and at least 6 bottles of pop a day    Drug use: Yes     Types: Cocaine     Comment: pre op    Sexual activity: Yes     Partners: Female   Lifestyle    Physical activity     Days per week: Not on file     Minutes per session: Not on file    Stress: Not on file   Relationships    Social connections     Talks on phone: Not on file     Gets together: Not on file     Attends Voodoo service: Not on file     Active member of club or organization: Not on file     Attends meetings of clubs or

## 2020-08-14 ENCOUNTER — ANESTHESIA EVENT (OUTPATIENT)
Dept: OPERATING ROOM | Age: 60
End: 2020-08-14
Payer: COMMERCIAL

## 2020-08-14 ENCOUNTER — HOSPITAL ENCOUNTER (OUTPATIENT)
Dept: PREADMISSION TESTING | Age: 60
Setting detail: OUTPATIENT SURGERY
Discharge: HOME | End: 2020-08-18
Payer: COMMERCIAL

## 2020-08-14 VITALS
SYSTOLIC BLOOD PRESSURE: 160 MMHG | DIASTOLIC BLOOD PRESSURE: 95 MMHG | TEMPERATURE: 97.4 F | WEIGHT: 270 LBS | HEIGHT: 75 IN | OXYGEN SATURATION: 98 % | HEART RATE: 68 BPM | BODY MASS INDEX: 33.57 KG/M2 | RESPIRATION RATE: 16 BRPM

## 2020-08-14 LAB
AMPHETAMINES: NEGATIVE
BARBITURATE SCREEN URINE: NEGATIVE
BENZODIAZEPINE SCREEN, URINE: NEGATIVE
CANNABINOID SCREEN URINE: NEGATIVE
COCAINE METABOLITE: NEGATIVE
OPIATES, URINE: ABNORMAL
OXYCODONE: NEGATIVE
PHENCYCLIDINE, URINE: NEGATIVE

## 2020-08-14 PROCEDURE — 80307 DRUG TEST PRSMV CHEM ANLYZR: CPT

## 2020-08-14 PROCEDURE — U0002 COVID-19 LAB TEST NON-CDC: HCPCS

## 2020-08-14 ASSESSMENT — PAIN DESCRIPTION - PAIN TYPE: TYPE: ACUTE PAIN

## 2020-08-14 ASSESSMENT — PAIN DESCRIPTION - ORIENTATION: ORIENTATION: LEFT

## 2020-08-14 ASSESSMENT — PAIN DESCRIPTION - LOCATION: LOCATION: EYE

## 2020-08-14 ASSESSMENT — PAIN DESCRIPTION - DESCRIPTORS: DESCRIPTORS: ACHING

## 2020-08-14 NOTE — PROGRESS NOTES
.Surgery 8/17/20 @ 0930, arrival 0730               1. Do not eat or drink anything within 8 hours of your surgery - unless instructed by your doctor prior to surgery. This includes                   no water, chewing gum or mints. 2. Follow your directions as prescribed by the doctor for your procedure and medications. To take Amiodarone & Coreg in am with a sip. 3. Check with your Doctor regarding stopping Plavix, Coumadin, Lovenox,Effient,Pradaxa,Xarelto, Fragmin or other blood thinners and                   follow their instructions. 4. Do not smoke, and do not drink any alcoholic beverages 24 hours prior to surgery. This includes NA Beer. 5. You may brush your teeth and gargle the morning of surgery. DO NOT SWALLOW WATER   6. You MUST make arrangements for a responsible adult to take you home after your surgery and be able to check on you every couple                   hours for the day. You will not be allowed to leave alone or drive yourself home. It is strongly suggested someone stay with you the first 24                   hrs. Your surgery will be cancelled if you do not have a ride home. 7. Please wear simple, loose fitting clothing to the hospital.  Julienne Higginset not bring valuables (money, credit cards, checkbooks, etc.) Do not wear any                   makeup (including no eye makeup) or nail polish on your fingers or toes. 8. DO NOT wear any jewelry or piercings on day of surgery. All body piercing jewelry must be removed. 9. If you have dentures, they will be removed before going to the OR; we will provide you a container. If you wear contact lenses or glasses,                  they will be removed; please bring a case for them. 10. If you  have a Living Will and Durable Power of  for Healthcare, please bring in a copy.            11. Please bring picture ID,  insurance card, paperwork from the doctors office    (H & P, Consent, & card for implantable devices). 12. Take a shower the night before or morning of your procedure, do not apply any lotion, oil or powder. 13. Wear a mask covering your nose & mouth when entering the hospital. Have your covid-19 test performed within 7 days of your                  surgery. Quarantine yourself after the test until after your surgery.

## 2020-08-14 NOTE — PROGRESS NOTES
PAT instructions---Covid testing done today and pt aware to self-isolate until surgery  Surgery:  8-17-20 @  0930          Arrival time: 0730  Nothing to eat or drink after midnight unless instructed to take certain medications by the doctor or the nurse the AM of surgery  Arrive at the front information desk -1st floor /[facility] [place] is on [address] [facility]  Please leave money and all other valuables at home. Wear comfortable clothing. If you wear contacts please bring a case. No make up. You may be asked to remove rings or body piercing. Please bring insurance cards and picture ID AM of procedure. Please bring any consent or paper work from your doctor. If you become ill,such as a cold, sore throat or fever contact your doctor. Please bathe or shower am of procedure with provided soaps.   Medications to take AM of procedure:  Amiodarone and Carvedilol   Any questions call [facility] [place]  [phone]

## 2020-08-14 NOTE — PROGRESS NOTES
8/14/20 - Dr. Krissy Porter office called, is patient to stay on or hold Warfarin for surgery on 8/17/20? Also, was phone assessed, is that ok or does Dr. Cris Claros want him seen as a PAT? Per Ana Jorge, patient is to come in for PAT, and Dr. Cris Claros wants the patient to stay on his Warfarin for surgery on 8/17/20.

## 2020-08-14 NOTE — ANESTHESIA PRE PROCEDURE
Department of Anesthesiology  Preprocedure Note       Name:  Alva Camacho   Age:  62 y.o.  :  1960                                          MRN:  [id number]         Date:  2020      Surgeon: Hetal Rushing):  Mell Calle MD    Procedure: Procedure(s):  LEFT TEMPORAL ARTERY BIOPSY LIGATION    Medications prior to admission:   Prior to Admission medications    Medication Sig Start Date End Date Taking?  Authorizing Provider   lisinopril (PRINIVIL;ZESTRIL) 10 MG tablet Take 1 tablet by mouth daily 20   Tommy Gutierrez MD   aspirin 81 MG EC tablet Take 1 tablet by mouth daily 20   Svetlana Le PA-C   amiodarone (CORDARONE) 200 MG tablet Take 1 tablet by mouth 2 times daily 20   Svetlana Le PA-C   glipiZIDE (GLUCOTROL) 10 MG tablet Take 1 tablet by mouth every morning (before breakfast) 20   Svetlana Le PA-C   atorvastatin (LIPITOR) 20 MG tablet Take 1 tablet by mouth nightly 20   Svetlana Le PA-C   carvedilol (COREG) 6.25 MG tablet Take 1 tablet by mouth 2 times daily 20   Svetlana Le PA-C   furosemide (LASIX) 40 MG tablet Take 1 tablet by mouth 3 times daily 20   Svetlana Le PA-C   potassium chloride (KLOR-CON M) 20 MEQ extended release tablet Take 2 tablets by mouth 2 times daily 20   Svetlana Le PA-C   linagliptin (TRADJENTA) 5 MG tablet Take 1 tablet by mouth daily 20   Svetlana Le PA-C   warfarin (COUMADIN) 5 MG tablet Take 0.5 tablets by mouth daily 20   Svetlana Le PA-C       Current medications:    Current Outpatient Medications   Medication Sig Dispense Refill    lisinopril (PRINIVIL;ZESTRIL) 10 MG tablet Take 1 tablet by mouth daily 90 tablet 1    aspirin 81 MG EC tablet Take 1 tablet by mouth daily 30 tablet 3    amiodarone (CORDARONE) 200 MG tablet Take 1 tablet by mouth 2 times daily 60 tablet 1    glipiZIDE (GLUCOTROL) 10 MG tablet Take 1 tablet by mouth every morning (before breakfast) 60 tablet 3    atorvastatin (LIPITOR) 20 MG tablet Take 1 tablet by mouth nightly 30 tablet 3    carvedilol (COREG) 6.25 MG tablet Take 1 tablet by mouth 2 times daily 60 tablet 3    furosemide (LASIX) 40 MG tablet Take 1 tablet by mouth 3 times daily 90 tablet 1    potassium chloride (KLOR-CON M) 20 MEQ extended release tablet Take 2 tablets by mouth 2 times daily 60 tablet 1    linagliptin (TRADJENTA) 5 MG tablet Take 1 tablet by mouth daily 30 tablet 3    warfarin (COUMADIN) 5 MG tablet Take 0.5 tablets by mouth daily 30 tablet 3     No current facility-administered medications for this encounter.         Allergies:  No Known Allergies    Problem List:    Patient Active Problem List   Diagnosis Code    Chest pain C33.8    DM w/o complication type II ([facility]) E11.9    Longstanding persistent atrial fibrillation I48.11    CAD (coronary artery disease) I25.10    Post PTCA Z98.61    Unstable angina ([facility]) I20.0    HTN (hypertension) I10    Angina pectoris syndrome ([facility])-unstable I20.9    Smoking F17.200    COPD (chronic obstructive pulmonary disease) ([facility]) J44.9    Atrial fibrillation with rapid ventricular response ([facility]) I48.91    Chest pain R07.9    STEMI (ST elevation myocardial infarction) ([facility] I21.3    Myocardial infarction acute ([facility]) I21.9    Nausea R11.0    New onset of congestive heart failure ([facility]) I50.9    COPD with acute exacerbation ([facility]) J44.1    Acute pulmonary edema ([facility]) J81.0    Pleural effusion, bilateral J90    Hypertensive urgency I16.0    Chronic systolic congestive heart failure ([facility]) I50.22    TIA (transient ischemic attack) G45.9    Chronic kidney disease, stage III (moderate) ([facility]) N18.3    3-vessel coronary artery disease I25.10       Past Medical History:        Diagnosis Date    Atrial fibrillation ([facility] 11/2013    CAD (coronary artery disease)     CHF (congestive heart failure) ([facility])     COPD (chronic obstructive pulmonary disease) ([facility]     Diabetes mellitus ([facility])     GERD (gastroesophageal reflux disease)     H/O cardiovascular stress test 11/20/13, 3/20/2013    11/13-Observed defect is consistent with diaphragmatic attenuation. EF 58%. 3/13 -EF 51%. No ischemia. Normal Study.  H/O cardiovascular stress test 06/08/2017    EF 50% normal study    H/O cardiovascular stress test 06/17/2019    Normal NM    H/O Doppler ultrasound 10/14/2010    10/2010-Bilateral venous doppler of lower extremies- No DVTs.  H/O echocardiogram 06/26/2019    Left ventricular systolic function is normal with an ejection fraction of 50-55%. Mild concentric left ventricular hypertrophy. Grade I diastolic dysfunction. No significant valvular disease noted. No evidence of pericardial effusion. Essentially unremarkable echo.  H/O percutaneous left heart catheterization 12/22/15    No evidence of hemodynamically significant coronary artery disease    Heart imaging 04/23/2020    muga - ef 58%    History of coronary artery stent placement 11/13/2013 11/13 -RCA - 3 stents placed    History of exercise stress test 06/08/2017    treadmill    History of Holter monitoring 1/4/2016    predominant rhythm sinus    History of MRI of brain and brain stem 06/04/2020    No evidence of an acute infarct, mod chronic microvascular white matter ischemic disease with associated cerebral parenchymal volume loss    History of nuclear stress test 02/24/2020    EF 38%,Myocardial perfusion scan shows moderate size, severe intensity, non  reversible perfusion defect in inferior wall.  Hyperlipidemia     Hypertension     S/P cardiac catheterization 11/13/2013 11/13/2013-EF 55%, distal LAD mild disease,CX stent patent,but distal to stent 50% stenosis. RCA severe disease.     Status post angioplasty with stent        Past Surgical History:        Procedure Laterality Date    APPENDECTOMY      CARDIAC CATHETERIZATION  11/13/2013 11/13/2013-EF 55%, distal LAD mild disease,CX stent patent,but distal to stent 50% stenosis. RCA severe disease.  CARDIAC SURGERY  07/03/2020    CABG X 3 LIMA-OM-PDA-LAD, Maze, LT Atrial Appendage clipping    COLONOSCOPY      CORONARY ANGIOPLASTY WITH STENT PLACEMENT      4 stents- RCA X3; CX X1    CORONARY ANGIOPLASTY WITH STENT PLACEMENT  11/13/2013 11/13/2013 -3 stents placed to RCA- Dr Kelsey Cochran N/A 7/3/2020    INTRAOPERATIVE ISMAEL, CABG X3   WITH LIMA  AND LEFT LEG ENDOVEIN HARVEST, INDUCED HYPOTHERMIA, MAZE BIPOLAR AND CRYO PROCEDURE, LEFT ATRIAL APPENDAGE CLIPPING performed by Esther White MD at [facility] [facility]       Social History:    Social History     Tobacco Use    Smoking status: Former Smoker     Packs/day: 3.50     Years: 45.00     Pack years: 157.50     Types: Cigarettes    Smokeless tobacco: Never Used    Tobacco comment: sushma gan he smoked sm par [employer] today   Substance Use Topics    Alcohol use: No     Comment: caffeine 1 cup of coffee a day and at least 6 bottles of pop a day                                Counseling given: Not Answered  Comment: sushma gan he smoked sm par og cigg today      Vital Signs (Current): There were no vitals filed for this visit. BP Readings from Last 3 Encounters:   08/13/20 (!) 140/90   08/02/20 (!) 187/104   07/23/20 (!) 164/92       NPO Status:                                                                                 BMI:   Wt Readings from Last 3 Encounters:   08/13/20 270 lb (122.5 kg)   08/02/20 267 lb (121.1 kg)   07/23/20 271 lb 6.4 oz (123.1 kg)     There is no height or weight on file to calculate BMI.       CBC  Lab Results   Component Value Date/Time    WBC 8.1 08/02/2020 02:00 PM    HGB 12.0 (L) 08/02/2020 02:00 PM    HCT 38.0 (L) 08/02/2020 02:00 PM     08/02/2020 02:00 PM       CMP  Lab Results   Component Value Date     08/02/2020    K 3.6 08/02/2020     08/02/2020    CO2 27 08/02/2020    BUN 19 08/02/2020    CREATININE 1.9 (H) 08/02/2020    GLUCOSE 170 (H) 08/02/2020    CALCIUM 9.6 08/02/2020    PROT 7.8 08/02/2020    LABALBU 4.1 08/02/2020    BILITOT 0.2 08/02/2020    ALKPHOS 130 (H) 08/02/2020    AST 12 (L) 08/02/2020    ALT 10 08/02/2020    LABGLOM 36 (L) 08/02/2020    GFRAA 44 (L) 08/02/2020         COVID-19 Screening (If Applicable):   Lab Results   Component Value Date    COVID19 NOT DETECTED 08/14/2020         Anesthesia Evaluation  Patient summary reviewed and Nursing notes reviewed  Airway:         Dental:          Pulmonary:   (+) COPD:                            ROS comment: cori pleural effusions    Smoker  4ppd x 20 years   Counseled on smoking cessation. PAT Airway. Limited exam. COVID 19 precautions. Patient wearing mask  Head/Neck movement: full  History of difficult intubation:  None  Teeth: all teeth extracted, wears upper denture   Able to lie flat     LUNGS:  No increased work of breathing, good air exchange, clear to auscultation bilaterally, no crackles or wheezing      COVID 19 screening  Denies recent fever or known COVID 19 exposure. Instructed to quarantine until surgery and report any new respiratory or fever symptoms to surgeon. Aware COVID testing must be completed before DOS. COVID swab:   8/14/2020   Cardiovascular:    (+) hypertension (on beta blocker and ASA):, angina:, past MI (STEMI, unsure what year):, CAD:, CABG/stent (CABG x 4, 7/2020. PTCA with stent x 3 RCA, 2013):, dysrhythmias (PAF s/p MAZE with CABG 7/2020. On coumadin): atrial fibrillation, CHF:, hyperlipidemia      ECG reviewed      Echocardiogram reviewed  Stress test reviewed  Cleared by cardiology           ROS comment: Cardiac risk assessment per Dr. Melisa Peter.   considered a MEDIUM risk candidate for any allegra-operative cardiac complications. Cardiac cath 6/2020   1. Severe three vessel CAD with significant Left Main disease.    2. Regional & global WMA with

## 2020-08-16 LAB
SARS-COV-2: NOT DETECTED
SOURCE: NORMAL
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
## 2020-08-17 ENCOUNTER — TELEPHONE (OUTPATIENT)
Dept: PHARMACY | Age: 60
End: 2020-08-17

## 2020-08-17 ENCOUNTER — APPOINTMENT (OUTPATIENT)
Dept: GENERAL RADIOLOGY | Age: 60
End: 2020-08-17
Attending: SURGERY
Payer: COMMERCIAL

## 2020-08-17 ENCOUNTER — ANESTHESIA (OUTPATIENT)
Dept: OPERATING ROOM | Age: 60
End: 2020-08-17
Payer: COMMERCIAL

## 2020-08-17 ENCOUNTER — HOSPITAL ENCOUNTER (OUTPATIENT)
Age: 60
Setting detail: OUTPATIENT SURGERY
Discharge: HOME | End: 2020-08-17
Attending: SURGERY | Admitting: SURGERY
Payer: COMMERCIAL

## 2020-08-17 VITALS
OXYGEN SATURATION: 97 % | HEART RATE: 60 BPM | SYSTOLIC BLOOD PRESSURE: 164 MMHG | BODY MASS INDEX: 33.57 KG/M2 | HEIGHT: 75 IN | WEIGHT: 270 LBS | RESPIRATION RATE: 16 BRPM | TEMPERATURE: 97.8 F | DIASTOLIC BLOOD PRESSURE: 98 MMHG

## 2020-08-17 VITALS
DIASTOLIC BLOOD PRESSURE: 94 MMHG | RESPIRATION RATE: 11 BRPM | SYSTOLIC BLOOD PRESSURE: 136 MMHG | TEMPERATURE: 98.6 F | OXYGEN SATURATION: 100 %

## 2020-08-17 LAB
AMPHETAMINES: NEGATIVE
BARBITURATE SCREEN URINE: NEGATIVE
BENZODIAZEPINE SCREEN, URINE: NEGATIVE
CANNABINOID SCREEN URINE: NEGATIVE
COCAINE METABOLITE: NEGATIVE
GLUCOSE BLD-MCNC: 93 MG/DL (ref 70–99)
INR BLD: 2.65 INDEX
OPIATES, URINE: ABNORMAL
OXYCODONE: NEGATIVE
PHENCYCLIDINE, URINE: NEGATIVE
PROTHROMBIN TIME: 32.4 SECONDS (ref 11.7–14.5)

## 2020-08-17 PROCEDURE — 3600000002 HC SURGERY LEVEL 2 BASE: Performed by: SURGERY

## 2020-08-17 PROCEDURE — 99999 PR OFFICE/OUTPT VISIT,PROCEDURE ONLY: CPT | Performed by: NURSE PRACTITIONER

## 2020-08-17 PROCEDURE — 7100000000 HC PACU RECOVERY - FIRST 15 MIN: Performed by: SURGERY

## 2020-08-17 PROCEDURE — 88305 TISSUE EXAM BY PATHOLOGIST: CPT

## 2020-08-17 PROCEDURE — 82962 GLUCOSE BLOOD TEST: CPT

## 2020-08-17 PROCEDURE — 3700000000 HC ANESTHESIA ATTENDED CARE: Performed by: SURGERY

## 2020-08-17 PROCEDURE — 6360000002 HC RX W HCPCS: Performed by: ANESTHESIOLOGY

## 2020-08-17 PROCEDURE — 7100000011 HC PHASE II RECOVERY - ADDTL 15 MIN: Performed by: SURGERY

## 2020-08-17 PROCEDURE — 71045 X-RAY EXAM CHEST 1 VIEW: CPT

## 2020-08-17 PROCEDURE — 2709999900 HC NON-CHARGEABLE SUPPLY: Performed by: SURGERY

## 2020-08-17 PROCEDURE — 85610 PROTHROMBIN TIME: CPT

## 2020-08-17 PROCEDURE — 88313 SPECIAL STAINS GROUP 2: CPT

## 2020-08-17 PROCEDURE — 2720000010 HC SURG SUPPLY STERILE: Performed by: SURGERY

## 2020-08-17 PROCEDURE — 2580000003 HC RX 258: Performed by: ANESTHESIOLOGY

## 2020-08-17 PROCEDURE — 2580000003 HC RX 258: Performed by: NURSE ANESTHETIST, CERTIFIED REGISTERED

## 2020-08-17 PROCEDURE — 7100000010 HC PHASE II RECOVERY - FIRST 15 MIN: Performed by: SURGERY

## 2020-08-17 PROCEDURE — 80307 DRUG TEST PRSMV CHEM ANLYZR: CPT

## 2020-08-17 PROCEDURE — 6370000000 HC RX 637 (ALT 250 FOR IP): Performed by: ANESTHESIOLOGY

## 2020-08-17 PROCEDURE — 7100000001 HC PACU RECOVERY - ADDTL 15 MIN: Performed by: SURGERY

## 2020-08-17 PROCEDURE — 3700000001 HC ADD 15 MINUTES (ANESTHESIA): Performed by: SURGERY

## 2020-08-17 PROCEDURE — 37609 LIGATION/BX TEMPORAL ARTERY: CPT | Performed by: SURGERY

## 2020-08-17 PROCEDURE — 3600000012 HC SURGERY LEVEL 2 ADDTL 15MIN: Performed by: SURGERY

## 2020-08-17 PROCEDURE — 2500000003 HC RX 250 WO HCPCS: Performed by: NURSE ANESTHETIST, CERTIFIED REGISTERED

## 2020-08-17 PROCEDURE — 6360000002 HC RX W HCPCS: Performed by: NURSE ANESTHETIST, CERTIFIED REGISTERED

## 2020-08-17 RX ORDER — LABETALOL HYDROCHLORIDE 5 MG/ML
5 INJECTION, SOLUTION INTRAVENOUS EVERY 10 MIN PRN
Status: DISCONTINUED | OUTPATIENT
Start: 2020-08-17 | End: 2020-08-17 | Stop reason: HOSPADM

## 2020-08-17 RX ORDER — ONDANSETRON 2 MG/ML
4 INJECTION INTRAMUSCULAR; INTRAVENOUS
Status: DISCONTINUED | OUTPATIENT
Start: 2020-08-17 | End: 2020-08-17 | Stop reason: HOSPADM

## 2020-08-17 RX ORDER — HYDRALAZINE HYDROCHLORIDE 20 MG/ML
5 INJECTION INTRAMUSCULAR; INTRAVENOUS EVERY 10 MIN PRN
Status: DISCONTINUED | OUTPATIENT
Start: 2020-08-17 | End: 2020-08-17 | Stop reason: HOSPADM

## 2020-08-17 RX ORDER — SODIUM CHLORIDE, SODIUM LACTATE, POTASSIUM CHLORIDE, CALCIUM CHLORIDE 600; 310; 30; 20 MG/100ML; MG/100ML; MG/100ML; MG/100ML
INJECTION, SOLUTION INTRAVENOUS ONCE
Status: COMPLETED | OUTPATIENT
Start: 2020-08-17 | End: 2020-08-17

## 2020-08-17 RX ORDER — FENTANYL CITRATE 50 UG/ML
25 INJECTION, SOLUTION INTRAMUSCULAR; INTRAVENOUS EVERY 5 MIN PRN
Status: DISCONTINUED | OUTPATIENT
Start: 2020-08-17 | End: 2020-08-17 | Stop reason: HOSPADM

## 2020-08-17 RX ORDER — FENTANYL CITRATE 50 UG/ML
50 INJECTION, SOLUTION INTRAMUSCULAR; INTRAVENOUS EVERY 5 MIN PRN
Status: DISCONTINUED | OUTPATIENT
Start: 2020-08-17 | End: 2020-08-17 | Stop reason: HOSPADM

## 2020-08-17 RX ORDER — LISINOPRIL AND HYDROCHLOROTHIAZIDE 12.5; 1 MG/1; MG/1
1 TABLET ORAL ONCE
Status: COMPLETED | OUTPATIENT
Start: 2020-08-17 | End: 2020-08-17

## 2020-08-17 RX ORDER — HYDRALAZINE HYDROCHLORIDE 20 MG/ML
INJECTION INTRAMUSCULAR; INTRAVENOUS PRN
Status: DISCONTINUED | OUTPATIENT
Start: 2020-08-17 | End: 2020-08-17 | Stop reason: SDUPTHER

## 2020-08-17 RX ORDER — ACETAMINOPHEN 325 MG/1
650 TABLET ORAL ONCE
Status: COMPLETED | OUTPATIENT
Start: 2020-08-17 | End: 2020-08-17

## 2020-08-17 RX ORDER — FENTANYL CITRATE 50 UG/ML
INJECTION, SOLUTION INTRAMUSCULAR; INTRAVENOUS PRN
Status: DISCONTINUED | OUTPATIENT
Start: 2020-08-17 | End: 2020-08-17 | Stop reason: SDUPTHER

## 2020-08-17 RX ORDER — CARVEDILOL 3.12 MG/1
6.25 TABLET ORAL ONCE
Status: COMPLETED | OUTPATIENT
Start: 2020-08-17 | End: 2020-08-17

## 2020-08-17 RX ORDER — PROPOFOL 10 MG/ML
INJECTION, EMULSION INTRAVENOUS PRN
Status: DISCONTINUED | OUTPATIENT
Start: 2020-08-17 | End: 2020-08-17 | Stop reason: SDUPTHER

## 2020-08-17 RX ORDER — LIDOCAINE HYDROCHLORIDE 20 MG/ML
INJECTION, SOLUTION INTRAVENOUS PRN
Status: DISCONTINUED | OUTPATIENT
Start: 2020-08-17 | End: 2020-08-17 | Stop reason: SDUPTHER

## 2020-08-17 RX ORDER — KETAMINE HYDROCHLORIDE 10 MG/ML
INJECTION, SOLUTION INTRAMUSCULAR; INTRAVENOUS PRN
Status: DISCONTINUED | OUTPATIENT
Start: 2020-08-17 | End: 2020-08-17 | Stop reason: SDUPTHER

## 2020-08-17 RX ADMIN — LIDOCAINE HYDROCHLORIDE 100 MG: 20 INJECTION, SOLUTION INTRAVENOUS at 10:21

## 2020-08-17 RX ADMIN — FENTANYL CITRATE 25 MCG: 50 INJECTION, SOLUTION INTRAMUSCULAR; INTRAVENOUS at 11:12

## 2020-08-17 RX ADMIN — PROPOFOL 20 MG: 10 INJECTION, EMULSION INTRAVENOUS at 10:28

## 2020-08-17 RX ADMIN — DEXMEDETOMIDINE 50 MCG: 100 INJECTION, SOLUTION, CONCENTRATE INTRAVENOUS at 10:21

## 2020-08-17 RX ADMIN — KETAMINE HYDROCHLORIDE 10 MG: 10 INJECTION INTRAMUSCULAR; INTRAVENOUS at 10:21

## 2020-08-17 RX ADMIN — FENTANYL CITRATE 25 MCG: 50 INJECTION INTRAMUSCULAR; INTRAVENOUS at 10:44

## 2020-08-17 RX ADMIN — KETAMINE HYDROCHLORIDE 30 MG: 10 INJECTION INTRAMUSCULAR; INTRAVENOUS at 10:29

## 2020-08-17 RX ADMIN — FENTANYL CITRATE 25 MCG: 50 INJECTION INTRAMUSCULAR; INTRAVENOUS at 10:33

## 2020-08-17 RX ADMIN — SODIUM CHLORIDE, POTASSIUM CHLORIDE, SODIUM LACTATE AND CALCIUM CHLORIDE: 600; 310; 30; 20 INJECTION, SOLUTION INTRAVENOUS at 08:46

## 2020-08-17 RX ADMIN — HYDRALAZINE HYDROCHLORIDE 7.5 MG: 20 INJECTION INTRAMUSCULAR; INTRAVENOUS at 10:21

## 2020-08-17 RX ADMIN — PROPOFOL 10 MG: 10 INJECTION, EMULSION INTRAVENOUS at 10:25

## 2020-08-17 RX ADMIN — PROPOFOL 30 MG: 10 INJECTION, EMULSION INTRAVENOUS at 10:22

## 2020-08-17 RX ADMIN — PROPOFOL 150 MG: 10 INJECTION, EMULSION INTRAVENOUS at 10:29

## 2020-08-17 RX ADMIN — LISINOPRIL AND HYDROCHLOROTHIAZIDE 1 TABLET: 10; 12.5 TABLET ORAL at 12:51

## 2020-08-17 RX ADMIN — KETAMINE HYDROCHLORIDE 10 MG: 10 INJECTION INTRAMUSCULAR; INTRAVENOUS at 10:25

## 2020-08-17 RX ADMIN — ACETAMINOPHEN 650 MG: 325 TABLET ORAL at 12:47

## 2020-08-17 RX ADMIN — CARVEDILOL 6.25 MG: 3.12 TABLET, FILM COATED ORAL at 12:48

## 2020-08-17 RX ADMIN — FENTANYL CITRATE 50 MCG: 50 INJECTION INTRAMUSCULAR; INTRAVENOUS at 10:21

## 2020-08-17 RX ADMIN — FENTANYL CITRATE 25 MCG: 50 INJECTION, SOLUTION INTRAMUSCULAR; INTRAVENOUS at 11:04

## 2020-08-17 ASSESSMENT — PULMONARY FUNCTION TESTS
PIF_VALUE: 1
PIF_VALUE: 1
PIF_VALUE: 0
PIF_VALUE: 9
PIF_VALUE: 8
PIF_VALUE: 6
PIF_VALUE: 0
PIF_VALUE: 0
PIF_VALUE: 8
PIF_VALUE: 0
PIF_VALUE: 0
PIF_VALUE: 7
PIF_VALUE: 10
PIF_VALUE: 9
PIF_VALUE: 0
PIF_VALUE: 0
PIF_VALUE: 10
PIF_VALUE: 0
PIF_VALUE: 10
PIF_VALUE: 7
PIF_VALUE: 0
PIF_VALUE: 7
PIF_VALUE: 0
PIF_VALUE: 0
PIF_VALUE: 5
PIF_VALUE: 8
PIF_VALUE: 1
PIF_VALUE: 1
PIF_VALUE: 6
PIF_VALUE: 0
PIF_VALUE: 1
PIF_VALUE: 7
PIF_VALUE: 0
PIF_VALUE: 9
PIF_VALUE: 4
PIF_VALUE: 2
PIF_VALUE: 0
PIF_VALUE: 8
PIF_VALUE: 0
PIF_VALUE: 8
PIF_VALUE: 0
PIF_VALUE: 0
PIF_VALUE: 9
PIF_VALUE: 7
PIF_VALUE: 0
PIF_VALUE: 6
PIF_VALUE: 4
PIF_VALUE: 8
PIF_VALUE: 0

## 2020-08-17 ASSESSMENT — PAIN DESCRIPTION - FREQUENCY: FREQUENCY: CONTINUOUS

## 2020-08-17 ASSESSMENT — PAIN SCALES - GENERAL
PAINLEVEL_OUTOF10: 6
PAINLEVEL_OUTOF10: 6
PAINLEVEL_OUTOF10: 4
PAINLEVEL_OUTOF10: 0
PAINLEVEL_OUTOF10: 6

## 2020-08-17 ASSESSMENT — PAIN DESCRIPTION - LOCATION: LOCATION: HEAD

## 2020-08-17 ASSESSMENT — PAIN DESCRIPTION - PAIN TYPE: TYPE: ACUTE PAIN

## 2020-08-17 ASSESSMENT — PAIN DESCRIPTION - DESCRIPTORS: DESCRIPTORS: HEADACHE

## 2020-08-17 ASSESSMENT — PAIN DESCRIPTION - ORIENTATION: ORIENTATION: LEFT

## 2020-08-17 ASSESSMENT — PAIN - FUNCTIONAL ASSESSMENT: PAIN_FUNCTIONAL_ASSESSMENT: 0-10

## 2020-08-17 NOTE — PROGRESS NOTES
1247 In to give medications as ordered. Pt states that he is felling fine now and ready to go home. Call light in reach. Incision left temple well approximated with surgical glue. 36 Pt discharged to home per wheelchair to private vehicle accompanied by wife.

## 2020-08-17 NOTE — TELEPHONE ENCOUNTER
Left patient voicemail to call clinic to schedule.     INR MONITORING  Recent Labs     08/17/20  0845   INR 2.65     Anibal Rubio, PharmD, [address]  8/17/2020  2:37 PM        CLINICAL PHARMACY CONSULT: MED RECONCILIATION/REVIEW ADDENDUM    For Pharmacy Admin Tracking Only    PHSO: No  Total # of Interventions Recommended: 0    Total Interventions Accepted: 0  Time Spent (min): 5    Rubia Parham, SandiD

## 2020-08-17 NOTE — PROGRESS NOTES
1054: Patient arrived to PACU from OR. Monitors applied, alarms on. Patient drowsy but awake. Dressing to temperal is clean, dry and intact. VS Stable, wdl. Report obtained from [facility] and [employer]   4196: Patient tolerating ice chips at this time. 1146: Phase 1 care complete. 1149: Patient transferred to same day. 1158: Report given to Alivia DENTON at bedside.

## 2020-08-17 NOTE — H&P
06/04/2020    No evidence of an acute infarct, mod chronic microvascular white matter ischemic disease with associated cerebral parenchymal volume loss    History of nuclear stress test 02/24/2020    EF 38%,Myocardial perfusion scan shows moderate size, severe intensity, non  reversible perfusion defect in inferior wall.  Hyperlipidemia     Hypertension     Follows with PCP    Kidney disease     Dr. Natalie Carmona S/P cardiac catheterization 11/13/2013 11/13/2013-EF 55%, distal LAD mild disease,CX stent patent,but distal to stent 50% stenosis. RCA severe disease.  Status post angioplasty with stent     Wears dentures     full upper denture       Past Surgical History:        Procedure Laterality Date    APPENDECTOMY      CARDIAC CATHETERIZATION  11/13/2013 11/13/2013-EF 55%, distal LAD mild disease,CX stent patent,but distal to stent 50% stenosis. RCA severe disease.     CARDIAC SURGERY  07/03/2020    CABG X 3 LIMA-OM-PDA-LAD, Maze, LT Atrial Appendage clipping    COLONOSCOPY      CORONARY ANGIOPLASTY WITH STENT PLACEMENT      4 stents- RCA X3; CX X1    CORONARY ANGIOPLASTY WITH STENT PLACEMENT  11/13/2013 11/13/2013 -3 stents placed to RCA- Dr Bishop Richey N/A 7/3/2020    INTRAOPERATIVE ISMAEL, CABG X3   WITH LIMA  AND LEFT LEG ENDOVEIN HARVEST, INDUCED HYPOTHERMIA, MAZE BIPOLAR AND CRYO PROCEDURE, LEFT ATRIAL APPENDAGE CLIPPING performed by Bria Mitchell MD at [address] [facility]      all teeth extracted   [address]       Medications Prior to Admission:   Medications Prior to Admission: lisinopril (PRINIVIL;ZESTRIL) 10 MG tablet, Take 1 tablet by mouth daily  aspirin 81 MG EC tablet, Take 1 tablet by mouth daily  amiodarone (CORDARONE) 200 MG tablet, Take 1 tablet by mouth 2 times daily  glipiZIDE (GLUCOTROL) 10 MG tablet, Take 1 tablet by mouth every morning (before breakfast)  atorvastatin (LIPITOR) 20 MG tablet, Take 1 tablet by mouth nightly  carvedilol (COREG) 6.25 MG tablet, Take 1 tablet by mouth 2 times daily  furosemide (LASIX) 40 MG tablet, Take 1 tablet by mouth 3 times daily  potassium chloride (KLOR-CON M) 20 MEQ extended release tablet, Take 2 tablets by mouth 2 times daily  linagliptin (TRADJENTA) 5 MG tablet, Take 1 tablet by mouth daily  warfarin (COUMADIN) 5 MG tablet, Take 0.5 tablets by mouth daily    Allergies:  Patient has no known allergies.     Social History:   Social History     Socioeconomic History    Marital status: Legally      Spouse name: Not on file    Number of children: Not on file    Years of education: Not on file    Highest education level: Not on file   Occupational History    Occupation:    Social Needs    Financial resource strain: Not on file    Food insecurity     Worry: Not on file     Inability: Not on file   [employer] needs     Medical: Not on file     Non-medical: Not on file   Tobacco Use    Smoking status: Current Every Day Smoker     Packs/day: 3.50     Years: 46.00     Pack years: 161.00     Types: Cigarettes     Start date: 1974    Smokeless tobacco: Never Used    Tobacco comment: Smokes approx 3 cig a day   Substance and Sexual Activity    Alcohol use: No     Comment: caffeine 1 cup of coffee a day and at least 6 bottles of pop a day    Drug use: Not Currently     Types: Cocaine     Comment: Last used: 7/1/20    Sexual activity: Yes     Partners: Female   Lifestyle    Physical activity     Days per week: Not on file     Minutes per session: Not on file    Stress: Not on file   Relationships    Social connections     Talks on phone: Not on file     Gets together: Not on file     Attends Religion service: Not on file     Active member of club or organization: Not on file     Attends meetings of clubs or organizations: Not on file     Relationship status: Not on file    Intimate partner violence     Fear of current or ex partner: Not on file     Emotionally abused: Not on file     Physically abused: Not on file     Forced sexual activity: Not on file   Other Topics Concern    Not on file   Social History Narrative    Not on file       Family History:       Problem Relation Age of Onset    Cancer Mother     Diabetes Mother     Heart Disease Mother     High Blood Pressure Mother     Stroke Mother     Cancer Father     Heart Disease Father     High Blood Pressure Father     Stroke Father     Cancer Brother        REVIEW OF SYSTEMS:    Pertinent items noted in HPI    PHYSICAL EXAM:    VITALS:  BP (!) 202/112   Pulse 65   Temp 96.7 °F (35.9 °C) (Temporal)   Resp 18   Ht 6' 3\" (1.905 m)   Wt 270 lb (122.5 kg)   SpO2 98%   BMI 33.75 kg/m²   CONSTITUTIONAL:  awake, alert  ENT:  normocepalic, without obvious abnormality;  Left sided temporal pain  NECK:  supple, symmetrical, trachea midline  Orientation:   person, place, time    DATA:  CBC with Differential:    Lab Results   Component Value Date    WBC 8.1 08/02/2020    RBC 4.12 08/02/2020    HGB 12.0 08/02/2020    HCT 38.0 08/02/2020     08/02/2020    MCV 92.2 08/02/2020    MCH 29.1 08/02/2020    MCHC 31.6 08/02/2020    RDW 14.8 08/02/2020    SEGSPCT 68.6 08/02/2020    LYMPHOPCT 18.7 08/02/2020    MONOPCT 7.5 08/02/2020    EOSPCT 0.3 12/03/2011    BASOPCT 1.1 08/02/2020    MONOSABS 0.6 08/02/2020    LYMPHSABS 1.5 08/02/2020    EOSABS 0.3 08/02/2020    BASOSABS 0.1 08/02/2020    DIFFTYPE AUTOMATED DIFFERENTIAL 08/02/2020     CMP:    Lab Results   Component Value Date     08/02/2020    K 3.6 08/02/2020     08/02/2020    CO2 27 08/02/2020    BUN 19 08/02/2020    CREATININE 1.9 08/02/2020    GFRAA 44 08/02/2020    LABGLOM 36 08/02/2020    GLUCOSE 170 08/02/2020    PROT 7.8 08/02/2020    PROT 7.7 03/09/2013    LABALBU 4.1 08/02/2020    CALCIUM 9.6 08/02/2020    BILITOT 0.2 08/02/2020    ALKPHOS 130 08/02/2020    AST 12 08/02/2020    ALT 10 08/02/2020 Radiology Reviewed    ASSESSMENT AND PLAN: Possible temporal arteritis - will plan left temporal artery bx under MAC. The risks, benefits and alternatives to the planned procedure were discussed. Patient expressed an understanding and is willing to proceed.     TREY Quintero-CNP

## 2020-08-17 NOTE — PROGRESS NOTES
1200 Pt received from PACU and report received from Shazia DENTON. Pt given Ice chips. Call light in reach.

## 2020-08-19 NOTE — ANESTHESIA POSTPROCEDURE EVALUATION
Department of Anesthesiology  Postprocedure Note    Patient: Miky Guevara  MRN: [id number]  YOB: 1960  Date of evaluation: 8/19/2020  Time:  4:28 PM     Procedure Summary     Date:  08/17/20 Room / Location:  [address] [place] [address]    Anesthesia Start:  7558 Anesthesia Stop:  4049    Procedure:  LEFT TEMPORAL ARTERY BIOPSY LIGATION (Left ) Diagnosis:  (TEMPORAL ARTERITIS)    Surgeon:  Anna Jin MD Responsible Provider:  TREY Dukes CRNA    Anesthesia Type:  general, MAC, TIVA ASA Status:  3 - Emergent          Anesthesia Type: general, MAC, TIVA    Suzanne Phase I: Suzanne Score: 10    Suzanne Phase II: Suzanne Score: 10    Last vitals: Reviewed and per EMR flowsheets.        Anesthesia Post Evaluation    Patient location during evaluation: PACU  Patient participation: complete - patient participated  Level of consciousness: sleepy but conscious  Pain score: 1  Airway patency: patent  Nausea & Vomiting: no nausea and no vomiting  Complications: no  Cardiovascular status: hemodynamically stable  Respiratory status: acceptable  Hydration status: euvolemic

## 2020-08-24 ENCOUNTER — HOSPITAL ENCOUNTER (EMERGENCY)
Age: 60
Discharge: HOME | End: 2020-08-24
Attending: EMERGENCY MEDICINE
Payer: COMMERCIAL

## 2020-08-24 ENCOUNTER — APPOINTMENT (OUTPATIENT)
Dept: CT IMAGING | Age: 60
End: 2020-08-24
Payer: COMMERCIAL

## 2020-08-24 ENCOUNTER — APPOINTMENT (OUTPATIENT)
Dept: GENERAL RADIOLOGY | Age: 60
End: 2020-08-24
Payer: COMMERCIAL

## 2020-08-24 VITALS
OXYGEN SATURATION: 98 % | WEIGHT: 283 LBS | HEIGHT: 75 IN | RESPIRATION RATE: 21 BRPM | DIASTOLIC BLOOD PRESSURE: 101 MMHG | HEART RATE: 70 BPM | SYSTOLIC BLOOD PRESSURE: 200 MMHG | BODY MASS INDEX: 35.19 KG/M2 | TEMPERATURE: 97.8 F

## 2020-08-24 LAB
ALBUMIN SERPL-MCNC: 4 GM/DL (ref 3.4–5)
ALP BLD-CCNC: 88 IU/L (ref 40–129)
ALT SERPL-CCNC: 17 U/L (ref 10–40)
ANION GAP SERPL CALCULATED.3IONS-SCNC: 13 MMOL/L (ref 4–16)
AST SERPL-CCNC: 16 IU/L (ref 15–37)
BASOPHILS ABSOLUTE: 0.1 K/CU MM
BASOPHILS RELATIVE PERCENT: 0.6 % (ref 0–1)
BILIRUB SERPL-MCNC: 0.3 MG/DL (ref 0–1)
BUN BLDV-MCNC: 16 MG/DL (ref 6–23)
CALCIUM SERPL-MCNC: 9.2 MG/DL (ref 8.3–10.6)
CHLORIDE BLD-SCNC: 104 MMOL/L (ref 99–110)
CO2: 24 MMOL/L (ref 21–32)
CREAT SERPL-MCNC: 1.2 MG/DL (ref 0.9–1.3)
DIFFERENTIAL TYPE: ABNORMAL
EOSINOPHILS ABSOLUTE: 0.3 K/CU MM
EOSINOPHILS RELATIVE PERCENT: 3.4 % (ref 0–3)
GFR AFRICAN AMERICAN: >60 ML/MIN/1.73M2
GFR NON-AFRICAN AMERICAN: >60 ML/MIN/1.73M2
GLUCOSE BLD-MCNC: 205 MG/DL (ref 70–99)
HCT VFR BLD CALC: 39.5 % (ref 42–52)
HEMOGLOBIN: 12.5 GM/DL (ref 13.5–18)
IMMATURE NEUTROPHIL %: 0.2 % (ref 0–0.43)
INR BLD: 1.01 INDEX
LYMPHOCYTES ABSOLUTE: 1.8 K/CU MM
LYMPHOCYTES RELATIVE PERCENT: 20.3 % (ref 24–44)
MCH RBC QN AUTO: 29.6 PG (ref 27–31)
MCHC RBC AUTO-ENTMCNC: 31.6 % (ref 32–36)
MCV RBC AUTO: 93.4 FL (ref 78–100)
MONOCYTES ABSOLUTE: 0.7 K/CU MM
MONOCYTES RELATIVE PERCENT: 7.5 % (ref 0–4)
PDW BLD-RTO: 15.3 % (ref 11.7–14.9)
PLATELET # BLD: 211 K/CU MM (ref 140–440)
PMV BLD AUTO: 10.9 FL (ref 7.5–11.1)
POTASSIUM SERPL-SCNC: 4 MMOL/L (ref 3.5–5.1)
PRO-BNP: 1152 PG/ML
PROTHROMBIN TIME: 13.2 SECONDS (ref 11.7–14.5)
RBC # BLD: 4.23 M/CU MM (ref 4.6–6.2)
SEGMENTED NEUTROPHILS ABSOLUTE COUNT: 6.1 K/CU MM
SEGMENTED NEUTROPHILS RELATIVE PERCENT: 68 % (ref 36–66)
SODIUM BLD-SCNC: 141 MMOL/L (ref 135–145)
TOTAL IMMATURE NEUTOROPHIL: 0.02 K/CU MM
TOTAL PROTEIN: 7.1 GM/DL (ref 6.4–8.2)
TROPONIN T: <0.01 NG/ML
WBC # BLD: 9 K/CU MM (ref 4–10.5)

## 2020-08-24 PROCEDURE — 83880 ASSAY OF NATRIURETIC PEPTIDE: CPT

## 2020-08-24 PROCEDURE — 84484 ASSAY OF TROPONIN QUANT: CPT

## 2020-08-24 PROCEDURE — 6360000004 HC RX CONTRAST MEDICATION: Performed by: EMERGENCY MEDICINE

## 2020-08-24 PROCEDURE — 96375 TX/PRO/DX INJ NEW DRUG ADDON: CPT

## 2020-08-24 PROCEDURE — 85025 COMPLETE CBC W/AUTO DIFF WBC: CPT

## 2020-08-24 PROCEDURE — 80053 COMPREHEN METABOLIC PANEL: CPT

## 2020-08-24 PROCEDURE — 71275 CT ANGIOGRAPHY CHEST: CPT

## 2020-08-24 PROCEDURE — 99284 EMERGENCY DEPT VISIT MOD MDM: CPT

## 2020-08-24 PROCEDURE — 96374 THER/PROPH/DIAG INJ IV PUSH: CPT

## 2020-08-24 PROCEDURE — 6360000002 HC RX W HCPCS: Performed by: EMERGENCY MEDICINE

## 2020-08-24 PROCEDURE — 71045 X-RAY EXAM CHEST 1 VIEW: CPT

## 2020-08-24 PROCEDURE — 85610 PROTHROMBIN TIME: CPT

## 2020-08-24 PROCEDURE — 93005 ELECTROCARDIOGRAM TRACING: CPT | Performed by: EMERGENCY MEDICINE

## 2020-08-24 RX ORDER — FUROSEMIDE 10 MG/ML
40 INJECTION INTRAMUSCULAR; INTRAVENOUS ONCE
Status: COMPLETED | OUTPATIENT
Start: 2020-08-24 | End: 2020-08-24

## 2020-08-24 RX ORDER — LORAZEPAM 2 MG/ML
1 INJECTION INTRAMUSCULAR ONCE
Status: COMPLETED | OUTPATIENT
Start: 2020-08-24 | End: 2020-08-24

## 2020-08-24 RX ADMIN — IOPAMIDOL 75 ML: 755 INJECTION, SOLUTION INTRAVENOUS at 22:14

## 2020-08-24 RX ADMIN — LORAZEPAM 1 MG: 2 INJECTION, SOLUTION INTRAMUSCULAR; INTRAVENOUS at 21:56

## 2020-08-24 RX ADMIN — FUROSEMIDE 40 MG: 10 INJECTION, SOLUTION INTRAVENOUS at 21:11

## 2020-08-25 ENCOUNTER — CARE COORDINATION (OUTPATIENT)
Dept: CARE COORDINATION | Age: 60
End: 2020-08-25

## 2020-08-25 ENCOUNTER — OFFICE VISIT (OUTPATIENT)
Dept: SURGERY | Age: 60
End: 2020-08-25

## 2020-08-25 VITALS
HEART RATE: 71 BPM | OXYGEN SATURATION: 98 % | SYSTOLIC BLOOD PRESSURE: 164 MMHG | TEMPERATURE: 97.9 F | DIASTOLIC BLOOD PRESSURE: 110 MMHG

## 2020-08-25 LAB
EKG ATRIAL RATE: 74 BPM
EKG DIAGNOSIS: NORMAL
EKG P AXIS: 21 DEGREES
EKG P-R INTERVAL: 184 MS
EKG Q-T INTERVAL: 422 MS
EKG QRS DURATION: 100 MS
EKG QTC CALCULATION (BAZETT): 468 MS
EKG R AXIS: 1 DEGREES
EKG T AXIS: 93 DEGREES
EKG VENTRICULAR RATE: 74 BPM

## 2020-08-25 PROCEDURE — 93010 ELECTROCARDIOGRAM REPORT: CPT | Performed by: INTERNAL MEDICINE

## 2020-08-25 PROCEDURE — 99024 POSTOP FOLLOW-UP VISIT: CPT | Performed by: SURGERY

## 2020-08-25 NOTE — ED TRIAGE NOTES
Pt to the ED for c/o shortness of breath and bilateral lower extremity swelling x3-4 days. Pt has known COPD and CHF and is on daily medications.

## 2020-08-25 NOTE — CARE COORDINATION
Patient contacted regarding recent discharge and COVID-19 risk. Discussed COVID-19 related testing which was not done at this time. Test results were not done. Patient informed of results, if available? n/a     Care Transition Nurse/ Ambulatory Care Manager contacted the patient by telephone to perform post discharge assessment. Verified name and  with patient as identifiers. Patient has following risk factors of: heart failure. CTN/ACM reviewed discharge instructions, medical action plan and red flags related to discharge diagnosis. Reviewed and educated them on any new and changed medications related to discharge diagnosis. Advised obtaining a 90-day supply of all daily and as-needed medications. Education provided regarding infection prevention, and signs and symptoms of COVID-19 and when to seek medical attention with patient who verbalized understanding. Discussed exposure protocols and quarantine from [address] you at higher risk for severe illness  and given an opportunity for questions and concerns. The patient agrees to contact the COVID-19 hotline [phone] or PCP office for questions related to their healthcare. CTN/ACM provided contact information for future reference. From CDC: Are you at higher risk for severe illness?  Wash your hands often.  Avoid close contact (6 feet, which is about two arm lengths) with people who are sick.  Put distance between yourself and other people if COVID-19 is spreading in your community.  Clean and disinfect frequently touched surfaces.  Avoid all cruise travel and non-essential air travel.  Call your healthcare professional if you have concerns about COVID-19 and your underlying condition or if you are sick. For more information on steps you can take to protect yourself, see CDC's How to [address] [place] [address] for follow-up call in 5-7 days based on severity of symptoms and risk factors.     Pt reports he called his cardiologist and had his medications adjusted today. Writer informed pt t looks like he has an appointment today at 1600 at the [facility] and he said he was unaware of that appointment. Pt stated he would call to find out what the appointment is about. He will also call his PCP for a f/u appointment.

## 2020-08-26 NOTE — DISCHARGE SUMMARY
Discharge Summary     Patient ID  Marcus Galeana   1960  [id number]      Primary Care Doctor:  Lizbet Higginbotham MD    Admit date: 7/19/2020   Discharge date: 7/20/20  Admitting Physician: Benita Prater MD   Discharge Physician: Jhony Cazares MD    Discharge Diagnoses: Active Hospital Problems    Diagnosis Date Noted    Chest pain [R07.9] 12/21/2015   Status post recent CABG      Discharged Condition: stable.     Hospital Course:  Patient presented to the emergency room with anterior chest wall pains following his recent discharge from CABG  He underwent evaluation in the emergency room work-up was essentially negative he was admitted for further observation to the intensive care unit  In the intensive care unit he was monitored for his cardiopulmonary status and and lab data  He experienced no further discomfort or pain which appeared to be resolved by using pain medications  At discharge, pt ambulating  Tolerating diet  Wounds clean  Had Bm  Lungs clear   NSR  VS at discharge   Vitals:    07/20/20 0832   BP: 135/88   Pulse: 76   Resp: 21   Temp:    SpO2:                Disposition: home    Patient Instructions:      Medication List      CONTINUE taking these medications    amiodarone 200 MG tablet  Commonly known as:  CORDARONE  Take 1 tablet by mouth 2 times daily     aspirin 81 MG EC tablet  Take 1 tablet by mouth daily     atorvastatin 20 MG tablet  Commonly known as:  LIPITOR  Take 1 tablet by mouth nightly     carvedilol 6.25 MG tablet  Commonly known as:  COREG  Take 1 tablet by mouth 2 times daily     furosemide 40 MG tablet  Commonly known as:  Lasix  Take 1 tablet by mouth 3 times daily     glipiZIDE 10 MG tablet  Commonly known as:  GLUCOTROL  Take 1 tablet by mouth every morning (before breakfast)     linagliptin 5 MG tablet  Commonly known as:  Tradjenta  Take 1 tablet by mouth daily     potassium chloride 20 MEQ extended release tablet  Commonly known as:  KLOR-CON M  Take 2 tablets by mouth 2 times daily     warfarin 5 MG tablet  Commonly known as:  COUMADIN  Take as directed. If you are unsure how to take this medication, talk to your nurse or doctor. Original instructions: Take 0.5 tablets by mouth daily        STOP taking these medications    amLODIPine 10 MG tablet  Commonly known as:  NORVASC        ASK your doctor about these medications    HYDROcodone-acetaminophen 5-325 MG per tablet  Commonly known as:  Norco  Take 1 tablet by mouth every 8 hours as needed for Pain for up to 5 days. Intended supply: 5 days. Take lowest dose possible to manage pain  Ask about: Should I take this medication?     traMADol 50 MG tablet  Commonly known as:  ULTRAM  Take 1 tablet by mouth every 8 hours as needed for Pain for up to 5 days. Ask about: Should I take this medication?            Where to Get Your Medications      You can get these medications from any pharmacy    Bring a paper prescription for each of these medications  · HYDROcodone-acetaminophen 5-325 MG per tablet  · traMADol 50 MG tablet       Activity: activity as tolerated and no lifting, Driving, or Strenuous exercise until seen by physician in office  Diet: cardiac diet  Wound Care: as directed    Follow-up as directed at discharge    Signed: Jacques Benavides    Time spent on discharge 35 minutes

## 2020-08-31 ENCOUNTER — ANTI-COAG VISIT (OUTPATIENT)
Dept: PHARMACY | Age: 60
End: 2020-08-31
Payer: COMMERCIAL

## 2020-08-31 VITALS — TEMPERATURE: 97.5 F

## 2020-08-31 LAB
INTERNATIONAL NORMALIZATION RATIO, POC: 1.1
POC INR: 1.1 INDEX
PROTHROMBIN TIME, POC: 13.2 SECONDS (ref 10–14.3)

## 2020-08-31 PROCEDURE — 36416 COLLJ CAPILLARY BLOOD SPEC: CPT

## 2020-08-31 PROCEDURE — 99212 OFFICE O/P EST SF 10 MIN: CPT

## 2020-08-31 PROCEDURE — 85610 PROTHROMBIN TIME: CPT

## 2020-08-31 NOTE — PROGRESS NOTES
Medication Management Service  [facility]  [phone]    Visit Date: 8/31/2020   Subjective:       Barbara Campbell is a 62 y.o. male who presents to clinic today for anticoagulation monitoring and adjustment. Patient seen in clinic for warfarin management due to  Indication:   atrial fibrillation. INR goal: of 2.0-3.0. Duration of therapy: indefinite. Patient reports the following:   Adherent with regimen  Missed or extra doses:  None   Bleeding or thromboembolic side effects:  None  Significant medication changes:  None  Significant dietary changes: None  Significant alcohol or tobacco changes: None  Significant recent illness, disease state changes, or hospitalization:  None  Upcoming surgeries or procedures:  None  Falls: None           Assessment and Plan     PT/INR done in office per protocol. INR today is 1.1, subtherapeutic. Patient states he has been taking warfarin 5mg for the past 8 days. Patient strongly denies missed doses. Advised to double check his medication bottles and med box at home and call if he needs a refill on warfarin. INR results do not reflect patient taking a 100% weekly dose increase. Patient denies any changes in amiodarone. Patient still taking warfarin in AM.   Patient showed up 2 hours early to appointment and states someone called him and told him to come in at 9:20am. Advised myself and the automated system are the only calls he will receive from this office. Plan: Take warfarin 7.5mg daily for 2 days then 5mg x1. Recheck INR in 3 days. Patient verbalized understanding of dosing directions and information discussed. Dosing schedule given to patient including phone number, appointment date, and time. Progress note sent to referring office. Patient acknowledges working in consult agreement with pharmacist as referred by his/her physician.       Electronically signed by Gabrielle Cardenas, [address] [place] [address] on 8/31/20 at 9:25 AM EDT    CLINICAL PHARMACY CONSULT: MED RECONCILIATION/REVIEW ADDENDUM    For Pharmacy Admin Tracking Only    PHSO: No  Total # of Interventions Recommended: 1  - Increased Dose #: 1  - Maintenance Safety Lab Monitoring #: 1     Total Interventions Accepted: 1  Time Spent (min): 15    Sandi HaddadD

## 2020-09-01 ENCOUNTER — CARE COORDINATION (OUTPATIENT)
Dept: CARE COORDINATION | Age: 60
End: 2020-09-01

## 2020-09-01 NOTE — CARE COORDINATION
You Patient resolved from the Care Transitions episode on 9. 1.20  Discussed COVID-19 related testing which was not done at this time. Test results were not done. Patient informed of results, if available? n/a    Patient/family has been provided the following resources and education related to COVID-19:                         Signs, symptoms and red flags related to COVID-19            [facility] exposure and quarantine guidelines            [employer] exposure contact - [phone]            Contact for their local Department of Health                 Patient currently reports that the following symptoms have improved:  no new/worsening symptoms     No further outreach scheduled with this CTN/ACM. Episode of Care resolved. Patient has this CTN/ACM contact information if future needs arise. Pt reports he has f/u with his cardiologist and will continue to f/u. He has an appointment on 9.3.20. He reports the swelling in his legs have decreased and he is taking his medications as prescribed.

## 2020-09-02 ENCOUNTER — TELEPHONE (OUTPATIENT)
Dept: PHARMACY | Age: 60
End: 2020-09-02

## 2020-09-02 RX ORDER — WARFARIN SODIUM 5 MG/1
5 TABLET ORAL DAILY
Qty: 30 TABLET | Refills: 1 | Status: SHIPPED | OUTPATIENT
Start: 2020-09-02 | End: 2020-09-24 | Stop reason: DRUGHIGH

## 2020-09-10 ENCOUNTER — ANTI-COAG VISIT (OUTPATIENT)
Dept: PHARMACY | Age: 60
End: 2020-09-10
Payer: COMMERCIAL

## 2020-09-10 LAB
INR BLD: 3
POC INR: 3 INDEX
PROTHROMBIN TIME, POC: 35.8 SECONDS (ref 10–14.3)
PROTIME: 35.8 SECONDS

## 2020-09-10 PROCEDURE — 85610 PROTHROMBIN TIME: CPT

## 2020-09-10 PROCEDURE — 99212 OFFICE O/P EST SF 10 MIN: CPT

## 2020-09-10 PROCEDURE — 36416 COLLJ CAPILLARY BLOOD SPEC: CPT

## 2020-09-10 NOTE — PROGRESS NOTES
Medication Management Service  [facility]  [phone]    Visit Date: 9/10/2020   Subjective:       Marcus Galeana is a 62 y.o. male who presents to clinic today for anticoagulation monitoring and adjustment. Patient seen in clinic for warfarin management due to  Indication:   atrial fibrillation. INR goal: of 2.0-3.0. Duration of therapy: indefinite. Patient reports the following:   Adherent with regimen- unknown  Missed or extra doses:  both   Bleeding or thromboembolic side effects:  None  Significant medication changes:  None  Significant dietary changes: None  Significant alcohol or tobacco changes: None  Significant recent illness, disease state changes, or hospitalization:  None  Upcoming surgeries or procedures:  None  Falls: None           Assessment and Plan     PT/INR done in office per protocol. INR today is 3.0, therapeutic, but rising quickly after patient restarted on 9/2. Patient states he missed a \"few days\", but it is unknown how long he had been without warfarin. Patient insists he was taking 5mg and not 2.5mg since he started coming into office. Plan: Will decrease patient's weekly dose ~14% from what patient has taken during the past 7 days to warfarin 5mg daily except 2.5mg on Mondays and Fridays  Recheck INR in 1 week. Patient verbalized understanding of dosing directions and information discussed. Dosing schedule given to patient including phone number, appointment date, and time. Progress note sent to referring office. Patient acknowledges working in consult agreement with pharmacist as referred by his/her physician.       Electronically signed by DARA De La [facility] on 9/10/20 at 10:17 AM EDT    CLINICAL PHARMACY CONSULT: MED RECONCILIATION/REVIEW ADDENDUM    For Pharmacy Admin Tracking Only    PHSO: No  Total # of Interventions Recommended: 1  - Decreased Dose #: 1  - Maintenance Safety Lab Monitoring #: 1  Total Interventions Accepted: 1  Time

## 2020-09-17 ENCOUNTER — TELEPHONE (OUTPATIENT)
Dept: PHARMACY | Age: 60
End: 2020-09-17

## 2020-09-17 ENCOUNTER — ANTI-COAG VISIT (OUTPATIENT)
Dept: PHARMACY | Age: 60
End: 2020-09-17
Payer: COMMERCIAL

## 2020-09-17 VITALS — TEMPERATURE: 97.9 F

## 2020-09-17 LAB
INTERNATIONAL NORMALIZATION RATIO, POC: 5.6
POC INR: 5.6 INDEX
PROTHROMBIN TIME, POC: 67.1 SECONDS (ref 10–14.3)

## 2020-09-17 PROCEDURE — 99212 OFFICE O/P EST SF 10 MIN: CPT

## 2020-09-17 PROCEDURE — 36416 COLLJ CAPILLARY BLOOD SPEC: CPT

## 2020-09-17 PROCEDURE — 85610 PROTHROMBIN TIME: CPT

## 2020-09-17 NOTE — PROGRESS NOTES
[facility]  [phone]    Visit Date: 9/17/2020   Subjective:       Marcus Galeana is a 62 y.o. male who presents to clinic today for anticoagulation monitoring and adjustment. Patient seen in clinic for warfarin management due to  Indication:   atrial fibrillation. INR goal: of 2.0-3.0. Duration of therapy: indefinite. Patient reports the following:   Adherent with regimen  Missed or extra doses:  None   Bleeding or thromboembolic side effects:  None  Significant medication changes:  None  Significant dietary changes: None  Significant alcohol or tobacco changes: None  Significant recent illness, disease state changes, or hospitalization:  None  Upcoming surgeries or procedures:  None  Falls: None           Assessment and Plan     PT/INR done in office per protocol. INR today is 5.6, supratherapeutic despite reduced dose. Patient started amiodarone in July, which may be a contributing factor. Patient to have cataract surgery 9/25, but was not told to hold warfarin  Plan:  Hold warfarin for 2 days then decrease weekly dose by ~42% to warfarin. Patient advised of increased risk of bleeding at current INR. Advised patient to avoid activities that increase risk of falling or cutting him/herself. Advised patient to go to ER for fall, head injury, or bleeding. Patient voiced understanding. Recheck INR in 1 week. Patient verbalized understanding of dosing directions and information discussed. Dosing schedule given to patient including phone number, appointment date, and time. Progress note sent to referring office. Patient acknowledges working in consult agreement with pharmacist as referred by his/her physician.       Electronically signed by Claude Fonseca, [facility] on 9/17/20 at 4:18 PM EDT    CLINICAL PHARMACY CONSULT: MED RECONCILIATION/REVIEW [place]  22. Tracking Only    PHSO: No  Total # of Interventions Recommended: Script approved and sent to pharmacy, please notify patient 1  - Decreased Dose #: 1  - Maintenance Safety Lab Monitoring #: 1  Total Interventions Accepted: 1  Time Spent (min): Sandi NevilleD

## 2020-09-17 NOTE — TELEPHONE ENCOUNTER
No show to appointment. Called patient and patient forgot. Scheduled for 4:15pm today.      CLINICAL PHARMACY CONSULT: MED RECONCILIATION/REVIEW ADDENDUM    For Pharmacy Admin Tracking Only    PHSO: No  Total # of Interventions Recommended: 0  Total Interventions Accepted: 0  Time Spent (min): 5    Thuy Perkins PharmD

## 2020-09-24 ENCOUNTER — ANTI-COAG VISIT (OUTPATIENT)
Dept: PHARMACY | Age: 60
End: 2020-09-24
Payer: COMMERCIAL

## 2020-09-24 ENCOUNTER — TELEPHONE (OUTPATIENT)
Dept: PHARMACY | Age: 60
End: 2020-09-24

## 2020-09-24 VITALS — TEMPERATURE: 97.9 F

## 2020-09-24 LAB
INTERNATIONAL NORMALIZATION RATIO, POC: 5.2
POC INR: 5.2 INDEX
PROTHROMBIN TIME, POC: 62 SECONDS (ref 10–14.3)

## 2020-09-24 PROCEDURE — 99213 OFFICE O/P EST LOW 20 MIN: CPT

## 2020-09-24 PROCEDURE — 36416 COLLJ CAPILLARY BLOOD SPEC: CPT

## 2020-09-24 PROCEDURE — 85610 PROTHROMBIN TIME: CPT

## 2020-09-24 RX ORDER — WARFARIN SODIUM 2.5 MG/1
2.5 TABLET ORAL DAILY
Qty: 30 TABLET | Refills: 1 | Status: SHIPPED | OUTPATIENT
Start: 2020-09-24 | End: 2020-10-15 | Stop reason: DRUGHIGH

## 2020-09-24 NOTE — TELEPHONE ENCOUNTER
Confirmed appointment time today.      CLINICAL PHARMACY CONSULT: MED RECONCILIATION/REVIEW ADDENDUM    For Pharmacy Admin Tracking Only    PHSO: No  Total # of Interventions Recommended: 0     Time Spent (min): 5    Sandi BassD

## 2020-09-24 NOTE — PROGRESS NOTES
on 9/24/20 at 2:09 PM EDT    CLINICAL PHARMACY CONSULT: MILAN Jones Tracking Only    PHSO: No  Total # of Interventions Recommended: 1  - Decreased Dose #: 1  - Refills Provided #: 1  - Maintenance Safety Lab Monitoring #: 1  Total Interventions Accepted: 1  Time Spent (min): 30    Bibi Taylor, SandiD

## 2020-09-28 ENCOUNTER — ANTI-COAG VISIT (OUTPATIENT)
Dept: PHARMACY | Age: 60
End: 2020-09-28
Payer: COMMERCIAL

## 2020-09-28 VITALS — TEMPERATURE: 97.7 F

## 2020-09-28 LAB
INTERNATIONAL NORMALIZATION RATIO, POC: 3.2
POC INR: 3.2 INDEX
PROTHROMBIN TIME, POC: 38.4 SECONDS (ref 10–14.3)

## 2020-09-28 PROCEDURE — 99211 OFF/OP EST MAY X REQ PHY/QHP: CPT

## 2020-09-28 PROCEDURE — 85610 PROTHROMBIN TIME: CPT

## 2020-09-28 PROCEDURE — 36416 COLLJ CAPILLARY BLOOD SPEC: CPT

## 2020-10-05 ENCOUNTER — TELEPHONE (OUTPATIENT)
Dept: PHARMACY | Age: 60
End: 2020-10-05

## 2020-10-05 NOTE — TELEPHONE ENCOUNTER
No show to appointment.  Scheduled for 10/8 at 1:15pm.     CLINICAL PHARMACY CONSULT: MED RECONCILIATION/REVIEW ADDENDUM    For Pharmacy Admin Tracking Only    PHSO: No  Total # of Interventions Recommended: 0    Total Interventions Accepted: 0  Time Spent (min): 15    Sandi GalanD

## 2020-10-08 ENCOUNTER — ANTI-COAG VISIT (OUTPATIENT)
Dept: PHARMACY | Age: 60
End: 2020-10-08
Payer: COMMERCIAL

## 2020-10-08 ENCOUNTER — OFFICE VISIT (OUTPATIENT)
Dept: CARDIOLOGY CLINIC | Age: 60
End: 2020-10-08
Payer: COMMERCIAL

## 2020-10-08 VITALS
WEIGHT: 273.8 LBS | SYSTOLIC BLOOD PRESSURE: 168 MMHG | BODY MASS INDEX: 34.04 KG/M2 | HEART RATE: 73 BPM | DIASTOLIC BLOOD PRESSURE: 110 MMHG | HEIGHT: 75 IN

## 2020-10-08 LAB
INTERNATIONAL NORMALIZATION RATIO, POC: 1.3
POC INR: 1.3 INDEX
PROTHROMBIN TIME, POC: 15.9 SECONDS (ref 10–14.3)

## 2020-10-08 PROCEDURE — 36416 COLLJ CAPILLARY BLOOD SPEC: CPT

## 2020-10-08 PROCEDURE — 93000 ELECTROCARDIOGRAM COMPLETE: CPT | Performed by: INTERNAL MEDICINE

## 2020-10-08 PROCEDURE — G8484 FLU IMMUNIZE NO ADMIN: HCPCS | Performed by: INTERNAL MEDICINE

## 2020-10-08 PROCEDURE — G8427 DOCREV CUR MEDS BY ELIG CLIN: HCPCS | Performed by: INTERNAL MEDICINE

## 2020-10-08 PROCEDURE — 4004F PT TOBACCO SCREEN RCVD TLK: CPT | Performed by: INTERNAL MEDICINE

## 2020-10-08 PROCEDURE — 85610 PROTHROMBIN TIME: CPT

## 2020-10-08 PROCEDURE — 99212 OFFICE O/P EST SF 10 MIN: CPT

## 2020-10-08 PROCEDURE — 99214 OFFICE O/P EST MOD 30 MIN: CPT | Performed by: INTERNAL MEDICINE

## 2020-10-08 PROCEDURE — G8417 CALC BMI ABV UP PARAM F/U: HCPCS | Performed by: INTERNAL MEDICINE

## 2020-10-08 PROCEDURE — 3017F COLORECTAL CA SCREEN DOC REV: CPT | Performed by: INTERNAL MEDICINE

## 2020-10-08 RX ORDER — LISINOPRIL 20 MG/1
20 TABLET ORAL DAILY
Qty: 90 TABLET | Refills: 3 | Status: SHIPPED | OUTPATIENT
Start: 2020-10-08 | End: 2020-12-10 | Stop reason: SDUPTHER

## 2020-10-08 NOTE — LETTER
Chase Novaky Massed  1960  [id number]    Have you had any Chest Pain - No      Have you had any Shortness of Breath - Yes  If Yes - When on exertion    Have you had any dizziness - No      Have you had any palpitations - No      Do you have any edema - No  Do you have a surgery or procedure scheduled in the near future - No

## 2020-10-08 NOTE — PROGRESS NOTES
Dulce Mccullough MD        OFFICE  FOLLOWUP NOTE    Chief complaints: patient is here for management of CAD s/p CABG, DM, HTN, DYSLPIDEMIA, AFIB    Subjective: +  chest pain, no shortness of breath, no dizziness, no palpitations    HPI Raissa Deep is a 62 y. o.year old who  has a past medical history of Atrial fibrillation ([facility] CAD (coronary artery disease), [facility]) [facility] COPD (chronic obstructive pulmonary disease) ([facility] Diabetes mellitus ([facility] GERD (gastroesophageal reflux disease), H/O cardiovascular stress test, H/O cardiovascular stress test, H/O cardiovascular stress test, H/O Doppler ultrasound, H/O echocardiogram, H/O percutaneous left heart catheterization, Heart imaging, History of coronary artery stent placement, History of exercise stress test, History of Holter monitoring, History of MRI of brain and brain stem, History of nuclear stress test, Hyperlipidemia, Hypertension, Kidney disease, S/P cardiac catheterization, Status post angioplasty with stent, and Wears dentures.  and presents for management of CAD s/p CABG, DM, HTN, DYSLPIDEMIA, AFIB which are well controlled  Patient has gained a lot of weight post CABG, he has not been working, he has chest pain early in the morning which is from chest wall incision of CABG      Current Outpatient Medications   Medication Sig Dispense Refill    warfarin (COUMADIN) 2.5 MG tablet Take 1 tablet by mouth daily 30 tablet 1    lisinopril (PRINIVIL;ZESTRIL) 10 MG tablet Take 1 tablet by mouth daily 90 tablet 1    aspirin 81 MG EC tablet Take 1 tablet by mouth daily 30 tablet 3    amiodarone (CORDARONE) 200 MG tablet Take 1 tablet by mouth 2 times daily 60 tablet 1    glipiZIDE (GLUCOTROL) 10 MG tablet Take 1 tablet by mouth every morning (before breakfast) 60 tablet 3    atorvastatin (LIPITOR) 20 MG tablet Take 1 tablet by mouth nightly 30 tablet 3    carvedilol (COREG) 6.25 MG tablet Take 1 tablet by mouth 2 times daily 60 tablet 3    furosemide (LASIX) 40 MG tablet Take 1 tablet by mouth 3 times daily 90 tablet 1    potassium chloride (KLOR-CON M) 20 MEQ extended release tablet Take 2 tablets by mouth 2 times daily 60 tablet 1    linagliptin (TRADJENTA) 5 MG tablet Take 1 tablet by mouth daily 30 tablet 3     No current facility-administered medications for this visit. Allergies: Patient has no known allergies. Past Medical History:   Diagnosis Date    Atrial fibrillation ([facility] [address] 11/2013    CAD (coronary artery disease)     Follows with Dr. Ac Reinoso CHF (congestive heart failure) ([facility] [facility]     COPD (chronic obstructive pulmonary disease) ([facility] [address]     Follows with PCP    [facility]     GERD (gastroesophageal reflux disease)     H/O cardiovascular stress test 11/20/13, 3/20/2013    11/13-Observed defect is consistent with diaphragmatic attenuation. EF 58%. 3/13 -EF 51%. No ischemia. Normal Study.  H/O cardiovascular stress test 06/08/2017    EF 50% normal study    H/O cardiovascular stress test 06/17/2019    Normal NM    H/O Doppler ultrasound 10/14/2010    10/2010-Bilateral venous doppler of lower extremies- No DVTs.  H/O echocardiogram 06/26/2019    Left ventricular systolic function is normal with an ejection fraction of 50-55%. Mild concentric left ventricular hypertrophy. Grade I diastolic dysfunction. No significant valvular disease noted. No evidence of pericardial effusion. Essentially unremarkable echo.     H/O percutaneous left heart catheterization 12/22/15    No evidence of hemodynamically significant coronary artery disease    Heart imaging 04/23/2020    muga - ef 58%    History of coronary artery stent placement 11/13/2013 11/13 -RCA - 3 stents placed    History of exercise stress test 06/08/2017    treadmill    History of Holter monitoring 1/4/2016    predominant rhythm sinus    History of MRI of brain and brain stem 06/04/2020    No evidence of an acute infarct, mod chronic microvascular white matter ischemic disease with associated cerebral parenchymal volume loss    History of nuclear stress test 02/24/2020    EF 38%,Myocardial perfusion scan shows moderate size, severe intensity, non  reversible perfusion defect in inferior wall.  Hyperlipidemia     Hypertension     Follows with PCP    Kidney disease     Dr. Letty Holman S/P cardiac catheterization 11/13/2013 11/13/2013-EF 55%, distal LAD mild disease,CX stent patent,but distal to stent 50% stenosis. RCA severe disease.  Status post angioplasty with stent     Wears dentures     full upper denture     Past Surgical History:   Procedure Laterality Date    APPENDECTOMY      BIOPSY / LIGATION TEMPORAL ARTERY Left 8/17/2020    LEFT TEMPORAL ARTERY BIOPSY LIGATION performed by Socorro Shell MD at [address] [facility]  11/13/2013 11/13/2013-EF 55%, distal LAD mild disease,CX stent patent,but distal to stent 50% stenosis. RCA severe disease.     CARDIAC SURGERY  07/03/2020    CABG X 3 LIMA-OM-PDA-LAD, Maze, LT Atrial Appendage clipping    COLONOSCOPY      CORONARY ANGIOPLASTY WITH STENT PLACEMENT      4 stents- RCA X3; CX X1    CORONARY ANGIOPLASTY WITH STENT PLACEMENT  11/13/2013 11/13/2013 -3 stents placed to RCA- Dr Aditya Camargo N/A 7/3/2020    INTRAOPERATIVE ISMAEL, CABG X3   WITH LIMA  AND LEFT LEG ENDOVEIN HARVEST, INDUCED HYPOTHERMIA, MAZE BIPOLAR AND CRYO PROCEDURE, LEFT ATRIAL APPENDAGE CLIPPING performed by Farhana Wright MD at [facility]      all teeth extracted    FEMUR FRACTURE SURGERY  1984    MANDIBLE FRACTURE SURGERY  1984     Family History   Problem Relation Age of Onset   Nichole Stabs Cancer Mother     Diabetes Mother     Heart Disease Mother     High Blood Pressure Mother     Stroke Mother     Cancer Father     Heart Disease Father     High Blood Pressure Father     Stroke Father     Cancer Brother      Social History     Tobacco Use  Smoking status: Current Every Day Smoker     Packs/day: 0.25     Years: 46.00     Pack years: 11.50     Types: Cigarettes     Start date: 1974    Smokeless tobacco: Never Used    Tobacco comment: Smokes approx 3 cig a day   Substance Use Topics    Alcohol use: No     Comment: caffeine 1 cup of coffee a day and at least 6 bottles of pop a day      [unfilled]  Review of Systems:   · Constitutional: No Fever or Weight Loss   · Eyes: No Decreased Vision  · ENT: No Headaches, Hearing Loss or Vertigo  · Cardiovascular: + chest pain, dyspnea on exertion, palpitations or loss of consciousness  · Respiratory: No cough or wheezing    · Gastrointestinal: No abdominal pain, appetite loss, blood in stools, constipation, diarrhea or heartburn  · Genitourinary: No dysuria, trouble voiding, or hematuria  · Musculoskeletal:  No gait disturbance, weakness or joint complaints  · Integumentary: No rash or pruritis  · Neurological: No TIA or stroke symptoms  · Psychiatric: No anxiety or depression  · Endocrine: No malaise, fatigue or temperature intolerance  · Hematologic/Lymphatic: No bleeding problems, blood clots or swollen lymph nodes  · Allergic/Immunologic: No nasal congestion or hives  All systems negative except as marked. Objective:  BP (!) 168/110   Pulse 73   Ht 6' 3\" (1.905 m)   Wt 273 lb 12.8 oz (124.2 kg)   BMI 34.22 kg/m²   Wt Readings from Last 3 Encounters:   10/08/20 273 lb 12.8 oz (124.2 kg)   08/24/20 283 lb (128.4 kg)   08/14/20 270 lb (122.5 kg)     Body mass index is 34.22 kg/m². GENERAL - Alert, oriented, pleasant, in no apparent distress,normal grooming  HEENT - pupils are reactive to light and accomodation, cornea intact, conjunctive normal color, ears are normal in exam,throat exam in normal, teeth, gum and palate are normal, oral mucosa is normal without any notation of pallor or cyanosis  Neck - Supple. No jugular venous distention noted.  No carotid bruits, no apical -carotid delay  Respiratory: Normal breath sounds, No respiratory distress, No wheezing, No chest tenderness. ,no use of accessory muscles, diaphragm movement is normal  Cardiovascular: (PMI) apex non displaced,no lifts no thrills, no s3,no s4, Normal heart rate, Normal rhythm, No murmurs, No rubs, No gallops. Carotid arteries pulse and amplitude are normal no bruit, no abdominal bruit noted ( normal abdominal aorta ausculation), femoral arteries pulse and amplitude are normal no bruit, pedal pulses are normal  Femoral pulses have normal amplitude, no bruits   Extremities - No cyanosis, clubbing, or significant edema, no varicose veins    Abdomen - No masses, tenderness, or organomegaly, no hepato-splenomegally, no bruits  Musculoskeletal - No significant edema, no kyphosis or scoliosis, no deformity in any extremity noted, muscle strength and tone are normal  Skin: no ulcer,no scar,no stasis dermatitis   Neurologic - alert oriented times 3,Cranial nerves II through XII are grossly intact. There were no gross focal neurologic abnormalities. All sensory and motor nerves examined and were normal  Psychiatric: normal mood and affect    Lab Results   Component Value Date    CKTOTAL 47 07/19/2020    CKMB 2.4 03/10/2013    TROPONINI 0.014 06/09/2014    TROPONINI 0.015 12/03/2011     BNP:    Lab Results   Component Value Date    BNP 17 03/09/2013     PT/INR:  No results found for: PTINR  Lab Results   Component Value Date    LABA1C 8.9 (H) 06/29/2020    LABA1C 6.9 (H) 01/05/2020     Lab Results   Component Value Date    CHOL 153 01/06/2020    TRIG 54 01/06/2020    HDL 43 01/06/2020    LDLDIRECT 111 (H) 01/06/2020     Lab Results   Component Value Date    ALT 17 08/24/2020    AST 16 08/24/2020     TSH:  No results found for: TSH    Impression:  Lien Gonzalez is a 62 y. o.year old who  has a past medical history of Atrial fibrillation ([facility] [address] CAD (coronary artery disease), CHF (congestive heart failure) ([facility] [address] COPD (chronic obstructive pulmonary disease) ([facility] [address] Diabetes mellitus ([facility] [address] [facility] GERD (gastroesophageal reflux disease), H/O cardiovascular stress test, H/O cardiovascular stress test, H/O cardiovascular stress test, H/O Doppler ultrasound, H/O echocardiogram, H/O percutaneous left heart catheterization, Heart imaging, History of coronary artery stent placement, History of exercise stress test, History of Holter monitoring, History of MRI of brain and brain stem, History of nuclear stress test, Hyperlipidemia, Hypertension, Kidney disease, S/P cardiac catheterization, Status post angioplasty with stent, and Wears dentures. and presents with     Plan:  1. CHEST PAIN: musculoskeletal post CABG, recommend to lose weight, and control BP,CAD:s/p CABG X4  Continue aspirin , continue statins, betablockers, stress test and cardiac rehab, LVEF 40-45%, INCREASE LISINOPRIL TO 20MG , POST CABG EKG IS NSR   2. DM: stable continue glipizide  3. Paroxysmal afib: continue COUAMDIN AND ASPIRIN, amidarone  4. Dyslipidemia: continue statins  5. HTN:UNstable, continue COREG INCREASE LISINOPRIL 20MG DAILY  6. Health maintenance: exerise and diet  All labs, medications and tests reviewed, continue all other medications of all above medical condition listed as is. 13

## 2020-10-08 NOTE — PROGRESS NOTES
[id number] Score for Atrial Fibrillation Stroke Risk   Risk   Factors  Component Value   C CHF Yes 1   H HTN Yes 1   A2 Age >= 75 No,  (65 y.o.) 0   D DM Yes 1   S2 Prior Stroke/TIA Yes 2   V Vascular Disease Yes 1   A Age 74-69 No,  (65 y.o.) 0   Sc Sex male 0    [id number]  Score  6   Score last updated 10/8/20 9:16 AM EDT

## 2020-10-14 RX ORDER — ATORVASTATIN CALCIUM 80 MG/1
80 TABLET, FILM COATED ORAL NIGHTLY
Qty: 30 TABLET | Refills: 3 | Status: SHIPPED | OUTPATIENT
Start: 2020-10-14

## 2020-10-15 ENCOUNTER — ANTI-COAG VISIT (OUTPATIENT)
Dept: PHARMACY | Age: 60
End: 2020-10-15
Payer: COMMERCIAL

## 2020-10-15 VITALS — TEMPERATURE: 97.2 F

## 2020-10-15 LAB
INTERNATIONAL NORMALIZATION RATIO, POC: 1.3
POC INR: 1.3 INDEX
PROTHROMBIN TIME, POC: 16.1 SECONDS (ref 10–14.3)

## 2020-10-15 PROCEDURE — 85610 PROTHROMBIN TIME: CPT

## 2020-10-15 PROCEDURE — 36416 COLLJ CAPILLARY BLOOD SPEC: CPT

## 2020-10-15 PROCEDURE — 99213 OFFICE O/P EST LOW 20 MIN: CPT

## 2020-10-15 RX ORDER — WARFARIN SODIUM 3 MG/1
3 TABLET ORAL DAILY
Qty: 30 TABLET | Refills: 1 | Status: SHIPPED | OUTPATIENT
Start: 2020-10-15 | End: 2020-11-05 | Stop reason: DRUGHIGH

## 2020-10-22 ENCOUNTER — ANTI-COAG VISIT (OUTPATIENT)
Dept: PHARMACY | Age: 60
End: 2020-10-22
Payer: COMMERCIAL

## 2020-10-22 VITALS — TEMPERATURE: 97.3 F

## 2020-10-22 LAB
INTERNATIONAL NORMALIZATION RATIO, POC: 1.4
POC INR: 1.4 INDEX
PROTHROMBIN TIME, POC: 16.8 SECONDS (ref 10–14.3)

## 2020-10-22 PROCEDURE — 99212 OFFICE O/P EST SF 10 MIN: CPT

## 2020-10-22 PROCEDURE — 36416 COLLJ CAPILLARY BLOOD SPEC: CPT

## 2020-10-22 PROCEDURE — 85610 PROTHROMBIN TIME: CPT

## 2020-10-29 ENCOUNTER — ANTI-COAG VISIT (OUTPATIENT)
Dept: PHARMACY | Age: 60
End: 2020-10-29
Payer: COMMERCIAL

## 2020-10-29 VITALS — TEMPERATURE: 97.3 F

## 2020-10-29 LAB
INTERNATIONAL NORMALIZATION RATIO, POC: 1.4
POC INR: 1.4 INDEX
PROTHROMBIN TIME, POC: 16.6 SECONDS (ref 10–14.3)

## 2020-10-29 PROCEDURE — 85610 PROTHROMBIN TIME: CPT

## 2020-10-29 PROCEDURE — 99212 OFFICE O/P EST SF 10 MIN: CPT

## 2020-10-29 PROCEDURE — 36416 COLLJ CAPILLARY BLOOD SPEC: CPT

## 2020-10-29 NOTE — PROGRESS NOTES
PATIENT NAME: Quan Rivas    TODAY'S DATE: 07/02/20    Reason for today's visit: CAD    SUBJECTIVE:    Pt was c/o CP and L jaw pain this am. Nitro was increased and now he is not having pain. OBJECTIVE:   VITALS:    Vitals:    07/02/20 1103   BP: (!) 122/90   Pulse: 79   Resp: 17   Temp:    SpO2:      INTAKE/OUTPUT:    Date 07/02/20 0000 - 07/02/20 2359   Shift 3005-4618 3580-3878 0142-6560 24 Hour Total   INTAKE   P.O.  480  480   Shift Total(mL/kg)  480(4.1)  480(4.1)   OUTPUT   Urine(mL/kg/hr) 1450(1.6) 300  1750   Shift Total(mL/kg) 1450(12.4) 300(2.6)  1750(15)   Weight (kg) 116.7 116.7 116.7 116.7      Patient Vitals for the past 96 hrs (Last 3 readings):   Weight   06/29/20 1202 257 lb 4.4 oz (116.7 kg)   06/29/20 0020 257 lb (116.6 kg)       EXAM:  Constitutional: Blood pressure (!) 122/90, pulse 79, temperature 97.9 °F (36.6 °C), temperature source Oral, resp. rate 17, height 6' 3\" (1.905 m), weight 257 lb 4.4 oz (116.7 kg), SpO2 96 %. No apparent distress, appears stated age and cooperative. Neurologic: follows commands, no focal weakness noted   Lungs: Good respiratory effort. Clear to auscultation,   CV: Regular rate/ rhythm , no peripheral edema, feet warm and well perfused  GI: Soft, non-tender in all four quadrants, non-distended, + bowel sounds, spleen and liver no palpable masses   : bladder nondistended   MSK: no obvious deformity   Skin: warm, pink and dry       Data:  CBC:   Recent Labs     06/30/20  0520 07/02/20  1025   WBC 8.7 8.3   HGB 14.6 13.0*   HCT 44.2 40.1*    195     BMP:    Recent Labs     06/30/20  0520 07/02/20  0710    138   K 3.6 3.7    102   CO2 28 26   BUN 22 19   CREATININE 1.4* 1.1   GLUCOSE 207* 221*     Hepatic: No results for input(s): AST, ALT, ALB, BILITOT, ALKPHOS in the last 72 hours. Mag:    No results for input(s): MG in the last 72 hours. Phos:   No results for input(s): PHOS in the last 72 hours.    INR:   Recent Labs 06/30/20  0520   INR 0.99       Radiology Review:        ASSESSMENT:  Patient Active Problem List   Diagnosis    Chest pain    DM w/o complication type II [facility]    Longstanding persistent atrial fibrillation    CAD (coronary artery disease)    Post PTCA    Unstable angina ([address]    HTN (hypertension)    Angina pectoris syndrome ([facility]unstable    Smoking    COPD (chronic obstructive pulmonary disease) (HCC)    Atrial fibrillation with rapid ventricular response ([facility])    Chest pain    STEMI (ST elevation myocardial infarction) ([address]    Myocardial infarction acute ([address]    Nausea    New onset of congestive heart failure ([address]    COPD with acute exacerbation ([facility]    Acute pulmonary edema ([facility]    Pleural effusion, bilateral    Hypertensive urgency    Chronic systolic congestive heart failure ([address]    TIA (transient ischemic attack)    Chronic kidney disease, stage III (moderate) ([facility])    3-vessel coronary artery disease       CAD  Cocaine abuse    PLAN:     Nitro gtt increased to 48. Maintain good BP control. Start heparin gtt. He is now CP free. Plan for CABG Monday. Discussed importance of abstaining from cocaine use after surgery and he states \"I'm done with it. \"     Patrick Freeman PA-C yes

## 2020-10-29 NOTE — PROGRESS NOTES
Medication Management Service  [facility]  [phone]    Visit Date: 10/29/2020   Subjective:       Vickie Trevino is a 62 y.o. male who presents to clinic today for anticoagulation monitoring and adjustment. Patient seen in clinic for warfarin management due to  Indication:   atrial fibrillation. INR goal: of 2.0-3.0. Duration of therapy: indefinite. Patient reports the following:   Adherent with regimen  Missed or extra doses:  None   Bleeding or thromboembolic side effects:  None  Significant medication changes:  None  Significant dietary changes: salads  Significant alcohol or tobacco changes: None  Significant recent illness, disease state changes, or hospitalization:  None  Upcoming surgeries or procedures:  None  Falls: None           Assessment and Plan     PT/INR done in office per protocol. INR today is 1.4, subtherapeutic, despite dose increase. Patient reports increased vitamin K in his diet. Plan: Take warfarin 6mg x1 then increase weekly dose ~21% to warfarin 3mg daily except 4.5mg on MWF. Recheck INR in 1 week(s). Patient verbalized understanding of dosing directions and information discussed. Dosing schedule given to patient including phone number, appointment date, and time. Progress note sent to referring office. Patient acknowledges working in consult agreement with pharmacist as referred by his/her physician.       Electronically signed by Tamiko Shay [facility] on 10/29/20 at 10:02 AM EDT    CLINICAL PHARMACY CONSULT: MED RECONCILIATION/REVIEW [place]  22. Tracking Only    PHSO: No  Total # of Interventions Recommended: 1  - Increased Dose #: 1  - Maintenance Safety Lab Monitoring #: 1  Total Interventions Accepted: 1  Time Spent (min): 109 [facility], PharmD

## 2020-11-05 ENCOUNTER — ANTI-COAG VISIT (OUTPATIENT)
Dept: PHARMACY | Age: 60
End: 2020-11-05
Payer: COMMERCIAL

## 2020-11-05 VITALS — TEMPERATURE: 97.2 F

## 2020-11-05 LAB
INTERNATIONAL NORMALIZATION RATIO, POC: 1.7
POC INR: 1.7 INDEX
PROTHROMBIN TIME, POC: 20.3 SECONDS (ref 10–14.3)

## 2020-11-05 PROCEDURE — 85610 PROTHROMBIN TIME: CPT

## 2020-11-05 PROCEDURE — 36416 COLLJ CAPILLARY BLOOD SPEC: CPT

## 2020-11-05 PROCEDURE — 99213 OFFICE O/P EST LOW 20 MIN: CPT

## 2020-11-05 RX ORDER — WARFARIN SODIUM 4 MG/1
4 TABLET ORAL DAILY
Qty: 30 TABLET | Refills: 1 | Status: SHIPPED | OUTPATIENT
Start: 2020-11-05 | End: 2020-12-04 | Stop reason: SDUPTHER

## 2020-11-05 NOTE — PROGRESS NOTES
Medication Management Service  [facility]  [phone]    Visit Date: 11/5/2020   Subjective:       Precious Rowland is a 62 y.o. male who presents to clinic today for anticoagulation monitoring and adjustment. Patient seen in clinic for warfarin management due to  Indication:   atrial fibrillation. INR goal: of 2.0-3.0. Duration of therapy: indefinite. Patient reports the following:   Adherent with regimen  Missed or extra doses:  None   Bleeding or thromboembolic side effects:  None  Significant medication changes:  None  Significant dietary changes: None  Significant alcohol or tobacco changes: None  Significant recent illness, disease state changes, or hospitalization:  None  Upcoming surgeries or procedures:  None  Falls: None           Assessment and Plan     PT/INR done in office per protocol. INR today is 1.7, subtherapeutic. Plan: Increase weekly dose ~10% to warfarin 4mg daily. Recheck INR in 1.5 week(s). Patient verbalized understanding of dosing directions and information discussed. Dosing schedule given to patient including phone number, appointment date, and time. Progress note sent to referring office. Patient acknowledges working in consult agreement with pharmacist as referred by his/her physician.       Electronically signed by Wilbert Álvarez [facility] on 11/5/20 at 3:18 PM EST    CLINICAL PHARMACY CONSULT: MED RECONCILIATION/REVIEW ADDENDUM    For Pharmacy Admin Tracking Only    PHSO: No  Total # of Interventions Recommended: 1  - Increased Dose #: 1  - Refills Provided #: 1  - Maintenance Safety Lab Monitoring #: 1  Total Interventions Accepted: 1  Time Spent (min): 109 [facility]

## 2020-11-09 ENCOUNTER — OFFICE VISIT (OUTPATIENT)
Dept: CARDIOLOGY CLINIC | Age: 60
End: 2020-11-09
Payer: COMMERCIAL

## 2020-11-09 VITALS
HEIGHT: 75 IN | SYSTOLIC BLOOD PRESSURE: 138 MMHG | DIASTOLIC BLOOD PRESSURE: 88 MMHG | BODY MASS INDEX: 34.89 KG/M2 | WEIGHT: 280.6 LBS | HEART RATE: 74 BPM

## 2020-11-09 PROCEDURE — G8427 DOCREV CUR MEDS BY ELIG CLIN: HCPCS | Performed by: INTERNAL MEDICINE

## 2020-11-09 PROCEDURE — 4004F PT TOBACCO SCREEN RCVD TLK: CPT | Performed by: INTERNAL MEDICINE

## 2020-11-09 PROCEDURE — G8484 FLU IMMUNIZE NO ADMIN: HCPCS | Performed by: INTERNAL MEDICINE

## 2020-11-09 PROCEDURE — G8417 CALC BMI ABV UP PARAM F/U: HCPCS | Performed by: INTERNAL MEDICINE

## 2020-11-09 PROCEDURE — 3017F COLORECTAL CA SCREEN DOC REV: CPT | Performed by: INTERNAL MEDICINE

## 2020-11-09 PROCEDURE — 99214 OFFICE O/P EST MOD 30 MIN: CPT | Performed by: INTERNAL MEDICINE

## 2020-11-09 NOTE — PROGRESS NOTES
60 tablet 3    furosemide (LASIX) 40 MG tablet Take 1 tablet by mouth 3 times daily 90 tablet 1    potassium chloride (KLOR-CON M) 20 MEQ extended release tablet Take 2 tablets by mouth 2 times daily 60 tablet 1    linagliptin (TRADJENTA) 5 MG tablet Take 1 tablet by mouth daily 30 tablet 3     No current facility-administered medications for this visit. Allergies: Patient has no known allergies. Past Medical History:   Diagnosis Date    Atrial fibrillation ([facility] 11/2013    CAD (coronary artery disease)     Follows with Dr. John Cochran CHF (congestive heart failure) ([facility]     COPD (chronic obstructive pulmonary disease) ([facility]     Follows with PCP    [facility]     GERD (gastroesophageal reflux disease)     H/O cardiovascular stress test 11/20/13, 3/20/2013    11/13-Observed defect is consistent with diaphragmatic attenuation. EF 58%. 3/13 -EF 51%. No ischemia. Normal Study.  H/O cardiovascular stress test 06/08/2017    EF 50% normal study    H/O cardiovascular stress test 06/17/2019    Normal NM    H/O Doppler ultrasound 10/14/2010    10/2010-Bilateral venous doppler of lower extremies- No DVTs.  H/O echocardiogram 06/26/2019    Left ventricular systolic function is normal with an ejection fraction of 50-55%. Mild concentric left ventricular hypertrophy. Grade I diastolic dysfunction. No significant valvular disease noted. No evidence of pericardial effusion. Essentially unremarkable echo.     H/O percutaneous left heart catheterization 12/22/15    No evidence of hemodynamically significant coronary artery disease    Heart imaging 04/23/2020    muga - ef 58%    History of coronary artery stent placement 11/13/2013 11/13 -RCA - 3 stents placed    History of exercise stress test 06/08/2017    treadmill    History of Holter monitoring 1/4/2016    predominant rhythm sinus    History of MRI of brain and brain stem 06/04/2020    No evidence of an acute infarct, mod chronic microvascular white matter ischemic disease with associated cerebral parenchymal volume loss    History of nuclear stress test 02/24/2020    EF 38%,Myocardial perfusion scan shows moderate size, severe intensity, non  reversible perfusion defect in inferior wall.  Hyperlipidemia     Hypertension     Follows with PCP    Kidney disease     Dr. Alexander Rader S/P cardiac catheterization 11/13/2013 11/13/2013-EF 55%, distal LAD mild disease,CX stent patent,but distal to stent 50% stenosis. RCA severe disease.  Status post angioplasty with stent     Wears dentures     full upper denture     Past Surgical History:   Procedure Laterality Date    APPENDECTOMY      BIOPSY / LIGATION TEMPORAL ARTERY Left 8/17/2020    LEFT TEMPORAL ARTERY BIOPSY LIGATION performed by Ceci Valencia MD at [address] [place] [address]  11/13/2013 11/13/2013-EF 55%, distal LAD mild disease,CX stent patent,but distal to stent 50% stenosis. RCA severe disease.     CARDIAC SURGERY  07/03/2020    CABG X 3 LIMA-OM-PDA-LAD, Maze, LT Atrial Appendage clipping    COLONOSCOPY      CORONARY ANGIOPLASTY WITH STENT PLACEMENT      4 stents- RCA X3; CX X1    CORONARY ANGIOPLASTY WITH STENT PLACEMENT  11/13/2013 11/13/2013 -3 stents placed to RCA- Dr Perry Mayers N/A 7/3/2020    INTRAOPERATIVE ISMAEL, CABG X3   WITH LIMA  AND LEFT LEG ENDOVEIN HARVEST, INDUCED HYPOTHERMIA, MAZE BIPOLAR AND CRYO PROCEDURE, LEFT ATRIAL APPENDAGE CLIPPING performed by Jaswant Buitrago MD at [address] [facility]      all teeth extracted    FEMUR FRACTURE SURGERY  1984    MANDIBLE FRACTURE SURGERY  1984     Family History   Problem Relation Age of Onset   Luz Juan Cancer Mother     Diabetes Mother     Heart Disease Mother     High Blood Pressure Mother     Stroke Mother     Cancer Father     Heart Disease Father     High Blood Pressure Father     Stroke Father     Cancer Brother      Social History     Tobacco Use  Smoking status: Current Every Day Smoker     Packs/day: 0.25     Years: 46.00     Pack years: 11.50     Types: Cigarettes     Start date: 1974    Smokeless tobacco: Never Used    Tobacco comment: Smokes approx 3 cig a day   Substance Use Topics    Alcohol use: No     Comment: caffeine 1 cup of coffee a day and at least 6 bottles of pop a day      [unfilled]  Review of Systems:   · Constitutional: No Fever or Weight Loss   · Eyes: No Decreased Vision  · ENT: No Headaches, Hearing Loss or Vertigo  · Cardiovascular: No chest pain, dyspnea on exertion, palpitations or loss of consciousness  · Respiratory: No cough or wheezing    · Gastrointestinal: No abdominal pain, appetite loss, blood in stools, constipation, diarrhea or heartburn  · Genitourinary: No dysuria, trouble voiding, or hematuria  · Musculoskeletal:  No gait disturbance, weakness or joint complaints  · Integumentary: No rash or pruritis  · Neurological: No TIA or stroke symptoms  · Psychiatric: No anxiety or depression  · Endocrine: No malaise, fatigue or temperature intolerance  · Hematologic/Lymphatic: No bleeding problems, blood clots or swollen lymph nodes  · Allergic/Immunologic: No nasal congestion or hives  All systems negative except as marked. Objective:  /88   Pulse 74   Ht 6' 3\" (1.905 m)   Wt 280 lb 9.6 oz (127.3 kg)   BMI 35.07 kg/m²   Wt Readings from Last 3 Encounters:   11/09/20 280 lb 9.6 oz (127.3 kg)   10/08/20 273 lb 12.8 oz (124.2 kg)   08/24/20 283 lb (128.4 kg)     Body mass index is 35.07 kg/m². GENERAL - Alert, oriented, pleasant, in no apparent distress,normal grooming  HEENT - pupils are reactive to light and accomodation, cornea intact, conjunctive normal color, ears are normal in exam,throat exam in normal, teeth, gum and palate are normal, oral mucosa is normal without any notation of pallor or cyanosis  Neck - Supple. No jugular venous distention noted.  No carotid bruits, no apical -carotid delay  Respiratory:  Normal breath sounds, No respiratory distress, No wheezing, No chest tenderness. ,no use of accessory muscles, diaphragm movement is normal  Cardiovascular: (PMI) apex non displaced,no lifts no thrills, no s3,no s4, Normal heart rate, Normal rhythm, No murmurs, No rubs, No gallops. Carotid arteries pulse and amplitude are normal no bruit, no abdominal bruit noted ( normal abdominal aorta ausculation), femoral arteries pulse and amplitude are normal no bruit, pedal pulses are normal  Femoral pulses have normal amplitude, no bruits   Extremities - No cyanosis, clubbing, or significant edema, no varicose veins    Abdomen - No masses, tenderness, or organomegaly, no hepato-splenomegally, no bruits  Musculoskeletal - No significant edema, no kyphosis or scoliosis, no deformity in any extremity noted, muscle strength and tone are normal  Skin: no ulcer,no scar,no stasis dermatitis   Neurologic - alert oriented times 3,Cranial nerves II through XII are grossly intact. There were no gross focal neurologic abnormalities. All sensory and motor nerves examined and were normal  Psychiatric: normal mood and affect    Lab Results   Component Value Date    CKTOTAL 47 07/19/2020    CKMB 2.4 03/10/2013    TROPONINI 0.014 06/09/2014    TROPONINI 0.015 12/03/2011     BNP:    Lab Results   Component Value Date    BNP 17 03/09/2013     PT/INR:  No results found for: PTINR  Lab Results   Component Value Date    LABA1C 8.9 (H) 06/29/2020    LABA1C 6.9 (H) 01/05/2020     Lab Results   Component Value Date    CHOL 153 01/06/2020    TRIG 54 01/06/2020    HDL 43 01/06/2020    LDLDIRECT 111 (H) 01/06/2020     Lab Results   Component Value Date    ALT 17 08/24/2020    AST 16 08/24/2020     TSH:  No results found for: TSH    Impression:  Antwan Del Real is a 62 y. o.year old who  has a past medical history of Atrial fibrillation ([facility] CAD (coronary artery disease), CHF (congestive heart failure) ([facility] COPD (chronic obstructive

## 2020-11-09 NOTE — PATIENT INSTRUCTIONS
Please hold on to these instructions the  will call you within 1-9 business days when we receive authorization from your insurance. Nuclear Stress Test    WHAT TO EXPECT:   ? You will need to confirm the test or it could be cancelled. ? This test will take approximately 2 hours: 1 hour in the AM &    1 hour in the PM. You will be given a time by the Technologist after the first part is completed to come back. ? You will be given a medication, through an IV in the hand, this will safely simulate exercise. This IV is also needed to inject the radioactive isotope unless you are able toe walk the treadmill. ? You will receive an injection in the AM & PM before the pictures. ? Using a special camera, you will have one set of pictures of your heart taken in the AM and a set of pictures in the PM.     PREPARATION FOR TEST:  ? Eat a light breakfast such as water or juice and toast.  ? If you are DIABETIC: Eat a normal breakfast with NO CAFFEINE and take your insulin as normal.   ? AVOID ALL FOODS & DRINKS containing CAFFEINE 12 HOURS PRIOR TO THE TEST: Including coffee, Tea, Robin and other soft drinks even those labeled  caffeine free or decaffeinated.

## 2020-11-09 NOTE — LETTER
Chase Novaky Massed  1960  [id number]    Have you had any Chest Pain - No      Have you had any Shortness of Breath - Yes  If Yes - When on exertion    Have you had any dizziness - No      Have you had any palpitations - No    Do you have any edema - swelling in None     \"    Do you have a surgery or procedure scheduled in the near future - No

## 2020-11-16 ENCOUNTER — ANTI-COAG VISIT (OUTPATIENT)
Dept: PHARMACY | Age: 60
End: 2020-11-16
Payer: COMMERCIAL

## 2020-11-16 VITALS — TEMPERATURE: 97.2 F

## 2020-11-16 LAB
INTERNATIONAL NORMALIZATION RATIO, POC: 2.5
POC INR: 2.5 INDEX
PROTHROMBIN TIME, POC: 29.7 SECONDS (ref 10–14.3)

## 2020-11-16 PROCEDURE — 36416 COLLJ CAPILLARY BLOOD SPEC: CPT

## 2020-11-16 PROCEDURE — 99211 OFF/OP EST MAY X REQ PHY/QHP: CPT

## 2020-11-16 PROCEDURE — 85610 PROTHROMBIN TIME: CPT

## 2020-11-18 ENCOUNTER — PROCEDURE VISIT (OUTPATIENT)
Dept: CARDIOLOGY CLINIC | Age: 60
End: 2020-11-18
Payer: COMMERCIAL

## 2020-11-18 LAB
LV EF: 29 %
LVEF MODALITY: NORMAL

## 2020-11-18 PROCEDURE — A9500 TC99M SESTAMIBI: HCPCS | Performed by: INTERNAL MEDICINE

## 2020-11-18 PROCEDURE — 93016 CV STRESS TEST SUPVJ ONLY: CPT | Performed by: INTERNAL MEDICINE

## 2020-11-18 PROCEDURE — 93017 CV STRESS TEST TRACING ONLY: CPT | Performed by: INTERNAL MEDICINE

## 2020-11-18 PROCEDURE — 78452 HT MUSCLE IMAGE SPECT MULT: CPT | Performed by: INTERNAL MEDICINE

## 2020-11-18 PROCEDURE — 93018 CV STRESS TEST I&R ONLY: CPT | Performed by: INTERNAL MEDICINE

## 2020-11-23 ENCOUNTER — TELEPHONE (OUTPATIENT)
Dept: CARDIOLOGY CLINIC | Age: 60
End: 2020-11-23

## 2020-11-23 NOTE — TELEPHONE ENCOUNTER
Notified wife of results and Carlee Givens gave me an OV for 11-25-20 @ 11:30 to consult Dr. Clary Robertson. PT's EF is 29%.            Summary     Supervising physician Dr. Raza Hatfield .    Eliza Jackson STRESS TEST, DEPRESSED LVEF, INCREASED EDV,Myocardial perfusion     scan shows LARGE size,SEVERE intensity, PARTIALLY reversible perfusion     defect in inferior wall.          Recommendation     Recommendation: Routine follow-up.  ICD

## 2020-11-25 ENCOUNTER — INITIAL CONSULT (OUTPATIENT)
Dept: CARDIOLOGY CLINIC | Age: 60
End: 2020-11-25
Payer: COMMERCIAL

## 2020-11-25 VITALS
OXYGEN SATURATION: 98 % | TEMPERATURE: 97.2 F | SYSTOLIC BLOOD PRESSURE: 160 MMHG | BODY MASS INDEX: 34.96 KG/M2 | HEIGHT: 75 IN | HEART RATE: 63 BPM | RESPIRATION RATE: 14 BRPM | WEIGHT: 281.2 LBS | DIASTOLIC BLOOD PRESSURE: 98 MMHG

## 2020-11-25 PROCEDURE — 99244 OFF/OP CNSLTJ NEW/EST MOD 40: CPT | Performed by: INTERNAL MEDICINE

## 2020-11-25 PROCEDURE — G8427 DOCREV CUR MEDS BY ELIG CLIN: HCPCS | Performed by: INTERNAL MEDICINE

## 2020-11-25 PROCEDURE — 93000 ELECTROCARDIOGRAM COMPLETE: CPT | Performed by: INTERNAL MEDICINE

## 2020-11-25 PROCEDURE — G8484 FLU IMMUNIZE NO ADMIN: HCPCS | Performed by: INTERNAL MEDICINE

## 2020-11-25 PROCEDURE — G8417 CALC BMI ABV UP PARAM F/U: HCPCS | Performed by: INTERNAL MEDICINE

## 2020-11-25 RX ORDER — CARVEDILOL 12.5 MG/1
12.5 TABLET ORAL 2 TIMES DAILY
Qty: 60 TABLET | Refills: 2 | Status: SHIPPED | OUTPATIENT
Start: 2020-11-25 | End: 2021-05-10

## 2020-11-25 RX ORDER — AMIODARONE HYDROCHLORIDE 200 MG/1
200 TABLET ORAL DAILY
Qty: 60 TABLET | Refills: 1
Start: 2020-11-25 | End: 2021-03-16

## 2020-11-25 RX ORDER — HYDROXYZINE PAMOATE 50 MG/1
50 CAPSULE ORAL 2 TIMES DAILY
COMMUNITY
Start: 2020-11-20 | End: 2022-02-21 | Stop reason: CLARIF

## 2020-11-25 NOTE — PROGRESS NOTES
Electrophysiology Consult Note      Reason for consultation:  Need for ICD    Chief complaint : Shortness of breath    Referring physician: Chest pain      Primary care physician: Brayden Olivas MD      History of Present Illness:     Patient is a 60-year-old male with a history of hypertension, hyperlipidemia, coronary artery disease, LV systolic dysfunction referred by Dr. Lui Eugene for ICD as LVEF was 29%. Patient reports he is very short of breath for last couple of days. Patient reports shortness of breath with exertion relieved with rest.  No associated chest pain with it but he does have off-and-on sharp pains in the chest which does not even last for a minute and sometimes present with movement of the body to the side to side. Patient denies any swelling in the legs. Patient does not exercise daily. Patient does smoke every day. Patient denies any alcohol use. Patient has sleep apnea assessment on December 3rd. Patient reports he is not very compliant on the medication  He reports he has a  and he does not want ICD unless is absolutely not recommended  Is worried that he will lose his job    Pastmedical history:   Past Medical History:   Diagnosis Date    Atrial fibrillation ([facility] 11/2013    CAD (coronary artery disease)     Follows with Dr. Melba Ryan CHF (congestive heart failure) ([facility]     COPD (chronic obstructive pulmonary disease) ([facility]     Follows with PCP    Diabetes mellitus ([facility]     GERD (gastroesophageal reflux disease)     H/O cardiovascular stress test 11/20/13, 3/20/2013    11/13-Observed defect is consistent with diaphragmatic attenuation. EF 58%. 3/13 -EF 51%. No ischemia. Normal Study.  H/O cardiovascular stress test 06/08/2017    EF 50% normal study    H/O cardiovascular stress test 06/17/2019    Normal NM    H/O Doppler ultrasound 10/14/2010    10/2010-Bilateral venous doppler of lower extremies- No DVTs.     H/O stents- RCA X3; CX X1    CORONARY ANGIOPLASTY WITH STENT PLACEMENT  11/13/2013 11/13/2013 -3 stents placed to RCA- Dr José Manuel Pulido N/A 7/3/2020    INTRAOPERATIVE ISMAEL, CABG X3   WITH LIMA  AND LEFT LEG ENDOVEIN HARVEST, INDUCED HYPOTHERMIA, MAZE BIPOLAR AND CRYO PROCEDURE, LEFT ATRIAL APPENDAGE CLIPPING performed by Rodolfo Valencia MD at [address] [facility]      all teeth extracted    FEMUR FRACTURE SURGERY  1984    MANDIBLE FRACTURE SURGERY  1984       Family history:   Family History   Problem Relation Age of Onset    Cancer Mother     Diabetes Mother     Heart Disease Mother     High Blood Pressure Mother     Stroke Mother     Cancer Father     Heart Disease Father     High Blood Pressure Father     Stroke Father     Cancer Brother        Social history :  reports that he has been smoking cigarettes. He started smoking about 46 years ago. He has a 11.50 pack-year smoking history. He has never used smokeless tobacco. He reports previous drug use. Drug: Cocaine. He reports that he does not drink alcohol.     No Known Allergies    Current Outpatient Medications on File Prior to Visit   Medication Sig Dispense Refill    hydrOXYzine (VISTARIL) 50 MG capsule Take 50 mg by mouth nightly      warfarin (COUMADIN) 4 MG tablet Take 1 tablet by mouth daily 30 tablet 1    atorvastatin (LIPITOR) 80 MG tablet Take 1 tablet by mouth nightly 30 tablet 3    lisinopril (PRINIVIL;ZESTRIL) 20 MG tablet Take 1 tablet by mouth daily 90 tablet 3    aspirin 81 MG EC tablet Take 1 tablet by mouth daily 30 tablet 3    amiodarone (CORDARONE) 200 MG tablet Take 1 tablet by mouth 2 times daily 60 tablet 1    glipiZIDE (GLUCOTROL) 10 MG tablet Take 1 tablet by mouth every morning (before breakfast) 60 tablet 3    carvedilol (COREG) 6.25 MG tablet Take 1 tablet by mouth 2 times daily 60 tablet 3    furosemide (LASIX) 40 MG tablet Take 1 tablet by mouth 3 times daily 90 tablet 1    potassium chloride (KLOR-CON M) 20 MEQ extended release tablet Take 2 tablets by mouth 2 times daily 60 tablet 1    linagliptin (TRADJENTA) 5 MG tablet Take 1 tablet by mouth daily 30 tablet 3     No current facility-administered medications on file prior to visit. Review of Systems:   Review of Systems   Constitutional: Positive for fatigue. Negative for activity change, chills and fever. HENT: Negative for congestion, ear pain and tinnitus. Eyes: Negative for photophobia, pain and visual disturbance. Respiratory: Positive for shortness of breath. Negative for cough, chest tightness and wheezing. Cardiovascular: Positive for leg swelling. Negative for chest pain and palpitations. Gastrointestinal: Negative for abdominal pain, blood in stool, constipation, diarrhea, nausea and vomiting. Endocrine: Negative for cold intolerance and heat intolerance. Genitourinary: Negative for dysuria, flank pain and hematuria. Musculoskeletal: Positive for arthralgias. Negative for back pain, myalgias and neck stiffness. Skin: Negative for color change and rash. Allergic/Immunologic: Negative for food allergies. Neurological: Negative for dizziness, light-headedness, numbness and headaches. Hematological: Does not bruise/bleed easily. Psychiatric/Behavioral: Negative for agitation, behavioral problems and confusion. Examination:        Vitals:    11/25/20 1137   BP: (!) 160/98   Pulse: 63   Resp: 14   Temp: 97.2 °F (36.2 °C)   SpO2: 98%   Weight: 281 lb 3.2 oz (127.6 kg)   Height: 6' 3\" (1.905 m)       Body mass index is 35.15 kg/m². Physical Exam  Constitutional:       General: He is not in acute distress. Appearance: He is well-developed. HENT:      Head: Normocephalic and atraumatic. Eyes:      Pupils: Pupils are equal, round, and reactive to light. Neck:      Musculoskeletal: Normal range of motion. Vascular: No JVD.    Cardiovascular:      Rate and Rhythm: Normal rate and regular rhythm. Heart sounds: Murmur (grade 2/6 systolic murmur) present. No friction rub. Pulmonary:      Effort: Pulmonary effort is normal. No respiratory distress. Breath sounds: Normal breath sounds. No wheezing or rales. Abdominal:      General: Bowel sounds are normal. There is no distension. Palpations: Abdomen is soft. Tenderness: There is no abdominal tenderness. Musculoskeletal:         General: No tenderness. Skin:     General: Skin is warm and dry. Neurological:      Mental Status: He is alert and oriented to person, place, and time. Cranial Nerves: No cranial nerve deficit.            CBC:   Lab Results   Component Value Date    WBC 9.0 08/24/2020    HGB 12.5 08/24/2020    HCT 39.5 08/24/2020     08/24/2020     Lipids:  Lab Results   Component Value Date    CHOL 153 01/06/2020    TRIG 54 01/06/2020    HDL 43 01/06/2020    LDLDIRECT 111 (H) 01/06/2020     PT/INR:   Lab Results   Component Value Date    INR 2.5 11/16/2020    INR 3 09/10/2020        BMP:    Lab Results   Component Value Date     08/24/2020    K 4.0 08/24/2020     08/24/2020    CO2 24 08/24/2020    BUN 16 08/24/2020     CMP:   Lab Results   Component Value Date    AST 16 08/24/2020    PROT 7.1 08/24/2020    BILITOT 0.3 08/24/2020    ALKPHOS 88 08/24/2020     TSH:  No results found for: TSH    EKGINTERPRETATION - EKG Interpretation:  Sinus rhythm      IMPRESSION / RECOMMENDATIONS:       Moderate to severe LV systolic dysfunction  CAD  HTN  HLD  OBESITY BMI 35    LVEF has been decreased on recent nuclear stress test but patient appears to have inferior scar and previous echo was 40 to 45%    Patient has not taking medication correctly and is only taking once a day Coreg    I will increase the Coreg to 12.5 mg twice daily and his blood pressures have been not been controlled according to him and sometimes the systolic is up to 530 per patient    Will try to optimize medical therapy,   can follow-up with NP in 2 weeks and readjust the medication     once medication is optimized I will get a MUGA scan if the MUGA scan shows less than 35% LVEF then we can consider ICD     patient is a  and he wants to make sure that ICD is only placed if needed otherwise he will lose his job    Discussed risks with patient and patient agreeable with plan      Thanks again for allowing me to participate in care of this patient. Please call me if you have any questions. With best regards. Kathy Adams MD, 11/25/2020 11:47 AM     Please note this report has been partially produced using speech recognition software and may contain errors related to that system including errors in grammar, punctuation, and spelling, as well as words and phrases that may be inappropriate. If there are any questions or concerns please feel free to contact the dictating provider for clarification.

## 2020-11-25 NOTE — PROGRESS NOTES
[id number] Score for Atrial Fibrillation Stroke Risk   Risk   Factors  Component Value   C CHF Yes 1   H HTN Yes 1   A2 Age >= 75 No,  (65 y.o.) 0   D DM Yes 1   S2 Prior Stroke/TIA Yes 2   V Vascular Disease Yes 1   A Age 74-69 No,  (65 y.o.) 0   Sc Sex male 0    [id number]  Score  6   Score last updated 11/25/20 11:38 AM EST

## 2020-12-03 ENCOUNTER — ANTI-COAG VISIT (OUTPATIENT)
Dept: PHARMACY | Age: 60
End: 2020-12-03
Payer: COMMERCIAL

## 2020-12-03 VITALS — TEMPERATURE: 97.2 F

## 2020-12-03 LAB
INTERNATIONAL NORMALIZATION RATIO, POC: 3.5
POC INR: 3.5 INDEX
PROTHROMBIN TIME, POC: 42.3 SECONDS (ref 10–14.3)

## 2020-12-03 PROCEDURE — 36416 COLLJ CAPILLARY BLOOD SPEC: CPT

## 2020-12-03 PROCEDURE — 85610 PROTHROMBIN TIME: CPT

## 2020-12-03 PROCEDURE — 99212 OFFICE O/P EST SF 10 MIN: CPT

## 2020-12-03 NOTE — PROGRESS NOTES
Medication Management Service  [place]  [phone]    Visit Date: 12/3/2020   Subjective:       Bola Wade is a 62 y.o. male who presents to clinic today for anticoagulation monitoring and adjustment. Patient seen in clinic for warfarin management due to  Indication:   atrial fibrillation. INR goal: of 2.0-3.0. Duration of therapy: indefinite. Patient reports the following:   Adherent with regimen  Missed or extra doses:  None   Bleeding or thromboembolic side effects:  None  Significant medication changes:  None  Significant dietary changes: None  Significant alcohol or tobacco changes: None  Significant recent illness, disease state changes, or hospitalization:  None  Upcoming surgeries or procedures:  None  Falls: None           Assessment and Plan     PT/INR done in office per protocol. INR today is 3.5, supratherapeutic. Plan: Will decrease weekly dose ~7% to warfarin 4mg daily except 2mg on Fridays. Recheck INR in 1 week(s). Patient verbalized understanding of dosing directions and information discussed. Dosing schedule given to patient including phone number, appointment date, and time. Progress note sent to referring office. Patient acknowledges working in consult agreement with pharmacist as referred by his/her physician.       Electronically signed by DARA Gaspar [facility] on 12/3/20 at 3:27 PM EST    CLINICAL PHARMACY CONSULT: MED RECONCILIATION/REVIEW [place]  22. Tracking Only    PHSO: No  Total # of Interventions Recommended: 1  - Decreased Dose #: 1  - Maintenance Safety Lab Monitoring #: 1  Total Interventions Accepted: 1  Time Spent (min): Sharlene Garcia PharmD

## 2020-12-04 ENCOUNTER — TELEPHONE (OUTPATIENT)
Dept: CARDIOLOGY CLINIC | Age: 60
End: 2020-12-04

## 2020-12-04 ENCOUNTER — TELEPHONE (OUTPATIENT)
Dept: PHARMACY | Age: 60
End: 2020-12-04

## 2020-12-04 RX ORDER — WARFARIN SODIUM 4 MG/1
4 TABLET ORAL DAILY
Qty: 30 TABLET | Refills: 1
Start: 2020-12-04 | End: 2021-01-14

## 2020-12-04 NOTE — TELEPHONE ENCOUNTER
Refill requested.    CLINICAL PHARMACY CONSULT: MED RECONCILIATION/REVIEW ADDENDUM    For Pharmacy Admin Tracking Only    PHSO: No  Total # of Interventions Recommended: 0  - Refills Provided #: 1    Total Interventions Accepted: 0  Time Spent (min): 5    Alicia Last, SandiD

## 2020-12-04 NOTE — TELEPHONE ENCOUNTER
Pt called stated the  wanted his medication increased and it was decreased to 12.5mg , I instructed Pt that he was taking 25mg daily and now it is instructed to take 12.5mg BID to spread out the medication,

## 2020-12-08 RX ORDER — NITROGLYCERIN 0.4 MG/1
0.4 TABLET SUBLINGUAL EVERY 5 MIN PRN
Qty: 25 TABLET | Refills: 3 | Status: SHIPPED | OUTPATIENT
Start: 2020-12-08 | End: 2022-01-21 | Stop reason: SDUPTHER

## 2020-12-08 ASSESSMENT — ENCOUNTER SYMPTOMS
SHORTNESS OF BREATH: 1
PHOTOPHOBIA: 0
NAUSEA: 0
WHEEZING: 0
DIARRHEA: 0
EYE PAIN: 0
CONSTIPATION: 0
BACK PAIN: 0
ABDOMINAL PAIN: 0
COLOR CHANGE: 0
CHEST TIGHTNESS: 0
BLOOD IN STOOL: 0
VOMITING: 0
COUGH: 0

## 2020-12-10 ENCOUNTER — OFFICE VISIT (OUTPATIENT)
Dept: CARDIOLOGY CLINIC | Age: 60
End: 2020-12-10
Payer: COMMERCIAL

## 2020-12-10 ENCOUNTER — ANTI-COAG VISIT (OUTPATIENT)
Dept: PHARMACY | Age: 60
End: 2020-12-10
Payer: COMMERCIAL

## 2020-12-10 VITALS
DIASTOLIC BLOOD PRESSURE: 98 MMHG | SYSTOLIC BLOOD PRESSURE: 146 MMHG | HEIGHT: 75 IN | WEIGHT: 281.2 LBS | HEART RATE: 66 BPM | BODY MASS INDEX: 34.96 KG/M2

## 2020-12-10 VITALS — TEMPERATURE: 97.2 F

## 2020-12-10 PROBLEM — E66.812 CLASS 2 OBESITY DUE TO EXCESS CALORIES WITHOUT SERIOUS COMORBIDITY WITH BODY MASS INDEX (BMI) OF 35.0 TO 35.9 IN ADULT: Status: ACTIVE | Noted: 2020-12-10

## 2020-12-10 PROBLEM — E66.09 CLASS 2 OBESITY DUE TO EXCESS CALORIES WITHOUT SERIOUS COMORBIDITY WITH BODY MASS INDEX (BMI) OF 35.0 TO 35.9 IN ADULT: Status: ACTIVE | Noted: 2020-12-10

## 2020-12-10 PROCEDURE — 85610 PROTHROMBIN TIME: CPT

## 2020-12-10 PROCEDURE — G8417 CALC BMI ABV UP PARAM F/U: HCPCS | Performed by: NURSE PRACTITIONER

## 2020-12-10 PROCEDURE — G8484 FLU IMMUNIZE NO ADMIN: HCPCS | Performed by: NURSE PRACTITIONER

## 2020-12-10 PROCEDURE — 36416 COLLJ CAPILLARY BLOOD SPEC: CPT

## 2020-12-10 PROCEDURE — 3017F COLORECTAL CA SCREEN DOC REV: CPT | Performed by: NURSE PRACTITIONER

## 2020-12-10 PROCEDURE — G8427 DOCREV CUR MEDS BY ELIG CLIN: HCPCS | Performed by: NURSE PRACTITIONER

## 2020-12-10 PROCEDURE — 99214 OFFICE O/P EST MOD 30 MIN: CPT | Performed by: NURSE PRACTITIONER

## 2020-12-10 PROCEDURE — 99212 OFFICE O/P EST SF 10 MIN: CPT

## 2020-12-10 PROCEDURE — 4004F PT TOBACCO SCREEN RCVD TLK: CPT | Performed by: NURSE PRACTITIONER

## 2020-12-10 RX ORDER — LISINOPRIL 40 MG/1
40 TABLET ORAL DAILY
Qty: 90 TABLET | Refills: 3 | Status: SHIPPED | OUTPATIENT
Start: 2020-12-10 | End: 2022-02-21 | Stop reason: CLARIF

## 2020-12-10 ASSESSMENT — ENCOUNTER SYMPTOMS
ABDOMINAL PAIN: 0
COUGH: 0
NAUSEA: 0
PHOTOPHOBIA: 0
BACK PAIN: 0
DIARRHEA: 0
BLOOD IN STOOL: 0
CHEST TIGHTNESS: 0
COLOR CHANGE: 0
VOMITING: 0
CONSTIPATION: 0
WHEEZING: 0
EYE PAIN: 0
SHORTNESS OF BREATH: 1

## 2020-12-10 NOTE — PROGRESS NOTES
Electrophysiology  Note      Reason for consultation:  Need for ICD    Chief complaint: Here for 2-week blood pressure check for the management of heart failure    Referring physician: Chest pain      Primary care physician: Ivon aPrikh MD      History of Present Illness: This visit 12/10/2020  Patient is here today for 2-week blood pressure check for the maximization of blood pressure medications and management of heart failure. Patient reports that he is smoking a pack of cigarettes a day. He states that he has some shortness of breath with exertion. He does not have lower leg swelling. He is able to lie flat. He denies chest pain, palpitations, lightheadedness, dizziness, edema or syncope    Previous visit  Patient is a 21-year-old male with a history of hypertension, hyperlipidemia, coronary artery disease, LV systolic dysfunction referred by Dr. Lotus Puga for ICD as LVEF was 29%. Patient reports he is very short of breath for last couple of days. Patient reports shortness of breath with exertion relieved with rest.  No associated chest pain with it but he does have off-and-on sharp pains in the chest which does not even last for a minute and sometimes present with movement of the body to the side to side. Patient denies any swelling in the legs. Patient does not exercise daily. Patient does smoke every day. Patient denies any alcohol use. Patient has sleep apnea assessment on December 3rd.     Patient reports he is not very compliant on the medication  He reports he has a  and he does not want ICD unless is absolutely not recommended  Is worried that he will lose his job    Pastmedical history:   Past Medical History:   Diagnosis Date    [facility]    CAD (coronary artery disease)     Follows with Dr. Cam Soares CHF (congestive heart failure) ([facility]     COPD (chronic obstructive pulmonary disease) ([facility]     Follows with PCP    Diabetes mellitus ([facility]     GERD (gastroesophageal reflux disease)     H/O cardiovascular stress test 11/20/13, 3/20/2013    11/13-Observed defect is consistent with diaphragmatic attenuation. EF 58%. 3/13 -EF 51%. No ischemia. Normal Study.  H/O cardiovascular stress test 06/08/2017    EF 50% normal study    H/O cardiovascular stress test 06/17/2019    Normal NM    H/O Doppler ultrasound 10/14/2010    10/2010-Bilateral venous doppler of lower extremies- No DVTs.  H/O echocardiogram 06/26/2019    Left ventricular systolic function is normal with an ejection fraction of 50-55%. Mild concentric left ventricular hypertrophy. Grade I diastolic dysfunction. No significant valvular disease noted. No evidence of pericardial effusion. Essentially unremarkable echo.  H/O percutaneous left heart catheterization 12/22/15    No evidence of hemodynamically significant coronary artery disease    Heart imaging 04/23/2020    muga - ef 58%    History of coronary artery stent placement 11/13/2013 11/13 -RCA - 3 stents placed    History of exercise stress test 06/08/2017    treadmill    History of Holter monitoring 1/4/2016    predominant rhythm sinus    History of MRI of brain and brain stem 06/04/2020    No evidence of an acute infarct, mod chronic microvascular white matter ischemic disease with associated cerebral parenchymal volume loss    History of nuclear stress test 02/24/2020    EF 38%,Myocardial perfusion scan shows moderate size, severe intensity, non  reversible perfusion defect in inferior wall.  Hyperlipidemia     Hypertension     Follows with PCP    Kidney disease     Dr. Alexander Rader S/P cardiac catheterization 11/13/2013 11/13/2013-EF 55%, distal LAD mild disease,CX stent patent,but distal to stent 50% stenosis. RCA severe disease.     Status post angioplasty with stent     Wears dentures     full upper denture       Surgical history :   Past Surgical History:   Procedure Laterality Date    APPENDECTOMY      BIOPSY / LIGATION TEMPORAL ARTERY Left 8/17/2020    LEFT TEMPORAL ARTERY BIOPSY LIGATION performed by Leighton Varela MD at [id number] [address]  11/13/2013 11/13/2013-EF 55%, distal LAD mild disease,CX stent patent,but distal to stent 50% stenosis. RCA severe disease.  CARDIAC SURGERY  07/03/2020    CABG X 3 LIMA-OM-PDA-LAD, Maze, LT Atrial Appendage clipping    COLONOSCOPY      CORONARY ANGIOPLASTY WITH STENT PLACEMENT      4 stents- RCA X3; CX X1    CORONARY ANGIOPLASTY WITH STENT PLACEMENT  11/13/2013 11/13/2013 -3 stents placed to RCA- Dr Jessee Graham N/A 7/3/2020    INTRAOPERATIVE ISMAEL, CABG X3   WITH LIMA  AND LEFT LEG ENDOVEIN HARVEST, INDUCED HYPOTHERMIA, MAZE BIPOLAR AND CRYO PROCEDURE, LEFT ATRIAL APPENDAGE CLIPPING performed by Leeroy Pappas MD at [address] [facility]      all teeth extracted    FEMUR FRACTURE SURGERY  1984    MANDIBLE FRACTURE SURGERY  1984       Family history:   Family History   Problem Relation Age of Onset    Cancer Mother     Diabetes Mother     Heart Disease Mother     High Blood Pressure Mother     Stroke Mother     Cancer Father     Heart Disease Father     High Blood Pressure Father     Stroke Father     Cancer Brother        Social history :  reports that he has been smoking cigarettes. He started smoking about 46 years ago. He has a 11.50 pack-year smoking history. He has never used smokeless tobacco. He reports previous drug use. Drug: Cocaine. He reports that he does not drink alcohol.     No Known Allergies    Current Outpatient Medications on File Prior to Visit   Medication Sig Dispense Refill    nitroGLYCERIN (NITROSTAT) 0.4 MG SL tablet Place 1 tablet under the tongue every 5 minutes as needed for Chest pain 25 tablet 3    warfarin (COUMADIN) 4 MG tablet Take 1 tablet by mouth daily Except take 1/2 tablet on Fridays or as directed by clinic 30 tablet 1 CBC:   Lab Results   Component Value Date    WBC 9.0 08/24/2020    HGB 12.5 08/24/2020    HCT 39.5 08/24/2020     08/24/2020     Lipids:  Lab Results   Component Value Date    CHOL 153 01/06/2020    TRIG 54 01/06/2020    HDL 43 01/06/2020    LDLDIRECT 111 (H) 01/06/2020     PT/INR:   Lab Results   Component Value Date    INR 3.5 12/03/2020    INR 3 09/10/2020        BMP:    Lab Results   Component Value Date     08/24/2020    K 4.0 08/24/2020     08/24/2020    CO2 24 08/24/2020    BUN 16 08/24/2020     CMP:   Lab Results   Component Value Date    AST 16 08/24/2020    PROT 7.1 08/24/2020    BILITOT 0.3 08/24/2020    ALKPHOS 88 08/24/2020     TSH:  No results found for: TSH    EKGINTERPRETATION - EKG Interpretation:  Sinus rhythm      IMPRESSION / RECOMMENDATIONS:       Moderate to severe LV systolic dysfunction  CAD  HTN  OBESITY BMI 35  tobacco abuse    Vitals:    12/10/20 1058   BP: (!) 146/98   Pulse: 66     Patient's blood pressure is not within GDMT  Heart rate is however acceptable at 66 bpm  I will increase his lisinopril to 40 mg daily  Per heart failure GDMT goal systolic blood pressure less than 115 without symptoms of lightheadedness dizziness or syncope. Patient denies chest pain however complains of shortness of breath  Patient is smoking 1 pack of cigarettes a day. I encouraged him to stop smoking. He reports that he is trying to quit on his own    Patient appears euvolemic, no lower leg edema noted lungs are clear. I discussed with patient regarding regular exercise at least 30 to 45 minutes 5 times a week tolerated. Patient reports that he is caring for his brother-in-law who recently had lower leg amputation. Caring for him is causing him to feel fatigued. Patient to follow-up in 3 weeks for blood pressure check    Thanks again for allowing me to participate in care of this patient. Please call me if you have any questions. With best regards.       Nidia Kuo Ricky Hsieh, TREY - CNP, 12/10/2020 11:11 AM     Please note this report has been partially produced using speech recognition software and may contain errors related to that system including errors in grammar, punctuation, and spelling, as well as words and phrases that may be inappropriate. If there are any questions or concerns please feel free to contact the dictating provider for clarification.

## 2020-12-10 NOTE — PROGRESS NOTES
Medication Management Service  [facility]  [phone]    Visit Date: 12/10/2020   Subjective:       Jorge Elliott is a 62 y.o. male who presents to clinic today for anticoagulation monitoring and adjustment. Patient seen in clinic for warfarin management due to  Indication:   atrial fibrillation. INR goal: of 2.0-3.0. Duration of therapy: indefinite. Patient reports the following:   Adherent with regimen  Missed or extra doses:  None   Bleeding or thromboembolic side effects:  None  Significant medication changes:  Lisinopril increased  Significant dietary changes: None  Significant alcohol or tobacco changes: None  Significant recent illness, disease state changes, or hospitalization:  None  Upcoming surgeries or procedures:  None  Falls: None           Assessment and Plan     PT/INR done in office per protocol. INR today is 1.4, subtherapeutic. Patient denies missed doses. Plan: Take 6mg x1 then will increase weekly dose ~8% to warfarin 4mg daily. Recheck INR in 2.5 weeks. Patient verbalized understanding of dosing directions and information discussed. Dosing schedule given to patient including phone number, appointment date, and time. Progress note sent to referring office. Patient acknowledges working in consult agreement with pharmacist as referred by his/her physician.       Electronically signed by Kinjal Carmona, [address] [place] [address] on 12/10/20 at 11:49 AM EST    CLINICAL PHARMACY CONSULT: MED RECONCILIATION/REVIEW ADDENDUM    For Pharmacy Admin Tracking Only    PHSO: No  Total # of Interventions Recommended: 1  - Increased Dose #: 1  - Maintenance Safety Lab Monitoring #: 1     Total Interventions Accepted: 1  Time Spent (min): Selina Francisco PharmD

## 2020-12-10 NOTE — LETTER
Chase ALEXANDER Roro Massed  1960  [id number]    Have you had any Chest Pain that is not new? - Yes  If Yes DO EKG - How does it feel - Stinging feeling      ? DO EKG IF: Patient has a Heart Rate above 100 or below 40     CAD (Coronary Artery Disease) patient should have one on file every 6 months        Have you had any Shortness of Breath - Yes  If Yes - When on exertion    Have you had any dizziness - No    ? Sitting wait 5 minutes do supine (laying down) wait 5 minutes then do standing - log each in \"vitals\" area in Epic  ? Be sure to ask what symptoms they are having if they get dizzy while completing ortho stats such as room spinning, nausea, etc.    Have you had any palpitations that are not new? - No      Do you have any edema - No  Vein \"LEG PROBLEM Questionnaire\"  1. Do you have prominent leg veins? No   2. Do you have any skin discoloration? No  3. Do you have any healed or active sores? No  4. Do you have swelling of the legs? No  5. Do you have a family history of varicose veins? No  6. Does your profession involve pro-longed        standing or heavy lifting? No  7. Have you been fighting overweight problems? No  8. Do you have restless legs? No  9. Do you have any night time cramps? No  10.  Do you have any of the following in your legs:        NONE      Do you have a surgery or procedure scheduled in the near future - No

## 2020-12-26 ENCOUNTER — APPOINTMENT (OUTPATIENT)
Dept: CT IMAGING | Age: 60
End: 2020-12-26
Payer: COMMERCIAL

## 2020-12-26 ENCOUNTER — HOSPITAL ENCOUNTER (EMERGENCY)
Age: 60
Discharge: HOME | End: 2020-12-26
Attending: EMERGENCY MEDICINE
Payer: COMMERCIAL

## 2020-12-26 VITALS
TEMPERATURE: 96 F | OXYGEN SATURATION: 97 % | HEART RATE: 80 BPM | WEIGHT: 280 LBS | HEIGHT: 75 IN | SYSTOLIC BLOOD PRESSURE: 201 MMHG | RESPIRATION RATE: 18 BRPM | DIASTOLIC BLOOD PRESSURE: 132 MMHG | BODY MASS INDEX: 34.82 KG/M2

## 2020-12-26 LAB
BACTERIA: NEGATIVE /HPF
BILIRUBIN URINE: NEGATIVE MG/DL
BLOOD, URINE: ABNORMAL
CAST TYPE: ABNORMAL /HPF
CLARITY: CLEAR
COLOR: YELLOW
CRYSTAL TYPE: NEGATIVE /HPF
EPITHELIAL CELLS, UA: ABNORMAL /HPF
GLUCOSE, URINE: NEGATIVE MG/DL
KETONES, URINE: NEGATIVE MG/DL
LEUKOCYTE ESTERASE, URINE: NEGATIVE
NITRITE URINE, QUANTITATIVE: NEGATIVE
PH, URINE: 6 (ref 5–8)
PROTEIN UA: 300 MG/DL
RBC URINE: 3 /HPF (ref 0–3)
SPECIFIC GRAVITY UA: 1.02 (ref 1–1.03)
UROBILINOGEN, URINE: 0.2 MG/DL (ref 0.2–1)
WBC UA: ABNORMAL /HPF (ref 0–2)

## 2020-12-26 PROCEDURE — 99284 EMERGENCY DEPT VISIT MOD MDM: CPT

## 2020-12-26 PROCEDURE — 72131 CT LUMBAR SPINE W/O DYE: CPT

## 2020-12-26 PROCEDURE — 6370000000 HC RX 637 (ALT 250 FOR IP): Performed by: EMERGENCY MEDICINE

## 2020-12-26 PROCEDURE — 6360000002 HC RX W HCPCS: Performed by: EMERGENCY MEDICINE

## 2020-12-26 PROCEDURE — 87086 URINE CULTURE/COLONY COUNT: CPT

## 2020-12-26 PROCEDURE — 81001 URINALYSIS AUTO W/SCOPE: CPT

## 2020-12-26 PROCEDURE — 72192 CT PELVIS W/O DYE: CPT

## 2020-12-26 PROCEDURE — 96372 THER/PROPH/DIAG INJ SC/IM: CPT

## 2020-12-26 RX ORDER — PREDNISONE 10 MG/1
TABLET ORAL
Qty: 30 TABLET | Refills: 0 | Status: SHIPPED | OUTPATIENT
Start: 2020-12-26 | End: 2021-01-07

## 2020-12-26 RX ORDER — MORPHINE SULFATE 4 MG/ML
4 INJECTION, SOLUTION INTRAMUSCULAR; INTRAVENOUS
Status: DISCONTINUED | OUTPATIENT
Start: 2020-12-26 | End: 2020-12-26 | Stop reason: HOSPADM

## 2020-12-26 RX ORDER — LIDOCAINE 50 MG/G
1 PATCH TOPICAL EVERY 24 HOURS
Qty: 30 PATCH | Refills: 0 | Status: SHIPPED | OUTPATIENT
Start: 2020-12-26 | End: 2021-01-25

## 2020-12-26 RX ORDER — NAPROXEN 500 MG/1
500 TABLET ORAL 2 TIMES DAILY PRN
Qty: 20 TABLET | Refills: 0 | Status: SHIPPED | OUTPATIENT
Start: 2020-12-26 | End: 2021-01-14

## 2020-12-26 RX ORDER — CYCLOBENZAPRINE HCL 10 MG
10 TABLET ORAL ONCE
Status: COMPLETED | OUTPATIENT
Start: 2020-12-26 | End: 2020-12-26

## 2020-12-26 RX ORDER — KETOROLAC TROMETHAMINE 30 MG/ML
30 INJECTION, SOLUTION INTRAMUSCULAR; INTRAVENOUS ONCE
Status: COMPLETED | OUTPATIENT
Start: 2020-12-26 | End: 2020-12-26

## 2020-12-26 RX ORDER — BACLOFEN 10 MG/1
10 TABLET ORAL 3 TIMES DAILY PRN
Qty: 12 TABLET | Refills: 0 | Status: SHIPPED | OUTPATIENT
Start: 2020-12-26 | End: 2020-12-30

## 2020-12-26 RX ADMIN — MORPHINE SULFATE 4 MG: 4 INJECTION, SOLUTION INTRAMUSCULAR; INTRAVENOUS at 02:57

## 2020-12-26 RX ADMIN — CYCLOBENZAPRINE 10 MG: 10 TABLET, FILM COATED ORAL at 02:57

## 2020-12-26 RX ADMIN — KETOROLAC TROMETHAMINE 30 MG: 30 INJECTION, SOLUTION INTRAMUSCULAR; INTRAVENOUS at 02:57

## 2020-12-26 ASSESSMENT — PAIN DESCRIPTION - LOCATION
LOCATION: BACK
LOCATION: BACK

## 2020-12-26 ASSESSMENT — PAIN SCALES - GENERAL
PAINLEVEL_OUTOF10: 5
PAINLEVEL_OUTOF10: 9
PAINLEVEL_OUTOF10: 9

## 2020-12-26 ASSESSMENT — ENCOUNTER SYMPTOMS
SHORTNESS OF BREATH: 0
SINUS PAIN: 0
RHINORRHEA: 0
WHEEZING: 0
EYE REDNESS: 0
GASTROINTESTINAL NEGATIVE: 1
RESPIRATORY NEGATIVE: 1
COUGH: 0
SINUS PRESSURE: 0
EYES NEGATIVE: 1
VOMITING: 0
CHEST TIGHTNESS: 0
DIARRHEA: 0
EYE DISCHARGE: 0
NAUSEA: 0
CONSTIPATION: 0
FACIAL SWELLING: 0
EYE PAIN: 0
SORE THROAT: 0
ABDOMINAL PAIN: 0
BACK PAIN: 1

## 2020-12-26 ASSESSMENT — PAIN DESCRIPTION - ORIENTATION: ORIENTATION: LEFT

## 2020-12-26 ASSESSMENT — PAIN DESCRIPTION - PROGRESSION: CLINICAL_PROGRESSION: GRADUALLY IMPROVING

## 2020-12-26 ASSESSMENT — PAIN DESCRIPTION - DESCRIPTORS: DESCRIPTORS: ACHING

## 2020-12-26 ASSESSMENT — PAIN DESCRIPTION - PAIN TYPE
TYPE: ACUTE PAIN
TYPE: ACUTE PAIN

## 2020-12-26 ASSESSMENT — PAIN DESCRIPTION - FREQUENCY
FREQUENCY: CONTINUOUS
FREQUENCY: CONTINUOUS

## 2020-12-26 NOTE — ED NOTES
Discharge AVS reviewed with patient all questions and concerns addressed. Patient educated on new RX medications and to follow up with pcp if needed. Patient instructed not to drive and state his wife will pick him up.       Keo Rolle, RN  12/26/20 [id number]

## 2020-12-26 NOTE — ED PROVIDER NOTES
arthralgias, back pain, gait problem and myalgias. Negative for neck pain and neck stiffness. Skin: Negative. Negative for rash. Neurological: Negative for dizziness, speech difficulty, light-headedness, numbness and headaches. Psychiatric/Behavioral: Negative. Negative for agitation and confusion. The patient is not nervous/anxious. All other systems reviewed and are negative. Except as noted above the remainder of the review of systems was reviewed and negative. PAST MEDICAL HISTORY     Past Medical History:   Diagnosis Date    Atrial fibrillation ([facility] 11/2013    CAD (coronary artery disease)     Follows with Dr. Prabha Cheung CHF (congestive heart failure) ([facility]     [facility] obstructive [facility]) [facility]     Follows with PCP    Diabetes mellitus ([facility]     GERD (gastroesophageal reflux disease)     H/O cardiovascular stress test 11/20/13, 3/20/2013    11/13-Observed defect is consistent with diaphragmatic attenuation. EF 58%. 3/13 -EF 51%. No ischemia. Normal Study.  H/O cardiovascular stress test 06/08/2017    EF 50% normal study    H/O cardiovascular stress test 06/17/2019    Normal NM    H/O Doppler ultrasound 10/14/2010    10/2010-Bilateral venous doppler of lower extremies- No DVTs.  H/O echocardiogram 06/26/2019    Left ventricular systolic function is normal with an ejection fraction of 50-55%. Mild concentric left ventricular hypertrophy. Grade I diastolic dysfunction. No significant valvular disease noted. No evidence of pericardial effusion. Essentially unremarkable echo.     H/O percutaneous left heart catheterization 12/22/15    No evidence of hemodynamically significant coronary artery disease    Heart imaging 04/23/2020    muga - ef 58%    History of coronary artery stent placement 11/13/2013 11/13 -RCA - 3 stents placed    History of exercise stress test 06/08/2017    treadmill    History of Holter monitoring 1/4/2016    predominant rhythm sinus    take 1/2 tablet on Fridays or as directed by clinic 12/4/20   Colleen Pelaez MD   hydrOXYzine (VISTARIL) 50 MG capsule Take 50 mg by mouth nightly 11/20/20   Historical Provider, MD   amiodarone (CORDARONE) 200 MG tablet Take 1 tablet by mouth daily 11/25/20   Emily Sosa MD   carvedilol (COREG) 12.5 MG tablet Take 1 tablet by mouth 2 times daily 11/25/20   Emily Sosa MD   atorvastatin (LIPITOR) 80 MG tablet Take 1 tablet by mouth nightly 10/14/20   Julio Snyder, APRN - CNP   aspirin 81 MG EC tablet Take 1 tablet by mouth daily 7/8/20   Ray Rust PA-C   glipiZIDE (GLUCOTROL) 10 MG tablet Take 1 tablet by mouth every morning (before breakfast) 7/8/20   Ray Rust PA-C   furosemide (LASIX) 40 MG tablet Take 1 tablet by mouth 3 times daily 7/7/20   Ray Rust PA-C   potassium chloride (KLOR-CON M) 20 MEQ extended release tablet Take 2 tablets by mouth 2 times daily 7/7/20   Ray Rust PA-C   linagliptin (TRADJENTA) 5 MG tablet Take 1 tablet by mouth daily 7/7/20   Ray Rust PA-C        Patient Active Problem List   Diagnosis    Chest pain    DM w/o complication type II ([facility]    Longstanding persistent atrial fibrillation (HCC)    CAD (coronary artery disease)    Post PTCA    Unstable angina (HCC)    HTN (hypertension)    Angina pectoris syndrome (HCC)-unstable    Smoking    COPD (chronic obstructive pulmonary disease) (HCC)    Atrial fibrillation with rapid ventricular response (HCC)    Chest pain    STEMI (ST elevation myocardial infarction) ([facility]    Myocardial infarction acute (HCC)    Nausea    New onset of congestive heart failure (HCC)    COPD with acute exacerbation (HCC)    Acute pulmonary edema (HCC)    Pleural effusion, bilateral    Hypertensive urgency    Chronic systolic congestive heart failure (HCC)    TIA (transient ischemic attack)    Chronic kidney disease, stage III (moderate)    3-vessel coronary artery disease    Temporal pain    Class 2 obesity due to excess calories without serious comorbidity with body mass index (BMI) of 35.0 to 35.9 in adult         SURGICAL HISTORY       Past Surgical History:   Procedure Laterality Date    APPENDECTOMY      BIOPSY / LIGATION TEMPORAL ARTERY Left 8/17/2020    LEFT TEMPORAL ARTERY BIOPSY LIGATION performed by Graciela Shearer MD at [facility]  11/13/2013 11/13/2013-EF 55%, distal LAD mild disease,CX stent patent,but distal to stent 50% stenosis. RCA severe disease.     CARDIAC SURGERY  07/03/2020    CABG X 3 LIMA-OM-PDA-LAD, Maze, LT Atrial Appendage clipping    COLONOSCOPY      CORONARY ANGIOPLASTY WITH STENT PLACEMENT      4 stents- RCA X3; CX X1    CORONARY ANGIOPLASTY WITH STENT PLACEMENT  11/13/2013 11/13/2013 -3 stents placed to RCA- Dr Bettina Mercado N/A 7/3/2020    INTRAOPERATIVE ISMAEL, CABG X3   WITH LIMA  AND LEFT LEG ENDOVEIN HARVEST, INDUCED HYPOTHERMIA, MAZE BIPOLAR AND CRYO PROCEDURE, LEFT ATRIAL APPENDAGE CLIPPING performed by Bo Bernstein MD at [facility]      all teeth extracted   [facility]         CURRENT MEDICATIONS       Previous Medications    AMIODARONE (CORDARONE) 200 MG TABLET    Take 1 tablet by mouth daily    ASPIRIN 81 MG EC TABLET    Take 1 tablet by mouth daily    ATORVASTATIN (LIPITOR) 80 MG TABLET    Take 1 tablet by mouth nightly    CARVEDILOL (COREG) 12.5 MG TABLET    Take 1 tablet by mouth 2 times daily    FUROSEMIDE (LASIX) 40 MG TABLET    Take 1 tablet by mouth 3 times daily    GLIPIZIDE (GLUCOTROL) 10 MG TABLET    Take 1 tablet by mouth every morning (before breakfast)    HYDROXYZINE (VISTARIL) 50 MG CAPSULE    Take 50 mg by mouth nightly    LINAGLIPTIN (TRADJENTA) 5 MG TABLET    Take 1 tablet by mouth daily    LISINOPRIL (PRINIVIL;ZESTRIL) 40 MG TABLET    Take 1 tablet by mouth daily    NITROGLYCERIN (NITROSTAT) 0.4 MG SL TABLET Place 1 tablet under the tongue every 5 minutes as needed for Chest pain    POTASSIUM CHLORIDE (KLOR-CON M) 20 MEQ EXTENDED RELEASE TABLET    Take 2 tablets by mouth 2 times daily    WARFARIN (COUMADIN) 4 MG TABLET    Take 1 tablet by mouth daily Except take 1/2 tablet on Fridays or as directed by clinic       ALLERGIES     Patient has no known allergies.     FAMILY HISTORY       Family History   Problem Relation Age of Onset    Cancer Mother     Diabetes Mother     Heart Disease Mother     High Blood Pressure Mother     Stroke Mother     Cancer Father     Heart Disease Father     High Blood Pressure Father     Stroke Father     Cancer Brother           SOCIAL HISTORY       Social History     Socioeconomic History    Marital status: Legally      Spouse name: Not on file    Number of children: Not on file    Years of education: Not on file    Highest education level: Not on file   Occupational History    Occupation:    Social Needs    Financial resource strain: Not on file    Food insecurity     Worry: Not on file     Inability: Not on file    Transportation needs     Medical: Not on file     Non-medical: Not on file   Tobacco Use    Smoking status: Current Every Day Smoker     Packs/day: 0.50     Years: 46.00     Pack years: 23.00     Types: Cigarettes     Start date: 1974    Smokeless tobacco: Never Used    Tobacco comment: Smokes approx 3 cig a day   Substance and Sexual Activity    Alcohol use: No     Comment: caffeine 1 cup of coffee a day and at least 6 bottles of pop a day    Drug use: Not Currently     Types: Cocaine     Comment: Last used: 7/1/20    Sexual activity: Yes     Partners: Female   Lifestyle    Physical activity     Days per week: Not on file     Minutes per session: Not on file    Stress: Not on file   Relationships    Social connections     Talks on phone: Not on file     Gets together: Not on file     Attends Baptism service: Not on file CVA tenderness, guarding or rebound. Musculoskeletal: Normal range of motion. General: Tenderness present. No swelling, deformity or signs of injury. Skin:     General: Skin is warm and dry. Capillary Refill: Capillary refill takes less than 2 seconds. Coloration: Skin is not pale. Findings: No erythema or rash. Neurological:      General: No focal deficit present. Mental Status: He is alert and oriented to person, place, and time. Mental status is at baseline. Cranial Nerves: No cranial nerve deficit. Motor: No weakness or abnormal muscle tone. Coordination: Coordination normal.      Gait: Gait normal.   Psychiatric:         Mood and Affect: Mood normal.         Behavior: Behavior normal.         Thought Content: Thought content normal.         Judgment: Judgment normal.         DIAGNOSTIC RESULTS     Labs Reviewed   CULTURE, URINE   URINALYSIS          EKG: All EKG's are interpreted by the Emergency Department Physician who either signs or Co-signs this chart in the absence of a cardiologist.       EKG Interpretation    Interpreted by emergency department physician      Mandy Guzman:     Non-plain film images such as CT, Ultrasound and MRI are read by the radiologist. Plain radiographic images are visualized and preliminarily interpreted by the emergency physician. Interpretation per the Radiologist below, if available at the time of this note:    CT PELVIS WO CONTRAST Additional Contrast? None    (Results Pending)   CT LUMBAR SPINE WO CONTRAST    (Results Pending)         ED BEDSIDE ULTRASOUND:   Performed by ED Physician Cadence Gaspar DO       LABS:  Labs Reviewed   CULTURE, URINE   URINALYSIS       All other labs were within normal range or not returned as of this dictation.     EMERGENCY DEPARTMENT COURSE and DIFFERENTIAL DIAGNOSIS/MDM:   Vitals:    Vitals:    12/26/20 0222   BP: (!) 213/120   Pulse: 84   Resp: 18   Temp: 96 °F (35.6 °C) TempSrc: Tympanic   SpO2: 97%   Weight: 280 lb (127 kg)   Height: 6' 3\" (1.905 m)           MDM  Number of Diagnoses or Management Options  Acute left-sided low back pain without sciatica  Diagnosis management comments: 59-year-old male presents emergency department chief complaint of back pain located in the lower left sacroiliac area. Patient denies fevers. Patient denies cauda equina symptoms. Patient denies recent injury. CT of the lumbar spine and pelvis was negative for signs of multiple myeloma or other cancer. Patient was given morphine, Toradol, Lidoderm in the emergency department. Will discharge patient home with baclofen, Lidoderm, naproxen. Return precautions given patient follow-up with primary care in the next 2 to 3 days if symptoms do not improve. -  Patient seen and evaluated in the emergency department. -  Triage and nursing notes reviewed and incorporated. -  Old chart records reviewed and incorporated. -  Work-up included:  See above  -  Results discussed with patient. REASSESSMENT          CRITICAL CARE TIME     This excludes seperately billable procedures and family discussion time. Critical care time provided for obtaining history, conducting a physical exam, performing and monitoring interventions, ordering, collecting and interpreting tests, and establishing medical decision-making. There was a potential for life/limb threatening pathology requiring close evaluation and intervention with concern for patient decompensation. CONSULTS:  None    PROCEDURES:  None performed unless otherwise noted below     Procedures        FINAL IMPRESSION      1.  Acute left-sided low back pain without sciatica          DISPOSITION/PLAN   DISPOSITION Decision To Discharge 12/26/2020 02:36:09 AM      PATIENT REFERRED TO:  Paul Almendarez MD  [address]  [address] [facility].  [phone]    In 2 days        DISCHARGE MEDICATIONS:  New Prescriptions    BACLOFEN (LIORESAL) 10 MG TABLET    Take 1 tablet by mouth 3 times daily as needed (muscle spasm)    LIDOCAINE (LIDODERM) 5 %    Place 1 patch onto the skin every 24 hours Place 1 patch onto the skin daily 12 hours on, 12 hours off. NAPROXEN (NAPROSYN) 500 MG TABLET    Take 1 tablet by mouth 2 times daily as needed for Pain (with meals)    PREDNISONE (DELTASONE) 10 MG TABLET    Take 4 tablets by mouth daily for 3 days, THEN 3 tablets daily for 3 days, THEN 2 tablets daily for 3 days, THEN 1 tablet daily for 3 days. Take by oral route 4 tabs x 3 days, then 3 tabs x 3 days, then 2 tabs x 3 days, then 1 tab x 3 days, then discontinue. Take with food. .       ED Provider Disposition Time  DISPOSITION Decision To Discharge 12/26/2020 02:36:09 AM      Appropriate personal protective equipment was worn during the patient's evaluation. These included surgical, eye protection, surgical mask or in 95 respirator and gloves. The patient was also placed in a surgical mask for the prevention of possible spread of respiratory viral illnesses. The Patient was instructed to read the package inserts with any medication that was prescribed. Major potential reactions and medication interactions were discussed. The Patient understands that there are numerous possible adverse reactions not covered. The patient was also instructed to arrange follow-up with his or her primary care provider for review of any pending labwork or incidental findings on any radiology results that were obtained. All efforts were made to discuss any incidental findings that require further monitoring. Controlled Substances Monitoring:     No flowsheet data found.     (Please note that portions of this note were completed with a voice recognition program.  Efforts were made to edit the dictations but occasionally words are mis-transcribed.)    Bertrand Lewis DO (electronically signed)  Attending Emergency Physician          Bertrand Lewis DO  12/26/20 Nena Leyva

## 2020-12-28 ENCOUNTER — ANTI-COAG VISIT (OUTPATIENT)
Dept: PHARMACY | Age: 60
End: 2020-12-28
Payer: COMMERCIAL

## 2020-12-28 ENCOUNTER — TELEPHONE (OUTPATIENT)
Dept: CARDIOLOGY CLINIC | Age: 60
End: 2020-12-28

## 2020-12-28 VITALS — TEMPERATURE: 96.4 F

## 2020-12-28 LAB
CULTURE: NORMAL
INTERNATIONAL NORMALIZATION RATIO, POC: 2.6
Lab: NORMAL
POC INR: 2.6 INDEX
PROTHROMBIN TIME, POC: 31.3 SECONDS (ref 10–14.3)
SPECIMEN: NORMAL

## 2020-12-28 PROCEDURE — 85610 PROTHROMBIN TIME: CPT

## 2020-12-28 PROCEDURE — 99211 OFF/OP EST MAY X REQ PHY/QHP: CPT

## 2020-12-28 PROCEDURE — 36416 COLLJ CAPILLARY BLOOD SPEC: CPT

## 2020-12-28 NOTE — TELEPHONE ENCOUNTER
Called patient to move MUGA out per Pedro Sandoval RN due to blood pressure not under control . Left message for return call. Test cancelled at this time.

## 2020-12-29 ENCOUNTER — HOSPITAL ENCOUNTER (INPATIENT)
Age: 60
LOS: 1 days | Discharge: LEFT BEFORE DISCHARGE | DRG: 174 | End: 2020-12-30
Attending: EMERGENCY MEDICINE | Admitting: STUDENT IN AN ORGANIZED HEALTH CARE EDUCATION/TRAINING PROGRAM
Payer: COMMERCIAL

## 2020-12-29 ENCOUNTER — APPOINTMENT (OUTPATIENT)
Dept: GENERAL RADIOLOGY | Age: 60
DRG: 174 | End: 2020-12-29
Payer: COMMERCIAL

## 2020-12-29 DIAGNOSIS — R07.9 ACUTE CHEST PAIN: Primary | ICD-10-CM

## 2020-12-29 DIAGNOSIS — E87.6 HYPOKALEMIA: ICD-10-CM

## 2020-12-29 DIAGNOSIS — R77.8 ELEVATED TROPONIN: ICD-10-CM

## 2020-12-29 LAB
ALBUMIN SERPL-MCNC: 3.8 GM/DL (ref 3.4–5)
ALP BLD-CCNC: 139 IU/L (ref 40–129)
ALT SERPL-CCNC: 14 U/L (ref 10–40)
ANION GAP SERPL CALCULATED.3IONS-SCNC: 13 MMOL/L (ref 4–16)
AST SERPL-CCNC: 14 IU/L (ref 15–37)
BASOPHILS ABSOLUTE: 0.1 K/CU MM
BASOPHILS RELATIVE PERCENT: 0.7 % (ref 0–1)
BILIRUB SERPL-MCNC: 0.2 MG/DL (ref 0–1)
BUN BLDV-MCNC: 28 MG/DL (ref 6–23)
CALCIUM SERPL-MCNC: 9.6 MG/DL (ref 8.3–10.6)
CHLORIDE BLD-SCNC: 92 MMOL/L (ref 99–110)
CO2: 28 MMOL/L (ref 21–32)
CREAT SERPL-MCNC: 1.5 MG/DL (ref 0.9–1.3)
DIFFERENTIAL TYPE: ABNORMAL
EOSINOPHILS ABSOLUTE: 0.1 K/CU MM
EOSINOPHILS RELATIVE PERCENT: 0.9 % (ref 0–3)
GFR AFRICAN AMERICAN: 58 ML/MIN/1.73M2
GFR NON-AFRICAN AMERICAN: 48 ML/MIN/1.73M2
GLUCOSE BLD-MCNC: 305 MG/DL (ref 70–99)
HCT VFR BLD CALC: 45.4 % (ref 42–52)
HEMOGLOBIN: 14.7 GM/DL (ref 13.5–18)
IMMATURE NEUTROPHIL %: 0.4 % (ref 0–0.43)
LYMPHOCYTES ABSOLUTE: 3 K/CU MM
LYMPHOCYTES RELATIVE PERCENT: 20.1 % (ref 24–44)
MCH RBC QN AUTO: 28.4 PG (ref 27–31)
MCHC RBC AUTO-ENTMCNC: 32.4 % (ref 32–36)
MCV RBC AUTO: 87.8 FL (ref 78–100)
MONOCYTES ABSOLUTE: 1 K/CU MM
MONOCYTES RELATIVE PERCENT: 6.6 % (ref 0–4)
NUCLEATED RBC %: 0 %
PDW BLD-RTO: 14.7 % (ref 11.7–14.9)
PLATELET # BLD: 267 K/CU MM (ref 140–440)
PMV BLD AUTO: 11 FL (ref 7.5–11.1)
POTASSIUM SERPL-SCNC: 2.7 MMOL/L (ref 3.5–5.1)
RBC # BLD: 5.17 M/CU MM (ref 4.6–6.2)
SEGMENTED NEUTROPHILS ABSOLUTE COUNT: 10.5 K/CU MM
SEGMENTED NEUTROPHILS RELATIVE PERCENT: 71.3 % (ref 36–66)
SODIUM BLD-SCNC: 133 MMOL/L (ref 135–145)
TOTAL IMMATURE NEUTOROPHIL: 0.06 K/CU MM
TOTAL NUCLEATED RBC: 0 K/CU MM
TOTAL PROTEIN: 7.2 GM/DL (ref 6.4–8.2)
TROPONIN T: 0.03 NG/ML
WBC # BLD: 14.7 K/CU MM (ref 4–10.5)

## 2020-12-29 PROCEDURE — 85025 COMPLETE CBC W/AUTO DIFF WBC: CPT

## 2020-12-29 PROCEDURE — 96374 THER/PROPH/DIAG INJ IV PUSH: CPT

## 2020-12-29 PROCEDURE — 85730 THROMBOPLASTIN TIME PARTIAL: CPT

## 2020-12-29 PROCEDURE — 36415 COLL VENOUS BLD VENIPUNCTURE: CPT

## 2020-12-29 PROCEDURE — 93005 ELECTROCARDIOGRAM TRACING: CPT | Performed by: EMERGENCY MEDICINE

## 2020-12-29 PROCEDURE — 99285 EMERGENCY DEPT VISIT HI MDM: CPT

## 2020-12-29 PROCEDURE — 6370000000 HC RX 637 (ALT 250 FOR IP): Performed by: EMERGENCY MEDICINE

## 2020-12-29 PROCEDURE — 96375 TX/PRO/DX INJ NEW DRUG ADDON: CPT

## 2020-12-29 PROCEDURE — 85027 COMPLETE CBC AUTOMATED: CPT

## 2020-12-29 PROCEDURE — 85610 PROTHROMBIN TIME: CPT

## 2020-12-29 PROCEDURE — 84484 ASSAY OF TROPONIN QUANT: CPT

## 2020-12-29 PROCEDURE — C1760 CLOSURE DEV, VASC: HCPCS

## 2020-12-29 PROCEDURE — 6360000002 HC RX W HCPCS: Performed by: EMERGENCY MEDICINE

## 2020-12-29 PROCEDURE — 83735 ASSAY OF MAGNESIUM: CPT

## 2020-12-29 PROCEDURE — 71045 X-RAY EXAM CHEST 1 VIEW: CPT

## 2020-12-29 PROCEDURE — 80053 COMPREHEN METABOLIC PANEL: CPT

## 2020-12-29 RX ORDER — ASPIRIN 81 MG/1
324 TABLET, CHEWABLE ORAL ONCE
Status: COMPLETED | OUTPATIENT
Start: 2020-12-29 | End: 2020-12-29

## 2020-12-29 RX ORDER — HEPARIN SODIUM 1000 [USP'U]/ML
4000 INJECTION, SOLUTION INTRAVENOUS; SUBCUTANEOUS ONCE
Status: COMPLETED | OUTPATIENT
Start: 2020-12-30 | End: 2020-12-29

## 2020-12-29 RX ORDER — HEPARIN SODIUM 1000 [USP'U]/ML
4000 INJECTION, SOLUTION INTRAVENOUS; SUBCUTANEOUS PRN
Status: DISCONTINUED | OUTPATIENT
Start: 2020-12-30 | End: 2020-12-30

## 2020-12-29 RX ORDER — HEPARIN SODIUM 1000 [USP'U]/ML
2000 INJECTION, SOLUTION INTRAVENOUS; SUBCUTANEOUS PRN
Status: DISCONTINUED | OUTPATIENT
Start: 2020-12-30 | End: 2020-12-30

## 2020-12-29 RX ORDER — HEPARIN SODIUM 10000 [USP'U]/100ML
7.9 INJECTION, SOLUTION INTRAVENOUS CONTINUOUS
Status: DISCONTINUED | OUTPATIENT
Start: 2020-12-29 | End: 2020-12-30

## 2020-12-29 RX ORDER — CLOPIDOGREL BISULFATE 75 MG/1
300 TABLET ORAL ONCE
Status: COMPLETED | OUTPATIENT
Start: 2020-12-29 | End: 2020-12-29

## 2020-12-29 RX ADMIN — ASPIRIN 81 MG CHEWABLE TABLET 324 MG: 81 TABLET CHEWABLE at 23:03

## 2020-12-29 RX ADMIN — CLOPIDOGREL BISULFATE 300 MG: 75 TABLET ORAL at 23:22

## 2020-12-29 RX ADMIN — HEPARIN SODIUM 4000 UNITS: 1000 INJECTION, SOLUTION INTRAVENOUS; SUBCUTANEOUS at 23:55

## 2020-12-29 RX ADMIN — HEPARIN SODIUM 7.9 UNITS/KG/HR: 10000 INJECTION, SOLUTION INTRAVENOUS at 23:55

## 2020-12-29 ASSESSMENT — PAIN SCALES - GENERAL: PAINLEVEL_OUTOF10: 8

## 2020-12-29 ASSESSMENT — PAIN DESCRIPTION - LOCATION: LOCATION: CHEST

## 2020-12-30 ENCOUNTER — APPOINTMENT (OUTPATIENT)
Dept: GENERAL RADIOLOGY | Age: 60
DRG: 174 | End: 2020-12-30
Payer: COMMERCIAL

## 2020-12-30 VITALS
HEIGHT: 75 IN | RESPIRATION RATE: 18 BRPM | OXYGEN SATURATION: 98 % | SYSTOLIC BLOOD PRESSURE: 106 MMHG | BODY MASS INDEX: 34.82 KG/M2 | WEIGHT: 280 LBS | TEMPERATURE: 97.6 F | DIASTOLIC BLOOD PRESSURE: 82 MMHG | HEART RATE: 85 BPM

## 2020-12-30 PROBLEM — I21.4 NSTEMI (NON-ST ELEVATED MYOCARDIAL INFARCTION) (HCC): Status: ACTIVE | Noted: 2020-12-30

## 2020-12-30 LAB
ACTIVATED CLOTTING TIME, LOW RANGE: 201 SEC
ACTIVATED CLOTTING TIME, LOW RANGE: >400 SEC
ALBUMIN SERPL-MCNC: 3.6 GM/DL (ref 3.4–5)
ALP BLD-CCNC: 121 IU/L (ref 40–129)
ALT SERPL-CCNC: 12 U/L (ref 10–40)
ANION GAP SERPL CALCULATED.3IONS-SCNC: 15 MMOL/L (ref 4–16)
ANION GAP SERPL CALCULATED.3IONS-SCNC: 17 MMOL/L (ref 4–16)
APTT: 36.3 SECONDS (ref 25.1–37.1)
APTT: 60.5 SECONDS (ref 25.1–37.1)
AST SERPL-CCNC: 15 IU/L (ref 15–37)
BILIRUB SERPL-MCNC: 0.2 MG/DL (ref 0–1)
BUN BLDV-MCNC: 26 MG/DL (ref 6–23)
BUN BLDV-MCNC: 28 MG/DL (ref 6–23)
CALCIUM SERPL-MCNC: 9 MG/DL (ref 8.3–10.6)
CALCIUM SERPL-MCNC: 9.3 MG/DL (ref 8.3–10.6)
CHLORIDE BLD-SCNC: 99 MMOL/L (ref 99–110)
CHLORIDE BLD-SCNC: 99 MMOL/L (ref 99–110)
CO2: 26 MMOL/L (ref 21–32)
CO2: 26 MMOL/L (ref 21–32)
CREAT SERPL-MCNC: 1.4 MG/DL (ref 0.9–1.3)
CREAT SERPL-MCNC: 1.5 MG/DL (ref 0.9–1.3)
EKG ATRIAL RATE: 129 BPM
EKG ATRIAL RATE: 73 BPM
EKG ATRIAL RATE: 78 BPM
EKG DIAGNOSIS: NORMAL
EKG P AXIS: 118 DEGREES
EKG P AXIS: 50 DEGREES
EKG P AXIS: 70 DEGREES
EKG P-R INTERVAL: 186 MS
EKG P-R INTERVAL: 188 MS
EKG P-R INTERVAL: 192 MS
EKG Q-T INTERVAL: 336 MS
EKG Q-T INTERVAL: 436 MS
EKG Q-T INTERVAL: 458 MS
EKG QRS DURATION: 108 MS
EKG QRS DURATION: 112 MS
EKG QRS DURATION: 114 MS
EKG QTC CALCULATION (BAZETT): 481 MS
EKG QTC CALCULATION (BAZETT): 497 MS
EKG QTC CALCULATION (BAZETT): 504 MS
EKG R AXIS: -6 DEGREES
EKG R AXIS: 11 DEGREES
EKG R AXIS: 9 DEGREES
EKG T AXIS: 121 DEGREES
EKG T AXIS: 151 DEGREES
EKG T AXIS: 152 DEGREES
EKG VENTRICULAR RATE: 123 BPM
EKG VENTRICULAR RATE: 73 BPM
EKG VENTRICULAR RATE: 78 BPM
ESTIMATED AVERAGE GLUCOSE: 177 MG/DL
GFR AFRICAN AMERICAN: 58 ML/MIN/1.73M2
GFR AFRICAN AMERICAN: >60 ML/MIN/1.73M2
GFR NON-AFRICAN AMERICAN: 48 ML/MIN/1.73M2
GFR NON-AFRICAN AMERICAN: 52 ML/MIN/1.73M2
GLUCOSE BLD-MCNC: 172 MG/DL (ref 70–99)
GLUCOSE BLD-MCNC: 173 MG/DL (ref 70–99)
GLUCOSE BLD-MCNC: 181 MG/DL (ref 70–99)
GLUCOSE BLD-MCNC: 195 MG/DL (ref 70–99)
GLUCOSE BLD-MCNC: 271 MG/DL (ref 70–99)
HBA1C MFR BLD: 7.8 % (ref 4.2–6.3)
INR BLD: 2.7 INDEX
LV EF: 50 %
LVEF MODALITY: NORMAL
MAGNESIUM: 1.9 MG/DL (ref 1.8–2.4)
MAGNESIUM: 2 MG/DL (ref 1.8–2.4)
MAGNESIUM: 2 MG/DL (ref 1.8–2.4)
POTASSIUM SERPL-SCNC: 2.7 MMOL/L (ref 3.5–5.1)
POTASSIUM SERPL-SCNC: 3.3 MMOL/L (ref 3.5–5.1)
PROTHROMBIN TIME: 33 SECONDS (ref 11.7–14.5)
SARS-COV-2, NAAT: NOT DETECTED
SODIUM BLD-SCNC: 140 MMOL/L (ref 135–145)
SODIUM BLD-SCNC: 142 MMOL/L (ref 135–145)
SOURCE: NORMAL
TOTAL PROTEIN: 6.7 GM/DL (ref 6.4–8.2)
TROPONIN T: 0.05 NG/ML
TROPONIN T: 0.06 NG/ML

## 2020-12-30 PROCEDURE — G0378 HOSPITAL OBSERVATION PER HR: HCPCS

## 2020-12-30 PROCEDURE — 83036 HEMOGLOBIN GLYCOSYLATED A1C: CPT

## 2020-12-30 PROCEDURE — 6370000000 HC RX 637 (ALT 250 FOR IP): Performed by: INTERNAL MEDICINE

## 2020-12-30 PROCEDURE — 94640 AIRWAY INHALATION TREATMENT: CPT

## 2020-12-30 PROCEDURE — 6360000002 HC RX W HCPCS

## 2020-12-30 PROCEDURE — 85730 THROMBOPLASTIN TIME PARTIAL: CPT

## 2020-12-30 PROCEDURE — 6360000002 HC RX W HCPCS: Performed by: NURSE PRACTITIONER

## 2020-12-30 PROCEDURE — 93459 L HRT ART/GRFT ANGIO: CPT

## 2020-12-30 PROCEDURE — 93010 ELECTROCARDIOGRAM REPORT: CPT | Performed by: INTERNAL MEDICINE

## 2020-12-30 PROCEDURE — 6370000000 HC RX 637 (ALT 250 FOR IP)

## 2020-12-30 PROCEDURE — 2500000003 HC RX 250 WO HCPCS

## 2020-12-30 PROCEDURE — 027236Z DILATION OF CORONARY ARTERY, THREE ARTERIES WITH THREE DRUG-ELUTING INTRALUMINAL DEVICES, PERCUTANEOUS APPROACH: ICD-10-PCS | Performed by: INTERNAL MEDICINE

## 2020-12-30 PROCEDURE — C1769 GUIDE WIRE: HCPCS

## 2020-12-30 PROCEDURE — B2111ZZ FLUOROSCOPY OF MULTIPLE CORONARY ARTERIES USING LOW OSMOLAR CONTRAST: ICD-10-PCS | Performed by: INTERNAL MEDICINE

## 2020-12-30 PROCEDURE — C1894 INTRO/SHEATH, NON-LASER: HCPCS

## 2020-12-30 PROCEDURE — 94761 N-INVAS EAR/PLS OXIMETRY MLT: CPT

## 2020-12-30 PROCEDURE — 80048 BASIC METABOLIC PNL TOTAL CA: CPT

## 2020-12-30 PROCEDURE — 71045 X-RAY EXAM CHEST 1 VIEW: CPT

## 2020-12-30 PROCEDURE — 82962 GLUCOSE BLOOD TEST: CPT

## 2020-12-30 PROCEDURE — C1725 CATH, TRANSLUMIN NON-LASER: HCPCS

## 2020-12-30 PROCEDURE — 96365 THER/PROPH/DIAG IV INF INIT: CPT

## 2020-12-30 PROCEDURE — 6360000002 HC RX W HCPCS: Performed by: STUDENT IN AN ORGANIZED HEALTH CARE EDUCATION/TRAINING PROGRAM

## 2020-12-30 PROCEDURE — 93005 ELECTROCARDIOGRAM TRACING: CPT | Performed by: INTERNAL MEDICINE

## 2020-12-30 PROCEDURE — 85347 COAGULATION TIME ACTIVATED: CPT

## 2020-12-30 PROCEDURE — 99291 CRITICAL CARE FIRST HOUR: CPT | Performed by: INTERNAL MEDICINE

## 2020-12-30 PROCEDURE — 93005 ELECTROCARDIOGRAM TRACING: CPT | Performed by: STUDENT IN AN ORGANIZED HEALTH CARE EDUCATION/TRAINING PROGRAM

## 2020-12-30 PROCEDURE — C1874 STENT, COATED/COV W/DEL SYS: HCPCS

## 2020-12-30 PROCEDURE — 80053 COMPREHEN METABOLIC PANEL: CPT

## 2020-12-30 PROCEDURE — 2000000000 HC ICU R&B

## 2020-12-30 PROCEDURE — U0002 COVID-19 LAB TEST NON-CDC: HCPCS

## 2020-12-30 PROCEDURE — 92937 PRQ TRLUML REVSC CAB GRF 1: CPT

## 2020-12-30 PROCEDURE — 6360000002 HC RX W HCPCS: Performed by: INTERNAL MEDICINE

## 2020-12-30 PROCEDURE — C1760 CLOSURE DEV, VASC: HCPCS

## 2020-12-30 PROCEDURE — 2709999900 HC NON-CHARGEABLE SUPPLY

## 2020-12-30 PROCEDURE — 93306 TTE W/DOPPLER COMPLETE: CPT

## 2020-12-30 PROCEDURE — 6360000004 HC RX CONTRAST MEDICATION

## 2020-12-30 PROCEDURE — 2580000003 HC RX 258: Performed by: INTERNAL MEDICINE

## 2020-12-30 PROCEDURE — 96366 THER/PROPH/DIAG IV INF ADDON: CPT

## 2020-12-30 PROCEDURE — 36415 COLL VENOUS BLD VENIPUNCTURE: CPT

## 2020-12-30 PROCEDURE — 92941 PRQ TRLML REVSC TOT OCCL AMI: CPT | Performed by: INTERNAL MEDICINE

## 2020-12-30 PROCEDURE — 2580000003 HC RX 258: Performed by: STUDENT IN AN ORGANIZED HEALTH CARE EDUCATION/TRAINING PROGRAM

## 2020-12-30 PROCEDURE — 84484 ASSAY OF TROPONIN QUANT: CPT

## 2020-12-30 PROCEDURE — C1887 CATHETER, GUIDING: HCPCS

## 2020-12-30 PROCEDURE — 83735 ASSAY OF MAGNESIUM: CPT

## 2020-12-30 PROCEDURE — 99253 IP/OBS CNSLTJ NEW/EST LOW 45: CPT | Performed by: INTERNAL MEDICINE

## 2020-12-30 PROCEDURE — 93459 L HRT ART/GRFT ANGIO: CPT | Performed by: INTERNAL MEDICINE

## 2020-12-30 RX ORDER — PROMETHAZINE HYDROCHLORIDE 25 MG/1
12.5 TABLET ORAL EVERY 6 HOURS PRN
Status: DISCONTINUED | OUTPATIENT
Start: 2020-12-30 | End: 2020-12-30 | Stop reason: HOSPADM

## 2020-12-30 RX ORDER — ONDANSETRON 2 MG/ML
4 INJECTION INTRAMUSCULAR; INTRAVENOUS EVERY 6 HOURS PRN
Status: DISCONTINUED | OUTPATIENT
Start: 2020-12-30 | End: 2020-12-30 | Stop reason: SDUPTHER

## 2020-12-30 RX ORDER — MIDAZOLAM HYDROCHLORIDE 2 MG/2ML
1 INJECTION, SOLUTION INTRAMUSCULAR; INTRAVENOUS ONCE
Status: DISCONTINUED | OUTPATIENT
Start: 2020-12-30 | End: 2020-12-30 | Stop reason: HOSPADM

## 2020-12-30 RX ORDER — MIDAZOLAM HYDROCHLORIDE 2 MG/2ML
1 INJECTION, SOLUTION INTRAMUSCULAR; INTRAVENOUS ONCE
Status: COMPLETED | OUTPATIENT
Start: 2020-12-30 | End: 2020-12-30

## 2020-12-30 RX ORDER — ACETAMINOPHEN 650 MG/1
650 SUPPOSITORY RECTAL EVERY 6 HOURS PRN
Status: DISCONTINUED | OUTPATIENT
Start: 2020-12-30 | End: 2020-12-30 | Stop reason: HOSPADM

## 2020-12-30 RX ORDER — FUROSEMIDE 10 MG/ML
40 INJECTION INTRAMUSCULAR; INTRAVENOUS ONCE
Status: COMPLETED | OUTPATIENT
Start: 2020-12-30 | End: 2020-12-30

## 2020-12-30 RX ORDER — NICOTINE 21 MG/24HR
1 PATCH, TRANSDERMAL 24 HOURS TRANSDERMAL DAILY
Status: DISCONTINUED | OUTPATIENT
Start: 2020-12-30 | End: 2020-12-30 | Stop reason: HOSPADM

## 2020-12-30 RX ORDER — IPRATROPIUM BROMIDE AND ALBUTEROL SULFATE 2.5; .5 MG/3ML; MG/3ML
SOLUTION RESPIRATORY (INHALATION)
Status: DISCONTINUED
Start: 2020-12-30 | End: 2020-12-30 | Stop reason: HOSPADM

## 2020-12-30 RX ORDER — GLIPIZIDE 5 MG/1
10 TABLET ORAL
Status: DISCONTINUED | OUTPATIENT
Start: 2020-12-30 | End: 2020-12-30 | Stop reason: HOSPADM

## 2020-12-30 RX ORDER — ATORVASTATIN CALCIUM 40 MG/1
40 TABLET, FILM COATED ORAL NIGHTLY
Status: DISCONTINUED | OUTPATIENT
Start: 2020-12-30 | End: 2020-12-30 | Stop reason: SDUPTHER

## 2020-12-30 RX ORDER — SODIUM CHLORIDE 9 MG/ML
INJECTION, SOLUTION INTRAVENOUS CONTINUOUS
Status: DISCONTINUED | OUTPATIENT
Start: 2020-12-30 | End: 2020-12-30 | Stop reason: SDUPTHER

## 2020-12-30 RX ORDER — NITROGLYCERIN 0.4 MG/1
0.4 TABLET SUBLINGUAL EVERY 5 MIN PRN
Status: DISCONTINUED | OUTPATIENT
Start: 2020-12-30 | End: 2020-12-30 | Stop reason: HOSPADM

## 2020-12-30 RX ORDER — SODIUM CHLORIDE 0.9 % (FLUSH) 0.9 %
10 SYRINGE (ML) INJECTION EVERY 12 HOURS SCHEDULED
Status: DISCONTINUED | OUTPATIENT
Start: 2020-12-30 | End: 2020-12-30 | Stop reason: HOSPADM

## 2020-12-30 RX ORDER — ACETAMINOPHEN 325 MG/1
650 TABLET ORAL EVERY 6 HOURS PRN
Status: DISCONTINUED | OUTPATIENT
Start: 2020-12-30 | End: 2020-12-30 | Stop reason: HOSPADM

## 2020-12-30 RX ORDER — ASPIRIN 81 MG/1
81 TABLET ORAL DAILY
Status: DISCONTINUED | OUTPATIENT
Start: 2020-12-30 | End: 2020-12-30 | Stop reason: ALTCHOICE

## 2020-12-30 RX ORDER — HYDROXYZINE 50 MG/1
50 TABLET, FILM COATED ORAL ONCE
Status: COMPLETED | OUTPATIENT
Start: 2020-12-30 | End: 2020-12-30

## 2020-12-30 RX ORDER — CLOPIDOGREL BISULFATE 75 MG/1
75 TABLET ORAL DAILY
Status: DISCONTINUED | OUTPATIENT
Start: 2020-12-31 | End: 2020-12-30 | Stop reason: SDUPTHER

## 2020-12-30 RX ORDER — LORAZEPAM 0.5 MG/1
0.5 TABLET ORAL EVERY 4 HOURS PRN
Status: DISCONTINUED | OUTPATIENT
Start: 2020-12-30 | End: 2020-12-30 | Stop reason: HOSPADM

## 2020-12-30 RX ORDER — POLYETHYLENE GLYCOL 3350 17 G/17G
17 POWDER, FOR SOLUTION ORAL DAILY PRN
Status: DISCONTINUED | OUTPATIENT
Start: 2020-12-30 | End: 2020-12-30 | Stop reason: HOSPADM

## 2020-12-30 RX ORDER — SODIUM CHLORIDE 9 MG/ML
INJECTION, SOLUTION INTRAVENOUS CONTINUOUS
Status: DISCONTINUED | OUTPATIENT
Start: 2020-12-30 | End: 2020-12-30 | Stop reason: HOSPADM

## 2020-12-30 RX ORDER — SODIUM CHLORIDE 0.9 % (FLUSH) 0.9 %
10 SYRINGE (ML) INJECTION PRN
Status: DISCONTINUED | OUTPATIENT
Start: 2020-12-30 | End: 2020-12-30 | Stop reason: HOSPADM

## 2020-12-30 RX ORDER — FUROSEMIDE 40 MG/1
40 TABLET ORAL 3 TIMES DAILY
Status: DISCONTINUED | OUTPATIENT
Start: 2020-12-30 | End: 2020-12-30

## 2020-12-30 RX ORDER — AMIODARONE HYDROCHLORIDE 200 MG/1
200 TABLET ORAL DAILY
Status: DISCONTINUED | OUTPATIENT
Start: 2020-12-30 | End: 2020-12-30 | Stop reason: HOSPADM

## 2020-12-30 RX ORDER — NICOTINE POLACRILEX 4 MG
15 LOZENGE BUCCAL PRN
Status: DISCONTINUED | OUTPATIENT
Start: 2020-12-30 | End: 2020-12-30 | Stop reason: HOSPADM

## 2020-12-30 RX ORDER — POTASSIUM CHLORIDE 20 MEQ/1
40 TABLET, EXTENDED RELEASE ORAL 2 TIMES DAILY WITH MEALS
Status: DISCONTINUED | OUTPATIENT
Start: 2020-12-30 | End: 2020-12-30 | Stop reason: HOSPADM

## 2020-12-30 RX ORDER — POTASSIUM CHLORIDE 7.45 MG/ML
10 INJECTION INTRAVENOUS PRN
Status: DISCONTINUED | OUTPATIENT
Start: 2020-12-30 | End: 2020-12-30 | Stop reason: HOSPADM

## 2020-12-30 RX ORDER — LORAZEPAM 2 MG/ML
1 INJECTION INTRAMUSCULAR ONCE
Status: DISCONTINUED | OUTPATIENT
Start: 2020-12-30 | End: 2020-12-30 | Stop reason: HOSPADM

## 2020-12-30 RX ORDER — METHYLPREDNISOLONE SODIUM SUCCINATE 40 MG/ML
40 INJECTION, POWDER, LYOPHILIZED, FOR SOLUTION INTRAMUSCULAR; INTRAVENOUS ONCE
Status: DISCONTINUED | OUTPATIENT
Start: 2020-12-30 | End: 2020-12-30

## 2020-12-30 RX ORDER — DEXTROSE MONOHYDRATE 25 G/50ML
12.5 INJECTION, SOLUTION INTRAVENOUS PRN
Status: DISCONTINUED | OUTPATIENT
Start: 2020-12-30 | End: 2020-12-30 | Stop reason: HOSPADM

## 2020-12-30 RX ORDER — ALOGLIPTIN 12.5 MG/1
25 TABLET, FILM COATED ORAL DAILY
Status: DISCONTINUED | OUTPATIENT
Start: 2020-12-30 | End: 2020-12-30 | Stop reason: HOSPADM

## 2020-12-30 RX ORDER — MORPHINE SULFATE 2 MG/ML
2 INJECTION, SOLUTION INTRAMUSCULAR; INTRAVENOUS EVERY 4 HOURS PRN
Status: DISCONTINUED | OUTPATIENT
Start: 2020-12-30 | End: 2020-12-30 | Stop reason: HOSPADM

## 2020-12-30 RX ORDER — CLOPIDOGREL BISULFATE 75 MG/1
75 TABLET ORAL DAILY
Status: DISCONTINUED | OUTPATIENT
Start: 2020-12-30 | End: 2020-12-30 | Stop reason: SDUPTHER

## 2020-12-30 RX ORDER — DIPHENHYDRAMINE HYDROCHLORIDE 50 MG/ML
25 INJECTION INTRAMUSCULAR; INTRAVENOUS ONCE
Status: COMPLETED | OUTPATIENT
Start: 2020-12-30 | End: 2020-12-30

## 2020-12-30 RX ORDER — ATORVASTATIN CALCIUM 80 MG/1
80 TABLET, FILM COATED ORAL NIGHTLY
Status: DISCONTINUED | OUTPATIENT
Start: 2020-12-30 | End: 2020-12-30 | Stop reason: SDUPTHER

## 2020-12-30 RX ORDER — CLOPIDOGREL BISULFATE 75 MG/1
75 TABLET ORAL DAILY
Status: DISCONTINUED | OUTPATIENT
Start: 2020-12-31 | End: 2020-12-30 | Stop reason: HOSPADM

## 2020-12-30 RX ORDER — LISINOPRIL 40 MG/1
40 TABLET ORAL DAILY
Status: DISCONTINUED | OUTPATIENT
Start: 2020-12-30 | End: 2020-12-30 | Stop reason: HOSPADM

## 2020-12-30 RX ORDER — CARVEDILOL 12.5 MG/1
12.5 TABLET ORAL 2 TIMES DAILY
Status: DISCONTINUED | OUTPATIENT
Start: 2020-12-30 | End: 2020-12-30 | Stop reason: SDUPTHER

## 2020-12-30 RX ORDER — CITALOPRAM 20 MG/1
10 TABLET ORAL DAILY
Status: DISCONTINUED | OUTPATIENT
Start: 2020-12-30 | End: 2020-12-30 | Stop reason: HOSPADM

## 2020-12-30 RX ORDER — ACETAMINOPHEN 325 MG/1
650 TABLET ORAL EVERY 4 HOURS PRN
Status: DISCONTINUED | OUTPATIENT
Start: 2020-12-30 | End: 2020-12-30 | Stop reason: HOSPADM

## 2020-12-30 RX ORDER — ASPIRIN 81 MG/1
81 TABLET, CHEWABLE ORAL DAILY
Status: DISCONTINUED | OUTPATIENT
Start: 2020-12-31 | End: 2020-12-30 | Stop reason: HOSPADM

## 2020-12-30 RX ORDER — POTASSIUM CHLORIDE 20 MEQ/1
40 TABLET, EXTENDED RELEASE ORAL PRN
Status: DISCONTINUED | OUTPATIENT
Start: 2020-12-30 | End: 2020-12-30 | Stop reason: HOSPADM

## 2020-12-30 RX ORDER — ONDANSETRON 2 MG/ML
4 INJECTION INTRAMUSCULAR; INTRAVENOUS EVERY 6 HOURS PRN
Status: DISCONTINUED | OUTPATIENT
Start: 2020-12-30 | End: 2020-12-30 | Stop reason: HOSPADM

## 2020-12-30 RX ORDER — FUROSEMIDE 40 MG/1
40 TABLET ORAL DAILY
Status: DISCONTINUED | OUTPATIENT
Start: 2020-12-30 | End: 2020-12-30 | Stop reason: HOSPADM

## 2020-12-30 RX ORDER — ACETAMINOPHEN 325 MG/1
650 TABLET ORAL EVERY 4 HOURS PRN
Status: DISCONTINUED | OUTPATIENT
Start: 2020-12-30 | End: 2020-12-30 | Stop reason: SDUPTHER

## 2020-12-30 RX ORDER — IPRATROPIUM BROMIDE AND ALBUTEROL SULFATE 2.5; .5 MG/3ML; MG/3ML
1 SOLUTION RESPIRATORY (INHALATION)
Status: DISCONTINUED | OUTPATIENT
Start: 2020-12-30 | End: 2020-12-30 | Stop reason: HOSPADM

## 2020-12-30 RX ORDER — HYDROXYZINE PAMOATE 25 MG/1
50 CAPSULE ORAL NIGHTLY
Status: DISCONTINUED | OUTPATIENT
Start: 2020-12-30 | End: 2020-12-30 | Stop reason: HOSPADM

## 2020-12-30 RX ORDER — ATORVASTATIN CALCIUM 80 MG/1
80 TABLET, FILM COATED ORAL NIGHTLY
Status: DISCONTINUED | OUTPATIENT
Start: 2020-12-30 | End: 2020-12-30 | Stop reason: HOSPADM

## 2020-12-30 RX ORDER — ASPIRIN 81 MG/1
81 TABLET, CHEWABLE ORAL DAILY
Status: DISCONTINUED | OUTPATIENT
Start: 2020-12-31 | End: 2020-12-30 | Stop reason: ALTCHOICE

## 2020-12-30 RX ORDER — LORAZEPAM 2 MG/ML
1 INJECTION INTRAMUSCULAR ONCE
Status: COMPLETED | OUTPATIENT
Start: 2020-12-30 | End: 2020-12-30

## 2020-12-30 RX ORDER — DEXTROSE MONOHYDRATE 50 MG/ML
100 INJECTION, SOLUTION INTRAVENOUS PRN
Status: DISCONTINUED | OUTPATIENT
Start: 2020-12-30 | End: 2020-12-30 | Stop reason: HOSPADM

## 2020-12-30 RX ADMIN — LORAZEPAM 1 MG: 2 INJECTION INTRAMUSCULAR; INTRAVENOUS at 15:17

## 2020-12-30 RX ADMIN — IPRATROPIUM BROMIDE AND ALBUTEROL SULFATE 1 AMPULE: .5; 3 SOLUTION RESPIRATORY (INHALATION) at 12:45

## 2020-12-30 RX ADMIN — POTASSIUM CHLORIDE 10 MEQ: 7.46 INJECTION, SOLUTION INTRAVENOUS at 05:18

## 2020-12-30 RX ADMIN — POTASSIUM CHLORIDE 40 MEQ: 1500 TABLET, EXTENDED RELEASE ORAL at 15:21

## 2020-12-30 RX ADMIN — CITALOPRAM HYDROBROMIDE 10 MG: 20 TABLET ORAL at 18:36

## 2020-12-30 RX ADMIN — HYDROXYZINE HYDROCHLORIDE 50 MG: 50 TABLET, FILM COATED ORAL at 12:51

## 2020-12-30 RX ADMIN — SODIUM CHLORIDE, PRESERVATIVE FREE 10 ML: 5 INJECTION INTRAVENOUS at 15:24

## 2020-12-30 RX ADMIN — SODIUM CHLORIDE: 9 INJECTION, SOLUTION INTRAVENOUS at 15:33

## 2020-12-30 RX ADMIN — IPRATROPIUM BROMIDE AND ALBUTEROL SULFATE 1 AMPULE: .5; 3 SOLUTION RESPIRATORY (INHALATION) at 12:46

## 2020-12-30 RX ADMIN — DIPHENHYDRAMINE HYDROCHLORIDE 25 MG: 50 INJECTION, SOLUTION INTRAMUSCULAR; INTRAVENOUS at 02:07

## 2020-12-30 RX ADMIN — Medication 2 PUFF: at 14:55

## 2020-12-30 RX ADMIN — FUROSEMIDE 40 MG: 10 INJECTION, SOLUTION INTRAMUSCULAR; INTRAVENOUS at 12:43

## 2020-12-30 RX ADMIN — MORPHINE SULFATE 2 MG: 2 INJECTION, SOLUTION INTRAMUSCULAR; INTRAVENOUS at 12:41

## 2020-12-30 RX ADMIN — INSULIN LISPRO 3 UNITS: 100 INJECTION, SOLUTION INTRAVENOUS; SUBCUTANEOUS at 17:46

## 2020-12-30 RX ADMIN — POTASSIUM CHLORIDE 10 MEQ: 7.46 INJECTION, SOLUTION INTRAVENOUS at 06:10

## 2020-12-30 RX ADMIN — MIDAZOLAM HYDROCHLORIDE 1 MG: 2 INJECTION, SOLUTION INTRAMUSCULAR; INTRAVENOUS at 12:22

## 2020-12-30 RX ADMIN — POTASSIUM CHLORIDE 10 MEQ: 7.46 INJECTION, SOLUTION INTRAVENOUS at 07:45

## 2020-12-30 RX ADMIN — SODIUM CHLORIDE: 9 INJECTION, SOLUTION INTRAVENOUS at 05:18

## 2020-12-30 ASSESSMENT — PAIN SCALES - GENERAL
PAINLEVEL_OUTOF10: 0
PAINLEVEL_OUTOF10: 3
PAINLEVEL_OUTOF10: 0

## 2020-12-30 ASSESSMENT — ENCOUNTER SYMPTOMS
COLOR CHANGE: 0
RHINORRHEA: 0
NAUSEA: 0
TROUBLE SWALLOWING: 0
BLOOD IN STOOL: 0
CHEST TIGHTNESS: 1
DIARRHEA: 0
WHEEZING: 0
VOMITING: 0
COUGH: 0
ABDOMINAL PAIN: 0
SORE THROAT: 0
SHORTNESS OF BREATH: 0
ANAL BLEEDING: 0

## 2020-12-30 NOTE — PROGRESS NOTES
Upon entry to room pt c/o numbness and tingling down right leg. Groin site checked, C/D/I no bruising or bleeding. Paged Katharina Grace NP. Also paged to clarify medication orders to dc brilinta. New orders placed.      Pt continues to be anxious

## 2020-12-30 NOTE — PROGRESS NOTES
Patient complaints of difficulty breathing SpO2 98%. Placed on O2 at [employer]. Verbalized anxiety with laying flat. Patient pushing himself up with his arms. Discussed risk of bleeding. Emotional support given. Repositioned with pillows. Patient seems more calm.

## 2020-12-30 NOTE — ED PROVIDER NOTES
Triage Chief Complaint:   Chest Pain (x a couple weeks)    [place]:  Leonela Vernon is a 62 y.o. male that presents with chest pain. The patient states that he has had intermittent midsternal chest pain for the past couple weeks that worsened tonight about 1 hour ago while he was sitting at home. States that he has sharp pain radiating into both armpits associate with some shortness of breath. Denies any nausea, dizziness, vomiting, fever, cough. States that he took 2 nitroglycerin tabs at home with some improvement but continues to have pain and is worried about a heart attack. Patient had a CABG over the summer. States that he has been compliant with his medications. ROS:  At least 14 systems reviewed and otherwise acutely negative except as in the [address] Past Medical History:   Diagnosis Date    Abnormal nuclear stress test     Atrial fibrillation ([facility] 11/2013    CAD (coronary artery disease)     Follows with Dr. Bon Mane CHF (congestive heart failure) ([facility]     COPD (chronic obstructive pulmonary disease) ([facility]     Follows with PCP    Decreased cardiac ejection fraction     Diabetes mellitus ([address]     GERD (gastroesophageal reflux disease)     H/O cardiovascular stress test 11/20/13, 3/20/2013    11/13-Observed defect is consistent with diaphragmatic attenuation. EF 58%. 3/13 -EF 51%. No ischemia. Normal Study.  H/O cardiovascular stress test 06/08/2017    EF 50% normal study    H/O cardiovascular stress test 06/17/2019    Normal NM    H/O Doppler ultrasound 10/14/2010    10/2010-Bilateral venous doppler of lower extremies- No DVTs.  H/O echocardiogram 06/26/2019    Left ventricular systolic function is normal with an ejection fraction of 50-55%. Mild concentric left ventricular hypertrophy. Grade I diastolic dysfunction. No significant valvular disease noted. No evidence of pericardial effusion. Essentially unremarkable echo.     H/O percutaneous left heart catheterization 12/22/2015    No evidence of hemodynamically significant coronary artery disease    Heart imaging 04/23/2020    muga - ef 58%    History of coronary artery stent placement 11/13/2013 11/13 -RCA - 3 stents placed    History of exercise stress test 06/08/2017    treadmill    History of Holter monitoring 01/04/2016    predominant rhythm sinus    History of MRI of brain and brain stem 06/04/2020    No evidence of an acute infarct, mod chronic microvascular white matter ischemic disease with associated cerebral parenchymal volume loss    History of nuclear stress test 02/24/2020    EF 38%,Myocardial perfusion scan shows moderate size, severe intensity, non  reversible perfusion defect in inferior wall.  Hyperlipidemia     Hypertension     Follows with PCP    Kidney disease     Dr. Favian Ventura S/P cardiac catheterization 11/13/2013 11/13/2013-EF 55%, distal LAD mild disease,CX stent patent,but distal to stent 50% stenosis. RCA severe disease.  SOB (shortness of breath)     Status post angioplasty with stent     Wears dentures     full upper denture     Past Surgical History:   Procedure Laterality Date    APPENDECTOMY      BIOPSY / LIGATION TEMPORAL ARTERY Left 8/17/2020    LEFT TEMPORAL ARTERY BIOPSY LIGATION performed by Erich Hodgson MD at [place] [address]  11/13/2013 11/13/2013-EF 55%, distal LAD mild disease,CX stent patent,but distal to stent 50% stenosis. RCA severe disease.     CARDIAC SURGERY  07/03/2020    CABG X 3 LIMA-OM-PDA-LAD, Maze, LT Atrial Appendage clipping    COLONOSCOPY      CORONARY ANGIOPLASTY WITH STENT PLACEMENT      4 stents- RCA X3; CX X1    CORONARY ANGIOPLASTY WITH STENT PLACEMENT  11/13/2013 11/13/2013 -3 stents placed to RCA- Dr Chiqui Menchaca N/A 7/3/2020    INTRAOPERATIVE ISMAEL, CABG X3   WITH LIMA  AND LEFT LEG ENDOVEIN HARVEST, INDUCED HYPOTHERMIA, MAZE BIPOLAR AND CRYO PROCEDURE, LEFT ATRIAL APPENDAGE CLIPPING performed by Hola Suh MD at [address] [facility]      all teeth extracted    FEMUR FRACTURE SURGERY  1984    [facility]     Family History   Problem Relation Age of Onset    Cancer Mother     Diabetes Mother     Heart Disease Mother     High Blood Pressure Mother     Stroke Mother     Cancer Father     Heart Disease Father     High Blood Pressure Father     Stroke Father     Cancer Brother      Social History     Socioeconomic History    Marital status: Legally      Spouse name: Not on file    Number of children: Not on file    Years of education: Not on file    Highest education level: Not on file   Occupational History    Occupation:    Social Needs    Financial resource strain: Not on file    Food insecurity     Worry: Not on file     Inability: Not on file    Transportation needs     Medical: Not on file     Non-medical: Not on file   Tobacco Use    Smoking status: Current Every Day Smoker     Packs/day: 0.50     Years: 46.00     Pack years: 23.00     Types: Cigarettes     Start date: 1974    Smokeless tobacco: Never Used    Tobacco comment: Smokes approx 3 cig a day   Substance and Sexual Activity    Alcohol use: No     Comment: caffeine 1 cup of coffee a day and at least 6 bottles of pop a day    Drug use: Not Currently     Types: Cocaine     Comment: Last used: 7/1/20    Sexual activity: Yes     Partners: Female   Lifestyle    Physical activity     Days per week: Not on file     Minutes per session: Not on file    Stress: Not on file   Relationships    Social connections     Talks on phone: Not on file     Gets together: Not on file     Attends Yarsani service: Not on file     Active member of club or organization: Not on file     Attends meetings of clubs or organizations: Not on file     Relationship status: Not on file    Intimate partner violence     Fear of current or ex partner: Not on file     Emotionally abused: Not on file     Physically abused: Not on file     Forced sexual activity: Not on file   Other Topics Concern    Not on file   Social History Narrative    Not on file     Current Facility-Administered Medications   Medication Dose Route Frequency Provider Last Rate Last Admin    metoprolol tartrate (LOPRESSOR) tablet 12.5 mg  12.5 mg Oral BID Clarita Masterson MD        atorvastatin (LIPITOR) tablet 40 mg  40 mg Oral Nightly Clarita Masterson MD        heparin (porcine) injection 4,000 Units  4,000 Units Intravenous PRN Randy Eugene MD        heparin (porcine) injection 2,000 Units  2,000 Units Intravenous PRN Randy Eugene MD        heparin 25,000 units in dextrose 5% 250 mL infusion  7.9 Units/kg/hr Intravenous Continuous Randy Eugene MD 10 mL/hr at 12/29/20 2355 7.9 Units/kg/hr at 12/29/20 2355     Current Outpatient Medications   Medication Sig Dispense Refill    baclofen (LIORESAL) 10 MG tablet Take 1 tablet by mouth 3 times daily as needed (muscle spasm) 12 tablet 0    lidocaine (LIDODERM) 5 % Place 1 patch onto the skin every 24 hours Place 1 patch onto the skin daily 12 hours on, 12 hours off. 30 patch 0    naproxen (NAPROSYN) 500 MG tablet Take 1 tablet by mouth 2 times daily as needed for Pain (with meals) 20 tablet 0    predniSONE (DELTASONE) 10 MG tablet Take 4 tablets by mouth daily for 3 days, THEN 3 tablets daily for 3 days, THEN 2 tablets daily for 3 days, THEN 1 tablet daily for 3 days. Take by oral route 4 tabs x 3 days, then 3 tabs x 3 days, then 2 tabs x 3 days, then 1 tab x 3 days, then discontinue. Take with food. . 30 tablet 0    lisinopril (PRINIVIL;ZESTRIL) 40 MG tablet Take 1 tablet by mouth daily 90 tablet 3    nitroGLYCERIN (NITROSTAT) 0.4 MG SL tablet Place 1 tablet under the tongue every 5 minutes as needed for Chest pain 25 tablet 3    warfarin (COUMADIN) 4 MG tablet Take 1 tablet by mouth daily Except take 1/2 tablet on Fridays or as directed by clinic 30 tablet 1    hydrOXYzine Differential   Result Value Ref Range    WBC 14.7 (H) 4.0 - 10.5 K/CU MM    RBC 5.17 4.6 - 6.2 M/CU MM    Hemoglobin 14.7 13.5 - 18.0 GM/DL    Hematocrit 45.4 42 - 52 %    MCV 87.8 78 - 100 FL    MCH 28.4 27 - 31 PG    MCHC 32.4 32.0 - 36.0 %    RDW 14.7 11.7 - 14.9 %    Platelets 997 182 - 971 K/CU MM    MPV 11.0 7.5 - 11.1 FL    Differential Type AUTOMATED DIFFERENTIAL     Segs Relative 71.3 (H) 36 - 66 %    Lymphocytes % 20.1 (L) 24 - 44 %    Monocytes % 6.6 (H) 0 - 4 %    Eosinophils % 0.9 0 - 3 %    Basophils % 0.7 0 - 1 %    Segs Absolute 10.5 K/CU MM    Lymphocytes Absolute 3.0 K/CU MM    Monocytes Absolute 1.0 K/CU MM    Eosinophils Absolute 0.1 K/CU MM    Basophils Absolute 0.1 K/CU MM    Nucleated RBC % 0.0 %    Total Nucleated RBC 0.0 K/CU MM    Total Immature Neutrophil 0.06 K/CU MM    Immature Neutrophil % 0.4 0 - 0.43 %   Comprehensive Metabolic Panel w/ Reflex to MG   Result Value Ref Range    Sodium 133 (L) 135 - 145 MMOL/L    Potassium 2.7 (LL) 3.5 - 5.1 MMOL/L    Chloride 92 (L) 99 - 110 mMol/L    CO2 28 21 - 32 MMOL/L    BUN 28 (H) 6 - 23 MG/DL    CREATININE 1.5 (H) 0.9 - 1.3 MG/DL    Glucose 305 (H) 70 - 99 MG/DL    Calcium 9.6 8.3 - 10.6 MG/DL    Alb 3.8 3.4 - 5.0 GM/DL    Total Protein 7.2 6.4 - 8.2 GM/DL    Total Bilirubin 0.2 0.0 - 1.0 MG/DL    ALT 14 10 - 40 U/L    AST 14 (L) 15 - 37 IU/L    Alkaline Phosphatase 139 (H) 40 - 129 IU/L    GFR Non- 48 (L) >60 mL/min/1.73m2    GFR  58 (L) >60 mL/min/1.73m2    Anion Gap 13 4 - 16   Troponin   Result Value Ref Range    Troponin T 0.028 (H) <0.01 NG/ML   EKG 12 Lead   Result Value Ref Range    Ventricular Rate 123 BPM    Atrial Rate 129 BPM    P-R Interval 192 ms    QRS Duration 114 ms    Q-T Interval 336 ms    QTc Calculation (Bazett) 481 ms    P Axis 118 degrees    R Axis -6 degrees    T Axis 152 degrees    Diagnosis       Sinus tachycardia  Left ventricular hypertrophy with repolarization abnormality  Inferior infarct (cited on or before 19-JUL-2020)  ** ** ACUTE MI / STEMI ** **  Consider right ventricular involvement in acute inferior infarct  Abnormal ECG  When compared with ECG of 24-AUG-2020 20:51,  Vent. rate has increased BY  49 BPM  ST more depressed in Lateral leads  T wave inversion now evident in Inferior leads        Radiographs (if obtained):  [] The following radiograph was interpreted by myself in the absence of a radiologist:  [x] Radiologist's Report Reviewed:    EKG (if obtained): (All EKG's are interpreted by myself in the absence of a cardiologist)  Atrial fibrillation with RVR. Normal axis. LVH. ST depression in the high lateral leads with ST elevation in lead III and minimally and aVF concerning for acute MI.  QTc is normal.    MDM:  Plan of care is discussed thoroughly with the patient and family if present. If performed, all imaging and lab work also discussed with patient. All relevant prior results and chart reviewed if available. Patient presents as above. He is having active chest pain with some ischemic changes on his EKG. STEMI alert was activated and I spoke with Dr. Kade Garcia who reviewed the patient's EKG and repeat EKG with improving heart rate, not entirely consistent with STEMI at this time. Patient will be placed on heparin drip, loaded with Plavix, Coumadin held and kept n.p.o. for cath in the morning. At this point given the patient's overall presentation I have low suspicion for PE, dissection, other acute emergent process. Patient does have a mildly elevated troponin which will be trended. Patient agreeable with this plan of care. I directly delivered medical care to this critically ill patient. Timely evaluation and treatment was necessary to address the significant organ system dysfunction present in this patient.  Due to high probability of clinically significant, life-threatening deterioration, the patient required my highest level of preparedness to intervene emergently and I personally spent this critical care time directly and personally managing the patient. I was involved in the stabilization of this critical patient for 37 minutes. During this time I was physically present at the bedside during my initial exam and for re-examinations at intervals coordinating this patient's care with other physicians, examining radiographs, interpreting electrocardiograms and rhythm strips, reviewing laboratory results, discussing the patient's condition and management with the patient/family. Time billed does not include time for procedures. Clinical Impression:  1. Acute chest pain    2. Hypokalemia    3.  Elevated troponin      (Please note that portions of this note may have been completed with a voice recognition program. Efforts were made to edit the dictations but occasionally words are mis-transcribed.)    MD Robbie Martini MD  12/30/20 2065

## 2020-12-30 NOTE — CONSULTS
CARDIOLOGY CONSULT NOTE   Reason for consultation:  NSTEMI     Referring physician:  Eligio Decker DO     Primary care physician: Madison Post MD      Dear  Dr. Eligio Decker DO   Thanks for the consult. Chief Complaints :  Chief Complaint   Patient presents with    Chest Pain     x a couple weeks        History of present illness:Josiah is a 62 y. o.year old who presents with intermittent chest pain for last fw days going [place] but last night started having pain at rest , unrelenting , nitro helped came in for eval, ekg shows some inferior wall changes . HE says pain was 8/10 when worse but down to 2 now  He has h/o afib ablation, MAZE, atrial appendage closure  , CABG in July 2020. He is on amio and coumadin , INR is 2.7  Yesterday   Unfortunately smoking    Past medical history:    has a past medical history of Abnormal nuclear stress test, Atrial fibrillation (HCC), CAD (coronary artery disease), CHF (congestive heart failure) ([facility] COPD (chronic obstructive pulmonary disease) ([facility] [facility] Decreased cardiac ejection fraction, Diabetes mellitus ([facility] [facility] GERD (gastroesophageal reflux disease), H/O cardiovascular stress test, H/O cardiovascular stress test, H/O cardiovascular stress test, H/O Doppler ultrasound, H/O echocardiogram, H/O percutaneous left heart catheterization, Heart imaging, History of coronary artery stent placement, History of exercise stress test, History of Holter monitoring, History of MRI of brain and brain stem, History of nuclear stress test, Hyperlipidemia, Hypertension, Kidney disease, S/P cardiac catheterization, SOB (shortness of breath), Status post angioplasty with stent, and Wears dentures. Past surgical history:   has a past surgical history that includes Coronary angioplasty with stent; Appendectomy; Femur fracture surgery (1984); Coronary angioplasty with stent (11/13/2013); Colonoscopy; Mandible fracture surgery (1984); Cardiac catheterization (11/13/2013);  Cardiac surgery (07/03/2020); Coronary artery bypass graft (N/A, 7/3/2020); Dental surgery; and BIOPSY / LIGATION TEMPORAL ARTERY (Left, 8/17/2020). Social History:   reports that he has been smoking cigarettes. He started smoking about 47 years ago. He has a 23.00 pack-year smoking history. He has never used smokeless tobacco. He reports previous drug use. Drug: Cocaine. He reports that he does not drink alcohol.   Family history:   no family history of CAD, STROKE of DM at early age    No Known Allergies        amiodarone (CORDARONE) tablet 200 mg, Daily      nitroGLYCERIN (NITROSTAT) SL tablet 0.4 mg, Q5 Min PRN      lisinopril (PRINIVIL;ZESTRIL) tablet 40 mg, Daily      alogliptin (NESINA) tablet 25 mg, Daily      hydrOXYzine (VISTARIL) capsule 50 mg, Nightly      glipiZIDE (GLUCOTROL) tablet 10 mg, QAM AC      aspirin EC tablet 81 mg, Daily      sodium chloride flush 0.9 % injection 10 mL, 2 times per day      sodium chloride flush 0.9 % injection 10 mL, PRN      promethazine (PHENERGAN) tablet 12.5 mg, Q6H PRN    Or      ondansetron (ZOFRAN) injection 4 mg, Q6H PRN      acetaminophen (TYLENOL) tablet 650 mg, Q6H PRN    Or      acetaminophen (TYLENOL) suppository 650 mg, Q6H PRN      polyethylene glycol (GLYCOLAX) packet 17 g, Daily PRN      potassium chloride (KLOR-CON M) extended release tablet 40 mEq, PRN    Or      potassium bicarb-citric acid (EFFER-K) effervescent tablet 40 mEq, PRN    Or      potassium chloride 10 mEq/100 mL IVPB (Peripheral Line), PRN      glucose (GLUTOSE) 40 % oral gel 15 g, PRN      dextrose 50 % IV solution, PRN      glucagon (rDNA) injection 1 mg, PRN      dextrose 5 % solution, PRN      insulin lispro (HUMALOG) injection vial 0-6 Units, Q4H      metoprolol tartrate (LOPRESSOR) tablet 12.5 mg, BID      atorvastatin (LIPITOR) tablet 40 mg, Nightly      0.9 % sodium chloride infusion, Continuous      furosemide (LASIX) tablet 40 mg, Daily      potassium chloride potassium chloride 10 mEq/100 mL IVPB (Peripheral Line)  10 mEq Intravenous PRN [place],  mL/hr at 12/30/20 0745 10 mEq at 12/30/20 0745    glucose (GLUTOSE) 40 % oral gel 15 g  15 g Oral PRN [facility], [facility]        dextrose 50 % IV solution  12.5 g Intravenous PRN [facility], DO        glucagon (rDNA) injection 1 mg  1 mg Intramuscular PRN [facility], DO        dextrose 5 % solution  100 mL/hr Intravenous PRN [facility], DO        insulin lispro (HUMALOG) injection vial 0-6 Units  0-6 Units Subcutaneous Q4H [facility], DO        metoprolol tartrate (LOPRESSOR) tablet 12.5 mg  12.5 mg Oral BID Cinthia Benitez MD   Stopped at 12/30/20 0900    atorvastatin (LIPITOR) tablet 40 mg  40 mg Oral Nightly Cinthia Benitez MD        0.9 % sodium chloride infusion   Intravenous Continuous [facility],  mL/hr at 12/30/20 0518 New Bag at 12/30/20 0518    furosemide (LASIX) tablet 40 mg  40 mg Oral Daily Laurie Mccray MD   Stopped at 12/30/20 0900    potassium chloride (KLOR-CON M) extended release tablet 40 mEq  40 mEq Oral BID  Tien Grant MD   Stopped at 12/30/20 0915    heparin (porcine) injection 4,000 Units  4,000 Units Intravenous PRN [facility], DO        heparin (porcine) injection 2,000 Units  2,000 Units Intravenous PRN [facility], DO        heparin 25,000 units in dextrose 5% 250 mL infusion  7.9 Units/kg/hr Intravenous Continuous [facility], DO   Stopped at 12/30/20 0900     Review of Systems:   · Constitutional: No Fever or Weight Loss   · Eyes: No Decreased Vision  · ENT: No Headaches, Hearing Loss or Vertigo  · Cardiovascular: As per HPI  · Respiratory: As per HPI  · Gastrointestinal: No abdominal pain, appetite loss, blood in stools, constipation, diarrhea or heartburn  · Genitourinary: No dysuria, trouble voiding, or hematuria  · Musculoskeletal:  No gait disturbance, weakness or joint complaints  · Integumentary: No rash or pruritis  · Neurological: No TIA or stroke symptoms  · Psychiatric: No anxiety or depression  · Endocrine: No malaise, fatigue or temperature intolerance  · Hematologic/Lymphatic: No bleeding problems, blood clots or swollen lymph nodes  · Allergic/Immunologic: No nasal congestion or hives  All systems negative except as marked. Physical Examination:    Vitals:    12/30/20 0338 12/30/20 0830 12/30/20 1109 12/30/20 1123   BP: (!) 150/98  (!) 133/96 133/83   Pulse: 76 72 72 73   Resp: 18 16 18 20   Temp: 97.8 °F (36.6 °C)      TempSrc: Oral      SpO2: 96%  96% 95%   Weight:       Height:           General Appearance:  No distress, conversant    Constitutional:  Well developed, Well nourished, No acute distress, Non-toxic appearance. HENT:  Normocephalic, Atraumatic, Bilateral external ears normal, Oropharynx moist, No oral exudates, Nose normal. Neck- Normal range of motion, No tenderness, Supple, No stridor,no apical-carotid delay  Lymphatics : no palpable lymph nodes  Eyes:  PERRL, EOMI, Conjunctiva normal, No discharge. Respiratory:  Normal breath sounds, No respiratory distress, No wheezing, No chest tenderness. ,no use of accessory muscles, crackles Present  Cardiovascular: (PMI) apex non displaced,no lifts no thrills, ankle swelling Absent  , 1+, s1 and s2 audible,Murmur. Present, JVD not noted    Abdomen /GI:  Bowel sounds normal, Soft, No tenderness, No masses, No gross visceromegaly   :  No costovertebral angle tenderness   Musculoskeletal:  No edema, no tenderness, no deformities.  Back- no tenderness  Integument:  Well hydrated, no rash   Lymphatic:  No lymphadenopathy noted   Neurologic:  Alert & oriented x 3, CN 2-12 normal, normal motor function, normal sensory function, no focal deficits noted           Medical decision making and Data review:    Lab Review   Recent Labs     12/29/20  2301   WBC 14.7*   HGB 14.7   HCT 45.4         Recent Labs     12/30/20  0849      K 3.3*   CL 99 CO2 26   BUN 26*   CREATININE 1.4*     Recent Labs     12/30/20  0340   AST 15   ALT 12   BILITOT 0.2   ALKPHOS 121     Recent Labs     12/29/20  2301 12/30/20  0340 12/30/20  0849   TROPONINT 0.028* 0.059* 0.054*       No results for input(s): PROBNP in the last 72 hours. Lab Results   Component Value Date    INR 2.70 12/29/2020    PROTIME 33.0 (H) 12/29/2020       EKG: (reviewed by myself)    ECHO:(reviewed by myself)    Chest Xray:(reviewed by myself)  Ct Lumbar Spine Wo Contrast    Result Date: 12/26/2020  EXAMINATION: CT OF THE PELVIS WITHOUT CONTRAST; CT OF THE LUMBAR SPINE WITHOUT CONTRAST 12/26/2020 2:37 am TECHNIQUE: CT of the pelvis was performed without the administration of intravenous contrast.  Multiplanar reformatted images are provided for review. Dose modulation, iterative reconstruction, and/or weight based adjustment of the mA/kV was utilized to reduce the radiation dose to as low as reasonably achievable.; CT of the lumbar spine was performed without the administration of intravenous contrast. Multiplanar reformatted images are provided for review. Dose modulation, iterative reconstruction, and/or weight based adjustment of the mA/kV was utilized to reduce the radiation dose to as low as reasonably achievable. COMPARISON: MRI lumbar spine performed September 29, 2017. HISTORY: ORDERING SYSTEM PROVIDED HISTORY: Pain TECHNOLOGIST PROVIDED HISTORY: Reason for exam:-> pain Additional Contrast?-> none Reason for Exam: twisted back, lower back pain that radiates into buttocks Acuity: Acute Type of Exam: Initial FINDINGS: The lumbar spine demonstrates gross anatomic alignment. Lumbar vertebral body height is grossly preserved. Moderate multilevel degenerative changes are not significantly changed from prior examination. No acute osseous abnormality. The pelvic bones are intact. Evidence of previously removed left femoral hardware. Mild degenerative changes of the bilateral hips.   The visualized femurs are intact. Sigmoid diverticulosis without evidence of acute diverticulitis. The visualized small and large bowel are nondilated. The prostate and seminal vesicles are unremarkable. The urinary bladder demonstrates mild circumferential wall thickening which may be related to incomplete distension. No significant pelvic lymphadenopathy. Few prominent bilateral inguinal lymph nodes, nonspecific. No acute abnormality of the lumbar spine or pelvis. Ct Pelvis Wo Contrast Additional Contrast? None    Result Date: 12/26/2020  EXAMINATION: CT OF THE PELVIS WITHOUT CONTRAST; CT OF THE LUMBAR SPINE WITHOUT CONTRAST 12/26/2020 2:37 am TECHNIQUE: CT of the pelvis was performed without the administration of intravenous contrast.  Multiplanar reformatted images are provided for review. Dose modulation, iterative reconstruction, and/or weight based adjustment of the mA/kV was utilized to reduce the radiation dose to as low as reasonably achievable.; CT of the lumbar spine was performed without the administration of intravenous contrast. Multiplanar reformatted images are provided for review. Dose modulation, iterative reconstruction, and/or weight based adjustment of the mA/kV was utilized to reduce the radiation dose to as low as reasonably achievable. COMPARISON: MRI lumbar spine performed September 29, 2017. HISTORY: ORDERING SYSTEM PROVIDED HISTORY: Pain TECHNOLOGIST PROVIDED HISTORY: Reason for exam:-> pain Additional Contrast?-> none Reason for Exam: twisted back, lower back pain that radiates into buttocks Acuity: Acute Type of Exam: Initial FINDINGS: The lumbar spine demonstrates gross anatomic alignment. Lumbar vertebral body height is grossly preserved. Moderate multilevel degenerative changes are not significantly changed from prior examination. No acute osseous abnormality. The pelvic bones are intact. Evidence of previously removed left femoral hardware.   Mild degenerative changes of the bilateral hips. The visualized femurs are intact. Sigmoid diverticulosis without evidence of acute diverticulitis. The visualized small and large bowel are nondilated. The prostate and seminal vesicles are unremarkable. The urinary bladder demonstrates mild circumferential wall thickening which may be related to incomplete distension. No significant pelvic lymphadenopathy. Few prominent bilateral inguinal lymph nodes, nonspecific. No acute abnormality of the lumbar spine or pelvis. Xr Chest Portable    Result Date: 12/29/2020  EXAMINATION: ONE XRAY VIEW OF THE CHEST 12/29/2020 11:15 pm COMPARISON: Chest radiograph performed August 24, 2020. HISTORY: ORDERING SYSTEM PROVIDED HISTORY: chest pain TECHNOLOGIST PROVIDED HISTORY: Reason for exam:->chest pain Reason for Exam: chest pain Acuity: Acute Type of Exam: Initial FINDINGS: No focal consolidation, pleural effusion, or pneumothorax. Stable enlargement of the cardiac silhouette. Mild prominence of the pulmonary vessels. Median sternotomy wires are aligned. No acute osseous abnormality. Cardiomegaly with mild prominence of the pulmonary vessels. All labs, medications and tests reviewed by myself including data  from outside source , patient and available family . Continue all other medications of all above medical condition listed as is. Impression:  Principal Problem:    NSTEMI (non-ST elevated myocardial infarction) ([address]  Active Problems:    Acute chest pain  Resolved Problems:    * No resolved hospital problems. *      Assessment: 62 y. o.year old with PMH of  has a past medical history of Abnormal nuclear stress test, Atrial fibrillation (HCC), CAD (coronary artery disease), CHF (congestive heart failure) ([address] COPD (chronic obstructive pulmonary disease) ([address] Decreased cardiac ejection fraction, Diabetes mellitus ([address] GERD (gastroesophageal reflux disease), H/O cardiovascular stress test, H/O cardiovascular stress test, H/O cardiovascular stress test, H/O Doppler ultrasound, H/O echocardiogram, H/O percutaneous left heart catheterization, Heart imaging, History of coronary artery stent placement, History of exercise stress test, History of Holter monitoring, History of MRI of brain and brain stem, History of nuclear stress test, Hyperlipidemia, Hypertension, Kidney disease, S/P cardiac catheterization, SOB (shortness of breath), Status post angioplasty with stent, and Wears dentures. Plan and Recommendations:    NSTEMI will plan cardiac cath . Agree with heparin and nitro, hold coumadin   Afib: in sinus on amio and hold coumaidn for cath   smoking cessation  Alternates and risk of the procedure were dicussed in detail  Patient is in agreement to proceed. Mallampati is 2  ASA is  3  DVT prophylaxis if no contraindication  6. Dyslipidemia: continue statins           Thank you  much for consult and giving us the opportunity in contributing in the care of this patient. Please feel free to call me for any questions.        Ele Clemente MD, 12/30/2020 11:43 AM

## 2020-12-30 NOTE — ED NOTES
Report called to [facility]. All questions answered.       Bill Mesa RN  12/30/20 8350

## 2020-12-30 NOTE — PROGRESS NOTES
Echo at bedside. Dr Layla Neal at bedside. Patient still complaining of difficulty breathing. Patient remains on 4L oxygen. Patient states anxiety with laying flat. Right femoral site free from any hematoma, oozing, or active bleeding at this time as patient sitting in high [employer].

## 2020-12-30 NOTE — PROGRESS NOTES
Patient insisted that he be sitting in a chair rather than in the cart. Therefore, this RN and Glen Para RN assisted him into a chair. Patient is still restless and very agitated; patient insistent that he cannot breathe laying flat despite stable vital signs. Right femoral site free from any active bleeding, oozing, or hematoma despite him sitting upright post-angioseal. No other complaints at this time.

## 2020-12-30 NOTE — PROGRESS NOTES
Patient transported in [facility] to ICU Room 2115 per RN and RT on [employer] monitor. Patient still rather agitated. Henry López RN attempted to call report to ICU RN but stated that would call her back.

## 2020-12-30 NOTE — ED TRIAGE NOTES
Pt presents to the ED c/o midsternal intermittant CP for the past couple weeks. Rates pain 8/10. Pt is alert and oriented. Took 2 nitro pta, states it only slightly helped.

## 2020-12-30 NOTE — PLAN OF CARE
Admitted this morning because of chest pain has been going on for 1 week worse few hours prior to admission. History of coronary artery bypass graft 3 to 4 months prior to admission. Status post left heart catheterization with PCI to SVG to RCA/PDA. Vitals:    12/30/20 1400   BP: (!) 142/128   Pulse: 79   Resp: 16   Temp: 98 °F (36.7 °C)   SpO2: 98%     Regular rate and rhythm  Chest clear to auscultation  Abdomen soft nontender  Lower extremity with no edema. Assessment and plan    Non-ST elevation MI: Status post left heart catheterization. Continue dual antiplatelet therapy. Continue high intensity statin. Anxiety attack: Start Celexa. Ativan as needed.     Tamie Matute MD

## 2020-12-30 NOTE — PROGRESS NOTES
Patient taken to cath lab at this time by cath lab RN. Cath lab RN stopped heparin drip at this time and was notified that the patient has only had 3 of the 6 bags of potassium chloride replacement. Will resume after heart cath.

## 2020-12-30 NOTE — PROGRESS NOTES
Procedure site __right femoral___________    Time patient arrived to room:1358    Hematoma noted?   No    Upon arrival to patient's room, procedure site assessed by:       ___isela butler_____________________  And _____leonardo rn__________________

## 2020-12-31 NOTE — PROGRESS NOTES
Called to bedside as patient wanted to leave AMA. Patient understood the risks and was able to repeat them back to me, including death. Patient was alert and oriented x3. Cardiology was notified. Patient states that he does have aspirin and plavix at home. Instructed patient to come back to the ED in the event that he experiences SOB or any further chest pain or diaphoresis and patient understood. Patient signed the [facility] paperwork.

## 2020-12-31 NOTE — DISCHARGE SUMMARY
Discharge Summary    Name:  Leonela Vernon /Age/Sex: 1960  (62 y.o. male)   MRN & CSN:  [id number] & [id number] Admission Date/Time: 2020 10:48 PM   Attending:  No att. providers found Discharging Physician: Perla Irene MD     Hospital Course:   Leonela Vernon is a 62 y.o.  male  who presents with NSTEMI (non-ST elevated myocardial infarction) [facility])    He was admitted to the hospital because of on and off chest pain for 1 week worse few hours prior to admission. He has history of coronary artery disease with recent coronary artery bypass graft. He was managed as a case of non-ST elevation myocardial infarction. He was started on heparin infusion and given clopidogrel. He underwent left heart catheterization with PCI to SVG to RCA/PDA at ostial anastomosis site for 99% lesion, SVG to obtuse marginal and LIMA to LAD are patent. RCA and circumflex are occluded, LAD is diffusely diseased, diagonal and LAD are filled by LIMA graft, left main has 80% distal stenosis. He was recommended dual antiplatelet therapy for 1 year, and smoking cessation. He was admitted to the ICU after left heart catheterization for closer monitoring. He was noted to be severely anxious after the procedure. He was given benzodiazepine and citalopram.    He however decided to leave  [address] Risks were explained and he understood. Cardiologist was made aware. He was advised to take aspirin and Plavix on discharge. The patient expressed appropriate understanding of and agreement with the discharge recommendations, medications, and plan.      Consults this admission:  IP CONSULT TO HOSPITALIST  IP CONSULT TO CARDIOLOGY  IP CONSULT TO CARDIAC REHAB  IP CONSULT TO CARDIAC REHAB    Discharge Instruction:     Left AGAINST MEDICAL ADVICE  Objective Findings at Discharge:   /82   Pulse 85   Temp 97.6 °F (36.4 °C) (Oral)   Resp 18   Ht 6' 3\" (1.905 m)   Wt 280 lb (127 kg)   SpO2 98%   BMI 35.00 kg/m²            PHYSICAL EXAM not done. BMP/CBC  Recent Labs     12/29/20  2301 12/30/20  0340 12/30/20  0849   * 142 140   K 2.7* 2.7* 3.3*   CL 92* 99 99   CO2 28 26 26   BUN 28* 28* 26*   CREATININE 1.5* 1.5* 1.4*   WBC 14.7*  --   --    HCT 45.4  --   --      --   --        IMAGING:  Echocardiogram Complete 2d With Doppler With Color    Result Date: 12/30/2020  Transthoracic Echocardiography Report (TTE)  Demographics   Patient Name       Dahiana ALEXANDER       Date of Study       12/30/2020   Date of Birth      1960         Gender              Male   Age                62 year(s)         Race                   Patient Number     [id number]         Room Number         [facility]   Visit Number       [id number]   Corporate ID       [id number]   Accession Number   [id number]         Lauren Swan RVT   Ordering Physician Leo Ron MD                 Physician           MD  Procedure Type of Study   TTE procedure:ECHOCARDIOGRAM COMPLETE 2D W DOPPLER W COLOR. Procedure Date Date: 12/30/2020 Start: 01:13 PM Study Location: Portable Technical Quality: Fair visualization Indications:Chest pain. Patient Status: Routine Height: 63 inches Weight: 280 pounds BSA: 2.23 m2 BMI: 49.6 kg/m2 HR: 78 bpm BP: 108/84 mmHg  Conclusions   Summary  Left ventricular systolic function is low normal.  Ejection fraction is visually estimated at 50%. Moderate left ventricular hypertrophy. No significant valvular disease noted. No evidence of any pericardial effusion. Pericardial fat pad present.    Signature   ------------------------------------------------------------------  Electronically signed by Albert Villanueva MD (Interpreting  physician) on 12/30/2020 at 01:24 PM  ------------------------------------------------------------------   Findings Left Ventricle  Left ventricular systolic function is low normal.  Ejection fraction is visually estimated at 50%. Moderate left ventricular hypertrophy. Left Atrium  Essentially normal left atrium. Right Atrium  Essentially normal right atrium. Right Ventricle  Essentially normal right ventricle. Aortic Valve  Structurally normal aortic valve. Mitral Valve  Structurally normal mitral valve. Tricuspid Valve  Trace tricuspid regurgitation; normal RVSP. Pulmonic Valve  Pulmonic valve is structurally normal.   Pericardial Effusion  No evidence of any pericardial effusion. Pleural Effusion  No evidence of pleural effusion.   M-Mode/2D Measurements & Calculations   LV Diastolic Dimension:  LV Systolic Dimension:  LA Dimension: 4.6 cmAO Root  4.64 cm                  3.74 cm                 Dimension: 4.1 cmLA Area:  LV FS:19.4 %             LV Volume Diastolic: 87 75.6 cm2  LV PW Diastolic: 1.8 cm  ml  LV PW Systolic: 2.5 cm   LV Volume Systolic: 50  Septum Diastolic: 9.38   ml  cm                       LV EDV/LV EDV Index: 87 RV Diastolic Dimension:  Septum Systolic: 1.6 cm  HS/71 Q9MU ESV/LV ESV   4.03 cm  CO: 8.24 l/min           Index: 50 ml/22 m2  CI: 3.7 l/m*m2           EF Calculated (A4C):    LA/Aorta: 1.12                           42.5 %  LV Area Diastolic: 40.3  EF Calculated (2D): 40  LA volume/Index: 81 ml  cm2                      %                       /61C1  LV Area Systolic: 21 cm2                           LV Length: 8.33 cm                            LVOT: 2.6 cm  Doppler Measurements & Calculations   MV Peak E-Wave: 82.9    AV Peak Velocity: 134 cm/s   LVOT Peak Velocity: 109  cm/s                    AV Peak Gradient: 7.18 mmHg  cm/s  MV Peak A-Wave: 62.7    AV Mean Velocity: 91.9 cm/s  LVOT Mean Velocity: 80  cm/s                    AV Mean Gradient: 4 mmHg     cm/s  MV E/A Ratio: 1.32      AV VTI: 20.4 cm              LVOT Peak Gradient: 5  MV Peak Gradient: 2.75  AV Area (Continuity):5.18    mmHgLVOT Mean Gradient:  mmHg                    cm2                          3 mmHg   MV P1/2t: 90 msec       LVOT VTI: 19.9 cm  MVA by PHT:2.44 cm2   MV E' Lateral Velocity:  8.99 cm/s   MV E/E' lateral: 9.22      Ct Lumbar Spine Wo Contrast    Result Date: 12/26/2020  EXAMINATION: CT OF THE PELVIS WITHOUT CONTRAST; CT OF THE LUMBAR SPINE WITHOUT CONTRAST 12/26/2020 2:37 am TECHNIQUE: CT of the pelvis was performed without the administration of intravenous contrast.  Multiplanar reformatted images are provided for review. Dose modulation, iterative reconstruction, and/or weight based adjustment of the mA/kV was utilized to reduce the radiation dose to as low as reasonably achievable.; CT of the lumbar spine was performed without the administration of intravenous contrast. Multiplanar reformatted images are provided for review. Dose modulation, iterative reconstruction, and/or weight based adjustment of the mA/kV was utilized to reduce the radiation dose to as low as reasonably achievable. COMPARISON: MRI lumbar spine performed September 29, 2017. HISTORY: ORDERING SYSTEM PROVIDED HISTORY: Pain TECHNOLOGIST PROVIDED HISTORY: Reason for exam:-> pain Additional Contrast?-> none Reason for Exam: twisted back, lower back pain that radiates into buttocks Acuity: Acute Type of Exam: Initial FINDINGS: The lumbar spine demonstrates gross anatomic alignment. Lumbar vertebral body height is grossly preserved. Moderate multilevel degenerative changes are not significantly changed from prior examination. No acute osseous abnormality. The pelvic bones are intact. Evidence of previously removed left femoral hardware. Mild degenerative changes of the bilateral hips. The visualized femurs are intact. Sigmoid diverticulosis without evidence of acute diverticulitis. The visualized small and large bowel are nondilated. The prostate and seminal vesicles are unremarkable.   The urinary bladder demonstrates mild circumferential wall thickening which may be related to incomplete distension. No significant pelvic lymphadenopathy. Few prominent bilateral inguinal lymph nodes, nonspecific. No acute abnormality of the lumbar spine or pelvis. Ct Pelvis Wo Contrast Additional Contrast? None    Result Date: 12/26/2020  EXAMINATION: CT OF THE PELVIS WITHOUT CONTRAST; CT OF THE LUMBAR SPINE WITHOUT CONTRAST 12/26/2020 2:37 am TECHNIQUE: CT of the pelvis was performed without the administration of intravenous contrast.  Multiplanar reformatted images are provided for review. Dose modulation, iterative reconstruction, and/or weight based adjustment of the mA/kV was utilized to reduce the radiation dose to as low as reasonably achievable.; CT of the lumbar spine was performed without the administration of intravenous contrast. Multiplanar reformatted images are provided for review. Dose modulation, iterative reconstruction, and/or weight based adjustment of the mA/kV was utilized to reduce the radiation dose to as low as reasonably achievable. COMPARISON: MRI lumbar spine performed September 29, 2017. HISTORY: ORDERING SYSTEM PROVIDED HISTORY: Pain TECHNOLOGIST PROVIDED HISTORY: Reason for exam:-> pain Additional Contrast?-> none Reason for Exam: twisted back, lower back pain that radiates into buttocks Acuity: Acute Type of Exam: Initial FINDINGS: The lumbar spine demonstrates gross anatomic alignment. Lumbar vertebral body height is grossly preserved. Moderate multilevel degenerative changes are not significantly changed from prior examination. No acute osseous abnormality. The pelvic bones are intact. Evidence of previously removed left femoral hardware. Mild degenerative changes of the bilateral hips. The visualized femurs are intact. Sigmoid diverticulosis without evidence of acute diverticulitis. The visualized small and large bowel are nondilated. The prostate and seminal vesicles are unremarkable.   The urinary bladder demonstrates mild circumferential wall thickening which may be related to incomplete distension. No significant pelvic lymphadenopathy. Few prominent bilateral inguinal lymph nodes, nonspecific. No acute abnormality of the lumbar spine or pelvis. Xr Chest Portable    Result Date: 12/30/2020  EXAMINATION: ONE XRAY VIEW OF THE CHEST 12/30/2020 2:15 pm COMPARISON: 12/29/2020 HISTORY: ORDERING SYSTEM PROVIDED HISTORY: SOB TECHNOLOGIST PROVIDED HISTORY: Reason for exam:->SOB Reason for Exam: sob Acuity: Acute Follow-up exam FINDINGS: No focal infiltrate, pleural effusion or pneumothorax is demonstrated. The heart is enlarged. There is no evidence of edema. Status post median sternotomy and left atrial appendage clipping. No acute osseous abnormality is seen. No acute cardiopulmonary disease     Xr Chest Portable    Result Date: 12/29/2020  EXAMINATION: ONE XRAY VIEW OF THE CHEST 12/29/2020 11:15 pm COMPARISON: Chest radiograph performed August 24, 2020. HISTORY: ORDERING SYSTEM PROVIDED HISTORY: chest pain TECHNOLOGIST PROVIDED HISTORY: Reason for exam:->chest pain Reason for Exam: chest pain Acuity: Acute Type of Exam: Initial FINDINGS: No focal consolidation, pleural effusion, or pneumothorax. Stable enlargement of the cardiac silhouette. Mild prominence of the pulmonary vessels. Median sternotomy wires are aligned. No acute osseous abnormality. Cardiomegaly with mild prominence of the pulmonary vessels.      Electronically signed by Alex Castro MD on 12/31/2020 at 4:09 PM\

## 2020-12-31 NOTE — FLOWSHEET NOTE
Pt is upset that he cannot get a full meal at this time and states he is leaving. This RN attempted to reason with pt explaining that the cafeteria is closed but we can provide a frozen meal and snacks. Pt refused these items and is demanding to remove his IVs stating that he is leaving no matter what. States, I am pissed and I'm leaving, if I stay it will not be nice, I don't want to start calling people names. \" This RN explained to pt that he would be leaving against medical advise and risks of leaving including death. Pt orientation assessed to be A&O x 4 and ambulates well. Pt states understanding to leaving AMA. This RN called Stephen Hickman NP to notify of event. Also called Dr. Erika Wills and notified of event. Stated to ask hospitalist to send pt home with ASA and Plavix prescriptions. Updated pt  On plan, pt anxious and states he does not need those meds he has them at home. Destiny Man and Dr. Jenny Kamara came to bedside for assessment. Pt signed AMA form, Ivs have been removed and pt left AMA.

## 2020-12-31 NOTE — PROGRESS NOTES
Call initiated by: Nursing staff: Nanette Ashby  Call addressed around: 12/30/2020 8:29 PM      Reason for call: Patient is requesting to leave AMA. Orders placed: The patient admitted with NSTEMI, underwent heart cath and stent placement today. Dr. Sandra Henao [cardiologist] was contacted by nurse Nanette Ashby, he states the patient can go home on aspirin and Plavix and to stress on the need for patient to take these medications. I saw the patient with Dr. Gonzalez Ga at bedside, the patient insisted on going against AMA. Patient was alert and oriented to person place time and situation and able to make decisions. The patient signed the [employer] paperwork and left. The patient states he has both aspirin and Plavix at home and did not need any more prescriptions. His reason for leaving AMA was \" due to his all the time and I am okay\". He also states that the hospital does not carry the type of food he wants. When asked if anything could be done to make him stay, patient stated no.         Chucho Jose, TREY - CNP

## 2021-01-01 LAB
EKG ATRIAL RATE: 92 BPM
EKG DIAGNOSIS: NORMAL
EKG Q-T INTERVAL: 434 MS
EKG QRS DURATION: 112 MS
EKG QTC CALCULATION (BAZETT): 562 MS
EKG R AXIS: -7 DEGREES
EKG T AXIS: 171 DEGREES
EKG VENTRICULAR RATE: 101 BPM

## 2021-01-01 PROCEDURE — 93010 ELECTROCARDIOGRAM REPORT: CPT | Performed by: INTERNAL MEDICINE

## 2021-01-06 ENCOUNTER — NURSE ONLY (OUTPATIENT)
Dept: NON INVASIVE DIAGNOSTICS | Age: 61
End: 2021-01-06

## 2021-01-06 ENCOUNTER — OFFICE VISIT (OUTPATIENT)
Dept: CARDIOLOGY CLINIC | Age: 61
End: 2021-01-06
Payer: COMMERCIAL

## 2021-01-06 VITALS
HEART RATE: 71 BPM | SYSTOLIC BLOOD PRESSURE: 138 MMHG | TEMPERATURE: 97.2 F | DIASTOLIC BLOOD PRESSURE: 78 MMHG | WEIGHT: 281.8 LBS | BODY MASS INDEX: 35.22 KG/M2

## 2021-01-06 DIAGNOSIS — Z95.1 S/P CABG (CORONARY ARTERY BYPASS GRAFT): ICD-10-CM

## 2021-01-06 PROCEDURE — 99999 PR OFFICE/OUTPT VISIT,PROCEDURE ONLY: CPT | Performed by: NURSE PRACTITIONER

## 2021-01-06 PROCEDURE — 93000 ELECTROCARDIOGRAM COMPLETE: CPT | Performed by: INTERNAL MEDICINE

## 2021-01-06 RX ORDER — NITROGLYCERIN 0.4 MG/1
0.4 TABLET SUBLINGUAL EVERY 5 MIN PRN
Qty: 25 TABLET | Refills: 3 | Status: CANCELLED | OUTPATIENT
Start: 2021-01-06

## 2021-01-06 RX ORDER — ALBUTEROL SULFATE 90 UG/1
AEROSOL, METERED RESPIRATORY (INHALATION)
COMMUNITY
Start: 2020-12-22 | End: 2021-10-10

## 2021-01-13 ENCOUNTER — PROCEDURE VISIT (OUTPATIENT)
Dept: CARDIOLOGY CLINIC | Age: 61
End: 2021-01-13
Payer: COMMERCIAL

## 2021-01-13 DIAGNOSIS — R93.1 DECREASED CARDIAC EJECTION FRACTION: ICD-10-CM

## 2021-01-13 DIAGNOSIS — R06.02 SOB (SHORTNESS OF BREATH): ICD-10-CM

## 2021-01-13 DIAGNOSIS — I50.22 CHRONIC SYSTOLIC CONGESTIVE HEART FAILURE (HCC): ICD-10-CM

## 2021-01-13 LAB
LV EF: 36 %
LVEF MODALITY: NORMAL

## 2021-01-13 PROCEDURE — A9560 TC99M LABELED RBC: HCPCS | Performed by: INTERNAL MEDICINE

## 2021-01-13 PROCEDURE — 78472 GATED HEART PLANAR SINGLE: CPT | Performed by: INTERNAL MEDICINE

## 2021-01-14 ENCOUNTER — ANTI-COAG VISIT (OUTPATIENT)
Dept: PHARMACY | Age: 61
End: 2021-01-14
Payer: COMMERCIAL

## 2021-01-14 VITALS — TEMPERATURE: 97 F

## 2021-01-14 DIAGNOSIS — I48.91 ATRIAL FIBRILLATION WITH RAPID VENTRICULAR RESPONSE (HCC): ICD-10-CM

## 2021-01-14 DIAGNOSIS — I48.11 LONGSTANDING PERSISTENT ATRIAL FIBRILLATION (HCC): ICD-10-CM

## 2021-01-14 LAB
INTERNATIONAL NORMALIZATION RATIO, POC: 2.2
POC INR: 2.2 INDEX
PROTHROMBIN TIME, POC: 26.2 SECONDS (ref 10–14.3)

## 2021-01-14 PROCEDURE — 85610 PROTHROMBIN TIME: CPT

## 2021-01-14 PROCEDURE — 99212 OFFICE O/P EST SF 10 MIN: CPT

## 2021-01-14 PROCEDURE — 36416 COLLJ CAPILLARY BLOOD SPEC: CPT

## 2021-01-14 RX ORDER — WARFARIN SODIUM 4 MG/1
4 TABLET ORAL DAILY
Qty: 30 TABLET | Refills: 2 | Status: SHIPPED | OUTPATIENT
Start: 2021-01-14 | End: 2021-02-11 | Stop reason: SDUPTHER

## 2021-01-14 NOTE — PROGRESS NOTES
Medication Management Service  [facility]  [phone]    Visit Date: 1/14/2021   Subjective:       Leonela Vernon is a 62 y.o. male who presents to clinic today for anticoagulation monitoring and adjustment. Patient seen in clinic for warfarin management due to  Indication:   atrial fibrillation. INR goal: of 2.0-3.0. Duration of therapy: indefinite. Patient reports the following:   Adherent with regimen  Missed or extra doses:  None   Bleeding or thromboembolic side effects:  None  Significant medication changes:  None  Significant dietary changes: None  Significant alcohol or tobacco changes: None  Significant recent illness, disease state changes, or hospitalization:  None  Upcoming surgeries or procedures:  None  Falls: None           Assessment and Plan     PT/INR done in office per protocol. INR today is 2.2, therapeutic. Refill requested        Plan: Will continue current regimen of warfarin 4mg daily. ERx sent to [facility] INR in 4 week(s). Patient verbalized understanding of dosing directions and information discussed. Dosing schedule given to patient including phone number, appointment date, and time. Progress note sent to referring office. Patient acknowledges working in consult agreement with pharmacist as referred by his/her physician.       Electronically signed by Elenita Larson, [address] [place] [address] on 1/14/21 at 4:46 PM EST    CLINICAL PHARMACY CONSULT: MED RECONCILIATION/REVIEW ADDENDUM    For Pharmacy Admin Tracking Only    PHSO: No  Total # of Interventions Recommended: 0  - Refills Provided #: 1  - Maintenance Safety Lab Monitoring #: 1    Total Interventions Accepted: 0  Time Spent (min): Ramírez Carmona, SandiD

## 2021-02-11 ENCOUNTER — ANTI-COAG VISIT (OUTPATIENT)
Dept: PHARMACY | Age: 61
End: 2021-02-11
Payer: COMMERCIAL

## 2021-02-11 DIAGNOSIS — I48.11 LONGSTANDING PERSISTENT ATRIAL FIBRILLATION (HCC): ICD-10-CM

## 2021-02-11 DIAGNOSIS — I48.91 ATRIAL FIBRILLATION WITH RAPID VENTRICULAR RESPONSE (HCC): ICD-10-CM

## 2021-02-11 LAB
INTERNATIONAL NORMALIZATION RATIO, POC: 3.4
POC INR: 3.4 INDEX
PROTHROMBIN TIME, POC: 40.8 SECONDS (ref 10–14.3)

## 2021-02-11 PROCEDURE — 85610 PROTHROMBIN TIME: CPT

## 2021-02-11 PROCEDURE — 36416 COLLJ CAPILLARY BLOOD SPEC: CPT

## 2021-02-11 PROCEDURE — 99212 OFFICE O/P EST SF 10 MIN: CPT

## 2021-02-11 RX ORDER — WARFARIN SODIUM 4 MG/1
4 TABLET ORAL DAILY
Qty: 30 TABLET | Refills: 3 | Status: SHIPPED | OUTPATIENT
Start: 2021-02-11 | End: 2021-04-26

## 2021-02-11 NOTE — PROGRESS NOTES
Medication Management Service  [facility]  [phone]    Visit Date: 2/11/2021   Subjective:       Tigist Casiano is a 62 y.o. male who presents to clinic today for anticoagulation monitoring and adjustment. Patient seen in clinic for warfarin management due to  Indication:   atrial fibrillation. INR goal: of 2.0-3.0. Duration of therapy: indefinite. Patient reports the following:   Adherent with regimen  Missed or extra doses:  None   Bleeding or thromboembolic side effects:  None  Significant medication changes:  None  Significant dietary changes: None  Significant alcohol or tobacco changes: None  Significant recent illness, disease state changes, or hospitalization:  Vomiting/diarrhea  Upcoming surgeries or procedures:  None  Falls: None           Assessment and Plan     PT/INR done in office per protocol. INR today is 3.4, supratherapeutic. Patient had vomiting/diarrhea and decreased appetite earlier in the week, which will increase INR. Plan:  Take warfarin 2mg x1 then will continue current regimen of warfarin 4mg daily. Recheck INR in 2.5 week(s). Patient verbalized understanding of dosing directions and information discussed. Dosing schedule given to patient including phone number, appointment date, and time. Progress note sent to referring office. Patient acknowledges working in consult agreement with pharmacist as referred by his/her physician.       Electronically signed by Prabha Menezes, [address] [place] [address] on 2/11/21 at 1:57 PM EST    CLINICAL PHARMACY CONSULT: MED RECONCILIATION/REVIEW [facility] Tracking Only    PHSO: No  Total # of Interventions Recommended: 1  - Decreased Dose #: 1  - Maintenance Safety Lab Monitoring #: 1    Total Interventions Accepted: 1  Time Spent (min): Carolee Temple PharmD

## 2021-02-15 ENCOUNTER — OFFICE VISIT (OUTPATIENT)
Dept: CARDIOLOGY CLINIC | Age: 61
End: 2021-02-15
Payer: COMMERCIAL

## 2021-02-15 VITALS
WEIGHT: 279.4 LBS | TEMPERATURE: 97.2 F | SYSTOLIC BLOOD PRESSURE: 148 MMHG | DIASTOLIC BLOOD PRESSURE: 98 MMHG | BODY MASS INDEX: 34.92 KG/M2

## 2021-02-15 DIAGNOSIS — Z79.899 ON AMIODARONE THERAPY: Primary | ICD-10-CM

## 2021-02-15 DIAGNOSIS — I20.8 STABLE ANGINA PECTORIS (HCC): ICD-10-CM

## 2021-02-15 PROCEDURE — G8427 DOCREV CUR MEDS BY ELIG CLIN: HCPCS | Performed by: INTERNAL MEDICINE

## 2021-02-15 PROCEDURE — G8417 CALC BMI ABV UP PARAM F/U: HCPCS | Performed by: INTERNAL MEDICINE

## 2021-02-15 PROCEDURE — 3017F COLORECTAL CA SCREEN DOC REV: CPT | Performed by: INTERNAL MEDICINE

## 2021-02-15 PROCEDURE — 99214 OFFICE O/P EST MOD 30 MIN: CPT | Performed by: INTERNAL MEDICINE

## 2021-02-15 PROCEDURE — G8484 FLU IMMUNIZE NO ADMIN: HCPCS | Performed by: INTERNAL MEDICINE

## 2021-02-15 PROCEDURE — 4004F PT TOBACCO SCREEN RCVD TLK: CPT | Performed by: INTERNAL MEDICINE

## 2021-02-15 RX ORDER — CLOPIDOGREL BISULFATE 75 MG/1
75 TABLET ORAL DAILY
Qty: 120 TABLET | Refills: 2 | Status: SHIPPED | OUTPATIENT
Start: 2021-02-15 | End: 2021-10-07

## 2021-02-15 RX ORDER — OMEPRAZOLE 20 MG/1
20 CAPSULE, DELAYED RELEASE ORAL 2 TIMES DAILY
COMMUNITY
Start: 2021-02-02 | End: 2022-02-02

## 2021-02-15 NOTE — PROGRESS NOTES
Shad Huitron MD        OFFICE  FOLLOWUP NOTE    Chief complaints: patient is here for management of CAD s/p CABG, DM, HTN, DYSLPIDEMIA, AFIB    Post PCI of SVG TO RCA follow up, MUga lvef 36%    Subjective: patient feels better, no chest pain, no shortness of breath, no dizziness, no palpitations    HPI Theresa Em is a 62 y. o.year old who  has a past medical history of Abnormal nuclear stress test, Atrial fibrillation (HCC), CAD (coronary artery disease), CHF (congestive heart failure) ([facility] [address] COPD (chronic obstructive pulmonary disease) ([facility] [address] Decreased cardiac ejection fraction, Diabetes mellitus ([facility] [address] GERD (gastroesophageal reflux disease), H/O cardiovascular stress test, H/O cardiovascular stress test, H/O cardiovascular stress test, H/O Doppler ultrasound, H/O echocardiogram, H/O percutaneous left heart catheterization, Heart imaging, History of coronary artery stent placement, History of exercise stress test, History of Holter monitoring, History of MRI of brain and brain stem, History of nuclear stress test, Hyperlipidemia, Hypertension, Kidney disease, S/P cardiac catheterization, SOB (shortness of breath), Status post angioplasty with stent, and Wears dentures.  and presents for management of CAD s/p CABG, DM, HTN, DYSLPIDEMIA, AFIB, which are well controlled      Current Outpatient Medications   Medication Sig Dispense Refill    omeprazole (PRILOSEC) 20 MG delayed release capsule Take 20 mg by mouth 2 times daily      warfarin (COUMADIN) 4 MG tablet Take 1 tablet by mouth daily 30 tablet 3    albuterol sulfate  (90 Base) MCG/ACT inhaler INHALE 2 PUFFS BY MOUTH EVERY SIX HOURS AS NEEDED FOR WHEEZING      nitroGLYCERIN (NITROSTAT) 0.4 MG SL tablet Place 1 tablet under the tongue every 5 minutes as needed for Chest pain 25 tablet 3    amiodarone (CORDARONE) 200 MG tablet Take 1 tablet by mouth daily 60 tablet 1  atorvastatin (LIPITOR) 80 MG tablet Take 1 tablet by mouth nightly 30 tablet 3    aspirin 81 MG EC tablet Take 1 tablet by mouth daily 30 tablet 3    metFORMIN (GLUCOPHAGE) 500 MG tablet Take 1,000 mg by mouth 2 times daily      lisinopril (PRINIVIL;ZESTRIL) 40 MG tablet Take 1 tablet by mouth daily 90 tablet 3    hydrOXYzine (VISTARIL) 50 MG capsule Take 50 mg by mouth nightly      carvedilol (COREG) 12.5 MG tablet Take 1 tablet by mouth 2 times daily 60 tablet 2    glipiZIDE (GLUCOTROL) 10 MG tablet Take 1 tablet by mouth every morning (before breakfast) 60 tablet 3    furosemide (LASIX) 40 MG tablet Take 1 tablet by mouth 3 times daily (Patient taking differently: Take 40 mg by mouth 3 times daily Taking 2 times a day) 90 tablet 1    potassium chloride (KLOR-CON M) 20 MEQ extended release tablet Take 2 tablets by mouth 2 times daily 60 tablet 1    linagliptin (TRADJENTA) 5 MG tablet Take 1 tablet by mouth daily 30 tablet 3     No current facility-administered medications for this visit. Allergies: Hydromorphone  Past Medical History:   Diagnosis Date    Abnormal nuclear stress test     Atrial fibrillation ([facility] [address] 11/2013    CAD (coronary artery disease)     Follows with Dr. Belinda Poe CHF (congestive heart failure) ([facility] [address]     COPD (chronic obstructive pulmonary disease) ([facility] [address]     Follows with PCP    Decreased cardiac ejection fraction     Diabetes mellitus ([facility] [address]     GERD (gastroesophageal reflux disease)     H/O cardiovascular stress test 11/20/13, 3/20/2013    11/13-Observed defect is consistent with diaphragmatic attenuation. EF 58%. 3/13 -EF 51%. No ischemia. Normal Study.  H/O cardiovascular stress test 06/08/2017    EF 50% normal study    H/O cardiovascular stress test 06/17/2019    Normal NM    H/O Doppler ultrasound 10/14/2010    10/2010-Bilateral venous doppler of lower extremies- No DVTs.     H/O echocardiogram 06/26/2019 4 stents- RCA X3; CX X1    CORONARY ANGIOPLASTY WITH STENT PLACEMENT  11/13/2013 11/13/2013 -3 stents placed to RCA- Dr Jeremiah Noriega N/A 7/3/2020    INTRAOPERATIVE ISMAEL, CABG X3   WITH LIMA  AND LEFT LEG ENDOVEIN HARVEST, INDUCED HYPOTHERMIA, MAZE BIPOLAR AND CRYO PROCEDURE, LEFT ATRIAL APPENDAGE CLIPPING performed by Elliott Paiz MD at [facility]      all teeth extracted    FEMUR FRACTURE SURGERY  1984    MANDIBLE FRACTURE SURGERY  1984     Family History   Problem Relation Age of Onset    Cancer Mother     Diabetes Mother     Heart Disease Mother     High Blood Pressure Mother     Stroke Mother     Cancer Father     Heart Disease Father     High Blood Pressure Father     Stroke Father     Cancer Brother      Social History     Tobacco Use    Smoking status: Current Every Day Smoker     Packs/day: 1.00     Years: 46.00     Pack years: 46.00     Types: Cigarettes     Start date: 1974    Smokeless tobacco: Never Used    Tobacco comment: Smokes approx 1 pack per day   Substance Use Topics    Alcohol use: No     Comment: caffeine 1 cup of coffee a day and at least 6 bottles of pop a day      [unfilled]  Review of Systems:   · Constitutional: No Fever or Weight Loss   · Eyes: No Decreased Vision  · ENT: No Headaches, Hearing Loss or Vertigo  · Cardiovascular: No chest pain, dyspnea on exertion, palpitations or loss of consciousness  · Respiratory: No cough or wheezing    · Gastrointestinal: No abdominal pain, appetite loss, blood in stools, constipation, diarrhea or heartburn  · Genitourinary: No dysuria, trouble voiding, or hematuria  · Musculoskeletal:  No gait disturbance, weakness or joint complaints  · Integumentary: No rash or pruritis  · Neurological: No TIA or stroke symptoms  · Psychiatric: No anxiety or depression  · Endocrine: No malaise, fatigue or temperature intolerance · Hematologic/Lymphatic: No bleeding problems, blood clots or swollen lymph nodes  · Allergic/Immunologic: No nasal congestion or hives  All systems negative except as marked. Objective:  BP (!) 148/98   Temp 97.2 °F (36.2 °C) (Temporal)   Wt 279 lb 6.4 oz (126.7 kg)   BMI 34.92 kg/m²   Wt Readings from Last 3 Encounters:   02/15/21 279 lb 6.4 oz (126.7 kg)   01/06/21 281 lb 12.8 oz (127.8 kg)   12/29/20 280 lb (127 kg)     Body mass index is 34.92 kg/m². GENERAL - Alert, oriented, pleasant, in no apparent distress,normal grooming  HEENT  pupils are intact, cornea intact, conjunctive normal color, ears are normal in exam,  Neck - Supple. No jugular venous distention noted. No carotid bruits, no apical -carotid delay  Respiratory:  Normal breath sounds, No respiratory distress, No wheezing, No chest tenderness. ,no use of accessory muscles, diaphragm movement is normal  Cardiovascular: (PMI) apex non displaced,no lifts no thrills, no s3,no s4, Normal heart rate, Normal rhythm, No murmurs, No rubs, No gallops. Carotid arteries pulse and amplitude are normal no bruit, no abdominal bruit noted ( normal abdominal aorta ausculation),   Extremities - No cyanosis, clubbing, or significant edema, no varicose veins    Abdomen  No masses, tenderness, or organomegaly, no hepato-splenomegally, no bruits  Musculoskeletal  No significant edema, no kyphosis or scoliosis, no deformity in any extremity noted, muscle strength and tone are normal  Skin: no ulcer,no scar,no stasis dermatitis   Neurologic  alert oriented times 3,Cranial nerves II through XII are grossly intact. There were no gross focal neurologic abnormalities.    Psychiatric: normal mood and affect    Lab Results   Component Value Date    CKTOTAL 47 07/19/2020    CKMB 2.4 03/10/2013    TROPONINI 0.014 06/09/2014    TROPONINI 0.015 12/03/2011     BNP:    Lab Results   Component Value Date    BNP 17 03/09/2013     PT/INR:  No results found for: [employer] 5. HTN:stable, continue COREG INCREASE LISINOPRIL 20MG DAILY  1. Health maintenance: exerise and diet  All labs, medications and tests reviewed, continue all other medications of all above medical condition listed as is.     [unfilled]

## 2021-02-15 NOTE — PATIENT INSTRUCTIONS
Please hold on to these instructions the  will call you within 1-9 business days when we receive authorization from your insurance. Nuclear Stress Test    WHAT TO EXPECT:   ? You will need to confirm the test or it could be cancelled. ? This test will take approximately 2 hours: 1 hour in the AM &    1 hour in the PM. You will be given a time by the Technologist after the first part is completed to come back. ? You will be given a medication, through an IV in the hand, this will safely simulate exercise. This IV is also needed to inject the radioactive isotope unless you are able toe walk the treadmill. ? You will receive an injection in the AM & PM before the pictures. ? Using a special camera, you will have one set of pictures of your heart taken in the AM and a set of pictures in the PM.     PREPARATION FOR TEST:  ? Eat a light breakfast such as water or juice and toast.  ? If you are DIABETIC: Eat a normal breakfast with NO CAFFEINE and take your insulin as normal.   ? AVOID ALL FOODS & DRINKS containing CAFFEINE 12 HOURS PRIOR TO THE TEST: Including coffee, Tea, Robin and other soft drinks even those labeled  caffeine free or decaffeinated.  ? HOLD THESE MEDICATIONS Persantine & Theophylline (Theodur)  24 hours prior & bring your inhaler with you.

## 2021-02-16 ENCOUNTER — TELEPHONE (OUTPATIENT)
Dept: CARDIOLOGY CLINIC | Age: 61
End: 2021-02-16

## 2021-02-16 DIAGNOSIS — Z01.818 PRE-OP TESTING: Primary | ICD-10-CM

## 2021-02-16 NOTE — TELEPHONE ENCOUNTER
I called and spoke with the patient about his PFT testing and his Covid testing also . PFT is scheudled for 3/2/2021 @ [facility] arrival time 2:00 pm scan at 2:30 pm .  Covid testing is scheduling for 2/26/2021 @ 10:10 am at [facility] . We need an order placed for the covid test.   All instructions were given to the patient.

## 2021-02-26 ENCOUNTER — HOSPITAL ENCOUNTER (OUTPATIENT)
Dept: LAB | Age: 61
Discharge: HOME | End: 2021-02-26
Payer: COMMERCIAL

## 2021-02-26 PROCEDURE — C9803 HOPD COVID-19 SPEC COLLECT: HCPCS

## 2021-02-26 PROCEDURE — U0002 COVID-19 LAB TEST NON-CDC: HCPCS

## 2021-02-27 LAB
SARS-COV-2: NOT DETECTED
SOURCE: NORMAL

## 2021-03-01 ENCOUNTER — ANTI-COAG VISIT (OUTPATIENT)
Dept: PHARMACY | Age: 61
End: 2021-03-01
Payer: COMMERCIAL

## 2021-03-01 VITALS — TEMPERATURE: 96.8 F

## 2021-03-01 DIAGNOSIS — I48.91 ATRIAL FIBRILLATION WITH RAPID VENTRICULAR RESPONSE (HCC): ICD-10-CM

## 2021-03-01 DIAGNOSIS — I48.11 LONGSTANDING PERSISTENT ATRIAL FIBRILLATION (HCC): ICD-10-CM

## 2021-03-01 LAB
POC INR: 2.1 INDEX
PROTHROMBIN TIME, POC: 24.7 SECONDS (ref 10–14.3)

## 2021-03-01 PROCEDURE — 85610 PROTHROMBIN TIME: CPT

## 2021-03-01 PROCEDURE — 99211 OFF/OP EST MAY X REQ PHY/QHP: CPT

## 2021-03-01 PROCEDURE — 36416 COLLJ CAPILLARY BLOOD SPEC: CPT

## 2021-03-01 NOTE — PROGRESS NOTES
Medication Management Service  [facility]  [phone]    Visit Date: 3/1/2021   Subjective:       Naty Alvarado is a 62 y.o. male who presents to clinic today for anticoagulation monitoring and adjustment. Patient seen in clinic for warfarin management due to  Indication:   atrial fibrillation. INR goal: of 2.0-3.0. Duration of therapy: indefinite. Patient reports the following:   Adherent with regimen  Missed or extra doses:  None   Bleeding or thromboembolic side effects:  None  Significant medication changes:  None  Significant dietary changes: None  Significant alcohol or tobacco changes: None  Significant recent illness, disease state changes, or hospitalization:  None  Upcoming surgeries or procedures:  None  Falls: None           Assessment and Plan     PT/INR done in office per protocol. INR today is 2.1, therapeutic. Plan: Will continue current regimen of warfarin 4mg daily. Recheck INR in 4 week(s). Patient verbalized understanding of dosing directions and information discussed. Dosing schedule given to patient including phone number, appointment date, and time. Progress note sent to referring office. Patient acknowledges working in consult agreement with pharmacist as referred by his/her physician.       Electronically signed by DARA [facility] on 3/1/21 at 1:51 PM EST    CLINICAL PHARMACY CONSULT: MED RECONCILIATION/REVIEW [place]  22. Tracking Only    PHSO: No  Total # of Interventions Recommended: 0    - Maintenance Safety Lab Monitoring #: 1  Total Interventions Accepted: 0  Time Spent (min): Genoveva Zacarias, SandiD

## 2021-03-02 ENCOUNTER — HOSPITAL ENCOUNTER (OUTPATIENT)
Dept: PULMONOLOGY | Age: 61
Discharge: HOME | End: 2021-03-02
Payer: COMMERCIAL

## 2021-03-02 DIAGNOSIS — Z79.899 ON AMIODARONE THERAPY: ICD-10-CM

## 2021-03-02 LAB
DLCO %PRED: 58 %
DLCO PRED: NORMAL
DLCO/VA %PRED: NORMAL
DLCO/VA PRED: NORMAL
DLCO/VA: NORMAL
DLCO: NORMAL
EXPIRATORY TIME: NORMAL
FEF 25-75% %PRED-PRE: NORMAL
FEF 25-75% PRED: NORMAL
FEF 25-75%-PRE: NORMAL
FEV1 %PRED-PRE: 53 %
FEV1 PRED: NORMAL
FEV1/FVC %PRED-PRE: NORMAL
FEV1/FVC PRED: NORMAL
FEV1/FVC: 66 %
FEV1: NORMAL
FVC %PRED-PRE: NORMAL
FVC PRED: NORMAL
FVC: NORMAL
GAW %PRED: NORMAL
GAW PRED: NORMAL
GAW: NORMAL
IC %PRED: NORMAL
IC PRED: NORMAL
IC: NORMAL
MVV %PRED-PRE: NORMAL
MVV PRED: NORMAL
MVV-PRE: NORMAL
PEF %PRED-PRE: NORMAL
PEF PRED: NORMAL
PEF-PRE: NORMAL
RAW %PRED: NORMAL
RAW PRED: NORMAL
RAW: NORMAL
RV %PRED: NORMAL
RV PRED: NORMAL
RV: NORMAL
SVC %PRED: NORMAL
SVC PRED: NORMAL
SVC: NORMAL
TLC %PRED: 98 %
TLC PRED: NORMAL
TLC: NORMAL
VA %PRED: NORMAL
VA PRED: NORMAL
VA: NORMAL
VTG %PRED: NORMAL
VTG PRED: NORMAL
VTG: NORMAL

## 2021-03-02 PROCEDURE — 94726 PLETHYSMOGRAPHY LUNG VOLUMES: CPT

## 2021-03-02 PROCEDURE — 94010 BREATHING CAPACITY TEST: CPT

## 2021-03-02 PROCEDURE — 94729 DIFFUSING CAPACITY: CPT

## 2021-03-02 ASSESSMENT — PULMONARY FUNCTION TESTS
FEV1_PERCENT_PREDICTED_PRE: 53
FEV1/FVC: 66

## 2021-03-04 ENCOUNTER — TELEPHONE (OUTPATIENT)
Dept: CARDIOLOGY CLINIC | Age: 61
End: 2021-03-04

## 2021-03-04 ENCOUNTER — PROCEDURE VISIT (OUTPATIENT)
Dept: CARDIOLOGY CLINIC | Age: 61
End: 2021-03-04
Payer: COMMERCIAL

## 2021-03-04 DIAGNOSIS — I20.8 STABLE ANGINA PECTORIS (HCC): ICD-10-CM

## 2021-03-04 DIAGNOSIS — I25.10 3-VESSEL CORONARY ARTERY DISEASE: ICD-10-CM

## 2021-03-04 LAB
LV EF: 38 %
LVEF MODALITY: NORMAL

## 2021-03-04 PROCEDURE — 93015 CV STRESS TEST SUPVJ I&R: CPT | Performed by: INTERNAL MEDICINE

## 2021-03-04 PROCEDURE — A9500 TC99M SESTAMIBI: HCPCS | Performed by: INTERNAL MEDICINE

## 2021-03-04 PROCEDURE — 78452 HT MUSCLE IMAGE SPECT MULT: CPT | Performed by: INTERNAL MEDICINE

## 2021-03-04 NOTE — TELEPHONE ENCOUNTER
Pt was here for his scheduled NM stress test today & his PRN NTG bottle fell on the tile, breaking open, w/pills scattering. Thus called his local pharmacy [facility] [address] spoke w/Marlena. Informed her about above. She stated will get his Rx ready for pick-up. Pt informed & voiced understanding.

## 2021-03-11 ENCOUNTER — TELEPHONE (OUTPATIENT)
Dept: CARDIOLOGY CLINIC | Age: 61
End: 2021-03-11

## 2021-03-11 NOTE — TELEPHONE ENCOUNTER
Patient notified of results.     Conclusions       [facility] physician Dr. Wellington Spain .   Iveth Rashkarel stress test, depressed LVEDF ,increased EDV, Myocardial perfusion    scan shows medium size, severe intensity, non reversible perfusion defect in    inferior wall.        Recommendation    OFFICE visit to discuss results        Signatures

## 2021-03-16 ENCOUNTER — OFFICE VISIT (OUTPATIENT)
Dept: CARDIOLOGY CLINIC | Age: 61
End: 2021-03-16
Payer: COMMERCIAL

## 2021-03-16 VITALS
SYSTOLIC BLOOD PRESSURE: 132 MMHG | BODY MASS INDEX: 33.69 KG/M2 | WEIGHT: 271 LBS | HEIGHT: 75 IN | DIASTOLIC BLOOD PRESSURE: 78 MMHG | HEART RATE: 63 BPM

## 2021-03-16 DIAGNOSIS — I25.118 CORONARY ARTERY DISEASE OF NATIVE HEART WITH STABLE ANGINA PECTORIS, UNSPECIFIED VESSEL OR LESION TYPE (HCC): Primary | ICD-10-CM

## 2021-03-16 PROCEDURE — G8484 FLU IMMUNIZE NO ADMIN: HCPCS | Performed by: INTERNAL MEDICINE

## 2021-03-16 PROCEDURE — 3017F COLORECTAL CA SCREEN DOC REV: CPT | Performed by: INTERNAL MEDICINE

## 2021-03-16 PROCEDURE — G8427 DOCREV CUR MEDS BY ELIG CLIN: HCPCS | Performed by: INTERNAL MEDICINE

## 2021-03-16 PROCEDURE — 99214 OFFICE O/P EST MOD 30 MIN: CPT | Performed by: INTERNAL MEDICINE

## 2021-03-16 PROCEDURE — 4004F PT TOBACCO SCREEN RCVD TLK: CPT | Performed by: INTERNAL MEDICINE

## 2021-03-16 PROCEDURE — G8417 CALC BMI ABV UP PARAM F/U: HCPCS | Performed by: INTERNAL MEDICINE

## 2021-03-16 NOTE — LETTER
Eric Escboar [place]  1960  [id number]    Have you had any Chest Pain that is not new? - No       DO EKG IF: Patient has a Heart Rate above 100 or below 40     CAD (Coronary Artery Disease) patient should have one on file every 6 months        Have you had any Shortness of Breath - Yes  If Yes - When on exertion    Have you had any dizziness - No     Sitting wait 5 minutes do supine (laying down) wait 5 minutes then do standing - log each in \"vitals\" area in Epic   Be sure to ask what symptoms they are having if they get dizzy while completing ortho stats such as: room spinning, nausea, etc.    Have you had any palpitations that are not new?  - No      Is the patient on any of the following medications -      Do you have any edema - swelling in No      Do you have a surgery or procedure scheduled in the near future - No per pt

## 2021-03-16 NOTE — PATIENT INSTRUCTIONS
Please be informed that if you contact our office outside of normal business hours the physician on call cannot help with any scheduling or rescheduling issues, procedure instruction questions or any type of medication issue. We advise you for any urgent/emergency that you go to the nearest emergency room! PLEASE CALL OUR OFFICE DURING NORMAL BUSINESS HOURS    Monday - Friday   8 am to 5 pm    [place]: [phone]    [place]:  [phone]  **It is YOUR responsibilty to bring medication bottles and/or updated medication list to [address] [place] [address].  This will allow us to better serve you and all your healthcare needs**

## 2021-03-16 NOTE — PROGRESS NOTES
Zac Pratt MD        OFFICE  FOLLOWUP NOTE    Chief complaints: patient is here for management of  CAD s/p CABG, DM, HTN, DYSLPIDEMIA, AFIB    Subjective: patient feels better, no chest pain, no shortness of breath, no dizziness, no palpitations    HPI Celina Ho is a 62 y. o.year old who  has a past medical history of Abnormal nuclear stress test, Atrial fibrillation (HCC), CAD (coronary artery disease), CHF (congestive heart failure) ([facility] [address] COPD (chronic obstructive pulmonary disease) ([facility] [address] Decreased cardiac ejection fraction, Diabetes mellitus ([facility] [address] GERD (gastroesophageal reflux disease), H/O cardiovascular stress test, H/O cardiovascular stress test, H/O cardiovascular stress test, H/O cardiovascular stress test, H/O Doppler ultrasound, H/O echocardiogram, H/O percutaneous left heart catheterization, Heart imaging, History of coronary artery stent placement, History of exercise stress test, History of Holter monitoring, History of MRI of brain and brain stem, History of nuclear stress test, Hyperlipidemia, Hypertension, Kidney disease, S/P cardiac catheterization, SOB (shortness of breath), Status post angioplasty with stent, and Wears dentures.  and presents for management of  CAD s/p CABG, DM, HTN, DYSLPIDEMIA, AFIB which are well controlled      Current Outpatient Medications   Medication Sig Dispense Refill    omeprazole (PRILOSEC) 20 MG delayed release capsule Take 20 mg by mouth 2 times daily      clopidogrel (PLAVIX) 75 MG tablet Take 1 tablet by mouth daily 300mg on day 1 for loading dose and then 75 mg daily 120 tablet 2    metFORMIN (GLUCOPHAGE) 500 MG tablet Take 1,000 mg by mouth 2 times daily      warfarin (COUMADIN) 4 MG tablet Take 1 tablet by mouth daily 30 tablet 3    albuterol sulfate  (90 Base) MCG/ACT inhaler INHALE 2 PUFFS BY MOUTH EVERY SIX HOURS AS NEEDED FOR WHEEZING      lisinopril (PRINIVIL;ZESTRIL) 40 MG tablet Take 1 tablet by mouth daily 90 tablet 3    nitroGLYCERIN (NITROSTAT) 0.4 MG SL tablet Place 1 tablet under the tongue every 5 minutes as needed for Chest pain 25 tablet 3    hydrOXYzine (VISTARIL) 50 MG capsule Take 50 mg by mouth nightly      amiodarone (CORDARONE) 200 MG tablet Take 1 tablet by mouth daily 60 tablet 1    carvedilol (COREG) 12.5 MG tablet Take 1 tablet by mouth 2 times daily 60 tablet 2    atorvastatin (LIPITOR) 80 MG tablet Take 1 tablet by mouth nightly 30 tablet 3    aspirin 81 MG EC tablet Take 1 tablet by mouth daily 30 tablet 3    glipiZIDE (GLUCOTROL) 10 MG tablet Take 1 tablet by mouth every morning (before breakfast) 60 tablet 3    furosemide (LASIX) 40 MG tablet Take 1 tablet by mouth 3 times daily (Patient taking differently: Take 40 mg by mouth 3 times daily Taking 2 times a day) 90 tablet 1    potassium chloride (KLOR-CON M) 20 MEQ extended release tablet Take 2 tablets by mouth 2 times daily 60 tablet 1    linagliptin (TRADJENTA) 5 MG tablet Take 1 tablet by mouth daily 30 tablet 3    clopidogrel (PLAVIX) 75 MG tablet Take 1 tablet by mouth daily 30 tablet 5     No current facility-administered medications for this visit. Allergies: Hydromorphone  Past Medical History:   Diagnosis Date    Abnormal nuclear stress test     Atrial fibrillation ([facility] 11/2013    CAD (coronary artery disease)     Follows with Dr. Edel Calero CHF (congestive heart failure) ([facility] [address]     [facility]     Follows with PCP    Decreased cardiac ejection fraction     Diabetes mellitus ([facility] [address]     GERD (gastroesophageal reflux disease)     H/O cardiovascular stress test 11/20/13, 3/20/2013    11/13-Observed defect is consistent with diaphragmatic attenuation. EF 58%. 3/13 -EF 51%. No ischemia. Normal Study.     H/O cardiovascular stress test 06/08/2017    EF 50% normal study    H/O cardiovascular stress test 06/17/2019    Normal NM    H/O cardiovascular stress test 03/02/2021    depressed lvedf increased edv myocardial perfusion abnormal stress test    H/O Doppler ultrasound 10/14/2010    10/2010-Bilateral venous doppler of lower extremies- No DVTs.  H/O echocardiogram 06/26/2019    Left ventricular systolic function is normal with an ejection fraction of 50-55%. Mild concentric left ventricular hypertrophy. Grade I diastolic dysfunction. No significant valvular disease noted. No evidence of pericardial effusion. Essentially unremarkable echo.  H/O percutaneous left heart catheterization 12/22/2015    No evidence of hemodynamically significant coronary artery disease    Heart imaging 04/23/2020    muga - ef 58%    History of coronary artery stent placement 11/13/2013 11/13 -RCA - 3 stents placed    History of exercise stress test 06/08/2017    treadmill    History of Holter monitoring 01/04/2016    predominant rhythm sinus    History of MRI of brain and brain stem 06/04/2020    No evidence of an acute infarct, mod chronic microvascular white matter ischemic disease with associated cerebral parenchymal volume loss    History of nuclear stress test 02/24/2020    EF 38%,Myocardial perfusion scan shows moderate size, severe intensity, non  reversible perfusion defect in inferior wall.  Hyperlipidemia     Hypertension     Follows with PCP    Kidney disease     Dr. Alex Mascorro S/P cardiac catheterization 11/13/2013 11/13/2013-EF 55%, distal LAD mild disease,CX stent patent,but distal to stent 50% stenosis. RCA severe disease.  SOB (shortness of breath)     Status post angioplasty with stent     Wears dentures     full upper denture     Past Surgical History:   Procedure Laterality Date    APPENDECTOMY      BIOPSY / LIGATION TEMPORAL ARTERY Left 8/17/2020    LEFT TEMPORAL ARTERY BIOPSY LIGATION performed by Tami Young MD at [address]  11/13/2013 11/13/2013-EF 55%, distal LAD mild disease,CX stent patent,but distal to stent 50% stenosis.  RCA 17 03/09/2013     PT/INR:  No results found for: PTINR  Lab Results   Component Value Date    LABA1C 7.8 (H) 12/30/2020    LABA1C 8.9 (H) 06/29/2020     Lab Results   Component Value Date    CHOL 153 01/06/2020    TRIG 54 01/06/2020    HDL 43 01/06/2020    LDLDIRECT 111 (H) 01/06/2020     Lab Results   Component Value Date    ALT 12 12/30/2020    AST 15 12/30/2020     TSH:  No results found for: TSH    Impression:  Addi Pollard is a 62 y. o.year old who  has a past medical history of Abnormal nuclear stress test, Atrial fibrillation (HCC), CAD (coronary artery disease), CHF (congestive heart failure) ([facility] [address] COPD (chronic obstructive pulmonary disease) ([facility] [address] Decreased cardiac ejection fraction, Diabetes mellitus ([facility] [address] GERD (gastroesophageal reflux disease), H/O cardiovascular stress test, H/O cardiovascular stress test, H/O cardiovascular stress test, H/O cardiovascular stress test, H/O Doppler ultrasound, H/O echocardiogram, H/O percutaneous left heart catheterization, Heart imaging, History of coronary artery stent placement, History of exercise stress test, History of Holter monitoring, History of MRI of brain and brain stem, History of nuclear stress test, Hyperlipidemia, Hypertension, Kidney disease, S/P cardiac catheterization, SOB (shortness of breath), Status post angioplasty with stent, and Wears dentures. and presents with     Plan:  1. CHEST PAIN: resolved,:s/p PCI of SVG TO RCA Graft at the end F December 2020,  WITH HISTORY OF  CABG X4  muga LVEF was 36%, howeverm ecgo was 50%, Continue aspirin , continue statins, betablockers,, continue LISINOPRIL TO 20MG, BB, POST CABG EKG IS NSR. continue PLAVIX, not sure why he is not on plavix, he just had PCI. 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 2. DM: stable continue glipizide  3.  Paroxysmal afib: continue COUAMDIN, stop amidarone as he does not check his TSH, AND eye wexam and his. pft SHOWED decreased FEV/FVC,, RECOMMEND TO CHECK EYE EXAM  4. Dyslipidemia: continue statins  5. HTN:stable, continue COREG INCREASE LISINOPRIL 20MG DAILY  1. Health maintenance: exerise and diet  All labs, medications and tests reviewed, continue all other medications of all above medical condition listed as is.     @[email or web address] Valley [email or web address]@

## 2021-03-22 ENCOUNTER — ANTI-COAG VISIT (OUTPATIENT)
Dept: PHARMACY | Age: 61
End: 2021-03-22
Payer: COMMERCIAL

## 2021-03-22 VITALS — TEMPERATURE: 97.2 F

## 2021-03-22 DIAGNOSIS — I48.91 ATRIAL FIBRILLATION WITH RAPID VENTRICULAR RESPONSE (HCC): ICD-10-CM

## 2021-03-22 DIAGNOSIS — I48.11 LONGSTANDING PERSISTENT ATRIAL FIBRILLATION (HCC): ICD-10-CM

## 2021-03-22 LAB
INTERNATIONAL NORMALIZATION RATIO, POC: 1.7
POC INR: 1.7 INDEX
PROTHROMBIN TIME, POC: 19.9 SECONDS (ref 10–14.3)

## 2021-03-22 PROCEDURE — 85610 PROTHROMBIN TIME: CPT

## 2021-03-22 PROCEDURE — 36416 COLLJ CAPILLARY BLOOD SPEC: CPT

## 2021-03-22 PROCEDURE — 99212 OFFICE O/P EST SF 10 MIN: CPT

## 2021-03-22 NOTE — PROGRESS NOTES
Medication Management Service  [facility]  [phone]    Visit Date: 3/22/2021   Subjective:       Rosette Gilmore is a 62 y.o. male who presents to clinic today for anticoagulation monitoring and adjustment. Patient seen in clinic for warfarin management due to  Indication:   atrial fibrillation. INR goal: of 2.0-3.0. Duration of therapy: indefinite. Patient reports the following:   Adherent with regimen  Missed or extra doses: missed x1 in last 2-3 days  Bleeding or thromboembolic side effects:  None  Significant medication changes:  None  Significant dietary changes: None  Significant alcohol or tobacco changes: None  Significant recent illness, disease state changes, or hospitalization:  None  Upcoming surgeries or procedures:  None  Falls: None           Assessment and Plan     PT/INR done in office per protocol. INR today is 1.7, subtherapeutic, likely due to missed dose. Plan: Take warfarin 6mg x1 then will continue current regimen of warfarin 4mg daily. Recheck INR in 3 week(s). Patient verbalized understanding of dosing directions and information discussed. Dosing schedule given to patient including phone number, appointment date, and time. Progress note sent to referring office. Patient acknowledges working in consult agreement with pharmacist as referred by his/her physician.       Electronically signed by Jaydon Wang, [address] [place] [address] on 3/22/21 at 1:05 PM EDT    CLINICAL PHARMACY CONSULT: MED RECONCILIATION/REVIEW [place]  22. Tracking Only    PHSO: No  Total # of Interventions Recommended: 1  - Increased Dose #: 1  - Maintenance Safety Lab Monitoring #: 1  Total Interventions Accepted: 1  Time Spent (min): Hu Redmond, PharmD

## 2021-04-12 ENCOUNTER — ANTI-COAG VISIT (OUTPATIENT)
Dept: PHARMACY | Age: 61
End: 2021-04-12
Payer: COMMERCIAL

## 2021-04-12 DIAGNOSIS — I48.11 LONGSTANDING PERSISTENT ATRIAL FIBRILLATION (HCC): ICD-10-CM

## 2021-04-12 DIAGNOSIS — I48.91 ATRIAL FIBRILLATION WITH RAPID VENTRICULAR RESPONSE (HCC): ICD-10-CM

## 2021-04-12 LAB
INTERNATIONAL NORMALIZATION RATIO, POC: 1.8
POC INR: 1.8 INDEX
PROTHROMBIN TIME, POC: 21.5 SECONDS (ref 10–14.3)

## 2021-04-12 PROCEDURE — 85610 PROTHROMBIN TIME: CPT

## 2021-04-12 PROCEDURE — 36416 COLLJ CAPILLARY BLOOD SPEC: CPT

## 2021-04-12 PROCEDURE — 99212 OFFICE O/P EST SF 10 MIN: CPT

## 2021-04-26 ENCOUNTER — TELEPHONE (OUTPATIENT)
Dept: PHARMACY | Age: 61
End: 2021-04-26

## 2021-04-26 ENCOUNTER — ANTI-COAG VISIT (OUTPATIENT)
Dept: PHARMACY | Age: 61
End: 2021-04-26
Payer: COMMERCIAL

## 2021-04-26 DIAGNOSIS — I48.91 ATRIAL FIBRILLATION WITH RAPID VENTRICULAR RESPONSE (HCC): ICD-10-CM

## 2021-04-26 DIAGNOSIS — I48.11 LONGSTANDING PERSISTENT ATRIAL FIBRILLATION (HCC): ICD-10-CM

## 2021-04-26 LAB
INTERNATIONAL NORMALIZATION RATIO, POC: 1.8
POC INR: 1.8 INDEX
PROTHROMBIN TIME, POC: 21.2 SECONDS (ref 10–14.3)

## 2021-04-26 PROCEDURE — 85610 PROTHROMBIN TIME: CPT

## 2021-04-26 PROCEDURE — 99212 OFFICE O/P EST SF 10 MIN: CPT

## 2021-04-26 PROCEDURE — 36416 COLLJ CAPILLARY BLOOD SPEC: CPT

## 2021-04-26 RX ORDER — WARFARIN SODIUM 4 MG/1
4 TABLET ORAL DAILY
Qty: 35 TABLET | Refills: 1 | Status: SHIPPED | OUTPATIENT
Start: 2021-04-26 | End: 2021-06-10 | Stop reason: SDUPTHER

## 2021-05-10 ENCOUNTER — ANTI-COAG VISIT (OUTPATIENT)
Dept: PHARMACY | Age: 61
End: 2021-05-10
Payer: COMMERCIAL

## 2021-05-10 DIAGNOSIS — I48.11 LONGSTANDING PERSISTENT ATRIAL FIBRILLATION (HCC): ICD-10-CM

## 2021-05-10 DIAGNOSIS — I48.91 ATRIAL FIBRILLATION WITH RAPID VENTRICULAR RESPONSE (HCC): ICD-10-CM

## 2021-05-10 LAB
INTERNATIONAL NORMALIZATION RATIO, POC: 1.6
POC INR: 1.6 INDEX
PROTHROMBIN TIME, POC: 19.5 SECONDS (ref 10–14.3)

## 2021-05-10 PROCEDURE — 85610 PROTHROMBIN TIME: CPT

## 2021-05-10 PROCEDURE — 99211 OFF/OP EST MAY X REQ PHY/QHP: CPT

## 2021-05-10 PROCEDURE — 36416 COLLJ CAPILLARY BLOOD SPEC: CPT

## 2021-05-10 NOTE — PROGRESS NOTES
Medication Management Service  [facility]  [phone]    Visit Date: 5/10/2021   Subjective:       Carlos Slaughter is a 62 y.o. male who presents to clinic today for anticoagulation monitoring and adjustment. Patient seen in clinic for warfarin management due to  Indication:   atrial fibrillation. INR goal: of 2.0-3.0. Duration of therapy: indefinite. Patient reports the following:   NOT Adherent with regimen  Missed or extra doses:  Took 4mg daily  Bleeding or thromboembolic side effects:  None  Significant medication changes:  None  Significant dietary changes: None  Significant alcohol or tobacco changes: None  Significant recent illness, disease state changes, or hospitalization:  None  Upcoming surgeries or procedures:  None  Falls: None           Assessment and Plan     PT/INR done in office per protocol. INR today is 1.6, subtherapeutic, likley due to taking 4mg instead of  6mg   on     Plan: Will continue current regimen of warfarin 4mg daily except 6mg on . Recheck INR in 2 week(s). Patient verbalized understanding of dosing directions and information discussed. Dosing schedule given to patient including phone number, appointment date, and time. Progress note sent to referring office. Patient acknowledges working in consult agreement with pharmacist as referred by his/her physician.       Electronically signed by Lavonne Jimenez [facility] on 5/10/21 at 2:29 PM EDT    For Pharmacy Admin Tracking Only     Intervention Detail: Adherence Monitorin   Total # of Interventions Recommended: 0   Total # of Interventions Accepted: 0   Time Spent (min): 15

## 2021-05-11 RX ORDER — CARVEDILOL 12.5 MG/1
TABLET ORAL
Qty: 60 TABLET | Refills: 5 | Status: SHIPPED | OUTPATIENT
Start: 2021-05-11 | End: 2021-06-24

## 2021-05-24 ENCOUNTER — ANTI-COAG VISIT (OUTPATIENT)
Dept: PHARMACY | Age: 61
End: 2021-05-24
Payer: COMMERCIAL

## 2021-05-24 DIAGNOSIS — I48.91 ATRIAL FIBRILLATION WITH RAPID VENTRICULAR RESPONSE (HCC): Primary | ICD-10-CM

## 2021-05-24 DIAGNOSIS — I48.11 LONGSTANDING PERSISTENT ATRIAL FIBRILLATION (HCC): ICD-10-CM

## 2021-05-24 LAB
INTERNATIONAL NORMALIZATION RATIO, POC: 1.8
POC INR: 1.8 INDEX
PROTHROMBIN TIME, POC: 21.6 SECONDS (ref 10–14.3)

## 2021-05-24 PROCEDURE — 85610 PROTHROMBIN TIME: CPT

## 2021-05-24 PROCEDURE — 99211 OFF/OP EST MAY X REQ PHY/QHP: CPT

## 2021-05-24 PROCEDURE — 36416 COLLJ CAPILLARY BLOOD SPEC: CPT

## 2021-05-24 NOTE — PROGRESS NOTES
Medication Management Service  [facility]  [phone]    Visit Date: 2021   Subjective:       Cecile Montiel is a 62 y.o. male who presents to clinic today for anticoagulation monitoring and adjustment. Patient seen in clinic for warfarin management due to  Indication:   atrial fibrillation. INR goal: of 2.0-3.0. Duration of therapy: indefinite. Patient reports the following:   NOT Adherent with regimen  Missed or extra doses:  4mg instead of 6mg on    Bleeding or thromboembolic side effects:  None  Significant medication changes:  None  Significant dietary changes: None  Significant alcohol or tobacco changes: None  Significant recent illness, disease state changes, or hospitalization:  None  Upcoming surgeries or procedures:  None  Falls: None           Assessment and Plan     PT/INR done in office per protocol. INR today is 1.8, subtherapeutic. WIll move 6mg day to Sundays to assist in compliance. Plan: Will continue current regimen of warfarin 4mg daily except 6mg on Sundays. Recheck INR in 2.5 week(s). Patient verbalized understanding of dosing directions and information discussed. Dosing schedule given to patient including phone number, appointment date, and time. Progress note sent to referring office. Patient acknowledges working in consult agreement with pharmacist as referred by his/her physician.       Electronically signed by Cristina [facility] on 21 at 10:51 AM EDT    For Pharmacy Admin Tracking Only     Intervention Detail: Adherence Monitorin   Total # of Interventions Recommended: 0   Total # of Interventions Accepted: 0   Time Spent (min): 15

## 2021-06-10 ENCOUNTER — ANTI-COAG VISIT (OUTPATIENT)
Dept: PHARMACY | Age: 61
End: 2021-06-10
Payer: COMMERCIAL

## 2021-06-10 DIAGNOSIS — I48.11 LONGSTANDING PERSISTENT ATRIAL FIBRILLATION (HCC): ICD-10-CM

## 2021-06-10 DIAGNOSIS — I48.91 ATRIAL FIBRILLATION WITH RAPID VENTRICULAR RESPONSE (HCC): Primary | ICD-10-CM

## 2021-06-10 LAB
INTERNATIONAL NORMALIZATION RATIO, POC: 2
POC INR: 2 INDEX
PROTHROMBIN TIME, POC: 23.8 SECONDS (ref 10–14.3)

## 2021-06-10 PROCEDURE — 36416 COLLJ CAPILLARY BLOOD SPEC: CPT

## 2021-06-10 PROCEDURE — 85610 PROTHROMBIN TIME: CPT

## 2021-06-10 PROCEDURE — 99212 OFFICE O/P EST SF 10 MIN: CPT

## 2021-06-10 RX ORDER — WARFARIN SODIUM 4 MG/1
4 TABLET ORAL DAILY
Qty: 35 TABLET | Refills: 1 | Status: SHIPPED | OUTPATIENT
Start: 2021-06-10 | End: 2021-09-02

## 2021-06-10 NOTE — PROGRESS NOTES
Medication Management Service  [facility]  [phone]    Visit Date: 6/10/2021   Subjective:       Antelmo Sheffield is a 62 y.o. male who presents to clinic today for anticoagulation monitoring and adjustment. Patient seen in clinic for warfarin management due to  Indication:   atrial fibrillation. INR goal: of 2.0-3.0. Duration of therapy: indefinite. Patient reports the following:   Adherent with regimen  Missed or extra doses:  None   Bleeding or thromboembolic side effects:  None  Significant medication changes:  None  Significant dietary changes: None  Significant alcohol or tobacco changes: None  Significant recent illness, disease state changes, or hospitalization:  None  Upcoming surgeries or procedures:  None  Falls: None           Assessment and Plan     PT/INR done in office per protocol. INR today is 2.0, therapeutic. Refill needed before next appointment. Plan: Will continue current regimen of warfarin 4mg daily except 6mg on Sundays. Erx sent to [facility] INR in 4 week(s). Patient verbalized understanding of dosing directions and information discussed. Dosing schedule given to patient including phone number, appointment date, and time. Progress note sent to referring office. Patient acknowledges working in consult agreement with pharmacist as referred by his/her physician.       Electronically signed by DARA Reed [facility] on 6/10/21 at 9:55 AM EDT    For Pharmacy Admin Tracking Only     Intervention Detail: Refill(s) Provided   Total # of Interventions Recommended: 0   Total # of Interventions Accepted: 0   Time Spent (min): 15

## 2021-06-24 ENCOUNTER — OFFICE VISIT (OUTPATIENT)
Dept: CARDIOLOGY CLINIC | Age: 61
End: 2021-06-24
Payer: COMMERCIAL

## 2021-06-24 VITALS
HEIGHT: 75 IN | HEART RATE: 82 BPM | SYSTOLIC BLOOD PRESSURE: 144 MMHG | BODY MASS INDEX: 32.08 KG/M2 | WEIGHT: 258 LBS | DIASTOLIC BLOOD PRESSURE: 98 MMHG | OXYGEN SATURATION: 98 %

## 2021-06-24 DIAGNOSIS — I25.5 ISCHEMIC CARDIOMYOPATHY: ICD-10-CM

## 2021-06-24 DIAGNOSIS — I48.11 LONGSTANDING PERSISTENT ATRIAL FIBRILLATION (HCC): ICD-10-CM

## 2021-06-24 DIAGNOSIS — I10 ESSENTIAL HYPERTENSION: Primary | ICD-10-CM

## 2021-06-24 DIAGNOSIS — E78.2 MIXED HYPERLIPIDEMIA: ICD-10-CM

## 2021-06-24 DIAGNOSIS — I25.10 CORONARY ARTERY DISEASE INVOLVING NATIVE HEART WITHOUT ANGINA PECTORIS, UNSPECIFIED VESSEL OR LESION TYPE: ICD-10-CM

## 2021-06-24 PROCEDURE — G8417 CALC BMI ABV UP PARAM F/U: HCPCS | Performed by: NURSE PRACTITIONER

## 2021-06-24 PROCEDURE — G8427 DOCREV CUR MEDS BY ELIG CLIN: HCPCS | Performed by: NURSE PRACTITIONER

## 2021-06-24 PROCEDURE — 4004F PT TOBACCO SCREEN RCVD TLK: CPT | Performed by: NURSE PRACTITIONER

## 2021-06-24 PROCEDURE — 3017F COLORECTAL CA SCREEN DOC REV: CPT | Performed by: NURSE PRACTITIONER

## 2021-06-24 PROCEDURE — 99214 OFFICE O/P EST MOD 30 MIN: CPT | Performed by: NURSE PRACTITIONER

## 2021-06-24 RX ORDER — CARVEDILOL 12.5 MG/1
25 TABLET ORAL 2 TIMES DAILY WITH MEALS
Qty: 60 TABLET | Refills: 5 | Status: SHIPPED | OUTPATIENT
Start: 2021-06-24 | End: 2021-06-29 | Stop reason: DRUGHIGH

## 2021-06-24 RX ORDER — CHLORTHALIDONE 25 MG/1
TABLET ORAL
COMMUNITY
Start: 2021-05-19 | End: 2022-02-21 | Stop reason: CLARIF

## 2021-06-24 NOTE — PATIENT INSTRUCTIONS
**It is YOUR responsibilty to bring medication bottles and/or updated medication list to [address] [place] [address]. This will allow us to better serve you and all your healthcare needs**    Please be informed that if you contact our office outside of normal business hours the physician on call cannot help with any scheduling or rescheduling issues, procedure instruction questions or any type of medication issue. We advise you for any urgent/emergency that you go to the nearest emergency room!     PLEASE CALL OUR OFFICE DURING NORMAL BUSINESS HOURS    Monday - Friday   8 am to 5 pm    [place]: [address]: [phone]    [place]:  [phone]

## 2021-06-24 NOTE — ASSESSMENT & PLAN NOTE
-elevated, increase Coreg to 25 mg BID and continue with lisinopril 40 mg daily, Lasix 40 mg twice daily, and hydrochlorothiazide 25 mg.

## 2021-06-24 NOTE — LETTER
Antonino ALEXANDER [facility]  1960  [id number]    Have you had any Chest Pain that is not new? - Yes  If Yes DO EKG - How does it feel - Sharp Pain   How long does the pain last - 3 seconds    How long have you been having the pain - Day's   Did you take a nothing      DO EKG IF: Patient has a Heart Rate above 100 or below 40     CAD (Coronary Artery Disease) patient should have one on file every 6 months        Have you had any Shortness of Breath - Yes  If Yes - When at rest    Have you had any dizziness - No    Have you had any palpitations that are not new? - No      Do you have any edema - swelling in No      When did you have your last labs drawn   Where did you have them done   What doctor ordered     If we do not have these labs you are retrieve these labs for these providers!     Do you have a surgery or procedure scheduled in the near future - No

## 2021-06-24 NOTE — ASSESSMENT & PLAN NOTE
-PCI of RCA in 2019, then CABG x 3 in 2020 LIMALAD, SVG to RCA/PDA, SVG to OM. Had PCI of SVG to RCA/PDA with multiple stents overlapping 6 months post CABG. MUGA scan in January 2021 showed EF 36%. Stress test in March 2021 showed medium size severe intensity nonreversible perfusion defect in inferior wall. Patient has significant history of noncompliance and cocaine abuse.      Primary and secondary prevention discussed with patient:   -Low sodium cardiac diet   -exercise 30 min a day three days a week  Continue current medications Plavix, Coumadin, Lipitor, Coreg, Lasix, lisinopril, chlorthalidone

## 2021-06-24 NOTE — PROGRESS NOTES
[facility]  Torrey Fajardo  Phone: [phone]    Fax [phone]                  Jenn Zhang MD, Ignacia Valle MD, Lily Gonzalez MD, Emi Pick, MD Barbee Pringle, MD Shlomo Rinne, MD Eliverto Schlossman, APRN      Maribell Carson, APRN  Bibi Card, APRN     Berto Mcqueen, APRN    CARDIOLOGY  NOTE      6/24/2021    RE: Miri Hart  (1960)                               TO:  Dr. Mitzy Cote MD  The primary cardiologist is Dr. Danielle Slaughter     CC:   1. Essential hypertension    2. Longstanding persistent atrial fibrillation ([facility] [place] [address]    3. Coronary artery disease involving native heart without angina pectoris, unspecified vessel or lesion type    4. Ischemic cardiomyopathy    5. Mixed hyperlipidemia        Patient denies all of the following:  Chest Pain  Palpitations  Edema  Dizziness  Syncope      HPI: Thank you for involving me in taking care of your patient Miri Hart, who is a  62y.o. year old male with a history as listed above. Patient presents to the office for follow up on CAD, HTN, CHF, A-fib, and hyperlipidemia. Shortness of breath has gotten worse over the last couple weeks. Patient was COVID tested which was negative. Patient has had multiple episodes in the last couple months of recurrent shortness of breath and chest pain. He reports being out of medications for a couple months. He had a relapse in April with cocaine abuse. Patient has multivessel CAD which is being monitored. Patient is an active male who does not walk regularly. Patient is not compliant with his medications.      Vitals:    06/24/21 1050   BP: (!) 144/98   Pulse: 82   SpO2: 98%       Current Outpatient Medications   Medication Sig Dispense Refill    chlorthalidone (HYGROTON) 25 MG tablet TAKE 1 TABLET BY MOUTH EVERY DAY      carvedilol (COREG) 12.5 MG tablet Take 2 tablets by mouth 2 times daily (with meals) 60 tablet 5    warfarin (gastroesophageal reflux disease)     H/O cardiovascular stress test 11/20/13, 3/20/2013    11/13-Observed defect is consistent with diaphragmatic attenuation. EF 58%. 3/13 -EF 51%. No ischemia. Normal Study.  H/O cardiovascular stress test 06/08/2017    EF 50% normal study    H/O cardiovascular stress test 06/17/2019    Normal NM    H/O cardiovascular stress test 03/02/2021    depressed lvedf increased edv myocardial perfusion abnormal stress test    H/O Doppler ultrasound 10/14/2010    10/2010-Bilateral venous doppler of lower extremies- No DVTs.  H/O echocardiogram 06/26/2019    Left ventricular systolic function is normal with an ejection fraction of 50-55%. Mild concentric left ventricular hypertrophy. Grade I diastolic dysfunction. No significant valvular disease noted. No evidence of pericardial effusion. Essentially unremarkable echo.  H/O percutaneous left heart catheterization 12/22/2015    No evidence of hemodynamically significant coronary artery disease    Heart imaging 04/23/2020    muga - ef 58%    History of coronary artery stent placement 11/13/2013 11/13 -RCA - 3 stents placed    History of exercise stress test 06/08/2017    treadmill    History of Holter monitoring 01/04/2016    predominant rhythm sinus    History of MRI of brain and brain stem 06/04/2020    No evidence of an acute infarct, mod chronic microvascular white matter ischemic disease with associated cerebral parenchymal volume loss    History of nuclear stress test 02/24/2020    EF 38%,Myocardial perfusion scan shows moderate size, severe intensity, non  reversible perfusion defect in inferior wall.  Hyperlipidemia     Hypertension     Follows with PCP    Kidney disease     Dr. Oleksandr Boston S/P cardiac catheterization 11/13/2013 11/13/2013-EF 55%, distal LAD mild disease,CX stent patent,but distal to stent 50% stenosis. RCA severe disease.     SOB (shortness of breath)     Status post angioplasty with stent  Wears dentures     full upper denture     Past Surgical History:   Procedure Laterality Date    APPENDECTOMY      BIOPSY / LIGATION TEMPORAL ARTERY Left 2020    LEFT TEMPORAL ARTERY BIOPSY LIGATION performed by Zahida Barnard MD at [facility]  2013-EF 55%, distal LAD mild disease,CX stent patent,but distal to stent 50% stenosis. RCA severe disease.     CARDIAC SURGERY  2020    CABG X 3 LIMA-OM-PDA-LAD, Maze, LT Atrial Appendage clipping    COLONOSCOPY      CORONARY ANGIOPLASTY WITH STENT PLACEMENT      4 stents- RCA X3; CX X1    CORONARY ANGIOPLASTY WITH STENT PLACEMENT  2013 -3 stents placed to RCA- Dr Shirlene Apgar N/A 7/3/2020    INTRAOPERATIVE ISMAEL, CABG X3   WITH LIMA  AND LEFT LEG ENDOVEIN HARVEST, INDUCED HYPOTHERMIA, MAZE BIPOLAR AND CRYO PROCEDURE, LEFT ATRIAL APPENDAGE CLIPPING performed by Arun Alves MD at [facility]      all teeth extracted    FEMUR FRACTURE SURGERY      MANDIBLE FRACTURE SURGERY       Family History   Problem Relation Age of Onset    Cancer Mother     Diabetes Mother     Heart Disease Mother     High Blood Pressure Mother     Stroke Mother     Cancer Father     Heart Disease Father     High Blood Pressure Father     Stroke Father     Cancer Brother      Social History     Tobacco Use    Smoking status: Current Every Day Smoker     Packs/day: 1.00     Years: 46.00     Pack years: 46.00     Types: Cigarettes     Start date:     Smokeless tobacco: Never Used    Tobacco comment: Smokes approx 1 pack per day   Substance Use Topics    Alcohol use: No     Comment: caffeine 6 bottles of unsweet tea a day        Review of Systems - History obtained from the patient  General: negative for - fatigue, malaise, night sweats, weight gain  Psychological: negative for - anxiety, depression, sleep disturbances  Ophthalmic: negative for - abnormalities. Skin-  No rash: No echymosis   Affect- normal mood and pleasant       DATA:  Lab Results   Component Value Date    CKTOTAL 47 07/19/2020    CKMB 2.4 03/10/2013    TROPONINI 0.014 06/09/2014    TROPONINI 0.015 12/03/2011     BNP:    Lab Results   Component Value Date    BNP 17 03/09/2013     PT/INR:  No results found for: PTINR  Lab Results   Component Value Date    LABA1C 7.8 (H) 12/30/2020    LABA1C 8.9 (H) 06/29/2020     Lab Results   Component Value Date    CHOL 153 01/06/2020    TRIG 54 01/06/2020    HDL 43 01/06/2020    LDLDIRECT 111 (H) 01/06/2020     Lab Results   Component Value Date    ALT 12 12/30/2020    AST 15 12/30/2020     TSH:  No results found for: TSH    Echo:1/7/2020  Left ventricular systolic function is abnormal.   Ejection fraction is visually estimated at 30 to 35 %. Moderate left ventricular hypertrophy. Cannot assess diastolic function due to AFib. Moderately dilated atrium bilaterally. No evidence of any pericardial effusion. Inferior vena cava is dilated, measuring at 2.8 cm, and does not collapse   with respiration. Echo: 12/30/20   Left ventricular systolic function is low normal.   Ejection fraction is visually estimated at 50%. Moderate left ventricular hypertrophy. No significant valvular disease noted. No evidence of any pericardial effusion. Pericardial fat pad present. MUGA: 1/13/21  EF 36 %    Cath: 7/30/19  PATIENT presenting with STEMI of RCA, s/p successful PCI of RCA with LEOLA, HAS multivessel disease as described above. Cath: 12/30/20  1. PCI to SVG to RCA/PDA at ostial anastomoses site for 99 % lesion reduced to 0 % using 3.0 X 28 LEOLA, 3.0 X 18 and 2.5 X 23 overlapping stents followed by post stent balloon dilation using   3.5 X 12 balloon   2. SVG to Om and LIMA to LAd are potent   3. LVEDP was 15 mmHG   4. RCA and Circ are occluded   5. LAD is diffusely diseased , Diag and LAD are filled by   LIMA graft.    6. left main has 80% risk is stressed. A regular exercise program is recommended to help achieve and maintain normal body weight, fitness and improve lipid balance. A written copy of a low fat, low cholesterol diet has been given to the patient. Patient seen, interviewed and examined. Testing was reviewed. Patient is encouraged to exercise even a brisk walk for 30 minutes at least 3 to 4 times a week. Lifestyle and risk factor modificatons discussed. Various goals are discussed and questions answered. Continue current medications. Appropriate prescriptions are addressed. Questions answered and patient verbalizes understanding. Call for any problems, questions, or concerns. Pt is to follow up in 3 months for Cardiac management    Electronically signed by Franca Bonilla.  TREY West CNP on 6/24/2021 at 1:01 PM

## 2021-06-29 ENCOUNTER — TELEPHONE (OUTPATIENT)
Dept: CARDIOLOGY CLINIC | Age: 61
End: 2021-06-29

## 2021-06-29 DIAGNOSIS — I48.11 LONGSTANDING PERSISTENT ATRIAL FIBRILLATION (HCC): ICD-10-CM

## 2021-06-29 DIAGNOSIS — I10 ESSENTIAL HYPERTENSION: Primary | ICD-10-CM

## 2021-06-29 DIAGNOSIS — I25.5 ISCHEMIC CARDIOMYOPATHY: ICD-10-CM

## 2021-06-29 RX ORDER — CARVEDILOL 25 MG/1
25 TABLET ORAL 2 TIMES DAILY
Qty: 60 TABLET | Refills: 3 | Status: SHIPPED | OUTPATIENT
Start: 2021-06-29 | End: 2021-12-06

## 2021-07-08 ENCOUNTER — NURSE ONLY (OUTPATIENT)
Dept: CARDIOLOGY CLINIC | Age: 61
End: 2021-07-08
Payer: COMMERCIAL

## 2021-07-08 ENCOUNTER — ANTI-COAG VISIT (OUTPATIENT)
Dept: PHARMACY | Age: 61
End: 2021-07-08
Payer: COMMERCIAL

## 2021-07-08 VITALS
SYSTOLIC BLOOD PRESSURE: 140 MMHG | HEART RATE: 75 BPM | WEIGHT: 254.8 LBS | BODY MASS INDEX: 31.68 KG/M2 | DIASTOLIC BLOOD PRESSURE: 88 MMHG | HEIGHT: 75 IN

## 2021-07-08 DIAGNOSIS — I48.11 LONGSTANDING PERSISTENT ATRIAL FIBRILLATION (HCC): ICD-10-CM

## 2021-07-08 DIAGNOSIS — I48.91 ATRIAL FIBRILLATION WITH RAPID VENTRICULAR RESPONSE (HCC): Primary | ICD-10-CM

## 2021-07-08 DIAGNOSIS — I10 ESSENTIAL HYPERTENSION: Primary | ICD-10-CM

## 2021-07-08 LAB
INTERNATIONAL NORMALIZATION RATIO, POC: 1.9
POC INR: 1.9 INDEX
PROTHROMBIN TIME, POC: 22.6 SECONDS (ref 10–14.3)

## 2021-07-08 PROCEDURE — 99211 OFF/OP EST MAY X REQ PHY/QHP: CPT | Performed by: NURSE PRACTITIONER

## 2021-07-08 PROCEDURE — 36416 COLLJ CAPILLARY BLOOD SPEC: CPT

## 2021-07-08 PROCEDURE — 85610 PROTHROMBIN TIME: CPT

## 2021-07-08 PROCEDURE — 99211 OFF/OP EST MAY X REQ PHY/QHP: CPT

## 2021-07-08 RX ORDER — AMLODIPINE BESYLATE 5 MG/1
2.5 TABLET ORAL DAILY
Qty: 30 TABLET | Refills: 3 | Status: SHIPPED | OUTPATIENT
Start: 2021-07-08 | End: 2022-01-21

## 2021-07-08 NOTE — PROGRESS NOTES
[address], [address] [facility],Third [address]  Phone: [phone]    Fax [phone]                  Bevin Councilman, MD, Leta Marx MD, Yanique White MD, MD Bridgett Deras MD Truett Clark, MD Jennet Pound, TREY Brooke, TREY  Nolan Laurie, TREY Qureshi    BP and Weight Check Visit    Pt is here for a 2 week BP and weight check. Recently patient was out of carvedilol and BP elevated. Patient reports taking labetalol this a.m. blood pressure is unchanged. Vitals:    07/08/21 0921   BP: (!) 140/88   Pulse: 75       Wt Readings from Last 3 Encounters:   07/08/21 254 lb 12.8 oz (115.6 kg)   06/24/21 258 lb (117 kg)   03/16/21 271 lb (122.9 kg)        Plan for pt is to start Norvasc 2.5 mg daily. Follow up in 2 weeks. Pt is to report any dizziness or syncope to the office        Electronically signed by Ellie Lira.  TREY Hair - CNP on 7/8/2021 at 9:25 AM

## 2021-07-08 NOTE — PROGRESS NOTES
Medication Management Service  [facility]  [phone]    Visit Date: 7/8/2021   Subjective:       Amber Valle is a 62 y.o. male who presents to clinic today for anticoagulation monitoring and adjustment. Patient seen in clinic for warfarin management due to  Indication:   atrial fibrillation. INR goal: of 2.0-3.0. Duration of therapy: indefinite. Patient reports the following:   Adherent with regimen  Missed or extra doses:  None   Bleeding or thromboembolic side effects:  None  Significant medication changes:  None  Significant dietary changes: None  Significant alcohol or tobacco changes: None  Significant recent illness, disease state changes, or hospitalization:  None  Upcoming surgeries or procedures:  None  Falls: None           Assessment and Plan     PT/INR done in office per protocol. INR today is 1.9, therapeutic, but slightly below goal range. Plan: Will continue current regimen of warfarin 4mg daily except 6mg on Sundays. Recheck INR in 4 week(s). Patient verbalized understanding of dosing directions and information discussed. Dosing schedule given to patient including phone number, appointment date, and time. Progress note sent to referring office. Patient acknowledges working in consult agreement with pharmacist as referred by his/her physician.       Electronically signed by Jerad Cruz, [address] [place] [address] on 7/8/21 at 10:28 AM EDT    For Pharmacy Admin Tracking Only     Intervention Detail:    Total # of Interventions Recommended:    Total # of Interventions Accepted:    Time Spent (min): 15

## 2021-07-08 NOTE — PATIENT INSTRUCTIONS
**It is YOUR responsibilty to bring medication bottles and/or updated medication list to [address] [place] [address]. This will allow us to better serve you and all your healthcare needs**    Please be informed that if you contact our office outside of normal business hours the physician on call cannot help with any scheduling or rescheduling issues, procedure instruction questions or any type of medication issue. We advise you for any urgent/emergency that you go to the nearest emergency room!     PLEASE CALL OUR OFFICE DURING NORMAL BUSINESS HOURS    Monday - Friday   8 am to 5 pm    [place]: [address]: [phone]    [place]:  [phone]

## 2021-08-05 ENCOUNTER — TELEPHONE (OUTPATIENT)
Dept: PHARMACY | Age: 61
End: 2021-08-05

## 2021-08-05 NOTE — TELEPHONE ENCOUNTER
Patient returned call and left voicemail next appointment Monday 8/9.      For Pharmacy Admin Tracking Only     Intervention Detail:    Total # of Interventions Recommended:    Total # of Interventions Accepted:    Time Spent (min): 5

## 2021-08-05 NOTE — TELEPHONE ENCOUNTER
Patient left voicemail to cancel appointment due to other appointment he has to take [facility] to in [place]. Returned call and left patient voicemail to call clinic to schedule.     For Pharmacy Admin Tracking Only     Intervention Detail:    Total # of Interventions Recommended:    Total # of Interventions Accepted:    Time Spent (min): 5

## 2021-08-09 ENCOUNTER — TELEPHONE (OUTPATIENT)
Dept: PHARMACY | Age: 61
End: 2021-08-09

## 2021-08-09 NOTE — TELEPHONE ENCOUNTER
No show to appointment. Left patient voicemail to call clinic to schedule.     For Pharmacy Admin Tracking Only     Intervention Detail:    Total # of Interventions Recommended:    Total # of Interventions Accepted:    Time Spent (min): 5

## 2021-08-13 ENCOUNTER — TELEPHONE (OUTPATIENT)
Dept: PHARMACY | Age: 61
End: 2021-08-13

## 2021-08-13 NOTE — TELEPHONE ENCOUNTER
Scheduled for 8/16.      For Pharmacy Admin Tracking Only     Intervention Detail:    Total # of Interventions Recommended:    Total # of Interventions Accepted:    Time Spent (min): 5

## 2021-08-16 ENCOUNTER — TELEPHONE (OUTPATIENT)
Dept: PHARMACY | Age: 61
End: 2021-08-16

## 2021-08-23 ENCOUNTER — TELEPHONE (OUTPATIENT)
Dept: PHARMACY | Age: 61
End: 2021-08-23

## 2021-08-23 NOTE — TELEPHONE ENCOUNTER
Left patient voicemail to call clinic to schedule.   For Pharmacy Admin Tracking Only     Intervention Detail:    Total # of Interventions Recommended:    Total # of Interventions Accepted:    Time Spent (min): 5

## 2021-09-02 ENCOUNTER — ANTI-COAG VISIT (OUTPATIENT)
Dept: PHARMACY | Age: 61
End: 2021-09-02
Payer: COMMERCIAL

## 2021-09-02 DIAGNOSIS — I48.91 ATRIAL FIBRILLATION WITH RAPID VENTRICULAR RESPONSE (HCC): Primary | ICD-10-CM

## 2021-09-02 DIAGNOSIS — I48.11 LONGSTANDING PERSISTENT ATRIAL FIBRILLATION (HCC): ICD-10-CM

## 2021-09-02 LAB
INTERNATIONAL NORMALIZATION RATIO, POC: 2.6
POC INR: 2.6 INDEX
PROTHROMBIN TIME, POC: 30.8 SECONDS (ref 10–14.3)

## 2021-09-02 PROCEDURE — 99211 OFF/OP EST MAY X REQ PHY/QHP: CPT

## 2021-09-02 PROCEDURE — 85610 PROTHROMBIN TIME: CPT

## 2021-09-02 PROCEDURE — 36416 COLLJ CAPILLARY BLOOD SPEC: CPT

## 2021-09-02 RX ORDER — WARFARIN SODIUM 4 MG/1
TABLET ORAL
Qty: 35 TABLET | Refills: 1 | Status: SHIPPED | OUTPATIENT
Start: 2021-09-02 | End: 2021-09-30 | Stop reason: SDUPTHER

## 2021-09-03 DIAGNOSIS — I48.11 LONGSTANDING PERSISTENT ATRIAL FIBRILLATION (HCC): ICD-10-CM

## 2021-09-03 DIAGNOSIS — I48.91 ATRIAL FIBRILLATION WITH RAPID VENTRICULAR RESPONSE (HCC): Primary | ICD-10-CM

## 2021-09-30 ENCOUNTER — ANTI-COAG VISIT (OUTPATIENT)
Dept: PHARMACY | Age: 61
End: 2021-09-30
Payer: COMMERCIAL

## 2021-09-30 DIAGNOSIS — I48.11 LONGSTANDING PERSISTENT ATRIAL FIBRILLATION (HCC): ICD-10-CM

## 2021-09-30 DIAGNOSIS — I48.91 ATRIAL FIBRILLATION WITH RAPID VENTRICULAR RESPONSE (HCC): Primary | ICD-10-CM

## 2021-09-30 LAB
INTERNATIONAL NORMALIZATION RATIO, POC: 3.1
POC INR: 3.1 INDEX
PROTHROMBIN TIME, POC: 37.1 SECONDS (ref 10–14.3)

## 2021-09-30 PROCEDURE — 85610 PROTHROMBIN TIME: CPT

## 2021-09-30 PROCEDURE — 99212 OFFICE O/P EST SF 10 MIN: CPT

## 2021-09-30 PROCEDURE — 36416 COLLJ CAPILLARY BLOOD SPEC: CPT

## 2021-09-30 RX ORDER — WARFARIN SODIUM 4 MG/1
TABLET ORAL
Qty: 100 TABLET | Refills: 0 | Status: SHIPPED | OUTPATIENT
Start: 2021-09-30 | End: 2022-01-17

## 2021-09-30 NOTE — PROGRESS NOTES
Medication Management Service  [facility]  [phone]    Visit Date: 9/30/2021   Subjective:       Ceci Ruffin is a 62 y.o. male who presents to clinic today for anticoagulation monitoring and adjustment. Patient seen in clinic for warfarin management due to  Indication:   atrial fibrillation. INR goal: of 2.0-3.0. Duration of therapy: indefinite. Patient reports the following:   Adherent with regimen  Missed or extra doses:  None   Bleeding or thromboembolic side effects:  None  Significant medication changes:  None  Significant dietary changes: None  Significant alcohol or tobacco changes: None  Significant recent illness, disease state changes, or hospitalization:  None  Upcoming surgeries or procedures:  None  Falls: None           Assessment and Plan     PT/INR done in office per protocol. INR today is 3.1, therapeutic. Refill needed  Plan: Will continue current regimen of warfarin 4mg daily except 6mg on Sundays. Erx sent to [facility] INR in 4 week(s). Patient verbalized understanding of dosing directions and information discussed. Dosing schedule given to patient including phone number, appointment date, and time. Progress note sent to referring office. Patient acknowledges working in consult agreement with pharmacist as referred by his/her physician.       Electronically signed by Italia Smith, [facility] on 9/30/21 at 9:03 AM EDT    For Pharmacy Admin Tracking Only     Intervention Detail: Refill(s) Provided   Total # of Interventions Recommended: 1   Total # of Interventions Accepted: 1   Time Spent (min): 15

## 2021-10-07 ENCOUNTER — HOSPITAL ENCOUNTER (EMERGENCY)
Age: 61
Discharge: HOME | End: 2021-10-07
Attending: EMERGENCY MEDICINE
Payer: COMMERCIAL

## 2021-10-07 ENCOUNTER — APPOINTMENT (OUTPATIENT)
Dept: GENERAL RADIOLOGY | Age: 61
End: 2021-10-07
Payer: COMMERCIAL

## 2021-10-07 VITALS
DIASTOLIC BLOOD PRESSURE: 97 MMHG | OXYGEN SATURATION: 98 % | HEART RATE: 68 BPM | BODY MASS INDEX: 32.33 KG/M2 | RESPIRATION RATE: 24 BRPM | WEIGHT: 260 LBS | HEIGHT: 75 IN | SYSTOLIC BLOOD PRESSURE: 187 MMHG

## 2021-10-07 DIAGNOSIS — R05.9 COUGH: Primary | ICD-10-CM

## 2021-10-07 DIAGNOSIS — J18.9 PNEUMONIA OF BOTH LUNGS DUE TO INFECTIOUS ORGANISM, UNSPECIFIED PART OF LUNG: ICD-10-CM

## 2021-10-07 LAB
ALBUMIN SERPL-MCNC: 4 GM/DL (ref 3.4–5)
ALP BLD-CCNC: 95 IU/L (ref 40–129)
ALT SERPL-CCNC: 6 U/L (ref 10–40)
ANION GAP SERPL CALCULATED.3IONS-SCNC: 12 MMOL/L (ref 4–16)
AST SERPL-CCNC: 16 IU/L (ref 15–37)
BASOPHILS ABSOLUTE: 0.1 K/CU MM
BASOPHILS RELATIVE PERCENT: 0.9 % (ref 0–1)
BILIRUB SERPL-MCNC: 0.3 MG/DL (ref 0–1)
BUN BLDV-MCNC: 16 MG/DL (ref 6–23)
CALCIUM SERPL-MCNC: 9.8 MG/DL (ref 8.3–10.6)
CHLORIDE BLD-SCNC: 102 MMOL/L (ref 99–110)
CO2: 25 MMOL/L (ref 21–32)
CREAT SERPL-MCNC: 1.7 MG/DL (ref 0.9–1.3)
DIFFERENTIAL TYPE: ABNORMAL
EOSINOPHILS ABSOLUTE: 0.2 K/CU MM
EOSINOPHILS RELATIVE PERCENT: 2.7 % (ref 0–3)
GFR AFRICAN AMERICAN: 50 ML/MIN/1.73M2
GFR NON-AFRICAN AMERICAN: 41 ML/MIN/1.73M2
GLUCOSE BLD-MCNC: 62 MG/DL (ref 70–99)
HCT VFR BLD CALC: 39.1 % (ref 42–52)
HEMOGLOBIN: 12.5 GM/DL (ref 13.5–18)
IMMATURE NEUTROPHIL %: 0.3 % (ref 0–0.43)
LYMPHOCYTES ABSOLUTE: 1.2 K/CU MM
LYMPHOCYTES RELATIVE PERCENT: 18.3 % (ref 24–44)
MCH RBC QN AUTO: 29.4 PG (ref 27–31)
MCHC RBC AUTO-ENTMCNC: 32 % (ref 32–36)
MCV RBC AUTO: 92 FL (ref 78–100)
MONOCYTES ABSOLUTE: 0.8 K/CU MM
MONOCYTES RELATIVE PERCENT: 12.3 % (ref 0–4)
PDW BLD-RTO: 14.7 % (ref 11.7–14.9)
PLATELET # BLD: 236 K/CU MM (ref 140–440)
PMV BLD AUTO: 10.3 FL (ref 7.5–11.1)
POTASSIUM SERPL-SCNC: 4.3 MMOL/L (ref 3.5–5.1)
PRO-BNP: 945.3 PG/ML
RBC # BLD: 4.25 M/CU MM (ref 4.6–6.2)
SEGMENTED NEUTROPHILS ABSOLUTE COUNT: 4.4 K/CU MM
SEGMENTED NEUTROPHILS RELATIVE PERCENT: 65.5 % (ref 36–66)
SODIUM BLD-SCNC: 139 MMOL/L (ref 135–145)
TOTAL IMMATURE NEUTOROPHIL: 0.02 K/CU MM
TOTAL PROTEIN: 7.3 GM/DL (ref 6.4–8.2)
WBC # BLD: 6.7 K/CU MM (ref 4–10.5)

## 2021-10-07 PROCEDURE — 6370000000 HC RX 637 (ALT 250 FOR IP): Performed by: EMERGENCY MEDICINE

## 2021-10-07 PROCEDURE — 99283 EMERGENCY DEPT VISIT LOW MDM: CPT

## 2021-10-07 PROCEDURE — 93005 ELECTROCARDIOGRAM TRACING: CPT | Performed by: EMERGENCY MEDICINE

## 2021-10-07 PROCEDURE — 80053 COMPREHEN METABOLIC PANEL: CPT

## 2021-10-07 PROCEDURE — U0003 INFECTIOUS AGENT DETECTION BY NUCLEIC ACID (DNA OR RNA); SEVERE ACUTE RESPIRATORY SYNDROME CORONAVIRUS 2 (SARS-COV-2) (CORONAVIRUS DISEASE [COVID-19]), AMPLIFIED PROBE TECHNIQUE, MAKING USE OF HIGH THROUGHPUT TECHNOLOGIES AS DESCRIBED BY CMS-2020-01-R: HCPCS

## 2021-10-07 PROCEDURE — 71045 X-RAY EXAM CHEST 1 VIEW: CPT

## 2021-10-07 PROCEDURE — U0005 INFEC AGEN DETEC AMPLI PROBE: HCPCS

## 2021-10-07 PROCEDURE — 83880 ASSAY OF NATRIURETIC PEPTIDE: CPT

## 2021-10-07 PROCEDURE — 85025 COMPLETE CBC W/AUTO DIFF WBC: CPT

## 2021-10-07 RX ORDER — PREDNISONE 50 MG/1
50 TABLET ORAL DAILY
Qty: 5 TABLET | Refills: 0 | Status: SHIPPED | OUTPATIENT
Start: 2021-10-07 | End: 2021-10-12

## 2021-10-07 RX ORDER — ONDANSETRON 4 MG/1
4 TABLET, ORALLY DISINTEGRATING ORAL ONCE
Status: COMPLETED | OUTPATIENT
Start: 2021-10-07 | End: 2021-10-07

## 2021-10-07 RX ORDER — LEVOFLOXACIN 500 MG/1
500 TABLET, FILM COATED ORAL DAILY
Qty: 10 TABLET | Refills: 0 | Status: SHIPPED | OUTPATIENT
Start: 2021-10-07 | End: 2021-10-17

## 2021-10-07 RX ORDER — PREDNISONE 20 MG/1
60 TABLET ORAL ONCE
Status: COMPLETED | OUTPATIENT
Start: 2021-10-07 | End: 2021-10-07

## 2021-10-07 RX ORDER — GUAIFENESIN 100 MG/5ML
10 SYRUP ORAL EVERY 4 HOURS PRN
Qty: 120 ML | Refills: 0 | Status: SHIPPED | OUTPATIENT
Start: 2021-10-07 | End: 2022-02-21 | Stop reason: CLARIF

## 2021-10-07 RX ORDER — GUAIFENESIN 100 MG/5ML
200 SOLUTION ORAL ONCE
Status: COMPLETED | OUTPATIENT
Start: 2021-10-07 | End: 2021-10-07

## 2021-10-07 RX ORDER — IBUPROFEN 600 MG/1
600 TABLET ORAL EVERY 6 HOURS PRN
Qty: 20 TABLET | Refills: 0 | Status: SHIPPED | OUTPATIENT
Start: 2021-10-07 | End: 2022-01-17

## 2021-10-07 RX ADMIN — ONDANSETRON 4 MG: 4 TABLET, ORALLY DISINTEGRATING ORAL at 14:24

## 2021-10-07 RX ADMIN — GUAIFENESIN 200 MG: 200 SOLUTION ORAL at 14:24

## 2021-10-07 RX ADMIN — PREDNISONE 60 MG: 20 TABLET ORAL at 14:24

## 2021-10-07 ASSESSMENT — PAIN SCALES - GENERAL: PAINLEVEL_OUTOF10: 8

## 2021-10-07 ASSESSMENT — PAIN DESCRIPTION - PAIN TYPE: TYPE: ACUTE PAIN

## 2021-10-07 ASSESSMENT — PAIN DESCRIPTION - LOCATION: LOCATION: BACK;CHEST

## 2021-10-07 ASSESSMENT — PAIN DESCRIPTION - FREQUENCY: FREQUENCY: CONTINUOUS

## 2021-10-07 ASSESSMENT — PAIN DESCRIPTION - DESCRIPTORS: DESCRIPTORS: ACHING

## 2021-10-07 NOTE — ED PROVIDER NOTES
Triage Chief Complaint:   Cough (reports of symptoms starting Sunday  ), Shortness of Breath, and Generalized Body Aches      [place]:  Jhon Perez is a 62 y.o. male that presents to the emergency department stating he has cough, body aches, shortness of breath. Does have a history of COPD as well as CHF. States he uses an inhaler at home. Does not wear oxygen. States he has been vaccinated for Covid. No known Covid exposure. No sick contacts. States he did have some nausea and vomiting. Some mild loose stools. No abdominal pain. No leg edema. States generally achy all over, 5 out of 10. Alejandra Guadarrama Past Medical History:   Diagnosis Date    Abnormal nuclear stress test     Atrial fibrillation ([facility] [address] 11/2013    CAD (coronary artery disease)     Follows with Dr. Colleen Gutierrez CHF (congestive heart failure) ([facility] [address]     COPD (chronic obstructive pulmonary disease) ([facility] [address]     Follows with PCP    Decreased cardiac ejection fraction     Diabetes mellitus ([facility] [address]     GERD (gastroesophageal reflux disease)     H/O cardiovascular stress test 11/20/13, 3/20/2013    11/13-Observed defect is consistent with diaphragmatic attenuation. EF 58%. 3/13 -EF 51%. No ischemia. Normal Study.  H/O cardiovascular stress test 06/08/2017    EF 50% normal study    H/O cardiovascular stress test 06/17/2019    Normal NM    H/O cardiovascular stress test 03/02/2021    depressed lvedf increased edv myocardial perfusion abnormal stress test    H/O Doppler ultrasound 10/14/2010    10/2010-Bilateral venous doppler of lower extremies- No DVTs.  H/O echocardiogram 06/26/2019    Left ventricular systolic function is normal with an ejection fraction of 50-55%. Mild concentric left ventricular hypertrophy. Grade I diastolic dysfunction. No significant valvular disease noted. No evidence of pericardial effusion. Essentially unremarkable echo.     H/O percutaneous left heart catheterization 12/22/2015    No evidence of hemodynamically significant coronary artery disease    Heart imaging 04/23/2020    muga - ef 58%    History of coronary artery stent placement 11/13/2013 11/13 -RCA - 3 stents placed    History of exercise stress test 06/08/2017    treadmill    History of Holter monitoring 01/04/2016    predominant rhythm sinus    History of MRI of brain and brain stem 06/04/2020    No evidence of an acute infarct, mod chronic microvascular white matter ischemic disease with associated cerebral parenchymal volume loss    History of nuclear stress test 02/24/2020    EF 38%,Myocardial perfusion scan shows moderate size, severe intensity, non  reversible perfusion defect in inferior wall.  Hyperlipidemia     Hypertension     Follows with PCP    Kidney disease     Dr. Sherrie Sullivan S/P cardiac catheterization 11/13/2013 11/13/2013-EF 55%, distal LAD mild disease,CX stent patent,but distal to stent 50% stenosis. RCA severe disease.  SOB (shortness of breath)     Status post angioplasty with stent     Wears dentures     full upper denture     Past Surgical History:   Procedure Laterality Date    APPENDECTOMY      BIOPSY / LIGATION TEMPORAL ARTERY Left 8/17/2020    LEFT TEMPORAL ARTERY BIOPSY LIGATION performed by Brando Curry MD at [address] [place] [address]  11/13/2013 11/13/2013-EF 55%, distal LAD mild disease,CX stent patent,but distal to stent 50% stenosis. RCA severe disease.     CARDIAC SURGERY  07/03/2020    CABG X 3 LIMA-OM-PDA-LAD, Maze, LT Atrial Appendage clipping    COLONOSCOPY      CORONARY ANGIOPLASTY WITH STENT PLACEMENT      4 stents- RCA X3; CX X1    CORONARY ANGIOPLASTY WITH STENT PLACEMENT  11/13/2013 11/13/2013 -3 stents placed to RCA- Dr Giles Coil N/A 7/3/2020    INTRAOPERATIVE ISMAEL, CABG X3   WITH LIMA  AND LEFT LEG ENDOVEIN HARVEST, INDUCED HYPOTHERMIA, MAZE BIPOLAR AND CRYO PROCEDURE, LEFT ATRIAL APPENDAGE CLIPPING performed by Song Mora MD at [place] [address] DENTAL SURGERY      all teeth extracted    FEMUR FRACTURE SURGERY  1984    MANDIBLE FRACTURE SURGERY  1984     Family History   Problem Relation Age of Onset    Cancer Mother     Diabetes Mother     Heart Disease Mother     High Blood Pressure Mother     Stroke Mother     Cancer Father     Heart Disease Father     High Blood Pressure Father     Stroke Father     Cancer Brother      Social History     Socioeconomic History    Marital status:      Spouse name: Not on file    Number of children: Not on file    Years of education: Not on file    Highest education level: Not on file   Occupational History    Occupation:    Tobacco Use    Smoking status: Current Every Day Smoker     Packs/day: 1.00     Years: 46.00     Pack years: 46.00     Types: Cigarettes     Start date: 1974    Smokeless tobacco: Never Used    Tobacco comment: Smokes approx 1 pack per day   Vaping Use    Vaping Use: Never used   Substance and Sexual Activity    Alcohol use: No     Comment: caffeine 6 bottles of unsweet tea a day    Drug use: Not Currently     Types: Cocaine     Comment: Last used: 7/1/20    Sexual activity: Yes     Partners: Female   Other Topics Concern    Not on file   Social History Narrative    Not on file     Social Determinants of Health     Financial Resource Strain:     Difficulty of Paying Living Expenses:    Food Insecurity:     Worried About Running Out of Food in the Last Year:     Ran Out of Food in the Last Year:    Transportation Needs:     Lack of Transportation (Medical):      Lack of Transportation (Non-Medical):    Physical Activity:     Days of Exercise per Week:     Minutes of Exercise per Session:    Stress:     Feeling of Stress :    Social Connections:     Frequency of Communication with Friends and Family:     Frequency of Social Gatherings with Friends and Family:     Attends Hoahaoism Services:     Active Member of Clubs or Organizations:     Attends RBC 4.25 (L) 4.6 - 6.2 M/CU MM    Hemoglobin 12.5 (L) 13.5 - 18.0 GM/DL    Hematocrit 39.1 (L) 42 - 52 %    MCV 92.0 78 - 100 FL    MCH 29.4 27 - 31 PG    MCHC 32.0 32.0 - 36.0 %    RDW 14.7 11.7 - 14.9 %    Platelets 323 809 - 248 K/CU MM    MPV 10.3 7.5 - 11.1 FL    Differential Type AUTOMATED DIFFERENTIAL     Segs Relative 65.5 36 - 66 %    Lymphocytes % 18.3 (L) 24 - 44 %    Monocytes % 12.3 (H) 0 - 4 %    Eosinophils % 2.7 0 - 3 %    Basophils % 0.9 0 - 1 %    Segs Absolute 4.4 K/CU MM    Lymphocytes Absolute 1.2 K/CU MM    Monocytes Absolute 0.8 K/CU MM    Eosinophils Absolute 0.2 K/CU MM    Basophils Absolute 0.1 K/CU MM    Immature Neutrophil % 0.3 0 - 0.43 %    Total Immature Neutrophil 0.02 K/CU MM   CMP   Result Value Ref Range    Sodium 139 135 - 145 MMOL/L    Potassium 4.3 3.5 - 5.1 MMOL/L    Chloride 102 99 - 110 mMol/L    CO2 25 21 - 32 MMOL/L    BUN 16 6 - 23 MG/DL    CREATININE 1.7 (H) 0.9 - 1.3 MG/DL    Glucose 62 (L) 70 - 99 MG/DL    Calcium 9.8 8.3 - 10.6 MG/DL    Albumin 4.0 3.4 - 5.0 GM/DL    Total Protein 7.3 6.4 - 8.2 GM/DL    Total Bilirubin 0.3 0.0 - 1.0 MG/DL    ALT 6 (L) 10 - 40 U/L    AST 16 15 - 37 IU/L    Alkaline Phosphatase 95 40 - 129 IU/L    GFR Non- 41 (L) >60 mL/min/1.73m2    GFR  50 (L) >60 mL/min/1.73m2    Anion Gap 12 4 - 16   Brain Natriuretic Peptide   Result Value Ref Range    Pro-.3 (H) <300 PG/ML   EKG 12 Lead   Result Value Ref Range    Ventricular Rate 68 BPM    Atrial Rate 68 BPM    P-R Interval 176 ms    QRS Duration 94 ms    Q-T Interval 440 ms    QTc Calculation (Bazett) 467 ms    P Axis 17 degrees    R Axis 6 degrees    T Axis 91 degrees    Diagnosis       Normal sinus rhythm  Cannot rule out Inferior infarct (cited on or before 19-JUL-2020)  Abnormal ECG  When compared with ECG of 30-DEC-2020 12:32,  T wave inversion no longer evident in Lateral leads          Radiographs:  [] Radiologist's Wet Read Report Reviewed: XR CHEST PORTABLE (Preliminary result)  Result time 10/07/21 15:04:18  Preliminary result by Tonja Arana MD (10/07/21 15:04:18)                Impression:    Patchy, bilateral peripheral ground-glass opacities which may indicate an   evolving atypical pneumonia.  Radiographic follow-up recommended to ensure   resolution. Narrative:    EXAMINATION:   ONE X-RAY VIEW OF THE CHEST     10/7/2021 2:52 pm     COMPARISON:   December 30, 2020     HISTORY:   ORDERING SYSTEM PROVIDED HISTORY:  Cough, SOB     TECHNOLOGIST PROVIDED HISTORY:     Reason for Exam:  Cough, SOB     Reason for Exam:  Cough, SOB     Acuity:  Acute     Type of Exam:  Initial     Additional signs and symptoms:  Body aches     FINDINGS:   The cardiomediastinal silhouette is mildly enlarged but stable.  Stable post   sternotomy changes are present. Bilateral patchy peripheral ground-glass opacities are present.  No evidence   of lobar consolidation or air bronchograms. No pleural effusion or pneumothorax.                 Preliminary result by Tonja Arana MD (10/07/21 14:57:50)                Impression:    Patchy, bilateral peripheral ground-glass opacities which may indicate an   evolving atypical pneumonia.  Radiographic follow-up recommended to ensure   resolution. [] Discussed with Radiologist:     [] The following radiograph was interpreted by myself in the absence of a radiologist:     EKG: (All EKG's are interpreted by myself in the absence of a cardiologist)  The Ekg interpreted by me shows  normal sinus rhythm with a rate of 68  Axis is   Normal  QTc is  normal  Intervals and Durations are unremarkable. ST Segments: no acute change  No significant change from prior EKG dated 1-6-2021        MDM:  Patient is hypertensive at 187/97. Heart rate in the 60s. Respiratory rate 24. Sats 98% on room air. Patient given prednisone p.o., Zofran ODT and Robitussin cough syrup.  EKG normal sinus rhythm without any acute findings. CBC White count is normal at 6.7. Hemoglobin of 12.5. CMP sodium of 139. Potassium of 4.3.  BUN of 16. Creatinine of 1.7. patient has had a history of CKD. Normal anion gap of 12, BNP normal. covid send out test pending. CXR shows patchy bilateral peripheral groundglass opacities which may indicate an evolving atypical pneumonia. Rapid covid test pending. Discussed results with patient. Discussed that he needs to quarantine and concern for Covid with x-ray results. We'll start him on anti-inflammatories, steroids, albuterol inhaler and antibiotics. Follow-up PCP. Clinical Impression:  1. Cough    2.  Pneumonia of both lungs due to infectious organism, unspecified part of lung        Disposition Vitals:  [unfilled], [unfilled], [unfilled], [unfilled]    Disposition referral (if applicable):  Madison Bucio MD  [facility]  [phone]            Disposition medications (if applicable):  New Prescriptions    GUAIFENESIN (ALTARUSSIN) 100 MG/5ML SYRUP    Take 10 mLs by mouth every 4 hours as needed for Cough    IBUPROFEN (IBU) 600 MG TABLET    Take 1 tablet by mouth every 6 hours as needed for Pain    LEVOFLOXACIN (LEVAQUIN) 500 MG TABLET    Take 1 tablet by mouth daily for 10 days    PREDNISONE (DELTASONE) 50 MG TABLET    Take 1 tablet by mouth daily for 5 days         (Please note that portions of this note may have been completed with a voice recognition program. Efforts were made to edit the dictations but occasionally words are mis-transcribed.)    MD Cristina Hall MD  10/07/21 1388

## 2021-10-08 LAB
EKG ATRIAL RATE: 68 BPM
EKG DIAGNOSIS: NORMAL
EKG P AXIS: 17 DEGREES
EKG P-R INTERVAL: 176 MS
EKG Q-T INTERVAL: 440 MS
EKG QRS DURATION: 94 MS
EKG QTC CALCULATION (BAZETT): 467 MS
EKG R AXIS: 6 DEGREES
EKG T AXIS: 91 DEGREES
EKG VENTRICULAR RATE: 68 BPM
SARS-COV-2: NOT DETECTED
SOURCE: NORMAL

## 2021-10-08 PROCEDURE — 93010 ELECTROCARDIOGRAM REPORT: CPT | Performed by: INTERNAL MEDICINE

## 2021-10-10 ENCOUNTER — HOSPITAL ENCOUNTER (EMERGENCY)
Age: 61
Discharge: HOME | End: 2021-10-10
Payer: COMMERCIAL

## 2021-10-10 ENCOUNTER — APPOINTMENT (OUTPATIENT)
Dept: CT IMAGING | Age: 61
End: 2021-10-10
Payer: COMMERCIAL

## 2021-10-10 VITALS
RESPIRATION RATE: 18 BRPM | HEIGHT: 75 IN | TEMPERATURE: 98.4 F | SYSTOLIC BLOOD PRESSURE: 205 MMHG | BODY MASS INDEX: 32.5 KG/M2 | OXYGEN SATURATION: 96 % | HEART RATE: 85 BPM | DIASTOLIC BLOOD PRESSURE: 110 MMHG

## 2021-10-10 DIAGNOSIS — R07.9 CHEST PAIN, UNSPECIFIED TYPE: Primary | ICD-10-CM

## 2021-10-10 DIAGNOSIS — J12.2 PARAINFLUENZA VIRUS PNEUMONIA: ICD-10-CM

## 2021-10-10 DIAGNOSIS — I10 ESSENTIAL HYPERTENSION: ICD-10-CM

## 2021-10-10 LAB
ADENOVIRUS DETECTION BY PCR: NOT DETECTED
ALBUMIN SERPL-MCNC: 4.4 GM/DL (ref 3.4–5)
ALP BLD-CCNC: 97 IU/L (ref 40–129)
ALT SERPL-CCNC: 9 U/L (ref 10–40)
ANION GAP SERPL CALCULATED.3IONS-SCNC: 13 MMOL/L (ref 4–16)
AST SERPL-CCNC: 14 IU/L (ref 15–37)
BASOPHILS ABSOLUTE: 0 K/CU MM
BASOPHILS RELATIVE PERCENT: 0.1 % (ref 0–1)
BILIRUB SERPL-MCNC: 0.1 MG/DL (ref 0–1)
BORDETELLA PARAPERTUSSIS BY PCR: NOT DETECTED
BORDETELLA PERTUSSIS PCR: NOT DETECTED
BUN BLDV-MCNC: 26 MG/DL (ref 6–23)
CALCIUM SERPL-MCNC: 9.5 MG/DL (ref 8.3–10.6)
CHLAMYDOPHILA PNEUMONIA PCR: NOT DETECTED
CHLORIDE BLD-SCNC: 104 MMOL/L (ref 99–110)
CO2: 22 MMOL/L (ref 21–32)
CORONAVIRUS 229E PCR: NOT DETECTED
CORONAVIRUS HKU1 PCR: NOT DETECTED
CORONAVIRUS NL63 PCR: NOT DETECTED
CORONAVIRUS OC43 PCR: NOT DETECTED
CREAT SERPL-MCNC: 1.4 MG/DL (ref 0.9–1.3)
DIFFERENTIAL TYPE: ABNORMAL
EOSINOPHILS ABSOLUTE: 0 K/CU MM
EOSINOPHILS RELATIVE PERCENT: 0.1 % (ref 0–3)
GFR AFRICAN AMERICAN: >60 ML/MIN/1.73M2
GFR NON-AFRICAN AMERICAN: 52 ML/MIN/1.73M2
GLUCOSE BLD-MCNC: 139 MG/DL (ref 70–99)
HCT VFR BLD CALC: 39.2 % (ref 42–52)
HEMOGLOBIN: 12.6 GM/DL (ref 13.5–18)
HUMAN METAPNEUMOVIRUS PCR: NOT DETECTED
IMMATURE NEUTROPHIL %: 0.7 % (ref 0–0.43)
INFLUENZA A BY PCR: NOT DETECTED
INFLUENZA A H1 (2009) PCR: NOT DETECTED
INFLUENZA A H1 PANDEMIC PCR: NOT DETECTED
INFLUENZA A H3 PCR: NOT DETECTED
INFLUENZA B BY PCR: NOT DETECTED
INR BLD: 0.98 INDEX
LACTATE: 2.3 MMOL/L (ref 0.4–2)
LYMPHOCYTES ABSOLUTE: 1.8 K/CU MM
LYMPHOCYTES RELATIVE PERCENT: 18.2 % (ref 24–44)
MCH RBC QN AUTO: 29.6 PG (ref 27–31)
MCHC RBC AUTO-ENTMCNC: 32.1 % (ref 32–36)
MCV RBC AUTO: 92.2 FL (ref 78–100)
MONOCYTES ABSOLUTE: 0.8 K/CU MM
MONOCYTES RELATIVE PERCENT: 8.3 % (ref 0–4)
MYCOPLASMA PNEUMONIAE PCR: NOT DETECTED
NUCLEATED RBC %: 0 %
PARAINFLUENZA 1 PCR: NOT DETECTED
PARAINFLUENZA 2 PCR: ABNORMAL
PARAINFLUENZA 3 PCR: NOT DETECTED
PARAINFLUENZA 4 PCR: NOT DETECTED
PDW BLD-RTO: 14.5 % (ref 11.7–14.9)
PLATELET # BLD: 248 K/CU MM (ref 140–440)
PMV BLD AUTO: 10.7 FL (ref 7.5–11.1)
POTASSIUM SERPL-SCNC: 4 MMOL/L (ref 3.5–5.1)
PRO-BNP: 2126 PG/ML
PROTHROMBIN TIME: 12.7 SECONDS (ref 11.7–14.5)
RBC # BLD: 4.25 M/CU MM (ref 4.6–6.2)
RHINOVIRUS ENTEROVIRUS PCR: NOT DETECTED
RSV PCR: NOT DETECTED
SARS-COV-2: NOT DETECTED
SEGMENTED NEUTROPHILS ABSOLUTE COUNT: 7.1 K/CU MM
SEGMENTED NEUTROPHILS RELATIVE PERCENT: 72.6 % (ref 36–66)
SODIUM BLD-SCNC: 139 MMOL/L (ref 135–145)
TOTAL IMMATURE NEUTOROPHIL: 0.07 K/CU MM
TOTAL NUCLEATED RBC: 0 K/CU MM
TOTAL PROTEIN: 7.2 GM/DL (ref 6.4–8.2)
TROPONIN T: 0.01 NG/ML
WBC # BLD: 9.8 K/CU MM (ref 4–10.5)

## 2021-10-10 PROCEDURE — 83605 ASSAY OF LACTIC ACID: CPT

## 2021-10-10 PROCEDURE — 6370000000 HC RX 637 (ALT 250 FOR IP): Performed by: PHYSICIAN ASSISTANT

## 2021-10-10 PROCEDURE — 0202U NFCT DS 22 TRGT SARS-COV-2: CPT

## 2021-10-10 PROCEDURE — 6360000002 HC RX W HCPCS: Performed by: PHYSICIAN ASSISTANT

## 2021-10-10 PROCEDURE — 6360000004 HC RX CONTRAST MEDICATION: Performed by: PHYSICIAN ASSISTANT

## 2021-10-10 PROCEDURE — 84484 ASSAY OF TROPONIN QUANT: CPT

## 2021-10-10 PROCEDURE — 71275 CT ANGIOGRAPHY CHEST: CPT

## 2021-10-10 PROCEDURE — 80053 COMPREHEN METABOLIC PANEL: CPT

## 2021-10-10 PROCEDURE — 85025 COMPLETE CBC W/AUTO DIFF WBC: CPT

## 2021-10-10 PROCEDURE — 93005 ELECTROCARDIOGRAM TRACING: CPT | Performed by: PHYSICIAN ASSISTANT

## 2021-10-10 PROCEDURE — 99283 EMERGENCY DEPT VISIT LOW MDM: CPT

## 2021-10-10 PROCEDURE — 2580000003 HC RX 258: Performed by: PHYSICIAN ASSISTANT

## 2021-10-10 PROCEDURE — 96374 THER/PROPH/DIAG INJ IV PUSH: CPT

## 2021-10-10 PROCEDURE — 83880 ASSAY OF NATRIURETIC PEPTIDE: CPT

## 2021-10-10 PROCEDURE — 85610 PROTHROMBIN TIME: CPT

## 2021-10-10 RX ORDER — METHYLPREDNISOLONE SODIUM SUCCINATE 125 MG/2ML
125 INJECTION, POWDER, LYOPHILIZED, FOR SOLUTION INTRAMUSCULAR; INTRAVENOUS ONCE
Status: COMPLETED | OUTPATIENT
Start: 2021-10-10 | End: 2021-10-10

## 2021-10-10 RX ORDER — GUAIFENESIN AND CODEINE PHOSPHATE 100; 10 MG/5ML; MG/5ML
5 SOLUTION ORAL 3 TIMES DAILY PRN
Qty: 120 ML | Refills: 0 | Status: SHIPPED | OUTPATIENT
Start: 2021-10-10 | End: 2021-10-18

## 2021-10-10 RX ORDER — ALBUTEROL SULFATE 90 UG/1
2 AEROSOL, METERED RESPIRATORY (INHALATION) ONCE
Status: COMPLETED | OUTPATIENT
Start: 2021-10-10 | End: 2021-10-10

## 2021-10-10 RX ORDER — ALBUTEROL SULFATE 90 UG/1
2 AEROSOL, METERED RESPIRATORY (INHALATION) EVERY 6 HOURS PRN
Qty: 18 G | Refills: 0 | Status: SHIPPED | OUTPATIENT
Start: 2021-10-10

## 2021-10-10 RX ORDER — ASPIRIN 81 MG/1
324 TABLET, CHEWABLE ORAL ONCE
Status: COMPLETED | OUTPATIENT
Start: 2021-10-10 | End: 2021-10-10

## 2021-10-10 RX ORDER — DIPHENHYDRAMINE HCL 25 MG
50 TABLET ORAL ONCE
Status: COMPLETED | OUTPATIENT
Start: 2021-10-10 | End: 2021-10-10

## 2021-10-10 RX ORDER — SODIUM CHLORIDE 0.9 % (FLUSH) 0.9 %
5-40 SYRINGE (ML) INJECTION 2 TIMES DAILY
Status: DISCONTINUED | OUTPATIENT
Start: 2021-10-10 | End: 2021-10-11 | Stop reason: HOSPADM

## 2021-10-10 RX ADMIN — METHYLPREDNISOLONE SODIUM SUCCINATE 125 MG: 125 INJECTION, POWDER, LYOPHILIZED, FOR SOLUTION INTRAMUSCULAR; INTRAVENOUS at 22:02

## 2021-10-10 RX ADMIN — SODIUM CHLORIDE, PRESERVATIVE FREE 10 ML: 5 INJECTION INTRAVENOUS at 21:56

## 2021-10-10 RX ADMIN — ALBUTEROL SULFATE 2 PUFF: 90 AEROSOL, METERED RESPIRATORY (INHALATION) at 21:21

## 2021-10-10 RX ADMIN — ASPIRIN 324 MG: 81 TABLET, CHEWABLE ORAL at 22:02

## 2021-10-10 RX ADMIN — DIPHENHYDRAMINE HCL 50 MG: 25 TABLET ORAL at 23:26

## 2021-10-10 RX ADMIN — IOPAMIDOL 75 ML: 755 INJECTION, SOLUTION INTRAVENOUS at 21:37

## 2021-10-10 ASSESSMENT — HEART SCORE: ECG: 0

## 2021-10-10 ASSESSMENT — PAIN DESCRIPTION - LOCATION: LOCATION: CHEST

## 2021-10-10 ASSESSMENT — PAIN DESCRIPTION - PAIN TYPE: TYPE: ACUTE PAIN

## 2021-10-10 ASSESSMENT — PAIN SCALES - GENERAL: PAINLEVEL_OUTOF10: 8

## 2021-10-11 LAB
EKG ATRIAL RATE: 79 BPM
EKG DIAGNOSIS: NORMAL
EKG P AXIS: 39 DEGREES
EKG P-R INTERVAL: 148 MS
EKG Q-T INTERVAL: 382 MS
EKG QRS DURATION: 92 MS
EKG QTC CALCULATION (BAZETT): 438 MS
EKG R AXIS: 22 DEGREES
EKG T AXIS: 89 DEGREES
EKG VENTRICULAR RATE: 79 BPM

## 2021-10-11 PROCEDURE — 93010 ELECTROCARDIOGRAM REPORT: CPT | Performed by: INTERNAL MEDICINE

## 2021-10-11 NOTE — ED PROVIDER NOTES
Emergency [address] EMERGENCY DEPARTMENT    Patient: Juan Steele  MRN: [id number]  : 1960  Date of Evaluation: 10/10/2021  ED Provider: Pramod Khalil PA-C    Chief Complaint       Chief Complaint   Patient presents with    Chest Pain     reports bilateral PNA, pain onset this AM, has taken two Love Corrie is a 62 y.o. male who presents to the emergency department for chest pain. Onset was this morning. Constant. Patient took 2 nitro at home without relief. Pain is localized to the entire chest.  Sharp. Worse with deep inspiration. He reports being seen at the [facility] ED on 10/7 and diagnosed with bilateral pneumonia. He states he has been compliant with antibiotics but doesn't feel like he is improving. Symptoms worse at night and isn't able to sleep well. He has a history of CAD with CABG x 3.  + DM, HTN, HLD, smoker. Rapid COVID was negative at [facility], he has been vaccinated. ROS     CONSTITUTIONAL:  Denies fever. EYES:  Denies visual changes. HEAD:  Denies headache. ENT:  Denies earache, nasal congestion, sore throat. NECK:  Denies neck pain. RESPIRATORY:  + cough, sob. CARDIOVASCULAR:  + chest pain. GI:  Denies nausea or vomiting. :  Denies urinary symptoms. MUSCULOSKELETAL:  Denies extremity pain or swelling. BACK:  Denies back pain. INTEGUMENT:  Denies skin changes. LYMPHATIC:  Denies lymphadenopathy. NEUROLOGIC:  Denies any numbness/tingling.     Past History     Past Medical History:   Diagnosis Date    Abnormal nuclear stress test     Atrial fibrillation ([facility] 2013    CAD (coronary artery disease)     Follows with Dr. Lorraine Latham CHF (congestive heart failure) ([facility]     COPD (chronic obstructive pulmonary disease) ([facility]     Follows with PCP    Decreased cardiac ejection fraction     Diabetes mellitus ([facility]     GERD (gastroesophageal reflux disease)     H/O cardiovascular stress test 11/20/13, 3/20/2013    11/13-Observed defect is consistent with diaphragmatic attenuation. EF 58%. 3/13 -EF 51%. No ischemia. Normal Study.  H/O cardiovascular stress test 06/08/2017    EF 50% normal study    H/O cardiovascular stress test 06/17/2019    Normal NM    H/O cardiovascular stress test 03/02/2021    depressed lvedf increased edv myocardial perfusion abnormal stress test    H/O Doppler ultrasound 10/14/2010    10/2010-Bilateral venous doppler of lower extremies- No DVTs.  H/O echocardiogram 06/26/2019    Left ventricular systolic function is normal with an ejection fraction of 50-55%. Mild concentric left ventricular hypertrophy. Grade I diastolic dysfunction. No significant valvular disease noted. No evidence of pericardial effusion. Essentially unremarkable echo.  H/O percutaneous left heart catheterization 12/22/2015    No evidence of hemodynamically significant coronary artery disease    Heart imaging 04/23/2020    muga - ef 58%    History of coronary artery stent placement 11/13/2013 11/13 -RCA - 3 stents placed    History of exercise stress test 06/08/2017    treadmill    History of Holter monitoring 01/04/2016    predominant rhythm sinus    History of MRI of brain and brain stem 06/04/2020    No evidence of an acute infarct, mod chronic microvascular white matter ischemic disease with associated cerebral parenchymal volume loss    History of nuclear stress test 02/24/2020    EF 38%,Myocardial perfusion scan shows moderate size, severe intensity, non  reversible perfusion defect in inferior wall.  Hyperlipidemia     Hypertension     Follows with PCP    Kidney disease     Dr. Shayna Weeks S/P cardiac catheterization 11/13/2013 11/13/2013-EF 55%, distal LAD mild disease,CX stent patent,but distal to stent 50% stenosis. RCA severe disease.     SOB (shortness of breath)     Status post angioplasty with stent     Wears dentures     full upper denture     Past Surgical History:   Procedure Laterality Date    APPENDECTOMY      BIOPSY / LIGATION TEMPORAL ARTERY Left 8/17/2020    LEFT TEMPORAL ARTERY BIOPSY LIGATION performed by Oscar Justin MD at [place] [address]  11/13/2013 11/13/2013-EF 55%, distal LAD mild disease,CX stent patent,but distal to stent 50% stenosis. RCA severe disease.     CARDIAC SURGERY  07/03/2020    CABG X 3 LIMA-OM-PDA-LAD, Maze, LT Atrial Appendage clipping    COLONOSCOPY      CORONARY ANGIOPLASTY WITH STENT PLACEMENT      4 stents- RCA X3; CX X1    CORONARY ANGIOPLASTY WITH STENT PLACEMENT  11/13/2013 11/13/2013 -3 stents placed to RCA- Dr Hanna House N/A 7/3/2020    INTRAOPERATIVE ISMAEL, CABG X3   WITH LIMA  AND LEFT LEG ENDOVEIN HARVEST, INDUCED HYPOTHERMIA, MAZE BIPOLAR AND CRYO PROCEDURE, LEFT ATRIAL APPENDAGE CLIPPING performed by Angelo Zimmerman MD at [facility]      all teeth extracted   [address] [facility]    MANDIBLE FRACTURE SURGERY  1984     Social History     Socioeconomic History    Marital status:      Spouse name: None    Number of children: None    Years of education: None    Highest education level: None   Occupational History    Occupation:    Tobacco Use    Smoking status: Current Every Day Smoker     Packs/day: 1.00     Years: 46.00     Pack years: 46.00     Types: Cigarettes     Start date: 1974    Smokeless tobacco: Never Used    Tobacco comment: Smokes approx 1 pack per day   Vaping Use    Vaping Use: Never used   Substance and Sexual Activity    Alcohol use: No     Comment: caffeine 6 bottles of unsweet tea a day    Drug use: Not Currently     Types: Cocaine     Comment: Last used: 7/1/20    Sexual activity: Yes     Partners: Female   Other Topics Concern    None   Social History Narrative    None     Social Determinants of Health     Financial Resource Strain:     Difficulty of Paying Living Expenses:    Food Insecurity:     Worried About Running Out of Food in the Last Year:     [address] in the Last Year:    Transportation Needs:     Lack of Transportation (Medical):      Lack of Transportation (Non-Medical):    Physical Activity:     Days of Exercise per Week:     Minutes of Exercise per Session:    Stress:     Feeling of Stress :    Social Connections:     Frequency of Communication with Friends and Family:     Frequency of Social Gatherings with Friends and Family:     Attends Worship Services:     Active Member of Clubs or Organizations:     Attends Club or Organization Meetings:     Marital Status:    Intimate Partner Violence:     Fear of Current or Ex-Partner:     Emotionally Abused:     Physically Abused:     Sexually Abused:        Medications/Allergies     Previous Medications    ALBUTEROL SULFATE  (90 BASE) MCG/ACT INHALER    INHALE 2 PUFFS BY MOUTH EVERY SIX HOURS AS NEEDED FOR WHEEZING    AMLODIPINE (NORVASC) 5 MG TABLET    Take 0.5 tablets by mouth daily    ATORVASTATIN (LIPITOR) 80 MG TABLET    Take 1 tablet by mouth nightly    CARVEDILOL (COREG) 25 MG TABLET    Take 1 tablet by mouth 2 times daily    CHLORTHALIDONE (HYGROTON) 25 MG TABLET    TAKE 1 TABLET BY MOUTH EVERY DAY    CLOPIDOGREL (PLAVIX) 75 MG TABLET    Take 1 tablet by mouth daily    FUROSEMIDE (LASIX) 40 MG TABLET    Take 1 tablet by mouth 3 times daily    GLIPIZIDE (GLUCOTROL) 10 MG TABLET    Take 1 tablet by mouth every morning (before breakfast)    GUAIFENESIN (ALTARUSSIN) 100 MG/5ML SYRUP    Take 10 mLs by mouth every 4 hours as needed for Cough    HYDROXYZINE (VISTARIL) 50 MG CAPSULE    Take 50 mg by mouth nightly    IBUPROFEN (IBU) 600 MG TABLET    Take 1 tablet by mouth every 6 hours as needed for Pain    LEVOFLOXACIN (LEVAQUIN) 500 MG TABLET    Take 1 tablet by mouth daily for 10 days    LINAGLIPTIN (TRADJENTA) 5 MG TABLET    Take 1 tablet by mouth daily    LISINOPRIL (PRINIVIL;ZESTRIL) 40 MG TABLET Take 1 tablet by mouth daily    METFORMIN (GLUCOPHAGE) 500 MG TABLET    Take 1,000 mg by mouth 2 times daily    NITROGLYCERIN (NITROSTAT) 0.4 MG SL TABLET    Place 1 tablet under the tongue every 5 minutes as needed for Chest pain    OMEPRAZOLE (PRILOSEC) 20 MG DELAYED RELEASE CAPSULE    Take 20 mg by mouth 2 times daily    POTASSIUM CHLORIDE (KLOR-CON M) 20 MEQ EXTENDED RELEASE TABLET    Take 2 tablets by mouth 2 times daily    PREDNISONE (DELTASONE) 50 MG TABLET    Take 1 tablet by mouth daily for 5 days    WARFARIN (JANTOVEN) 4 MG TABLET    TAKE 1 TABLET BY MOUTH EVERY DAY, EXCEPT take 1&1/2 TABLETS ON SUNDAYS OR AS DIRECTED BY CLINIC     Allergies   Allergen Reactions    Hydromorphone Other (See Comments)     Hallucinations. Physical Exam       ED Triage Vitals [10/10/21 2016]   BP Temp Temp Source Pulse Resp SpO2 Height Weight   (!) 210/101 98.4 °F (36.9 °C) Oral 85 18 100 % 6' 3\" (1.905 m) --     GENERAL APPEARANCE:  Well-developed, well-nourished, no acute distress. HEAD:  NC/AT. EYES:  Sclera anicteric. ENT:  Ears, nose, mouth normal.     NECK:  Supple. CARDIO:  RRR. LUNGS:   Wheezing/rhonchi bilaterally. Respirations unlabored. ABDOMEN:  Soft, non-distended, non-tender. BS active. EXTREMITIES:  No acute deformities. SKIN:  Warm and dry. NEUROLOGICAL:  Alert and oriented. PSYCHIATRIC:  Normal mood.      Diagnostics     Labs:  Labs Reviewed   RESPIRATORY PANEL, MOLECULAR, WITH COVID-19 - Abnormal; Notable for the following components:       Result Value    Parainfluenza 2 PCR DETECTED BY PCR (*)     All other components within normal limits   CBC WITH AUTO DIFFERENTIAL - Abnormal; Notable for the following components:    RBC 4.25 (*)     Hemoglobin 12.6 (*)     Hematocrit 39.2 (*)     Segs Relative 72.6 (*)     Lymphocytes % 18.2 (*)     Monocytes % 8.3 (*)     Immature Neutrophil % 0.7 (*)     All other components within normal limits   COMPREHENSIVE METABOLIC PANEL W/ REFLEX TO MG FOR COMPARISON: Chest radiograph 10/07/2021. CT chest angiogram 08/24/2020. HISTORY: ORDERING SYSTEM PROVIDED HISTORY: chest pain, recent pneumonia diagnosis TECHNOLOGIST PROVIDED HISTORY: Reason for exam:->chest pain, recent pneumonia diagnosis Decision Support Exception - unselect if not a suspected or confirmed emergency medical condition->Emergency Medical Condition (MA) Reason for Exam: chest pain Acuity: Acute Type of Exam: Initial FINDINGS: Pulmonary Arteries: Pulmonary arteries are adequately opacified for evaluation. No evidence of intraluminal filling defect to suggest pulmonary embolism. Main pulmonary artery is normal in caliber. Mediastinum: The thoracic aorta is normal in caliber with mild calcific plaquing. Status post CABG. The heart is normal in size. No pericardial effusion. The mediastinal esophagus and visualized aspects of the thyroid gland are unremarkable. No lymphadenopathy. Lungs/pleura: The central airways are patent. No pleural effusion or pneumothorax. Mild emphysematous changes. No consolidation or interlobular septal thickening. Upper Abdomen: Limited images of the upper abdomen are unremarkable. Soft Tissues/Bones: Status post median sternotomy. No acute osseous or soft tissue abnormality. 1. No pulmonary embolism or other acute process in the chest. 2. Mild emphysematous changes. 3. Status post CABG     Procedures/EKG:   Please see attending physician's note for interpretation. ED Course and MDM   -  Patient seen and evaluated in the emergency department. -  Triage and nursing notes reviewed and incorporated. -  Old chart records reviewed and incorporated. -  Supervising physician was Dr. Boy Perez. Patient was seen independently. -  Work-up included:  See above  -  ED medications:  Benadryl, albuterol, Solumedrol, aspirin  -  Results discussed with patient. Respiratory panel is positive for parainfluenza virus. No PE on CTA.   Troponin is 0.015--patient appears to have a chronically elevated troponin and this is improved from previous. Cr bumped at 1.4. BNP 2126. Lactic 2.3. Patient's HEART score is a 5, so I recommend admission for further evaluation. However, patient refuses. He voiced understanding that he is high risk for adverse outcome and that he has not had a complete evaluation. He refuses to stay for delta troponin as well. He states he lives nearby, so he will just return if symptoms worsen. Requesting something to help him sleep--given a dose of Benadryl here and will dc home with Guaifenesin-codeine cough syrup and refill an inhaler. He is agreeable with plan of care and disposition.  -  Disposition:  Informed discharge home    In light of current events, I did utilize appropriate PPE (including N95 and surgical face mask, safety glasses, and gloves, as recommended by the health facility/national standard best practice, during my bedside interactions with the patient. Final Impression      1. Chest pain, unspecified type    2. Parainfluenza virus pneumonia    3.  Essential hypertension            DISPOSITION        Brayden Olsen PA-C  [address] [place] [address]  10/11/21 0030

## 2021-10-11 NOTE — ED PROVIDER NOTES
EKG Interpretation    Interpreted by emergency department physician from October 10 at 2012    Rhythm: normal sinus   Rate: normal  Axis: normal  Ectopy: none  Conduction: normal  ST Segments: no acute change  T Waves: no acute change  Q Waves: none    Clinical Impression: Normal sinus rhythm with a rate of 79 and some motion artifact, no clear ischemia or ectopy    MD Carolina Gupta MD  10/11/21 [id number]

## 2021-10-25 ENCOUNTER — HOSPITAL ENCOUNTER (OUTPATIENT)
Age: 61
Discharge: HOME | End: 2021-10-25
Payer: COMMERCIAL

## 2021-10-25 ENCOUNTER — HOSPITAL ENCOUNTER (OUTPATIENT)
Dept: GENERAL RADIOLOGY | Age: 61
Discharge: HOME | End: 2021-10-25
Payer: COMMERCIAL

## 2021-10-25 DIAGNOSIS — J18.9 PNEUMONIA OF BOTH LUNGS DUE TO INFECTIOUS ORGANISM, UNSPECIFIED PART OF LUNG: ICD-10-CM

## 2021-10-25 PROCEDURE — 71046 X-RAY EXAM CHEST 2 VIEWS: CPT

## 2021-10-28 ENCOUNTER — ANTI-COAG VISIT (OUTPATIENT)
Dept: PHARMACY | Age: 61
End: 2021-10-28
Payer: COMMERCIAL

## 2021-10-28 DIAGNOSIS — I48.11 LONGSTANDING PERSISTENT ATRIAL FIBRILLATION (HCC): ICD-10-CM

## 2021-10-28 DIAGNOSIS — I48.91 ATRIAL FIBRILLATION WITH RAPID VENTRICULAR RESPONSE (HCC): Primary | ICD-10-CM

## 2021-10-28 LAB
INTERNATIONAL NORMALIZATION RATIO, POC: 2.9
POC INR: 2.9 INDEX
PROTHROMBIN TIME, POC: 35.1 SECONDS (ref 10–14.3)

## 2021-10-28 PROCEDURE — 99211 OFF/OP EST MAY X REQ PHY/QHP: CPT

## 2021-10-28 PROCEDURE — 36416 COLLJ CAPILLARY BLOOD SPEC: CPT

## 2021-10-28 PROCEDURE — 85610 PROTHROMBIN TIME: CPT

## 2021-10-28 RX ORDER — HYDROXYZINE PAMOATE 50 MG/1
50 CAPSULE ORAL NIGHTLY
COMMUNITY
Start: 2021-10-15 | End: 2021-10-28

## 2021-10-28 NOTE — PROGRESS NOTES
Medication Management Service  [facility]  [phone]    Visit Date: 10/28/2021   Subjective:       Latesha Conner is a 62 y.o. male who presents to clinic today for anticoagulation monitoring and adjustment. Patient seen in clinic for warfarin management due to  Indication:   atrial fibrillation. INR goal: of 2.0-3.0. Duration of therapy: indefinite. Patient reports the following:   Adherent with regimen  Missed or extra doses:  None   Bleeding or thromboembolic side effects:  None  Significant medication changes:  None  Significant dietary changes: None  Significant alcohol or tobacco changes: None  Significant recent illness, disease state changes, or hospitalization:  None  Upcoming surgeries or procedures:  None  Falls: None           Assessment and Plan     PT/INR done in office per protocol. INR today is 2.9, therapeutic. Plan: Will continue current regimen of warfarin 4mg daily except 6mg on Sundays. Recheck INR in 5 week(s). Patient verbalized understanding of dosing directions and information discussed. Dosing schedule given to patient including phone number, appointment date, and time. Progress note sent to referring office. Patient acknowledges working in consult agreement with pharmacist as referred by his/her physician.       Electronically signed by Olimpia Pulido [facility] on 10/28/21 at 8:08 AM EDT    For Pharmacy [address] Intervention Detail:    Total # of Interventions Recommended:    Total # of Interventions Accepted:    Time Spent (min): 15

## 2021-12-02 ENCOUNTER — ANTI-COAG VISIT (OUTPATIENT)
Dept: PHARMACY | Age: 61
End: 2021-12-02
Payer: COMMERCIAL

## 2021-12-02 DIAGNOSIS — I48.91 ATRIAL FIBRILLATION WITH RAPID VENTRICULAR RESPONSE (HCC): Primary | ICD-10-CM

## 2021-12-02 DIAGNOSIS — I48.11 LONGSTANDING PERSISTENT ATRIAL FIBRILLATION (HCC): ICD-10-CM

## 2021-12-02 LAB
INTERNATIONAL NORMALIZATION RATIO, POC: 3
POC INR: 3 INDEX
PROTHROMBIN TIME, POC: 36.6 SECONDS (ref 10–14.3)

## 2021-12-02 PROCEDURE — 99211 OFF/OP EST MAY X REQ PHY/QHP: CPT

## 2021-12-02 PROCEDURE — 85610 PROTHROMBIN TIME: CPT

## 2021-12-02 PROCEDURE — 36416 COLLJ CAPILLARY BLOOD SPEC: CPT

## 2021-12-02 NOTE — PROGRESS NOTES
Medication Management Service  [facility]  [phone]    Visit Date: 12/2/2021   Subjective:       Jhon Perez is a 62 y.o. male who presents to clinic today for anticoagulation monitoring and adjustment. Patient seen in clinic for warfarin management due to  Indication:   atrial fibrillation. INR goal: of 2.0-3.0. Duration of therapy: indefinite. Patient reports the following:   Adherent with regimen  Missed or extra doses:  None   Bleeding or thromboembolic side effects:  None  Significant medication changes:  None  Significant dietary changes: None  Significant alcohol or tobacco changes: None  Significant recent illness, disease state changes, or hospitalization:  None  Upcoming surgeries or procedures:  None  Falls: None           Assessment and Plan     PT/INR done in office per protocol. INR today is 3.0, therapeutic. Plan: Will continue current regimen of warfarin 4mg daily except 6mg on Sundays. Recheck INR in 4 week(s). Patient verbalized understanding of dosing directions and information discussed. Dosing schedule given to patient including phone number, appointment date, and time. Progress note sent to referring office. Patient acknowledges working in consult agreement with pharmacist as referred by his/her physician.       Electronically signed by Bernard Slaughter [address] [place] [address] on 12/2/21 at 9:10 AM EST    For [address] [place] [address] Intervention Detail:    Total # of Interventions Recommended:    Total # of Interventions Accepted:    Time Spent (min): 15

## 2021-12-04 DIAGNOSIS — I10 ESSENTIAL HYPERTENSION: ICD-10-CM

## 2021-12-04 DIAGNOSIS — I25.5 ISCHEMIC CARDIOMYOPATHY: ICD-10-CM

## 2021-12-04 DIAGNOSIS — I48.11 LONGSTANDING PERSISTENT ATRIAL FIBRILLATION (HCC): ICD-10-CM

## 2021-12-06 ENCOUNTER — APPOINTMENT (OUTPATIENT)
Dept: GENERAL RADIOLOGY | Age: 61
End: 2021-12-06
Payer: COMMERCIAL

## 2021-12-06 ENCOUNTER — HOSPITAL ENCOUNTER (EMERGENCY)
Age: 61
Discharge: HOME | End: 2021-12-06
Attending: EMERGENCY MEDICINE
Payer: COMMERCIAL

## 2021-12-06 VITALS
HEART RATE: 65 BPM | BODY MASS INDEX: 30.84 KG/M2 | WEIGHT: 248 LBS | DIASTOLIC BLOOD PRESSURE: 99 MMHG | OXYGEN SATURATION: 97 % | SYSTOLIC BLOOD PRESSURE: 197 MMHG | RESPIRATION RATE: 20 BRPM | TEMPERATURE: 98.2 F | HEIGHT: 75 IN

## 2021-12-06 DIAGNOSIS — I50.9 ACUTE ON CHRONIC CONGESTIVE HEART FAILURE, UNSPECIFIED HEART FAILURE TYPE (HCC): Primary | ICD-10-CM

## 2021-12-06 LAB
ALBUMIN SERPL-MCNC: 3.8 GM/DL (ref 3.4–5)
ALP BLD-CCNC: 93 IU/L (ref 40–129)
ALT SERPL-CCNC: 14 U/L (ref 10–40)
ANION GAP SERPL CALCULATED.3IONS-SCNC: 11 MMOL/L (ref 4–16)
AST SERPL-CCNC: 13 IU/L (ref 15–37)
BASOPHILS ABSOLUTE: 0.1 K/CU MM
BASOPHILS RELATIVE PERCENT: 0.8 % (ref 0–1)
BILIRUB SERPL-MCNC: 0.3 MG/DL (ref 0–1)
BUN BLDV-MCNC: 20 MG/DL (ref 6–23)
CALCIUM SERPL-MCNC: 9 MG/DL (ref 8.3–10.6)
CHLORIDE BLD-SCNC: 106 MMOL/L (ref 99–110)
CO2: 23 MMOL/L (ref 21–32)
CREAT SERPL-MCNC: 1.4 MG/DL (ref 0.9–1.3)
DIFFERENTIAL TYPE: ABNORMAL
EKG ATRIAL RATE: 67 BPM
EKG DIAGNOSIS: NORMAL
EKG P AXIS: 26 DEGREES
EKG P-R INTERVAL: 182 MS
EKG Q-T INTERVAL: 452 MS
EKG QRS DURATION: 98 MS
EKG QTC CALCULATION (BAZETT): 477 MS
EKG R AXIS: 0 DEGREES
EKG T AXIS: 115 DEGREES
EKG VENTRICULAR RATE: 67 BPM
EOSINOPHILS ABSOLUTE: 0.6 K/CU MM
EOSINOPHILS RELATIVE PERCENT: 6 % (ref 0–3)
GFR AFRICAN AMERICAN: >60 ML/MIN/1.73M2
GFR NON-AFRICAN AMERICAN: 52 ML/MIN/1.73M2
GLUCOSE BLD-MCNC: 60 MG/DL (ref 70–99)
HCT VFR BLD CALC: 36.4 % (ref 42–52)
HEMOGLOBIN: 11.4 GM/DL (ref 13.5–18)
IMMATURE NEUTROPHIL %: 0.1 % (ref 0–0.43)
INR BLD: 2.18 INDEX
LYMPHOCYTES ABSOLUTE: 1.8 K/CU MM
LYMPHOCYTES RELATIVE PERCENT: 18.8 % (ref 24–44)
MCH RBC QN AUTO: 28.7 PG (ref 27–31)
MCHC RBC AUTO-ENTMCNC: 31.3 % (ref 32–36)
MCV RBC AUTO: 91.7 FL (ref 78–100)
MONOCYTES ABSOLUTE: 0.8 K/CU MM
MONOCYTES RELATIVE PERCENT: 8.2 % (ref 0–4)
NUCLEATED RBC %: 0 %
PDW BLD-RTO: 13.8 % (ref 11.7–14.9)
PLATELET # BLD: 240 K/CU MM (ref 140–440)
PMV BLD AUTO: 10.5 FL (ref 7.5–11.1)
POTASSIUM SERPL-SCNC: 3.9 MMOL/L (ref 3.5–5.1)
PRO-BNP: 2917 PG/ML
PROTHROMBIN TIME: 28.4 SECONDS (ref 11.7–14.5)
RBC # BLD: 3.97 M/CU MM (ref 4.6–6.2)
SEGMENTED NEUTROPHILS ABSOLUTE COUNT: 6.2 K/CU MM
SEGMENTED NEUTROPHILS RELATIVE PERCENT: 66.1 % (ref 36–66)
SODIUM BLD-SCNC: 140 MMOL/L (ref 135–145)
TOTAL IMMATURE NEUTOROPHIL: 0.01 K/CU MM
TOTAL NUCLEATED RBC: 0 K/CU MM
TOTAL PROTEIN: 6.5 GM/DL (ref 6.4–8.2)
TROPONIN T: <0.01 NG/ML
WBC # BLD: 9.3 K/CU MM (ref 4–10.5)

## 2021-12-06 PROCEDURE — 99282 EMERGENCY DEPT VISIT SF MDM: CPT

## 2021-12-06 PROCEDURE — 84484 ASSAY OF TROPONIN QUANT: CPT

## 2021-12-06 PROCEDURE — 93010 ELECTROCARDIOGRAM REPORT: CPT | Performed by: INTERNAL MEDICINE

## 2021-12-06 PROCEDURE — 93005 ELECTROCARDIOGRAM TRACING: CPT | Performed by: EMERGENCY MEDICINE

## 2021-12-06 PROCEDURE — 71045 X-RAY EXAM CHEST 1 VIEW: CPT

## 2021-12-06 PROCEDURE — 83880 ASSAY OF NATRIURETIC PEPTIDE: CPT

## 2021-12-06 PROCEDURE — 85610 PROTHROMBIN TIME: CPT

## 2021-12-06 PROCEDURE — 85025 COMPLETE CBC W/AUTO DIFF WBC: CPT

## 2021-12-06 PROCEDURE — 80053 COMPREHEN METABOLIC PANEL: CPT

## 2021-12-06 RX ORDER — CARVEDILOL 25 MG/1
25 TABLET ORAL 2 TIMES DAILY
Qty: 60 TABLET | Refills: 5 | Status: SHIPPED | OUTPATIENT
Start: 2021-12-06 | End: 2022-05-19

## 2021-12-06 ASSESSMENT — PAIN DESCRIPTION - PAIN TYPE: TYPE: ACUTE PAIN

## 2021-12-06 ASSESSMENT — PAIN DESCRIPTION - DESCRIPTORS: DESCRIPTORS: SHOOTING

## 2021-12-06 ASSESSMENT — PAIN SCALES - GENERAL: PAINLEVEL_OUTOF10: 7

## 2021-12-06 ASSESSMENT — PAIN DESCRIPTION - ORIENTATION: ORIENTATION: LEFT;RIGHT

## 2021-12-06 ASSESSMENT — PAIN DESCRIPTION - LOCATION: LOCATION: LEG

## 2021-12-06 NOTE — ED TRIAGE NOTES
Pt presents with a variety of chief complaints. Pt states he was prompted to come in tonight after he woke up to shooting pain in his legs. Pt notes he has had worsening shortness of breath for approximately x3 weeks and notes he has a hx of COPD, CHF. Pt states \"I've been meaning to come in and get a breathing treatment\". Pt notes that he had some shooting bilateral arm pain before he went to sleep last night and stated he took some nitro. . he notes the arm pain went away prior to him checking in. When asked if he had been having CP as well, pt states he did have some CP at the time but is no longer having CP. Pt denies any new redness or swelling in his legs. Pt thinks his leg pain may be secondary to his diabetes.

## 2021-12-06 NOTE — ED NOTES
Patient states he has COPD and shortness of breath.  Has pain in bilateral arms- sort of a throbbing pain going down and both legs are in pain

## 2021-12-06 NOTE — ED NOTES
Pt states the last time he checked his blood sugar was about a month ago.      Ceferino Irving RN  12/06/21 [id number]

## 2021-12-06 NOTE — ED PROVIDER NOTES
Triage Chief Complaint:   Leg Pain (bilateral shooting leg pain) and Shortness of Breath (x 3 weeks)    [place]:  Rick Sims is a 62 y.o. male that presents with main complaint of leg pain. The patient states that he woke up this morning with shooting pains in his shins bilaterally. States that he gets these pains every once in a while by the time he came to the hospital the pain was gone. States it is sharp/burning and feels like his feet are swollen. States that he does have what he thinks is neuropathy in his legs secondary to diabetes. Also states that over the past month he has had some worsening shortness of breath especially with exertion and lying flat. He has resorted to sleeping in his recliner. Denies any swelling in his lower extremities and he has been compliant with his Lasix. Had an episode of chest pain last night that he took nitroglycerin for with relief. States that he does not take nitroglycerin or have chest pain on a regular basis. Denies any fever, cough, abdominal pain, nausea, vomiting, diarrhea. ROS:  At least 10 systems reviewed and otherwise acutely negative except as in the [address] Past Medical History:   Diagnosis Date    Abnormal nuclear stress test     Atrial fibrillation ([address] 11/2013    CAD (coronary artery disease)     Follows with Dr. Diogenes Brown CHF (congestive heart failure) ([address]     COPD (chronic obstructive pulmonary disease) ([address]     Follows with PCP    Decreased cardiac ejection fraction     Diabetes mellitus ([address]     GERD (gastroesophageal reflux disease)     H/O cardiovascular stress test 11/20/13, 3/20/2013    11/13-Observed defect is consistent with diaphragmatic attenuation. EF 58%. 3/13 -EF 51%. No ischemia. Normal Study.     H/O cardiovascular stress test 06/08/2017    EF 50% normal study    H/O cardiovascular stress test 06/17/2019    Normal NM    H/O cardiovascular stress test 03/02/2021    depressed lvedf increased edv myocardial perfusion abnormal stress test    H/O Doppler ultrasound 10/14/2010    10/2010-Bilateral venous doppler of lower extremies- No DVTs.  H/O echocardiogram 06/26/2019    Left ventricular systolic function is normal with an ejection fraction of 50-55%. Mild concentric left ventricular hypertrophy. Grade I diastolic dysfunction. No significant valvular disease noted. No evidence of pericardial effusion. Essentially unremarkable echo.  H/O percutaneous left heart catheterization 12/22/2015    No evidence of hemodynamically significant coronary artery disease    Heart imaging 04/23/2020    muga - ef 58%    History of coronary artery stent placement 11/13/2013 11/13 -RCA - 3 stents placed    History of exercise stress test 06/08/2017    treadmill    History of Holter monitoring 01/04/2016    predominant rhythm sinus    History of MRI of brain and brain stem 06/04/2020    No evidence of an acute infarct, mod chronic microvascular white matter ischemic disease with associated cerebral parenchymal volume loss    History of nuclear stress test 02/24/2020    EF 38%,Myocardial perfusion scan shows moderate size, severe intensity, non  reversible perfusion defect in inferior wall.  Hyperlipidemia     Hypertension     Follows with PCP    Kidney disease     Dr. David Larkin S/P cardiac catheterization 11/13/2013 11/13/2013-EF 55%, distal LAD mild disease,CX stent patent,but distal to stent 50% stenosis. RCA severe disease.  SOB (shortness of breath)     Status post angioplasty with stent     Wears dentures     full upper denture     Past Surgical History:   Procedure Laterality Date    APPENDECTOMY      BIOPSY / LIGATION TEMPORAL ARTERY Left 8/17/2020    LEFT TEMPORAL ARTERY BIOPSY LIGATION performed by Steve Carney MD at [facility]  11/13/2013 11/13/2013-EF 55%, distal LAD mild disease,CX stent patent,but distal to stent 50% stenosis. RCA severe disease.    [address] 07/03/2020    CABG X 3 LIMA-OM-PDA-LAD, Maze, LT Atrial Appendage clipping    COLONOSCOPY      CORONARY ANGIOPLASTY WITH STENT PLACEMENT      4 stents- RCA X3; CX X1    CORONARY ANGIOPLASTY WITH STENT PLACEMENT  11/13/2013 11/13/2013 -3 stents placed to RCA- Dr Angeles Viera N/A 7/3/2020    INTRAOPERATIVE ISMAEL, CABG X3   WITH LIMA  AND LEFT LEG ENDOVEIN HARVEST, INDUCED HYPOTHERMIA, MAZE BIPOLAR AND CRYO PROCEDURE, LEFT ATRIAL APPENDAGE CLIPPING performed by Abel Chavez MD at [facility]      all teeth extracted    FEMUR FRACTURE SURGERY  1984    [facility]     Family History   Problem Relation Age of Onset    Cancer Mother     Diabetes Mother     Heart Disease Mother     High Blood Pressure Mother     Stroke Mother     Cancer Father     Heart Disease Father     High Blood Pressure Father     Stroke Father     Cancer Brother      Social History     Socioeconomic History    Marital status: Legally      Spouse name: Not on file    Number of children: Not on file    Years of education: Not on file    Highest education level: Not on file   Occupational History    Occupation:    Tobacco Use    Smoking status: Current Every Day Smoker     Packs/day: 1.00     Years: 46.00     Pack years: 46.00     Types: Cigarettes     Start date: 1974    Smokeless tobacco: Never Used    Tobacco comment: Smokes approx 1 pack per day   Vaping Use    Vaping Use: Never used   Substance and Sexual Activity    Alcohol use: No     Comment: caffeine 6 bottles of unsweet tea a day    Drug use: Not Currently     Types: Cocaine     Comment: Last used: 7/1/20    Sexual activity: Yes     Partners: Female   Other Topics Concern    Not on file   Social History Narrative    Not on file     Social Determinants of Health     Financial Resource Strain:     Difficulty of Paying Living Expenses: Not on file   Food Insecurity:     Worried About Running Out of Food in the Last Year: Not on file    Ran Out of Food in the Last Year: Not on file   Transportation Needs:     Lack of Transportation (Medical): Not on file    Lack of Transportation (Non-Medical): Not on file   Physical Activity:     Days of Exercise per Week: Not on file    Minutes of Exercise per Session: Not on file   Stress:     Feeling of Stress : Not on file   Social Connections:     Frequency of Communication with Friends and Family: Not on file    Frequency of Social Gatherings with Friends and Family: Not on file    Attends [employer] Services: Not on file    Active Member of [address] [place] [address] [employer] or Organizations: Not on file    Attends Club or Organization Meetings: Not on file    Marital Status: Not on file   Intimate Partner Violence:     Fear of Current or Ex-Partner: Not on file    Emotionally Abused: Not on file    Physically Abused: Not on file    Sexually Abused: Not on file   Housing Stability:     Unable to Pay for Housing in the Last Year: Not on file    Number of [place] in the Last Year: Not on file    Unstable Housing in the Last Year: Not on file     No current facility-administered medications for this encounter.      Current Outpatient Medications   Medication Sig Dispense Refill    albuterol sulfate HFA (PROVENTIL HFA) 108 (90 Base) MCG/ACT inhaler Inhale 2 puffs into the lungs every 6 hours as needed for Wheezing or Shortness of Breath 18 g 0    guaiFENesin (ALTARUSSIN) 100 MG/5ML syrup Take 10 mLs by mouth every 4 hours as needed for Cough 120 mL 0    ibuprofen (IBU) 600 MG tablet Take 1 tablet by mouth every 6 hours as needed for Pain 20 tablet 0    warfarin (JANTOVEN) 4 MG tablet TAKE 1 TABLET BY MOUTH EVERY DAY, EXCEPT take 1&1/2 TABLETS ON SUNDAYS OR AS DIRECTED BY CLINIC 100 tablet 0    amLODIPine (NORVASC) 5 MG tablet Take 0.5 tablets by mouth daily 30 tablet 3    carvedilol (COREG) 25 MG tablet Take 1 tablet by mouth 2 times daily 60 tablet 3    chlorthalidone (HYGROTON) 25 MG tablet TAKE 1 TABLET BY MOUTH EVERY DAY      omeprazole (PRILOSEC) 20 MG delayed release capsule Take 20 mg by mouth 2 times daily      metFORMIN (GLUCOPHAGE) 500 MG tablet Take 1,000 mg by mouth 2 times daily      lisinopril (PRINIVIL;ZESTRIL) 40 MG tablet Take 1 tablet by mouth daily 90 tablet 3    nitroGLYCERIN (NITROSTAT) 0.4 MG SL tablet Place 1 tablet under the tongue every 5 minutes as needed for Chest pain 25 tablet 3    hydrOXYzine (VISTARIL) 50 MG capsule Take 50 mg by mouth nightly      atorvastatin (LIPITOR) 80 MG tablet Take 1 tablet by mouth nightly 30 tablet 3    glipiZIDE (GLUCOTROL) 10 MG tablet Take 1 tablet by mouth every morning (before breakfast) 60 tablet 3    furosemide (LASIX) 40 MG tablet Take 1 tablet by mouth 3 times daily (Patient taking differently: Take 40 mg by mouth 3 times daily Taking 2 times a day) 90 tablet 1    potassium chloride (KLOR-CON M) 20 MEQ extended release tablet Take 2 tablets by mouth 2 times daily 60 tablet 1    linagliptin (TRADJENTA) 5 MG tablet Take 1 tablet by mouth daily 30 tablet 3    clopidogrel (PLAVIX) 75 MG tablet Take 1 tablet by mouth daily 30 tablet 5     Allergies   Allergen Reactions    Hydromorphone Other (See Comments)     Hallucinations. Nursing Notes Reviewed    Physical Exam:  ED Triage Vitals [12/06/21 0315]   Enc Vitals Group      BP (!) 208/112      Pulse 69      Resp 17      Temp 98.2 °F (36.8 °C)      Temp Source Oral      SpO2 96 %      Weight 248 lb (112.5 kg)      Height 6' 3\" (1.905 m)      Head Circumference       Peak Flow       Pain Score       Pain Loc       Pain Edu? Excl. in [address]? GENERAL APPEARANCE: Awake and alert. Cooperative. No acute distress. HEAD: Normocephalic. Atraumatic. EYES: EOM's grossly intact. Sclera anicteric. ENT: Mucous membranes are moist. Tolerates saliva. No trismus. NECK: Supple. No meningismus. Trachea midline. HEART: RRR.  Radial pulses 2+.  LUNGS: Respirations unlabored. CTAB  ABDOMEN: Soft. Non-tender. No guarding or rebound. EXTREMITIES: No acute deformities. No edema. Palpable DP pulses. No tenderness to palpation of the lower extremities. SKIN: Warm and dry. NEUROLOGICAL: No gross facial drooping. Moves all 4 extremities spontaneously. PSYCHIATRIC: Normal mood.     I have reviewed and interpreted all of the currently available lab results from this visit (if applicable):  Results for orders placed or performed during the hospital encounter of 12/06/21   CBC Auto Differential   Result Value Ref Range    WBC 9.3 4.0 - 10.5 K/CU MM    RBC 3.97 (L) 4.6 - 6.2 M/CU MM    Hemoglobin 11.4 (L) 13.5 - 18.0 GM/DL    Hematocrit 36.4 (L) 42 - 52 %    MCV 91.7 78 - 100 FL    MCH 28.7 27 - 31 PG    MCHC 31.3 (L) 32.0 - 36.0 %    RDW 13.8 11.7 - 14.9 %    Platelets 164 329 - 574 K/CU MM    MPV 10.5 7.5 - 11.1 FL    Differential Type AUTOMATED DIFFERENTIAL     Segs Relative 66.1 (H) 36 - 66 %    Lymphocytes % 18.8 (L) 24 - 44 %    Monocytes % 8.2 (H) 0 - 4 %    Eosinophils % 6.0 (H) 0 - 3 %    Basophils % 0.8 0 - 1 %    Segs Absolute 6.2 K/CU MM    Lymphocytes Absolute 1.8 K/CU MM    Monocytes Absolute 0.8 K/CU MM    Eosinophils Absolute 0.6 K/CU MM    Basophils Absolute 0.1 K/CU MM    Nucleated RBC % 0.0 %    Total Nucleated RBC 0.0 K/CU MM    Total Immature Neutrophil 0.01 K/CU MM    Immature Neutrophil % 0.1 0 - 0.43 %   Comprehensive Metabolic Panel w/ Reflex to MG   Result Value Ref Range    Sodium 140 135 - 145 MMOL/L    Potassium 3.9 3.5 - 5.1 MMOL/L    Chloride 106 99 - 110 mMol/L    CO2 23 21 - 32 MMOL/L    BUN 20 6 - 23 MG/DL    CREATININE 1.4 (H) 0.9 - 1.3 MG/DL    Glucose 60 (L) 70 - 99 MG/DL    Calcium 9.0 8.3 - 10.6 MG/DL    Albumin 3.8 3.4 - 5.0 GM/DL    Total Protein 6.5 6.4 - 8.2 GM/DL    Total Bilirubin 0.3 0.0 - 1.0 MG/DL    ALT 14 10 - 40 U/L    AST 13 (L) 15 - 37 IU/L    Alkaline Phosphatase 93 40 - 129 IU/L    GFR Non- American 52 (L) >60 mL/min/1.73m2    GFR African American >60 >60 mL/min/1.73m2    Anion Gap 11 4 - 16   Troponin   Result Value Ref Range    Troponin T <0.010 <0.01 NG/ML   Brain Natriuretic Peptide   Result Value Ref Range    Pro-BNP 2,917 (H) <300 PG/ML   PT - INR   Result Value Ref Range    Protime 28.4 (H) 11.7 - 14.5 SECONDS    INR 2.18 INDEX      Radiographs (if obtained):  [] The following radiograph was interpreted by myself in the absence of a radiologist:  [x] Radiologist's Report Reviewed:    EKG (if obtained): (All EKG's are interpreted by myself in the absence of a cardiologist)  12 lead EKG as interpreted by me reveals normal sinus rhythm. Axis is normal. LVH. There are no ischemic ST elevations or other suspicious ST changes;  QRS interval is narrow, QT interval is not prolonged. Final interpretation: Normal sinus rhythm. MDM:  Plan of care is discussed thoroughly with the patient and family if present. If performed, all imaging and lab work also discussed with patient. All relevant prior results and chart reviewed if available. Presents as above. He is in no acute distress. He does have some crackles at the bases bilaterally on pulmonary auscultation. Denies any chest pain at this time. Vital signs significant for hypertension. He does not otherwise appear volume overloaded as he does not have any lower extremity edema. EKG without any ischemic changes. Patient's chest x-ray does show some evidence of pulmonary edema. Troponin is within normal limits. Metabolic work-up is largely unremarkable. Patient is not acutely anemic and he has no leukocytosis. I feel that given the patient's progressive symptoms over the past month he has some mild decompensated heart failure but is not at the point that he requires hospitalization at this time.   Encourage the patient to double his Lasix dose in the morning for the next 3 days, keep a log of his blood pressure at home and follow-up with cardiology soon as possible. He understands that he needs to return as soon as possible if he has any worsening shortness of breath, chest pain or other new symptoms. Patient discharged in good condition. Clinical Impression:  1.  Acute on chronic congestive heart failure, unspecified heart failure type ([address]      (Please note that portions of this note may have been completed with a voice recognition program. Efforts were made to edit the dictations but occasionally words are mis-transcribed.)    MD Hina Cordoba MD  12/06/21 [id number]

## 2021-12-09 ENCOUNTER — OFFICE VISIT (OUTPATIENT)
Dept: CARDIOLOGY CLINIC | Age: 61
End: 2021-12-09
Payer: COMMERCIAL

## 2021-12-09 VITALS
BODY MASS INDEX: 33 KG/M2 | DIASTOLIC BLOOD PRESSURE: 90 MMHG | HEIGHT: 75 IN | WEIGHT: 265.36 LBS | SYSTOLIC BLOOD PRESSURE: 170 MMHG

## 2021-12-09 DIAGNOSIS — I48.91 ATRIAL FIBRILLATION WITH RAPID VENTRICULAR RESPONSE (HCC): Primary | ICD-10-CM

## 2021-12-09 PROCEDURE — 4004F PT TOBACCO SCREEN RCVD TLK: CPT | Performed by: INTERNAL MEDICINE

## 2021-12-09 PROCEDURE — G8417 CALC BMI ABV UP PARAM F/U: HCPCS | Performed by: INTERNAL MEDICINE

## 2021-12-09 PROCEDURE — 3017F COLORECTAL CA SCREEN DOC REV: CPT | Performed by: INTERNAL MEDICINE

## 2021-12-09 PROCEDURE — 99214 OFFICE O/P EST MOD 30 MIN: CPT | Performed by: INTERNAL MEDICINE

## 2021-12-09 PROCEDURE — G8427 DOCREV CUR MEDS BY ELIG CLIN: HCPCS | Performed by: INTERNAL MEDICINE

## 2021-12-09 PROCEDURE — G8484 FLU IMMUNIZE NO ADMIN: HCPCS | Performed by: INTERNAL MEDICINE

## 2021-12-09 NOTE — PROGRESS NOTES
Ryan Leavitt MD        OFFICE  FOLLOWUP NOTE    Chief complaints: patient is here for management of CAD s/p CABG, DM, HTN, DYSLPIDEMIA, AFIB    F/u after ED visit: as per ED patient had CHF, probnp was 2100    Subjective: patient feels leg pain, no chest pain, some shortness of breath, no dizziness, no palpitations    HPI Oj Burton is a 62 y. o.year old who  has a past medical history of Abnormal nuclear stress test, Atrial fibrillation (HCC), CAD (coronary artery disease), CHF (congestive heart failure) ([facility] [address] COPD (chronic obstructive pulmonary disease) ([facility] [address] Decreased cardiac ejection fraction, Diabetes mellitus ([facility] [address] GERD (gastroesophageal reflux disease), H/O cardiovascular stress test, H/O cardiovascular stress test, H/O cardiovascular stress test, H/O cardiovascular stress test, H/O Doppler ultrasound, H/O echocardiogram, H/O percutaneous left heart catheterization, Heart imaging, History of coronary artery stent placement, History of exercise stress test, History of Holter monitoring, History of MRI of brain and brain stem, History of nuclear stress test, Hyperlipidemia, Hypertension, Kidney disease, S/P cardiac catheterization, SOB (shortness of breath), Status post angioplasty with stent, and Wears dentures.  and presents for management of CAD s/p CABG, DM, HTN, DYSLPIDEMIA, AFIB which are well controlled      Current Outpatient Medications   Medication Sig Dispense Refill    carvedilol (COREG) 25 MG tablet Take 1 tablet by mouth 2 times daily 60 tablet 5    albuterol sulfate HFA (PROVENTIL HFA) 108 (90 Base) MCG/ACT inhaler Inhale 2 puffs into the lungs every 6 hours as needed for Wheezing or Shortness of Breath 18 g 0    guaiFENesin (ALTARUSSIN) 100 MG/5ML syrup Take 10 mLs by mouth every 4 hours as needed for Cough 120 mL 0    warfarin (JANTOVEN) 4 MG tablet TAKE 1 TABLET BY MOUTH EVERY DAY, EXCEPT take 1&1/2 TABLETS ON SUNDAYS OR AS DIRECTED BY CLINIC 100 tablet 0    amLODIPine (NORVASC) 5 MG tablet Take 0.5 tablets by mouth daily 30 tablet 3    chlorthalidone (HYGROTON) 25 MG tablet TAKE 1 TABLET BY MOUTH EVERY DAY      omeprazole (PRILOSEC) 20 MG delayed release capsule Take 20 mg by mouth 2 times daily      metFORMIN (GLUCOPHAGE) 500 MG tablet Take 1,000 mg by mouth 2 times daily      lisinopril (PRINIVIL;ZESTRIL) 40 MG tablet Take 1 tablet by mouth daily 90 tablet 3    nitroGLYCERIN (NITROSTAT) 0.4 MG SL tablet Place 1 tablet under the tongue every 5 minutes as needed for Chest pain 25 tablet 3    hydrOXYzine (VISTARIL) 50 MG capsule Take 50 mg by mouth nightly      atorvastatin (LIPITOR) 80 MG tablet Take 1 tablet by mouth nightly 30 tablet 3    glipiZIDE (GLUCOTROL) 10 MG tablet Take 1 tablet by mouth every morning (before breakfast) 60 tablet 3    furosemide (LASIX) 40 MG tablet Take 1 tablet by mouth 3 times daily (Patient taking differently: Take 40 mg by mouth 3 times daily Taking 2 times a day) 90 tablet 1    potassium chloride (KLOR-CON M) 20 MEQ extended release tablet Take 2 tablets by mouth 2 times daily 60 tablet 1    linagliptin (TRADJENTA) 5 MG tablet Take 1 tablet by mouth daily 30 tablet 3    clopidogrel (PLAVIX) 75 MG tablet Take 1 tablet by mouth daily 30 tablet 5    ibuprofen (IBU) 600 MG tablet Take 1 tablet by mouth every 6 hours as needed for Pain (Patient not taking: Reported on 12/9/2021) 20 tablet 0     No current facility-administered medications for this visit.      Allergies: Hydromorphone  Past Medical History:   Diagnosis Date    Abnormal nuclear stress test     Atrial fibrillation ([address] 11/2013    CAD (coronary artery disease)     Follows with Dr. Divina Barrientos CHF (congestive heart failure) ([facility] [address]     COPD (chronic obstructive pulmonary disease) ([facility] [address]     Follows with PCP    Decreased cardiac ejection fraction     Diabetes mellitus ([facility] [address]     GERD (gastroesophageal reflux disease)     H/O cardiovascular stress test 11/20/13, 3/20/2013 11/13-Observed defect is consistent with diaphragmatic attenuation. EF 58%. 3/13 -EF 51%. No ischemia. Normal Study.  H/O cardiovascular stress test 06/08/2017    EF 50% normal study    H/O cardiovascular stress test 06/17/2019    Normal NM    H/O cardiovascular stress test 03/02/2021    depressed lvedf increased edv myocardial perfusion abnormal stress test    H/O Doppler ultrasound 10/14/2010    10/2010-Bilateral venous doppler of lower extremies- No DVTs.  H/O echocardiogram 06/26/2019    Left ventricular systolic function is normal with an ejection fraction of 50-55%. Mild concentric left ventricular hypertrophy. Grade I diastolic dysfunction. No significant valvular disease noted. No evidence of pericardial effusion. Essentially unremarkable echo.  H/O percutaneous left heart catheterization 12/22/2015    No evidence of hemodynamically significant coronary artery disease    Heart imaging 04/23/2020    muga - ef 58%    History of coronary artery stent placement 11/13/2013 11/13 -RCA - 3 stents placed    History of exercise stress test 06/08/2017    treadmill    History of Holter monitoring 01/04/2016    predominant rhythm sinus    History of MRI of brain and brain stem 06/04/2020    No evidence of an acute infarct, mod chronic microvascular white matter ischemic disease with associated cerebral parenchymal volume loss    History of nuclear stress test 02/24/2020    EF 38%,Myocardial perfusion scan shows moderate size, severe intensity, non  reversible perfusion defect in inferior wall.  Hyperlipidemia     Hypertension     Follows with PCP    Kidney disease     Dr. Carolyne Molina S/P cardiac catheterization 11/13/2013 11/13/2013-EF 55%, distal LAD mild disease,CX stent patent,but distal to stent 50% stenosis. RCA severe disease.     SOB (shortness of breath)     Status post angioplasty with stent     Wears dentures     full upper denture     Past Surgical History:   Procedure Laterality Date    APPENDECTOMY      BIOPSY / LIGATION TEMPORAL ARTERY Left 8/17/2020    LEFT TEMPORAL ARTERY BIOPSY LIGATION performed by Rony Black MD at [facility]  11/13/2013 11/13/2013-EF 55%, distal LAD mild disease,CX stent patent,but distal to stent 50% stenosis. RCA severe disease.     CARDIAC SURGERY  07/03/2020    CABG X 3 LIMA-OM-PDA-LAD, Maze, LT Atrial Appendage clipping    COLONOSCOPY      CORONARY ANGIOPLASTY WITH STENT PLACEMENT      4 stents- RCA X3; CX X1    CORONARY ANGIOPLASTY WITH STENT PLACEMENT  11/13/2013 11/13/2013 -3 stents placed to RCA- Dr Yolanda Hernandez N/A 7/3/2020    INTRAOPERATIVE ISMAEL, CABG X3   WITH LIMA  AND LEFT LEG ENDOVEIN HARVEST, INDUCED HYPOTHERMIA, MAZE BIPOLAR AND CRYO PROCEDURE, LEFT ATRIAL APPENDAGE CLIPPING performed by Ysabel Poole MD at [facility]      all teeth extracted    FEMUR FRACTURE SURGERY  1984    MANDIBLE FRACTURE SURGERY  1984     Family History   Problem Relation Age of Onset   [facility] Cancer Mother     Diabetes Mother     Heart Disease Mother     High Blood Pressure Mother     Stroke Mother     Cancer Father     Heart Disease Father     High Blood Pressure Father     Stroke Father     Cancer Brother      Social History     Tobacco Use    Smoking status: Current Every Day Smoker     Packs/day: 1.00     Years: 46.00     Pack years: 46.00     Types: Cigarettes     Start date: 1974    Smokeless tobacco: Never Used    Tobacco comment: Smokes approx 1 pack per day   Substance Use Topics    Alcohol use: No     Comment: caffeine 6 bottles of unsweet tea a day      [unfilled]  Review of Systems:   · Constitutional: No Fever or Weight Loss   · Eyes: No Decreased Vision  · ENT: No Headaches, Hearing Loss or Vertigo  · Cardiovascular: No chest pain, dyspnea on exertion, palpitations or loss of consciousness  · Respiratory: No cough or wheezing    · Gastrointestinal: No abdominal pain, appetite loss, blood in stools, constipation, diarrhea or heartburn  · Genitourinary: No dysuria, trouble voiding, or hematuria  · Musculoskeletal:  No gait disturbance, weakness or joint complaints  · Integumentary: No rash or pruritis  · Neurological: No TIA or stroke symptoms  · Psychiatric: No anxiety or depression  · Endocrine: No malaise, fatigue or temperature intolerance  · Hematologic/Lymphatic: No bleeding problems, blood clots or swollen lymph nodes  · Allergic/Immunologic: No nasal congestion or hives  All systems negative except as marked. Objective:  BP (!) 170/90   Ht 6' 3\" (1.905 m)   Wt 265 lb 5.8 oz (120.4 kg)   BMI 33.17 kg/m²   Wt Readings from Last 3 Encounters:   12/09/21 265 lb 5.8 oz (120.4 kg)   12/06/21 248 lb (112.5 kg)   10/07/21 260 lb (117.9 kg)     Body mass index is 33.17 kg/m². GENERAL - Alert, oriented, pleasant, in no apparent distress,normal grooming  HEENT - pupils are intact, cornea intact, conjunctive normal color, ears are normal in exam,  Neck - Supple. No jugular venous distention noted. No carotid bruits, no apical -carotid delay  Respiratory:  Normal breath sounds, No respiratory distress, No wheezing, No chest tenderness. ,no use of accessory muscles, diaphragm movement is normal  Cardiovascular: (PMI) apex non displaced,no lifts no thrills, no s3,no s4, Normal heart rate, Normal rhythm, No murmurs, No rubs, No gallops.  Carotid arteries pulse and amplitude are normal no bruit, no abdominal bruit noted ( normal abdominal aorta ausculation),   Extremities - No cyanosis, clubbing, or significant edema, no varicose veins    Abdomen - No masses, tenderness, or organomegaly, no hepato-splenomegally, no bruits  Musculoskeletal - No significant edema, no kyphosis or scoliosis, no deformity in any extremity noted, muscle strength and tone are normal  Skin: no ulcer,no scar,no stasis dermatitis   Neurologic - alert oriented times 3,Cranial nerves II through XII are grossly intact. There were no gross focal neurologic abnormalities. Psychiatric: normal mood and affect    Lab Results   Component Value Date    CKTOTAL 47 07/19/2020    CKMB 2.4 03/10/2013    TROPONINI 0.014 06/09/2014    TROPONINI 0.015 12/03/2011     BNP:    Lab Results   Component Value Date    BNP 17 03/09/2013     PT/INR:  No results found for: PTINR  Lab Results   Component Value Date    LABA1C 7.8 (H) 12/30/2020    LABA1C 8.9 (H) 06/29/2020     Lab Results   Component Value Date    CHOL 153 01/06/2020    TRIG 54 01/06/2020    HDL 43 01/06/2020    LDLDIRECT 111 (H) 01/06/2020     Lab Results   Component Value Date    ALT 14 12/06/2021    AST 13 (L) 12/06/2021     TSH:  No results found for: TSH    Impression:  Carmen Fonseca is a 62 y. o.year old who  has a past medical history of Abnormal nuclear stress test, Atrial fibrillation (HCC), CAD (coronary artery disease), CHF (congestive heart failure) ([facility] [address] COPD (chronic obstructive pulmonary disease) ([facility] [address] Decreased cardiac ejection fraction, Diabetes mellitus ([facility] [address] GERD (gastroesophageal reflux disease), H/O cardiovascular stress test, H/O cardiovascular stress test, H/O cardiovascular stress test, H/O cardiovascular stress test, H/O Doppler ultrasound, H/O echocardiogram, H/O percutaneous left heart catheterization, Heart imaging, History of coronary artery stent placement, History of exercise stress test, History of Holter monitoring, History of MRI of brain and brain stem, History of nuclear stress test, Hyperlipidemia, Hypertension, Kidney disease, S/P cardiac catheterization, SOB (shortness of breath), Status post angioplasty with stent, and Wears dentures. and presents with     Plan:  1. Shortness of breath: CXR reviewed, he continues to smoke, CXR is not diffierent than his last cxr, will not diganose with decompensated CHF, Pro BNP is mildly elevated, will follow up next month to decide for LASIX, for now recommend stop smoking  2.  CHEST PAIN: resolved,:s/p PCI of SVG TO RCA Graft at the end F December 2020,  WITH HISTORY OF  CABG Shanel Sheer LVEF was 36%, however ECHO was 50%, Continue aspirin , continue statins, betablockers,, continue LISINOPRIL TO 20MG, BB, POST CABG EKG IS NSR. continue PLAVIX,  CONTINUE PLAVIX AND COUMADIN,  3. He is not working. 4. Leg pain: it does not sound like intermittent claudication as it occurs with laying down and not with walking  5. DM: stable continue glipizide  6. Paroxysmal afib: continue COUAMDIN, off amidarone  7. Dyslipidemia: continue statins  8. HTN:stable, continue COREG INCREASE LISINOPRIL 20MG DAILY  9. Health maintenance: exerise and diet  All labs, medications and tests reviewed, continue all other medications of all above medical condition listed as is.

## 2021-12-30 ENCOUNTER — ANTI-COAG VISIT (OUTPATIENT)
Dept: PHARMACY | Age: 61
End: 2021-12-30
Payer: COMMERCIAL

## 2021-12-30 DIAGNOSIS — I48.11 LONGSTANDING PERSISTENT ATRIAL FIBRILLATION (HCC): ICD-10-CM

## 2021-12-30 DIAGNOSIS — I48.91 ATRIAL FIBRILLATION WITH RAPID VENTRICULAR RESPONSE (HCC): Primary | ICD-10-CM

## 2021-12-30 LAB
INR BLD: 2.5
POC INR: 2.5 INDEX
PROTHROMBIN TIME, POC: 30.2 SECONDS (ref 10–14.3)
PROTIME: 30.2 SECONDS

## 2021-12-30 PROCEDURE — 99211 OFF/OP EST MAY X REQ PHY/QHP: CPT

## 2021-12-30 PROCEDURE — 85610 PROTHROMBIN TIME: CPT

## 2021-12-30 PROCEDURE — 36416 COLLJ CAPILLARY BLOOD SPEC: CPT

## 2021-12-30 NOTE — PROGRESS NOTES
Medication Management Service  [facility]  [phone]    Visit Date: 12/30/2021   Subjective:       Nidia Zapata is a 62 y.o. male who presents to clinic today for anticoagulation monitoring and adjustment. Patient seen in clinic for warfarin management due to  Indication:   atrial fibrillation. INR goal: of 2.0-3.0. Duration of therapy: indefinite. Patient reports the following:   Adherent with regimen  Missed or extra doses:  None   Bleeding or thromboembolic side effects:  None  Significant medication changes:  None  Significant dietary changes: None  Significant alcohol or tobacco changes: None  Significant recent illness, disease state changes, or hospitalization:  None  Upcoming surgeries or procedures:  None  Falls: None           Assessment and Plan     PT/INR done in office per protocol. INR today is 2.5, therapeutic. Plan: Will continue current regimen of warfarin 4mg daily except 6mg Sundays. Recheck INR in 4 week(s). Patient verbalized understanding of dosing directions and information discussed. Dosing schedule given to patient including phone number, appointment date, and time. Progress note sent to referring office. Patient acknowledges working in consult agreement with pharmacist as referred by his/her physician.       Electronically signed by Lily Caceres [address] [place] [address] on 12/30/21 at 9:55 AM EST    For Pharmacy Admin Tracking Only     Total # of Interventions Recommended: 0   Total # of Interventions Accepted: 0   Time Spent (min): 15

## 2022-01-04 ENCOUNTER — HOSPITAL ENCOUNTER (EMERGENCY)
Age: 62
Discharge: HOME | End: 2022-01-05
Attending: EMERGENCY MEDICINE
Payer: COMMERCIAL

## 2022-01-04 ENCOUNTER — APPOINTMENT (OUTPATIENT)
Dept: GENERAL RADIOLOGY | Age: 62
End: 2022-01-04
Payer: COMMERCIAL

## 2022-01-04 VITALS
SYSTOLIC BLOOD PRESSURE: 211 MMHG | TEMPERATURE: 98.5 F | WEIGHT: 256 LBS | DIASTOLIC BLOOD PRESSURE: 99 MMHG | BODY MASS INDEX: 31.83 KG/M2 | HEART RATE: 82 BPM | RESPIRATION RATE: 20 BRPM | OXYGEN SATURATION: 96 % | HEIGHT: 75 IN

## 2022-01-04 DIAGNOSIS — U07.1 COVID-19: Primary | ICD-10-CM

## 2022-01-04 LAB
ALBUMIN SERPL-MCNC: 3.8 GM/DL (ref 3.4–5)
ALP BLD-CCNC: 107 IU/L (ref 40–128)
ALT SERPL-CCNC: 6 U/L (ref 10–40)
ANION GAP SERPL CALCULATED.3IONS-SCNC: 12 MMOL/L (ref 4–16)
AST SERPL-CCNC: 11 IU/L (ref 15–37)
BASOPHILS ABSOLUTE: 0.1 K/CU MM
BASOPHILS RELATIVE PERCENT: 1.2 % (ref 0–1)
BILIRUB SERPL-MCNC: 0.2 MG/DL (ref 0–1)
BUN BLDV-MCNC: 24 MG/DL (ref 6–23)
CALCIUM SERPL-MCNC: 9.3 MG/DL (ref 8.3–10.6)
CHLORIDE BLD-SCNC: 104 MMOL/L (ref 99–110)
CO2: 22 MMOL/L (ref 21–32)
CREAT SERPL-MCNC: 1.8 MG/DL (ref 0.9–1.3)
DIFFERENTIAL TYPE: ABNORMAL
EOSINOPHILS ABSOLUTE: 0.4 K/CU MM
EOSINOPHILS RELATIVE PERCENT: 4.7 % (ref 0–3)
GFR AFRICAN AMERICAN: 47 ML/MIN/1.73M2
GFR NON-AFRICAN AMERICAN: 39 ML/MIN/1.73M2
GLUCOSE BLD-MCNC: 117 MG/DL (ref 70–99)
HCT VFR BLD CALC: 37.9 % (ref 42–52)
HEMOGLOBIN: 12.1 GM/DL (ref 13.5–18)
IMMATURE NEUTROPHIL %: 0.2 % (ref 0–0.43)
LYMPHOCYTES ABSOLUTE: 0.8 K/CU MM
LYMPHOCYTES RELATIVE PERCENT: 9.9 % (ref 24–44)
MCH RBC QN AUTO: 28.1 PG (ref 27–31)
MCHC RBC AUTO-ENTMCNC: 31.9 % (ref 32–36)
MCV RBC AUTO: 88.1 FL (ref 78–100)
MONOCYTES ABSOLUTE: 0.7 K/CU MM
MONOCYTES RELATIVE PERCENT: 8.3 % (ref 0–4)
NUCLEATED RBC %: 0 %
PDW BLD-RTO: 13.7 % (ref 11.7–14.9)
PLATELET # BLD: 232 K/CU MM (ref 140–440)
PMV BLD AUTO: 10.3 FL (ref 7.5–11.1)
POTASSIUM SERPL-SCNC: 4 MMOL/L (ref 3.5–5.1)
RAPID INFLUENZA  B AGN: NEGATIVE
RAPID INFLUENZA A AGN: NEGATIVE
RBC # BLD: 4.3 M/CU MM (ref 4.6–6.2)
SARS-COV-2, NAAT: DETECTED
SEGMENTED NEUTROPHILS ABSOLUTE COUNT: 6.3 K/CU MM
SEGMENTED NEUTROPHILS RELATIVE PERCENT: 75.7 % (ref 36–66)
SODIUM BLD-SCNC: 138 MMOL/L (ref 135–145)
SOURCE: ABNORMAL
TOTAL IMMATURE NEUTOROPHIL: 0.02 K/CU MM
TOTAL NUCLEATED RBC: 0 K/CU MM
TOTAL PROTEIN: 6.9 GM/DL (ref 6.4–8.2)
TROPONIN T: <0.01 NG/ML
WBC # BLD: 8.3 K/CU MM (ref 4–10.5)

## 2022-01-04 PROCEDURE — 84484 ASSAY OF TROPONIN QUANT: CPT

## 2022-01-04 PROCEDURE — 6370000000 HC RX 637 (ALT 250 FOR IP): Performed by: PHYSICIAN ASSISTANT

## 2022-01-04 PROCEDURE — 99283 EMERGENCY DEPT VISIT LOW MDM: CPT

## 2022-01-04 PROCEDURE — 6370000000 HC RX 637 (ALT 250 FOR IP): Performed by: EMERGENCY MEDICINE

## 2022-01-04 PROCEDURE — 87804 INFLUENZA ASSAY W/OPTIC: CPT

## 2022-01-04 PROCEDURE — 71046 X-RAY EXAM CHEST 2 VIEWS: CPT

## 2022-01-04 PROCEDURE — 85025 COMPLETE CBC W/AUTO DIFF WBC: CPT

## 2022-01-04 PROCEDURE — 94640 AIRWAY INHALATION TREATMENT: CPT

## 2022-01-04 PROCEDURE — 87635 SARS-COV-2 COVID-19 AMP PRB: CPT

## 2022-01-04 PROCEDURE — 80053 COMPREHEN METABOLIC PANEL: CPT

## 2022-01-04 PROCEDURE — 93005 ELECTROCARDIOGRAM TRACING: CPT | Performed by: PHYSICIAN ASSISTANT

## 2022-01-04 RX ORDER — PREDNISONE 20 MG/1
60 TABLET ORAL ONCE
Status: COMPLETED | OUTPATIENT
Start: 2022-01-04 | End: 2022-01-04

## 2022-01-04 RX ORDER — BENZONATATE 100 MG/1
100 CAPSULE ORAL ONCE
Status: COMPLETED | OUTPATIENT
Start: 2022-01-05 | End: 2022-01-04

## 2022-01-04 RX ORDER — GUAIFENESIN/DEXTROMETHORPHAN 100-10MG/5
5 SYRUP ORAL ONCE
Status: COMPLETED | OUTPATIENT
Start: 2022-01-04 | End: 2022-01-04

## 2022-01-04 RX ORDER — ALBUTEROL SULFATE 90 UG/1
2 AEROSOL, METERED RESPIRATORY (INHALATION) ONCE
Status: COMPLETED | OUTPATIENT
Start: 2022-01-04 | End: 2022-01-04

## 2022-01-04 RX ORDER — BENZONATATE 100 MG/1
100 CAPSULE ORAL 3 TIMES DAILY PRN
Qty: 10 CAPSULE | Refills: 0 | Status: SHIPPED | OUTPATIENT
Start: 2022-01-04 | End: 2022-01-11

## 2022-01-04 RX ADMIN — Medication 2 PUFF: at 23:54

## 2022-01-04 RX ADMIN — ALBUTEROL SULFATE 2 PUFF: 90 AEROSOL, METERED RESPIRATORY (INHALATION) at 23:53

## 2022-01-04 RX ADMIN — BENZONATATE 100 MG: 100 CAPSULE ORAL at 23:56

## 2022-01-04 RX ADMIN — PREDNISONE 60 MG: 20 TABLET ORAL at 22:50

## 2022-01-04 RX ADMIN — GUAIFENESIN AND DEXTROMETHORPHAN 5 ML: 100; 10 SYRUP ORAL at 22:50

## 2022-01-05 LAB
EKG ATRIAL RATE: 74 BPM
EKG DIAGNOSIS: NORMAL
EKG P AXIS: 23 DEGREES
EKG P-R INTERVAL: 166 MS
EKG Q-T INTERVAL: 408 MS
EKG QRS DURATION: 94 MS
EKG QTC CALCULATION (BAZETT): 452 MS
EKG R AXIS: -1 DEGREES
EKG T AXIS: 99 DEGREES
EKG VENTRICULAR RATE: 74 BPM

## 2022-01-05 PROCEDURE — 93010 ELECTROCARDIOGRAM REPORT: CPT | Performed by: INTERNAL MEDICINE

## 2022-01-05 NOTE — ED PROVIDER NOTES
As provider-in-triage, I performed a medical screening history and physical exam on this patient. HISTORY OF PRESENT ILLNESS  Carlitos Degroot is a 62 y.o. male who presents to the emergency department today with worsening cough, shortness of breath, low-grade fever. Worsening over the last week. Smokes a pack of cigarettes a day, has emphysema, COPD. Juma Neighbours PHYSICAL EXAM  BP (!) 196/107   Pulse 74   Temp 98.5 °F (36.9 °C) (Oral)   Resp 20   Ht 6' 3\" (1.905 m)   Wt 256 lb (116.1 kg)   SpO2 98%   BMI 32.00 kg/m²     On exam, the patient appears in no acute distress. Speech is clear. Breathing is unlabored. Moves all extremities    Comment: Please note this report has been produced using speech recognition software and may contain errors related to that system including errors in grammar, punctuation, and spelling, as well as words and phrases that may be inappropriate. If there are any questions or concerns please feel free to contact the dictating provider for clarification.         LYNDSEY Weinberg  01/04/22 [id number]

## 2022-01-05 NOTE — ED PROVIDER NOTES
Triage Chief Complaint:   Shortness of Breath    [place]:  Alber Hirsch is a 62 y.o. male that presents with 1 week of worsening shortness of breath and productive cough. States that he has felt hot and chilled at times. He is also had some headaches. Denies any nausea, vomiting, diarrhea, chest pain, abdominal pain. No sick contacts or recent travel. States that his inhalers have not been helping at home. ROS:  At least 10 systems reviewed and otherwise acutely negative except as in the [address] Past Medical History:   Diagnosis Date    Abnormal nuclear stress test     Atrial fibrillation ([facility] 11/2013    CAD (coronary artery disease)     Follows with Dr. Sadia Braxton CHF (congestive heart failure) ([facility]     COPD (chronic obstructive pulmonary disease) ([facility]     Follows with PCP    Decreased cardiac ejection fraction     Diabetes mellitus ([facility]     GERD (gastroesophageal reflux disease)     H/O cardiovascular stress test 11/20/13, 3/20/2013    11/13-Observed defect is consistent with diaphragmatic attenuation. EF 58%. 3/13 -EF 51%. No ischemia. Normal Study.  H/O cardiovascular stress test 06/08/2017    EF 50% normal study    H/O cardiovascular stress test 06/17/2019    Normal NM    H/O cardiovascular stress test 03/02/2021    depressed lvedf increased edv myocardial perfusion abnormal stress test    H/O Doppler ultrasound 10/14/2010    10/2010-Bilateral venous doppler of lower extremies- No DVTs.  H/O echocardiogram 06/26/2019    Left ventricular systolic function is normal with an ejection fraction of 50-55%. Mild concentric left ventricular hypertrophy. Grade I diastolic dysfunction. No significant valvular disease noted. No evidence of pericardial effusion. Essentially unremarkable echo.     H/O percutaneous left heart catheterization 12/22/2015    No evidence of hemodynamically significant coronary artery disease    Heart imaging 04/23/2020    muga - ef 58%    History of coronary artery stent placement 11/13/2013 11/13 -RCA - 3 stents placed    History of exercise stress test 06/08/2017    treadmill    History of Holter monitoring 01/04/2016    predominant rhythm sinus    History of MRI of brain and brain stem 06/04/2020    No evidence of an acute infarct, mod chronic microvascular white matter ischemic disease with associated cerebral parenchymal volume loss    History of nuclear stress test 02/24/2020    EF 38%,Myocardial perfusion scan shows moderate size, severe intensity, non  reversible perfusion defect in inferior wall.  Hyperlipidemia     Hypertension     Follows with PCP    Kidney disease     Dr. Kortney Bocanegra S/P cardiac catheterization 11/13/2013 11/13/2013-EF 55%, distal LAD mild disease,CX stent patent,but distal to stent 50% stenosis. RCA severe disease.  SOB (shortness of breath)     Status post angioplasty with stent     Wears dentures     full upper denture     Past Surgical History:   Procedure Laterality Date    APPENDECTOMY      BIOPSY / LIGATION TEMPORAL ARTERY Left 8/17/2020    LEFT TEMPORAL ARTERY BIOPSY LIGATION performed by Starr Robles MD at [address]  11/13/2013 11/13/2013-EF 55%, distal LAD mild disease,CX stent patent,but distal to stent 50% stenosis. RCA severe disease.     CARDIAC SURGERY  07/03/2020    CABG X 3 LIMA-OM-PDA-LAD, Maze, LT Atrial Appendage clipping    COLONOSCOPY      CORONARY ANGIOPLASTY WITH STENT PLACEMENT      4 stents- RCA X3; CX X1    CORONARY ANGIOPLASTY WITH STENT PLACEMENT  11/13/2013 11/13/2013 -3 stents placed to RCA- Dr Fabiana Ott N/A 7/3/2020    INTRAOPERATIVE ISMAEL, CABG X3   WITH LIMA  AND LEFT LEG ENDOVEIN HARVEST, INDUCED HYPOTHERMIA, MAZE BIPOLAR AND CRYO PROCEDURE, LEFT ATRIAL APPENDAGE CLIPPING performed by Hilda Dailey MD at [address] [facility]      all teeth extracted   [address] Family History   Problem Relation Age of Onset    Cancer Mother     Diabetes Mother     Heart Disease Mother     High Blood Pressure Mother     Stroke Mother     Cancer Father     Heart Disease Father     High Blood Pressure Father     Stroke Father     Cancer Brother      Social History     Socioeconomic History    Marital status: Legally      Spouse name: Not on file    Number of children: Not on file    Years of education: Not on file    Highest education level: Not on file   Occupational History    Occupation:    Tobacco Use    Smoking status: Current Every Day Smoker     Packs/day: 1.00     Years: 46.00     Pack years: 46.00     Types: Cigarettes     Start date: 1974    Smokeless tobacco: Never Used    Tobacco comment: Smokes approx 1 pack per day   Vaping Use    Vaping Use: Never used   Substance and Sexual Activity    Alcohol use: No     Comment: caffeine 6 bottles of unsweet tea a day    Drug use: Not Currently     Types: Cocaine     Comment: Last used: 7/1/20    Sexual activity: Yes     Partners: Female   Other Topics Concern    Not on file   Social History Narrative    Not on file     Social Determinants of Health     Financial Resource Strain:     Difficulty of Paying Living Expenses: Not on file   Food Insecurity:     Worried About [address] in the Last Year: Not on file    Sivakumar of Food in the Last Year: Not on file   Transportation Needs:     Lack of Transportation (Medical): Not on file    Lack of Transportation (Non-Medical):  Not on file   Physical Activity:     Days of Exercise per Week: Not on file    Minutes of Exercise per Session: Not on file   Stress:     Feeling of Stress : Not on file   Social Connections:     Frequency of Communication with Friends and Family: Not on file    Frequency of Social Gatherings with Friends and Family: Not on file    Attends [employer] Services: Not on file   [employer]s or Organizations: Not on file    Attends Club or Organization Meetings: Not on file    Marital Status: Not on file   Intimate Partner Violence:     Fear of Current or Ex-Partner: Not on file    Emotionally Abused: Not on file    Physically Abused: Not on file    Sexually Abused: Not on file   Housing Stability:     Unable to Pay for Housing in the Last Year: Not on file    Number of [place] in the Last Year: Not on file    Unstable Housing in the Last Year: Not on file     No current facility-administered medications for this encounter.      Current Outpatient Medications   Medication Sig Dispense Refill    benzonatate (TESSALON PERLES) 100 MG capsule Take 1 capsule by mouth 3 times daily as needed for Cough 10 capsule 0    carvedilol (COREG) 25 MG tablet Take 1 tablet by mouth 2 times daily 60 tablet 5    albuterol sulfate HFA (PROVENTIL HFA) 108 (90 Base) MCG/ACT inhaler Inhale 2 puffs into the lungs every 6 hours as needed for Wheezing or Shortness of Breath 18 g 0    guaiFENesin (ALTARUSSIN) 100 MG/5ML syrup Take 10 mLs by mouth every 4 hours as needed for Cough 120 mL 0    ibuprofen (IBU) 600 MG tablet Take 1 tablet by mouth every 6 hours as needed for Pain (Patient not taking: Reported on 12/9/2021) 20 tablet 0    warfarin (JANTOVEN) 4 MG tablet TAKE 1 TABLET BY MOUTH EVERY DAY, EXCEPT take 1&1/2 TABLETS ON SUNDAYS OR AS DIRECTED BY CLINIC 100 tablet 0    amLODIPine (NORVASC) 5 MG tablet Take 0.5 tablets by mouth daily 30 tablet 3    chlorthalidone (HYGROTON) 25 MG tablet TAKE 1 TABLET BY MOUTH EVERY DAY      omeprazole (PRILOSEC) 20 MG delayed release capsule Take 20 mg by mouth 2 times daily      metFORMIN (GLUCOPHAGE) 500 MG tablet Take 1,000 mg by mouth 2 times daily      lisinopril (PRINIVIL;ZESTRIL) 40 MG tablet Take 1 tablet by mouth daily 90 tablet 3    nitroGLYCERIN (NITROSTAT) 0.4 MG SL tablet Place 1 tablet under the tongue every 5 minutes as needed for Chest pain 25 tablet 3  hydrOXYzine (VISTARIL) 50 MG capsule Take 50 mg by mouth nightly      atorvastatin (LIPITOR) 80 MG tablet Take 1 tablet by mouth nightly 30 tablet 3    glipiZIDE (GLUCOTROL) 10 MG tablet Take 1 tablet by mouth every morning (before breakfast) 60 tablet 3    furosemide (LASIX) 40 MG tablet Take 1 tablet by mouth 3 times daily (Patient taking differently: Take 40 mg by mouth 3 times daily Taking 2 times a day) 90 tablet 1    potassium chloride (KLOR-CON M) 20 MEQ extended release tablet Take 2 tablets by mouth 2 times daily 60 tablet 1    linagliptin (TRADJENTA) 5 MG tablet Take 1 tablet by mouth daily 30 tablet 3    clopidogrel (PLAVIX) 75 MG tablet Take 1 tablet by mouth daily 30 tablet 5     Allergies   Allergen Reactions    Hydromorphone Other (See Comments)     Hallucinations. Nursing Notes Reviewed    Physical Exam:  ED Triage Vitals   Enc Vitals Group      BP 01/04/22 2000 (!) 196/107      Pulse 01/04/22 2000 74      Resp 01/04/22 2000 20      Temp 01/04/22 2000 98.5 °F (36.9 °C)      Temp Source 01/04/22 2000 Oral      SpO2 01/04/22 2000 98 %      Weight 01/04/22 1955 256 lb (116.1 kg)      Height 01/04/22 1955 6' 3\" (1.905 m)      Head Circumference --       Peak Flow --       Pain Score --       Pain Loc --       Pain Edu? --       Excl. in [address]? --      GENERAL APPEARANCE: Awake and alert. Cooperative. No acute distress. HEAD: Normocephalic. Atraumatic. EYES: EOM's grossly intact. Sclera anicteric. ENT: Mucous membranes are moist. Tolerates saliva. No trismus. NECK: Supple. No meningismus. Trachea midline. HEART: RRR. Radial pulses 2+. LUNGS: Respirations unlabored. CTAB  ABDOMEN: Soft. Non-tender. No guarding or rebound. EXTREMITIES: No acute deformities. No edema  SKIN: Warm and dry. NEUROLOGICAL: No gross facial drooping. Moves all 4 extremities spontaneously. PSYCHIATRIC: Normal mood.     I have reviewed and interpreted all of the currently available lab results from this visit (if applicable):  Results for orders placed or performed during the hospital encounter of 01/04/22   COVID-19, Rapid    Specimen: Nasopharyngeal   Result Value Ref Range    Source THROAT     SARS-CoV-2, NAAT DETECTED (A) NOT DETECTED   Rapid Flu Swab    Specimen: Nasopharyngeal   Result Value Ref Range    Rapid Influenza A Ag NEGATIVE NEGATIVE    Rapid Influenza B Ag NEGATIVE NEGATIVE   CBC auto diff   Result Value Ref Range    WBC 8.3 4.0 - 10.5 K/CU MM    RBC 4.30 (L) 4.6 - 6.2 M/CU MM    Hemoglobin 12.1 (L) 13.5 - 18.0 GM/DL    Hematocrit 37.9 (L) 42 - 52 %    MCV 88.1 78 - 100 FL    MCH 28.1 27 - 31 PG    MCHC 31.9 (L) 32.0 - 36.0 %    RDW 13.7 11.7 - 14.9 %    Platelets 901 491 - 037 K/CU MM    MPV 10.3 7.5 - 11.1 FL    Differential Type AUTOMATED DIFFERENTIAL     Segs Relative 75.7 (H) 36 - 66 %    Lymphocytes % 9.9 (L) 24 - 44 %    Monocytes % 8.3 (H) 0 - 4 %    Eosinophils % 4.7 (H) 0 - 3 %    Basophils % 1.2 (H) 0 - 1 %    Segs Absolute 6.3 K/CU MM    Lymphocytes Absolute 0.8 K/CU MM    Monocytes Absolute 0.7 K/CU MM    Eosinophils Absolute 0.4 K/CU MM    Basophils Absolute 0.1 K/CU MM    Nucleated RBC % 0.0 %    Total Nucleated RBC 0.0 K/CU MM    Total Immature Neutrophil 0.02 K/CU MM    Immature Neutrophil % 0.2 0 - 0.43 %   CMP   Result Value Ref Range    Sodium 138 135 - 145 MMOL/L    Potassium 4.0 3.5 - 5.1 MMOL/L    Chloride 104 99 - 110 mMol/L    CO2 22 21 - 32 MMOL/L    BUN 24 (H) 6 - 23 MG/DL    CREATININE 1.8 (H) 0.9 - 1.3 MG/DL    Glucose 117 (H) 70 - 99 MG/DL    Calcium 9.3 8.3 - 10.6 MG/DL    Albumin 3.8 3.4 - 5.0 GM/DL    Total Protein 6.9 6.4 - 8.2 GM/DL    Total Bilirubin 0.2 0.0 - 1.0 MG/DL    ALT 6 (L) 10 - 40 U/L    AST 11 (L) 15 - 37 IU/L    Alkaline Phosphatase 107 40 - 128 IU/L    GFR Non- 39 (L) >60 mL/min/1.73m2    GFR  47 (L) >60 mL/min/1.73m2    Anion Gap 12 4 - 16   Troponin   Result Value Ref Range    Troponin T <0.010 <0.01 NG/ML      Radiographs (if obtained):  [] The following radiograph was interpreted by myself in the absence of a radiologist:  [x] Radiologist's Report Reviewed:    EKG (if obtained): (All EKG's are interpreted by myself in the absence of a cardiologist)  12 lead EKG as interpreted by me reveals sinus rhythm. Axis is normal. PVCs. There are no ischemic ST elevations or other suspicious ST changes, but does demonstrate nonspecific ST & T wave abnormality;  QRS interval is narrow, QT interval is not prolonged. Final Interpretation: Sinus rhythm with nonspecific ST & T wave abnormality. PVCs. MDM:  Plan of care is discussed thoroughly with the patient and family if present. If performed, all imaging and lab work also discussed with patient. All relevant prior results and chart reviewed if available. Patient presents as above. He is in no acute distress. Vital signs are normal.  He is not in respiratory distress. He does not appear volume overloaded. Presents with 1 week of headaches, cough, shortness of breath, chills. Covid test is positive here. Chest x-ray does not show any evidence of acute abnormality. Troponin is within normal limits metabolic work-up is otherwise largely unremarkable. I feel that symptoms are consistent with Covid diagnosis. He does not require admission for further management at this time. Is given Tessalon Perles for cough. Pulse oximeter provided for patient to monitor saturations at home and instructed return for worsening shortness of breath or hypoxia. Discharged in good condition. Clinical Impression:  1.  COVID-19      (Please note that portions of this note may have been completed with a voice recognition program. Efforts were made to edit the dictations but occasionally words are mis-transcribed.)    MD Dat Melton MD  01/05/22 2948

## 2022-01-05 NOTE — ED NOTES
Reviewed discharge information. Patient verbalized understanding of paperwork, medication, and care instructions. Patient denied any questions.      Christine Yu RN  01/05/22 0000

## 2022-01-06 ENCOUNTER — CARE COORDINATION (OUTPATIENT)
Dept: CARE COORDINATION | Age: 62
End: 2022-01-06

## 2022-01-06 NOTE — CARE COORDINATION
Patient contacted regarding COVID-19 diagnosis. Discussed COVID-19 related testing which was available at this time. Test results were positive. Patient informed of results, if available? Yes. Ambulatory Care Manager contacted the patient by telephone to perform post discharge assessment. Call within 2 business days of discharge: Yes. Verified name and  with patient as identifiers. Provided introduction to self, and explanation of the CTN/ACM role, and reason for call due to risk factors for infection and/or exposure to COVID-19. Symptoms reviewed with patient who verbalized the following symptoms: fatigue, cough, shortness of breath, chills or shaking and no worsening symptoms. Due to no new or worsening symptoms encounter was not routed to provider for escalation. Discussed follow-up appointments. If no appointment was previously scheduled, appointment scheduling offered: Yes. [facility] follow up appointment(s):   Future Appointments   Date Time Provider Shashi Barrios   2022  9:15 AM Anirudh   2022  2:40 PM Day Nuñez MD [facility]     Non-[facility] follow up appointment(s): N/A    Non-face-to-face services provided:  Obtained and reviewed discharge summary and/or continuity of care documents     Advance Care Planning:   Does patient have an Advance Directive:  decision maker updated. Educated patient about risk for severe COVID-19 due to risk factors according to CDC guidelines. ACM reviewed discharge instructions, medical action plan and red flag symptoms with the patient who verbalized understanding. Discussed COVID vaccination status: Yes. Education provided on COVID-19 vaccination as appropriate. Discussed exposure protocols and quarantine with CDC Guidelines.  Patient was given an opportunity to verbalize any questions and concerns and agrees to contact ACM or health care provider for questions related to their healthcare. Reviewed and educated patient on any new and changed medications related to discharge diagnosis     Was patient discharged with a pulse oximeter? No       ACM provided contact information. Plan for follow-up call in 5-7 days based on severity of symptoms and risk factors. ACM call to pt regarding ER / Covid follow up. Pt states he continues to have cough, fatigue, aches. Inquired about when he can obtain a negative covid test. Encouraged to reach out to PCP for instructions on when to have follow up and repeat testing. Has obtained prescribed meds and is isolating himself. Reviewed with pt:  -Covid test / isolation  -Scheduling PCP appt for follow up  -Staying hydrated, deep breathing exercises, ambulating as tolerated, resting  -Taking medications as prescribed  -When to return to ER -dyspnea  -Pt agreeable to ACM outreach next week.

## 2022-01-06 NOTE — ACP (ADVANCE CARE PLANNING)
Advance Care Planning   Healthcare Decision Maker:    Primary Decision Maker: Ha Paige - [facility] - [phone]    Click here to complete Healthcare Decision Makers including selection of the Healthcare Decision Maker Relationship (ie \"Primary\"). Today we documented Decision Maker(s) consistent with Legal Next of Kin hierarchy.

## 2022-01-14 ENCOUNTER — CARE COORDINATION (OUTPATIENT)
Dept: CARE COORDINATION | Age: 62
End: 2022-01-14

## 2022-01-14 DIAGNOSIS — I48.11 LONGSTANDING PERSISTENT ATRIAL FIBRILLATION (HCC): ICD-10-CM

## 2022-01-14 DIAGNOSIS — I48.91 ATRIAL FIBRILLATION WITH RAPID VENTRICULAR RESPONSE (HCC): Primary | ICD-10-CM

## 2022-01-14 NOTE — CARE COORDINATION
Second attempt. [facility] call to pt regarding ER / Covid follow up. No answer. Left message for pt requesting return call to AC. No further outreach to be completed.

## 2022-01-17 ENCOUNTER — TELEPHONE (OUTPATIENT)
Dept: PHARMACY | Age: 62
End: 2022-01-17

## 2022-01-17 DIAGNOSIS — I48.11 LONGSTANDING PERSISTENT ATRIAL FIBRILLATION (HCC): ICD-10-CM

## 2022-01-17 DIAGNOSIS — I48.91 ATRIAL FIBRILLATION WITH RAPID VENTRICULAR RESPONSE (HCC): Primary | ICD-10-CM

## 2022-01-17 RX ORDER — WARFARIN SODIUM 4 MG/1
TABLET ORAL
Qty: 100 TABLET | Refills: 1 | Status: SHIPPED | OUTPATIENT
Start: 2022-01-17 | End: 2022-07-07 | Stop reason: SDUPTHER

## 2022-01-17 NOTE — TELEPHONE ENCOUNTER
Patient left message requesting refills. Returned call. Patient states he only had 1/2 tablet (2mg) to take this morning. He plans to get refill today. Advised patient to take 6mg on 1/18/22 then return to warfarin 4mg daily except 6mg on Sundays. Confirmed next INR 1/27.      For Pharmacy Admin Tracking Only     Intervention Detail:    Total # of Interventions Recommended:    Total # of Interventions Accepted:    Time Spent (min): 5

## 2022-01-19 ENCOUNTER — HOSPITAL ENCOUNTER (EMERGENCY)
Age: 62
Discharge: HOME | End: 2022-01-19
Attending: EMERGENCY MEDICINE
Payer: COMMERCIAL

## 2022-01-19 ENCOUNTER — APPOINTMENT (OUTPATIENT)
Dept: GENERAL RADIOLOGY | Age: 62
End: 2022-01-19
Payer: COMMERCIAL

## 2022-01-19 VITALS
BODY MASS INDEX: 32.08 KG/M2 | WEIGHT: 258 LBS | DIASTOLIC BLOOD PRESSURE: 101 MMHG | SYSTOLIC BLOOD PRESSURE: 208 MMHG | RESPIRATION RATE: 15 BRPM | OXYGEN SATURATION: 98 % | HEIGHT: 75 IN | HEART RATE: 69 BPM | TEMPERATURE: 97.7 F

## 2022-01-19 DIAGNOSIS — J44.1 COPD EXACERBATION (HCC): Primary | ICD-10-CM

## 2022-01-19 DIAGNOSIS — I10 UNCONTROLLED HYPERTENSION: ICD-10-CM

## 2022-01-19 PROCEDURE — 71045 X-RAY EXAM CHEST 1 VIEW: CPT

## 2022-01-19 PROCEDURE — 6370000000 HC RX 637 (ALT 250 FOR IP): Performed by: EMERGENCY MEDICINE

## 2022-01-19 PROCEDURE — 6360000002 HC RX W HCPCS: Performed by: EMERGENCY MEDICINE

## 2022-01-19 PROCEDURE — 99283 EMERGENCY DEPT VISIT LOW MDM: CPT

## 2022-01-19 RX ORDER — PREDNISONE 50 MG/1
50 TABLET ORAL DAILY
Qty: 5 TABLET | Refills: 0 | Status: SHIPPED | OUTPATIENT
Start: 2022-01-19 | End: 2022-01-24

## 2022-01-19 RX ORDER — AZITHROMYCIN 250 MG/1
250 TABLET, FILM COATED ORAL DAILY
Qty: 5 TABLET | Refills: 0 | Status: SHIPPED | OUTPATIENT
Start: 2022-01-19 | End: 2022-01-24

## 2022-01-19 RX ORDER — AMLODIPINE BESYLATE 5 MG/1
5 TABLET ORAL ONCE
Status: COMPLETED | OUTPATIENT
Start: 2022-01-19 | End: 2022-01-19

## 2022-01-19 RX ORDER — DEXAMETHASONE 4 MG/1
6 TABLET ORAL ONCE
Status: COMPLETED | OUTPATIENT
Start: 2022-01-19 | End: 2022-01-19

## 2022-01-19 RX ORDER — AZITHROMYCIN 250 MG/1
500 TABLET, FILM COATED ORAL ONCE
Status: COMPLETED | OUTPATIENT
Start: 2022-01-19 | End: 2022-01-19

## 2022-01-19 RX ORDER — IPRATROPIUM BROMIDE AND ALBUTEROL SULFATE 2.5; .5 MG/3ML; MG/3ML
1 SOLUTION RESPIRATORY (INHALATION) ONCE
Status: COMPLETED | OUTPATIENT
Start: 2022-01-19 | End: 2022-01-19

## 2022-01-19 RX ADMIN — DEXAMETHASONE 6 MG: 4 TABLET ORAL at 19:49

## 2022-01-19 RX ADMIN — AMLODIPINE BESYLATE 5 MG: 5 TABLET ORAL at 19:52

## 2022-01-19 RX ADMIN — IPRATROPIUM BROMIDE AND ALBUTEROL SULFATE 1 AMPULE: .5; 2.5 SOLUTION RESPIRATORY (INHALATION) at 19:49

## 2022-01-19 RX ADMIN — AZITHROMYCIN MONOHYDRATE 500 MG: 250 TABLET ORAL at 20:54

## 2022-01-20 ENCOUNTER — TELEPHONE (OUTPATIENT)
Dept: CARDIOLOGY CLINIC | Age: 62
End: 2022-01-20

## 2022-01-20 ENCOUNTER — HOSPITAL ENCOUNTER (EMERGENCY)
Age: 62
Discharge: LEFT BEFORE DISCHARGE | End: 2022-01-21
Attending: EMERGENCY MEDICINE
Payer: COMMERCIAL

## 2022-01-20 ENCOUNTER — APPOINTMENT (OUTPATIENT)
Dept: GENERAL RADIOLOGY | Age: 62
End: 2022-01-20
Payer: COMMERCIAL

## 2022-01-20 DIAGNOSIS — Z53.21 ELOPED FROM EMERGENCY DEPARTMENT: Primary | ICD-10-CM

## 2022-01-20 DIAGNOSIS — R07.9 CHEST PAIN, UNSPECIFIED TYPE: ICD-10-CM

## 2022-01-20 LAB
BASOPHILS ABSOLUTE: 0 K/CU MM
BASOPHILS RELATIVE PERCENT: 0.1 % (ref 0–1)
DIFFERENTIAL TYPE: ABNORMAL
EOSINOPHILS ABSOLUTE: 0 K/CU MM
EOSINOPHILS RELATIVE PERCENT: 0 % (ref 0–3)
HCT VFR BLD CALC: 36.4 % (ref 42–52)
HEMOGLOBIN: 11.7 GM/DL (ref 13.5–18)
IMMATURE NEUTROPHIL %: 0.8 % (ref 0–0.43)
LYMPHOCYTES ABSOLUTE: 0.9 K/CU MM
LYMPHOCYTES RELATIVE PERCENT: 7.7 % (ref 24–44)
MCH RBC QN AUTO: 28.1 PG (ref 27–31)
MCHC RBC AUTO-ENTMCNC: 32.1 % (ref 32–36)
MCV RBC AUTO: 87.3 FL (ref 78–100)
MONOCYTES ABSOLUTE: 0.3 K/CU MM
MONOCYTES RELATIVE PERCENT: 2.2 % (ref 0–4)
NUCLEATED RBC %: 0 %
PDW BLD-RTO: 14 % (ref 11.7–14.9)
PLATELET # BLD: 275 K/CU MM (ref 140–440)
PMV BLD AUTO: 10.9 FL (ref 7.5–11.1)
RBC # BLD: 4.17 M/CU MM (ref 4.6–6.2)
SEGMENTED NEUTROPHILS ABSOLUTE COUNT: 10 K/CU MM
SEGMENTED NEUTROPHILS RELATIVE PERCENT: 89.2 % (ref 36–66)
TOTAL IMMATURE NEUTOROPHIL: 0.09 K/CU MM
TOTAL NUCLEATED RBC: 0 K/CU MM
TROPONIN T: <0.01 NG/ML
WBC # BLD: 11.2 K/CU MM (ref 4–10.5)

## 2022-01-20 PROCEDURE — 6370000000 HC RX 637 (ALT 250 FOR IP): Performed by: EMERGENCY MEDICINE

## 2022-01-20 PROCEDURE — 83880 ASSAY OF NATRIURETIC PEPTIDE: CPT

## 2022-01-20 PROCEDURE — 85025 COMPLETE CBC W/AUTO DIFF WBC: CPT

## 2022-01-20 PROCEDURE — 80053 COMPREHEN METABOLIC PANEL: CPT

## 2022-01-20 PROCEDURE — 99283 EMERGENCY DEPT VISIT LOW MDM: CPT

## 2022-01-20 PROCEDURE — 71045 X-RAY EXAM CHEST 1 VIEW: CPT

## 2022-01-20 PROCEDURE — 84484 ASSAY OF TROPONIN QUANT: CPT

## 2022-01-20 RX ORDER — NITROGLYCERIN 0.4 MG/1
0.4 TABLET SUBLINGUAL ONCE
Status: COMPLETED | OUTPATIENT
Start: 2022-01-20 | End: 2022-01-20

## 2022-01-20 RX ADMIN — NITROGLYCERIN 0.4 MG: 0.4 TABLET SUBLINGUAL at 23:29

## 2022-01-20 ASSESSMENT — ENCOUNTER SYMPTOMS
COUGH: 0
ABDOMINAL PAIN: 0
EYE DISCHARGE: 0
VOMITING: 0
NAUSEA: 0
EYE PAIN: 0
SORE THROAT: 0
SHORTNESS OF BREATH: 0
RHINORRHEA: 0
BACK PAIN: 0

## 2022-01-20 ASSESSMENT — PAIN DESCRIPTION - PAIN TYPE: TYPE: ACUTE PAIN

## 2022-01-20 ASSESSMENT — PAIN SCALES - GENERAL: PAINLEVEL_OUTOF10: 8

## 2022-01-20 NOTE — ED PROVIDER NOTES
CHIEF COMPLAINT    No chief complaint on file. HPI  Bria Patel is a 62 y.o. male with history of atrial fibrillation, CHF, COPD, coronary artery disease who presents to the ED with complaints of wheezing and increased shortness of breath with cough. Patient states over the last day or so he has been experience some increasing shortness of breath and wheezing unrelieved by home albuterol. He has no history of COPD continues to smoke. He states that shortness of breath exacerbated exertion. Symptoms are current he does not use home oxygen at baseline. He has been coughing intermittently which she states is nonproductive in nature. Patient did test positive for COVID-19 on January 4 and states that he has been quarantining since that time. He denies new fevers, dizziness complaints, chest pain, abdominal pain, nausea, vomiting. REVIEW OF SYSTEMS  Constitutional: No fever, chills    Eye: No visual changes  HENT: No earache or sore throat. Resp: Complains of shortness of breath and cough  Cardio: No chest pain or palpitations. GI: No abdominal pain, nausea, vomiting, constipation or diarrhea. No melena. : No dysuria, urgency or frequency. Endocrine: No heat intolerance, no cold intolerance, no polydipsia   Lymphatics: No adenopathy  Musculoskeletal: No new muscle aches or joint pain. Neuro: No headaches. Psych: No homicidal or suicidal thoughts  Skin: No rash, No itching. ?  ?   PAST MEDICAL HISTORY  Past Medical History:   Diagnosis Date    Abnormal nuclear stress test     Atrial fibrillation ([address] 11/2013    CAD (coronary artery disease)     Follows with Dr. Jignesh Bridges CHF (congestive heart failure) ([address]     COPD (chronic obstructive pulmonary disease) ([address]     Follows with PCP    Decreased cardiac ejection fraction     Diabetes mellitus ([address]     GERD (gastroesophageal reflux disease)     H/O cardiovascular stress test 11/20/13, 3/20/2013    11/13-Observed defect is consistent with diaphragmatic attenuation. EF 58%. 3/13 -EF 51%. No ischemia. Normal Study.  H/O cardiovascular stress test 06/08/2017    EF 50% normal study    H/O cardiovascular stress test 06/17/2019    Normal NM    H/O cardiovascular stress test 03/02/2021    depressed lvedf increased edv myocardial perfusion abnormal stress test    H/O Doppler ultrasound 10/14/2010    10/2010-Bilateral venous doppler of lower extremies- No DVTs.  H/O echocardiogram 06/26/2019    Left ventricular systolic function is normal with an ejection fraction of 50-55%. Mild concentric left ventricular hypertrophy. Grade I diastolic dysfunction. No significant valvular disease noted. No evidence of pericardial effusion. Essentially unremarkable echo.  H/O percutaneous left heart catheterization 12/22/2015    No evidence of hemodynamically significant coronary artery disease    Heart imaging 04/23/2020    muga - ef 58%    History of coronary artery stent placement 11/13/2013 11/13 -RCA - 3 stents placed    History of exercise stress test 06/08/2017    treadmill    History of Holter monitoring 01/04/2016    predominant rhythm sinus    History of MRI of brain and brain stem 06/04/2020    No evidence of an acute infarct, mod chronic microvascular white matter ischemic disease with associated cerebral parenchymal volume loss    History of nuclear stress test 02/24/2020    EF 38%,Myocardial perfusion scan shows moderate size, severe intensity, non  reversible perfusion defect in inferior wall.  Hyperlipidemia     Hypertension     Follows with PCP    Kidney disease     Dr. Mayuri Joy S/P cardiac catheterization 11/13/2013 11/13/2013-EF 55%, distal LAD mild disease,CX stent patent,but distal to stent 50% stenosis. RCA severe disease.     SOB (shortness of breath)     Status post angioplasty with stent     Wears dentures     full upper denture     FAMILY HISTORY  Family History   Problem Relation Age of Onset    Cancer Mother    [facility] Diabetes Mother     Heart Disease Mother     High Blood Pressure Mother     Stroke Mother     Cancer Father     Heart Disease Father     High Blood Pressure Father     Stroke Father     Cancer Brother      SOCIAL HISTORY  Social History     Socioeconomic History    Marital status: Legally      Spouse name: None    Number of children: None    Years of education: None    Highest education level: None   Occupational History    Occupation:    Tobacco Use    Smoking status: Current Every Day Smoker     Packs/day: 1.00     Years: 46.00     Pack years: 46.00     Types: Cigarettes     Start date: 1974    Smokeless tobacco: Never Used    Tobacco comment: Smokes approx 1 pack per day   Vaping Use    Vaping Use: Never used   Substance and Sexual Activity    Alcohol use: No     Comment: caffeine 6 bottles of unsweet tea a day    Drug use: Not Currently     Types: Cocaine     Comment: Last used: 7/1/20    Sexual activity: Yes     Partners: Female   Other Topics Concern    None   Social History Narrative    None     Social Determinants of Health     Financial Resource Strain:     Difficulty of Paying Living Expenses: Not on file   Food Insecurity:     Worried About Running Out of Food in the Last Year: Not on file    Sivakumar of Food in the Last Year: Not on file   Transportation Needs:     Lack of Transportation (Medical): Not on file    Lack of Transportation (Non-Medical):  Not on file   Physical Activity:     Days of Exercise per Week: Not on file    Minutes of Exercise per Session: Not on file   Stress:     Feeling of Stress : Not on file   Social Connections:     Frequency of Communication with Friends and Family: Not on file    Frequency of Social Gatherings with Friends and Family: Not on file    Attends Holiness Services: Not on file    Active Member of Clubs or Organizations: Not on file    Attends Club or Organization Meetings: Not on file    Marital Status: Not on file   Intimate Partner Violence:     Fear of Current or Ex-Partner: Not on file    Emotionally Abused: Not on file    Physically Abused: Not on file    Sexually Abused: Not on file   Housing Stability:     Unable to Pay for Housing in the Last Year: Not on file    Number of [place] in the Last Year: Not on file    Unstable Housing in the Last Year: Not on file       SURGICAL HISTORY  Past Surgical History:   Procedure Laterality Date    APPENDECTOMY      BIOPSY / [address] Left 8/17/2020    LEFT TEMPORAL ARTERY BIOPSY LIGATION performed by Rosalinda Lopes MD at [address]  11/13/2013 11/13/2013-EF 55%, distal LAD mild disease,CX stent patent,but distal to stent 50% stenosis. RCA severe disease.     CARDIAC SURGERY  07/03/2020    CABG X 3 LIMA-OM-PDA-LAD, Maze, LT Atrial Appendage clipping    COLONOSCOPY      CORONARY ANGIOPLASTY WITH STENT PLACEMENT      4 stents- RCA X3; CX X1    CORONARY ANGIOPLASTY WITH STENT PLACEMENT  11/13/2013 11/13/2013 -3 stents placed to RCA- Dr Ivis Parisi N/A 7/3/2020    INTRAOPERATIVE ISMAEL, CABG X3   WITH LIMA  AND LEFT LEG ENDOVEIN HARVEST, INDUCED HYPOTHERMIA, MAZE BIPOLAR AND CRYO PROCEDURE, LEFT ATRIAL APPENDAGE CLIPPING performed by Nettie Nguyen MD at [address] [facility]      all teeth extracted   [address] [facility]     CURRENT MEDICATIONS  Previous Medications    ALBUTEROL SULFATE HFA (PROVENTIL HFA) 108 (90 BASE) MCG/ACT INHALER    Inhale 2 puffs into the lungs every 6 hours as needed for Wheezing or Shortness of Breath    AMLODIPINE (NORVASC) 5 MG TABLET    Take 0.5 tablets by mouth daily    ATORVASTATIN (LIPITOR) 80 MG TABLET    Take 1 tablet by mouth nightly    CARVEDILOL (COREG) 25 MG TABLET    Take 1 tablet by mouth 2 times daily    CHLORTHALIDONE (HYGROTON) 25 MG TABLET    TAKE 1 TABLET BY MOUTH EVERY DAY    CLOPIDOGREL (PLAVIX) 75 MG TABLET    Take 1 tablet by mouth daily    FUROSEMIDE (LASIX) 40 MG TABLET    Take 1 tablet by mouth 3 times daily    GLIPIZIDE (GLUCOTROL) 10 MG TABLET    Take 1 tablet by mouth every morning (before breakfast)    GUAIFENESIN (ALTARUSSIN) 100 MG/5ML SYRUP    Take 10 mLs by mouth every 4 hours as needed for Cough    HYDROXYZINE (VISTARIL) 50 MG CAPSULE    Take 50 mg by mouth nightly    LINAGLIPTIN (TRADJENTA) 5 MG TABLET    Take 1 tablet by mouth daily    LISINOPRIL (PRINIVIL;ZESTRIL) 40 MG TABLET    Take 1 tablet by mouth daily    METFORMIN (GLUCOPHAGE) 500 MG TABLET    Take 1,000 mg by mouth 2 times daily    NITROGLYCERIN (NITROSTAT) 0.4 MG SL TABLET    Place 1 tablet under the tongue every 5 minutes as needed for Chest pain    OMEPRAZOLE (PRILOSEC) 20 MG DELAYED RELEASE CAPSULE    Take 20 mg by mouth 2 times daily    POTASSIUM CHLORIDE (KLOR-CON M) 20 MEQ EXTENDED RELEASE TABLET    Take 2 tablets by mouth 2 times daily    WARFARIN (JANTOVEN) 4 MG TABLET    TAKE 1 TABLET BY MOUTH EVERY DAY, EXCEPT TAKE 1 AND 1/2 TABLETS ON SUNDAYS OR AS DIRECTED BY CLINIC     ALLERGIES  Allergies   Allergen Reactions    Hydromorphone Other (See Comments)     Hallucinations. Nursing notes reviewed by myself for past medical history, family history, social history, surgical history, current medications, and allergies. PHYSICAL EXAM  VITAL SIGNS: Triage VS:    ED Triage Vitals [01/19/22 1823]   Enc Vitals Group      BP (!) 208/101      Pulse 69      Resp 15      Temp 97.7 °F (36.5 °C)      Temp Source Oral      SpO2 98 %      Weight 258 lb (117 kg)      Height 6' 3\" (1.905 m)      Head Circumference       Peak Flow       Pain Score       Pain Loc       Pain Edu? Excl. in [address]?       Constitutional: Well developed, Well nourished, nontoxic appearing  HENT: Normocephalic, Atraumatic, Bilateral external ears normal, Oropharynx moist, No oral exudates, Nose normal.   Eyes: PERRL, EOMI, Conjunctiva normal, No 98% on room air and is afebrile. On exam he has diminished breath sounds with expiratory wheezing but no retractions. Patient states that his blood pressure is always elevated and states that he has been taking his antihypertensives at home. At this time we will obtain chest x-ray and provide the patient with DuoNeb therapy as well as Decadron. He was also agreeable with a dose of Norvasc while here. Patient's chest x-ray is without acute cardiopulmonary pathology shows chronic changes. He had improved aeration following DuoNeb treatment and subjectively states he feels much better. At this time azithromycin ordered for the patient for anti-inflammatory effect. Discharged home with a prescription for azithromycin and prednisone. Return precautions provided. Amount and/or Complexity of Data Reviewed  Clinical lab tests: reviewed  Decide to obtain previous medical records or to obtain history from someone other than the patient: yes       -  Patient seen and evaluated in the emergency department. -  Triage and nursing notes reviewed and incorporated. -  Old chart records reviewed and incorporated. -  Work-up included:  See above  -  Results discussed with patient. Appropriate PPE utilized as indicated for entire patient encounter? Time of Disposition: See timeline  ? New Prescriptions    ALBUTEROL (PROVENTIL) (5 MG/ML) 0.5% NEBULIZER SOLUTION    Take 0.5 mLs by nebulization every 6 hours as needed for Wheezing    AZITHROMYCIN (ZITHROMAX) 250 MG TABLET    Take 1 tablet by mouth daily for 5 days    PREDNISONE (DELTASONE) 50 MG TABLET    Take 1 tablet by mouth daily for 5 days     FINAL IMPRESSION  1. COPD exacerbation ([facility]    2. Uncontrolled hypertension        Electronically signed by:  [address], DO, 1/19/2022         [address], DO  01/19/22 2053

## 2022-01-20 NOTE — TELEPHONE ENCOUNTER
Patient states seen in ED yesterday for COPD then last night had chest pain requiring ntg. Patient has had 2 more episodes taking ntg each time.  OV scheduled for tomorrow but patient instructed to go to ED if further episodes

## 2022-01-21 ENCOUNTER — OFFICE VISIT (OUTPATIENT)
Dept: CARDIOLOGY CLINIC | Age: 62
End: 2022-01-21
Payer: COMMERCIAL

## 2022-01-21 VITALS
WEIGHT: 258 LBS | OXYGEN SATURATION: 96 % | DIASTOLIC BLOOD PRESSURE: 96 MMHG | SYSTOLIC BLOOD PRESSURE: 193 MMHG | TEMPERATURE: 97.8 F | HEIGHT: 75 IN | HEART RATE: 72 BPM | BODY MASS INDEX: 32.08 KG/M2 | RESPIRATION RATE: 19 BRPM

## 2022-01-21 VITALS
DIASTOLIC BLOOD PRESSURE: 98 MMHG | SYSTOLIC BLOOD PRESSURE: 160 MMHG | BODY MASS INDEX: 33.57 KG/M2 | HEIGHT: 75 IN | HEART RATE: 68 BPM | WEIGHT: 270 LBS

## 2022-01-21 DIAGNOSIS — R07.9 CHEST PAIN, UNSPECIFIED TYPE: Primary | ICD-10-CM

## 2022-01-21 DIAGNOSIS — R06.02 SHORTNESS OF BREATH: ICD-10-CM

## 2022-01-21 LAB
ALBUMIN SERPL-MCNC: 3.7 GM/DL (ref 3.4–5)
ALP BLD-CCNC: 113 IU/L (ref 40–129)
ALT SERPL-CCNC: 15 U/L (ref 10–40)
ANION GAP SERPL CALCULATED.3IONS-SCNC: 14 MMOL/L (ref 4–16)
AST SERPL-CCNC: 22 IU/L (ref 15–37)
BILIRUB SERPL-MCNC: 0.2 MG/DL (ref 0–1)
BUN BLDV-MCNC: 25 MG/DL (ref 6–23)
CALCIUM SERPL-MCNC: 9.3 MG/DL (ref 8.3–10.6)
CHLORIDE BLD-SCNC: 101 MMOL/L (ref 99–110)
CO2: 19 MMOL/L (ref 21–32)
CREAT SERPL-MCNC: 1.5 MG/DL (ref 0.9–1.3)
GFR AFRICAN AMERICAN: 58 ML/MIN/1.73M2
GFR NON-AFRICAN AMERICAN: 48 ML/MIN/1.73M2
GLUCOSE BLD-MCNC: 277 MG/DL (ref 70–99)
POTASSIUM SERPL-SCNC: 4.5 MMOL/L (ref 3.5–5.1)
PRO-BNP: 3723 PG/ML
SODIUM BLD-SCNC: 134 MMOL/L (ref 135–145)
TOTAL PROTEIN: 6.6 GM/DL (ref 6.4–8.2)

## 2022-01-21 PROCEDURE — G8427 DOCREV CUR MEDS BY ELIG CLIN: HCPCS | Performed by: INTERNAL MEDICINE

## 2022-01-21 PROCEDURE — 4004F PT TOBACCO SCREEN RCVD TLK: CPT | Performed by: INTERNAL MEDICINE

## 2022-01-21 PROCEDURE — 99214 OFFICE O/P EST MOD 30 MIN: CPT | Performed by: INTERNAL MEDICINE

## 2022-01-21 PROCEDURE — G8484 FLU IMMUNIZE NO ADMIN: HCPCS | Performed by: INTERNAL MEDICINE

## 2022-01-21 PROCEDURE — 3017F COLORECTAL CA SCREEN DOC REV: CPT | Performed by: INTERNAL MEDICINE

## 2022-01-21 PROCEDURE — G8417 CALC BMI ABV UP PARAM F/U: HCPCS | Performed by: INTERNAL MEDICINE

## 2022-01-21 PROCEDURE — 93000 ELECTROCARDIOGRAM COMPLETE: CPT | Performed by: INTERNAL MEDICINE

## 2022-01-21 RX ORDER — NITROGLYCERIN 0.4 MG/1
0.4 TABLET SUBLINGUAL EVERY 5 MIN PRN
Qty: 25 TABLET | Refills: 3 | Status: SHIPPED | OUTPATIENT
Start: 2022-01-21

## 2022-01-21 RX ORDER — AMLODIPINE BESYLATE 10 MG/1
10 TABLET ORAL DAILY
Qty: 30 TABLET | Refills: 3 | Status: SHIPPED | OUTPATIENT
Start: 2022-01-21 | End: 2022-05-17

## 2022-01-21 NOTE — ED NOTES
PATIENT STATES HE WANTS TO LEAVE BECAUSE HIS LEGS ARE GETTING RESTLESS.       Kathy Rutledge RN  01/21/22 [id number]

## 2022-01-21 NOTE — LETTER
Sanjiv Vizcaino Art  1960  [id number]  Have you had any Chest Pain that is not new? - Yes  If Yes DO EKG - How does it feel - Sharp Pain   How long does the pain last -  hours    How long have you been having the pain - Weeks   Did you take a Nitro   And did it relieve the pain - No   DO EKG IF: Patient has a Heart Rate above 100 or below 40     CAD (Coronary Artery Disease) patient should have one on file every 6 months      Have you had any Shortness of Breath - Yes  If Yes - When at rest and on exertion    Have you had any dizziness - No    Have you had any palpitations that are not new? - No    Do you have any edema - swelling in No      When did you have your last labs drawn 01/20/2022  Where did you have them done   What doctor ordered Ted Ambriz  If we do not have these labs you are retrieve these labs for these providers!     Do you have a surgery or procedure scheduled in the near future - No     Ask patient if they [facility] to [facility] if they are not already signed up     Check to see if we have an E-MAIL on file for the patient     Check medication list thoroughly!!! AND RECONCILE OUTSIDE MEDICATIONS  If dose has changed change the entire order not just the MG  BE SURE TO ASK PATIENT IF THEY NEED MEDICATION REFILLS     At check out add to every patient's \"wrap up\" the following dot phrase AFTERHOURSEDUCATION and ensure we explain this to our patients

## 2022-01-21 NOTE — ED NOTES
Report to Magaly Cline RN. Pt states took 3 nitro prior to arrival at [facility]. And still arrived with elevated BP. Dr. Arthur Wong aware of nitro taken and states she is giving nitro for BP control. Pt voices he is impatient and believes this is taking too long and may just go to another hospital. Charge nurse and Dr. Arthur Wong aware.         Klarissa Peralta RN  01/20/22 [id number]

## 2022-01-21 NOTE — PROGRESS NOTES
Christiana Mcghee MD        OFFICE  FOLLOWUP NOTE    Chief complaints: patient is here for management of  CAD s/p CABG, DM, HTN, DYSLPIDEMIA, AFIB, COVID 19    F/u after ED visit, he signed out AMA, his blood pressure was 970 systolic    HE RECOVERED COVID 19  ONE MONTH AGO    Subjective: patient feels better, no chest pain, no shortness of breath, no dizziness, no palpitations    ADRIANE Jauregui is a 62 y. o.year old who  has a past medical history of Abnormal nuclear stress test, Atrial fibrillation (HCC), CAD (coronary artery disease), CHF (congestive heart failure) ([facility] [address] COPD (chronic obstructive pulmonary disease) ([facility] [address] Decreased cardiac ejection fraction, Diabetes mellitus ([facility] [address] GERD (gastroesophageal reflux disease), H/O cardiovascular stress test, H/O cardiovascular stress test, H/O cardiovascular stress test, H/O cardiovascular stress test, H/O Doppler ultrasound, H/O echocardiogram, H/O percutaneous left heart catheterization, Heart imaging, History of coronary artery stent placement, History of exercise stress test, History of Holter monitoring, History of MRI of brain and brain stem, History of nuclear stress test, Hyperlipidemia, Hypertension, Kidney disease, S/P cardiac catheterization, SOB (shortness of breath), Status post angioplasty with stent, and Wears dentures. and presents for management of CAD s/p CABG, DM, HTN, DYSLPIDEMIA, AFIB which are well controlled    He is on presdnisone 50mg table daily for last 2 days, his blood pressure was 188 sytolic, was sitting in ED waiting for few hrs and then signed out AMA.   Current Outpatient Medications   Medication Sig Dispense Refill    predniSONE (DELTASONE) 50 MG tablet Take 1 tablet by mouth daily for 5 days 5 tablet 0    azithromycin (ZITHROMAX) 250 MG tablet Take 1 tablet by mouth daily for 5 days 5 tablet 0    albuterol (PROVENTIL) (5 MG/ML) 0.5% nebulizer solution Take 0.5 mLs by nebulization every 6 hours as needed for Wheezing 120 each 0    warfarin (JANTOVEN) 4 MG tablet TAKE 1 TABLET BY MOUTH EVERY DAY, EXCEPT TAKE 1 AND 1/2 TABLETS ON SUNDAYS OR AS DIRECTED BY CLINIC 100 tablet 1    carvedilol (COREG) 25 MG tablet Take 1 tablet by mouth 2 times daily 60 tablet 5    albuterol sulfate HFA (PROVENTIL HFA) 108 (90 Base) MCG/ACT inhaler Inhale 2 puffs into the lungs every 6 hours as needed for Wheezing or Shortness of Breath 18 g 0    guaiFENesin (ALTARUSSIN) 100 MG/5ML syrup Take 10 mLs by mouth every 4 hours as needed for Cough 120 mL 0    amLODIPine (NORVASC) 5 MG tablet Take 0.5 tablets by mouth daily 30 tablet 3    chlorthalidone (HYGROTON) 25 MG tablet TAKE 1 TABLET BY MOUTH EVERY DAY      omeprazole (PRILOSEC) 20 MG delayed release capsule Take 20 mg by mouth 2 times daily      metFORMIN (GLUCOPHAGE) 500 MG tablet Take 1,000 mg by mouth 2 times daily      lisinopril (PRINIVIL;ZESTRIL) 40 MG tablet Take 1 tablet by mouth daily 90 tablet 3    nitroGLYCERIN (NITROSTAT) 0.4 MG SL tablet Place 1 tablet under the tongue every 5 minutes as needed for Chest pain 25 tablet 3    hydrOXYzine (VISTARIL) 50 MG capsule Take 50 mg by mouth nightly      atorvastatin (LIPITOR) 80 MG tablet Take 1 tablet by mouth nightly 30 tablet 3    glipiZIDE (GLUCOTROL) 10 MG tablet Take 1 tablet by mouth every morning (before breakfast) 60 tablet 3    furosemide (LASIX) 40 MG tablet Take 1 tablet by mouth 3 times daily (Patient taking differently: Take 40 mg by mouth 3 times daily Taking 2 times a day) 90 tablet 1    potassium chloride (KLOR-CON M) 20 MEQ extended release tablet Take 2 tablets by mouth 2 times daily 60 tablet 1    linagliptin (TRADJENTA) 5 MG tablet Take 1 tablet by mouth daily 30 tablet 3    clopidogrel (PLAVIX) 75 MG tablet Take 1 tablet by mouth daily 30 tablet 5     No current facility-administered medications for this visit.      Allergies: Hydromorphone  Past Medical History:   Diagnosis Date    Abnormal nuclear stress test  Atrial fibrillation ([facility] [address] 11/2013    CAD (coronary artery disease)     Follows with Dr. Sarah Duran CHF (congestive heart failure) ([facility] [address]     COPD (chronic obstructive pulmonary disease) ([facility] [address]     Follows with PCP    Decreased cardiac ejection fraction     [facility]     GERD (gastroesophageal reflux disease)     H/O cardiovascular stress test 11/20/13, 3/20/2013    11/13-Observed defect is consistent with diaphragmatic attenuation. EF 58%. 3/13 -EF 51%. No ischemia. Normal Study.  H/O cardiovascular stress test 06/08/2017    EF 50% normal study    H/O cardiovascular stress test 06/17/2019    Normal NM    H/O cardiovascular stress test 03/02/2021    depressed lvedf increased edv myocardial perfusion abnormal stress test    H/O Doppler ultrasound 10/14/2010    10/2010-Bilateral venous doppler of lower extremies- No DVTs.  H/O echocardiogram 06/26/2019    Left ventricular systolic function is normal with an ejection fraction of 50-55%. Mild concentric left ventricular hypertrophy. Grade I diastolic dysfunction. No significant valvular disease noted. No evidence of pericardial effusion. Essentially unremarkable echo.  H/O percutaneous left heart catheterization 12/22/2015    No evidence of hemodynamically significant coronary artery disease    Heart imaging 04/23/2020    muga - ef 58%    History of coronary artery stent placement 11/13/2013 11/13 -RCA - 3 stents placed    History of exercise stress test 06/08/2017    treadmill    History of Holter monitoring 01/04/2016    predominant rhythm sinus    History of MRI of brain and brain stem 06/04/2020    No evidence of an acute infarct, mod chronic microvascular white matter ischemic disease with associated cerebral parenchymal volume loss    History of nuclear stress test 02/24/2020    EF 38%,Myocardial perfusion scan shows moderate size, severe intensity, non  reversible perfusion defect in inferior wall.     Hyperlipidemia     Hypertension     Follows with PCP    Kidney disease     Dr. Chuck Vogt S/P cardiac catheterization 11/13/2013 11/13/2013-EF 55%, distal LAD mild disease,CX stent patent,but distal to stent 50% stenosis. RCA severe disease.  SOB (shortness of breath)     Status post angioplasty with stent     Wears dentures     full upper denture     Past Surgical History:   Procedure Laterality Date    APPENDECTOMY      BIOPSY / LIGATION TEMPORAL ARTERY Left 8/17/2020    LEFT TEMPORAL ARTERY BIOPSY LIGATION performed by Tracy Art MD at [facility]  11/13/2013 11/13/2013-EF 55%, distal LAD mild disease,CX stent patent,but distal to stent 50% stenosis. RCA severe disease.     CARDIAC SURGERY  07/03/2020    CABG X 3 LIMA-OM-PDA-LAD, Maze, LT Atrial Appendage clipping    COLONOSCOPY      CORONARY ANGIOPLASTY WITH STENT PLACEMENT      4 stents- RCA X3; CX X1    CORONARY ANGIOPLASTY WITH STENT PLACEMENT  11/13/2013 11/13/2013 -3 stents placed to RCA- Dr Doreene Harada N/A 7/3/2020    INTRAOPERATIVE ISMAEL, CABG X3   WITH LIMA  AND LEFT LEG ENDOVEIN HARVEST, INDUCED HYPOTHERMIA, MAZE BIPOLAR AND CRYO PROCEDURE, LEFT ATRIAL APPENDAGE CLIPPING performed by Annabelle Snyder MD at [facility]      all teeth extracted    FEMUR FRACTURE SURGERY  1984    MANDIBLE FRACTURE SURGERY  1984     Family History   Problem Relation Age of Onset    Cancer Mother     Diabetes Mother     Heart Disease Mother     High Blood Pressure Mother     Stroke Mother     Cancer Father     Heart Disease Father     High Blood Pressure Father     Stroke Father     Cancer Brother      Social History     Tobacco Use    Smoking status: Current Every Day Smoker     Packs/day: 1.00     Years: 46.00     Pack years: 46.00     Types: Cigarettes     Start date: 1974    Smokeless tobacco: Never Used    Tobacco comment: Smokes approx 1 pack per day   Substance Use Topics    Alcohol use: No     Comment: caffeine 6 bottles of unsweet tea a day      [unfilled]  Review of Systems:   · Constitutional: No Fever or Weight Loss   · Eyes: No Decreased Vision  · ENT: No Headaches, Hearing Loss or Vertigo  · Cardiovascular: No chest pain, dyspnea on exertion, palpitations or loss of consciousness  · Respiratory: No cough or wheezing    · Gastrointestinal: No abdominal pain, appetite loss, blood in stools, constipation, diarrhea or heartburn  · Genitourinary: No dysuria, trouble voiding, or hematuria  · Musculoskeletal:  No gait disturbance, weakness or joint complaints  · Integumentary: No rash or pruritis  · Neurological: No TIA or stroke symptoms  · Psychiatric: No anxiety or depression  · Endocrine: No malaise, fatigue or temperature intolerance  · Hematologic/Lymphatic: No bleeding problems, blood clots or swollen lymph nodes  · Allergic/Immunologic: No nasal congestion or hives  All systems negative except as marked. Objective:  BP (!) 160/98   Pulse 68   Ht 6' 3\" (1.905 m)   Wt 270 lb (122.5 kg)   BMI 33.75 kg/m²   Wt Readings from Last 3 Encounters:   01/21/22 270 lb (122.5 kg)   01/20/22 258 lb (117 kg)   01/19/22 258 lb (117 kg)     Body mass index is 33.75 kg/m². GENERAL - Alert, oriented, pleasant, in no apparent distress,normal grooming  HEENT - pupils are intact, cornea intact, conjunctive normal color, ears are normal in exam,  Neck - Supple. No jugular venous distention noted. No carotid bruits, no apical -carotid delay  Respiratory:  Normal breath sounds, No respiratory distress, No wheezing, No chest tenderness. ,no use of accessory muscles, diaphragm movement is normal  Cardiovascular: (PMI) apex non displaced,no lifts no thrills, no s3,no s4, Normal heart rate, Normal rhythm, No murmurs, No rubs, No gallops.  Carotid arteries pulse and amplitude are normal no bruit, no abdominal bruit noted ( normal abdominal aorta ausculation),   Extremities - No cyanosis, clubbing, or significant edema, no varicose veins    Abdomen - No masses, tenderness, or organomegaly, no hepato-splenomegally, no bruits  Musculoskeletal - No significant edema, no kyphosis or scoliosis, no deformity in any extremity noted, muscle strength and tone are normal  Skin: no ulcer,no scar,no stasis dermatitis   Neurologic - alert oriented times 3,Cranial nerves II through XII are grossly intact. There were no gross focal neurologic abnormalities. Psychiatric: normal mood and affect    Lab Results   Component Value Date    CKTOTAL 47 07/19/2020    CKMB 2.4 03/10/2013    TROPONINI 0.014 06/09/2014    TROPONINI 0.015 12/03/2011     BNP:    Lab Results   Component Value Date    BNP 17 03/09/2013     PT/INR:  No results found for: PTINR  Lab Results   Component Value Date    LABA1C 7.8 (H) 12/30/2020    LABA1C 8.9 (H) 06/29/2020     Lab Results   Component Value Date    CHOL 153 01/06/2020    TRIG 54 01/06/2020    HDL 43 01/06/2020    LDLDIRECT 111 (H) 01/06/2020     Lab Results   Component Value Date    ALT 15 01/20/2022    AST 22 01/20/2022     TSH:  No results found for: TSH    Impression:  Darrick Slater is a 62 y. o.year old who  has a past medical history of Abnormal nuclear stress test, Atrial fibrillation (HCC), CAD (coronary artery disease), CHF (congestive heart failure) ([facility] [address] COPD (chronic obstructive pulmonary disease) ([facility] [address] Decreased cardiac ejection fraction, Diabetes mellitus ([facility] [address] GERD (gastroesophageal reflux disease), H/O cardiovascular stress test, H/O cardiovascular stress test, H/O cardiovascular stress test, H/O cardiovascular stress test, H/O Doppler ultrasound, H/O echocardiogram, H/O percutaneous left heart catheterization, Heart imaging, History of coronary artery stent placement, History of exercise stress test, History of Holter monitoring, History of MRI of brain and brain stem, History of nuclear stress test, Hyperlipidemia, Hypertension, Kidney disease, S/P cardiac catheterization, SOB (shortness of breath), Status post angioplasty with stent, and Wears dentures. and presents with     Plan:  1. Uncontrolled HTN: most likely from steroid use, will increase norvasc 10mg daily, continue coreg. lisinopril, HCTZ  2. COVID 19: RECOVERED LAST MONTH, HE WILL NEED BOOSTER VACCINE. 3. CHEST PAIN: resolved,:s/p PCI of SVG TO RCA Graft at the end F December 2020,  WITH HISTORY OF  CABG [place] LVEF was 36%, however ECHO was 50%, Continue aspirin , continue statins, betablockers,, continue LISINOPRIL TO 20MG, BB, POST CABG EKG IS NSR. continue PLAVIX,  CONTINUE PLAVIX AND COUMADIN,  4. He is not working. 5. Leg pain: it does not sound like intermittent claudication as it occurs with laying down and not with walking  6. DM: stable continue glipizide  7. Paroxysmal afib: continue COUAMDIN, off amidarone  8. Dyslipidemia: continue statins  All labs, medications and tests reviewed, continue all other medications of all above medical condition listed as is.

## 2022-01-21 NOTE — ED PROVIDER NOTES
[address] ENCOUNTER      Pt Name: Panchito Barboza  MRN: [id number]  [place] 1960  Date of evaluation: 1/20/2022  Provider: Nella Mukherjee MD    CHIEF COMPLAINT       Chief Complaint   Patient presents with    Chest Pain     onset last night, 8/10         HISTORY OF PRESENT ILLNESS      Panchito Barboza is a 62 y.o. male who presents to the emergency department  for   Chief Complaint   Patient presents with    Chest Pain     onset last night, 7/7       81-year-old male presents reporting midsternal chest pain. He has a history of CAD status post CABG, status post PCI, COPD with no home oxygen requirement. He was seen 1 day ago at the [facility] emergency department for COPD exacerbation. He did test positive for COVID-19 in early January. He is not on any supplemental oxygen at this time. He states that his blood pressures been running high recently. States he is getting systolic numbers in the 614I and diastolic numbers in the 938H. He presents today stating that his chest pain started yesterday evening. He has taken multiple rounds of nitro with some relief of symptoms. He denies any abdominal pain. No radiation of his chest pain. No difficulty breathing. He presents to the emergency department elevated blood pressure. No signs of respiratory distress. No wheezing or stridor. GCS of 15. He is moving all extremities spontaneously. Denies any injury to his chest wall. Nursing Notes, Triage Notes & Vital Signs were reviewed. REVIEW OF SYSTEMS    (2-9 systems for level 4, 10 or more for level 5)     Review of Systems   Constitutional: Negative for chills and fever. HENT: Negative for congestion, rhinorrhea and sore throat. Eyes: Negative for pain and discharge. Respiratory: Negative for cough and shortness of breath. Cardiovascular: Positive for chest pain.  Negative for palpitations. Gastrointestinal: Negative for abdominal pain, nausea and vomiting. Endocrine: Negative for polydipsia and polyuria. Genitourinary: Negative for dysuria and flank pain. Musculoskeletal: Negative for back pain and neck pain. Skin: Negative for pallor and wound. Neurological: Negative for dizziness, facial asymmetry, light-headedness, numbness and headaches. Psychiatric/Behavioral: Negative for confusion. Except as noted above the remainder of the review of systems was reviewed and negative. PAST MEDICAL HISTORY     Past Medical History:   Diagnosis Date    Abnormal nuclear stress test     Atrial fibrillation ([facility] 11/2013    CAD (coronary artery disease)     Follows with Dr. Wm Zaragoza CHF (congestive heart failure) ([facility]     COPD (chronic obstructive pulmonary disease) ([facility]     Follows with PCP    Decreased cardiac ejection fraction     Diabetes mellitus ([facility]     GERD (gastroesophageal reflux disease)     H/O cardiovascular stress test 11/20/13, 3/20/2013    11/13-Observed defect is consistent with diaphragmatic attenuation. EF 58%. 3/13 -EF 51%. No ischemia. Normal Study.  H/O cardiovascular stress test 06/08/2017    EF 50% normal study    H/O cardiovascular stress test 06/17/2019    Normal NM    H/O cardiovascular stress test 03/02/2021    depressed lvedf increased edv myocardial perfusion abnormal stress test    H/O Doppler ultrasound 10/14/2010    10/2010-Bilateral venous doppler of lower extremies- No DVTs.  H/O echocardiogram 06/26/2019    Left ventricular systolic function is normal with an ejection fraction of 50-55%. Mild concentric left ventricular hypertrophy. Grade I diastolic dysfunction. No significant valvular disease noted. No evidence of pericardial effusion. Essentially unremarkable echo.     H/O percutaneous left heart catheterization 12/22/2015    No evidence of hemodynamically significant coronary artery disease    Heart imaging 04/23/2020 [facility] - ef 58%    History of coronary artery stent placement 11/13/2013 11/13 -RCA - 3 stents placed    History of exercise stress test 06/08/2017    treadmill    History of Holter monitoring 01/04/2016    predominant rhythm sinus    History of MRI of brain and brain stem 06/04/2020    No evidence of an acute infarct, mod chronic microvascular white matter ischemic disease with associated cerebral parenchymal volume loss    History of nuclear stress test 02/24/2020    EF 38%,Myocardial perfusion scan shows moderate size, severe intensity, non  reversible perfusion defect in inferior wall.  Hyperlipidemia     Hypertension     Follows with PCP    Kidney disease     Dr. Octavia Umanzor S/P cardiac catheterization 11/13/2013 11/13/2013-EF 55%, distal LAD mild disease,CX stent patent,but distal to stent 50% stenosis. RCA severe disease.  SOB (shortness of breath)     Status post angioplasty with stent     Wears dentures     full upper denture       Prior to Admission medications    Medication Sig Start Date End Date Taking? Authorizing Provider   predniSONE (DELTASONE) 50 MG tablet Take 1 tablet by mouth daily for 5 days 1/19/22 1/24/22  Roberto Cox, DO   azithromycin (ZITHROMAX) 250 MG tablet Take 1 tablet by mouth daily for 5 days 1/19/22 1/24/22  Roberto Cox, DO   albuterol (PROVENTIL) (5 MG/ML) 0.5% nebulizer solution Take 0.5 mLs by nebulization every 6 hours as needed for Wheezing 1/19/22   Roberto Cox, DO   warfarin (JANTOVEN) 4 MG tablet TAKE 1 TABLET BY MOUTH EVERY DAY, EXCEPT TAKE 1 AND 1/2 TABLETS ON SUNDAYS OR AS DIRECTED BY CLINIC 1/17/22   Nabil Woo MD   carvedilol (COREG) 25 MG tablet Take 1 tablet by mouth 2 times daily 12/6/21   Julio nSyder, APRN - CNP   albuterol sulfate HFA (PROVENTIL HFA) 108 (90 Base) MCG/ACT inhaler Inhale 2 puffs into the lungs every 6 hours as needed for Wheezing or Shortness of Breath 10/10/21   OLIVIA Serra (ALTARUSSIN) 100 MG/5ML syrup Take 10 mLs by mouth every 4 hours as needed for Cough 10/7/21   Munira Lira MD   amLODIPine (NORVASC) 5 MG tablet Take 0.5 tablets by mouth daily 7/8/21   TREY Rosas CNP   chlorthalidone (HYGROTON) 25 MG tablet TAKE 1 TABLET BY MOUTH EVERY DAY 5/19/21   Historical Provider, MD   omeprazole (PRILOSEC) 20 MG delayed release capsule Take 20 mg by mouth 2 times daily 2/2/21 2/2/22  Historical Provider, MD   metFORMIN (GLUCOPHAGE) 500 MG tablet Take 1,000 mg by mouth 2 times daily 1/15/21   Historical Provider, MD   lisinopril (PRINIVIL;ZESTRIL) 40 MG tablet Take 1 tablet by mouth daily 12/10/20   TREY Carrillo CNP   nitroGLYCERIN (NITROSTAT) 0.4 MG SL tablet Place 1 tablet under the tongue every 5 minutes as needed for Chest pain 12/8/20   TREY Carrillo CNP   hydrOXYzine (VISTARIL) 50 MG capsule Take 50 mg by mouth nightly 11/20/20   Historical Provider, MD   atorvastatin (LIPITOR) 80 MG tablet Take 1 tablet by mouth nightly 10/14/20   TREY Rosas CNP   glipiZIDE (GLUCOTROL) 10 MG tablet Take 1 tablet by mouth every morning (before breakfast) 7/8/20   Kylah Morales PA-C   furosemide (LASIX) 40 MG tablet Take 1 tablet by mouth 3 times daily  Patient taking differently: Take 40 mg by mouth 3 times daily Taking 2 times a day 7/7/20   Kylah Morales PA-C   potassium chloride (KLOR-CON M) 20 MEQ extended release tablet Take 2 tablets by mouth 2 times daily 7/7/20   Kylah Morales PA-C   linagliptin (TRADJENTA) 5 MG tablet Take 1 tablet by mouth daily 7/7/20   Kylah Morales PA-C   clopidogrel (PLAVIX) 75 MG tablet Take 1 tablet by mouth daily 8/1/16 2/15/22  Britni Dominguez MD        Patient Active Problem List   Diagnosis    Chest pain    DM w/o complication type II ([address]    Longstanding persistent atrial fibrillation ([address]    CAD (coronary artery disease)    Post PTCA    [facility]    HTN (hypertension)    Angina pectoris syndrome (HCC)-unstable    Smoking    COPD (chronic obstructive pulmonary disease) (HCC)    Atrial fibrillation with rapid ventricular response (HCC)    Acute chest pain    STEMI (ST elevation myocardial infarction) (HCC)    Myocardial infarction acute (HCC)    Nausea    New onset of congestive heart failure (HCC)    COPD with acute exacerbation (HCC)    Acute pulmonary edema (HCC)    Pleural effusion, bilateral    Hypertensive urgency    Chronic systolic congestive heart failure (HCC)    TIA (transient ischemic attack)    [facility], stage III (moderate) [facility]    3-vessel coronary artery disease    Temporal pain    Class 2 obesity due to excess calories without serious comorbidity with body mass index (BMI) of 35.0 to 35.9 in adult    Abnormal nuclear stress test    Decreased cardiac ejection fraction    NSTEMI (non-ST elevated myocardial infarction) [facility]    Ischemic cardiomyopathy    Mixed hyperlipidemia         SURGICAL HISTORY       Past Surgical History:   Procedure Laterality Date    APPENDECTOMY      BIOPSY / LIGATION TEMPORAL ARTERY Left 8/17/2020    LEFT TEMPORAL ARTERY BIOPSY LIGATION performed by Alonzo Quigley MD at [address] [place] [address]  11/13/2013 11/13/2013-EF 55%, distal LAD mild disease,CX stent patent,but distal to stent 50% stenosis. RCA severe disease.     CARDIAC SURGERY  07/03/2020    CABG X 3 LIMA-OM-PDA-LAD, Maze, LT Atrial Appendage clipping    COLONOSCOPY      CORONARY ANGIOPLASTY WITH STENT PLACEMENT      4 stents- RCA X3; CX X1    CORONARY ANGIOPLASTY WITH STENT PLACEMENT  11/13/2013 11/13/2013 -3 stents placed to RCA- Dr Denia Jensen N/A 7/3/2020    INTRAOPERATIVE ISMAEL, CABG X3   WITH LIMA  AND LEFT LEG ENDOVEIN HARVEST, INDUCED HYPOTHERMIA, MAZE BIPOLAR AND CRYO PROCEDURE, LEFT ATRIAL APPENDAGE CLIPPING performed by Lewie Closs, MD at [facility]      all teeth extracted    [facility]         CURRENT MEDICATIONS       Discharge Medication List as of 1/21/2022 12:22 AM      CONTINUE these medications which have NOT CHANGED    Details   predniSONE (DELTASONE) 50 MG tablet Take 1 tablet by mouth daily for 5 days, Disp-5 tablet, R-0Normal      azithromycin (ZITHROMAX) 250 MG tablet Take 1 tablet by mouth daily for 5 days, Disp-5 tablet, R-0Normal      albuterol (PROVENTIL) (5 MG/ML) 0.5% nebulizer solution Take 0.5 mLs by nebulization every 6 hours as needed for Wheezing, Disp-120 each, R-0Normal      warfarin (JANTOVEN) 4 MG tablet TAKE 1 TABLET BY MOUTH EVERY DAY, EXCEPT TAKE 1 AND 1/2 TABLETS ON SUNDAYS OR AS DIRECTED BY CLINIC, Disp-100 tablet, R-1Normal      carvedilol (COREG) 25 MG tablet Take 1 tablet by mouth 2 times daily, Disp-60 tablet, R-5Normal      albuterol sulfate HFA (PROVENTIL HFA) 108 (90 Base) MCG/ACT inhaler Inhale 2 puffs into the lungs every 6 hours as needed for Wheezing or Shortness of Breath, Disp-18 g, R-0Print      guaiFENesin (ALTARUSSIN) 100 MG/5ML syrup Take 10 mLs by mouth every 4 hours as needed for Cough, Disp-120 mL, R-0Normal      amLODIPine (NORVASC) 5 MG tablet Take 0.5 tablets by mouth daily, Disp-30 tablet, R-3Normal      chlorthalidone (HYGROTON) 25 MG tablet TAKE 1 TABLET BY MOUTH EVERY DAYHistorical Med      omeprazole (PRILOSEC) 20 MG delayed release capsule Take 20 mg by mouth 2 times dailyHistorical Med      metFORMIN (GLUCOPHAGE) 500 MG tablet Take 1,000 mg by mouth 2 times dailyHistorical Med      lisinopril (PRINIVIL;ZESTRIL) 40 MG tablet Take 1 tablet by mouth daily, Disp-90 tablet,R-3Normal      nitroGLYCERIN (NITROSTAT) 0.4 MG SL tablet Place 1 tablet under the tongue every 5 minutes as needed for Chest pain, Disp-25 tablet,R-3Normal      hydrOXYzine (VISTARIL) 50 MG capsule Take 50 mg by mouth nightlyHistorical Med      atorvastatin (LIPITOR) 80 MG tablet Take 1 tablet by mouth nightly, Disp-30 tablet,R-3Normal      glipiZIDE (GLUCOTROL) 10 MG tablet Take 1 tablet by mouth every morning (before breakfast), Disp-60 tablet, R-3Normal      furosemide (LASIX) 40 MG tablet Take 1 tablet by mouth 3 times daily, Disp-90 tablet, R-1Normal      potassium chloride (KLOR-CON M) 20 MEQ extended release tablet Take 2 tablets by mouth 2 times daily, Disp-60 tablet, R-1Normal      linagliptin (TRADJENTA) 5 MG tablet Take 1 tablet by mouth daily, Disp-30 tablet, R-3Normal      clopidogrel (PLAVIX) 75 MG tablet Take 1 tablet by mouth daily, Disp-30 tablet, R-5Normal             ALLERGIES     Hydromorphone    FAMILY HISTORY       Family History   Problem Relation Age of Onset    Cancer Mother     Diabetes Mother     Heart Disease Mother     High Blood Pressure Mother     Stroke Mother     Cancer Father     Heart Disease Father     High Blood Pressure Father     Stroke Father     Cancer Brother           SOCIAL HISTORY       Social History     Socioeconomic History    Marital status: Legally      Spouse name: None    Number of children: None    Years of education: None    Highest education level: None   Occupational History    Occupation:    Tobacco Use    Smoking status: Current Every Day Smoker     Packs/day: 1.00     Years: 46.00     Pack years: 46.00     Types: Cigarettes     Start date: 1974    Smokeless tobacco: Never Used    Tobacco comment: Smokes approx 1 pack per day   Vaping Use    Vaping Use: Never used   Substance and Sexual Activity    Alcohol use: No     Comment: caffeine 6 bottles of unsweet tea a day    Drug use: Not Currently     Types: Cocaine     Comment: Last used: 7/1/20    Sexual activity: Yes     Partners: Female   Other Topics Concern    None   Social History Narrative    None     Social Determinants of Health     Financial Resource Strain:     Difficulty of Paying Living Expenses: Not on file   Food Insecurity:     Worried About Running Out of Food in the Last Year: Not on file    Ran Out of Food in the Last Year: Not on file   Transportation Needs:     Lack of Transportation (Medical): Not on file    Lack of Transportation (Non-Medical): Not on file   Physical Activity:     Days of Exercise per Week: Not on file    Minutes of Exercise per Session: Not on file   Stress:     Feeling of Stress : Not on file   Social Connections:     Frequency of Communication with Friends and Family: Not on file    Frequency of Social Gatherings with Friends and Family: Not on file    Attends [employer] Services: Not on file    Active Member of [address] [place] [address] [employer] or Organizations: Not on file    Attends Club or Organization Meetings: Not on file    Marital Status: Not on file   Intimate Partner Violence:     Fear of Current or Ex-Partner: Not on file    Emotionally Abused: Not on file    Physically Abused: Not on file    Sexually Abused: Not on file   Housing Stability:     Unable to Pay for Housing in the Last Year: Not on file    Number of [place] in the Last Year: Not on file    Unstable Housing in the Last Year: Not on file       SCREENINGS               PHYSICAL EXAM    (up to 7 for level 4, 8 or more for level 5)     ED Triage Vitals   BP Temp Temp Source Pulse Resp SpO2 Height Weight   01/20/22 2110 01/20/22 2110 01/20/22 2110 01/20/22 2110 01/20/22 2110 01/20/22 2110 01/20/22 2240 01/20/22 2240   (!) 212/123 97.8 °F (36.6 °C) Oral 93 22 97 % 6' 3\" (1.905 m) 258 lb (117 kg)       Physical Exam  Vitals reviewed. Constitutional:       Appearance: He is not ill-appearing or toxic-appearing. HENT:      Head: Normocephalic and atraumatic. Nose: No congestion or rhinorrhea. Mouth/Throat:      Mouth: Mucous membranes are moist.      Pharynx: No oropharyngeal exudate or posterior oropharyngeal erythema. Eyes:      General:         Right eye: No discharge. Left eye: No discharge.       Extraocular Movements: Extraocular movements intact. Pupils: Pupils are equal, round, and reactive to light. Cardiovascular:      Rate and Rhythm: Normal rate. Pulses: Normal pulses. Heart sounds: No friction rub. No gallop. Pulmonary:      Effort: Pulmonary effort is normal. No respiratory distress. Chest:      Chest wall: No tenderness. Abdominal:      Palpations: Abdomen is soft. Tenderness: There is no abdominal tenderness. There is no guarding. Musculoskeletal:         General: No tenderness or deformity. Normal range of motion. Cervical back: Normal range of motion. No tenderness. Lymphadenopathy:      Cervical: No cervical adenopathy. Skin:     General: Skin is warm. Capillary Refill: Capillary refill takes less than 2 seconds. Neurological:      General: No focal deficit present. Mental Status: He is alert and oriented to person, place, and time.          DIAGNOSTIC RESULTS     Labs Reviewed   COMPREHENSIVE METABOLIC PANEL W/ REFLEX TO MG FOR LOW K - Abnormal; Notable for the following components:       Result Value    Sodium 134 (*)     CO2 19 (*)     BUN 25 (*)     CREATININE 1.5 (*)     Glucose 277 (*)     GFR Non- 48 (*)     GFR  58 (*)     All other components within normal limits   CBC WITH AUTO DIFFERENTIAL - Abnormal; Notable for the following components:    WBC 11.2 (*)     RBC 4.17 (*)     Hemoglobin 11.7 (*)     Hematocrit 36.4 (*)     Segs Relative 89.2 (*)     Lymphocytes % 7.7 (*)     Immature Neutrophil % 0.8 (*)     All other components within normal limits   BRAIN NATRIURETIC PEPTIDE - Abnormal; Notable for the following components:    Pro-BNP 3,723 (*)     All other components within normal limits   TROPONIN          EKG: All EKG's are interpreted by the Emergency Department Physician who either signs or Co-signs this chart in the absence of a cardiologist.       EKG Interpretation    Interpreted by emergency department physician    EKG is interpreted by me.  EKG shows sinus rhythm 84 bpm, axis is nondeviated, no remarkable ST segment ovation's or depressions, T waves are unremarkable, MI interval 176, QRS duration of 94, QTC of 4-53. Final impression, nonspecific EKG    Janessa Dean MD     RADIOLOGY:     Non-plain film images such as CT, Ultrasound and MRI are read by the radiologist. Plain radiographic images are visualized and preliminarily interpreted by the emergency physician. Interpretation per the Radiologist below, if available at the time of this note:    XR CHEST PORTABLE   Final Result   No significant interval change in comparison 01/19/2022, with stable central   pulmonary vascular congestion. ED BEDSIDE ULTRASOUND:   Performed by ED Physician Janessa Dean MD       LABS:  Labs Reviewed   COMPREHENSIVE METABOLIC PANEL W/ REFLEX TO MG FOR LOW K - Abnormal; Notable for the following components:       Result Value    Sodium 134 (*)     CO2 19 (*)     BUN 25 (*)     CREATININE 1.5 (*)     Glucose 277 (*)     GFR Non- 48 (*)     GFR  58 (*)     All other components within normal limits   CBC WITH AUTO DIFFERENTIAL - Abnormal; Notable for the following components:    WBC 11.2 (*)     RBC 4.17 (*)     Hemoglobin 11.7 (*)     Hematocrit 36.4 (*)     Segs Relative 89.2 (*)     Lymphocytes % 7.7 (*)     Immature Neutrophil % 0.8 (*)     All other components within normal limits   BRAIN NATRIURETIC PEPTIDE - Abnormal; Notable for the following components:    Pro-BNP 3,723 (*)     All other components within normal limits   TROPONIN       All other labs were within normal range or not returned as of this dictation.     EMERGENCY DEPARTMENT COURSE and DIFFERENTIAL DIAGNOSIS/MDM:   Vitals:    Vitals:    01/20/22 2243 01/20/22 2332 01/20/22 2342 01/21/22 0003   BP: (!) 208/113 (!) 201/110 (!) 193/106 (!) 193/96   Pulse:  72     Resp:  19     Temp:       TempSrc:       SpO2: 97% 98% 95% 96% Weight:       Height:               MDM  Number of Diagnoses or Management Options  Diagnosis management comments: 62-year-old male with a history of CAD status post CABG status post PCI presents with midsternal chest pain. Symptoms started yesterday evening. He is taken multiple nitro at home with some relief of symptoms. He was seen 1 day ago at the [facility] emergency department for COPD exacerbation. Does not have a home oxygen requirement. He reportedly was positive for COVID earlier in the month. He presents with elevated blood pressure in the systolic of 407B over 906I. He states that his blood pressure has been running recently. He does state that he has been taking his antihypertensive medications. Vitals otherwise unremarkable. Denies any injury to his chest wall. His chest pain is not reproducible. EKG is nondiagnostic showing no acute signs of ischemia or arrhythmia. From review of records, last known stress test about 6 months ago which did show a nonreversible area of infarction or defect in his inferior wall. He does follow with a local cardiologist.  Labs including troponin are obtained. Chest x-ray is also obtained. I did give an additional dose of nitro in the emergency department for blood pressure control. It did lower his systolic blood pressure slightly. His labs overall nonacute. Troponin undetectable. Labs otherwise at baseline. Some creatinine is 1.5 which is within the baseline range for him. Patient decided to leave the emergency department before being told the results of all his labs. He did elope before treatment course was complete      -  Patient seen and evaluated in the emergency department. -  Triage and nursing notes reviewed and incorporated. -  Old chart records reviewed and incorporated. -  Work-up included:  See above  -  Results discussed with patient.     CONSULTS:  None    PROCEDURES:  None performed unless otherwise noted below Procedures        FINAL IMPRESSION      1. Eloped from emergency department    2. Chest pain, unspecified type          DISPOSITION/PLAN   DISPOSITION Eloped - Left Before Treatment Complete 01/21/2022 12:21:24 AM      PATIENT REFERRED TO:  No follow-up provider specified. DISCHARGE MEDICATIONS:  Discharge Medication List as of 1/21/2022 12:22 AM          ED Provider Disposition Time  DISPOSITION Eloped - Left Before Treatment Complete 01/21/2022 12:21:24 AM      Appropriate personal protective equipment was worn during the patient's evaluation. These included surgical, eye protection, surgical mask or in 95 respirator and gloves. The patient was also placed in a surgical mask for the prevention of possible spread of respiratory viral illnesses. The Patient was instructed to read the package inserts with any medication that was prescribed. Major potential reactions and medication interactions were discussed. The Patient understands that there are numerous possible adverse reactions not covered. The patient was also instructed to arrange follow-up with his or her primary care provider for review of any pending labwork or incidental findings on any radiology results that were obtained. All efforts were made to discuss any incidental findings that require further monitoring. Controlled Substances Monitoring:     No flowsheet data found.     (Please note that portions of this note were completed with a voice recognition program.  Efforts were made to edit the dictations but occasionally words are mis-transcribed.)    Law Rao MD (electronically signed)  Attending Emergency Physician           Law Rao MD  01/21/22 [id number]

## 2022-01-21 NOTE — ED PROVIDER NOTES
Twelve-lead EKG obtained at 2121 on 20 January 2022 and interpreted by me in the absence of a cardiologist.  There is ST change in V1 and V2 compared similar to prior. There is no acute criteria ST elevation or reciprocal change. There are no hyperacute T wave changes. There is no sign of acute ischemia or infarction. This tracing shows a normal sinus rhythm. Rate and intervals are 84 beats per minute, WA interval 176 milliseconds, QRS duration 94 milliseconds, QTc interval 453 milliseconds, and R axis normal at -13 degrees. There is no acute change compared with the most recent EKG dated January 4, 2022, with similar precordial morphology.         Vallery Essex, MD  01/20/22 3772

## 2022-01-21 NOTE — ED NOTES
Pt states has been waiting too long and nothing has been done. Dr. Thelma Hernandez notified of Pt high BP and has assessed pt. Awaiting orders.       Ryan Cordero RN  01/20/22 0911

## 2022-01-24 ENCOUNTER — TELEPHONE (OUTPATIENT)
Dept: CARDIOLOGY CLINIC | Age: 62
End: 2022-01-24

## 2022-01-24 DIAGNOSIS — I20.8 STABLE ANGINA PECTORIS (HCC): Primary | ICD-10-CM

## 2022-01-24 NOTE — TELEPHONE ENCOUNTER
Patient states chest pain left and right side, took ntg at 11pm and 3am. Pain not completely resolved.  Also has swelling in right foot

## 2022-01-26 ENCOUNTER — PROCEDURE VISIT (OUTPATIENT)
Dept: CARDIOLOGY CLINIC | Age: 62
End: 2022-01-26
Payer: COMMERCIAL

## 2022-01-26 DIAGNOSIS — I25.10 CORONARY ARTERY DISEASE INVOLVING NATIVE HEART WITHOUT ANGINA PECTORIS, UNSPECIFIED VESSEL OR LESION TYPE: ICD-10-CM

## 2022-01-26 DIAGNOSIS — R07.9 CHEST PAIN, UNSPECIFIED TYPE: ICD-10-CM

## 2022-01-26 DIAGNOSIS — I20.8 STABLE ANGINA PECTORIS (HCC): ICD-10-CM

## 2022-01-26 LAB
LV EF: 48 %
LVEF MODALITY: NORMAL

## 2022-01-26 PROCEDURE — 93306 TTE W/DOPPLER COMPLETE: CPT | Performed by: INTERNAL MEDICINE

## 2022-01-27 ENCOUNTER — ANTI-COAG VISIT (OUTPATIENT)
Dept: PHARMACY | Age: 62
End: 2022-01-27
Payer: COMMERCIAL

## 2022-01-27 DIAGNOSIS — I48.91 ATRIAL FIBRILLATION WITH RAPID VENTRICULAR RESPONSE (HCC): Primary | ICD-10-CM

## 2022-01-27 DIAGNOSIS — I48.11 LONGSTANDING PERSISTENT ATRIAL FIBRILLATION (HCC): ICD-10-CM

## 2022-01-27 LAB
INTERNATIONAL NORMALIZATION RATIO, POC: 2.6
POC INR: 2.6 INDEX
PROTHROMBIN TIME, POC: 31.6 SECONDS (ref 10–14.3)

## 2022-01-27 PROCEDURE — 36416 COLLJ CAPILLARY BLOOD SPEC: CPT

## 2022-01-27 PROCEDURE — 99211 OFF/OP EST MAY X REQ PHY/QHP: CPT

## 2022-01-27 PROCEDURE — 85610 PROTHROMBIN TIME: CPT

## 2022-01-27 NOTE — PROGRESS NOTES
Medication Management Service  [facility]  [phone]    Visit Date: 1/27/2022   Subjective:       Leelee Perdomo is a 62 y.o. male who presents to clinic today for anticoagulation monitoring and adjustment. Patient seen in clinic for warfarin management due to  Indication:   atrial fibrillation. INR goal: of 2.0-3.0. Duration of therapy: indefinite. Patient reports the following:   Adherent with regimen  Missed or extra doses:  None   Bleeding or thromboembolic side effects:  None  Significant medication changes:  Prednisone 50mg daily ended last week  Significant dietary changes: None  Significant alcohol or tobacco changes: None  Significant recent illness, disease state changes, or hospitalization:  None  Upcoming surgeries or procedures:  None  Falls: None           Assessment and Plan     PT/INR done in office per protocol. INR today is 2.6, therapeutic. Plan: Will continue current regimen of warfarin 4mg daily except 6mg on Sundays. Recheck INR in 4 week(s). Patient verbalized understanding of dosing directions and information discussed. Dosing schedule given to patient including phone number, appointment date, and time. Progress note sent to referring office. Patient acknowledges working in consult agreement with pharmacist as referred by his/her physician.       Electronically signed by DARA [facility] on 1/27/22 at 9:18 AM [address] [place] [address] Intervention Detail:    Total # of Interventions Recommended:    Total # of Interventions Accepted:    Time Spent (min): 15

## 2022-01-29 ENCOUNTER — APPOINTMENT (OUTPATIENT)
Dept: GENERAL RADIOLOGY | Age: 62
End: 2022-01-29
Payer: COMMERCIAL

## 2022-01-29 ENCOUNTER — HOSPITAL ENCOUNTER (OUTPATIENT)
Age: 62
Setting detail: OBSERVATION
Discharge: HOME | End: 2022-01-30
Attending: EMERGENCY MEDICINE
Payer: COMMERCIAL

## 2022-01-29 ENCOUNTER — HOSPITAL ENCOUNTER (EMERGENCY)
Age: 62
Discharge: HOME | End: 2022-01-29
Attending: EMERGENCY MEDICINE
Payer: COMMERCIAL

## 2022-01-29 ENCOUNTER — APPOINTMENT (OUTPATIENT)
Dept: ULTRASOUND IMAGING | Age: 62
End: 2022-01-29
Payer: COMMERCIAL

## 2022-01-29 VITALS
DIASTOLIC BLOOD PRESSURE: 111 MMHG | WEIGHT: 270 LBS | HEART RATE: 68 BPM | SYSTOLIC BLOOD PRESSURE: 175 MMHG | HEIGHT: 75 IN | OXYGEN SATURATION: 98 % | TEMPERATURE: 97.9 F | BODY MASS INDEX: 33.57 KG/M2 | RESPIRATION RATE: 20 BRPM

## 2022-01-29 DIAGNOSIS — R79.89 ELEVATED BRAIN NATRIURETIC PEPTIDE (BNP) LEVEL: ICD-10-CM

## 2022-01-29 DIAGNOSIS — R77.8 TROPONIN LEVEL ELEVATED: ICD-10-CM

## 2022-01-29 DIAGNOSIS — R07.9 CHEST PAIN, UNSPECIFIED TYPE: Primary | ICD-10-CM

## 2022-01-29 DIAGNOSIS — R06.00 DYSPNEA, UNSPECIFIED TYPE: ICD-10-CM

## 2022-01-29 DIAGNOSIS — I50.9 CONGESTIVE HEART FAILURE, UNSPECIFIED HF CHRONICITY, UNSPECIFIED HEART FAILURE TYPE (HCC): ICD-10-CM

## 2022-01-29 LAB
ALBUMIN SERPL-MCNC: 3.1 GM/DL (ref 3.4–5)
ALBUMIN SERPL-MCNC: 3.6 GM/DL (ref 3.4–5)
ALP BLD-CCNC: 102 IU/L (ref 40–129)
ALP BLD-CCNC: 109 IU/L (ref 40–128)
ALT SERPL-CCNC: 13 U/L (ref 10–40)
ALT SERPL-CCNC: 14 U/L (ref 10–40)
ANION GAP SERPL CALCULATED.3IONS-SCNC: 10 MMOL/L (ref 4–16)
ANION GAP SERPL CALCULATED.3IONS-SCNC: 11 MMOL/L (ref 4–16)
APTT: 40.7 SECONDS (ref 25.1–37.1)
AST SERPL-CCNC: 17 IU/L (ref 15–37)
AST SERPL-CCNC: 18 IU/L (ref 15–37)
BASOPHILS ABSOLUTE: 0 K/CU MM
BASOPHILS ABSOLUTE: 0 K/CU MM
BASOPHILS RELATIVE PERCENT: 0.4 % (ref 0–1)
BASOPHILS RELATIVE PERCENT: 0.4 % (ref 0–1)
BILIRUB SERPL-MCNC: 0.3 MG/DL (ref 0–1)
BILIRUB SERPL-MCNC: 0.3 MG/DL (ref 0–1)
BUN BLDV-MCNC: 23 MG/DL (ref 6–23)
BUN BLDV-MCNC: 24 MG/DL (ref 6–23)
CALCIUM SERPL-MCNC: 8.4 MG/DL (ref 8.3–10.6)
CALCIUM SERPL-MCNC: 8.8 MG/DL (ref 8.3–10.6)
CHLORIDE BLD-SCNC: 103 MMOL/L (ref 99–110)
CHLORIDE BLD-SCNC: 104 MMOL/L (ref 99–110)
CO2: 24 MMOL/L (ref 21–32)
CO2: 26 MMOL/L (ref 21–32)
CREAT SERPL-MCNC: 1.3 MG/DL (ref 0.9–1.3)
CREAT SERPL-MCNC: 1.3 MG/DL (ref 0.9–1.3)
D DIMER: <200 NG/ML(DDU)
D DIMER: <200 NG/ML(DDU)
DIFFERENTIAL TYPE: ABNORMAL
DIFFERENTIAL TYPE: ABNORMAL
EOSINOPHILS ABSOLUTE: 0.2 K/CU MM
EOSINOPHILS ABSOLUTE: 0.3 K/CU MM
EOSINOPHILS RELATIVE PERCENT: 2.4 % (ref 0–3)
EOSINOPHILS RELATIVE PERCENT: 2.6 % (ref 0–3)
GFR AFRICAN AMERICAN: >60 ML/MIN/1.73M2
GFR AFRICAN AMERICAN: >60 ML/MIN/1.73M2
GFR NON-AFRICAN AMERICAN: 56 ML/MIN/1.73M2
GFR NON-AFRICAN AMERICAN: 56 ML/MIN/1.73M2
GLUCOSE BLD-MCNC: 120 MG/DL (ref 70–99)
GLUCOSE BLD-MCNC: 123 MG/DL (ref 70–99)
HCT VFR BLD CALC: 35.5 % (ref 42–52)
HCT VFR BLD CALC: 36.9 % (ref 42–52)
HEMOGLOBIN: 11 GM/DL (ref 13.5–18)
HEMOGLOBIN: 11.4 GM/DL (ref 13.5–18)
IMMATURE NEUTROPHIL %: 0.4 % (ref 0–0.43)
IMMATURE NEUTROPHIL %: 0.4 % (ref 0–0.43)
INR BLD: 2.25 INDEX
LYMPHOCYTES ABSOLUTE: 1.6 K/CU MM
LYMPHOCYTES ABSOLUTE: 1.6 K/CU MM
LYMPHOCYTES RELATIVE PERCENT: 15.1 % (ref 24–44)
LYMPHOCYTES RELATIVE PERCENT: 16.6 % (ref 24–44)
MCH RBC QN AUTO: 27.9 PG (ref 27–31)
MCH RBC QN AUTO: 27.9 PG (ref 27–31)
MCHC RBC AUTO-ENTMCNC: 30.9 % (ref 32–36)
MCHC RBC AUTO-ENTMCNC: 31 % (ref 32–36)
MCV RBC AUTO: 90.1 FL (ref 78–100)
MCV RBC AUTO: 90.2 FL (ref 78–100)
MONOCYTES ABSOLUTE: 0.9 K/CU MM
MONOCYTES ABSOLUTE: 1 K/CU MM
MONOCYTES RELATIVE PERCENT: 8.9 % (ref 0–4)
MONOCYTES RELATIVE PERCENT: 9.9 % (ref 0–4)
NUCLEATED RBC %: 0 %
NUCLEATED RBC %: 0 %
PDW BLD-RTO: 14.7 % (ref 11.7–14.9)
PDW BLD-RTO: 14.7 % (ref 11.7–14.9)
PLATELET # BLD: 222 K/CU MM (ref 140–440)
PLATELET # BLD: 241 K/CU MM (ref 140–440)
PMV BLD AUTO: 10.7 FL (ref 7.5–11.1)
PMV BLD AUTO: 11 FL (ref 7.5–11.1)
POTASSIUM SERPL-SCNC: 3.8 MMOL/L (ref 3.5–5.1)
POTASSIUM SERPL-SCNC: 4 MMOL/L (ref 3.5–5.1)
PRO-BNP: 2054 PG/ML
PRO-BNP: 2364 PG/ML
PROTHROMBIN TIME: 29.2 SECONDS (ref 11.7–14.5)
RBC # BLD: 3.94 M/CU MM (ref 4.6–6.2)
RBC # BLD: 4.09 M/CU MM (ref 4.6–6.2)
SEGMENTED NEUTROPHILS ABSOLUTE COUNT: 6.9 K/CU MM
SEGMENTED NEUTROPHILS ABSOLUTE COUNT: 7.5 K/CU MM
SEGMENTED NEUTROPHILS RELATIVE PERCENT: 70.3 % (ref 36–66)
SEGMENTED NEUTROPHILS RELATIVE PERCENT: 72.6 % (ref 36–66)
SODIUM BLD-SCNC: 139 MMOL/L (ref 135–145)
SODIUM BLD-SCNC: 139 MMOL/L (ref 135–145)
TOTAL CK: 34 IU/L (ref 38–174)
TOTAL IMMATURE NEUTOROPHIL: 0.04 K/CU MM
TOTAL IMMATURE NEUTOROPHIL: 0.04 K/CU MM
TOTAL NUCLEATED RBC: 0 K/CU MM
TOTAL NUCLEATED RBC: 0 K/CU MM
TOTAL PROTEIN: 5.9 GM/DL (ref 6.4–8.2)
TOTAL PROTEIN: 6.8 GM/DL (ref 6.4–8.2)
TROPONIN T: 0.05 NG/ML
TROPONIN T: 0.07 NG/ML
WBC # BLD: 10.3 K/CU MM (ref 4–10.5)
WBC # BLD: 9.8 K/CU MM (ref 4–10.5)

## 2022-01-29 PROCEDURE — 6370000000 HC RX 637 (ALT 250 FOR IP): Performed by: PHYSICIAN ASSISTANT

## 2022-01-29 PROCEDURE — 85025 COMPLETE CBC W/AUTO DIFF WBC: CPT

## 2022-01-29 PROCEDURE — 96374 THER/PROPH/DIAG INJ IV PUSH: CPT

## 2022-01-29 PROCEDURE — 93970 EXTREMITY STUDY: CPT

## 2022-01-29 PROCEDURE — 99285 EMERGENCY DEPT VISIT HI MDM: CPT

## 2022-01-29 PROCEDURE — G0378 HOSPITAL OBSERVATION PER HR: HCPCS

## 2022-01-29 PROCEDURE — 93005 ELECTROCARDIOGRAM TRACING: CPT | Performed by: EMERGENCY MEDICINE

## 2022-01-29 PROCEDURE — 85610 PROTHROMBIN TIME: CPT

## 2022-01-29 PROCEDURE — 84484 ASSAY OF TROPONIN QUANT: CPT

## 2022-01-29 PROCEDURE — 85730 THROMBOPLASTIN TIME PARTIAL: CPT

## 2022-01-29 PROCEDURE — 82550 ASSAY OF CK (CPK): CPT

## 2022-01-29 PROCEDURE — 83880 ASSAY OF NATRIURETIC PEPTIDE: CPT

## 2022-01-29 PROCEDURE — 6360000002 HC RX W HCPCS: Performed by: PHYSICIAN ASSISTANT

## 2022-01-29 PROCEDURE — 85379 FIBRIN DEGRADATION QUANT: CPT

## 2022-01-29 PROCEDURE — 93005 ELECTROCARDIOGRAM TRACING: CPT | Performed by: PHYSICIAN ASSISTANT

## 2022-01-29 PROCEDURE — 71045 X-RAY EXAM CHEST 1 VIEW: CPT

## 2022-01-29 PROCEDURE — 80053 COMPREHEN METABOLIC PANEL: CPT

## 2022-01-29 RX ORDER — ASPIRIN 325 MG
325 TABLET ORAL ONCE
Status: COMPLETED | OUTPATIENT
Start: 2022-01-29 | End: 2022-01-29

## 2022-01-29 RX ORDER — POTASSIUM CHLORIDE 20 MEQ/1
40 TABLET, EXTENDED RELEASE ORAL 2 TIMES DAILY
Status: DISCONTINUED | OUTPATIENT
Start: 2022-01-29 | End: 2022-01-30 | Stop reason: HOSPADM

## 2022-01-29 RX ORDER — NITROGLYCERIN 0.4 MG/1
0.4 TABLET SUBLINGUAL EVERY 5 MIN PRN
Status: DISCONTINUED | OUTPATIENT
Start: 2022-01-29 | End: 2022-01-30 | Stop reason: HOSPADM

## 2022-01-29 RX ORDER — FUROSEMIDE 10 MG/ML
40 INJECTION INTRAMUSCULAR; INTRAVENOUS ONCE
Status: COMPLETED | OUTPATIENT
Start: 2022-01-29 | End: 2022-01-29

## 2022-01-29 RX ORDER — NICOTINE 21 MG/24HR
1 PATCH, TRANSDERMAL 24 HOURS TRANSDERMAL ONCE
Status: COMPLETED | OUTPATIENT
Start: 2022-01-29 | End: 2022-01-30

## 2022-01-29 RX ORDER — AMLODIPINE BESYLATE 10 MG/1
10 TABLET ORAL DAILY
Status: DISCONTINUED | OUTPATIENT
Start: 2022-01-30 | End: 2022-01-30 | Stop reason: HOSPADM

## 2022-01-29 RX ORDER — FUROSEMIDE 10 MG/ML
40 INJECTION INTRAMUSCULAR; INTRAVENOUS ONCE
Status: DISCONTINUED | OUTPATIENT
Start: 2022-01-29 | End: 2022-01-29

## 2022-01-29 RX ORDER — CARVEDILOL 25 MG/1
25 TABLET ORAL 2 TIMES DAILY
Status: DISCONTINUED | OUTPATIENT
Start: 2022-01-29 | End: 2022-01-30 | Stop reason: HOSPADM

## 2022-01-29 RX ORDER — FUROSEMIDE 40 MG/1
40 TABLET ORAL 2 TIMES DAILY
Status: DISCONTINUED | OUTPATIENT
Start: 2022-01-30 | End: 2022-01-30 | Stop reason: HOSPADM

## 2022-01-29 RX ORDER — FUROSEMIDE 10 MG/ML
40 INJECTION INTRAMUSCULAR; INTRAVENOUS ONCE
Status: DISCONTINUED | OUTPATIENT
Start: 2022-01-29 | End: 2022-01-29 | Stop reason: HOSPADM

## 2022-01-29 RX ORDER — LISINOPRIL 20 MG/1
20 TABLET ORAL DAILY
Status: DISCONTINUED | OUTPATIENT
Start: 2022-01-30 | End: 2022-01-30 | Stop reason: HOSPADM

## 2022-01-29 RX ORDER — ALBUTEROL SULFATE 90 UG/1
2 AEROSOL, METERED RESPIRATORY (INHALATION) EVERY 6 HOURS PRN
Status: DISCONTINUED | OUTPATIENT
Start: 2022-01-29 | End: 2022-01-30 | Stop reason: HOSPADM

## 2022-01-29 RX ORDER — ONDANSETRON 2 MG/ML
4 INJECTION INTRAMUSCULAR; INTRAVENOUS EVERY 30 MIN PRN
Status: DISCONTINUED | OUTPATIENT
Start: 2022-01-29 | End: 2022-01-29 | Stop reason: HOSPADM

## 2022-01-29 RX ORDER — NITROGLYCERIN 0.4 MG/1
0.4 TABLET SUBLINGUAL EVERY 5 MIN PRN
Status: DISCONTINUED | OUTPATIENT
Start: 2022-01-29 | End: 2022-01-29 | Stop reason: HOSPADM

## 2022-01-29 RX ORDER — ATORVASTATIN CALCIUM 80 MG/1
80 TABLET, FILM COATED ORAL NIGHTLY
Status: DISCONTINUED | OUTPATIENT
Start: 2022-01-29 | End: 2022-01-30 | Stop reason: HOSPADM

## 2022-01-29 RX ORDER — NICOTINE 21 MG/24HR
1 PATCH, TRANSDERMAL 24 HOURS TRANSDERMAL DAILY
Status: DISCONTINUED | OUTPATIENT
Start: 2022-01-30 | End: 2022-01-30 | Stop reason: HOSPADM

## 2022-01-29 RX ORDER — HYDROXYZINE PAMOATE 25 MG/1
50 CAPSULE ORAL NIGHTLY
Status: DISCONTINUED | OUTPATIENT
Start: 2022-01-29 | End: 2022-01-30 | Stop reason: HOSPADM

## 2022-01-29 RX ORDER — PANTOPRAZOLE SODIUM 40 MG/1
40 TABLET, DELAYED RELEASE ORAL
Status: DISCONTINUED | OUTPATIENT
Start: 2022-01-29 | End: 2022-01-30 | Stop reason: HOSPADM

## 2022-01-29 RX ORDER — FENTANYL CITRATE 50 UG/ML
50 INJECTION, SOLUTION INTRAMUSCULAR; INTRAVENOUS
Status: DISCONTINUED | OUTPATIENT
Start: 2022-01-29 | End: 2022-01-29 | Stop reason: HOSPADM

## 2022-01-29 RX ORDER — CHLORTHALIDONE 25 MG/1
25 TABLET ORAL DAILY
Status: DISCONTINUED | OUTPATIENT
Start: 2022-01-30 | End: 2022-01-30 | Stop reason: HOSPADM

## 2022-01-29 RX ORDER — OMEPRAZOLE 20 MG/1
20 CAPSULE, DELAYED RELEASE ORAL 2 TIMES DAILY
Status: DISCONTINUED | OUTPATIENT
Start: 2022-01-29 | End: 2022-01-29 | Stop reason: CLARIF

## 2022-01-29 RX ADMIN — CARVEDILOL 25 MG: 25 TABLET, FILM COATED ORAL at 21:57

## 2022-01-29 RX ADMIN — POTASSIUM CHLORIDE 40 MEQ: 1500 TABLET, EXTENDED RELEASE ORAL at 21:45

## 2022-01-29 RX ADMIN — ASPIRIN 325 MG ORAL TABLET 325 MG: 325 PILL ORAL at 18:18

## 2022-01-29 RX ADMIN — ATORVASTATIN CALCIUM 80 MG: 80 TABLET, FILM COATED ORAL at 21:57

## 2022-01-29 RX ADMIN — PANTOPRAZOLE SODIUM 40 MG: 40 TABLET, DELAYED RELEASE ORAL at 21:53

## 2022-01-29 RX ADMIN — HYDROXYZINE PAMOATE 50 MG: 25 CAPSULE ORAL at 21:45

## 2022-01-29 RX ADMIN — FUROSEMIDE 40 MG: 10 INJECTION INTRAMUSCULAR; INTRAVENOUS at 21:46

## 2022-01-29 ASSESSMENT — PAIN DESCRIPTION - FREQUENCY
FREQUENCY: INTERMITTENT
FREQUENCY: CONTINUOUS

## 2022-01-29 ASSESSMENT — PAIN SCALES - GENERAL
PAINLEVEL_OUTOF10: 1
PAINLEVEL_OUTOF10: 3

## 2022-01-29 ASSESSMENT — PAIN DESCRIPTION - LOCATION
LOCATION: CHEST

## 2022-01-29 ASSESSMENT — ENCOUNTER SYMPTOMS
EYES NEGATIVE: 1
SHORTNESS OF BREATH: 1
ALLERGIC/IMMUNOLOGIC NEGATIVE: 1
GASTROINTESTINAL NEGATIVE: 1

## 2022-01-29 ASSESSMENT — PAIN DESCRIPTION - PAIN TYPE
TYPE: ACUTE PAIN
TYPE: ACUTE PAIN

## 2022-01-29 ASSESSMENT — PAIN DESCRIPTION - DESCRIPTORS
DESCRIPTORS: PRESSURE
DESCRIPTORS: HEAVINESS

## 2022-01-29 ASSESSMENT — PAIN DESCRIPTION - ORIENTATION: ORIENTATION: RIGHT;LEFT

## 2022-01-29 NOTE — ED PROVIDER NOTES
[facility]      TRIAGE CHIEF COMPLAINT:   Chest Pain (states this has been going on for approx. 12 hours) and Shortness of Breath      [place]:  Bria Patel is a 62 y.o. male that presents with complaint of intermittent chest pain shortness of breath peripheral edema. History of CAD, open heart surgery, bypass, CHF states he follows with cardiology he is on Coumadin states last 4 hours increased chest pain shortness of breath. Denies any fevers nausea vomiting abdominal pain no history of DVT PE or AAA. No other questions or concerns. Pain is not that bad he states about 3 to 4-10. REVIEW OF SYSTEMS:  At least 10 systems reviewed and otherwise acutely negative except as in the [address] Review of Systems   Constitutional: Positive for fatigue. HENT: Negative. Eyes: Negative. Respiratory: Positive for shortness of breath. Cardiovascular: Positive for chest pain and leg swelling. Gastrointestinal: Negative. Endocrine: Negative. Genitourinary: Negative. Musculoskeletal: Negative. Skin: Negative. Allergic/Immunologic: Negative. Neurological: Negative. Hematological: Negative. Psychiatric/Behavioral: Negative. All other systems reviewed and are negative. Past Medical History:   Diagnosis Date    Abnormal nuclear stress test     Atrial fibrillation ([facility] [address] 11/2013    CAD (coronary artery disease)     Follows with Dr. Jignesh Bridges CHF (congestive heart failure) ([facility] [address]     COPD (chronic obstructive pulmonary disease) ([facility] [address]     Follows with PCP    Decreased cardiac ejection fraction     Diabetes mellitus ([facility] [address]     GERD (gastroesophageal reflux disease)     H/O cardiovascular stress test 11/20/13, 3/20/2013    11/13-Observed defect is consistent with diaphragmatic attenuation. EF 58%. 3/13 -EF 51%. No ischemia. Normal Study.     H/O cardiovascular stress test 06/08/2017    EF 50% normal study    H/O cardiovascular stress test 06/17/2019    Normal NM  H/O cardiovascular stress test 03/02/2021    depressed lvedf increased edv myocardial perfusion abnormal stress test    H/O Doppler ultrasound 10/14/2010    10/2010-Bilateral venous doppler of lower extremies- No DVTs.  H/O echocardiogram 06/26/2019    Left ventricular systolic function is normal with an ejection fraction of 50-55%. Mild concentric left ventricular hypertrophy. Grade I diastolic dysfunction. No significant valvular disease noted. No evidence of pericardial effusion. Essentially unremarkable echo.  H/O percutaneous left heart catheterization 12/22/2015    No evidence of hemodynamically significant coronary artery disease    Heart imaging 04/23/2020    muga - ef 58%    History of coronary artery stent placement 11/13/2013 11/13 -RCA - 3 stents placed    History of exercise stress test 06/08/2017    treadmill    History of Holter monitoring 01/04/2016    predominant rhythm sinus    History of MRI of brain and brain stem 06/04/2020    No evidence of an acute infarct, mod chronic microvascular white matter ischemic disease with associated cerebral parenchymal volume loss    History of nuclear stress test 02/24/2020    EF 38%,Myocardial perfusion scan shows moderate size, severe intensity, non  reversible perfusion defect in inferior wall.  Hyperlipidemia     Hypertension     Follows with PCP    Kidney disease     Dr. Mir Felder S/P cardiac catheterization 11/13/2013 11/13/2013-EF 55%, distal LAD mild disease,CX stent patent,but distal to stent 50% stenosis. RCA severe disease.     SOB (shortness of breath)     Status post angioplasty with stent     Wears dentures     full upper denture     Past Surgical History:   Procedure Laterality Date    APPENDECTOMY      BIOPSY / LIGATION TEMPORAL ARTERY Left 8/17/2020    LEFT TEMPORAL ARTERY BIOPSY LIGATION performed by Cindy Chinchilla MD at [address] [place] [address]  11/13/2013 11/13/2013-EF 55%, distal LAD mild disease,CX stent patent,but distal to stent 50% stenosis. RCA severe disease.     CARDIAC SURGERY  07/03/2020    CABG X 3 LIMA-OM-PDA-LAD, Maze, LT Atrial Appendage clipping    COLONOSCOPY      CORONARY ANGIOPLASTY WITH STENT PLACEMENT      4 stents- RCA X3; CX X1    CORONARY ANGIOPLASTY WITH STENT PLACEMENT  11/13/2013 11/13/2013 -3 stents placed to RCA- Dr Mick Erickson N/A 7/3/2020    INTRAOPERATIVE ISMAEL, CABG X3   WITH LIMA  AND LEFT LEG ENDOVEIN HARVEST, INDUCED HYPOTHERMIA, MAZE BIPOLAR AND CRYO PROCEDURE, LEFT ATRIAL APPENDAGE CLIPPING performed by Zonia Stone MD at [facility]      all teeth extracted    FEMUR FRACTURE SURGERY  1984    MANDIBLE FRACTURE SURGERY  1984     Family History   Problem Relation Age of Onset    Cancer Mother     Diabetes Mother     Heart Disease Mother     High Blood Pressure Mother     Stroke Mother     Cancer Father     Heart Disease Father     High Blood Pressure Father     Stroke Father     Cancer Brother      Social History     Socioeconomic History    Marital status: Legally      Spouse name: Not on file    Number of children: Not on file    Years of education: Not on file    Highest education level: Not on file   Occupational History    Occupation:    Tobacco Use    Smoking status: Current Every Day Smoker     Packs/day: 1.00     Years: 46.00     Pack years: 46.00     Types: Cigarettes     Start date: 1974    Smokeless tobacco: Never Used    Tobacco comment: Smokes approx 1 pack per day   Vaping Use    Vaping Use: Never used   Substance and Sexual Activity    Alcohol use: No     Comment: caffeine 6 bottles of unsweet tea a day    Drug use: Not Currently     Types: Cocaine     Comment: Last used: 7/1/20    Sexual activity: Yes     Partners: Female   Other Topics Concern    Not on file   Social History Narrative    Not on file     Social Determinants of Health     Financial Resource Strain:     Difficulty of Paying Living Expenses: Not on file   Food Insecurity:     Worried About Running Out of Food in the Last Year: Not on file    Sivakumar of Food in the Last Year: Not on file   Transportation Needs:     Lack of Transportation (Medical): Not on file    Lack of Transportation (Non-Medical):  Not on file   Physical Activity:     Days of Exercise per Week: Not on file    Minutes of Exercise per Session: Not on file   Stress:     Feeling of Stress : Not on file   Social Connections:     Frequency of Communication with Friends and Family: Not on file    Frequency of Social Gatherings with Friends and Family: Not on file    Attends Hindu Services: Not on file    Active Member of Clubs or Organizations: Not on file    Attends Club or Organization Meetings: Not on file    Marital Status: Not on file   Intimate Partner Violence:     Fear of Current or Ex-Partner: Not on file    Emotionally Abused: Not on file    Physically Abused: Not on file    Sexually Abused: Not on file   Housing Stability:     Unable to Pay for Housing in the Last Year: Not on file    Number of Places Lived in the Last Year: Not on file    Unstable Housing in the Last Year: Not on file     Current Facility-Administered Medications   Medication Dose Route Frequency Provider Last Rate Last Admin    ondansetron (ZOFRAN) injection 4 mg  4 mg IntraVENous Q30 Min PRN Ari Daunt, DO        fentaNYL (SUBLIMAZE) injection 50 mcg  50 mcg IntraVENous Q1H PRN Ari Daunt, DO        furosemide (LASIX) injection 40 mg  40 mg IntraVENous Once Ari Daunt, DO        nitroGLYCERIN (NITROSTAT) SL tablet 0.4 mg  0.4 mg SubLINGual Q5 Min PRN Ari Daunt, DO         Current Outpatient Medications   Medication Sig Dispense Refill    nitroGLYCERIN (NITROSTAT) 0.4 MG SL tablet Place 1 tablet under the tongue every 5 minutes as needed for Chest pain 25 tablet 3    amLODIPine (NORVASC) 10 MG tablet Take 1 tablet by mouth daily 30 tablet 3    albuterol (PROVENTIL) (5 MG/ML) 0.5% nebulizer solution Take 0.5 mLs by nebulization every 6 hours as needed for Wheezing 120 each 0    warfarin (JANTOVEN) 4 MG tablet TAKE 1 TABLET BY MOUTH EVERY DAY, EXCEPT TAKE 1 AND 1/2 TABLETS ON SUNDAYS OR AS DIRECTED BY CLINIC 100 tablet 1    carvedilol (COREG) 25 MG tablet Take 1 tablet by mouth 2 times daily 60 tablet 5    albuterol sulfate HFA (PROVENTIL HFA) 108 (90 Base) MCG/ACT inhaler Inhale 2 puffs into the lungs every 6 hours as needed for Wheezing or Shortness of Breath 18 g 0    guaiFENesin (ALTARUSSIN) 100 MG/5ML syrup Take 10 mLs by mouth every 4 hours as needed for Cough 120 mL 0    chlorthalidone (HYGROTON) 25 MG tablet TAKE 1 TABLET BY MOUTH EVERY DAY      omeprazole (PRILOSEC) 20 MG delayed release capsule Take 20 mg by mouth 2 times daily      metFORMIN (GLUCOPHAGE) 500 MG tablet Take 1,000 mg by mouth 2 times daily      lisinopril (PRINIVIL;ZESTRIL) 40 MG tablet Take 1 tablet by mouth daily 90 tablet 3    hydrOXYzine (VISTARIL) 50 MG capsule Take 50 mg by mouth nightly      atorvastatin (LIPITOR) 80 MG tablet Take 1 tablet by mouth nightly 30 tablet 3    glipiZIDE (GLUCOTROL) 10 MG tablet Take 1 tablet by mouth every morning (before breakfast) 60 tablet 3    furosemide (LASIX) 40 MG tablet Take 1 tablet by mouth 3 times daily (Patient taking differently: Take 40 mg by mouth 3 times daily Taking 2 times a day) 90 tablet 1    potassium chloride (KLOR-CON M) 20 MEQ extended release tablet Take 2 tablets by mouth 2 times daily 60 tablet 1    linagliptin (TRADJENTA) 5 MG tablet Take 1 tablet by mouth daily 30 tablet 3    clopidogrel (PLAVIX) 75 MG tablet Take 1 tablet by mouth daily 30 tablet 5      Allergies   Allergen Reactions    Hydromorphone Other (See Comments)     Hallucinations.       Current Facility-Administered Medications   Medication Dose Route Frequency Provider Last Rate Last Admin    ondansetron (ZOFRAN) injection 4 mg  4 mg IntraVENous Q30 Min PRN Francisco Javier Sr, DO        fentaNYL (SUBLIMAZE) injection 50 mcg  50 mcg IntraVENous Q1H PRN Francisco Javier Sr, DO        furosemide (LASIX) injection 40 mg  40 mg IntraVENous Once Francisco Javier Sr, DO        nitroGLYCERIN (NITROSTAT) SL tablet 0.4 mg  0.4 mg SubLINGual Q5 Min PRN Francisco Javier Sr, DO         Current Outpatient Medications   Medication Sig Dispense Refill    nitroGLYCERIN (NITROSTAT) 0.4 MG SL tablet Place 1 tablet under the tongue every 5 minutes as needed for Chest pain 25 tablet 3    amLODIPine (NORVASC) 10 MG tablet Take 1 tablet by mouth daily 30 tablet 3    albuterol (PROVENTIL) (5 MG/ML) 0.5% nebulizer solution Take 0.5 mLs by nebulization every 6 hours as needed for Wheezing 120 each 0    warfarin (JANTOVEN) 4 MG tablet TAKE 1 TABLET BY MOUTH EVERY DAY, EXCEPT TAKE 1 AND 1/2 TABLETS ON SUNDAYS OR AS DIRECTED BY CLINIC 100 tablet 1    carvedilol (COREG) 25 MG tablet Take 1 tablet by mouth 2 times daily 60 tablet 5    albuterol sulfate HFA (PROVENTIL HFA) 108 (90 Base) MCG/ACT inhaler Inhale 2 puffs into the lungs every 6 hours as needed for Wheezing or Shortness of Breath 18 g 0    guaiFENesin (ALTARUSSIN) 100 MG/5ML syrup Take 10 mLs by mouth every 4 hours as needed for Cough 120 mL 0    chlorthalidone (HYGROTON) 25 MG tablet TAKE 1 TABLET BY MOUTH EVERY DAY      omeprazole (PRILOSEC) 20 MG delayed release capsule Take 20 mg by mouth 2 times daily      metFORMIN (GLUCOPHAGE) 500 MG tablet Take 1,000 mg by mouth 2 times daily      lisinopril (PRINIVIL;ZESTRIL) 40 MG tablet Take 1 tablet by mouth daily 90 tablet 3    hydrOXYzine (VISTARIL) 50 MG capsule Take 50 mg by mouth nightly      atorvastatin (LIPITOR) 80 MG tablet Take 1 tablet by mouth nightly 30 tablet 3    glipiZIDE (GLUCOTROL) 10 MG tablet Take 1 tablet by mouth every morning (before breakfast) 60 tablet 3    furosemide (LASIX) 40 MG tablet Take 1 tablet by mouth 3 times daily (Patient taking differently: Take 40 mg by mouth 3 times daily Taking 2 times a day) 90 tablet 1    potassium chloride (KLOR-CON M) 20 MEQ extended release tablet Take 2 tablets by mouth 2 times daily 60 tablet 1    linagliptin (TRADJENTA) 5 MG tablet Take 1 tablet by mouth daily 30 tablet 3    clopidogrel (PLAVIX) 75 MG tablet Take 1 tablet by mouth daily 30 tablet 5       Nursing Notes Reviewed    VITAL SIGNS:  ED Triage Vitals   Enc Vitals Group      BP 01/29/22 1719 (!) 169/101      Pulse 01/29/22 1719 70      Resp 01/29/22 1719 20      Temp 01/29/22 1719 97.9 °F (36.6 °C)      Temp Source 01/29/22 1719 Oral      SpO2 01/29/22 1719 97 %      Weight 01/29/22 1717 270 lb (122.5 kg)      Height 01/29/22 1717 6' 3\" (1.905 m)      Head Circumference --       Peak Flow --       Pain Score --       Pain Loc --       Pain Edu? --       Excl. in [address]? --        PHYSICAL EXAM:  Physical Exam  Vitals and nursing note reviewed. Constitutional:       General: He is not in acute distress. Appearance: Normal appearance. He is well-developed, well-groomed and overweight. He is not ill-appearing, toxic-appearing or diaphoretic. HENT:      Head: Normocephalic and atraumatic. Right Ear: External ear normal.      Left Ear: External ear normal.      Nose: No congestion. Eyes:      General: No scleral icterus. Right eye: No discharge. Left eye: No discharge. Extraocular Movements: Extraocular movements intact. Conjunctiva/sclera: Conjunctivae normal.   Cardiovascular:      Rate and Rhythm: Normal rate and regular rhythm. Pulses: Normal pulses. Heart sounds: Normal heart sounds. No murmur heard. No friction rub. No gallop. Pulmonary:      Effort: Pulmonary effort is normal. No respiratory distress. Breath sounds: Normal breath sounds. No stridor. No wheezing, rhonchi or rales. Abdominal:      General: Bowel sounds are normal. There is no distension. Palpations: Abdomen is soft. There is no mass or pulsatile mass. Tenderness: There is no abdominal tenderness. There is no guarding or rebound. Negative signs include Bach's sign, Rovsing's sign and McBurney's sign. Hernia: No hernia is present. Musculoskeletal:         General: Swelling present. No tenderness, deformity or signs of injury. Cervical back: Normal range of motion. No rigidity. Right lower leg: Edema present. Left lower leg: Edema present. Skin:     General: Skin is warm. Coloration: Skin is not jaundiced or pale. Findings: No bruising, erythema, lesion or rash. Neurological:      General: No focal deficit present. Mental Status: He is alert and oriented to person, place, and time. GCS: GCS eye subscore is 4. GCS verbal subscore is 5. GCS motor subscore is 6. Cranial Nerves: Cranial nerves are intact. No cranial nerve deficit, dysarthria or facial asymmetry. Sensory: Sensation is intact. No sensory deficit. Motor: Motor function is intact. No weakness, tremor, atrophy, abnormal muscle tone or seizure activity. Coordination: Coordination normal.   Psychiatric:         Mood and Affect: Mood normal.         Behavior: Behavior normal. Behavior is cooperative. Thought Content:  Thought content normal.         Judgment: Judgment normal.           I have reviewed andinterpreted all of the currently available lab results from this visit (if applicable):    Results for orders placed or performed during the hospital encounter of 01/29/22   CBC Auto Differential   Result Value Ref Range    WBC 10.3 4.0 - 10.5 K/CU MM    RBC 4.09 (L) 4.6 - 6.2 M/CU MM    Hemoglobin 11.4 (L) 13.5 - 18.0 GM/DL    Hematocrit 36.9 (L) 42 - 52 %    MCV 90.2 78 - 100 FL    MCH 27.9 27 - 31 PG    MCHC 30.9 (L) 32.0 - 36.0 %    RDW 14.7 11.7 - 14.9 %    Platelets 211 780 - 569 K/CU MM    MPV 11.0 7.5 - 11.1 FL    Differential Type AUTOMATED DIFFERENTIAL Segs Relative 72.6 (H) 36 - 66 %    Lymphocytes % 15.1 (L) 24 - 44 %    Monocytes % 8.9 (H) 0 - 4 %    Eosinophils % 2.6 0 - 3 %    Basophils % 0.4 0 - 1 %    Segs Absolute 7.5 K/CU MM    Lymphocytes Absolute 1.6 K/CU MM    Monocytes Absolute 0.9 K/CU MM    Eosinophils Absolute 0.3 K/CU MM    Basophils Absolute 0.0 K/CU MM    Nucleated RBC % 0.0 %    Total Nucleated RBC 0.0 K/CU MM    Total Immature Neutrophil 0.04 K/CU MM    Immature Neutrophil % 0.4 0 - 0.43 %   Comprehensive Metabolic Panel   Result Value Ref Range    Sodium 139 135 - 145 MMOL/L    Potassium 4.0 3.5 - 5.1 MMOL/L    Chloride 103 99 - 110 mMol/L    CO2 26 21 - 32 MMOL/L    BUN 24 (H) 6 - 23 MG/DL    CREATININE 1.3 0.9 - 1.3 MG/DL    Glucose 120 (H) 70 - 99 MG/DL    Calcium 8.8 8.3 - 10.6 MG/DL    Albumin 3.6 3.4 - 5.0 GM/DL    Total Protein 6.8 6.4 - 8.2 GM/DL    Total Bilirubin 0.3 0.0 - 1.0 MG/DL    ALT 13 10 - 40 U/L    AST 17 15 - 37 IU/L    Alkaline Phosphatase 109 40 - 128 IU/L    GFR Non- 56 (L) >60 mL/min/1.73m2    GFR African American >60 >60 mL/min/1.73m2    Anion Gap 10 4 - 16   Troponin   Result Value Ref Range    Troponin T 0.071 (H) <0.01 NG/ML   Brain Natriuretic Peptide   Result Value Ref Range    Pro-BNP 2,364 (H) <300 PG/ML   CK   Result Value Ref Range    Total CK 34 (L) 38 - 174 IU/L   Protime/INR & PTT   Result Value Ref Range    Protime 29.2 (H) 11.7 - 14.5 SECONDS    INR 2.25 INDEX    aPTT 40.7 (H) 25.1 - 37.1 SECONDS   EKG 12 Lead   Result Value Ref Range    Ventricular Rate 68 BPM    Atrial Rate 68 BPM    P-R Interval 188 ms    QRS Duration 98 ms    Q-T Interval 444 ms    QTc Calculation (Bazett) 472 ms    P Axis 45 degrees    R Axis -10 degrees    T Axis 112 degrees    Diagnosis       Normal sinus rhythm  ST & T wave abnormality, consider lateral ischemia  Prolonged QT  Abnormal ECG  When compared with ECG of 20-JAN-2022 21:21,  No significant change was found          Radiographs (if obtained):  [] The following radiograph was interpreted by myself in the absence of a radiologist:  [x] Radiologist's Report Reviewed:    CXR    ECHO Complete 2D W Doppler W Color    Result Date: 1/28/2022  Transthoracic Echocardiography Report (TTE)  Demographics   Patient Name       Candyce Kehr D       Date of Study       01/26/2022   Date of Birth      1960         Gender              Male   Age                62 year(s)         Race                   Patient Number     [id number]           Room Number   Visit Number       [id number]   Corporate ID       [id number]   Accession Number   [id number]         Gaby Lomas,                                                            CRT   Ordering Physician Dustin Gomez MD      Physician           Esau Dyer MD  Procedure Type of Study   TTE procedure:ECHOCARDIOGRAM COMPLETE 2D W DOPPLER W COLOR. Procedure Date Date: 01/26/2022 Start: 10:36 AM Study Location: [address] Technical Quality: Fair visualization Indications:Chest pain. Patient Status: Routine Height: 74 inches Weight: 258 pounds BSA: 2.42 m2 BMI: 33.12 kg/m2 Rhythm: Sinus rhythm HR: 70 bpm BP: 208/101 mmHg  Conclusions   Summary  Left ventricular function is low normal, EF is estimated at 45-50%. Moderate left ventricular hypertrophy. Grade I diastolic dysfunction. Moderately dilated left atrium. Moderate mitral and mild tricuspid regurgitation is present. RVSP is 32 mmHg. Dilation of the aortic root(4.5cm) and the ascending aorta(4.7cm). No evidence of pericardial effusion.    Signature   ------------------------------------------------------------------  Electronically signed by Santiago Grant MD  (Interpreting physician) on 01/28/2022 at 05:01 PM  ------------------------------------------------------------------   Findings   Left Ventricle  Left ventricular function is low normal, EF is estimated at 45-50%. Moderate left ventricular hypertrophy. Grade I diastolic dysfunction. Left Atrium  Moderately dilated left atrium. Right Atrium  Normal right atrium size and structure. Right Ventricle  Essentially normal right ventricle. Aortic Valve  Normal aortic valve structure and function. Mitral Valve  Normal mitral valve structure and function. Moderate mitral regurgitation is present. Tricuspid Valve  Normal tricuspid valve structure and function. Mild tricuspid regurgitation; RVSP is 32 mmHg. Pulmonic Valve  The pulmonic valve was not well visualized. Pericardial Effusion  No evidence of pericardial effusion. Pleural Effusion  No evidence of pleural effusion. Miscellaneous  IVC and abdominal aorta are within normal limits. Dilation of the aortic root(4.5cm) and the ascending aorta(4.7cm).   M-Mode/2D Measurements & Calculations   LV Diastolic Dimension:  LV Systolic Dimension:  LA Dimension: 5.3 cmAO Root  5.37 cm                  4.48 cm                 Dimension: 4.4 cmLA Area:  LV FS:16.6 %             LV Volume Diastolic:    93.3 cm2  LV PW Diastolic: 8.21 cm 660 ml  LV PW Systolic: 9.98 cm  LV Volume Systolic: 74  Septum Diastolic: 6.08   ml  cm                       LV EDV/LV EDV Index:    RV Diastolic Dimension: 3.5  Septum Systolic: 8.85 cm 420 JM/36 m2LV ESV/LV   cm  CO: 4.79 l/min           ESV Index: 74 ml/31 m2  CI: 1.98 l/m*m2          EF Calculated (A4C):    LA/Aorta: 1.2                           47.5 %                  Ascending Aorta: 4.7 cm  LV Area Diastolic: 29.5  EF Calculated (2D):     LA volume/Index: 95 ml  cm2                      27.3 %                  /77B3  LV Area Systolic: 18.4  cm2                      LV Length: 7.95 cm                            LVOT: 2 cm  Doppler Measurements & Calculations   MV Peak E-Wave: 101  AV Peak Velocity: 153 cm/s    LVOT Mean Velocity: 72.8  cm/s                 AV Peak Gradient: 9.36 mmHg   cm/s  MV Peak A-Wave: 31.6 AV Mean Velocity: 98.4 cm/s   LVOT Mean Gradient: 2  cm/s                 AV Mean Gradient: 5 mmHg      mmHg  MV E/A Ratio: 3.2    AV VTI: 30.9 cm               Estimated RVSP: 32 mmHg  MV Peak Gradient:    AV Area (Continuity):2.22 cm2 Estimated RAP:3 mmHg  4.08 mmHg                       LVOT VTI: 21.8 cm  MV P1/2t: 47 msec                                  TR Velocity:239 cm/s  MVA by PHT:4.68 cm2  Estimated PASP: 25.85 mmHg    TR Gradient:22.85 mmHg   MV E' Lateral  Velocity: 6.46 cm/s  MV A' Lateral  Velocity: 5.46 cm/s  MV E/E' lateral:  15.63      XR CHEST (2 VW)    Result Date: 1/4/2022  EXAMINATION: TWO XRAY VIEWS OF THE CHEST 1/4/2022 8:42 pm COMPARISON: 12/06/2021 HISTORY: ORDERING SYSTEM PROVIDED HISTORY: SOB TECHNOLOGIST PROVIDED HISTORY: Reason for exam:->SOB Reason for Exam: SOB Additional signs and symptoms: cough Relevant Medical/Surgical History: CAD, COPD, STEMI FINDINGS: Prior sternal splitting procedure for CABG. There are coronary artery stents. The heart is mildly enlarged. There is a clip on the left atrial appendage. The lungs are clear. Previously noted interstitial edema has resolved. No pneumothorax or pleural fluid. No acute bone finding. No acute cardiopulmonary disease. Stable mild cardiomegaly. XR CHEST PORTABLE    Result Date: 1/29/2022  EXAMINATION: ONE XRAY VIEW OF THE CHEST 1/29/2022 2:30 pm COMPARISON: 01/20/2022 HISTORY: ORDERING SYSTEM PROVIDED HISTORY: chest pain TECHNOLOGIST PROVIDED HISTORY: Reason for exam:->chest pain Reason for Exam: chest pain Additional signs and symptoms: leg swelling Relevant Medical/Surgical History: CAD, COPD, STEMI, triple bypass FINDINGS: Cardial pericardial silhouette is enlarged but stable. Left atrial appendage clip again noted. Midline sternotomy wires are unchanged in configuration. Pulmonary vasculature is mildly congested. A focal infiltrate is not identified. No pneumothorax. No free air.      Pulmonary vascular congestion. Correlate with clinical evidence of developing pulmonary edema. XR CHEST PORTABLE    Result Date: 1/20/2022  EXAMINATION: ONE XRAY VIEW OF THE CHEST 1/20/2022 11:12 pm COMPARISON: Chest radiograph of 01/19/2022 HISTORY: ORDERING SYSTEM PROVIDED HISTORY: chest pain TECHNOLOGIST PROVIDED HISTORY: Reason for exam:->chest pain Reason for Exam: chest pain FINDINGS: Cardiomediastinal silhouette is unchanged, with postsurgical changes of CABG and left atrial appendage clip placement. No pleural effusion or pneumothorax. Central pulmonary vascular distension is similar to prior. No definite new focal airspace consolidation. No acute osseous abnormality     No significant interval change in comparison 01/19/2022, with stable central pulmonary vascular congestion. XR CHEST PORTABLE    Result Date: 1/19/2022  EXAMINATION: ONE XRAY VIEW OF THE CHEST 1/19/2022 7:50 pm COMPARISON: Chest 01/04/2022 HISTORY: ORDERING SYSTEM PROVIDED HISTORY: sob, cough FINDINGS: The cardiac silhouette is enlarged. Calcifications involving the aorta reflect atherosclerosis. The mediastinal and hilar silhouettes appear unremarkable. Chronic appearing coarse interstitial densities predominate perihilar regions and lung bases, typical of sequela from smoking or other previous infectious/inflammatory process. The lungs are under-inflated. The lungs appear otherwise clear. No pleural fluid evident. No pneumothorax is seen. No acute osseous abnormality is identified. Stable sequela from CABG. 1. Poor inspiration for the exam.  Follow-up full inspiration PA and lateral chest may be useful for better characterization of pulmonary findings. 2.  Pulmonary sequela typical of that seen with smoking. 3. Calcific atherosclerosis aorta. 4. Cardiomegaly.        EKG (if obtained): (All EKG's are interpreted by myself in the absence of a cardiologist)    12 lead EKG per my interpretation:  Normal Sinus Rhythm 68  Axis is   Normal  QTc is  472  There is no specific T wave changes appreciated. There is no specific ST wave changes appreciated. Prior EKG to compare with was not available             MDM:    Patient with complaint of intermittent chest pain shortness of breath for the past 12 hours. No history of CAD, CHF. He is on Coumadin he states. Again history of open heart surgery, bypass states increased chest pain shortness of breath pain right now is not that bad about 3-4 10. He otherwise appears well vital signs are stable aside from mild hypertension I did give him aspirin will check labs, imaging given pain medicine. Work-up performed may have some pulmonary vascular congestion BNP is elevated, troponin levels elevated. We will give him Lasix, nitro as needed and will need to hospital medicine will consult cardiology EKG is negative pending coagulation studies as he is on Coumadin. I did talk to hospital medicine and was going to admit him however patient wants to leave. I did not receive a call back from cardiology. His INR is therapeutic. I had ordered nitro, Lasix for his CHF he has a positive troponin and positive BNP and pulmonary congestion. On reevaluation I had very long discussion with him hospitalist PA also in the room during discussion. Patient is ANO x3 he wants to go home he does not want to be in the hospital he hates this hospital, he states he is not want to be here all weekend and not be seen by . I assured him a doctor would see him he states he knows how this works he is not staying here overnight he did not stay overnight he just wanted medication he states his chest pain is getting better but he is getting frustrated he wants to leave [address] he states he lives down the road and will come back if symptoms worsen. I did explain that he has obvious heart damage going on concern about ischemia, CHF, developing a heart attack and could be life-threatening.   He is able to repeat this back to me he is aware of risk and benefit of leaving versus staying including death. He states he is leaving, he started ripping off his blood pressure cuff and IV patient left AMA. He was capable of making decisions. CLINICAL IMPRESSION:  Final diagnoses:   Chest pain, unspecified type   Dyspnea, unspecified type   Troponin level elevated   Elevated brain natriuretic peptide (BNP) level   Congestive heart failure, unspecified HF chronicity, unspecified heart failure type ([facility] [address]       (Please note that portions of this note may have been completed with a voice recognition program. Efforts were made to edit the dictations but occasionally words aremis-transcribed.)    DISPOSITION REFERRAL (if applicable):  Xochitl West MD  [address] [facility]  [phone]    Schedule an appointment as soon as possible for a visit in 1 day      [facility] Emergency Department  [place] [id number]  [phone]    If symptoms worsen    Luann Hollingsworth MD  [address] [address]  [phone]    Schedule an appointment as soon as possible for a visit in 1 day        DISPOSITION MEDICATIONS (if applicable):  New Prescriptions    No medications on file          [place], [facility], DO  01/29/22 22 Yoselyn Barker, DO  01/29/22 1939

## 2022-01-29 NOTE — ED TRIAGE NOTES
Patient to triage with c/o chest pain and shortness of breath since yesterday. Also states BLE are swelling. Resps even and unlabored.

## 2022-01-29 NOTE — ED PROVIDER NOTES
As PA-in-triage, I performed a medical screening history and physical exam on this patient. HISTORY OF PRESENT ILLNESS  Janett Lebron is a 62 y.o. male presents for chest pain and shortness of breath. Patient is history of CABG, cardiac stents, COPD, hypertension hyperlipidemia. He is a persistent daily smoker. Follows with [facility] and had echocardiogram on Friday. States he did test positive for COVID-19 earlier this month but chest pain shortness of breath started last evening while at rest.  Is endorsing anterior chest pressure, nonradiating with associate shortness of breath and nonproductive cough. No fever or chills. No diaphoresis. Did try 2 rounds of nitro without relief of chest pain. Did take his morning meds including Plavix. Moon Gipson PHYSICAL EXAM  There were no vitals taken for this visit. On exam, the patient appears well-hydrated, well-nourished, and in no acute distress. Mucous membranes are moist. Speech is clear. Breathing is unlabored. Skin is dry. Mental status is normal. The patient has normal gait, moves all extremities, and is without facial droop. Labs, imaging, EKG ordered at triage. Vital signs pending at time of my triage evaluation. Please see ED provider's note for patient complete ED evaluation, labs/imaging interpretation and final disposition/clinical impression.          Giovana Masterson PA-C  01/29/22 0228

## 2022-01-30 VITALS
BODY MASS INDEX: 33.2 KG/M2 | HEIGHT: 75 IN | HEART RATE: 62 BPM | RESPIRATION RATE: 18 BRPM | TEMPERATURE: 98.2 F | SYSTOLIC BLOOD PRESSURE: 181 MMHG | WEIGHT: 267 LBS | OXYGEN SATURATION: 99 % | DIASTOLIC BLOOD PRESSURE: 107 MMHG

## 2022-01-30 LAB
EKG ATRIAL RATE: 64 BPM
EKG ATRIAL RATE: 68 BPM
EKG DIAGNOSIS: NORMAL
EKG DIAGNOSIS: NORMAL
EKG P AXIS: 14 DEGREES
EKG P AXIS: 45 DEGREES
EKG P-R INTERVAL: 170 MS
EKG P-R INTERVAL: 188 MS
EKG Q-T INTERVAL: 444 MS
EKG Q-T INTERVAL: 464 MS
EKG QRS DURATION: 104 MS
EKG QRS DURATION: 98 MS
EKG QTC CALCULATION (BAZETT): 472 MS
EKG QTC CALCULATION (BAZETT): 478 MS
EKG R AXIS: -10 DEGREES
EKG R AXIS: 20 DEGREES
EKG T AXIS: 112 DEGREES
EKG T AXIS: 125 DEGREES
EKG VENTRICULAR RATE: 64 BPM
EKG VENTRICULAR RATE: 68 BPM
INR BLD: 2.37 INDEX
PROTHROMBIN TIME: 30.8 SECONDS (ref 11.7–14.5)

## 2022-01-30 PROCEDURE — 93010 ELECTROCARDIOGRAM REPORT: CPT | Performed by: INTERNAL MEDICINE

## 2022-01-30 PROCEDURE — G0378 HOSPITAL OBSERVATION PER HR: HCPCS

## 2022-01-30 PROCEDURE — 94640 AIRWAY INHALATION TREATMENT: CPT

## 2022-01-30 PROCEDURE — 6370000000 HC RX 637 (ALT 250 FOR IP): Performed by: NURSE PRACTITIONER

## 2022-01-30 PROCEDURE — 85610 PROTHROMBIN TIME: CPT

## 2022-01-30 PROCEDURE — 6370000000 HC RX 637 (ALT 250 FOR IP): Performed by: PHYSICIAN ASSISTANT

## 2022-01-30 RX ORDER — LANOLIN ALCOHOL/MO/W.PET/CERES
3 CREAM (GRAM) TOPICAL NIGHTLY PRN
Status: DISCONTINUED | OUTPATIENT
Start: 2022-01-30 | End: 2022-01-30 | Stop reason: HOSPADM

## 2022-01-30 RX ORDER — WARFARIN SODIUM 4 MG/1
4 TABLET ORAL
Status: DISCONTINUED | OUTPATIENT
Start: 2022-01-31 | End: 2022-01-30 | Stop reason: HOSPADM

## 2022-01-30 RX ADMIN — ALBUTEROL SULFATE 2 PUFF: 90 AEROSOL, METERED RESPIRATORY (INHALATION) at 00:26

## 2022-01-30 RX ADMIN — FUROSEMIDE 40 MG: 40 TABLET ORAL at 09:43

## 2022-01-30 RX ADMIN — CHLORTHALIDONE 25 MG: 25 TABLET ORAL at 09:43

## 2022-01-30 RX ADMIN — CARVEDILOL 25 MG: 25 TABLET, FILM COATED ORAL at 09:51

## 2022-01-30 RX ADMIN — PANTOPRAZOLE SODIUM 40 MG: 40 TABLET, DELAYED RELEASE ORAL at 09:43

## 2022-01-30 RX ADMIN — Medication 3 MG: at 01:03

## 2022-01-30 RX ADMIN — LISINOPRIL 20 MG: 20 TABLET ORAL at 09:43

## 2022-01-30 RX ADMIN — POTASSIUM CHLORIDE 40 MEQ: 1500 TABLET, EXTENDED RELEASE ORAL at 09:43

## 2022-01-30 RX ADMIN — AMLODIPINE BESYLATE 10 MG: 10 TABLET ORAL at 09:42

## 2022-01-30 NOTE — ED NOTES
Pt pulled off leads while turning over in bed. To room to put leads back on pt states \"Im gonna be leaving so dont put those back on. Im not staying. \"       Hardik Hence  01/30/22 2031 No

## 2022-01-30 NOTE — ED NOTES
To room to discharge pt. Pt states \"I dont mean to be rude but I have been here for 3 hours and they havent done anything. I come in with my legs swollen and Mamta been here 3 hours and nothing. No they want to give me something and have me stay but Im just pissed. \"  Asked pt if there was anything I could do to make it better so he would stay.  Pt states \"No \"     Mika Elizabeth  01/29/22 1953

## 2022-01-30 NOTE — PROGRESS NOTES
Rosemary Yañez is a 62 y.o. male on warfarin therapy for atrial fibrillation. Pharmacy consulted by Susan Vasquez PA-C for monitoring and adjustment of treatment. Indication for anticoagulation: Atrial fibrillation  INR goal: 2 - 3  Warfarin dose prior to admission: 6 mg on Sun; 4 mg all other days    Pertinent Laboratory Values   Recent Labs     01/29/22  1726 01/29/22  1726 01/29/22 2025   INR 2.25  --   --    HGB 11.4*   < > 11.0*   HCT 36.9*   < > 35.5*     --  222    < > = values in this interval not displayed. Assessment/Plan:  Drug Interactions: none at this time  INR therapeutic on admission at 2.25  Will continue warfarin 6 mg on Sunday; 4 mg on all other days. Trend INR daily. Pharmacy will continue to monitor and adjust warfarin therapy as indicated    Thank you for the consult.   Tri Arevalo, [facility]  1/30/2022 2:05 AM

## 2022-01-30 NOTE — ED NOTES
To room pt standing at bedside states \"The bed is too uncomfortable. \" Asked pt again if he would like a recliner and pt said he would like that. Recliner to room and pt assisted into it.       Kerwin Parks  01/30/22 0546

## 2022-01-30 NOTE — DISCHARGE SUMMARY
V2.0  Discharge Summary    Name:  Tristen Chamorro /Age/Sex: 1960 (62 y.o. male)   Admit Date: 2022  Discharge Date: 22    MRN & CSN:  [id number] & [id number] Encounter Date and Time 22 11:06 AM EST    Attending:  No att. providers found Discharging Provider: Tri Boykin MD       Hospital Course:     Brief HPI: Tristen Chamorro is a 62 y.o. male who presented with CP and acute CHF exacerbation. Patient has history of CABG x4 and presented with signs of volume overload including peripheral edema and vascular congestion on chest x-ray. EKG showed T wave depressions inferiorly. TTE showed EF 45-50% with Grade 1 dastolic dysfunction. .  Initial troponin was nondetectable. Cardiology was consulted patient was started on Lasix. Patient was very impatient throughout the hospital stay he did not want to wait on cardiac evaluation thus left AMA 2022. Patient was counseled on risks including further morbidity by nursing staff. The patient expressed appropriate understanding of, and agreement with the discharge recommendations, medications, and plan. Consults this admission:  IP CONSULT TO PHARMACY  IP CONSULT TO CARDIOLOGY    Discharge Diagnosis:   <principal problem not specified>    Acute Systolic/Diastolic CHF- EF 98-79% with Grade 1 dastolic dysfunction, moderate MR.      Discharge Instruction:     Discharge Medications:         Labs and Imaging   ECHO Complete 2D W Doppler W Color    Result Date: 2022  Transthoracic Echocardiography Report (TTE)  Demographics   Patient Name       Belen Or BENJAMIN       Date of Study       2022   Date of Birth      1960         Gender              Male   Age                62 year(s)         Race                   Patient Number     [id number]           Room Number   Visit Number       [id number]   Corporate ID       [id number]   Accession Number   [id number]         [address] [place] [address] CRT   Ordering Physician Lenka Artis MD      Physician           Zayra Connell MD  Procedure Type of Study   TTE procedure:ECHOCARDIOGRAM COMPLETE 2D W DOPPLER W COLOR. Procedure Date Date: 01/26/2022 Start: 10:36 AM Study Location: [address] Technical Quality: Fair visualization Indications:Chest pain. Patient Status: Routine Height: 74 inches Weight: 258 pounds BSA: 2.42 m2 BMI: 33.12 kg/m2 Rhythm: Sinus rhythm HR: 70 bpm BP: 208/101 mmHg  Conclusions   Summary  Left ventricular function is low normal, EF is estimated at 45-50%. Moderate left ventricular hypertrophy. Grade I diastolic dysfunction. Moderately dilated left atrium. Moderate mitral and mild tricuspid regurgitation is present. RVSP is 32 mmHg. Dilation of the aortic root(4.5cm) and the ascending aorta(4.7cm). No evidence of pericardial effusion. Signature   ------------------------------------------------------------------  Electronically signed by Denis Perez MD  (Interpreting physician) on 01/28/2022 at 05:01 PM  ------------------------------------------------------------------   Findings   Left Ventricle  Left ventricular function is low normal, EF is estimated at 45-50%. Moderate left ventricular hypertrophy. Grade I diastolic dysfunction. Left Atrium  Moderately dilated left atrium. Right Atrium  Normal right atrium size and structure. Right Ventricle  Essentially normal right ventricle. Aortic Valve  Normal aortic valve structure and function. Mitral Valve  Normal mitral valve structure and function. Moderate mitral regurgitation is present. Tricuspid Valve  Normal tricuspid valve structure and function. Mild tricuspid regurgitation; RVSP is 32 mmHg. Pulmonic Valve  The pulmonic valve was not well visualized. Pericardial Effusion  No evidence of pericardial effusion.    Pleural Effusion No evidence of pleural effusion. Miscellaneous  IVC and abdominal aorta are within normal limits. Dilation of the aortic root(4.5cm) and the ascending aorta(4.7cm).   M-Mode/2D Measurements & Calculations   LV Diastolic Dimension:  LV Systolic Dimension:  LA Dimension: 5.3 cmAO Root  5.37 cm                  4.48 cm                 Dimension: 4.4 cmLA Area:  LV FS:16.6 %             LV Volume Diastolic:    41.8 cm2  LV PW Diastolic: 8.36 cm 756 ml  LV PW Systolic: 5.45 cm  LV Volume Systolic: 74  Septum Diastolic: 7.61   ml  cm                       LV EDV/LV EDV Index:    RV Diastolic Dimension: 3.5  Septum Systolic: 5.69 cm 221 LA/16 m2LV ESV/LV   cm  CO: 4.79 l/min           ESV Index: 74 ml/31 m2  CI: 1.98 l/m*m2          EF Calculated (A4C):    LA/Aorta: 1.2                           47.5 %                  Ascending Aorta: 4.7 cm  LV Area Diastolic: 75.0  EF Calculated (2D):     LA volume/Index: 95 ml  cm2                      27.3 %                  /44H7  LV Area Systolic: 16.1  cm2                      LV Length: 7.95 cm                            LVOT: 2 cm  Doppler Measurements & Calculations   MV Peak E-Wave: 101  AV Peak Velocity: 153 cm/s    LVOT Mean Velocity: 72.8  cm/s                 AV Peak Gradient: 9.36 mmHg   cm/s  MV Peak A-Wave: 31.6 AV Mean Velocity: 98.4 cm/s   LVOT Mean Gradient: 2  cm/s                 AV Mean Gradient: 5 mmHg      mmHg  MV E/A Ratio: 3.2    AV VTI: 30.9 cm               Estimated RVSP: 32 mmHg  MV Peak Gradient:    AV Area (Continuity):2.22 cm2 Estimated RAP:3 mmHg  4.08 mmHg                       LVOT VTI: 21.8 cm  MV P1/2t: 47 msec                                  TR Velocity:239 cm/s  MVA by PHT:4.68 cm2  Estimated PASP: 25.85 mmHg    TR Gradient:22.85 mmHg   MV E' Lateral  Velocity: 6.46 cm/s  MV A' Lateral  Velocity: 5.46 cm/s  MV E/E' lateral:  15.63      XR CHEST PORTABLE    Result Date: 1/29/2022  EXAMINATION: ONE XRAY VIEW OF THE CHEST 1/29/2022 2:30 pm COMPARISON: 01/20/2022 HISTORY: ORDERING SYSTEM PROVIDED HISTORY: chest pain TECHNOLOGIST PROVIDED HISTORY: Reason for exam:->chest pain Reason for Exam: chest pain Additional signs and symptoms: leg swelling Relevant Medical/Surgical History: CAD, COPD, STEMI, triple bypass FINDINGS: Cardial pericardial silhouette is enlarged but stable. Left atrial appendage clip again noted. Midline sternotomy wires are unchanged in configuration. Pulmonary vasculature is mildly congested. A focal infiltrate is not identified. No pneumothorax. No free air. Pulmonary vascular congestion. Correlate with clinical evidence of developing pulmonary edema. VL DUP LOWER EXTREMITY VENOUS BILATERAL    Result Date: 1/29/2022  EXAMINATION: DUPLEX VENOUS ULTRASOUND OF THE BILATERAL LOWER EXTREMITIES1/29/2022 9:19 pm TECHNIQUE: Duplex ultrasound using B-mode/gray scaled imaging, Doppler spectral analysis and color flow Doppler was obtained of the deep venous structures of the lower bilateral extremities. COMPARISON: None. HISTORY: ORDERING SYSTEM PROVIDED HISTORY: swelling TECHNOLOGIST PROVIDED HISTORY: Reason for exam:->swelling FINDINGS: The visualized veins of the bilateral lower extremities are patent and free of echogenic thrombus. The veins demonstrate good compressibility with normal color flow study and spectral analysis. No evidence of DVT in either lower extremity.        CBC:   Recent Labs     01/29/22  1726 01/29/22 2025   WBC 10.3 9.8   HGB 11.4* 11.0*    222     BMP:    Recent Labs     01/29/22  1726 01/29/22 2025    139   K 4.0 3.8    104   CO2 26 24   BUN 24* 23   CREATININE 1.3 1.3   GLUCOSE 120* 123*     Hepatic:   Recent Labs     01/29/22  1726 01/29/22 2025   AST 17 18   ALT 13 14   BILITOT 0.3 0.3   ALKPHOS 109 102     Lipids:   Lab Results   Component Value Date    CHOL 153 01/06/2020    HDL 43 01/06/2020    TRIG 54 01/06/2020     Hemoglobin A1C:   Lab Results   Component Value Date LABA1C 7.8 12/30/2020     TSH: No results found for: TSH  Troponin:   Lab Results   Component Value Date    TROPONINT 0.050 01/29/2022    TROPONINT 0.071 01/29/2022    TROPONINT <0.010 01/20/2022     Lactic Acid: No results for input(s): LACTA in the last 72 hours. BNP:   Recent Labs     01/29/22  1726 01/29/22 2025   PROBNP 2,364* 2,054*     UA:  Lab Results   Component Value Date    NITRU NEGATIVE 12/26/2020    COLORU YELLOW 12/26/2020    WBCUA NO CELLS SEEN 12/26/2020    RBCUA 3 12/26/2020    MUCUS RARE 07/04/2020    TRICHOMONAS NONE SEEN 07/20/2020    BACTERIA NEGATIVE 12/26/2020    CLARITYU CLEAR 12/26/2020    SPECGRAV 1.020 12/26/2020    LEUKOCYTESUR NEGATIVE 12/26/2020    UROBILINOGEN 0.2 12/26/2020    BILIRUBINUR NEGATIVE 12/26/2020    BLOODU TRACE 12/26/2020    KETUA NEGATIVE 12/26/2020     Urine Cultures: No results found for: LABURIN  Blood Cultures: No results found for: BC  No results found for: BLOODCULT2  Organism: No results found for: ORG    Time Spent Discharging patient 20 minutes. Pt was not seen or examined on day of DC as he left AMA.      Electronically signed by Olivia Ritchie MD on 1/30/2022 at 11:06 AM

## 2022-01-30 NOTE — ED NOTES
Patient eloped stating he did not want to wait for miguel eone to come talk with him. Blaine Mcintyre RN  01/30/22 [address]  CORRIE Mcintyre  01/30/22 3407

## 2022-01-30 NOTE — ED NOTES
Pt states \"I just cant get comfortable. If I cant sleep Im leaving. \" Asked pt if he would be more comfortable in a recliner pt denies. Message sent to physician for sleeping pill.       Edward Vasquez  01/30/22 0005

## 2022-01-30 NOTE — ED NOTES
Pt sleeping in bed RR easy and unlabored. Pt took nicotine patch off and placed it on the bedside stand.       Yonis Rioswall  01/30/22 7545

## 2022-01-30 NOTE — ED PROVIDER NOTES
 Heart imaging 04/23/2020    muga - ef 58%    History of coronary artery stent placement 11/13/2013 11/13 -RCA - 3 stents placed    History of exercise stress test 06/08/2017    treadmill    History of Holter monitoring 01/04/2016    predominant rhythm sinus    History of MRI of brain and brain stem 06/04/2020    No evidence of an acute infarct, mod chronic microvascular white matter ischemic disease with associated cerebral parenchymal volume loss    History of nuclear stress test 02/24/2020    EF 38%,Myocardial perfusion scan shows moderate size, severe intensity, non  reversible perfusion defect in inferior wall.  Hyperlipidemia     Hypertension     Follows with PCP    Kidney disease     Dr. Pradip Guevara S/P cardiac catheterization 11/13/2013 11/13/2013-EF 55%, distal LAD mild disease,CX stent patent,but distal to stent 50% stenosis. RCA severe disease.  SOB (shortness of breath)     Status post angioplasty with stent     Wears dentures     full upper denture     Past Surgical History:   Procedure Laterality Date    APPENDECTOMY      BIOPSY / LIGATION TEMPORAL ARTERY Left 8/17/2020    LEFT TEMPORAL ARTERY BIOPSY LIGATION performed by Ashleigh Mtz MD at [address] [place] [address]  11/13/2013 11/13/2013-EF 55%, distal LAD mild disease,CX stent patent,but distal to stent 50% stenosis. RCA severe disease.     CARDIAC SURGERY  07/03/2020    CABG X 3 LIMA-OM-PDA-LAD, Maze, LT Atrial Appendage clipping    COLONOSCOPY      CORONARY ANGIOPLASTY WITH STENT PLACEMENT      4 stents- RCA X3; CX X1    CORONARY ANGIOPLASTY WITH STENT PLACEMENT  11/13/2013 11/13/2013 -3 stents placed to RCA- Dr Sheree Bloch N/A 7/3/2020    INTRAOPERATIVE ISMAEL, CABG X3   WITH LIMA  AND LEFT LEG ENDOVEIN HARVEST, INDUCED HYPOTHERMIA, MAZE BIPOLAR AND CRYO PROCEDURE, LEFT ATRIAL APPENDAGE CLIPPING performed by Elena Hopper MD at [facility]      all teeth extracted    FEMUR FRACTURE SURGERY  1984    MANDIBLE FRACTURE SURGERY  1984     Family History   Problem Relation Age of Onset    Cancer Mother     Diabetes Mother     Heart Disease Mother     High Blood Pressure Mother     Stroke Mother     Cancer Father     Heart Disease Father     High Blood Pressure Father     Stroke Father     Cancer Brother      Social History     Socioeconomic History    Marital status: Legally      Spouse name: Not on file    Number of children: Not on file    Years of education: Not on file    Highest education level: Not on file   Occupational History    Occupation:    Tobacco Use    Smoking status: Current Every Day Smoker     Packs/day: 1.00     Years: 46.00     Pack years: 46.00     Types: Cigarettes     Start date: 1974    Smokeless tobacco: Never Used    Tobacco comment: Smokes approx 1 pack per day   Vaping Use    Vaping Use: Never used   Substance and Sexual Activity    Alcohol use: No     Comment: caffeine 6 bottles of unsweet tea a day    Drug use: Not Currently     Types: Cocaine     Comment: Last used: 7/1/20    Sexual activity: Yes     Partners: Female   Other Topics Concern    Not on file   Social History Narrative    Not on file     Social Determinants of Health     Financial Resource Strain:     Difficulty of Paying Living Expenses: Not on file   Food Insecurity:     Worried About [facility] in the Last Year: Not on file    Sivakumar of Food in the Last Year: Not on file   Transportation Needs:     Lack of Transportation (Medical): Not on file    Lack of Transportation (Non-Medical):  Not on file   Physical Activity:     Days of Exercise per Week: Not on file    Minutes of Exercise per Session: Not on file   Stress:     Feeling of Stress : Not on file   Social Connections:     Frequency of Communication with Friends and Family: Not on file    Frequency of Social Gatherings with Friends and Family: Not on file    Attends Yazidism Services: Not on file    Active Member of Clubs or Organizations: Not on file    Attends Club or Organization Meetings: Not on file    Marital Status: Not on file   Intimate Partner Violence:     Fear of Current or Ex-Partner: Not on file    Emotionally Abused: Not on file    Physically Abused: Not on file    Sexually Abused: Not on file   Housing Stability:     Unable to Pay for Housing in the Last Year: Not on file    Number of [place] in the Last Year: Not on file    Unstable Housing in the Last Year: Not on file     Current Facility-Administered Medications   Medication Dose Route Frequency Provider Last Rate Last Admin    [START ON 1/30/2022] nicotine (NICODERM CQ) 21 MG/24HR 1 patch  1 patch TransDERmal Daily Flora Russell PA-C        albuterol sulfate  (90 Base) MCG/ACT inhaler 2 puff  2 puff Inhalation Q6H PRN Flora Russell PA-C        [START ON 1/30/2022] amLODIPine (NORVASC) tablet 10 mg  10 mg Oral Daily Flora Russell PA-C        atorvastatin (LIPITOR) tablet 80 mg  80 mg Oral Nightly Flora Russell PA-C        carvedilol (COREG) tablet 25 mg  25 mg Oral BID Flora Russell PA-C        [START ON 1/30/2022] chlorthalidone (HYGROTON) tablet 25 mg  25 mg Oral Daily Flora Russell PA-C        [START ON 1/30/2022] furosemide (LASIX) tablet 40 mg  40 mg Oral BID Flora Russell PA-C        hydrOXYzine (VISTARIL) capsule 50 mg  50 mg Oral Nightly Flora Russell PA-C   50 mg at 01/29/22 2145    [START ON 1/30/2022] lisinopril (PRINIVIL;ZESTRIL) tablet 20 mg  20 mg Oral Daily Flora Russell PA-C        nitroGLYCERIN (NITROSTAT) SL tablet 0.4 mg  0.4 mg SubLINGual Q5 Min PRN Satish Russell PA-C        potassium chloride (KLOR-CON M) extended release tablet 40 mEq  40 mEq Oral BID Flora Russell PA-C   40 mEq at 01/29/22 2145    nicotine (NICODERM CQ) 21 MG/24HR 1 patch  1 patch TransDERmal Once Lexx Brown PA-C   1 patch at 01/29/22 4687    pantoprazole (PROTONIX) tablet 40 mg  40 mg Oral BID AC Emanuel Mayberry PA-C         Current Outpatient Medications   Medication Sig Dispense Refill    nitroGLYCERIN (NITROSTAT) 0.4 MG SL tablet Place 1 tablet under the tongue every 5 minutes as needed for Chest pain 25 tablet 3    amLODIPine (NORVASC) 10 MG tablet Take 1 tablet by mouth daily 30 tablet 3    albuterol (PROVENTIL) (5 MG/ML) 0.5% nebulizer solution Take 0.5 mLs by nebulization every 6 hours as needed for Wheezing 120 each 0    warfarin (JANTOVEN) 4 MG tablet TAKE 1 TABLET BY MOUTH EVERY DAY, EXCEPT TAKE 1 AND 1/2 TABLETS ON SUNDAYS OR AS DIRECTED BY CLINIC 100 tablet 1    carvedilol (COREG) 25 MG tablet Take 1 tablet by mouth 2 times daily 60 tablet 5    albuterol sulfate HFA (PROVENTIL HFA) 108 (90 Base) MCG/ACT inhaler Inhale 2 puffs into the lungs every 6 hours as needed for Wheezing or Shortness of Breath 18 g 0    guaiFENesin (ALTARUSSIN) 100 MG/5ML syrup Take 10 mLs by mouth every 4 hours as needed for Cough 120 mL 0    chlorthalidone (HYGROTON) 25 MG tablet TAKE 1 TABLET BY MOUTH EVERY DAY      omeprazole (PRILOSEC) 20 MG delayed release capsule Take 20 mg by mouth 2 times daily      metFORMIN (GLUCOPHAGE) 500 MG tablet Take 1,000 mg by mouth 2 times daily      lisinopril (PRINIVIL;ZESTRIL) 40 MG tablet Take 1 tablet by mouth daily 90 tablet 3    hydrOXYzine (VISTARIL) 50 MG capsule Take 50 mg by mouth nightly      atorvastatin (LIPITOR) 80 MG tablet Take 1 tablet by mouth nightly 30 tablet 3    glipiZIDE (GLUCOTROL) 10 MG tablet Take 1 tablet by mouth every morning (before breakfast) 60 tablet 3    furosemide (LASIX) 40 MG tablet Take 1 tablet by mouth 3 times daily (Patient taking differently: Take 40 mg by mouth 3 times daily Taking 2 times a day) 90 tablet 1    potassium chloride (KLOR-CON M) 20 MEQ extended release tablet Take 2 tablets by mouth 2 times daily 60 tablet 1    linagliptin (TRADJENTA) 5 MG tablet Take 1 tablet by mouth daily 30 tablet 3    clopidogrel (PLAVIX) 75 MG tablet Take 1 tablet by mouth daily 30 tablet 5     Allergies   Allergen Reactions    Hydromorphone Other (See Comments)     Hallucinations. Nursing Notes Reviewed    ROS:  At least 10 systems reviewed and otherwise negative except as in the [address] Physical Exam:  ED Triage Vitals   Enc Vitals Group      BP 01/29/22 2001 (!) 174/96      Pulse 01/29/22 2001 71      Resp 01/29/22 2001 19      Temp 01/29/22 2001 98.1 °F (36.7 °C)      Temp Source 01/29/22 2010 Oral      SpO2 01/29/22 2001 97 %      Weight 01/29/22 2010 267 lb (121.1 kg)      Height 01/29/22 2010 6' 3\" (1.905 m)      Head Circumference --       Peak Flow --       Pain Score --       Pain Loc --       Pain Edu? --       Excl. in [address]? --      My pulse oximetry interpretation is which is within the normal range    GENERAL APPEARANCE: Awake and alert. Cooperative. No acute distress. HEAD:  Atraumatic. EYES: EOM's grossly intact. ENT: Mucous membranes are moist.  No trismus. NECK:  Trachea midline. HEART: RRR. Radial pulses 2+. LUNGS: Respirations unlabored. CTAB  ABDOMEN: Soft. Non-tender. No guarding or rebound. EXTREMITIES: 3+ pitting edema lower extremities  SKIN: Warm and dry. NEUROLOGICAL: No gross facial drooping. Moves all 4 extremities spontaneously. PSYCHIATRIC: Normal mood.     I have reviewed and interpreted all of the currently available lab results from this visit (if applicable):  Results for orders placed or performed during the hospital encounter of 01/29/22   CBC Auto Differential   Result Value Ref Range    WBC 9.8 4.0 - 10.5 K/CU MM    RBC 3.94 (L) 4.6 - 6.2 M/CU MM    Hemoglobin 11.0 (L) 13.5 - 18.0 GM/DL    Hematocrit 35.5 (L) 42 - 52 %    MCV 90.1 78 - 100 FL    MCH 27.9 27 - 31 PG    MCHC 31.0 (L) 32.0 - 36.0 %    RDW 14.7 11.7 - 14.9 %    Platelets 634 354 - 250 K/CU MM    MPV 10.7 7.5 - 11.1 FL    Differential Type AUTOMATED DIFFERENTIAL     Segs Relative 70.3 (H) 36 - 66 %    Lymphocytes % 16.6 (L) 24 - 44 %    Monocytes % 9.9 (H) 0 - 4 %    Eosinophils % 2.4 0 - 3 %    Basophils % 0.4 0 - 1 %    Segs Absolute 6.9 K/CU MM    Lymphocytes Absolute 1.6 K/CU MM    Monocytes Absolute 1.0 K/CU MM    Eosinophils Absolute 0.2 K/CU MM    Basophils Absolute 0.0 K/CU MM    Nucleated RBC % 0.0 %    Total Nucleated RBC 0.0 K/CU MM    Total Immature Neutrophil 0.04 K/CU MM    Immature Neutrophil % 0.4 0 - 0.43 %   Comprehensive Metabolic Panel w/ Reflex to MG   Result Value Ref Range    Sodium 139 135 - 145 MMOL/L    Potassium 3.8 3.5 - 5.1 MMOL/L    Chloride 104 99 - 110 mMol/L    CO2 24 21 - 32 MMOL/L    BUN 23 6 - 23 MG/DL    CREATININE 1.3 0.9 - 1.3 MG/DL    Glucose 123 (H) 70 - 99 MG/DL    Calcium 8.4 8.3 - 10.6 MG/DL    Albumin 3.1 (L) 3.4 - 5.0 GM/DL    Total Protein 5.9 (L) 6.4 - 8.2 GM/DL    Total Bilirubin 0.3 0.0 - 1.0 MG/DL    ALT 14 10 - 40 U/L    AST 18 15 - 37 IU/L    Alkaline Phosphatase 102 40 - 129 IU/L    GFR Non- 56 (L) >60 mL/min/1.73m2    GFR African American >60 >60 mL/min/1.73m2    Anion Gap 11 4 - 16   Troponin   Result Value Ref Range    Troponin T 0.050 (H) <0.01 NG/ML   Brain Natriuretic Peptide   Result Value Ref Range    Pro-BNP 2,054 (H) <300 PG/ML   D-dimer, Quantitative   Result Value Ref Range    D-Dimer, Quant <200 <230 NG/mL(DDU)   EKG 12 Lead   Result Value Ref Range    Ventricular Rate 64 BPM    Atrial Rate 64 BPM    P-R Interval 170 ms    QRS Duration 104 ms    Q-T Interval 464 ms    QTc Calculation (Bazett) 478 ms    P Axis 14 degrees    R Axis 20 degrees    T Axis 125 degrees    Diagnosis       Normal sinus rhythm  Nonspecific ST and T wave abnormality  Prolonged QT  Abnormal ECG  When compared with ECG of 29-JAN-2022 17:15,  No significant change was found            EKG: (All EKG's are interpreted by myself in the absence of a cardiologist)      MDM:  Previous blood work showed an elevated troponin and elevated BNP. Hospitalist was consulted as they had already seen this patient and placed admitting orders. They state they will again place admitting orders for the patient. Clinical Impression:  1. Chest pain, unspecified type        Disposition Vitals:  [unfilled], [unfilled], [unfilled], [unfilled]    Disposition referral (if applicable):  No follow-up provider specified.     Disposition medications (if applicable):  New Prescriptions    No medications on file         (Please note that portions of this note may have been completed with a voice recognition program. Efforts were made to edit the dictations but occasionally words are mis-transcribed.)    MD Hugo Chappell MD  01/29/22 4283

## 2022-01-30 NOTE — PROGRESS NOTES
Accepted patient from the ER. Went to evaluate patient at bedside and patient was discussing with Dr. Estevan Lesches how he was leaving AM as he did not want to wait for his cardiologist to see him.     Nithya Luna PA-C  Hospitalist  1/29/2022, 7:36 PM

## 2022-01-30 NOTE — H&P
History and Physical      Name:  Bertin Kim /Age/Sex: 1960  (62 y.o. male)   MRN & CSN:  [id number] & [id number] Encounter Date/Time: 2022 8:30 PM EST   Location:  [facility] PCP: Panda Crowell MD       Hospital Day: 1    Assessment and Plan:      Chest pain r/o ACS   Pulmonary edema/CHF   o Appears fluid overloaded with peripheral edema and crackles in lung bases. In setting of CAD s/p CABG x 4. CXR with vascular congestion. EKG with t wave depressions in V5/6  o Initial troponin detectable, trend  o Tele monitoring  o Cardiology consult  o Start lasix  o ASA/BB/Statin  o Nitroglycerin sublingual PRN  o Strict I/O  o Last echo 22 with EF 45-50%, G1DD  o Obtain d-dimer given recent covid infection, consider CTA if d-dimer is elevated  o Check BL DVT US given new swelling     · Nicotine use  · Nicotine patch    Chronic conditions: home medications continued unless contraindicated   CAD   HTN   HLD   Atrial fibrillation   CKD   COPD    Disposition:   Current Living situation: home  Expected Disposition: observation  Estimated D/C: 22    Diet No diet orders on file   DVT Prophylaxis [] Lovenox, []  Heparin, [] SCDs, [] Ambulation,  [] Eliquis, [] Xarelto   Code Status Prior   Surrogate Decision Maker/ POA patient     History from:     patient    History of Present Illness:     Chief Complaint:   Bertin Kim is a 62 y.o. male with a pmh of  atrial fibrillation, CAD s/p CABG, HTN, HLD, CKD, COPD,  Who presents for evaluation of chest pain for one day. He also has noticed swelling in bilateral lower legs. He admits to increasing his smoking use lately. Admits to dyspnea at baseline. Denies radiation of pain, nausea, diaphoresis. He was found to have detectable troponins and was admitted for ACS rule out. Patient's past medical, social, and family history have been reviewed.     Patient case discussed with ED provider Dr. Dia Salazar of Systems:    10 point system reviewed, negative, unless as noted in above HPI. Objective:   No intake or output data in the 24 hours ending 01/29/22 2030   Vitals:   Vitals:    01/29/22 2001 01/29/22 2010   BP: (!) 174/96 (!) 174/96   Pulse: 71 66   Resp: 19 20   Temp: 98.1 °F (36.7 °C) 98 °F (36.7 °C)   TempSrc:  Oral   SpO2: 97% 96%   Weight:  267 lb (121.1 kg)   Height:  6' 3\" (1.905 m)       Medications Prior to Admission     Prior to Admission medications    Medication Sig Start Date End Date Taking? Authorizing Provider   nitroGLYCERIN (NITROSTAT) 0.4 MG SL tablet Place 1 tablet under the tongue every 5 minutes as needed for Chest pain 1/21/22   Sharlene Bolanos MD   amLODIPine (NORVASC) 10 MG tablet Take 1 tablet by mouth daily 1/21/22   Sharlene Bolanos MD   albuterol (PROVENTIL) (5 MG/ML) 0.5% nebulizer solution Take 0.5 mLs by nebulization every 6 hours as needed for Wheezing 1/19/22   Roberto Cox DO   warfarin (JANTOVEN) 4 MG tablet TAKE 1 TABLET BY MOUTH EVERY DAY, EXCEPT TAKE 1 AND 1/2 TABLETS ON SUNDAYS OR AS DIRECTED BY CLINIC 1/17/22   Sharlene Bolanos MD   carvedilol (COREG) 25 MG tablet Take 1 tablet by mouth 2 times daily 12/6/21   Julio Snyder, APRN - CNP   albuterol sulfate HFA (PROVENTIL HFA) 108 (90 Base) MCG/ACT inhaler Inhale 2 puffs into the lungs every 6 hours as needed for Wheezing or Shortness of Breath 10/10/21   Chetna Hendricks PA-C   guaiFENesin (ALTARUSSIN) 100 MG/5ML syrup Take 10 mLs by mouth every 4 hours as needed for Cough 10/7/21   Sherri Okeefe MD   chlorthalidone (HYGROTON) 25 MG tablet TAKE 1 TABLET BY MOUTH EVERY DAY 5/19/21   Historical Provider, MD   omeprazole (PRILOSEC) 20 MG delayed release capsule Take 20 mg by mouth 2 times daily 2/2/21 2/2/22  Historical Provider, MD   metFORMIN (GLUCOPHAGE) 500 MG tablet Take 1,000 mg by mouth 2 times daily 1/15/21   Historical Provider, MD   lisinopril (PRINIVIL;ZESTRIL) 40 MG tablet Take 1 tablet by mouth daily 12/10/20 Medical History:   Diagnosis Date    Abnormal nuclear stress test     Atrial fibrillation ([facility] 11/2013    CAD (coronary artery disease)     Follows with Dr. Dara Cote CHF (congestive heart failure) ([facility]     COPD (chronic obstructive pulmonary disease) ([facility]     Follows with PCP    Decreased cardiac ejection fraction     [facility]     GERD (gastroesophageal reflux disease)     H/O cardiovascular stress test 11/20/13, 3/20/2013    11/13-Observed defect is consistent with diaphragmatic attenuation. EF 58%. 3/13 -EF 51%. No ischemia. Normal Study.  H/O cardiovascular stress test 06/08/2017    EF 50% normal study    H/O cardiovascular stress test 06/17/2019    Normal NM    H/O cardiovascular stress test 03/02/2021    depressed lvedf increased edv myocardial perfusion abnormal stress test    H/O Doppler ultrasound 10/14/2010    10/2010-Bilateral venous doppler of lower extremies- No DVTs.  H/O echocardiogram 06/26/2019    Left ventricular systolic function is normal with an ejection fraction of 50-55%. Mild concentric left ventricular hypertrophy. Grade I diastolic dysfunction. No significant valvular disease noted. No evidence of pericardial effusion. Essentially unremarkable echo.     H/O percutaneous left heart catheterization 12/22/2015    No evidence of hemodynamically significant coronary artery disease    Heart imaging 04/23/2020    muga - ef 58%    History of coronary artery stent placement 11/13/2013 11/13 -RCA - 3 stents placed    History of exercise stress test 06/08/2017    treadmill    History of Holter monitoring 01/04/2016    predominant rhythm sinus    History of MRI of brain and brain stem 06/04/2020    No evidence of an acute infarct, mod chronic microvascular white matter ischemic disease with associated cerebral parenchymal volume loss    History of nuclear stress test 02/24/2020    EF 38%,Myocardial perfusion scan shows moderate size, severe intensity, non reversible perfusion defect in inferior wall.  Hyperlipidemia     Hypertension     Follows with PCP    Kidney disease     Dr. Daniel Chow S/P cardiac catheterization 11/13/2013 11/13/2013-EF 55%, distal LAD mild disease,CX stent patent,but distal to stent 50% stenosis. RCA severe disease.  SOB (shortness of breath)     Status post angioplasty with stent     Wears dentures     full upper denture     PSHX:  has a past surgical history that includes Coronary angioplasty with stent; Appendectomy; Femur fracture surgery (1984); Coronary angioplasty with stent (11/13/2013); Colonoscopy; Mandible fracture surgery (1984); Cardiac catheterization (11/13/2013); Cardiac surgery (07/03/2020); Coronary artery bypass graft (N/A, 7/3/2020); Dental surgery; and BIOPSY / LIGATION TEMPORAL ARTERY (Left, 8/17/2020). Allergies: Allergies   Allergen Reactions    Hydromorphone Other (See Comments)     Hallucinations. Fam HX:  family history includes Cancer in his brother, father, and mother; Diabetes in his mother; Heart Disease in his father and mother; High Blood Pressure in his father and mother; Stroke in his father and mother.   Soc HX:   Social History     Socioeconomic History    Marital status: Legally      Spouse name: None    Number of children: None    Years of education: None    Highest education level: None   Occupational History    Occupation:    Tobacco Use    Smoking status: Current Every Day Smoker     Packs/day: 1.00     Years: 46.00     Pack years: 46.00     Types: Cigarettes     Start date: 1974    Smokeless tobacco: Never Used    Tobacco comment: Smokes approx 1 pack per day   Vaping Use    Vaping Use: Never used   Substance and Sexual Activity    Alcohol use: No     Comment: caffeine 6 bottles of unsweet tea a day    Drug use: Not Currently     Types: Cocaine     Comment: Last used: 7/1/20    Sexual activity: Yes     Partners: Female   Other Topics Concern    None   Social History Narrative    None     Social Determinants of Health     Financial Resource Strain:     Difficulty of Paying Living Expenses: Not on file   Food Insecurity:     Worried About Running Out of Food in the Last Year: Not on file    Sivakumar of Food in the Last Year: Not on file   Transportation Needs:     Lack of Transportation (Medical): Not on file    Lack of Transportation (Non-Medical):  Not on file   Physical Activity:     Days of Exercise per Week: Not on file    Minutes of Exercise per Session: Not on file   Stress:     Feeling of Stress : Not on file   Social Connections:     Frequency of Communication with Friends and Family: Not on file    Frequency of Social Gatherings with Friends and Family: Not on file    Attends [employer] Services: Not on file    Active Member of [address] [place] [address] [employer] or Organizations: Not on file    Attends Club or Organization Meetings: Not on file    Marital Status: Not on file   Intimate Partner Violence:     Fear of Current or Ex-Partner: Not on file    Emotionally Abused: Not on file    Physically Abused: Not on file    Sexually Abused: Not on file   Housing Stability:     Unable to Pay for Housing in the Last Year: Not on file    Number of [place] in the Last Year: Not on file    Unstable Housing in the Last Year: Not on file       Medications:   Medications:    Infusions:   PRN Meds:     Labs    ECHO Complete 2D W Doppler W Color    Result Date: 1/28/2022  Transthoracic Echocardiography Report (TTE)  Demographics   Patient Name       Jess ALEXANDER       Date of Study       01/26/2022   Date of Birth      1960         Gender              Male   Age                62 year(s)         Race                   Patient Number     [id number]           Room Number   Visit Number       [id number]   Corporate ID       [id number]   Accession Number   [id number]         Sonographer         Chas CABALLERO, CRT   Ordering Physician Diogenes Anne MD      Physician           Dawna Rosenbaum MD  Procedure Type of Study   TTE procedure:ECHOCARDIOGRAM COMPLETE 2D W DOPPLER W COLOR. Procedure Date Date: 01/26/2022 Start: 10:36 AM Study Location: [address] Technical Quality: Fair visualization Indications:Chest pain. Patient Status: Routine Height: 74 inches Weight: 258 pounds BSA: 2.42 m2 BMI: 33.12 kg/m2 Rhythm: Sinus rhythm HR: 70 bpm BP: 208/101 mmHg  Conclusions   Summary  Left ventricular function is low normal, EF is estimated at 45-50%. Moderate left ventricular hypertrophy. Grade I diastolic dysfunction. Moderately dilated left atrium. Moderate mitral and mild tricuspid regurgitation is present. RVSP is 32 mmHg. Dilation of the aortic root(4.5cm) and the ascending aorta(4.7cm). No evidence of pericardial effusion. Signature   ------------------------------------------------------------------  Electronically signed by Luz He MD  (Interpreting physician) on 01/28/2022 at 05:01 PM  ------------------------------------------------------------------   Findings   Left Ventricle  Left ventricular function is low normal, EF is estimated at 45-50%. Moderate left ventricular hypertrophy. Grade I diastolic dysfunction. Left Atrium  Moderately dilated left atrium. Right Atrium  Normal right atrium size and structure. Right Ventricle  Essentially normal right ventricle. Aortic Valve  Normal aortic valve structure and function. Mitral Valve  Normal mitral valve structure and function. Moderate mitral regurgitation is present. Tricuspid Valve  Normal tricuspid valve structure and function. Mild tricuspid regurgitation; RVSP is 32 mmHg. Pulmonic Valve  The pulmonic valve was not well visualized. Pericardial Effusion  No evidence of pericardial effusion.    Pleural Effusion  No evidence of pleural effusion. Miscellaneous  IVC and abdominal aorta are within normal limits. Dilation of the aortic root(4.5cm) and the ascending aorta(4.7cm).   M-Mode/2D Measurements & Calculations   LV Diastolic Dimension:  LV Systolic Dimension:  LA Dimension: 5.3 cmAO Root  5.37 cm                  4.48 cm                 Dimension: 4.4 cmLA Area:  LV FS:16.6 %             LV Volume Diastolic:    94.8 cm2  LV PW Diastolic: 7.74 cm 937 ml  LV PW Systolic: 2.52 cm  LV Volume Systolic: 74  Septum Diastolic: 4.44   ml  cm                       LV EDV/LV EDV Index:    RV Diastolic Dimension: 3.5  Septum Systolic: 2.89 cm 421 ZD/20 m2LV ESV/LV   cm  CO: 4.79 l/min           ESV Index: 74 ml/31 m2  CI: 1.98 l/m*m2          EF Calculated (A4C):    LA/Aorta: 1.2                           47.5 %                  Ascending Aorta: 4.7 cm  LV Area Diastolic: 73.2  EF Calculated (2D):     LA volume/Index: 95 ml  cm2                      27.3 %                  /51K9  LV Area Systolic: 48.3  cm2                      LV Length: 7.95 cm                            LVOT: 2 cm  Doppler Measurements & Calculations   MV Peak E-Wave: 101  AV Peak Velocity: 153 cm/s    LVOT Mean Velocity: 72.8  cm/s                 AV Peak Gradient: 9.36 mmHg   cm/s  MV Peak A-Wave: 31.6 AV Mean Velocity: 98.4 cm/s   LVOT Mean Gradient: 2  cm/s                 AV Mean Gradient: 5 mmHg      mmHg  MV E/A Ratio: 3.2    AV VTI: 30.9 cm               Estimated RVSP: 32 mmHg  MV Peak Gradient:    AV Area (Continuity):2.22 cm2 Estimated RAP:3 mmHg  4.08 mmHg                       LVOT VTI: 21.8 cm  MV P1/2t: 47 msec                                  TR Velocity:239 cm/s  MVA by PHT:4.68 cm2  Estimated PASP: 25.85 mmHg    TR Gradient:22.85 mmHg   MV E' Lateral  Velocity: 6.46 cm/s  MV A' Lateral  Velocity: 5.46 cm/s  MV E/E' lateral:  15.63      XR CHEST PORTABLE    Result Date: 1/29/2022  EXAMINATION: ONE XRAY VIEW OF THE CHEST 1/29/2022 2:30 pm COMPARISON: 01/20/2022 HISTORY: ORDERING SYSTEM PROVIDED HISTORY: chest pain TECHNOLOGIST PROVIDED HISTORY: Reason for exam:->chest pain Reason for Exam: chest pain Additional signs and symptoms: leg swelling Relevant Medical/Surgical History: CAD, COPD, STEMI, triple bypass FINDINGS: Cardial pericardial silhouette is enlarged but stable. Left atrial appendage clip again noted. Midline sternotomy wires are unchanged in configuration. Pulmonary vasculature is mildly congested. A focal infiltrate is not identified. No pneumothorax. No free air. Pulmonary vascular congestion. Correlate with clinical evidence of developing pulmonary edema. CBC:   Recent Labs     01/29/22  1726   WBC 10.3   HGB 11.4*        BMP:    Recent Labs     01/29/22  1726      K 4.0      CO2 26   BUN 24*   CREATININE 1.3   GLUCOSE 120*     Hepatic:   Recent Labs     01/29/22  1726   AST 17   ALT 13   BILITOT 0.3   ALKPHOS 109     Lipids:   Lab Results   Component Value Date    CHOL 153 01/06/2020    HDL 43 01/06/2020    TRIG 54 01/06/2020     Hemoglobin A1C:   Lab Results   Component Value Date    LABA1C 7.8 12/30/2020     TSH: No results found for: TSH  Troponin:   Lab Results   Component Value Date    TROPONINT 0.071 01/29/2022    TROPONINT <0.010 01/20/2022    TROPONINT <0.010 01/04/2022     Lactic Acid: No results for input(s): LACTA in the last 72 hours.   BNP:   Recent Labs     01/29/22  1726   PROBNP 2,364*     UA:  Lab Results   Component Value Date    NITRU NEGATIVE 12/26/2020    COLORU YELLOW 12/26/2020    WBCUA NO CELLS SEEN 12/26/2020    RBCUA 3 12/26/2020    MUCUS RARE 07/04/2020    TRICHOMONAS NONE SEEN 07/20/2020    BACTERIA NEGATIVE 12/26/2020    CLARITYU CLEAR 12/26/2020    SPECGRAV 1.020 12/26/2020    LEUKOCYTESUR NEGATIVE 12/26/2020    UROBILINOGEN 0.2 12/26/2020    BILIRUBINUR NEGATIVE 12/26/2020    BLOODU TRACE 12/26/2020    KETUA NEGATIVE 12/26/2020     Urine Cultures: No results found for: LABURIN  Blood Cultures: No results found for: BC  No results found for: BLOODCULT2  Organism: No results found for: ORG    Imaging/Diagnostics Last 24 Hours   ECHO Complete 2D W Doppler W Color    Result Date: 1/28/2022  Transthoracic Echocardiography Report (TTE)  Demographics   Patient Name       Jennifer ALEXANDER       Date of Study       01/26/2022   Date of Birth      1960         Gender              Male   Age                62 year(s)         Race                   Patient Number     [id number]           Room Number   Visit Number       [id number]   Corporate ID       [id number]   Accession Number   [id number]         [facility],                                                            [facility]   Ordering Physician Luis F Roberson MD      Physician           Shanna Vasquez MD  Procedure Type of Study   TTE procedure:ECHOCARDIOGRAM COMPLETE 2D W DOPPLER W COLOR. Procedure Date Date: 01/26/2022 Start: 10:36 AM Study Location: [address] Technical Quality: Fair visualization Indications:Chest pain. Patient Status: Routine Height: 74 inches Weight: 258 pounds BSA: 2.42 m2 BMI: 33.12 kg/m2 Rhythm: Sinus rhythm HR: 70 bpm BP: 208/101 mmHg  Conclusions   Summary  Left ventricular function is low normal, EF is estimated at 45-50%. Moderate left ventricular hypertrophy. Grade I diastolic dysfunction. Moderately dilated left atrium. Moderate mitral and mild tricuspid regurgitation is present. RVSP is 32 mmHg. Dilation of the aortic root(4.5cm) and the ascending aorta(4.7cm). No evidence of pericardial effusion.    Signature   ------------------------------------------------------------------  Electronically signed by Ammy Barry MD  (Interpreting physician) on 01/28/2022 at 05:01 PM  ------------------------------------------------------------------   Findings   Left Ventricle  Left ventricular function is low normal, EF is estimated at 45-50%. Moderate left ventricular hypertrophy. Grade I diastolic dysfunction. Left Atrium  Moderately dilated left atrium. Right Atrium  Normal right atrium size and structure. Right Ventricle  Essentially normal right ventricle. Aortic Valve  Normal aortic valve structure and function. Mitral Valve  Normal mitral valve structure and function. Moderate mitral regurgitation is present. Tricuspid Valve  Normal tricuspid valve structure and function. Mild tricuspid regurgitation; RVSP is 32 mmHg. Pulmonic Valve  The pulmonic valve was not well visualized. Pericardial Effusion  No evidence of pericardial effusion. Pleural Effusion  No evidence of pleural effusion. Miscellaneous  IVC and abdominal aorta are within normal limits. Dilation of the aortic root(4.5cm) and the ascending aorta(4.7cm).   M-Mode/2D Measurements & Calculations   LV Diastolic Dimension:  LV Systolic Dimension:  LA Dimension: 5.3 cmAO Root  5.37 cm                  4.48 cm                 Dimension: 4.4 cmLA Area:  LV FS:16.6 %             LV Volume Diastolic:    90.7 cm2  LV PW Diastolic: 1.59 cm 280 ml  LV PW Systolic: 1.84 cm  LV Volume Systolic: 74  Septum Diastolic: 3.48   ml  cm                       LV EDV/LV EDV Index:    RV Diastolic Dimension: 3.5  Septum Systolic: 8.89 cm 501 SL/58 m2LV ESV/LV   cm  CO: 4.79 l/min           ESV Index: 74 ml/31 m2  CI: 1.98 l/m*m2          EF Calculated (A4C):    LA/Aorta: 1.2                           47.5 %                  Ascending Aorta: 4.7 cm  LV Area Diastolic: 01.0  EF Calculated (2D):     LA volume/Index: 95 ml  cm2                      27.3 %                  /69H0  LV Area Systolic: 98.5  cm2                      LV Length: 7.95 cm                            LVOT: 2 cm  Doppler Measurements & Calculations   MV Peak E-Wave: 101  AV Peak Velocity: 153 cm/s    LVOT Mean Velocity: 72.8  cm/s                 AV Peak Gradient: 9.36 mmHg   cm/s  MV Peak A-Wave: 31.6 AV Mean Velocity: 98.4 cm/s   LVOT Mean Gradient: 2  cm/s                 AV Mean Gradient: 5 mmHg      mmHg  MV E/A Ratio: 3.2    AV VTI: 30.9 cm               Estimated RVSP: 32 mmHg  MV Peak Gradient:    AV Area (Continuity):2.22 cm2 Estimated RAP:3 mmHg  4.08 mmHg                       LVOT VTI: 21.8 cm  MV P1/2t: 47 msec                                  TR Velocity:239 cm/s  MVA by PHT:4.68 cm2  Estimated PASP: 25.85 mmHg    TR Gradient:22.85 mmHg   MV E' Lateral  Velocity: 6.46 cm/s  MV A' Lateral  Velocity: 5.46 cm/s  MV E/E' lateral:  15.63      XR CHEST PORTABLE    Result Date: 1/29/2022  EXAMINATION: ONE XRAY VIEW OF THE CHEST 1/29/2022 2:30 pm COMPARISON: 01/20/2022 HISTORY: ORDERING SYSTEM PROVIDED HISTORY: chest pain TECHNOLOGIST PROVIDED HISTORY: Reason for exam:->chest pain Reason for Exam: chest pain Additional signs and symptoms: leg swelling Relevant Medical/Surgical History: CAD, COPD, STEMI, triple bypass FINDINGS: Cardial pericardial silhouette is enlarged but stable. Left atrial appendage clip again noted. Midline sternotomy wires are unchanged in configuration. Pulmonary vasculature is mildly congested. A focal infiltrate is not identified. No pneumothorax. No free air. Pulmonary vascular congestion. Correlate with clinical evidence of developing pulmonary edema.        Personally reviewed Lab Studies, Imaging, and discussed case with Dr. Andrade Tsai    Electronically signed by Ariana Pineda PA-C on 1/29/2022 at 8:30 PM

## 2022-02-07 ENCOUNTER — OFFICE VISIT (OUTPATIENT)
Dept: CARDIOLOGY CLINIC | Age: 62
End: 2022-02-07
Payer: COMMERCIAL

## 2022-02-07 VITALS
SYSTOLIC BLOOD PRESSURE: 160 MMHG | BODY MASS INDEX: 33.82 KG/M2 | HEART RATE: 72 BPM | HEIGHT: 75 IN | DIASTOLIC BLOOD PRESSURE: 80 MMHG | WEIGHT: 272 LBS

## 2022-02-07 DIAGNOSIS — E78.2 MIXED HYPERLIPIDEMIA: ICD-10-CM

## 2022-02-07 DIAGNOSIS — I48.11 LONGSTANDING PERSISTENT ATRIAL FIBRILLATION (HCC): ICD-10-CM

## 2022-02-07 DIAGNOSIS — I25.10 CORONARY ARTERY DISEASE INVOLVING NATIVE HEART WITHOUT ANGINA PECTORIS, UNSPECIFIED VESSEL OR LESION TYPE: ICD-10-CM

## 2022-02-07 DIAGNOSIS — I71.40 ABDOMINAL AORTIC ANEURYSM (AAA) WITHOUT RUPTURE: Primary | ICD-10-CM

## 2022-02-07 DIAGNOSIS — I10 PRIMARY HYPERTENSION: ICD-10-CM

## 2022-02-07 DIAGNOSIS — I25.5 ISCHEMIC CARDIOMYOPATHY: ICD-10-CM

## 2022-02-07 PROCEDURE — 4004F PT TOBACCO SCREEN RCVD TLK: CPT | Performed by: NURSE PRACTITIONER

## 2022-02-07 PROCEDURE — G8417 CALC BMI ABV UP PARAM F/U: HCPCS | Performed by: NURSE PRACTITIONER

## 2022-02-07 PROCEDURE — G8427 DOCREV CUR MEDS BY ELIG CLIN: HCPCS | Performed by: NURSE PRACTITIONER

## 2022-02-07 PROCEDURE — 99214 OFFICE O/P EST MOD 30 MIN: CPT | Performed by: NURSE PRACTITIONER

## 2022-02-07 PROCEDURE — G8484 FLU IMMUNIZE NO ADMIN: HCPCS | Performed by: NURSE PRACTITIONER

## 2022-02-07 PROCEDURE — 3017F COLORECTAL CA SCREEN DOC REV: CPT | Performed by: NURSE PRACTITIONER

## 2022-02-07 RX ORDER — HYDRALAZINE HYDROCHLORIDE 10 MG/1
10 TABLET, FILM COATED ORAL 3 TIMES DAILY
Qty: 90 TABLET | Refills: 3 | Status: SHIPPED | OUTPATIENT
Start: 2022-02-07 | End: 2022-07-06

## 2022-02-07 RX ORDER — FUROSEMIDE 40 MG/1
40 TABLET ORAL 2 TIMES DAILY
Qty: 90 TABLET | Refills: 3 | Status: SHIPPED | OUTPATIENT
Start: 2022-02-07 | End: 2022-05-20

## 2022-02-07 ASSESSMENT — ENCOUNTER SYMPTOMS: SHORTNESS OF BREATH: 1

## 2022-02-07 NOTE — PATIENT INSTRUCTIONS
**It is YOUR responsibilty to bring medication bottles and/or updated medication list to [address] [place] [address]. This will allow us to better serve you and all your healthcare needs**  Please be informed that if you contact our office outside of normal business hours the physician on call cannot help with any scheduling or rescheduling issues, procedure instruction questions or any type of medication issue. We advise you for any urgent/emergency that you go to the nearest emergency room!     PLEASE CALL OUR OFFICE DURING NORMAL BUSINESS HOURS    Monday - Friday   8 am to 5 pm    [place] [address]: [phone]    [place]:  [phone]

## 2022-02-07 NOTE — PROGRESS NOTES
DAVID ([facility] PHYSICAL [facility]  [address]  Phone: [phone]    Fax [phone]                  Vanessa Bartholomew MD, Kishan Lira MD, Varun Garcia MD, Katherine Fothergill, MD Wilton Burns, MD Towana Harris, MD  You Files, APRN      Nimesh Welsh, APRN  Daniel Umanzor, APRN     Caity Gallagher, APRN    CARDIOLOGY  NOTE      2/7/2022    RE: Daniel Collins  (1960)                               TO:  Dr. Negro Maloney MD  The primary cardiologist is Dr. Yomi Sierra     CC:   1. Abdominal aortic aneurysm (AAA) without rupture ([facility] [address]    2. Longstanding persistent atrial fibrillation ([facility] [address]    3. Ischemic cardiomyopathy    4. Coronary artery disease involving native heart without angina pectoris, unspecified vessel or lesion type    5. Primary hypertension    6. Mixed hyperlipidemia        Patient denies all of the following:  Chest Pain  Palpitations  Edema  Dizziness  Syncope      HPI: Thank you for involving me in taking care of your patient Daniel Collins, who is a  62y.o. year old male with a history as listed above. Patient presents to the office for follow up on CAD, HTN, CHF, A-fib, AAA, and hyperlipidemia. Shortness of breath has gotten worse over the last couple weeks. Patient was seen multiple times in emergency department for chest pain, shortness of breath, and elevated BP but left AMA. Patient is an active male who does not walk regularly. Patient is not compliant with his medications.      Vitals:    02/07/22 1523   BP: (!) 160/80   Pulse:        Current Outpatient Medications   Medication Sig Dispense Refill    furosemide (LASIX) 40 MG tablet Take 1 tablet by mouth 2 times daily 90 tablet 3    hydrALAZINE (APRESOLINE) 10 MG tablet Take 1 tablet by mouth 3 times daily 90 tablet 3    nitroGLYCERIN (NITROSTAT) 0.4 MG SL tablet Place 1 tablet under the tongue every 5 minutes as needed for Chest pain 25 tablet 3    amLODIPine (NORVASC) 10 MG tablet Take 1 tablet by mouth daily 30 tablet 3    albuterol (PROVENTIL) (5 MG/ML) 0.5% nebulizer solution Take 0.5 mLs by nebulization every 6 hours as needed for Wheezing 120 each 0    warfarin (JANTOVEN) 4 MG tablet TAKE 1 TABLET BY MOUTH EVERY DAY, EXCEPT TAKE 1 AND 1/2 TABLETS ON SUNDAYS OR AS DIRECTED BY CLINIC 100 tablet 1    carvedilol (COREG) 25 MG tablet Take 1 tablet by mouth 2 times daily 60 tablet 5    albuterol sulfate HFA (PROVENTIL HFA) 108 (90 Base) MCG/ACT inhaler Inhale 2 puffs into the lungs every 6 hours as needed for Wheezing or Shortness of Breath 18 g 0    chlorthalidone (HYGROTON) 25 MG tablet TAKE 1 TABLET BY MOUTH EVERY DAY      metFORMIN (GLUCOPHAGE) 500 MG tablet Take 1,000 mg by mouth 2 times daily      lisinopril (PRINIVIL;ZESTRIL) 40 MG tablet Take 1 tablet by mouth daily 90 tablet 3    hydrOXYzine (VISTARIL) 50 MG capsule Take 50 mg by mouth 2 times daily       atorvastatin (LIPITOR) 80 MG tablet Take 1 tablet by mouth nightly 30 tablet 3    glipiZIDE (GLUCOTROL) 10 MG tablet Take 1 tablet by mouth every morning (before breakfast) 60 tablet 3    guaiFENesin (ALTARUSSIN) 100 MG/5ML syrup Take 10 mLs by mouth every 4 hours as needed for Cough (Patient not taking: Reported on 2/7/2022) 120 mL 0    linagliptin (TRADJENTA) 5 MG tablet Take 1 tablet by mouth daily 30 tablet 3     No current facility-administered medications for this visit.      Allergies: Hydromorphone  Past Medical History:   Diagnosis Date    Abnormal nuclear stress test     Atrial fibrillation ([address] 11/2013    CAD (coronary artery disease)     Follows with Dr. Tayo Wall CHF (congestive heart failure) ([address]     COPD (chronic obstructive pulmonary disease) ([address]     Follows with PCP    Decreased cardiac ejection fraction     Diabetes mellitus ([address]     GERD (gastroesophageal reflux disease)     H/O cardiovascular stress test 11/20/13, 3/20/2013    11/13-Observed defect is consistent Skin: Negative. Neurological: Negative for dizziness and weakness. All other systems reviewed and are negative. Objective:      Physical Exam:  BP (!) 160/80 (Site: Left Upper Arm, Position: Sitting, Cuff Size: Large Adult)   Pulse 72   Ht 6' 3\" (1.905 m)   Wt 272 lb (123.4 kg)   BMI 34.00 kg/m²   Wt Readings from Last 3 Encounters:   02/07/22 272 lb (123.4 kg)   01/29/22 267 lb (121.1 kg)   01/29/22 270 lb (122.5 kg)     Body mass index is 34 kg/m². Physical Exam  Constitutional:       Appearance: He is well-developed. Cardiovascular:      Rate and Rhythm: Normal rate and regular rhythm. Pulses: Intact distal pulses. Dorsalis pedis pulses are 2+ on the right side and 2+ on the left side. Posterior tibial pulses are 2+ on the right side and 2+ on the left side. Heart sounds: Normal heart sounds, S1 normal and S2 normal.   Pulmonary:      Effort: Pulmonary effort is normal.      Breath sounds: Rales present. Musculoskeletal:         General: Normal range of motion. Right lower leg: Edema present. Left lower leg: Edema present. Skin:     General: Skin is warm and dry. Neurological:      Mental Status: He is alert and oriented to person, place, and time.          DATA:  Lab Results   Component Value Date    CKTOTAL 34 01/29/2022    CKMB 2.4 03/10/2013    TROPONINI 0.014 06/09/2014    TROPONINI 0.015 12/03/2011     BNP:    Lab Results   Component Value Date    BNP 17 03/09/2013     PT/INR:  No results found for: PTINR  Lab Results   Component Value Date    LABA1C 7.8 (H) 12/30/2020    LABA1C 8.9 (H) 06/29/2020     Lab Results   Component Value Date    CHOL 153 01/06/2020    TRIG 54 01/06/2020    HDL 43 01/06/2020    LDLDIRECT 111 (H) 01/06/2020     Lab Results   Component Value Date    ALT 14 01/29/2022    AST 18 01/29/2022     TSH:  No results found for: TSH    Echo:1/7/2020  Left ventricular systolic function is abnormal.   Ejection fraction is visually estimated at 30 to 35 %. Moderate left ventricular hypertrophy. Cannot assess diastolic function due to AFib. Moderately dilated atrium bilaterally. No evidence of any pericardial effusion. Inferior vena cava is dilated, measuring at 2.8 cm, and does not collapse   with respiration. Echo: 12/30/20   Left ventricular systolic function is low normal.   Ejection fraction is visually estimated at 50%. Moderate left ventricular hypertrophy. No significant valvular disease noted. No evidence of any pericardial effusion. Pericardial fat pad present. Echo:1/26/22  Left ventricular function is low normal, EF is estimated at 45-50%. Moderate left ventricular hypertrophy. Grade I diastolic dysfunction. Moderately dilated left atrium. Moderate mitral and mild tricuspid regurgitation is present. RVSP is 32 mmHg. Dilation of the aortic root(4.5cm) and the ascending aorta(4.7cm). No evidence of pericardial effusion. MUGA: 1/13/21  EF 36 %    Cath: 7/30/19  PATIENT presenting with STEMI of RCA, s/p successful PCI of RCA with LEOLA, HAS multivessel disease as described above. Cath: 12/30/20  1. PCI to SVG to RCA/PDA at ostial anastomoses site for 99 % lesion reduced to 0 % using 3.0 X 28 LEOLA, 3.0 X 18 and 2.5 X 23 overlapping stents followed by post stent balloon dilation using   3.5 X 12 balloon   2. SVG to Om and LIMA to LAd are potent   3. LVEDP was 15 mmHG   4. RCA and Circ are occluded   5. LAD is diffusely diseased , Diag and LAD are filled by   LIMA graft. 6. left main has 80% distal stenosis   Pt is referred to Cardiac Rehab Phase 2 as OP    The ASCVD Risk score (Miguel Valle., et al., 2013) failed to calculate for the following reasons: The patient has a prior MI or stroke diagnosis    Assessment/ Plan:     AAA  New found aortic root aneurysm measuring 4.5 cm and ascending aorta measuring 4.7 cm. Patient needs better B/P control.  CTA of chest in 10/10/21 showed normal size of aorta.  CTA of chest ordered. HTN (hypertension)   -elevated, B/P in 200's previously in ER but signed out AMA. Start hydralazine 10 mg TID, increase Lasix 40 mg to BID, continue with Coreg to 25 mg BID, lisinopril 40 mg daily, Norvasc 10 mg, and hydrochlorothiazide 25 mg.     Longstanding persistent atrial fibrillation   -Chads-Vas is 6, continue with Coumadin for anticoagulation. Recent EKG shows normal sinus rhythm.      CAD (coronary artery disease)   -PCI of RCA in 2019, then CABG x 3 in 2020 LIMA-LAD, SVG to RCA/PDA, SVG to OM. Had PCI of SVG to RCA/PDA with multiple stents overlapping 6 months post CABG. MUGA scan in January 2021 showed EF 36%. Stress test in March 2021 showed medium size severe intensity nonreversible perfusion defect in inferior wall. Patient has significant history of noncompliance and cocaine abuse.      Primary and secondary prevention discussed with patient:              -Low sodium cardiac diet              -exercise 30 min a day three days a week  Continue current medications Plavix, Coumadin, Lipitor, Coreg, Lasix, lisinopril, chlorthalidone, Norvasc     Ischemic cardiomyopathy   -MUGA scan in January 2021 showed EF 36%. Recent echocardiogram shows EF 45-50% which is slightly reduced. Noted to have moderate LVH, uncontrolled HTN, and new found AAA. Patient recently evaluated by emergency department for fluid overload but signed out AMA. He did receive IV lasix while hospitalized. Increase Lasix to 40 mg twice daily. Follow-up BMP in 2 weeks. Stressed the importance of low-sodium diet.     Mixed hyperlipidemia   -At or near goal No    -He is to continue current medications (Lipitor 80 mg) Hepatic function panel WNL. No abdominal pain. No myalgias.      -The nature of cardiac risk has been fully discussed with this patient. I have made him aware of his LDL target goal given his cardiovascular risk analysis. I have discussed the appropriate diet.  The need for lifelong compliance in order to reduce risk is stressed. A regular exercise program is recommended to help achieve and maintain normal body weight, fitness and improve lipid balance. A written copy of a low fat, low cholesterol diet has been given to the patient. Patient seen, interviewed and examined. Testing was reviewed. Patient is encouraged to exercise even a brisk walk for 30 minutes at least 3 to 4 times a week. Lifestyle and risk factor modificatons discussed. Various goals are discussed and questions answered. Continue current medications. Appropriate prescriptions are addressed. Questions answered and patient verbalizes understanding. Call for any problems, questions, or concerns. Pt is to follow up in 2 weeks for Cardiac management    Electronically signed by Jessica Jaquez.  TREY Thorne CNP on 2/7/2022 at 3:56 PM

## 2022-02-22 RX ORDER — CLOPIDOGREL BISULFATE 75 MG/1
TABLET ORAL
Qty: 120 TABLET | Refills: 0 | OUTPATIENT
Start: 2022-02-22

## 2022-02-24 ENCOUNTER — HOSPITAL ENCOUNTER (OUTPATIENT)
Age: 62
Discharge: HOME | End: 2022-02-24
Payer: COMMERCIAL

## 2022-02-24 ENCOUNTER — ANTI-COAG VISIT (OUTPATIENT)
Dept: PHARMACY | Age: 62
End: 2022-02-24
Payer: COMMERCIAL

## 2022-02-24 DIAGNOSIS — I48.11 LONGSTANDING PERSISTENT ATRIAL FIBRILLATION (HCC): ICD-10-CM

## 2022-02-24 DIAGNOSIS — I48.91 ATRIAL FIBRILLATION WITH RAPID VENTRICULAR RESPONSE (HCC): Primary | ICD-10-CM

## 2022-02-24 LAB
ALBUMIN SERPL-MCNC: 3.9 GM/DL (ref 3.4–5)
ALP BLD-CCNC: 123 IU/L (ref 40–128)
ALT SERPL-CCNC: 8 U/L (ref 10–40)
ANION GAP SERPL CALCULATED.3IONS-SCNC: 10 MMOL/L (ref 4–16)
AST SERPL-CCNC: 11 IU/L (ref 15–37)
BASOPHILS ABSOLUTE: 0.1 K/CU MM
BASOPHILS RELATIVE PERCENT: 1.4 % (ref 0–1)
BILIRUB SERPL-MCNC: 0.2 MG/DL (ref 0–1)
BUN BLDV-MCNC: 23 MG/DL (ref 6–23)
CALCIUM SERPL-MCNC: 9.4 MG/DL (ref 8.3–10.6)
CHLORIDE BLD-SCNC: 104 MMOL/L (ref 99–110)
CO2: 27 MMOL/L (ref 21–32)
CREAT SERPL-MCNC: 1.7 MG/DL (ref 0.9–1.3)
DIFFERENTIAL TYPE: ABNORMAL
EOSINOPHILS ABSOLUTE: 0.7 K/CU MM
EOSINOPHILS RELATIVE PERCENT: 6.5 % (ref 0–3)
GFR AFRICAN AMERICAN: 50 ML/MIN/1.73M2
GFR NON-AFRICAN AMERICAN: 41 ML/MIN/1.73M2
GLUCOSE BLD-MCNC: 91 MG/DL (ref 70–99)
HCT VFR BLD CALC: 38.9 % (ref 42–52)
HEMOGLOBIN: 12.2 GM/DL (ref 13.5–18)
IMMATURE NEUTROPHIL %: 0.4 % (ref 0–0.43)
INTERNATIONAL NORMALIZATION RATIO, POC: 2.6
LYMPHOCYTES ABSOLUTE: 2 K/CU MM
LYMPHOCYTES RELATIVE PERCENT: 19.2 % (ref 24–44)
MCH RBC QN AUTO: 28.4 PG (ref 27–31)
MCHC RBC AUTO-ENTMCNC: 31.4 % (ref 32–36)
MCV RBC AUTO: 90.5 FL (ref 78–100)
MONOCYTES ABSOLUTE: 0.9 K/CU MM
MONOCYTES RELATIVE PERCENT: 8.9 % (ref 0–4)
NUCLEATED RBC %: 0 %
PDW BLD-RTO: 14.6 % (ref 11.7–14.9)
PLATELET # BLD: 277 K/CU MM (ref 140–440)
PMV BLD AUTO: 10.9 FL (ref 7.5–11.1)
POC INR: 2.6 INDEX
POTASSIUM SERPL-SCNC: 3.9 MMOL/L (ref 3.5–5.1)
PRO-BNP: 1881 PG/ML
PROTHROMBIN TIME, POC: 31.7 SECONDS (ref 10–14.3)
RBC # BLD: 4.3 M/CU MM (ref 4.6–6.2)
SEGMENTED NEUTROPHILS ABSOLUTE COUNT: 6.6 K/CU MM
SEGMENTED NEUTROPHILS RELATIVE PERCENT: 63.6 % (ref 36–66)
SODIUM BLD-SCNC: 141 MMOL/L (ref 135–145)
TOTAL IMMATURE NEUTOROPHIL: 0.04 K/CU MM
TOTAL NUCLEATED RBC: 0 K/CU MM
TOTAL PROTEIN: 6.7 GM/DL (ref 6.4–8.2)
WBC # BLD: 10.4 K/CU MM (ref 4–10.5)

## 2022-02-24 PROCEDURE — 85025 COMPLETE CBC W/AUTO DIFF WBC: CPT

## 2022-02-24 PROCEDURE — 82785 ASSAY OF IGE: CPT

## 2022-02-24 PROCEDURE — 99211 OFF/OP EST MAY X REQ PHY/QHP: CPT

## 2022-02-24 PROCEDURE — 80053 COMPREHEN METABOLIC PANEL: CPT

## 2022-02-24 PROCEDURE — 85610 PROTHROMBIN TIME: CPT

## 2022-02-24 PROCEDURE — 36415 COLL VENOUS BLD VENIPUNCTURE: CPT

## 2022-02-24 PROCEDURE — 36416 COLLJ CAPILLARY BLOOD SPEC: CPT

## 2022-02-24 PROCEDURE — 83880 ASSAY OF NATRIURETIC PEPTIDE: CPT

## 2022-02-24 NOTE — PROGRESS NOTES
Medication Management Service  [facility]  [phone]    Visit Date: 2/24/2022   Subjective:       Jose Angel Candelario is a 62 y.o. male who presents to clinic today for anticoagulation monitoring and adjustment. Patient seen in clinic for warfarin management due to  Indication:   atrial fibrillation. INR goal: of 2.0-3.0. Duration of therapy: indefinite. Patient reports the following:   Adherent with regimen  Missed or extra doses:  None   Bleeding or thromboembolic side effects:  None  Significant medication changes:  None  Significant dietary changes: None  Significant alcohol or tobacco changes: None  Significant recent illness, disease state changes, or hospitalization:  None  Upcoming surgeries or procedures:  None  Falls: None           Assessment and Plan     PT/INR done in office per protocol. INR today is 2.6, therapeutic. Plan: Will continue current regimen of warfarin 4mg daily except 6mg on Sundays. Recheck INR in 4 week(s). Patient verbalized understanding of dosing directions and information discussed. Dosing schedule given to patient including phone number, appointment date, and time. Progress note sent to referring office. Patient acknowledges working in consult agreement with pharmacist as referred by his/her physician.       Electronically signed by Buster Fleischer, [facility] on 2/24/22 at 1:05  [facility] Intervention Detail:    Total # of Interventions Recommended:    Total # of Interventions Accepted:    Time Spent (min): 15

## 2022-02-25 ENCOUNTER — OFFICE VISIT (OUTPATIENT)
Dept: CARDIOLOGY CLINIC | Age: 62
End: 2022-02-25
Payer: COMMERCIAL

## 2022-02-25 VITALS
SYSTOLIC BLOOD PRESSURE: 134 MMHG | DIASTOLIC BLOOD PRESSURE: 82 MMHG | BODY MASS INDEX: 32.6 KG/M2 | HEIGHT: 75 IN | WEIGHT: 262.2 LBS | HEART RATE: 56 BPM

## 2022-02-25 DIAGNOSIS — I25.5 ISCHEMIC CARDIOMYOPATHY: Primary | ICD-10-CM

## 2022-02-25 PROCEDURE — 4004F PT TOBACCO SCREEN RCVD TLK: CPT | Performed by: INTERNAL MEDICINE

## 2022-02-25 PROCEDURE — G8417 CALC BMI ABV UP PARAM F/U: HCPCS | Performed by: INTERNAL MEDICINE

## 2022-02-25 PROCEDURE — 99214 OFFICE O/P EST MOD 30 MIN: CPT | Performed by: INTERNAL MEDICINE

## 2022-02-25 PROCEDURE — G8427 DOCREV CUR MEDS BY ELIG CLIN: HCPCS | Performed by: INTERNAL MEDICINE

## 2022-02-25 PROCEDURE — 3017F COLORECTAL CA SCREEN DOC REV: CPT | Performed by: INTERNAL MEDICINE

## 2022-02-25 PROCEDURE — G8484 FLU IMMUNIZE NO ADMIN: HCPCS | Performed by: INTERNAL MEDICINE

## 2022-02-25 NOTE — PATIENT INSTRUCTIONS
Please be informed that if you contact our office outside of normal business hours the physician on call cannot help with any scheduling or rescheduling issues, procedure instruction questions or any type of medication issue. We advise you for any urgent/emergency that you go to the nearest emergency room! PLEASE CALL OUR OFFICE DURING NORMAL BUSINESS HOURS    Monday - Friday   8 am to 5 pm    [place]: [address]: [phone]    [place]:  [address] Laboratory Locations - No appointment necessary. Sites open Monday to Friday. Call your preferred location for test preparation, business hours and other information you need. [employer] [facility] [employer]. [place] [facility] [address] [place], [address] [facility]  Phone: [phone] [facility] [address] [facility]  [address].   Chandana Kaiser, [address] [facility]  Phone: [phone]

## 2022-02-25 NOTE — PROGRESS NOTES
Lito Bautista MD        OFFICE  FOLLOWUP NOTE    Chief complaints: patient is here for management of CAD s/p CABG, DM, HTN, DYSLPIDEMIA, AFIB, COVID 19    Subjective: patient has leg swelling    HPI Salo Lion is a 62 y. o.year old who  has a past medical history of Abnormal nuclear stress test, Atrial fibrillation (HCC), CAD (coronary artery disease), CHF (congestive heart failure) ([facility] [address] COPD (chronic obstructive pulmonary disease) ([facility] [address] Decreased cardiac ejection fraction, Diabetes mellitus ([facility] [address] GERD (gastroesophageal reflux disease), H/O cardiovascular stress test, H/O cardiovascular stress test, H/O cardiovascular stress test, H/O cardiovascular stress test, H/O Doppler ultrasound, H/O echocardiogram, H/O percutaneous left heart catheterization, Heart imaging, History of coronary artery stent placement, History of exercise stress test, History of Holter monitoring, History of MRI of brain and brain stem, History of nuclear stress test, Hx of Doppler echocardiogram, Hyperlipidemia, Hypertension, Kidney disease, S/P cardiac catheterization, SOB (shortness of breath), Status post angioplasty with stent, and Wears dentures.  and presents for management of CAD s/p CABG, DM, HTN, DYSLPIDEMIA, AFIB, COVID 19 which are well controlled    He had covid 19, and in Jan singed out AMA from hospital with blood pressure uncontrolled, his norvasc was increased to 10 mg in Jan and in feb after discharge hydralaziine 10mg TID was started  Current Outpatient Medications   Medication Sig Dispense Refill    albuterol (PROVENTIL) (2.5 MG/3ML) 0.083% nebulizer solution INHALE CONTENTS OF 1 VIAL BY NEBULIZER EVERY 6 HOURS AS NEEDED FOR WHEEZING      ipratropium-albuterol (DUONEB) 0.5-2.5 (3) MG/3ML SOLN nebulizer solution Inhale 3 mLs into the lungs 4 times daily 360 mL 5    guaiFENesin (MUCINEX) 600 MG extended release tablet Take 1 tablet by mouth 2 times daily 60 tablet 5    furosemide (LASIX) 40 MG tablet Take 1 tablet by mouth 2 times daily 90 tablet 3    hydrALAZINE (APRESOLINE) 10 MG tablet Take 1 tablet by mouth 3 times daily 90 tablet 3    nitroGLYCERIN (NITROSTAT) 0.4 MG SL tablet Place 1 tablet under the tongue every 5 minutes as needed for Chest pain 25 tablet 3    amLODIPine (NORVASC) 10 MG tablet Take 1 tablet by mouth daily 30 tablet 3    warfarin (JANTOVEN) 4 MG tablet TAKE 1 TABLET BY MOUTH EVERY DAY, EXCEPT TAKE 1 AND 1/2 TABLETS ON SUNDAYS OR AS DIRECTED BY CLINIC 100 tablet 1    carvedilol (COREG) 25 MG tablet Take 1 tablet by mouth 2 times daily 60 tablet 5    albuterol sulfate HFA (PROVENTIL HFA) 108 (90 Base) MCG/ACT inhaler Inhale 2 puffs into the lungs every 6 hours as needed for Wheezing or Shortness of Breath 18 g 0    metFORMIN (GLUCOPHAGE) 500 MG tablet Take 1,000 mg by mouth 2 times daily      atorvastatin (LIPITOR) 80 MG tablet Take 1 tablet by mouth nightly 30 tablet 3    glipiZIDE (GLUCOTROL) 10 MG tablet Take 1 tablet by mouth every morning (before breakfast) 60 tablet 3    linagliptin (TRADJENTA) 5 MG tablet Take 1 tablet by mouth daily 30 tablet 3     No current facility-administered medications for this visit. Allergies: Hydromorphone  Past Medical History:   Diagnosis Date    Abnormal nuclear stress test     Atrial fibrillation ([facility] [address] 11/2013    CAD (coronary artery disease)     Follows with Dr. Sadia Braxton CHF (congestive heart failure) ([facility] [address]     COPD (chronic obstructive pulmonary disease) ([facility] [address]     Follows with PCP    Decreased cardiac ejection fraction     Diabetes mellitus ([facility] [address]     GERD (gastroesophageal reflux disease)     H/O cardiovascular stress test 11/20/13, 3/20/2013    11/13-Observed defect is consistent with diaphragmatic attenuation. EF 58%. 3/13 -EF 51%. No ischemia. Normal Study.     H/O cardiovascular stress test 06/08/2017    EF 50% normal study    H/O cardiovascular stress test 06/17/2019    Normal NM    H/O cardiovascular stress test 03/02/2021 depressed lvedf increased edv myocardial perfusion abnormal stress test    H/O Doppler ultrasound 10/14/2010    10/2010-Bilateral venous doppler of lower extremies- No DVTs.  H/O echocardiogram 06/26/2019    Left ventricular systolic function is normal with an ejection fraction of 50-55%. Mild concentric left ventricular hypertrophy. Grade I diastolic dysfunction. No significant valvular disease noted. No evidence of pericardial effusion. Essentially unremarkable echo.  H/O percutaneous left heart catheterization 12/22/2015    No evidence of hemodynamically significant coronary artery disease    Heart imaging 04/23/2020    muga - ef 58%    History of coronary artery stent placement 11/13/2013 11/13 -RCA - 3 stents placed    History of exercise stress test 06/08/2017    treadmill    History of Holter monitoring 01/04/2016    predominant rhythm sinus    History of MRI of brain and brain stem 06/04/2020    No evidence of an acute infarct, mod chronic microvascular white matter ischemic disease with associated cerebral parenchymal volume loss    History of nuclear stress test 02/24/2020    EF 38%,Myocardial perfusion scan shows moderate size, severe intensity, non  reversible perfusion defect in inferior wall.  Hx of Doppler echocardiogram 01/26/2022    EF 45-50% Mod LV hypertrophy. Grade 1 diastolic dysfunction. Mod MR and TR. Dilation of the aortic root and ascending aorta.  Hyperlipidemia     Hypertension     Follows with PCP    Kidney disease     Dr. Kortney Bocanegra S/P cardiac catheterization 11/13/2013 11/13/2013-EF 55%, distal LAD mild disease,CX stent patent,but distal to stent 50% stenosis. RCA severe disease.     SOB (shortness of breath)     Status post angioplasty with stent     Wears dentures     full upper denture     Past Surgical History:   Procedure Laterality Date    APPENDECTOMY      BIOPSY / LIGATION TEMPORAL ARTERY Left 8/17/2020    LEFT TEMPORAL ARTERY BIOPSY LIGATION performed by Starr Robles MD at [address]  11/13/2013 11/13/2013-EF 55%, distal LAD mild disease,CX stent patent,but distal to stent 50% stenosis. RCA severe disease.     CARDIAC SURGERY  07/03/2020    CABG X 3 LIMA-OM-PDA-LAD, Maze, LT Atrial Appendage clipping    COLONOSCOPY      CORONARY ANGIOPLASTY WITH STENT PLACEMENT      4 stents- RCA X3; CX X1    CORONARY ANGIOPLASTY WITH STENT PLACEMENT  11/13/2013 11/13/2013 -3 stents placed to RCA- Dr Fabiana Ott N/A 7/3/2020    INTRAOPERATIVE ISMAEL, CABG X3   WITH LIMA  AND LEFT LEG ENDOVEIN HARVEST, INDUCED HYPOTHERMIA, MAZE BIPOLAR AND CRYO PROCEDURE, LEFT ATRIAL APPENDAGE CLIPPING performed by Hilda Dailey MD at [address] [facility]      all teeth extracted    FEMUR FRACTURE SURGERY  1984    MANDIBLE FRACTURE SURGERY  1984     Family History   Problem Relation Age of Onset   [facility] Cancer Mother     Diabetes Mother     Heart Disease Mother     High Blood Pressure Mother     Stroke Mother     Cancer Father     Heart Disease Father     High Blood Pressure Father     Stroke Father     Cancer Brother      Social History     Tobacco Use    Smoking status: Current Every Day Smoker     Packs/day: 1.00     Years: 46.00     Pack years: 46.00     Types: Cigarettes     Start date: 1974    Smokeless tobacco: Never Used    Tobacco comment: Smokes approx 1 pack per day   Substance Use Topics    Alcohol use: No     Comment: caffeine 6 bottles of unsweet tea a day      [unfilled]  Review of Systems:   · Constitutional: No Fever or Weight Loss   · Eyes: No Decreased Vision  · ENT: No Headaches, Hearing Loss or Vertigo  · Cardiovascular: No chest pain, dyspnea on exertion, palpitations or loss of consciousness  · Respiratory: No cough or wheezing    · Gastrointestinal: No abdominal pain, appetite loss, blood in stools, constipation, diarrhea or heartburn  · Genitourinary: No dysuria, trouble voiding, or hematuria  · Musculoskeletal:  No gait disturbance, weakness or joint complaints  · Integumentary: No rash or pruritis  · Neurological: No TIA or stroke symptoms  · Psychiatric: No anxiety or depression  · Endocrine: No malaise, fatigue or temperature intolerance  · Hematologic/Lymphatic: No bleeding problems, blood clots or swollen lymph nodes  · Allergic/Immunologic: No nasal congestion or hives  All systems negative except as marked. Objective:  /82   Pulse 56   Ht 6' 3\" (1.905 m)   Wt 262 lb 3.2 oz (118.9 kg)   BMI 32.77 kg/m²   Wt Readings from Last 3 Encounters:   02/25/22 262 lb 3.2 oz (118.9 kg)   02/21/22 266 lb (120.7 kg)   02/07/22 272 lb (123.4 kg)     Body mass index is 32.77 kg/m². GENERAL - Alert, oriented, pleasant, in no apparent distress,normal grooming  HEENT - pupils are intact, cornea intact, conjunctive normal color, ears are normal in exam,  Neck - Supple. No jugular venous distention noted. No carotid bruits, no apical -carotid delay  Respiratory:  Normal breath sounds, No respiratory distress, No wheezing, No chest tenderness. ,no use of accessory muscles, diaphragm movement is normal  Cardiovascular: (PMI) apex non displaced,no lifts no thrills, no s3,no s4, Normal heart rate, Normal rhythm, No murmurs, No rubs, No gallops. Carotid arteries pulse and amplitude are normal no bruit, no abdominal bruit noted ( normal abdominal aorta ausculation),   Extremities - No cyanosis, clubbing, or significant edema, no varicose veins    Abdomen - No masses, tenderness, or organomegaly, no hepato-splenomegally, no bruits  Musculoskeletal - No significant edema, no kyphosis or scoliosis, no deformity in any extremity noted, muscle strength and tone are normal  Skin: no ulcer,no scar,no stasis dermatitis   Neurologic - alert oriented times 3,Cranial nerves II through XII are grossly intact. There were no gross focal neurologic abnormalities.    Psychiatric: normal mood and affect    Lab Results   Component Value Date    CKTOTAL 34 01/29/2022    CKMB 2.4 03/10/2013    TROPONINI 0.014 06/09/2014    TROPONINI 0.015 12/03/2011     BNP:    Lab Results   Component Value Date    BNP 17 03/09/2013     PT/INR:  No results found for: PTINR  Lab Results   Component Value Date    LABA1C 7.8 (H) 12/30/2020    LABA1C 8.9 (H) 06/29/2020     Lab Results   Component Value Date    CHOL 153 01/06/2020    TRIG 54 01/06/2020    HDL 43 01/06/2020    LDLDIRECT 111 (H) 01/06/2020     Lab Results   Component Value Date    ALT 8 (L) 02/24/2022    AST 11 (L) 02/24/2022     TSH:  No results found for: TSH    Impression:  Simba Davalos is a 62 y. o.year old who  has a past medical history of Abnormal nuclear stress test, Atrial fibrillation (HCC), CAD (coronary artery disease), CHF (congestive heart failure) ([facility] [address] COPD (chronic obstructive pulmonary disease) ([facility] [address] Decreased cardiac ejection fraction, Diabetes mellitus ([facility] [address] GERD (gastroesophageal reflux disease), H/O cardiovascular stress test, H/O cardiovascular stress test, H/O cardiovascular stress test, H/O cardiovascular stress test, H/O Doppler ultrasound, H/O echocardiogram, H/O percutaneous left heart catheterization, Heart imaging, History of coronary artery stent placement, History of exercise stress test, History of Holter monitoring, History of MRI of brain and brain stem, History of nuclear stress test, Hx of Doppler echocardiogram, Hyperlipidemia, Hypertension, Kidney disease, S/P cardiac catheterization, SOB (shortness of breath), Status post angioplasty with stent, and Wears dentures. and presents with     Plan:  1. HTN: controlled now, contineu hydralazine, norvasc 10mg daily, continue coreg. lisinopril, HCTZ  2. Leg swelling: DVT was ruled out, it could be combination of venous reflux disease and norvasc use, does not appear to have over CHF, SHOWED HIM AMAZON site compression wraps also,. 3. COVID 19: RECOVERED LAST MONTH, HE WILL NEED BOOSTER VACCINE.   4. CHEST PAIN: resolved,:s/p PCI of SVG TO RCA Graft at the end F December 2020,  WITH HISTORY OF  CABG [facility] [place] [facility] LVEF was 36%, however ECHO was 50%, Continue aspirin , continue statins, betablockers,, continue LISINOPRIL TO 20MG, BB, POST CABG EKG IS NSR. continue PLAVIX,  CONTINUE PLAVIX AND COUMADIN,  5. He is not working. 6. Leg pain: it does not sound like intermittent claudication as it occurs with laying down and not with walking  7. DM: stable continue glipizide  8. Paroxysmal afib: continue COUAMDIN, off amidarone  9. Dyslipidemia: continue statins  All labs, medications and tests reviewed, continue all other medications of all above medical condition listed as is.   All labs, medications and tests reviewed, continue all other medications of all above medical condition listed as is.     [unfilled]

## 2022-03-03 LAB — IMMUNOGLOBULIN E: 858 KU/L

## 2022-03-24 ENCOUNTER — ANTI-COAG VISIT (OUTPATIENT)
Dept: PHARMACY | Age: 62
End: 2022-03-24
Payer: COMMERCIAL

## 2022-03-24 DIAGNOSIS — I48.91 ATRIAL FIBRILLATION WITH RAPID VENTRICULAR RESPONSE (HCC): Primary | ICD-10-CM

## 2022-03-24 DIAGNOSIS — I48.11 LONGSTANDING PERSISTENT ATRIAL FIBRILLATION (HCC): ICD-10-CM

## 2022-03-24 LAB
INTERNATIONAL NORMALIZATION RATIO, POC: 3.1
POC INR: 3.1 INDEX
PROTHROMBIN TIME, POC: 37 SECONDS (ref 10–14.3)

## 2022-03-24 PROCEDURE — 99211 OFF/OP EST MAY X REQ PHY/QHP: CPT

## 2022-03-24 PROCEDURE — 36416 COLLJ CAPILLARY BLOOD SPEC: CPT

## 2022-03-24 PROCEDURE — 85610 PROTHROMBIN TIME: CPT

## 2022-03-24 NOTE — PROGRESS NOTES
Medication Management Service  [facility][1]  [phone]    Visit Date: 3/24/2022   Subjective:       Hugh Degroot is a 62 y.o. male who presents to clinic today for anticoagulation monitoring and adjustment. Patient seen in clinic for warfarin management due to  Indication:   atrial fibrillation. INR goal: of 2.0-3.0. Duration of therapy: indefinite. Patient reports the following:   Adherent with regimen  Missed or extra doses:  None   Bleeding or thromboembolic side effects:  None  Significant medication changes:  Medrol ends tomorrow  Significant dietary changes: None  Significant alcohol or tobacco changes: None  Significant recent illness, disease state changes, or hospitalization:  None  Upcoming surgeries or procedures:  None  Falls: None           Assessment and Plan     PT/INR done in office per protocol. INR today is 3.1, supratherapeutic, likely due to effects of Medrol pack that ends tomorrow. Plan: Will continue current regimen of warfarin 4mg daily except 6mg on Sundays. Recheck INR in 4 week(s). Patient verbalized understanding of dosing directions and information discussed. Dosing schedule given to patient including phone number, appointment date, and time. Progress note sent to referring office. Patient acknowledges working in consult agreement with pharmacist as referred by his/her physician.       Electronically signed by Salud Loving, [facility] on 3/24/22 at 10:05 AM EDT    For Pharmacy [address] Intervention Detail:    Total # of Interventions Recommended:    Total # of Interventions Accepted:    Time Spent (min): 15

## 2022-04-11 RX ORDER — CLOPIDOGREL BISULFATE 75 MG/1
TABLET ORAL
Qty: 120 TABLET | Refills: 0 | OUTPATIENT
Start: 2022-04-11

## 2022-04-12 RX ORDER — CLOPIDOGREL BISULFATE 75 MG/1
TABLET ORAL
Qty: 120 TABLET | Refills: 0 | OUTPATIENT
Start: 2022-04-12

## 2022-04-21 ENCOUNTER — ANTI-COAG VISIT (OUTPATIENT)
Dept: PHARMACY | Age: 62
End: 2022-04-21
Payer: COMMERCIAL

## 2022-04-21 DIAGNOSIS — I48.91 ATRIAL FIBRILLATION WITH RAPID VENTRICULAR RESPONSE (HCC): Primary | ICD-10-CM

## 2022-04-21 DIAGNOSIS — I48.11 LONGSTANDING PERSISTENT ATRIAL FIBRILLATION (HCC): ICD-10-CM

## 2022-04-21 LAB
INTERNATIONAL NORMALIZATION RATIO, POC: 2.6
POC INR: 2.6 INDEX
PROTHROMBIN TIME, POC: 31.4 SECONDS (ref 10–14.3)

## 2022-04-21 PROCEDURE — 36416 COLLJ CAPILLARY BLOOD SPEC: CPT

## 2022-04-21 PROCEDURE — 99211 OFF/OP EST MAY X REQ PHY/QHP: CPT

## 2022-04-21 PROCEDURE — 85610 PROTHROMBIN TIME: CPT

## 2022-04-21 NOTE — PROGRESS NOTES
Medication Management Service  [facility]  [phone]    Visit Date: 4/21/2022   Subjective:       Garfield Whipple is a 62 y.o. male who presents to clinic today for anticoagulation monitoring and adjustment. Patient seen in clinic for warfarin management due to  Indication:   atrial fibrillation. INR goal: of 2.0-3.0. Duration of therapy: indefinite. Patient reports the following:   Adherent with regimen  Missed or extra doses:  None   Bleeding or thromboembolic side effects:  None  Significant medication changes:  None  Significant dietary changes: None  Significant alcohol or tobacco changes: None  Significant recent illness, disease state changes, or hospitalization:  None  Upcoming surgeries or procedures:  None  Falls: None           Assessment and Plan     PT/INR done in office per protocol. INR today is 2.6, therapeutic. Plan: Will continue current regimen of warfarin 4mg daily except 6mg on Sundays. Recheck INR in 4 week(s). Patient verbalized understanding of dosing directions and information discussed. Dosing schedule given to patient including phone number, appointment date, and time. Progress note sent to referring office. Patient acknowledges working in consult agreement with pharmacist as referred by his/her physician.       Electronically signed by Deonte Sainz [facility] on 4/21/22 at 11:31 AM EDT    For Pharmacy Admin Tracking Only     Intervention Detail:    Total # of Interventions Recommended:    Total # of Interventions Accepted:    Time Spent (min): 15

## 2022-05-17 RX ORDER — AMLODIPINE BESYLATE 10 MG/1
TABLET ORAL
Qty: 30 TABLET | Refills: 5 | Status: SHIPPED | OUTPATIENT
Start: 2022-05-17 | End: 2022-07-06

## 2022-05-19 ENCOUNTER — ANTI-COAG VISIT (OUTPATIENT)
Dept: PHARMACY | Age: 62
End: 2022-05-19
Payer: COMMERCIAL

## 2022-05-19 DIAGNOSIS — I48.91 ATRIAL FIBRILLATION WITH RAPID VENTRICULAR RESPONSE (HCC): Primary | ICD-10-CM

## 2022-05-19 DIAGNOSIS — I48.11 LONGSTANDING PERSISTENT ATRIAL FIBRILLATION (HCC): ICD-10-CM

## 2022-05-19 PROCEDURE — 85610 PROTHROMBIN TIME: CPT

## 2022-05-19 PROCEDURE — 36416 COLLJ CAPILLARY BLOOD SPEC: CPT

## 2022-05-19 PROCEDURE — 99212 OFFICE O/P EST SF 10 MIN: CPT

## 2022-05-19 RX ORDER — POTASSIUM CHLORIDE 20 MEQ/1
TABLET, EXTENDED RELEASE ORAL
COMMUNITY
Start: 2022-04-19 | End: 2022-10-17

## 2022-05-19 RX ORDER — CLOPIDOGREL BISULFATE 75 MG/1
TABLET ORAL
COMMUNITY
Start: 2022-04-13

## 2022-05-19 RX ORDER — POLYETHYLENE GLYCOL 3350 17 G/17G
34 POWDER, FOR SOLUTION ORAL 2 TIMES DAILY
COMMUNITY
Start: 2022-05-13 | End: 2022-06-12

## 2022-05-19 RX ORDER — CHLORTHALIDONE 25 MG/1
TABLET ORAL
COMMUNITY
Start: 2022-04-27 | End: 2022-05-19

## 2022-05-19 RX ORDER — LISINOPRIL 20 MG/1
TABLET ORAL
COMMUNITY
Start: 2022-04-27

## 2022-05-19 RX ORDER — PREGABALIN 150 MG/1
150 CAPSULE ORAL 2 TIMES DAILY
COMMUNITY
Start: 2022-04-27

## 2022-05-19 RX ORDER — METOLAZONE 2.5 MG/1
TABLET ORAL
COMMUNITY
Start: 2022-05-13 | End: 2022-06-08 | Stop reason: SDUPTHER

## 2022-05-19 RX ORDER — BUMETANIDE 2 MG/1
TABLET ORAL
Status: ON HOLD | COMMUNITY
Start: 2022-05-13 | End: 2022-07-17 | Stop reason: SDUPTHER

## 2022-05-19 RX ORDER — PANTOPRAZOLE SODIUM 40 MG/1
TABLET, DELAYED RELEASE ORAL
COMMUNITY
Start: 2022-04-25

## 2022-05-19 RX ORDER — PSEUDOEPHEDRINE HCL 30 MG
100 TABLET ORAL 2 TIMES DAILY
COMMUNITY
Start: 2022-05-13 | End: 2022-06-12

## 2022-05-19 RX ORDER — CARVEDILOL 3.12 MG/1
TABLET ORAL
COMMUNITY
Start: 2022-05-13 | End: 2022-06-08 | Stop reason: SDUPTHER

## 2022-05-19 RX ORDER — LIDOCAINE 4 G/100G
PATCH TOPICAL
COMMUNITY
Start: 2022-05-07 | End: 2022-10-17

## 2022-05-19 NOTE — PROGRESS NOTES
Medication Management Service  [facility]  [phone]    Visit Date: 5/19/2022   Subjective:       Arpan Mayers is a 65 y.o. male who presents to clinic today for anticoagulation monitoring and adjustment. Patient seen in clinic for warfarin management due to  Indication:   atrial fibrillation. INR goal: of 2.0-3.0. Duration of therapy: indefinite. Patient reports the following:   Adherent with regimen  Missed or extra doses:  None   Bleeding or thromboembolic side effects:  None  Significant medication changes:  None  Significant dietary changes: None  Significant alcohol or tobacco changes: None  Significant recent illness, disease state changes, or hospitalization:  Hospitalized at [address] surgeries or procedures:  None  Falls: None           Assessment and Plan     PT/INR done in office per protocol. INR today is 1.8, subtherapeutic. Patient was hospitalized 5/7-5/13 at [facility]. Patient states only changes are he is not taking the muscle relaxer and new diuretic was  started  Updated med list after patient left with discharge list from [facility]. He may start having meals delivered- he will call if it starts. Plan:  Take warfarin 6mg tomorrow 5/20/22 then will continue current regimen of warfarin 4mg daily except 6mg on Sundays. Recheck INR in 4 week(s). Patient refuses to return in 2 weeks and states he will call if his diet changes. Patient verbalized understanding of dosing directions and information discussed. Dosing schedule given to patient including phone number, appointment date, and time. Progress note sent to referring office. Patient acknowledges working in consult agreement with pharmacist as referred by his/her physician.       Electronically signed by Kaela Ibrahim [facility] on 5/19/22 at 10:29 AM EDT    For Pharmacy Admin Tracking Only     Intervention Detail: Dose Adjustment: 1, reason: Therapy Optimization   Total # of Interventions Recommended: 1   Total # of Interventions Accepted: 1   Time Spent (min): 15

## 2022-05-20 ENCOUNTER — HOSPITAL ENCOUNTER (OUTPATIENT)
Age: 62
Discharge: HOME | End: 2022-05-20
Payer: COMMERCIAL

## 2022-05-20 ENCOUNTER — OFFICE VISIT (OUTPATIENT)
Dept: CARDIOLOGY CLINIC | Age: 62
End: 2022-05-20
Payer: COMMERCIAL

## 2022-05-20 VITALS
DIASTOLIC BLOOD PRESSURE: 82 MMHG | HEIGHT: 75 IN | HEART RATE: 60 BPM | SYSTOLIC BLOOD PRESSURE: 124 MMHG | WEIGHT: 283 LBS | BODY MASS INDEX: 35.19 KG/M2

## 2022-05-20 DIAGNOSIS — M79.89 LEG SWELLING: ICD-10-CM

## 2022-05-20 DIAGNOSIS — M79.89 LEG SWELLING: Primary | ICD-10-CM

## 2022-05-20 LAB
ANION GAP SERPL CALCULATED.3IONS-SCNC: 12 MMOL/L (ref 4–16)
BUN BLDV-MCNC: 33 MG/DL (ref 6–23)
CALCIUM SERPL-MCNC: 9.2 MG/DL (ref 8.3–10.6)
CHLORIDE BLD-SCNC: 102 MMOL/L (ref 99–110)
CO2: 30 MMOL/L (ref 21–32)
CREAT SERPL-MCNC: 1.6 MG/DL (ref 0.9–1.3)
GFR AFRICAN AMERICAN: 53 ML/MIN/1.73M2
GFR NON-AFRICAN AMERICAN: 44 ML/MIN/1.73M2
GLUCOSE BLD-MCNC: 184 MG/DL (ref 70–99)
POTASSIUM SERPL-SCNC: 3.7 MMOL/L (ref 3.5–5.1)
SODIUM BLD-SCNC: 144 MMOL/L (ref 135–145)

## 2022-05-20 PROCEDURE — 36415 COLL VENOUS BLD VENIPUNCTURE: CPT

## 2022-05-20 PROCEDURE — 4004F PT TOBACCO SCREEN RCVD TLK: CPT | Performed by: INTERNAL MEDICINE

## 2022-05-20 PROCEDURE — 3017F COLORECTAL CA SCREEN DOC REV: CPT | Performed by: INTERNAL MEDICINE

## 2022-05-20 PROCEDURE — 99214 OFFICE O/P EST MOD 30 MIN: CPT | Performed by: INTERNAL MEDICINE

## 2022-05-20 PROCEDURE — 80048 BASIC METABOLIC PNL TOTAL CA: CPT

## 2022-05-20 PROCEDURE — G8427 DOCREV CUR MEDS BY ELIG CLIN: HCPCS | Performed by: INTERNAL MEDICINE

## 2022-05-20 PROCEDURE — G8417 CALC BMI ABV UP PARAM F/U: HCPCS | Performed by: INTERNAL MEDICINE

## 2022-05-20 NOTE — PROGRESS NOTES
Panchito Denton MD        OFFICE  FOLLOWUP NOTE    Chief complaints: patient is here for management of CAD s/p CABG, DM, HTN, DYSLPIDEMIA, AFIB, COVID 23    F/u after [facility] discharge for leg swelling ? CHF, stayed for one week in hospital had echo LVEF 45-50%, dvt ruled out, CXR SHOWED Mild congestion, his lasix was changed to bumex and zaroxolyn    Subjective: patient feels better, no chest pain, no shortness of breath, no dizziness, no palpitations    HPI Reta Hidalgo is a 65 y. o.year old who  has a past medical history of Abnormal nuclear stress test, Atrial fibrillation (HCC), CAD (coronary artery disease), CHF (congestive heart failure) ([facility] [address] COPD (chronic obstructive pulmonary disease) ([facility] [address] Decreased cardiac ejection fraction, Diabetes mellitus ([facility] [address] GERD (gastroesophageal reflux disease), H/O cardiovascular stress test, H/O cardiovascular stress test, H/O cardiovascular stress test, H/O cardiovascular stress test, H/O Doppler ultrasound, H/O echocardiogram, H/O percutaneous left heart catheterization, Heart imaging, History of coronary artery stent placement, History of exercise stress test, History of Holter monitoring, History of MRI of brain and brain stem, History of nuclear stress test, Hx of Doppler echocardiogram, Hyperlipidemia, Hypertension, Kidney disease, S/P cardiac catheterization, SOB (shortness of breath), Status post angioplasty with stent, and Wears dentures. and presents for management of CAD s/p CABG, DM, HTN, DYSLPIDEMIA, AFIB, COVID 19, which are well controlled      Current Outpatient Medications   Medication Sig Dispense Refill    clopidogrel (PLAVIX) 75 MG tablet TAKE 1 TABLET BY MOUTH DAILY.       pantoprazole (PROTONIX) 40 MG tablet TAKE 1 TABLET BY MOUTH EVERY DAY      potassium chloride (KLOR-CON M) 20 MEQ extended release tablet TAKE 2 TABLETS BY MOUTH EVERY DAY      lisinopril (PRINIVIL;ZESTRIL) 20 MG tablet TAKE 1 TABLET BY MOUTH DAILY      pregabalin (LYRICA) 150 MG capsule Take 1 capsule (150 mg total) by mouth 2 times a day      carvedilol (COREG) 3.125 MG tablet TAKE 1 TABLET BY MOUTH TWO TIMES A DAY      docusate (COLACE, DULCOLAX) 100 MG CAPS Take 100 mg by mouth 2 times daily      polyethylene glycol (GLYCOLAX) 17 GM/SCOOP powder Take 34 g by mouth 2 times daily      metOLazone (ZAROXOLYN) 2.5 MG tablet Take 1 tablet (2.5 mg total) by mouth every Monday, Wednesday and Friday.       bumetanide (BUMEX) 2 MG tablet TAKE 1 TABLET BY MOUTH TWO TIMES A DAY      LIDOCAINE PAIN RELIEF 4 % external patch APPLY 1 PATCH EXTERNALLY EVERY DAY      amLODIPine (NORVASC) 10 MG tablet TAKE 1 TABLET BY MOUTH EVERY DAY 30 tablet 5    ipratropium-albuterol (DUONEB) 0.5-2.5 (3) MG/3ML SOLN nebulizer solution Inhale 3 mLs into the lungs 4 times daily 360 mL 5    guaiFENesin (MUCINEX) 600 MG extended release tablet Take 1 tablet by mouth 2 times daily 60 tablet 5    furosemide (LASIX) 40 MG tablet Take 1 tablet by mouth 2 times daily 90 tablet 3    nitroGLYCERIN (NITROSTAT) 0.4 MG SL tablet Place 1 tablet under the tongue every 5 minutes as needed for Chest pain 25 tablet 3    warfarin (JANTOVEN) 4 MG tablet TAKE 1 TABLET BY MOUTH EVERY DAY, EXCEPT TAKE 1 AND 1/2 TABLETS ON SUNDAYS OR AS DIRECTED BY CLINIC 100 tablet 1    albuterol sulfate HFA (PROVENTIL HFA) 108 (90 Base) MCG/ACT inhaler Inhale 2 puffs into the lungs every 6 hours as needed for Wheezing or Shortness of Breath 18 g 0    metFORMIN (GLUCOPHAGE) 500 MG tablet Take 1,000 mg by mouth 2 times daily      atorvastatin (LIPITOR) 80 MG tablet Take 1 tablet by mouth nightly 30 tablet 3    glipiZIDE (GLUCOTROL) 10 MG tablet Take 1 tablet by mouth every morning (before breakfast) (Patient taking differently: Take 10 mg by mouth 2 times daily (before meals) ) 60 tablet 3    hydrALAZINE (APRESOLINE) 10 MG tablet Take 1 tablet by mouth 3 times daily (Patient not taking: Reported on 5/19/2022) 90 tablet 3     No current facility-administered medications for this visit. Allergies: Hydromorphone  Past Medical History:   Diagnosis Date    Abnormal nuclear stress test     Atrial fibrillation ([facility] [address] 11/2013    CAD (coronary artery disease)     Follows with Dr. Erica Lawton CHF (congestive heart failure) ([facility] [address]     COPD (chronic obstructive pulmonary disease) ([facility] [address]     Follows with PCP    Decreased cardiac ejection fraction     Diabetes mellitus ([facility] [address]     GERD (gastroesophageal reflux disease)     H/O cardiovascular stress test 11/20/13, 3/20/2013    11/13-Observed defect is consistent with diaphragmatic attenuation. EF 58%. 3/13 -EF 51%. No ischemia. Normal Study.  H/O cardiovascular stress test 06/08/2017    EF 50% normal study    H/O cardiovascular stress test 06/17/2019    Normal NM    H/O cardiovascular stress test 03/02/2021    depressed lvedf increased edv myocardial perfusion abnormal stress test    H/O Doppler ultrasound 10/14/2010    10/2010-Bilateral venous doppler of lower extremies- No DVTs.  H/O echocardiogram 06/26/2019    Left ventricular systolic function is normal with an ejection fraction of 50-55%. Mild concentric left ventricular hypertrophy. Grade I diastolic dysfunction. No significant valvular disease noted. No evidence of pericardial effusion. Essentially unremarkable echo.     H/O percutaneous left heart catheterization 12/22/2015    No evidence of hemodynamically significant coronary artery disease    Heart imaging 04/23/2020    muga - ef 58%    History of coronary artery stent placement 11/13/2013 11/13 -RCA - 3 stents placed    History of exercise stress test 06/08/2017    treadmill    History of Holter monitoring 01/04/2016    predominant rhythm sinus    History of MRI of brain and brain stem 06/04/2020    No evidence of an acute infarct, mod chronic microvascular white matter ischemic disease with associated cerebral parenchymal volume loss    History of nuclear stress test 02/24/2020    EF 38%,Myocardial perfusion scan shows moderate size, severe intensity, non  reversible perfusion defect in inferior wall.  Hx of Doppler echocardiogram 01/26/2022    EF 45-50% Mod LV hypertrophy. Grade 1 diastolic dysfunction. Mod MR and TR. Dilation of the aortic root and ascending aorta.  Hyperlipidemia     Hypertension     Follows with PCP    Kidney disease     Dr. Mary Hamilton S/P cardiac catheterization 11/13/2013 11/13/2013-EF 55%, distal LAD mild disease,CX stent patent,but distal to stent 50% stenosis. RCA severe disease.  SOB (shortness of breath)     Status post angioplasty with stent     Wears dentures     full upper denture     Past Surgical History:   Procedure Laterality Date    APPENDECTOMY      BIOPSY / LIGATION TEMPORAL ARTERY Left 8/17/2020    LEFT TEMPORAL ARTERY BIOPSY LIGATION performed by Leobardo Arnold MD at [place] [address]  11/13/2013 11/13/2013-EF 55%, distal LAD mild disease,CX stent patent,but distal to stent 50% stenosis. RCA severe disease.     CARDIAC SURGERY  07/03/2020    CABG X 3 LIMA-OM-PDA-LAD, Maze, LT Atrial Appendage clipping    COLONOSCOPY      CORONARY ANGIOPLASTY WITH STENT PLACEMENT      4 stents- RCA X3; CX X1    CORONARY ANGIOPLASTY WITH STENT PLACEMENT  11/13/2013 11/13/2013 -3 stents placed to RCA- Dr Miguel Stern N/A 7/3/2020    INTRAOPERATIVE ISMAEL, CABG X3   WITH LIMA  AND LEFT LEG ENDOVEIN HARVEST, INDUCED HYPOTHERMIA, MAZE BIPOLAR AND CRYO PROCEDURE, LEFT ATRIAL APPENDAGE CLIPPING performed by Remy Rao MD at [facility]      all teeth extracted    FEMUR FRACTURE SURGERY  1984    MANDIBLE FRACTURE SURGERY  1984     Family History   Problem Relation Age of Onset   Clement Loser Cancer Mother     Diabetes Mother     Heart Disease Mother     High Blood Pressure Mother     Stroke Mother     Cancer Father     Heart Disease Father     High Blood Pressure Father  Stroke Father     Cancer Brother      Social History     Tobacco Use    Smoking status: Current Every Day Smoker     Packs/day: 1.00     Years: 46.00     Pack years: 46.00     Types: Cigarettes     Start date: 1974    Smokeless tobacco: Never Used    Tobacco comment: Smokes approx 1 pack per day   Substance Use Topics    Alcohol use: No     Comment: caffeine 6 bottles of unsweet tea a day      [unfilled]  Review of Systems:   · Constitutional: No Fever or Weight Loss   · Eyes: No Decreased Vision  · ENT: No Headaches, Hearing Loss or Vertigo  · Cardiovascular: No chest pain, dyspnea on exertion, palpitations or loss of consciousness  · Respiratory: No cough or wheezing    · Gastrointestinal: No abdominal pain, appetite loss, blood in stools, constipation, diarrhea or heartburn  · Genitourinary: No dysuria, trouble voiding, or hematuria  · Musculoskeletal:  No gait disturbance, weakness or joint complaints  · Integumentary: No rash or pruritis  · Neurological: No TIA or stroke symptoms  · Psychiatric: No anxiety or depression  · Endocrine: No malaise, fatigue or temperature intolerance  · Hematologic/Lymphatic: No bleeding problems, blood clots or swollen lymph nodes  · Allergic/Immunologic: No nasal congestion or hives  All systems negative except as marked. Objective:  /82   Pulse 60   Ht 6' 3\" (1.905 m)   Wt 283 lb (128.4 kg)   BMI 35.37 kg/m²   Wt Readings from Last 3 Encounters:   05/20/22 283 lb (128.4 kg)   02/25/22 262 lb 3.2 oz (118.9 kg)   02/21/22 266 lb (120.7 kg)     Body mass index is 35.37 kg/m². GENERAL - Alert, oriented, pleasant, in no apparent distress,normal grooming  HEENT - pupils are intact, cornea intact, conjunctive normal color, ears are normal in exam,  Neck - Supple. No jugular venous distention noted. No carotid bruits, no apical -carotid delay  Respiratory:  Normal breath sounds, No respiratory distress, No wheezing, No chest tenderness. ,no use of accessory muscles, diaphragm movement is normal  Cardiovascular: (PMI) apex non displaced,no lifts no thrills, no s3,no s4, Normal heart rate, Normal rhythm, No murmurs, No rubs, No gallops. Carotid arteries pulse and amplitude are normal no bruit, no abdominal bruit noted ( normal abdominal aorta ausculation),   Extremities - No cyanosis, clubbing, or significant edema, no varicose veins    Abdomen - No masses, tenderness, or organomegaly, no hepato-splenomegally, no bruits  Musculoskeletal + edema, no kyphosis or scoliosis, no deformity in any extremity noted, muscle strength and tone are normal  Skin: no ulcer,no scar,no stasis dermatitis   Neurologic - alert oriented times 3,Cranial nerves II through XII are grossly intact. There were no gross focal neurologic abnormalities. Psychiatric: normal mood and affect    Lab Results   Component Value Date    CKTOTAL 34 01/29/2022    CKMB 2.4 03/10/2013    TROPONINI 0.014 06/09/2014    TROPONINI 0.015 12/03/2011     BNP:    Lab Results   Component Value Date    BNP 17 03/09/2013     PT/INR:  No results found for: PTINR  Lab Results   Component Value Date    LABA1C 7.8 (H) 12/30/2020    LABA1C 8.9 (H) 06/29/2020     Lab Results   Component Value Date    CHOL 153 01/06/2020    TRIG 54 01/06/2020    HDL 43 01/06/2020    LDLDIRECT 111 (H) 01/06/2020     Lab Results   Component Value Date    ALT 8 (L) 02/24/2022    AST 11 (L) 02/24/2022     TSH:  No results found for: TSH    Impression:  Horace Healy is a 65 y. o.year old who  has a past medical history of Abnormal nuclear stress test, Atrial fibrillation (HCC), CAD (coronary artery disease), CHF (congestive heart failure) ([facility] [address] COPD (chronic obstructive pulmonary disease) ([facility] [address] Decreased cardiac ejection fraction, Diabetes mellitus ([facility] [address] GERD (gastroesophageal reflux disease), H/O cardiovascular stress test, H/O cardiovascular stress test, H/O cardiovascular stress test, H/O cardiovascular stress test, H/O Doppler ultrasound, H/O echocardiogram, H/O percutaneous left heart catheterization, Heart imaging, History of coronary artery stent placement, History of exercise stress test, History of Holter monitoring, History of MRI of brain and brain stem, History of nuclear stress test, Hx of Doppler echocardiogram, Hyperlipidemia, Hypertension, Kidney disease, S/P cardiac catheterization, SOB (shortness of breath), Status post angioplasty with stent, and Wears dentures. and presents with     Plan:  1. HTN: controlled now, contineu hydralazine, norvasc 10mg daily, continue coreg. lisinopril, HCTZ  2. Leg swelling: DVT was ruled out, it could be combination of venous reflux disease and norvasc use, does not appear to have over CHF, SHOWED HIM AMAZON site compression wraps also,.will get reflux ultrasond for legs, for now continue bumex and zaroxolyn, will check chem 7 to rule out hypokalemia from 2 diuretics and if needed, add kcl  3. COVID 19: RECOVERED LAST MONTH, HE WILL NEED BOOSTER VACCINE. 4. CHEST PAIN: resolved,:s/p PCI of SVG TO RCA Graft at the end F December 2020,  WITH HISTORY OF  CABG Angeline Barkley LVEF was 36%, however ECHO was 50%, Continue aspirin , continue statins, betablockers,, continue LISINOPRIL TO 20MG, BB, POST CABG EKG IS NSR. continue PLAVIX,  CONTINUE PLAVIX AND COUMADIN,  5. He is not working. recomemnd to stop smoking  6. Leg pain: it does not sound like intermittent claudication as it occurs with laying down and not with walking  7. DM: stable continue glipizide  8. Paroxysmal afib: continue COUAMDIN, off amidarone  9. Dyslipidemia: continue statins  All labs, medications and tests reviewed, continue all other medications of all above medical condition listed as is.     [unfilled]

## 2022-06-02 ENCOUNTER — PROCEDURE VISIT (OUTPATIENT)
Dept: CARDIOLOGY CLINIC | Age: 62
End: 2022-06-02
Payer: COMMERCIAL

## 2022-06-02 DIAGNOSIS — M79.89 LEG SWELLING: ICD-10-CM

## 2022-06-02 PROCEDURE — 93970 EXTREMITY STUDY: CPT | Performed by: INTERNAL MEDICINE

## 2022-06-07 ENCOUNTER — HOSPITAL ENCOUNTER (OUTPATIENT)
Dept: SLEEP CENTER | Age: 62
Discharge: HOME | End: 2022-06-07

## 2022-06-07 DIAGNOSIS — G47.10 HYPERSOMNOLENCE: ICD-10-CM

## 2022-06-07 DIAGNOSIS — R06.83 SNORING: ICD-10-CM

## 2022-06-08 ENCOUNTER — OFFICE VISIT (OUTPATIENT)
Dept: CARDIOLOGY CLINIC | Age: 62
End: 2022-06-08
Payer: COMMERCIAL

## 2022-06-08 VITALS
SYSTOLIC BLOOD PRESSURE: 160 MMHG | OXYGEN SATURATION: 96 % | BODY MASS INDEX: 34.71 KG/M2 | DIASTOLIC BLOOD PRESSURE: 88 MMHG | WEIGHT: 279.2 LBS | HEIGHT: 75 IN | HEART RATE: 77 BPM

## 2022-06-08 DIAGNOSIS — E78.2 MIXED HYPERLIPIDEMIA: ICD-10-CM

## 2022-06-08 DIAGNOSIS — R19.09 GROIN MASS: ICD-10-CM

## 2022-06-08 DIAGNOSIS — I10 PRIMARY HYPERTENSION: ICD-10-CM

## 2022-06-08 DIAGNOSIS — I25.10 CORONARY ARTERY DISEASE INVOLVING NATIVE HEART WITHOUT ANGINA PECTORIS, UNSPECIFIED VESSEL OR LESION TYPE: ICD-10-CM

## 2022-06-08 DIAGNOSIS — I48.11 LONGSTANDING PERSISTENT ATRIAL FIBRILLATION (HCC): Primary | ICD-10-CM

## 2022-06-08 DIAGNOSIS — I25.5 ISCHEMIC CARDIOMYOPATHY: ICD-10-CM

## 2022-06-08 DIAGNOSIS — I87.2 VENOUS REFLUX: ICD-10-CM

## 2022-06-08 PROCEDURE — 4004F PT TOBACCO SCREEN RCVD TLK: CPT | Performed by: NURSE PRACTITIONER

## 2022-06-08 PROCEDURE — 99214 OFFICE O/P EST MOD 30 MIN: CPT | Performed by: NURSE PRACTITIONER

## 2022-06-08 PROCEDURE — G8417 CALC BMI ABV UP PARAM F/U: HCPCS | Performed by: NURSE PRACTITIONER

## 2022-06-08 PROCEDURE — G8427 DOCREV CUR MEDS BY ELIG CLIN: HCPCS | Performed by: NURSE PRACTITIONER

## 2022-06-08 PROCEDURE — 3017F COLORECTAL CA SCREEN DOC REV: CPT | Performed by: NURSE PRACTITIONER

## 2022-06-08 RX ORDER — METOLAZONE 2.5 MG/1
2.5 TABLET ORAL DAILY
Qty: 30 TABLET | Refills: 2 | Status: ON HOLD | OUTPATIENT
Start: 2022-06-08 | End: 2022-07-17 | Stop reason: SDUPTHER

## 2022-06-08 RX ORDER — CARVEDILOL 6.25 MG/1
6.25 TABLET ORAL 2 TIMES DAILY WITH MEALS
Qty: 60 TABLET | Refills: 2 | Status: SHIPPED | OUTPATIENT
Start: 2022-06-08 | End: 2022-07-06

## 2022-06-08 ASSESSMENT — ENCOUNTER SYMPTOMS: SHORTNESS OF BREATH: 1

## 2022-06-08 NOTE — PROGRESS NOTES
DAVID ([facility] PHYSICAL [facility]  Torrey Fajardo  Phone: [phone]    Fax [phone]                  Whitney Stacy MD, Tj López MD, [address], MD, MD Cristian Nixon MD Millie Rundle, MD Harshil Hussein, APRN      Stanislaw Parks, APRN  Sujata Tobin, APRN     Evie Johnson, APRN    CARDIOLOGY  NOTE      6/8/2022    RE: Gwen Villalobos  (1960)                               TO:  Dr. Yamilka Plummer MD  The primary cardiologist is Dr. Arabella Nagy     CC:   1. Longstanding persistent atrial fibrillation ([address] [address]    2. Mixed hyperlipidemia    3. Ischemic cardiomyopathy    4. Primary hypertension    5. Coronary artery disease involving native heart without angina pectoris, unspecified vessel or lesion type    6. Venous reflux    7. Groin mass        Patient denies all of the following:  Chest Pain  Palpitations  Edema  Dizziness  Syncope      HPI: Thank you for involving me in taking care of your patient Gwen Villalobos, who is a  65y.o. year old male with a history as listed above. Patient presents to the office for follow up on CAD, HTN, CHF, A-fib, AAA, and hyperlipidemia. Shortness of breath has gotten worse over the last couple weeks. Patient was seen multiple times in emergency department for chest pain, shortness of breath, and elevated BP but left AMA. Most recent ER visit yesterday at [facility] where lower extremities was evaluated and he was diagnosed with cellulitis. CT of abdomen pelvis without contrast conducted noted to show adrenal nodule. Due to CKD and claustrophobia follow-up CT scans with contrast have not been completed. Patient is an active male who does not walk regularly. Patient is not compliant with his medications.      Vitals:    06/08/22 0914   BP: (!) 160/88   Pulse:    SpO2:        Current Outpatient Medications   Medication Sig Dispense Refill    metOLazone (ZAROXOLYN) 2.5 MG tablet Take 1 tablet by mouth daily Take 2 hours after Bumex. 30 tablet 2    carvedilol (COREG) 6.25 MG tablet Take 1 tablet by mouth 2 times daily (with meals) 60 tablet 2    montelukast (SINGULAIR) 10 MG tablet Take 1 tablet by mouth daily 30 tablet 3    guaiFENesin (MUCINEX) 600 MG extended release tablet Take 1 tablet by mouth 2 times daily 60 tablet 5    ipratropium-albuterol (DUONEB) 0.5-2.5 (3) MG/3ML SOLN nebulizer solution Inhale 3 mLs into the lungs 4 times daily 360 mL 5    clopidogrel (PLAVIX) 75 MG tablet TAKE 1 TABLET BY MOUTH DAILY.       pantoprazole (PROTONIX) 40 MG tablet TAKE 1 TABLET BY MOUTH EVERY DAY      potassium chloride (KLOR-CON M) 20 MEQ extended release tablet TAKE 2 TABLETS BY MOUTH EVERY DAY      lisinopril (PRINIVIL;ZESTRIL) 20 MG tablet TAKE 1 TABLET BY MOUTH DAILY      pregabalin (LYRICA) 150 MG capsule Take 1 capsule (150 mg total) by mouth 2 times a day      docusate (COLACE, DULCOLAX) 100 MG CAPS Take 100 mg by mouth 2 times daily      polyethylene glycol (GLYCOLAX) 17 GM/SCOOP powder Take 34 g by mouth 2 times daily      bumetanide (BUMEX) 2 MG tablet TAKE 1 TABLET BY MOUTH TWO TIMES A DAY      LIDOCAINE PAIN RELIEF 4 % external patch APPLY 1 PATCH EXTERNALLY EVERY DAY      amLODIPine (NORVASC) 10 MG tablet TAKE 1 TABLET BY MOUTH EVERY DAY 30 tablet 5    guaiFENesin (MUCINEX) 600 MG extended release tablet Take 1 tablet by mouth 2 times daily 60 tablet 5    hydrALAZINE (APRESOLINE) 10 MG tablet Take 1 tablet by mouth 3 times daily 90 tablet 3    nitroGLYCERIN (NITROSTAT) 0.4 MG SL tablet Place 1 tablet under the tongue every 5 minutes as needed for Chest pain 25 tablet 3    warfarin (JANTOVEN) 4 MG tablet TAKE 1 TABLET BY MOUTH EVERY DAY, EXCEPT TAKE 1 AND 1/2 TABLETS ON SUNDAYS OR AS DIRECTED BY CLINIC 100 tablet 1    albuterol sulfate HFA (PROVENTIL HFA) 108 (90 Base) MCG/ACT inhaler Inhale 2 puffs into the lungs every 6 hours as needed for Wheezing or Shortness of Breath 18 g 0    metFORMIN (GLUCOPHAGE) 500 MG tablet Take 1,000 mg by mouth 2 times daily      atorvastatin (LIPITOR) 80 MG tablet Take 1 tablet by mouth nightly 30 tablet 3    glipiZIDE (GLUCOTROL) 10 MG tablet Take 1 tablet by mouth every morning (before breakfast) (Patient taking differently: Take 10 mg by mouth 2 times daily (before meals) ) 60 tablet 3     No current facility-administered medications for this visit. Allergies: Hydromorphone  Past Medical History:   Diagnosis Date    Abnormal nuclear stress test     Atrial fibrillation ([facility] 11/2013    CAD (coronary artery disease)     Follows with Dr. Cam Soares CHF (congestive heart failure) ([facility]     COPD (chronic obstructive pulmonary disease) ([facility]     Follows with PCP    Decreased cardiac ejection fraction     Diabetes mellitus ([facility]     GERD (gastroesophageal reflux disease)     H/O cardiovascular stress test 11/20/13, 3/20/2013    11/13-Observed defect is consistent with diaphragmatic attenuation. EF 58%. 3/13 -EF 51%. No ischemia. Normal Study.  H/O cardiovascular stress test 06/08/2017    EF 50% normal study    H/O cardiovascular stress test 06/17/2019    Normal NM    H/O cardiovascular stress test 03/02/2021    depressed lvedf increased edv myocardial perfusion abnormal stress test    H/O Doppler ultrasound 10/14/2010    10/2010-Bilateral venous doppler of lower extremies- No DVTs.  H/O echocardiogram 06/26/2019    Left ventricular systolic function is normal with an ejection fraction of 50-55%. Mild concentric left ventricular hypertrophy. Grade I diastolic dysfunction. No significant valvular disease noted. No evidence of pericardial effusion. Essentially unremarkable echo.     H/O percutaneous left heart catheterization 12/22/2015    No evidence of hemodynamically significant coronary artery disease    Heart imaging 04/23/2020    muga - ef 58%    History of coronary artery stent placement 11/13/2013 11/13 -RCA - 3 stents placed  History of exercise stress test 06/08/2017    treadmill    History of Holter monitoring 01/04/2016    predominant rhythm sinus    History of MRI of brain and brain stem 06/04/2020    No evidence of an acute infarct, mod chronic microvascular white matter ischemic disease with associated cerebral parenchymal volume loss    History of nuclear stress test 02/24/2020    EF 38%,Myocardial perfusion scan shows moderate size, severe intensity, non  reversible perfusion defect in inferior wall.  Hx of Doppler echocardiogram 01/26/2022    EF 45-50% Mod LV hypertrophy. Grade 1 diastolic dysfunction. Mod MR and TR. Dilation of the aortic root and ascending aorta.  Hyperlipidemia     Hypertension     Follows with PCP    Kidney disease     Dr. Adelso North S/P cardiac catheterization 11/13/2013 11/13/2013-EF 55%, distal LAD mild disease,CX stent patent,but distal to stent 50% stenosis. RCA severe disease.  SOB (shortness of breath)     Status post angioplasty with stent     Wears dentures     full upper denture     Past Surgical History:   Procedure Laterality Date    APPENDECTOMY      BIOPSY / LIGATION TEMPORAL ARTERY Left 8/17/2020    LEFT TEMPORAL ARTERY BIOPSY LIGATION performed by Adilia Blank MD at [address] [place] [address]  11/13/2013 11/13/2013-EF 55%, distal LAD mild disease,CX stent patent,but distal to stent 50% stenosis. RCA severe disease.     CARDIAC SURGERY  07/03/2020    CABG X 3 LIMA-OM-PDA-LAD, Maze, LT Atrial Appendage clipping    COLONOSCOPY      CORONARY ANGIOPLASTY WITH STENT PLACEMENT      4 stents- RCA X3; CX X1    CORONARY ANGIOPLASTY WITH STENT PLACEMENT  11/13/2013 11/13/2013 -3 stents placed to RCA- Dr Ilene Freeman N/A 7/3/2020    INTRAOPERATIVE ISMAEL, CABG X3   WITH LIMA  AND LEFT LEG ENDOVEIN HARVEST, INDUCED HYPOTHERMIA, MAZE BIPOLAR AND CRYO PROCEDURE, LEFT ATRIAL APPENDAGE CLIPPING performed by Lizandro Byrd MD at [facility] Musculoskeletal:         General: Normal range of motion. Right lower leg: Edema present. Left lower leg: Edema present. Skin:     General: Skin is warm and dry. Neurological:      Mental Status: He is alert and oriented to person, place, and time. DATA:  Lab Results   Component Value Date    CKTOTAL 34 01/29/2022    CKMB 2.4 03/10/2013    TROPONINI 0.014 06/09/2014    TROPONINI 0.015 12/03/2011     BNP:    Lab Results   Component Value Date    BNP 17 03/09/2013     PT/INR:  No results found for: [employer]  Lab Results   Component Value Date    LABA1C 7.8 (H) 12/30/2020    LABA1C 8.9 (H) 06/29/2020     Lab Results   Component Value Date    CHOL 153 01/06/2020    TRIG 54 01/06/2020    HDL 43 01/06/2020    LDLDIRECT 111 (H) 01/06/2020     Lab Results   Component Value Date    ALT 8 (L) 02/24/2022    AST 11 (L) 02/24/2022     TSH:  No results found for: TSH    Echo:1/7/2020  Left ventricular systolic function is abnormal.   Ejection fraction is visually estimated at 30 to 35 %. Moderate left ventricular hypertrophy. Cannot assess diastolic function due to AFib. Moderately dilated atrium bilaterally. No evidence of any pericardial effusion. Inferior vena cava is dilated, measuring at 2.8 cm, and does not collapse   with respiration. Echo: 12/30/20   Left ventricular systolic function is low normal.   Ejection fraction is visually estimated at 50%. Moderate left ventricular hypertrophy. No significant valvular disease noted. No evidence of any pericardial effusion. Pericardial fat pad present. Echo:1/26/22  Left ventricular function is low normal, EF is estimated at 45-50%. Moderate left ventricular hypertrophy. Grade I diastolic dysfunction. Moderately dilated left atrium. Moderate mitral and mild tricuspid regurgitation is present. RVSP is 32 mmHg. Dilation of the aortic root(4.5cm) and the ascending aorta(4.7cm).    No evidence of pericardial effusion. Echo:5/9/22  1. Left ventricle: The cavity size was mildly dilated. There was      mild concentric hypertrophy. Systolic function was mildly      reduced. The estimated ejection fraction was in the range of 45%      to 50%. Mild diffuse hypokinesis. Unable to assess diastolic      function. 2. Left atrium: The atrium was moderately to markedly dilated. 3. Right ventricle: The cavity size was moderately to markedly      dilated. 4. Pericardium, extracardiac: There was no pericardial effusion. MUGA: 1/13/21  EF 36 %    Cath: 7/30/19  PATIENT presenting with STEMI of RCA, s/p successful PCI of RCA with LEOLA, HAS multivessel disease as described above. Cath: 12/30/20  1. PCI to SVG to RCA/PDA at ostial anastomoses site for 99 % lesion reduced to 0 % using 3.0 X 28 LEOLA, 3.0 X 18 and 2.5 X 23 overlapping stents followed by post stent balloon dilation using   3.5 X 12 balloon   2. SVG to Om and LIMA to LAd are potent   3. LVEDP was 15 mmHG   4. RCA and Circ are occluded   5. LAD is diffusely diseased , Diag and LAD are filled by   LIMA graft. 6. left main has 80% distal stenosis   Pt is referred to Cardiac Rehab Phase 2 as OP    The ASCVD Risk score (Marlena Cisneros, et al., 2013) failed to calculate for the following reasons: The patient has a prior MI or stroke diagnosis    Assessment/ Plan:     Venous reflux  -Significant reflux of right GSV SF J tributary note of left GSV at knee and mid calf, significant reflux of left CFV. Recommend compression stockings and compression therapy but patient refuses. Groin mass  -Incidental finding on recent venous ultrasound. Left groin mass consistent with lymph node measuring 2.54 times 0.84 x 3.81 cm right groin mass consistent with lymph node measuring 3.69 x 0.91 cm. Recent CT without contrast did not show any mass. Recent diagnosis of lower extremity cellulitis could be because of enlarged lymph nodes.     AAA  New found aortic root aneurysm measuring 4.5 cm and ascending aorta measuring 4.7 cm. Patient needs better B/P control. CTA of chest in 10/10/21 showed normal size of aorta. Unable to obtain CTA of chest due to CKD and patient having claustrophobia. Recent echocardiogram at [address] [place] [address] does not show any aortic root dilation. HTN (hypertension)   -elevated, continue with hydralazine 10 mg TID, increase Coreg to 6.25 mg BID, lisinopril 20 mg daily, Norvasc 10 mg, and hydrochlorothiazide 25 mg. Continue with Bumex 2 mg twice daily and increase Zaroxolyn to daily 2 hrs after Bumex.     Longstanding persistent atrial fibrillation   -Chads-Vas is 6, continue with Coumadin for anticoagulation. Recent EKG shows normal sinus rhythm.      CAD (coronary artery disease)   -PCI of RCA in 2019, then CABG x 3 in 2020 LIMA-LAD, SVG to RCA/PDA, SVG to OM. Had PCI of SVG to RCA/PDA with multiple stents overlapping 6 months post CABG. MUGA scan in January 2021 showed EF 36%. Stress test in March 2021 showed medium size severe intensity nonreversible perfusion defect in inferior wall. Patient has significant history of noncompliance and cocaine abuse.      Primary and secondary prevention discussed with patient:              -Low sodium cardiac diet              -exercise 30 min a day three days a week  Continue current medications Plavix, Coumadin, Lipitor, Coreg, Lasix, lisinopril, chlorthalidone, Norvasc     Ischemic cardiomyopathy   -MUGA scan in January 2021 showed EF 36%. Recent echocardiogram shows EF 45-50% which is slightly reduced. Change metolazone to 2.5 mg daily 2 hours after Bumex. Will get BMP at next visit. Current labs stable.      Mixed hyperlipidemia   -At or near goal No    -He is to continue current medications (Lipitor 80 mg) Hepatic function panel WNL. No abdominal pain. No myalgias.      -The nature of cardiac risk has been fully discussed with this patient.  I have made him aware of his LDL target goal given his cardiovascular risk analysis. I have discussed the appropriate diet. The need for lifelong compliance in order to reduce risk is stressed. A regular exercise program is recommended to help achieve and maintain normal body weight, fitness and improve lipid balance. A written copy of a low fat, low cholesterol diet has been given to the patient. Patient seen, interviewed and examined. Testing was reviewed. Patient is encouraged to exercise even a brisk walk for 30 minutes at least 3 to 4 times a week. Lifestyle and risk factor modificatons discussed. Various goals are discussed and questions answered. Continue current medications. Appropriate prescriptions are addressed. Questions answered and patient verbalizes understanding. Call for any problems, questions, or concerns. Pt is to follow up in 2 weeks for Cardiac management    Electronically signed by Elsa Lagunas.  TREY Gallego CNP on 6/8/2022 at 9:41 AM

## 2022-06-08 NOTE — PATIENT INSTRUCTIONS
Please be informed that if you contact our office outside of normal business hours the physician on call cannot help with any scheduling or rescheduling issues, procedure instruction questions or any type of medication issue. We advise you for any urgent/emergency that you go to the nearest emergency room! PLEASE CALL OUR OFFICE DURING NORMAL BUSINESS HOURS    Monday - Friday   8 am to 5 pm    [place]: [address]: [phone]    [place]:  [phone]    **It is YOUR responsibilty to bring medication bottles and/or updated medication list to [address] [place] [address].  This will allow us to better serve you and all your healthcare needs**

## 2022-06-09 ENCOUNTER — TELEPHONE (OUTPATIENT)
Dept: CARDIOLOGY CLINIC | Age: 62
End: 2022-06-09

## 2022-06-09 NOTE — TELEPHONE ENCOUNTER
Summary        No evidence of DVT or SVT in the bilateral common femoral vein, femoral    vein, popliteal vein, greater saphenous vein or small saphenous vein.    Tributary noted in the right GSV at the level of the mid thigh.    Significant reflux noted of the Right GSV SFJ (2.5s).  Tributary noted in the left GSV at the level of the knee and mid calf.    Significant reflux noted in the Left CFV (1.0s).  The left GSV Mid Thigh was not visualized, small vessel versus removed.    Incidental finding of left groin mass consistent with a lymph node,    measuring 2.54 x 0.84 x 3.81 cm.    Incidental finding of a fluid collection in the left popliteal fossa    consistent with a Baker's Cyst, measuring 2.07 x 1.34 x 3.30 cm.    Incidental finding of right groin mass consistent with a lymph node,    measuring 3.69 x 0.91 cm.    Bilateral calf and ankle edema.        Recommendations        office visit to discuss results about enlarged b/l lymphgroin mass    (lymphnodes), use compression socks     Spoke to patient regarding results of lower extremity doppler. Made an appointment for 6/10.

## 2022-06-10 ENCOUNTER — OFFICE VISIT (OUTPATIENT)
Dept: CARDIOLOGY CLINIC | Age: 62
End: 2022-06-10
Payer: COMMERCIAL

## 2022-06-10 VITALS
SYSTOLIC BLOOD PRESSURE: 146 MMHG | HEIGHT: 75 IN | OXYGEN SATURATION: 98 % | RESPIRATION RATE: 18 BRPM | HEART RATE: 86 BPM | DIASTOLIC BLOOD PRESSURE: 78 MMHG | BODY MASS INDEX: 34.96 KG/M2 | WEIGHT: 281.2 LBS

## 2022-06-10 DIAGNOSIS — R59.1 LYMPHADENOPATHY: Primary | ICD-10-CM

## 2022-06-10 DIAGNOSIS — M79.89 LEG SWELLING: ICD-10-CM

## 2022-06-10 DIAGNOSIS — M71.22 BAKER CYST, LEFT: ICD-10-CM

## 2022-06-10 PROCEDURE — G8417 CALC BMI ABV UP PARAM F/U: HCPCS | Performed by: INTERNAL MEDICINE

## 2022-06-10 PROCEDURE — 4004F PT TOBACCO SCREEN RCVD TLK: CPT | Performed by: INTERNAL MEDICINE

## 2022-06-10 PROCEDURE — 99214 OFFICE O/P EST MOD 30 MIN: CPT | Performed by: INTERNAL MEDICINE

## 2022-06-10 PROCEDURE — G8427 DOCREV CUR MEDS BY ELIG CLIN: HCPCS | Performed by: INTERNAL MEDICINE

## 2022-06-10 PROCEDURE — 3017F COLORECTAL CA SCREEN DOC REV: CPT | Performed by: INTERNAL MEDICINE

## 2022-06-10 NOTE — PROGRESS NOTES
Luciana Underwood MD        OFFICE  FOLLOWUP NOTE    Chief complaints: patient is here for management of CAD s/p CABG, DM, HTN, DYSLPIDEMIA, AFIB, COVID 19    F/u on venous doppler suggesting severe reflux in rt GSV, left poplilteal fossa baker cyst, b/l groin lymphadenopathy    Subjective: patient feels better, no chest pain, no shortness of breath, no dizziness, no palpitations    HPI Susana Martinez is a 65 y. o.year old who  has a past medical history of Abnormal nuclear stress test, Atrial fibrillation (HCC), CAD (coronary artery disease), CHF (congestive heart failure) ([facility] [address] COPD (chronic obstructive pulmonary disease) ([facility] [address] Decreased cardiac ejection fraction, Diabetes mellitus ([facility] [address] GERD (gastroesophageal reflux disease), H/O cardiovascular stress test, H/O cardiovascular stress test, H/O cardiovascular stress test, H/O cardiovascular stress test, H/O Doppler ultrasound, H/O echocardiogram, H/O percutaneous left heart catheterization, Heart imaging, History of coronary artery stent placement, History of exercise stress test, History of Holter monitoring, History of MRI of brain and brain stem, History of nuclear stress test, Hx of Doppler echocardiogram, Hyperlipidemia, Hypertension, Kidney disease, S/P cardiac catheterization, SOB (shortness of breath), Status post angioplasty with stent, and Wears dentures. and presents for management of CAD s/p CABG, DM, HTN, DYSLPIDEMIA, AFIB, COVID 19CAD s/p CABG, DM, HTN, DYSLPIDEMIA, AFIB, COVID 19which are well controlled      Current Outpatient Medications   Medication Sig Dispense Refill    metOLazone (ZAROXOLYN) 2.5 MG tablet Take 1 tablet by mouth daily Take 2 hours after Bumex.  30 tablet 2    carvedilol (COREG) 6.25 MG tablet Take 1 tablet by mouth 2 times daily (with meals) 60 tablet 2    montelukast (SINGULAIR) 10 MG tablet Take 1 tablet by mouth daily 30 tablet 3    guaiFENesin (MUCINEX) 600 MG extended release tablet Take 1 tablet by mouth 2 times daily 60 tablet 5    ipratropium-albuterol (DUONEB) 0.5-2.5 (3) MG/3ML SOLN nebulizer solution Inhale 3 mLs into the lungs 4 times daily 360 mL 5    clopidogrel (PLAVIX) 75 MG tablet TAKE 1 TABLET BY MOUTH DAILY.       pantoprazole (PROTONIX) 40 MG tablet TAKE 1 TABLET BY MOUTH EVERY DAY      potassium chloride (KLOR-CON M) 20 MEQ extended release tablet TAKE 2 TABLETS BY MOUTH EVERY DAY      lisinopril (PRINIVIL;ZESTRIL) 20 MG tablet TAKE 1 TABLET BY MOUTH DAILY      pregabalin (LYRICA) 150 MG capsule Take 1 capsule (150 mg total) by mouth 2 times a day      docusate (COLACE, DULCOLAX) 100 MG CAPS Take 100 mg by mouth 2 times daily      polyethylene glycol (GLYCOLAX) 17 GM/SCOOP powder Take 34 g by mouth 2 times daily      bumetanide (BUMEX) 2 MG tablet TAKE 1 TABLET BY MOUTH TWO TIMES A DAY      LIDOCAINE PAIN RELIEF 4 % external patch APPLY 1 PATCH EXTERNALLY EVERY DAY      amLODIPine (NORVASC) 10 MG tablet TAKE 1 TABLET BY MOUTH EVERY DAY 30 tablet 5    guaiFENesin (MUCINEX) 600 MG extended release tablet Take 1 tablet by mouth 2 times daily 60 tablet 5    hydrALAZINE (APRESOLINE) 10 MG tablet Take 1 tablet by mouth 3 times daily 90 tablet 3    nitroGLYCERIN (NITROSTAT) 0.4 MG SL tablet Place 1 tablet under the tongue every 5 minutes as needed for Chest pain 25 tablet 3    warfarin (JANTOVEN) 4 MG tablet TAKE 1 TABLET BY MOUTH EVERY DAY, EXCEPT TAKE 1 AND 1/2 TABLETS ON SUNDAYS OR AS DIRECTED BY CLINIC 100 tablet 1    albuterol sulfate HFA (PROVENTIL HFA) 108 (90 Base) MCG/ACT inhaler Inhale 2 puffs into the lungs every 6 hours as needed for Wheezing or Shortness of Breath 18 g 0    metFORMIN (GLUCOPHAGE) 500 MG tablet Take 1,000 mg by mouth 2 times daily      atorvastatin (LIPITOR) 80 MG tablet Take 1 tablet by mouth nightly 30 tablet 3    glipiZIDE (GLUCOTROL) 10 MG tablet Take 1 tablet by mouth every morning (before breakfast) (Patient taking differently: Take 10 mg by mouth 2 times daily (before meals) ) 60 tablet 3     No current facility-administered medications for this visit. Allergies: Hydromorphone  Past Medical History:   Diagnosis Date    Abnormal nuclear stress test     Atrial fibrillation ([facility] [address] 11/2013    CAD (coronary artery disease)     Follows with Dr. John Muñiz CHF (congestive heart failure) ([facility] [address]     COPD (chronic obstructive pulmonary disease) ([facility] [address]     Follows with PCP    Decreased cardiac ejection fraction     Diabetes mellitus ([facility] [address]     GERD (gastroesophageal reflux disease)     H/O cardiovascular stress test 11/20/13, 3/20/2013    11/13-Observed defect is consistent with diaphragmatic attenuation. EF 58%. 3/13 -EF 51%. No ischemia. Normal Study.  H/O cardiovascular stress test 06/08/2017    EF 50% normal study    H/O cardiovascular stress test 06/17/2019    Normal NM    H/O cardiovascular stress test 03/02/2021    depressed lvedf increased edv myocardial perfusion abnormal stress test    H/O Doppler ultrasound 10/14/2010    10/2010-Bilateral venous doppler of lower extremies- No DVTs.  H/O echocardiogram 06/26/2019    Left ventricular systolic function is normal with an ejection fraction of 50-55%. Mild concentric left ventricular hypertrophy. Grade I diastolic dysfunction. No significant valvular disease noted. No evidence of pericardial effusion. Essentially unremarkable echo.     H/O percutaneous left heart catheterization 12/22/2015    No evidence of hemodynamically significant coronary artery disease    Heart imaging 04/23/2020    muga - ef 58%    History of coronary artery stent placement 11/13/2013 11/13 -RCA - 3 stents placed    History of exercise stress test 06/08/2017    treadmill    History of Holter monitoring 01/04/2016    predominant rhythm sinus    History of MRI of brain and brain stem 06/04/2020    No evidence of an acute infarct, mod chronic microvascular white matter ischemic disease with associated cerebral parenchymal volume loss    History of nuclear stress test 02/24/2020    EF 38%,Myocardial perfusion scan shows moderate size, severe intensity, non  reversible perfusion defect in inferior wall.  Hx of Doppler echocardiogram 01/26/2022    EF 45-50% Mod LV hypertrophy. Grade 1 diastolic dysfunction. Mod MR and TR. Dilation of the aortic root and ascending aorta.  Hyperlipidemia     Hypertension     Follows with PCP    Kidney disease     Dr. Edith Newberry S/P cardiac catheterization 11/13/2013 11/13/2013-EF 55%, distal LAD mild disease,CX stent patent,but distal to stent 50% stenosis. RCA severe disease.  SOB (shortness of breath)     Status post angioplasty with stent     Wears dentures     full upper denture     Past Surgical History:   Procedure Laterality Date    APPENDECTOMY      BIOPSY / LIGATION TEMPORAL ARTERY Left 8/17/2020    LEFT TEMPORAL ARTERY BIOPSY LIGATION performed by Jd Fajardo MD at [address]  11/13/2013 11/13/2013-EF 55%, distal LAD mild disease,CX stent patent,but distal to stent 50% stenosis. RCA severe disease.     CARDIAC SURGERY  07/03/2020    CABG X 3 LIMA-OM-PDA-LAD, Maze, LT Atrial Appendage clipping    COLONOSCOPY      CORONARY ANGIOPLASTY WITH STENT PLACEMENT      4 stents- RCA X3; CX X1    CORONARY ANGIOPLASTY WITH STENT PLACEMENT  11/13/2013 11/13/2013 -3 stents placed to RCA- Dr Cj Horvath N/A 7/3/2020    INTRAOPERATIVE ISMAEL, CABG X3   WITH LIMA  AND LEFT LEG ENDOVEIN HARVEST, INDUCED HYPOTHERMIA, MAZE BIPOLAR AND CRYO PROCEDURE, LEFT ATRIAL APPENDAGE CLIPPING performed by Lissette Galeas MD at [facility]      all teeth extracted    FEMUR FRACTURE SURGERY  1984    MANDIBLE FRACTURE SURGERY  1984     Family History   Problem Relation Age of Onset    Cancer Mother     Diabetes Mother     Heart Disease Mother     High Blood Pressure Mother     Stroke Mother     Cancer Father     Heart Disease Father  High Blood Pressure Father     Stroke Father     Cancer Brother      Social History     Tobacco Use    Smoking status: Current Every Day Smoker     Packs/day: 0.50     Years: 46.00     Pack years: 23.00     Types: Cigarettes     Start date: 1974    Smokeless tobacco: Never Used    Tobacco comment: Smokes approx 1 pack per day   Substance Use Topics    Alcohol use: No     Comment: caffeine 6 bottles of unsweet tea a day      [unfilled]  Review of Systems:   · Constitutional: No Fever or Weight Loss   · Eyes: No Decreased Vision  · ENT: No Headaches, Hearing Loss or Vertigo  · Cardiovascular: No chest pain, dyspnea on exertion, palpitations or loss of consciousness  · Respiratory: No cough or wheezing    · Gastrointestinal: No abdominal pain, appetite loss, blood in stools, constipation, diarrhea or heartburn  · Genitourinary: No dysuria, trouble voiding, or hematuria  · Musculoskeletal:  No gait disturbance, weakness or joint complaints  · Integumentary: No rash or pruritis  · Neurological: No TIA or stroke symptoms  · Psychiatric: No anxiety or depression  · Endocrine: No malaise, fatigue or temperature intolerance  · Hematologic/Lymphatic: No bleeding problems, blood clots or swollen lymph nodes  · Allergic/Immunologic: No nasal congestion or hives  All systems negative except as marked. Objective:  BP (!) 146/78 (Site: Left Upper Arm, Position: Sitting, Cuff Size: Medium Adult)   Pulse 86   Resp 18   Ht 6' 3\" (1.905 m)   Wt 281 lb 3.2 oz (127.6 kg)   SpO2 98%   BMI 35.15 kg/m²   Wt Readings from Last 3 Encounters:   06/10/22 281 lb 3.2 oz (127.6 kg)   06/08/22 279 lb 3.2 oz (126.6 kg)   06/01/22 283 lb (128.4 kg)     Body mass index is 35.15 kg/m². GENERAL - Alert, oriented, pleasant, in no apparent distress,normal grooming  HEENT - pupils are intact, cornea intact, conjunctive normal color, ears are normal in exam,  Neck - Supple. No jugular venous distention noted.  No carotid bruits, no apical H/O cardiovascular stress test, H/O cardiovascular stress test, H/O cardiovascular stress test, H/O Doppler ultrasound, H/O echocardiogram, H/O percutaneous left heart catheterization, Heart imaging, History of coronary artery stent placement, History of exercise stress test, History of Holter monitoring, History of MRI of brain and brain stem, History of nuclear stress test, Hx of Doppler echocardiogram, Hyperlipidemia, Hypertension, Kidney disease, S/P cardiac catheterization, SOB (shortness of breath), Status post angioplasty with stent, and Wears dentures. and presents with     Plan:  1. HTN: controlled now, contineu hydralazine, norvasc 10mg daily, continue coreg. lisinopril, HCTZ  2. Leg swelling:rt GSV severe reflux and left popliteal baker cyst, will refer to  for rt leg GSV ablation, and orthopedics for left baker cyst, will also get CT abdomen for swollen lymphnodes of groin,   3. COVID 19: RECOVERED LAST MONTH, HE WILL NEED BOOSTER VACCINE. 4. CHEST PAIN: resolved,:s/p PCI of SVG TO RCA Graft at the end F December 2020,  WITH HISTORY OF  CABG Harmony Garber LVEF was 36%, however ECHO was 50%, Continue aspirin , continue statins, betablockers,, continue LISINOPRIL TO 20MG, BB, POST CABG EKG IS NSR. continue PLAVIX,  CONTINUE PLAVIX AND COUMADIN,  5. He is not working. recomemnd to stop smoking  6. Leg pain: it does not sound like intermittent claudication as it occurs with laying down and not with walking  7. DM: stable continue glipizide  8. Paroxysmal afib: continue COUAMDIN, off amidarone  9. Dyslipidemia: continue statins  All labs, medications and tests reviewed, continue all other medications of all above medical condition listed as is.     [unfilled]

## 2022-06-23 ENCOUNTER — HOSPITAL ENCOUNTER (OUTPATIENT)
Dept: CT IMAGING | Age: 62
Discharge: HOME | End: 2022-06-23
Payer: COMMERCIAL

## 2022-06-23 DIAGNOSIS — R59.1 LYMPHADENOPATHY: ICD-10-CM

## 2022-06-23 PROCEDURE — 74176 CT ABD & PELVIS W/O CONTRAST: CPT

## 2022-06-24 ENCOUNTER — TELEPHONE (OUTPATIENT)
Dept: PHARMACY | Age: 62
End: 2022-06-24

## 2022-06-24 NOTE — TELEPHONE ENCOUNTER
Patient left message he missed call from office. Returned call. Advised message had been left for Raza Mcleod to schedule for 6/26 and the call he received was the automated appointment reminder. Patient requests to reschedule him and Raza Mcleod. Scheduled for 7/7/22.      For Pharmacy Admin Tracking Only     Intervention Detail:    Total # of Interventions Recommended:    Total # of Interventions Accepted:    Time Spent (min): 10

## 2022-06-27 ENCOUNTER — TELEPHONE (OUTPATIENT)
Dept: CARDIOLOGY CLINIC | Age: 62
End: 2022-06-27

## 2022-06-28 ENCOUNTER — TELEPHONE (OUTPATIENT)
Dept: CARDIOLOGY CLINIC | Age: 62
End: 2022-06-28

## 2022-07-05 ENCOUNTER — TELEPHONE (OUTPATIENT)
Dept: CARDIOLOGY CLINIC | Age: 62
End: 2022-07-05

## 2022-07-05 NOTE — TELEPHONE ENCOUNTER
Consult appointment with Dr Rosemarie Brown moved to tomorrow Mother called back before message could be sent  PSR advised mother of APN's notes, mother agrees to go to [address] [place] [address] for evaluation of stitches

## 2022-07-06 ENCOUNTER — INITIAL CONSULT (OUTPATIENT)
Dept: CARDIOLOGY CLINIC | Age: 62
End: 2022-07-06
Payer: COMMERCIAL

## 2022-07-06 VITALS
DIASTOLIC BLOOD PRESSURE: 80 MMHG | SYSTOLIC BLOOD PRESSURE: 130 MMHG | WEIGHT: 284.5 LBS | HEIGHT: 75 IN | HEART RATE: 77 BPM | BODY MASS INDEX: 35.37 KG/M2

## 2022-07-06 DIAGNOSIS — Z98.61 POST PTCA: ICD-10-CM

## 2022-07-06 DIAGNOSIS — I25.5 ISCHEMIC CARDIOMYOPATHY: ICD-10-CM

## 2022-07-06 DIAGNOSIS — I10 PRIMARY HYPERTENSION: ICD-10-CM

## 2022-07-06 DIAGNOSIS — I25.10 CORONARY ARTERY DISEASE INVOLVING NATIVE HEART WITHOUT ANGINA PECTORIS, UNSPECIFIED VESSEL OR LESION TYPE: ICD-10-CM

## 2022-07-06 DIAGNOSIS — E11.9 TYPE 2 DIABETES MELLITUS WITHOUT COMPLICATION, WITHOUT LONG-TERM CURRENT USE OF INSULIN (HCC): Chronic | ICD-10-CM

## 2022-07-06 DIAGNOSIS — I83.893 VARICOSE VEINS OF BOTH LEGS WITH EDEMA: Primary | ICD-10-CM

## 2022-07-06 PROCEDURE — G8417 CALC BMI ABV UP PARAM F/U: HCPCS | Performed by: INTERNAL MEDICINE

## 2022-07-06 PROCEDURE — 2022F DILAT RTA XM EVC RTNOPTHY: CPT | Performed by: INTERNAL MEDICINE

## 2022-07-06 PROCEDURE — 3046F HEMOGLOBIN A1C LEVEL >9.0%: CPT | Performed by: INTERNAL MEDICINE

## 2022-07-06 PROCEDURE — 3017F COLORECTAL CA SCREEN DOC REV: CPT | Performed by: INTERNAL MEDICINE

## 2022-07-06 PROCEDURE — G8427 DOCREV CUR MEDS BY ELIG CLIN: HCPCS | Performed by: INTERNAL MEDICINE

## 2022-07-06 PROCEDURE — 99204 OFFICE O/P NEW MOD 45 MIN: CPT | Performed by: INTERNAL MEDICINE

## 2022-07-06 PROCEDURE — 4004F PT TOBACCO SCREEN RCVD TLK: CPT | Performed by: INTERNAL MEDICINE

## 2022-07-06 RX ORDER — CARVEDILOL 12.5 MG/1
12.5 TABLET ORAL 2 TIMES DAILY WITH MEALS
Qty: 60 TABLET | Refills: 5 | Status: SHIPPED | OUTPATIENT
Start: 2022-07-06

## 2022-07-06 RX ORDER — HYDRALAZINE HYDROCHLORIDE 25 MG/1
25 TABLET, FILM COATED ORAL 3 TIMES DAILY
Qty: 90 TABLET | Refills: 5 | Status: ON HOLD | OUTPATIENT
Start: 2022-07-06 | End: 2022-10-04

## 2022-07-06 NOTE — LETTER
after Bumex. 30 tablet 2    carvedilol (COREG) 6.25 MG tablet Take 1 tablet by mouth 2 times daily (with meals) 60 tablet 2    montelukast (SINGULAIR) 10 MG tablet Take 1 tablet by mouth daily 30 tablet 3    guaiFENesin (MUCINEX) 600 MG extended release tablet Take 1 tablet by mouth 2 times daily 60 tablet 5    ipratropium-albuterol (DUONEB) 0.5-2.5 (3) MG/3ML SOLN nebulizer solution Inhale 3 mLs into the lungs 4 times daily 360 mL 5    clopidogrel (PLAVIX) 75 MG tablet TAKE 1 TABLET BY MOUTH DAILY.       pantoprazole (PROTONIX) 40 MG tablet TAKE 1 TABLET BY MOUTH EVERY DAY      potassium chloride (KLOR-CON M) 20 MEQ extended release tablet TAKE 2 TABLETS BY MOUTH EVERY DAY      lisinopril (PRINIVIL;ZESTRIL) 20 MG tablet TAKE 1 TABLET BY MOUTH DAILY      pregabalin (LYRICA) 150 MG capsule Take 1 capsule (150 mg total) by mouth 2 times a day      bumetanide (BUMEX) 2 MG tablet TAKE 1 TABLET BY MOUTH TWO TIMES A DAY      LIDOCAINE PAIN RELIEF 4 % external patch APPLY 1 PATCH EXTERNALLY EVERY DAY      amLODIPine (NORVASC) 10 MG tablet TAKE 1 TABLET BY MOUTH EVERY DAY 30 tablet 5    guaiFENesin (MUCINEX) 600 MG extended release tablet Take 1 tablet by mouth 2 times daily 60 tablet 5    hydrALAZINE (APRESOLINE) 10 MG tablet Take 1 tablet by mouth 3 times daily 90 tablet 3    nitroGLYCERIN (NITROSTAT) 0.4 MG SL tablet Place 1 tablet under the tongue every 5 minutes as needed for Chest pain 25 tablet 3    warfarin (JANTOVEN) 4 MG tablet TAKE 1 TABLET BY MOUTH EVERY DAY, EXCEPT TAKE 1 AND 1/2 TABLETS ON SUNDAYS OR AS DIRECTED BY CLINIC 100 tablet 1    albuterol sulfate HFA (PROVENTIL HFA) 108 (90 Base) MCG/ACT inhaler Inhale 2 puffs into the lungs every 6 hours as needed for Wheezing or Shortness of Breath 18 g 0    metFORMIN (GLUCOPHAGE) 500 MG tablet Take 1,000 mg by mouth 2 times daily      atorvastatin (LIPITOR) 80 MG tablet Take 1 tablet by mouth nightly 30 tablet 3    glipiZIDE (GLUCOTROL) 10 MG tablet Take 1 tablet by mouth every morning (before breakfast) (Patient taking differently: Take 10 mg by mouth 2 times daily (before meals) ) 60 tablet 3     No current facility-administered medications for this visit.          Lab Review   Lab Results   Component Value Date/Time    CKTOTAL 34 01/29/2022 05:26 PM    CKTOTAL 47 07/19/2020 11:18 PM    CKMB 2.4 03/10/2013 07:05 AM    CKMB 2.7 03/09/2013 11:30 PM    TROPONINT 0.050 01/29/2022 08:25 PM    TROPONINT 0.071 01/29/2022 05:26 PM     BNP:    Lab Results   Component Value Date/Time    BNP 17 03/09/2013 11:30 PM    PROBNP 1,881 02/24/2022 01:21 PM    PROBNP 2,054 01/29/2022 08:25 PM     PT/INR:    Lab Results   Component Value Date    INR 1.8 05/19/2022    INR 1.8 05/19/2022     Lab Results   Component Value Date    LABA1C 7.8 (H) 12/30/2020    LABA1C 8.9 (H) 06/29/2020     Lab Results   Component Value Date    WBC 10.4 02/24/2022    WBC 9.8 01/29/2022    HCT 38.9 (L) 02/24/2022    HCT 35.5 (L) 01/29/2022    MCV 90.5 02/24/2022    MCV 90.1 01/29/2022     02/24/2022     01/29/2022     Lab Results   Component Value Date    CHOL 153 01/06/2020    CHOL 181 07/31/2019    TRIG 54 01/06/2020    TRIG 162 (H) 07/31/2019    HDL 43 01/06/2020    HDL 36 (L) 07/31/2019    LDLDIRECT 111 (H) 01/06/2020    LDLDIRECT 132 (H) 07/31/2019     Lab Results   Component Value Date    ALT 8 (L) 02/24/2022    ALT 14 01/29/2022    AST 11 (L) 02/24/2022    AST 18 01/29/2022     BMP:    Lab Results   Component Value Date/Time     05/20/2022 09:44 AM     02/24/2022 01:21 PM    K 3.7 05/20/2022 09:44 AM    K 3.9 02/24/2022 01:21 PM     05/20/2022 09:44 AM     02/24/2022 01:21 PM    CO2 30 05/20/2022 09:44 AM    CO2 27 02/24/2022 01:21 PM    BUN 33 05/20/2022 09:44 AM    BUN 23 02/24/2022 01:21 PM    CREATININE 1.6 05/20/2022 09:44 AM    CREATININE 1.7 02/24/2022 01:21 PM     CMP:   Lab Results   Component Value Date/Time     05/20/2022 09:44 AM  02/24/2022 01:21 PM    K 3.7 05/20/2022 09:44 AM    K 3.9 02/24/2022 01:21 PM     05/20/2022 09:44 AM     02/24/2022 01:21 PM    CO2 30 05/20/2022 09:44 AM    CO2 27 02/24/2022 01:21 PM    BUN 33 05/20/2022 09:44 AM    BUN 23 02/24/2022 01:21 PM    CREATININE 1.6 05/20/2022 09:44 AM    CREATININE 1.7 02/24/2022 01:21 PM    PROT 6.7 02/24/2022 01:21 PM    PROT 5.9 01/29/2022 08:25 PM    PROT 7.7 03/09/2013 11:30 PM    PROT 7.5 12/03/2011 07:35 PM     TSH:    Lab Results   Component Value Date/Time    TSHHS 1.390 06/02/2020 01:36 PM    TSHHS 1.430 01/05/2020 10:55 PM       Smoke: What:                           How much:    Alcohol:                                      How Much:     Caffeine: Pop:         Tea:            Coffee:                Chocolate:    Exercise:    Last Visit:  Complaints:  Changes:    LAST EKG: NONE    VENOUS DOPPLER: NONE    HOLTER/ EVENT MONITOR: NONE    STRESS TEST:  NONE    ECHO: NONE    CAROTID: NONE    MUGA: NONE    LAST PACER CHECK: NONE    CARDIAC CATH: NONE    Amio Protocol:    CHADS:

## 2022-07-06 NOTE — PATIENT INSTRUCTIONS
CVI: Patient is very symptomatic with C6 venous disease. US reveals right GSV & Left CFV Reflux. Suggest change Amlodipine to a different medication. Wear compression stockings. Ablation of right GSV. HYPERTENSION: Well controlled on Amlodipine, Lisinopril, Coreg& Hydrallazine. Will stop Amlodipine & increase Coreg & Hydallazine doses. BP check with nurse in two weeks. DYSLIPIDEMIA; On Lipitor. ATRIAL FIBRILLATION: rate controlled & anticoagulated. DIABETES: Per PMD.    I  Reviewed all of the available data, chart Prep., interviewing patient, obtaining history, performing physical exam, going through decision making analysis for assessment & plans of management on this patient. Office Visit fa month after the procedures.

## 2022-07-06 NOTE — LETTER
[address] [address]  [address] [address]  Phone: [phone]  Fax: [phone]    Sandie Dexter MD    July 6, 2022     Armond Mcguire, [address] [place] [address]    Patient: Justine Avelar   MR Number: [id number]   YOB: 1960   Date of Visit: 7/6/2022       Dear Armond Mcguire: Thank you for referring Laurie Byrd to me for evaluation/treatment. Below are the relevant portions of my assessment and plan of care. If you have questions, please do not hesitate to call me. I look forward to following Josiah along with you.     Sincerely,      Sandie Dexter MD

## 2022-07-06 NOTE — PROGRESS NOTES
Vein \"LEG PROBLEM Questionnaire\"  1. Do you have prominent leg veins? Yes   2. Do you have any skin discoloration? Yes  3. Do you have any healed or active sores? Yes active  4. Do you have swelling of the legs? Yes  5. Do you have a family history of varicose veins? No  6. Does your profession involve pro-longed        standing or heavy lifting? Yes,   7. Have you been fighting overweight problems? Yes  8. Do you have restless legs? Yes  9. Do you have any night time cramps? Yes  10. Do you have any of the following in your legs:        Aching, Itching and Tiredness weakness  11. If Yes - Have they worn compression stockings Yes  12.  If they have worn compression stockings 1 Weeks

## 2022-07-06 NOTE — PROGRESS NOTES
CARDIAC CONSULT NOTE       Josiah  65 y.o.  male    Chief Complaint   Patient presents with    Edema       Referring physician:  Maycol Sousa MD     Primary care physician:  Maycol Sousa MD    History of Present Illness:     Bria Kelley is a 65 y.o. male referred for evaluation and management of CVI. Patient C/O Leg edema, prominent Leg veins, Skin discoloration, night time cramps, restless legs, active Ulcers, aching, itching, tired & weak legs. Patient smokes & obese. Not able to wear stockings. has a past medical history of Abnormal nuclear stress test, Atrial fibrillation (HCC), CAD (coronary artery disease), CHF (congestive heart failure) ([facility] [address] COPD (chronic obstructive pulmonary disease) ([facility] [address] Decreased cardiac ejection fraction, Diabetes mellitus ([facility] [address] GERD (gastroesophageal reflux disease), H/O cardiovascular stress test, H/O cardiovascular stress test, H/O cardiovascular stress test, H/O cardiovascular stress test, H/O Doppler ultrasound, H/O echocardiogram, H/O percutaneous left heart catheterization, Heart imaging, History of coronary artery stent placement, History of exercise stress test, History of Holter monitoring, History of MRI of brain and brain stem, History of nuclear stress test, Hx of Doppler echocardiogram, Hyperlipidemia, Hypertension, Kidney disease, S/P cardiac catheterization, SOB (shortness of breath), Status post angioplasty with stent, and Wears dentures. has a past surgical history that includes Coronary angioplasty with stent; Appendectomy; Femur fracture surgery (1984); Coronary angioplasty with stent (11/13/2013); Colonoscopy; Mandible fracture surgery (1984); Cardiac catheterization (11/13/2013); Cardiac surgery (07/03/2020); Coronary artery bypass graft (N/A, 7/3/2020); Dental surgery; and BIOPSY / LIGATION TEMPORAL ARTERY (Left, 8/17/2020). reports that he has been smoking cigarettes.  He started smoking about 48 years ago. He has a 23.00 pack-year smoking history. He has never used smokeless tobacco. He reports previous drug use. Drug: Cocaine. He reports that he does not drink alcohol.    family history includes Cancer in his brother, father, and mother; Diabetes in his mother; Heart Disease in his father and mother; High Blood Pressure in his father and mother; Stroke in his father and mother. Review of Systems: As in HPI  1. Cardiovascular: No chest pain, dyspnea on exertion, palpitations or loss of consciousness  2. Respiratory: No cough or wheezing    3. Musculoskeletal:  No gait disturbance,+ weakness, muscle cramps, aches & pains or joint complaints  4. Neurological: No TIA or stroke symptoms  5. Psychiatric: No anxiety or depression  6. Hematologic/Lymphatic: No bleeding problems, blood clots or swollen lymph nodes    Physical Examination:    /80   Pulse 77   Ht 6' 3\" (1.905 m)   Wt 284 lb 8 oz (129 kg)   BMI 35.56 kg/m²    Wt Readings from Last 3 Encounters:   07/06/22 284 lb 8 oz (129 kg)   06/10/22 281 lb 3.2 oz (127.6 kg)   06/08/22 279 lb 3.2 oz (126.6 kg)     Body mass index is 35.56 kg/m². General Appearance:  obese/Well Nourished  1. Skin: It is warm & dry. No rashes noted. 2. Eyes: No conjunctival Pallor seen. No jaundice noted. 3. Neck: is supple there is no elevation of JVD. No thyromegaly  4. Respiratory:  · Resp Assessment: No abnormal findings. · Resp Auscultation: Vesicular breath sounds without rales or wheezing. 5. Cardiovascular:  · Auscultation: Normal S1 & S2, No prominent murmurs  · Carotid Arteries: No bruits present  · Abdominal Aorta: Non-palpable  · Pedal Pulses: 2+ and equal   6. Abdomen:  · No masses or tenderness  · Liver/Spleen: No Abnormalities Noted, no organomegaly. 7. Musculoskeletal: No joint deformities. No muscle wasting  8. Extremities:  ·  No Cyanosis or Clubbing. There is significant B/L edema WITH WEEPING ULCERS.   9. Rectal / genital:  · Deferred  10.  Neurological/Psychiatric:  · Oriented to time, place, and person  · Non-anxious    Lab Results   Component Value Date/Time    CKTOTAL 34 01/29/2022 05:26 PM    CKTOTAL 47 07/19/2020 11:18 PM    CKMB 2.4 03/10/2013 07:05 AM    CKMB 2.7 03/09/2013 11:30 PM    TROPONINT 0.050 01/29/2022 08:25 PM    TROPONINT 0.071 01/29/2022 05:26 PM     BNP:    Lab Results   Component Value Date/Time    BNP 17 03/09/2013 11:30 PM    PROBNP 1,881 02/24/2022 01:21 PM    PROBNP 2,054 01/29/2022 08:25 PM     PT/INR:  No results found for: PTINR  Lab Results   Component Value Date    LABA1C 7.8 (H) 12/30/2020    LABA1C 8.9 (H) 06/29/2020     Lab Results   Component Value Date    CHOL 153 01/06/2020    CHOL 181 07/31/2019    TRIG 54 01/06/2020    TRIG 162 (H) 07/31/2019    HDL 43 01/06/2020    HDL 36 (L) 07/31/2019    LDLDIRECT 111 (H) 01/06/2020    LDLDIRECT 132 (H) 07/31/2019     Lab Results   Component Value Date    ALT 8 (L) 02/24/2022    ALT 14 01/29/2022    AST 11 (L) 02/24/2022    AST 18 01/29/2022     BMP:    Lab Results   Component Value Date/Time     05/20/2022 09:44 AM     02/24/2022 01:21 PM    K 3.7 05/20/2022 09:44 AM    K 3.9 02/24/2022 01:21 PM     05/20/2022 09:44 AM     02/24/2022 01:21 PM    CO2 30 05/20/2022 09:44 AM    CO2 27 02/24/2022 01:21 PM    BUN 33 05/20/2022 09:44 AM    BUN 23 02/24/2022 01:21 PM    CREATININE 1.6 05/20/2022 09:44 AM    CREATININE 1.7 02/24/2022 01:21 PM     CMP:    Lab Results   Component Value Date/Time     05/20/2022 09:44 AM     02/24/2022 01:21 PM    K 3.7 05/20/2022 09:44 AM    K 3.9 02/24/2022 01:21 PM     05/20/2022 09:44 AM     02/24/2022 01:21 PM    CO2 30 05/20/2022 09:44 AM    CO2 27 02/24/2022 01:21 PM    BUN 33 05/20/2022 09:44 AM    BUN 23 02/24/2022 01:21 PM    CREATININE 1.6 05/20/2022 09:44 AM    CREATININE 1.7 02/24/2022 01:21 PM    PROT 6.7 02/24/2022 01:21 PM    PROT 5.9 01/29/2022 08:25 PM    PROT 7.7 03/09/2013 11:30 PM    PROT 7.5 12/03/2011 07:35 PM     TSH:    Lab Results   Component Value Date/Time    [facility] 1.390 06/02/2020 01:36 PM    TSH 1.430 01/05/2020 10:55 PM       QUALITY MEASURES REVIEWED:  1.CAD:Patient is taking anti platelet agent:Yes  2. DYSLIPIDEMIA: Patient is on cholesterol lowering medication:Yes . 3.Beta-Blocker therapy for CAD, if prior Myocardial Infarction:Yes   4. Counselled regarding smoking cessation. Yes   Patient does not Smoke. 5.Anticoagulation therapy (for A.Fib) Yes   6. Discussed weight management strategies. Assessment, Recommendations & Tests:     CVI: Patient is very symptomatic with C6 venous disease. US reveals right GSV & Left CFV Reflux. Suggest change Amlodipine to a different medication. Wear compression stockings. Ablation of right GSV. HYPERTENSION: Well controlled on Amlodipine, Lisinopril, Coreg& Hydrallazine. Will stop Amlodipine & increase Coreg & Hydallazine doses. BP check with nurse in two weeks. DYSLIPIDEMIA; On Lipitor. ATRIAL FIBRILLATION: rate controlled & anticoagulated. DIABETES: Per PMD.    I  Reviewed all of the available data, chart Prep., interviewing patient, obtaining history, performing physical exam, going through decision making analysis for assessment & plans of management on this patient. Office Visit fa month after the procedures.      Marilynn Donovan MD, 7/6/2022 1:35 PM

## 2022-07-07 ENCOUNTER — ANTI-COAG VISIT (OUTPATIENT)
Dept: PHARMACY | Age: 62
End: 2022-07-07
Payer: COMMERCIAL

## 2022-07-07 DIAGNOSIS — I48.11 LONGSTANDING PERSISTENT ATRIAL FIBRILLATION (HCC): ICD-10-CM

## 2022-07-07 DIAGNOSIS — I48.91 ATRIAL FIBRILLATION WITH RAPID VENTRICULAR RESPONSE (HCC): Primary | ICD-10-CM

## 2022-07-07 LAB
INTERNATIONAL NORMALIZATION RATIO, POC: 1.6
POC INR: 1.6 INDEX
PROTHROMBIN TIME, POC: 19.6 SECONDS (ref 10–14.3)

## 2022-07-07 PROCEDURE — 85610 PROTHROMBIN TIME: CPT

## 2022-07-07 PROCEDURE — 36416 COLLJ CAPILLARY BLOOD SPEC: CPT

## 2022-07-07 PROCEDURE — 99213 OFFICE O/P EST LOW 20 MIN: CPT

## 2022-07-07 RX ORDER — WARFARIN SODIUM 4 MG/1
TABLET ORAL
Qty: 100 TABLET | Refills: 1 | Status: SHIPPED | OUTPATIENT
Start: 2022-07-07

## 2022-07-07 NOTE — PROGRESS NOTES
Medication Management Service  [facility]  [phone]    Visit Date: 7/7/2022   Subjective:       Jase Torres is a 65 y.o. male who presents to clinic today for anticoagulation monitoring and adjustment. Patient seen in clinic for warfarin management due to  Indication:   atrial fibrillation. INR goal: of 2.0-3.0. Duration of therapy: indefinite. Patient reports the following:   Adherent with regimen  Missed or extra doses:  None   Bleeding or thromboembolic side effects:  None  Significant medication changes:  None  Significant dietary changes: None  Significant alcohol or tobacco changes: None  Significant recent illness, disease state changes, or hospitalization:  None  Upcoming surgeries or procedures:  None  Falls: None           Assessment and Plan     PT/INR done in office per protocol. INR today is 1.6, subtherapeutic, due to missed doses. Patient unsure which days he missed. Refill needed  Plan: Take warfarin 6mg tomorrow 7/8 then will continue current regimen of warfarin 4mg daily except 6mg on Sundays. ERx sent to [facility] INR in 2-3 week(s). Will schedule to coincide with his brother-in-laws visit at visit in 2 weeks. Patient verbalized understanding of dosing directions and information discussed. Dosing schedule given to patient including phone number, appointment date, and time. Progress note sent to referring office. Patient acknowledges working in consult agreement with pharmacist as referred by his/her physician.       Electronically signed by DARA [facility] on 7/7/22 at 4:40 PM EDT    For Pharmacy Admin Tracking Only     Intervention Detail: Dose Adjustment: 1, reason: Therapy Optimization   Total # of Interventions Recommended: 1   Total # of Interventions Accepted: 1   Time Spent (min): 15

## 2022-07-13 ENCOUNTER — TELEPHONE (OUTPATIENT)
Dept: CARDIOLOGY CLINIC | Age: 62
End: 2022-07-13

## 2022-07-15 ENCOUNTER — HOSPITAL ENCOUNTER (INPATIENT)
Age: 62
LOS: 2 days | Discharge: HOME | DRG: 194 | End: 2022-07-17
Attending: STUDENT IN AN ORGANIZED HEALTH CARE EDUCATION/TRAINING PROGRAM | Admitting: HOSPITALIST
Payer: COMMERCIAL

## 2022-07-15 DIAGNOSIS — I50.9 ACUTE ON CHRONIC CONGESTIVE HEART FAILURE, UNSPECIFIED HEART FAILURE TYPE (HCC): Primary | ICD-10-CM

## 2022-07-15 PROBLEM — J96.21 ACUTE ON CHRONIC RESPIRATORY FAILURE WITH HYPOXIA (HCC): Status: ACTIVE | Noted: 2022-07-15

## 2022-07-15 PROBLEM — J20.9 ACUTE BRONCHITIS WITH COPD (HCC): Status: ACTIVE | Noted: 2022-07-15

## 2022-07-15 PROBLEM — I48.20 CHRONIC ATRIAL FIBRILLATION (HCC): Status: ACTIVE | Noted: 2022-07-15

## 2022-07-15 PROBLEM — J44.0 ACUTE BRONCHITIS WITH COPD (HCC): Status: ACTIVE | Noted: 2022-07-15

## 2022-07-15 PROBLEM — I48.0 PAROXYSMAL A-FIB (HCC): Status: ACTIVE | Noted: 2022-07-15

## 2022-07-15 LAB
ANION GAP SERPL CALCULATED.3IONS-SCNC: 12 MMOL/L (ref 4–16)
BASE EXCESS MIXED: 3.9 (ref 0–1.2)
BUN BLDV-MCNC: 17 MG/DL (ref 6–23)
CALCIUM SERPL-MCNC: 9.2 MG/DL (ref 8.3–10.6)
CHLORIDE BLD-SCNC: 101 MMOL/L (ref 99–110)
CO2: 25 MMOL/L (ref 21–32)
COMMENT: ABNORMAL
CREAT SERPL-MCNC: 1.6 MG/DL (ref 0.9–1.3)
EKG ATRIAL RATE: 78 BPM
EKG DIAGNOSIS: NORMAL
EKG P AXIS: 56 DEGREES
EKG P-R INTERVAL: 178 MS
EKG Q-T INTERVAL: 396 MS
EKG QRS DURATION: 106 MS
EKG QTC CALCULATION (BAZETT): 451 MS
EKG R AXIS: 12 DEGREES
EKG T AXIS: 105 DEGREES
EKG VENTRICULAR RATE: 78 BPM
GFR AFRICAN AMERICAN: 53 ML/MIN/1.73M2
GFR NON-AFRICAN AMERICAN: 44 ML/MIN/1.73M2
GLUCOSE BLD-MCNC: 136 MG/DL (ref 70–99)
GLUCOSE BLD-MCNC: 157 MG/DL (ref 70–99)
GLUCOSE BLD-MCNC: 276 MG/DL (ref 70–99)
GLUCOSE BLD-MCNC: 282 MG/DL (ref 70–99)
GLUCOSE BLD-MCNC: 307 MG/DL (ref 70–99)
HCO3 VENOUS: 31.3 MMOL/L (ref 19–25)
INR BLD: 1.68 INDEX
LV EF: 45 %
LVEF MODALITY: NORMAL
MAGNESIUM: 1.9 MG/DL (ref 1.8–2.4)
O2 SAT, VEN: 79.9 % (ref 50–70)
PCO2, VEN: 58 MMHG (ref 38–52)
PH VENOUS: 7.34 (ref 7.32–7.42)
PHOSPHORUS: 2.8 MG/DL (ref 2.5–4.9)
PO2, VEN: 49 MMHG (ref 28–48)
POTASSIUM SERPL-SCNC: 4.3 MMOL/L (ref 3.5–5.1)
PROTHROMBIN TIME: 21.8 SECONDS (ref 11.7–14.5)
SODIUM BLD-SCNC: 138 MMOL/L (ref 135–145)
TROPONIN T: 0.02 NG/ML

## 2022-07-15 PROCEDURE — 6370000000 HC RX 637 (ALT 250 FOR IP): Performed by: STUDENT IN AN ORGANIZED HEALTH CARE EDUCATION/TRAINING PROGRAM

## 2022-07-15 PROCEDURE — 6360000002 HC RX W HCPCS: Performed by: STUDENT IN AN ORGANIZED HEALTH CARE EDUCATION/TRAINING PROGRAM

## 2022-07-15 PROCEDURE — 6370000000 HC RX 637 (ALT 250 FOR IP): Performed by: NURSE PRACTITIONER

## 2022-07-15 PROCEDURE — 97530 THERAPEUTIC ACTIVITIES: CPT

## 2022-07-15 PROCEDURE — 83735 ASSAY OF MAGNESIUM: CPT

## 2022-07-15 PROCEDURE — 97116 GAIT TRAINING THERAPY: CPT

## 2022-07-15 PROCEDURE — 94640 AIRWAY INHALATION TREATMENT: CPT

## 2022-07-15 PROCEDURE — 99254 IP/OBS CNSLTJ NEW/EST MOD 60: CPT | Performed by: INTERNAL MEDICINE

## 2022-07-15 PROCEDURE — 36415 COLL VENOUS BLD VENIPUNCTURE: CPT

## 2022-07-15 PROCEDURE — 93005 ELECTROCARDIOGRAM TRACING: CPT | Performed by: STUDENT IN AN ORGANIZED HEALTH CARE EDUCATION/TRAINING PROGRAM

## 2022-07-15 PROCEDURE — 2500000003 HC RX 250 WO HCPCS: Performed by: NURSE PRACTITIONER

## 2022-07-15 PROCEDURE — 84484 ASSAY OF TROPONIN QUANT: CPT

## 2022-07-15 PROCEDURE — 82962 GLUCOSE BLOOD TEST: CPT

## 2022-07-15 PROCEDURE — 1200000000 HC SEMI PRIVATE

## 2022-07-15 PROCEDURE — APPNB45 APP NON BILLABLE 31-45 MINUTES: Performed by: NURSE PRACTITIONER

## 2022-07-15 PROCEDURE — 93306 TTE W/DOPPLER COMPLETE: CPT

## 2022-07-15 PROCEDURE — 82805 BLOOD GASES W/O2 SATURATION: CPT

## 2022-07-15 PROCEDURE — 94664 DEMO&/EVAL PT USE INHALER: CPT

## 2022-07-15 PROCEDURE — 93010 ELECTROCARDIOGRAM REPORT: CPT | Performed by: INTERNAL MEDICINE

## 2022-07-15 PROCEDURE — 85610 PROTHROMBIN TIME: CPT

## 2022-07-15 PROCEDURE — 94761 N-INVAS EAR/PLS OXIMETRY MLT: CPT

## 2022-07-15 PROCEDURE — 97161 PT EVAL LOW COMPLEX 20 MIN: CPT

## 2022-07-15 PROCEDURE — 97165 OT EVAL LOW COMPLEX 30 MIN: CPT

## 2022-07-15 PROCEDURE — 84100 ASSAY OF PHOSPHORUS: CPT

## 2022-07-15 PROCEDURE — 2580000003 HC RX 258: Performed by: STUDENT IN AN ORGANIZED HEALTH CARE EDUCATION/TRAINING PROGRAM

## 2022-07-15 PROCEDURE — 2500000003 HC RX 250 WO HCPCS: Performed by: STUDENT IN AN ORGANIZED HEALTH CARE EDUCATION/TRAINING PROGRAM

## 2022-07-15 PROCEDURE — 80048 BASIC METABOLIC PNL TOTAL CA: CPT

## 2022-07-15 RX ORDER — INSULIN LISPRO 100 [IU]/ML
0-6 INJECTION, SOLUTION INTRAVENOUS; SUBCUTANEOUS
Status: DISCONTINUED | OUTPATIENT
Start: 2022-07-15 | End: 2022-07-17 | Stop reason: HOSPADM

## 2022-07-15 RX ORDER — ZOLPIDEM TARTRATE 5 MG/1
5 TABLET ORAL NIGHTLY PRN
Status: DISCONTINUED | OUTPATIENT
Start: 2022-07-15 | End: 2022-07-17 | Stop reason: HOSPADM

## 2022-07-15 RX ORDER — ONDANSETRON 2 MG/ML
4 INJECTION INTRAMUSCULAR; INTRAVENOUS EVERY 6 HOURS PRN
Status: DISCONTINUED | OUTPATIENT
Start: 2022-07-15 | End: 2022-07-17 | Stop reason: HOSPADM

## 2022-07-15 RX ORDER — ALBUTEROL SULFATE 90 UG/1
2 AEROSOL, METERED RESPIRATORY (INHALATION) EVERY 6 HOURS PRN
Status: DISCONTINUED | OUTPATIENT
Start: 2022-07-15 | End: 2022-07-17 | Stop reason: HOSPADM

## 2022-07-15 RX ORDER — LISINOPRIL 20 MG/1
20 TABLET ORAL DAILY
Status: DISCONTINUED | OUTPATIENT
Start: 2022-07-15 | End: 2022-07-17 | Stop reason: HOSPADM

## 2022-07-15 RX ORDER — METHYLPREDNISOLONE SODIUM SUCCINATE 40 MG/ML
40 INJECTION, POWDER, LYOPHILIZED, FOR SOLUTION INTRAMUSCULAR; INTRAVENOUS DAILY
Status: DISCONTINUED | OUTPATIENT
Start: 2022-07-15 | End: 2022-07-17 | Stop reason: HOSPADM

## 2022-07-15 RX ORDER — DEXTROSE MONOHYDRATE 50 MG/ML
100 INJECTION, SOLUTION INTRAVENOUS PRN
Status: DISCONTINUED | OUTPATIENT
Start: 2022-07-15 | End: 2022-07-17 | Stop reason: HOSPADM

## 2022-07-15 RX ORDER — PANTOPRAZOLE SODIUM 40 MG/1
40 TABLET, DELAYED RELEASE ORAL DAILY
Status: DISCONTINUED | OUTPATIENT
Start: 2022-07-15 | End: 2022-07-17 | Stop reason: HOSPADM

## 2022-07-15 RX ORDER — DOXYCYCLINE HYCLATE 100 MG
100 TABLET ORAL EVERY 12 HOURS SCHEDULED
Status: DISCONTINUED | OUTPATIENT
Start: 2022-07-15 | End: 2022-07-17 | Stop reason: HOSPADM

## 2022-07-15 RX ORDER — HYDRALAZINE HYDROCHLORIDE 50 MG/1
50 TABLET, FILM COATED ORAL EVERY 8 HOURS SCHEDULED
Status: DISCONTINUED | OUTPATIENT
Start: 2022-07-15 | End: 2022-07-17 | Stop reason: HOSPADM

## 2022-07-15 RX ORDER — ALBUTEROL SULFATE 90 UG/1
2 AEROSOL, METERED RESPIRATORY (INHALATION) 4 TIMES DAILY
Status: DISCONTINUED | OUTPATIENT
Start: 2022-07-15 | End: 2022-07-17 | Stop reason: HOSPADM

## 2022-07-15 RX ORDER — OXYCODONE HYDROCHLORIDE 5 MG/1
5 TABLET ORAL EVERY 6 HOURS PRN
Status: DISCONTINUED | OUTPATIENT
Start: 2022-07-15 | End: 2022-07-15

## 2022-07-15 RX ORDER — ATORVASTATIN CALCIUM 40 MG/1
80 TABLET, FILM COATED ORAL NIGHTLY
Status: DISCONTINUED | OUTPATIENT
Start: 2022-07-15 | End: 2022-07-17 | Stop reason: HOSPADM

## 2022-07-15 RX ORDER — HYDRALAZINE HYDROCHLORIDE 20 MG/ML
10 INJECTION INTRAMUSCULAR; INTRAVENOUS EVERY 4 HOURS PRN
Status: DISCONTINUED | OUTPATIENT
Start: 2022-07-15 | End: 2022-07-17 | Stop reason: HOSPADM

## 2022-07-15 RX ORDER — INSULIN GLARGINE 100 [IU]/ML
20 INJECTION, SOLUTION SUBCUTANEOUS NIGHTLY
Status: DISCONTINUED | OUTPATIENT
Start: 2022-07-15 | End: 2022-07-17 | Stop reason: HOSPADM

## 2022-07-15 RX ORDER — BUMETANIDE 0.25 MG/ML
2 INJECTION, SOLUTION INTRAMUSCULAR; INTRAVENOUS ONCE
Status: COMPLETED | OUTPATIENT
Start: 2022-07-15 | End: 2022-07-15

## 2022-07-15 RX ORDER — FLUTICASONE PROPIONATE 50 MCG
1 SPRAY, SUSPENSION (ML) NASAL DAILY
Status: DISCONTINUED | OUTPATIENT
Start: 2022-07-15 | End: 2022-07-17 | Stop reason: HOSPADM

## 2022-07-15 RX ORDER — WARFARIN SODIUM 4 MG/1
4 TABLET ORAL
Status: DISCONTINUED | OUTPATIENT
Start: 2022-07-15 | End: 2022-07-17 | Stop reason: HOSPADM

## 2022-07-15 RX ORDER — CARVEDILOL 6.25 MG/1
12.5 TABLET ORAL 2 TIMES DAILY WITH MEALS
Status: DISCONTINUED | OUTPATIENT
Start: 2022-07-15 | End: 2022-07-15

## 2022-07-15 RX ORDER — IPRATROPIUM BROMIDE AND ALBUTEROL SULFATE 2.5; .5 MG/3ML; MG/3ML
1 SOLUTION RESPIRATORY (INHALATION) EVERY 4 HOURS PRN
Status: DISCONTINUED | OUTPATIENT
Start: 2022-07-15 | End: 2022-07-17 | Stop reason: HOSPADM

## 2022-07-15 RX ORDER — SODIUM CHLORIDE 0.9 % (FLUSH) 0.9 %
5-40 SYRINGE (ML) INJECTION EVERY 12 HOURS SCHEDULED
Status: DISCONTINUED | OUTPATIENT
Start: 2022-07-15 | End: 2022-07-17 | Stop reason: HOSPADM

## 2022-07-15 RX ORDER — ACETAMINOPHEN 325 MG/1
650 TABLET ORAL EVERY 6 HOURS PRN
Status: DISCONTINUED | OUTPATIENT
Start: 2022-07-15 | End: 2022-07-17 | Stop reason: HOSPADM

## 2022-07-15 RX ORDER — ACETAMINOPHEN 650 MG/1
650 SUPPOSITORY RECTAL EVERY 6 HOURS PRN
Status: DISCONTINUED | OUTPATIENT
Start: 2022-07-15 | End: 2022-07-15

## 2022-07-15 RX ORDER — CLOPIDOGREL BISULFATE 75 MG/1
75 TABLET ORAL DAILY
Status: DISCONTINUED | OUTPATIENT
Start: 2022-07-15 | End: 2022-07-17 | Stop reason: HOSPADM

## 2022-07-15 RX ORDER — WARFARIN SODIUM 6 MG/1
6 TABLET ORAL
Status: DISCONTINUED | OUTPATIENT
Start: 2022-07-17 | End: 2022-07-17 | Stop reason: HOSPADM

## 2022-07-15 RX ORDER — CARVEDILOL 25 MG/1
25 TABLET ORAL 2 TIMES DAILY WITH MEALS
Status: DISCONTINUED | OUTPATIENT
Start: 2022-07-15 | End: 2022-07-17 | Stop reason: HOSPADM

## 2022-07-15 RX ORDER — KETOROLAC TROMETHAMINE 30 MG/ML
30 INJECTION, SOLUTION INTRAMUSCULAR; INTRAVENOUS EVERY 6 HOURS PRN
Status: DISCONTINUED | OUTPATIENT
Start: 2022-07-15 | End: 2022-07-17 | Stop reason: HOSPADM

## 2022-07-15 RX ORDER — SODIUM CHLORIDE 0.9 % (FLUSH) 0.9 %
5-40 SYRINGE (ML) INJECTION PRN
Status: DISCONTINUED | OUTPATIENT
Start: 2022-07-15 | End: 2022-07-17 | Stop reason: HOSPADM

## 2022-07-15 RX ORDER — BUMETANIDE 0.25 MG/ML
2 INJECTION, SOLUTION INTRAMUSCULAR; INTRAVENOUS 2 TIMES DAILY
Status: DISCONTINUED | OUTPATIENT
Start: 2022-07-15 | End: 2022-07-16

## 2022-07-15 RX ORDER — INSULIN LISPRO 100 [IU]/ML
0-3 INJECTION, SOLUTION INTRAVENOUS; SUBCUTANEOUS NIGHTLY
Status: DISCONTINUED | OUTPATIENT
Start: 2022-07-15 | End: 2022-07-17 | Stop reason: HOSPADM

## 2022-07-15 RX ADMIN — ALBUTEROL SULFATE 2 PUFF: 90 AEROSOL, METERED RESPIRATORY (INHALATION) at 06:39

## 2022-07-15 RX ADMIN — BUMETANIDE 2 MG: 0.25 INJECTION, SOLUTION INTRAMUSCULAR; INTRAVENOUS at 07:43

## 2022-07-15 RX ADMIN — HYDRALAZINE HYDROCHLORIDE 50 MG: 50 TABLET, FILM COATED ORAL at 21:29

## 2022-07-15 RX ADMIN — CLOPIDOGREL BISULFATE 75 MG: 75 TABLET ORAL at 08:43

## 2022-07-15 RX ADMIN — LISINOPRIL 20 MG: 20 TABLET ORAL at 16:07

## 2022-07-15 RX ADMIN — ALBUTEROL SULFATE 2 PUFF: 90 AEROSOL, METERED RESPIRATORY (INHALATION) at 17:55

## 2022-07-15 RX ADMIN — ALBUTEROL SULFATE 2 PUFF: 90 AEROSOL, METERED RESPIRATORY (INHALATION) at 20:22

## 2022-07-15 RX ADMIN — METHYLPREDNISOLONE SODIUM SUCCINATE 40 MG: 40 INJECTION, POWDER, FOR SOLUTION INTRAMUSCULAR; INTRAVENOUS at 06:49

## 2022-07-15 RX ADMIN — ALBUTEROL SULFATE 2 PUFF: 90 AEROSOL, METERED RESPIRATORY (INHALATION) at 16:13

## 2022-07-15 RX ADMIN — INSULIN LISPRO 3 UNITS: 100 INJECTION, SOLUTION INTRAVENOUS; SUBCUTANEOUS at 17:27

## 2022-07-15 RX ADMIN — ATORVASTATIN CALCIUM 80 MG: 40 TABLET, FILM COATED ORAL at 21:29

## 2022-07-15 RX ADMIN — FLUTICASONE PROPIONATE 1 SPRAY: 50 SPRAY, METERED NASAL at 16:07

## 2022-07-15 RX ADMIN — Medication 2 PUFF: at 20:23

## 2022-07-15 RX ADMIN — BUMETANIDE 2 MG: 0.25 INJECTION, SOLUTION INTRAMUSCULAR; INTRAVENOUS at 22:29

## 2022-07-15 RX ADMIN — SODIUM CHLORIDE, PRESERVATIVE FREE 10 ML: 5 INJECTION INTRAVENOUS at 22:12

## 2022-07-15 RX ADMIN — ZOLPIDEM TARTRATE 5 MG: 5 TABLET ORAL at 22:29

## 2022-07-15 RX ADMIN — INSULIN LISPRO 4 UNITS: 100 INJECTION, SOLUTION INTRAVENOUS; SUBCUTANEOUS at 12:05

## 2022-07-15 RX ADMIN — HYDRALAZINE HYDROCHLORIDE 50 MG: 50 TABLET, FILM COATED ORAL at 14:27

## 2022-07-15 RX ADMIN — INSULIN GLARGINE 20 UNITS: 100 INJECTION, SOLUTION SUBCUTANEOUS at 21:30

## 2022-07-15 RX ADMIN — Medication 2 PUFF: at 16:14

## 2022-07-15 RX ADMIN — HYDRALAZINE HYDROCHLORIDE 50 MG: 50 TABLET, FILM COATED ORAL at 06:49

## 2022-07-15 RX ADMIN — PANTOPRAZOLE SODIUM 40 MG: 40 TABLET, DELAYED RELEASE ORAL at 06:49

## 2022-07-15 RX ADMIN — WARFARIN SODIUM 4 MG: 4 TABLET ORAL at 17:30

## 2022-07-15 RX ADMIN — DOXYCYCLINE HYCLATE 100 MG: 100 TABLET, COATED ORAL at 21:29

## 2022-07-15 RX ADMIN — CARVEDILOL 25 MG: 25 TABLET, FILM COATED ORAL at 17:31

## 2022-07-15 RX ADMIN — KETOROLAC TROMETHAMINE 30 MG: 30 INJECTION, SOLUTION INTRAMUSCULAR; INTRAVENOUS at 18:56

## 2022-07-15 RX ADMIN — DOXYCYCLINE HYCLATE 100 MG: 100 TABLET, COATED ORAL at 08:43

## 2022-07-15 RX ADMIN — SODIUM CHLORIDE, PRESERVATIVE FREE 10 ML: 5 INJECTION INTRAVENOUS at 08:43

## 2022-07-15 RX ADMIN — OXYCODONE HYDROCHLORIDE 5 MG: 5 TABLET ORAL at 08:43

## 2022-07-15 RX ADMIN — CARVEDILOL 12.5 MG: 6.25 TABLET, FILM COATED ORAL at 08:43

## 2022-07-15 ASSESSMENT — PAIN SCALES - GENERAL
PAINLEVEL_OUTOF10: 8
PAINLEVEL_OUTOF10: 6
PAINLEVEL_OUTOF10: 0
PAINLEVEL_OUTOF10: 0

## 2022-07-15 ASSESSMENT — ENCOUNTER SYMPTOMS
SINUS PAIN: 0
SINUS PRESSURE: 0
COUGH: 1
SHORTNESS OF BREATH: 1
DIARRHEA: 0
EYE PAIN: 0
VOICE CHANGE: 0
ABDOMINAL PAIN: 0
BLOOD IN STOOL: 0
EYE DISCHARGE: 0
ABDOMINAL DISTENTION: 1
CHEST TIGHTNESS: 0
VOMITING: 0
NAUSEA: 0
BACK PAIN: 0

## 2022-07-15 ASSESSMENT — PAIN DESCRIPTION - LOCATION
LOCATION: LEG
LOCATION: LEG

## 2022-07-15 ASSESSMENT — PAIN DESCRIPTION - PAIN TYPE: TYPE: CHRONIC PAIN

## 2022-07-15 ASSESSMENT — PAIN DESCRIPTION - DESCRIPTORS: DESCRIPTORS: SHARP

## 2022-07-15 NOTE — CONSULTS
CARDIOLOGY CONSULT NOTE       Reason for consultation:  CHF        Primary care physician: Kvng Fontaine MD       Chief Complaints : SOB     History of present illness:Josiah is a 65 y. o.year old who presents with increased shortness of breath over the last week. He reports having increased lower extremity edema orthopnea. Patient is well-known to cardiology and recently seen in office. Patient has had complications with lower extremity edema and cellulitis resulting in multiple emergency room visits. Patient has venous reflux and is awaiting venous ablation bilaterally. Patient originally had PCI of RCA in 2019 then CABG x4 in 2020 (LIMA-LAD reversal diagonal, SVG-RCA/PDA, SVG-OM). He unfortunately had stenting of SVG-RCA PDA 6 months post CABG. This time patient was treated for ischemic cardiomyopathy. Patient also has a history of noncompliance and cocaine abuse. Past medical history:    has a past medical history of Abnormal nuclear stress test, Atrial fibrillation (HCC), CAD (coronary artery disease), CHF (congestive heart failure) ([facility] [address] COPD (chronic obstructive pulmonary disease) ([facility] [address] Decreased cardiac ejection fraction, Diabetes mellitus ([facility] [address] GERD (gastroesophageal reflux disease), H/O cardiovascular stress test, H/O cardiovascular stress test, H/O cardiovascular stress test, H/O cardiovascular stress test, H/O Doppler ultrasound, H/O echocardiogram, H/O percutaneous left heart catheterization, Heart imaging, History of coronary artery stent placement, History of exercise stress test, History of Holter monitoring, History of MRI of brain and brain stem, History of nuclear stress test, Hx of Doppler echocardiogram, Hyperlipidemia, Hypertension, Kidney disease, S/P cardiac catheterization, SOB (shortness of breath), Status post angioplasty with stent, and Wears dentures.   Past surgical history:   has a past surgical history that includes Coronary angioplasty with stent; Appendectomy; Femur fracture surgery (1984); Coronary angioplasty with stent (11/13/2013); Colonoscopy; Mandible fracture surgery (1984); Cardiac catheterization (11/13/2013); Cardiac surgery (07/03/2020); Coronary artery bypass graft (N/A, 7/3/2020); Dental surgery; and BIOPSY / LIGATION TEMPORAL ARTERY (Left, 8/17/2020). Social History:   reports that he has been smoking cigarettes. He started smoking about 48 years ago. He has a 23.00 pack-year smoking history. He has never used smokeless tobacco. He reports that he does not currently use drugs after having used the following drugs: Cocaine. He reports that he does not drink alcohol. Family history:   no family history of CAD, STROKE of DM at early age    Allergies   Allergen Reactions    Hydromorphone Other (See Comments)     Hallucinations.         albuterol sulfate HFA (PROVENTIL;VENTOLIN;PROAIR) 108 (90 Base) MCG/ACT inhaler 2 puff, Q6H PRN  atorvastatin (LIPITOR) tablet 80 mg, Nightly  carvedilol (COREG) tablet 12.5 mg, BID WC  clopidogrel (PLAVIX) tablet 75 mg, Daily  pantoprazole (PROTONIX) tablet 40 mg, Daily  sodium chloride flush 0.9 % injection 5-40 mL, 2 times per day  sodium chloride flush 0.9 % injection 5-40 mL, PRN  acetaminophen (TYLENOL) tablet 650 mg, Q6H PRN  bisacodyl (DULCOLAX) EC tablet 5 mg, Daily PRN  ondansetron (ZOFRAN) injection 4 mg, Q6H PRN  ipratropium-albuterol (DUONEB) nebulizer solution 1 ampule, Q4H PRN  albuterol sulfate HFA (PROVENTIL;VENTOLIN;PROAIR) 108 (90 Base) MCG/ACT inhaler 2 puff, 4x daily   And  ipratropium (ATROVENT HFA) 17 MCG/ACT inhaler 2 puff, 4x daily  methylPREDNISolone sodium (SOLU-MEDROL) injection 40 mg, Daily  oxyCODONE (ROXICODONE) immediate release tablet 5 mg, Q6H PRN  hydrALAZINE (APRESOLINE) injection 10 mg, Q4H PRN  hydrALAZINE (APRESOLINE) tablet 50 mg, 3 times per day  warfarin (COUMADIN) tablet 4 mg, Once per day on Mon Tue Wed Thu Fri Sat  [START ON 7/17/2022] warfarin (COUMADIN) tablet 6 mg, Once per day on Sun  insulin lispro (HUMALOG) injection vial 0-6 Units, TID   insulin lispro (HUMALOG) injection vial 0-3 Units, Nightly  doxycycline hyclate (VIBRA-TABS) tablet 100 mg, 2 times per day      Current Facility-Administered Medications   Medication Dose Route Frequency Provider Last Rate Last Admin    albuterol sulfate HFA (PROVENTIL;VENTOLIN;PROAIR) 108 (90 Base) MCG/ACT inhaler 2 puff  2 puff Inhalation Q6H PRN Waneta Bernheim, MD   2 puff at 07/15/22 0639    atorvastatin (LIPITOR) tablet 80 mg  80 mg Oral Nightly Waneta Bernheim, MD        carvedilol (COREG) tablet 12.5 mg  12.5 mg Oral BID WC Waneta Bernheim, MD   12.5 mg at 07/15/22 0843    clopidogrel (PLAVIX) tablet 75 mg  75 mg Oral Daily Waneta Bernheim, MD   75 mg at 07/15/22 0843    pantoprazole (PROTONIX) tablet 40 mg  40 mg Oral Daily Waneta Bernheim, MD   40 mg at 07/15/22 0649    sodium chloride flush 0.9 % injection 5-40 mL  5-40 mL IntraVENous 2 times per day Waneta Bernheim, MD   10 mL at 07/15/22 0843    sodium chloride flush 0.9 % injection 5-40 mL  5-40 mL IntraVENous PRN Waneta Bernheim, MD        acetaminophen (TYLENOL) tablet 650 mg  650 mg Oral Q6H PRN Waneta Bernheim, MD        bisacodyl (DULCOLAX) EC tablet 5 mg  5 mg Oral Daily PRN Waneta Bernheim, MD        ondansetron [facility]) injection 4 mg  4 mg IntraVENous Q6H PRN Waneta Bernheim, MD        ipratropium-albuterol (DUONEB) nebulizer solution 1 ampule  1 ampule Inhalation Q4H PRN Waneta Bernheim, MD        albuterol sulfate HFA (PROVENTIL;VENTOLIN;PROAIR) 108 (90 Base) MCG/ACT inhaler 2 puff  2 puff Inhalation 4x daily Waneta Bernheim, MD        And    ipratropium (ATROVENT HFA) 17 MCG/ACT inhaler 2 puff  2 puff Inhalation 4x daily Waneta Bernheim, MD        methylPREDNISolone sodium (SOLU-MEDROL) injection 40 mg  40 mg IntraVENous Daily Waneta Bernheim, MD   40 mg at 07/15/22 0649    oxyCODONE (ROXICODONE) immediate release tablet 5 mg  5 mg Oral Q6H PRN Waneta Bernheim, MD   5 mg at 07/15/22 0843    hydrALAZINE (APRESOLINE) injection 10 mg  10 mg IntraVENous Q4H PRN Sol Sanchez MD        hydrALAZINE (APRESOLINE) tablet 50 mg  50 mg Oral 3 times per day Sol Sanchez MD   50 mg at 07/15/22 5689    warfarin (COUMADIN) tablet 4 mg  4 mg Oral Once per day on Mon Tue Wed Thu Fri Sat Sol Sanchez MD        [START ON 7/17/2022] warfarin (COUMADIN) tablet 6 mg  6 mg Oral Once per day on Sun Sol Sanchez MD        insulin lispro (HUMALOG) injection vial 0-6 Units  0-6 Units SubCUTAneous TID WC Sol Sanchez MD        insulin lispro (HUMALOG) injection vial 0-3 Units  0-3 Units SubCUTAneous Nightly Sol Sacnhez MD        doxycycline hyclate (VIBRA-TABS) tablet 100 mg  100 mg Oral 2 times per day Armin Cline MD   100 mg at 07/15/22 0204     Review of Systems:   Review of Systems   Respiratory:  Positive for shortness of breath. Cardiovascular:  Positive for leg swelling. Negative for chest pain and palpitations. Gastrointestinal:  Positive for abdominal distention. Neurological:  Positive for weakness. Physical Examination:    Physical Exam  Constitutional:       Appearance: He is well-developed. Cardiovascular:      Rate and Rhythm: Normal rate and regular rhythm. Pulses: Intact distal pulses. Dorsalis pedis pulses are 2+ on the right side and 2+ on the left side. Posterior tibial pulses are 2+ on the right side and 2+ on the left side. Heart sounds: Normal heart sounds, S1 normal and S2 normal.   Pulmonary:      Effort: Pulmonary effort is normal.      Breath sounds: Wheezing present. Musculoskeletal:         General: Normal range of motion. Right lower leg: Edema present. Left lower leg: Edema present. Skin:     General: Skin is warm and dry. Neurological:      Mental Status: He is alert and oriented to person, place, and time.          Medical decision making and Data review:  Vitals:    07/15/22 0538 07/15/22 0639 07/15/22 0648 07/15/22 0834   BP: (!) 194/109  (!) 185/89 (!) 156/114   Pulse: 81 80  93   Resp: 27 23  14   Temp: 97.9 °F (36.6 °C)      TempSrc: Oral   Oral   SpO2: 94% 92%  93%   Weight:       Height:            Lab Review   No results for input(s): WBC, HGB, HCT, PLT in the last 72 hours. No results for input(s): NA, K, CL, CO2, PHOS, BUN, CREATININE, CA in the last 72 hours. No results for input(s): AST, ALT, ALB, BILIDIR, BILITOT, ALKPHOS in the last 72 hours. No results for input(s): TROPONINT in the last 72 hours. No results for input(s): PROBNP in the last 72 hours. Lab Results   Component Value Date    INR 1.6 07/07/2022    PROTIME 19.6 (H) 07/07/2022       EKG: NSR with PAC    ECHO:1/26/22  Left ventricular function is low normal, EF is estimated at 45-50%. Moderate left ventricular hypertrophy. Grade I diastolic dysfunction. Moderately dilated left atrium. Moderate mitral and mild tricuspid regurgitation is present. RVSP is 32 mmHg. Dilation of the aortic root(4.5cm) and the ascending aorta(4.7cm). No evidence of pericardial effusion. Chest Xray:(reviewed by myself)  CT ABDOMEN PELVIS WO CONTRAST Additional Contrast? Radiologist Recommendation    Result Date: 6/23/2022  EXAMINATION: CT OF THE ABDOMEN AND PELVIS WITHOUT CONTRAST 6/23/2022 7:52 am TECHNIQUE: CT of the abdomen and pelvis was performed without the administration of intravenous contrast. Multiplanar reformatted images are provided for review. Automated exposure control, iterative reconstruction, and/or weight based adjustment of the mA/kV was utilized to reduce the radiation dose to as low as reasonably achievable. COMPARISON: None.  HISTORY: ORDERING SYSTEM PROVIDED HISTORY: Lymphadenopathy TECHNOLOGIST PROVIDED HISTORY: Additional Contrast?->Radiologist Recommendation Reason for Exam: pt states per  swelling in groin, pt does not palpate any knots or mass, pt refused oral contrast, Surg- Appendectomy FINDINGS: Lower Chest: Lung bases are clear. Organs: Liver without focal lesion. Gallbladder unremarkable. Pancreas and spleen unremarkable. Adrenals without nodule. Kidneys without suspicious renal lesion and no hydronephrosis. GI/Bowel: No focal thickening or disproportion dilatation of bowel. No inflammatory findings. Redundant sigmoid colon with moderate to large distal rectosigmoid colonic stool burden Pelvis: No suspicious pelvic lesion or bulky pelvic adenopathy/free fluid. Peritoneum/Retroperitoneum: No bulky retroperitoneal adenopathy. No suspicious peritoneal or mesenteric process Vasculature: Grossly normal caliber of abdominal aorta and vasculature Bones/Soft Tissues: No acute osseous or soft tissue findings. No bulky adenopathy within the abdomen or pelvis Moderate to large distal colonic stool burden concerning for retention or stasis potential constipation in the appropriate clinical setting. No inflammatory change or mechanical obstructive process however       All labs, medications and tests reviewed by myself including data  from outside source , patient and available family . Continue all other medications of all above medical condition listed as is. Impression:  Principal Problem:    Acute on chronic respiratory failure with hypoxia (HCC)  Active Problems:    Longstanding persistent atrial fibrillation (HCC)    Venous reflux    Acute bronchitis with COPD (HCC)    Paroxysmal A-fib (HCC)    Acute on chronic congestive heart failure, unspecified heart failure type ([address]    Hypertensive urgency    Chronic kidney disease, stage III (moderate) (HCC)  Resolved Problems:    * No resolved hospital problems. *      Assessment: 65 y. o.year old with PMH of  has a past medical history of Abnormal nuclear stress test, Atrial fibrillation ([address] CAD (coronary artery disease), CHF (congestive heart failure) ([address] COPD (chronic obstructive pulmonary disease) ([address] Decreased cardiac ejection fraction, Diabetes mellitus ([facility] [address] GERD (gastroesophageal reflux disease), H/O cardiovascular stress test, H/O cardiovascular stress test, H/O cardiovascular stress test, H/O cardiovascular stress test, H/O Doppler ultrasound, H/O echocardiogram, H/O percutaneous left heart catheterization, Heart imaging, History of coronary artery stent placement, History of exercise stress test, History of Holter monitoring, History of MRI of brain and brain stem, History of nuclear stress test, Hx of Doppler echocardiogram, Hyperlipidemia, Hypertension, Kidney disease, S/P cardiac catheterization, SOB (shortness of breath), Status post angioplasty with stent, and Wears dentures. Julio Snyder, APRN - CNP, 7/15/2022 10:03 AM           CARDIOLOGY ATTENDING ADDENDUM    I have seen, spoken to and examined this patient personally, independent of the NP/PAC. I have reviewed the hospital care given to date and reviewed all pertinent labs and imaging. I have spoken with patient, nursing staff and provided written and verbal instructions . The above note has been reviewed. I have spent substantive amount of time in formulating patient care. HPI     Patient was seen and examined independently    52-year-old gentleman with prior medical history significant for coronary disease s/p PCI and CABG/ischemic heart disease as detailed above, history of congestive heart failure hyperlipidemia hypertension presents to the hospital with chief complaint of bilateral lower extremity edema. Patient also has history of noncompliance as well as cocaine abuse. Physical Exam:    General:   Awake, alert  Head:normal  Eye:normal  Chest:   Bilateral wheezing on examination  Cardiovascular:  S1S2 IRIR  Abdomen: soft   Extremities:   ++  edema  Pulses; palpable      MEDICAL DECISION MAKING :       HFrEF: combined diastolic/systolic compensated heart failure. Continue with Bumex 2 mg BID.  Creatinine at baseline restart lisinopril 20 mg.  Echo today . Strict I/O with daily weights. CAD: CABG x 4 in 2020 then PCI of SVG-RCA/PDA 6 months post CABG. continue with medical therapy including beta-blocker statins. HTN:Elevated, increase Coreg to 25 mg BID and hydralazine to 50 mg TID. ACE on hold due to renal function. Atrial Fibrillation: Had MAZE during CABG. Continue with  anticoagulation. Dyslipidemia: continue statins    Venous reflux: Following outpatient with Dr. Cong Rdz for right GSV and left CFV ablation. Norvasc D/C due to BLE. COPD: Exacerbation. Tobacco abuse: Counseled on quitting.     History of cocaine abuse    CKD: at baseline, will continue to monitor          Dr. Tonda Mcburney, MD

## 2022-07-15 NOTE — PROGRESS NOTES
Oriana Licea is a 65 y.o. male on warfarin therapy for atrial fibrillation. Pharmacy consulted by Dr. Som Adhikari for monitoring and adjustment of treatment.     Indication for anticoagulation: Atrial fibrillation  INR goal: 2 - 3  Warfarin dose prior to admission: 6 mg on Sun; 4 mg on all other days    (ABNORMAL) Protime-INR (07/14/2022 11:13 PM EDT)  Lab Results - (ABNORMAL) Protime-INR (07/14/2022 11:13 PM EDT)  Component Value Ref Range Test Method Analysis Time Performed At [facility]   Protime 21.2 (A) 10.0 - 12.8 Seconds   07/14/2022 11:39 PM EDT [address] [place] [address]     INR 1.8 (A) 0.9 - 1.1   07/14/2022 11:39 PM EDT [address] [place] [address]       Lab Results - (ABNORMAL) Protime-INR (07/14/2022 11:13 PM EDT)  Specimen (Source) Anatomical Location / Laterality Collection Method / Volume Collection Time Received Time   Blood SWAB OF LINE INSERTION SITE / Unknown Collection / Unknown 07/14/2022 11:13 PM EDT 07/14/2022 11:28 PM EDT     Lab Results - (ABNORMAL) Protime-INR (07/14/2022 11:13 PM EDT)  Narrative   [address] [place] [address] - 07/14/2022 11:39 PM EDT       Hemoglobin 9.2 (A) 13.1 - 17.6 g/dL   07/14/2022 10:07 PM EDT [address] [facility]     (ABNORMAL) CBC w/ Diff-Complete Blood Count (07/14/2022 9:32 PM EDT)  Lab Results - (ABNORMAL) CBC w/ Diff-Complete Blood Count (07/14/2022 9:32 PM EDT)  Component Value Ref Range Test Method Analysis Time Performed At [facility]   WBC 15.2 (A) 4.0 - 10.5 K/uL   07/14/2022 10:07 PM EDT [address] [place] [address]     RBC 4.10 (A) 4.30 - 5.86 M/uL   07/14/2022 10:07 PM EDT [address] [place] [address]     HGB 11.2 (A) 13.1 - 17.6 g/dL   07/14/2022 10:07 PM EDT [address] [place] [address]     HCT 35.1 (A) 39.0 - 51.5 %   07/14/2022 10:07 PM EDT [facility]     MCV 85.6 85.0 - 99.0 fl   07/14/2022 10:07 PM EDT [address] [facility]     MCH 27.2 (A) 28.4 - 33.4 pg   07/14/2022 10:07 PM EDT [address]     MCHC 31.8 31.1 - 37.0 g/dL   07/14/2022 10:07 PM EDT [address]     RDW 16.7 (A) 11.7 - 15.2 %   07/14/2022 10:07 PM EDT [address]     Platelet count 503 486 - 393 K/uL   07/14/2022 10:07 PM EDT [address]     Neutrophils % 83.2 %   07/14/2022 10:07 PM EDT [address]     Lymphocytes % 9.3 %   07/14/2022 10:07 PM EDT [address]     Monocytes % 6.6 %   07/14/2022 10:07 PM EDT [address]     Eosinophils % 0.8 %   07/14/2022 10:07 PM EDT [address]     Basophils % 0.1 %   07/14/2022 10:07 PM EDT [address]     Neutrophils Absolute 12.6 (A) 2.0 - 7.3 K/uL   07/14/2022 10:07 PM EDT [address]     Lymphocytes Absolute 1.4 0.8 - 3.6 K/uL   07/14/2022 10:07 PM EDT [address]     Monocytes Absolute 1.0 (A) 0.3 - 0.9 K/uL   07/14/2022 10:07 PM EDT [address]     Eosinophils Absolute 0.1 0.0 - 0.4 K/uL   07/14/2022 10:07 PM EDT [address]     Basophils Absolute 0.0 0.0 - 0.1 K/uL   07/14/2022 10:07 PM EDT [address]       Lab Results - (ABNORMAL) CBC w/ Diff-Complete Blood Count (07/14/2022 9:32 PM EDT)  Specimen (Source) Anatomical Location / Laterality Collection Method / Volume Collection Time Received Time   Blood SWAB OF LINE INSERTION SITE / Unknown Collection / Unknown 07/14/2022 9:32 PM EDT 07/14/2022 9:50 PM EDT       Assessment/Plan:  Drug Interactions:   Plavix (Home med)  Solu-Medrol IV (New start)  Acetaminophen (PRN)  INR subtherapeutic on admission at 1.8  Patient last seen at anticoagulation clinic on 07/07/2022 with a subtherapeutic INR due to missed doses. Will initially continue warfarin regimen of 6 mg on Sun and 4 mg all other days and trend INR daily.   Pharmacy will continue to monitor and adjust warfarin therapy as indicated    Thank you for the consult.   Kni Fletcher [facility]  7/15/2022 6:22 AM

## 2022-07-15 NOTE — PROGRESS NOTES
posture, safety awareness, activity tolerance (unless otherwise indicated)    Cognitive and Psychosocial Functioning:  Overall cognitive status: WNL, A/Ox4. Inappropriate with staff at times, impulsive. Affect: friendly    Functional Mobility:  Bed Mobility: ind  Sit <> Stand: CGA progressing to SBA    Ambulation: CGA progressing to SBA with no AD    AM-PAC 6 click short form for inpatient daily activity:   How much help from another person does the patient currently need. .. Unable  Dep A Lot  Max A A Lot   Mod A A Little  Min A A Little   CGA  SBA None   Mod I  Indep  Sup   1. Putting on and taking off regular lower body clothing? [] 1    [] 2   [] 2   [] 3   [x] 3   [] 4      2. Bathing (including washing, rinsing, drying)? [] 1   [] 2   [] 2 [] 3 [x] 3 [] 4   3. Toileting, which includes using toilet, bedpan, or urinal? [] 1    [] 2   [] 2   [] 3   [x] 3   [] 4     4. Putting on and taking off regular upper body clothing? [] 1   [] 2   [] 2   [] 3   [] 3    [x] 4      5. Taking care of personal grooming such as brushing teeth? [] 1   [] 2    [] 2 [] 3    [] 3   [x] 4      6. Eating meals? [] 1   [] 2   [] 2   [] 3   [] 3   [x] 4        Raw Score:  21      24/24 = unimpaired  23/24 = 1-20% impaired   20/24-22/24 = 21-40% impaired  15/24-19/24 = 41-59% impaired   10/24-14/24 = 60%-79% impaired  7/24-9/24 = 80%-99% impaired  6/24 = 100% impaired     Treatment:  Therapeutic Activity Training (15 minutes): Therapeutic activity training was instructed today. Cues were given for safety, sequence, UE/LE placement, visual cues, and balance. Activities performed today included pt demo STS from EOB with CGA, ambulating to end of hallway and back with x2 standing rest breaks with CGA progressing to SBA using no AD. Pt returning to room, taking sitting rest break on bed.  Pt standing from bed with SBA, ambulating to bathroom with SBA using no AD, standing at sink for several minutes to complete grooming/oral hygiene tasks with SBA. Pt returning to room, sitting in chair with SBA and positioned for comfort. Education: Role of OT, OT POC, discharge needs, safety, benefits of EOB/OOB activity, AD/DME needs, Home safety  Safety Measures: Gait belt used for safety of pt and therapist, Left in chair, call light and phone within reach, lines managed    Assessment:  Pt is a 65 yr old male from home with friends who presents with respiratory failure with hypoxia. Prior to admission, pt was independent with ADLs, IADLs, and mobility using no AD. Pt currently very close to baseline, only barrier seems to be endurance though impairment seems non-significant. Pt and therapist agree at this time that pt has no needs for IP OT services and would be safe to d/c to home independently. Thank you for the referral, please reorder services if pt needs arise.     Complexity: low  Prognosis: good  Barriers: endurance    Time:   Time in: 0921  Time out: 0950  Treatment Minutes: 15  Evaluation Minutes: 10  Total time: 29    Electronically signed by:      SIMONE Clements  7/15/2022, 10:48 AM

## 2022-07-15 NOTE — CARE COORDINATION
CM in to see Pt to initiate discharge planning. Pt from home. Pt states he is independent with ADL's prior to admission and has DME to include home o2. Pt denies the need for home care at this time. Pt has insurance, pcp, and can afford medications. Pt denies any needs at this time.      CM following Tremfya Counseling: I discussed with the patient the risks of guselkumab including but not limited to immunosuppression, serious infections, worsening of inflammatory bowel disease and drug reactions.  The patient understands that monitoring is required including a PPD at baseline and must alert us or the primary physician if symptoms of infection or other concerning signs are noted.

## 2022-07-15 NOTE — PROGRESS NOTES
4 Eyes Skin Assessment     NAME:  Brandin Fletcher  YOB: 1960  MEDICAL RECORD NUMBER:  [id number]    The patient is being assess for  Admission    I agree that 2 RN's have performed a thorough Head to Toe Skin Assessment on the patient. ALL assessment sites listed below have been assessed. Areas assessed by both nurses:    Head, Face, Ears, Shoulders, Back, Chest, Arms, Elbows, Hands, Sacrum. Buttock, Coccyx, Ischium, and Legs. Feet and Heels        Does the Patient have a Wound?  No noted wound(s)       Ernesto Prevention initiated:  No   Wound Care Orders initiated:  No    Pressure Injury (Stage 3,4, Unstageable, DTI, NWPT, and Complex wounds) if present place referral/consult order under [de-identified] No    New and Established Ostomies if present place consult order under : No      Nurse 1 eSignature: Electronically signed by Veronika Garduno RN on 7/15/22 at 5:56 AM EDT    **SHARE this note so that the co-signing nurse is able to place an eSignature**    Nurse 2 eSignature: {Esignature:[id number]}

## 2022-07-15 NOTE — PLAN OF CARE
Problem: Discharge Planning  Goal: Discharge to home or other facility with appropriate resources  Outcome: Progressing  Flowsheets (Taken 7/15/2022 [id number] by Arelis Lee RN)  Discharge to home or other facility with appropriate resources:   Identify barriers to discharge with patient and caregiver   Identify discharge learning needs (meds, wound care, etc)   Refer to discharge planning if patient needs post-hospital services based on physician order or complex needs related to functional status, cognitive ability or social support system   Arrange for needed discharge resources and transportation as appropriate     Problem: Pain  Goal: Verbalizes/displays adequate comfort level or baseline comfort level  Outcome: Progressing     Problem: Safety - Adult  Goal: Free from fall injury  Outcome: Progressing     Problem: ABCDS Injury Assessment  Goal: Absence of physical injury  Outcome: Progressing

## 2022-07-15 NOTE — PLAN OF CARE
Patient seen and examined at bedside. Patient initially presented to [facility] for COPD exacerbation. Currently on steroids, breathing treatments and doxycycline. Patient was seen earlier today by Dr. Zachery Kim. Cardiology consulted and following. We will continue current plan of care.

## 2022-07-15 NOTE — H&P
V2.0  History and Physical      Name:  Adri Pedroza /Age/Sex: 1960  (65 y.o. male)   MRN & CSN:  [id number] & [id number] Encounter Date/Time: 7/15/2022 6:11 AM EDT   [facility] Atif Valentin MD       Hospital Day: 1    Assessment and Plan:   Adri Pedroza is a 65 y.o. male with a PMHx of paroxysmal atrial fibrillation,, coronary artery disease status post CABG x4, congestive heart failure, COPD, diabetes, GERD, tobacco abuse, hyperlipidemia and obstructive sleep apnea who presents with Acute on chronic respiratory failure with hypoxia [facility])    Hospital Problems             Last Modified POA    * (Principal) Acute on chronic respiratory failure with hypoxia ([address] 7/15/2022 Yes    Longstanding persistent atrial fibrillation ([address] 7/15/2022 Yes    Venous reflux 7/15/2022 Yes    Acute bronchitis with COPD ([address] 7/15/2022 Yes    Paroxysmal A-fib ([address] 7/15/2022 Yes    Hypertensive urgency 7/15/2022 Yes    Chronic kidney disease, stage III (moderate) ([address] 7/15/2022 Yes     Acute on chronic hypoxic respiratory failure  -Contributing factors include acute bronchitis with COPD and heart failure  -Management as mentioned below    Acute bronchitis with COPD  -Gold 2 per prior PFTs  -Current active tobacco use  -DuoNebs every 4 as needed, Proventil and Atrovent inhaler scheduled, IV Rocephin 1 g every 24 hours for 5 days, methylprednisolone IV 40 mg daily-switch to oral when clinical condition improves.   -ABG ordered  -Utilize supplemental oxygen to maintain saturation between 90 to 92%    Hypertensive urgency  -Recently amlodipine was discontinued  -Will increase the dose of hydralazine to 50 mg 3 times daily    Chronic combined heart failure  -Mildly hypervolemic  -BNP 1106 (lowest since prior admissions)  -Chest x-ray with pulmonary vascular congestion, no pulmonary edema and cardiomegaly  -Hold lisinopril until repeat BMP  -IV Bumex 2 mg x 1, continue Coreg 12.mg BID and increase hydralazine to 50 mg 3 times daily  -Initiate scheduled diuretic once repeat BMP is resulted  -Strict monitoring of intake output, daily weight  -Continue Plavix  -Consulted cardiology    Leukocytosis  -Does not meet SIRS criteria  -Likely reactive, monitor CBC and for signs of infection    Chronic venous insufficiency with bilateral lower extremity pain  -CV 6  -Compression stockings  -Scheduled for venous procedure outpatient  -Oxy IR 5 mg every 6 as needed for pain control    Paroxysmal A. fib on Coumadin  -INR 1.8  -Consult pharmacy for Coumadin dosing  -Target 2-3 for A. Fib    Type 2 diabetes with hyperglycemia  -Insulin sliding scale low-dose  -Target 140-180  -Consider switching sulfonylurea in the setting of CKD on discharge    CKD stage III  -Creatinine improved from prior labs, appears to be at baseline  -Monitor renal function parameters, avoid nephrotoxins, repeat BMP pending      Discussed CODE STATUS with the patient in detail, he understand his current medical conditions. Full code. Disposition:   Current Living situation: Lives with friends  Expected Disposition: Home with outpatient follow-up  Estimated D/C: 2 days  DVT PPx: Coumadin    Diet ADULT DIET; Regular; Low Fat/Low Chol/High Fiber/2 gm Na; 1200 ml   DVT Prophylaxis [] Lovenox, []  Heparin, [] SCDs, [] Ambulation,  [] Eliquis, [] Xarelto   Code Status Full Code   Surrogate Decision Maker/ POA None     History from:     patient    History of Present Illness:     Chief Complaint: Acute on chronic respiratory failure with hypoxia ([facility]  Leticia Mcrae is a 65 y.o. male with pmh of paroxysmal atrial fibrillation,, coronary artery disease status post CABG x4, congestive heart failure, COPD, diabetes, GERD, tobacco abuse, hyperlipidemia and obstructive sleep apnea  who presents with progressively worsening shortness of breath for the past 3 days.   Patient reports that he has been coughing more than usual and producing more than his baseline sputum. He also reports using his albuterol inhaler more than usual .Patient also reports dyspnea on exertion and fatigue. Patient went to ER in [facility], received Lasix 40 mg IV x1. Admitted at her [facility] for further evaluation and management of worsening shortness of breath. Patient was seen and evaluated at bedside. Seems anxious and in distress. Constantly asking for a nebulization treatment and states that it helped in the ER. Chest x-ray showed cardiomegaly and pulmonary vascular congestion. It also showed mild right pleural effusion. Review of Systems: Need 10 Elements   Review of Systems   Constitutional:  Positive for fatigue. Negative for appetite change, chills, fever and unexpected weight change. HENT:  Negative for ear pain, hearing loss, sinus pressure, sinus pain and voice change. Eyes:  Negative for pain, discharge and visual disturbance. Respiratory:  Positive for cough and shortness of breath. Negative for chest tightness. Cardiovascular:  Positive for leg swelling. Negative for chest pain and palpitations. Gastrointestinal:  Negative for abdominal pain, blood in stool, diarrhea, nausea and vomiting. Genitourinary:  Negative for dysuria, frequency and urgency. Musculoskeletal:  Negative for arthralgias, back pain and joint swelling. Neurological:  Negative for dizziness, weakness, light-headedness and headaches. Psychiatric/Behavioral:  Negative for sleep disturbance. The patient is nervous/anxious.       Objective:   No intake or output data in the 24 hours ending 07/15/22 0753   Vitals:   Vitals:    07/15/22 0515 07/15/22 0538 07/15/22 0639 07/15/22 0648   BP:  (!) 194/109  (!) 185/89   Pulse:  81 80    Resp:  27 23    Temp:  97.9 °F (36.6 °C)     TempSrc:  Oral     SpO2:  94% 92%    Weight: 275 lb 1.6 oz (124.8 kg)      Height: 6' 3\" (1.905 m)          Medications Prior to Admission     Prior to Admission medications    Medication Sig Start Date End Date Taking? Authorizing Provider   warfarin (JANTOVEN) 4 MG tablet TAKE 1 TABLET BY MOUTH EVERY DAY, EXCEPT TAKE 1 AND 1/2 TABLETS ON SUNDAYS OR AS DIRECTED BY CLINIC 7/7/22   Jennie Santiago MD   hydrALAZINE (APRESOLINE) 25 MG tablet Take 1 tablet by mouth 3 times daily 7/6/22   Marifer Henderson MD   carvedilol (COREG) 12.5 MG tablet Take 1 tablet by mouth 2 times daily (with meals) 7/6/22   Marifer Henderson MD   metOLazone (ZAROXOLYN) 2.5 MG tablet Take 1 tablet by mouth daily Take 2 hours after Bumex. 6/8/22   TREY Rosas - CNP   montelukast (SINGULAIR) 10 MG tablet Take 1 tablet by mouth daily 6/1/22   Mario Rutherford MD   guaiFENesin (MUCINEX) 600 MG extended release tablet Take 1 tablet by mouth 2 times daily 6/1/22 11/28/22  Mario Rutherford MD   ipratropium-albuterol (DUONEB) 0.5-2.5 (3) MG/3ML SOLN nebulizer solution Inhale 3 mLs into the lungs 4 times daily 6/1/22   Mario Rutherford MD   clopidogrel (PLAVIX) 75 MG tablet TAKE 1 TABLET BY MOUTH DAILY.  4/13/22   Historical Provider, MD   pantoprazole (PROTONIX) 40 MG tablet TAKE 1 TABLET BY MOUTH EVERY DAY 4/25/22   Historical Provider, MD   potassium chloride (KLOR-CON M) 20 MEQ extended release tablet TAKE 2 TABLETS BY MOUTH EVERY DAY 4/19/22   Historical Provider, MD   lisinopril (PRINIVIL;ZESTRIL) 20 MG tablet TAKE 1 TABLET BY MOUTH DAILY 4/27/22   Historical Provider, MD   pregabalin (LYRICA) 150 MG capsule Take 1 capsule (150 mg total) by mouth 2 times a day 4/27/22   Historical Provider, MD   bumetanide (BUMEX) 2 MG tablet TAKE 1 TABLET BY MOUTH TWO TIMES A DAY 5/13/22   Historical Provider, MD   LIDOCAINE PAIN RELIEF 4 % external patch APPLY 1 PATCH EXTERNALLY EVERY DAY 5/7/22   Historical Provider, MD   nitroGLYCERIN (NITROSTAT) 0.4 MG SL tablet Place 1 tablet under the tongue every 5 minutes as needed for Chest pain 1/21/22   Jennie Santiago MD   albuterol sulfate HFA (PROVENTIL HFA) 108 (90 Base) MCG/ACT inhaler Inhale 2 puffs into the lungs every 6 hours as needed for Wheezing or Shortness of Breath 10/10/21   Samantha Mobley PA-C   metFORMIN (GLUCOPHAGE) 500 MG tablet Take 1,000 mg by mouth 2 times daily 1/15/21   Historical Provider, MD   atorvastatin (LIPITOR) 80 MG tablet Take 1 tablet by mouth nightly 10/14/20   Julio Snyder, APRN - CNP   glipiZIDE (GLUCOTROL) 10 MG tablet Take 1 tablet by mouth every morning (before breakfast)  Patient taking differently: Take 10 mg by mouth in the morning and 10 mg in the evening. Take before meals. 7/8/20   Kayy White PA-C       Physical Exam: Need 8 Elements   Physical Exam  Vitals reviewed. Constitutional:       Appearance: Normal appearance. He is normal weight. HENT:      Head: Normocephalic. Nose: Nose normal.      Mouth/Throat:      Mouth: Mucous membranes are moist.   Eyes:      Conjunctiva/sclera: Conjunctivae normal.      Pupils: Pupils are equal, round, and reactive to light. Cardiovascular:      Rate and Rhythm: Normal rate and regular rhythm. Pulses: Normal pulses. Heart sounds: Normal heart sounds. No murmur heard. Pulmonary:      Effort: Pulmonary effort is normal.      Breath sounds: Wheezing and rhonchi present. No rales. Abdominal:      General: Abdomen is flat. Bowel sounds are normal. There is no distension. Palpations: Abdomen is soft. Tenderness: There is no abdominal tenderness. Musculoskeletal:         General: No deformity. Normal range of motion. Cervical back: Normal range of motion and neck supple. Right lower leg: No edema. Left lower leg: No edema. Skin:     Coloration: Skin is not jaundiced or pale. Neurological:      General: No focal deficit present. Mental Status: He is alert and oriented to person, place, and time. Mental status is at baseline.           Past History:   PMHx   Past Medical History:   Diagnosis Date    Abnormal nuclear stress test     Atrial fibrillation ([facility] 11/2013 CAD (coronary artery disease)     Follows with Dr. Magdaleno Londono    CHF (congestive heart failure) (HCC)     COPD (chronic obstructive pulmonary disease) ([facility]     Follows with PCP    Decreased cardiac ejection fraction     Diabetes mellitus ([facility]     GERD (gastroesophageal reflux disease)     H/O cardiovascular stress test 11/20/13, 3/20/2013    11/13-Observed defect is consistent with diaphragmatic attenuation. EF 58%. 3/13 -EF 51%. No ischemia. Normal Study. H/O cardiovascular stress test 06/08/2017    EF 50% normal study    H/O cardiovascular stress test 06/17/2019    Normal NM    H/O cardiovascular stress test 03/02/2021    depressed lvedf increased edv myocardial perfusion abnormal stress test    H/O Doppler ultrasound 10/14/2010    10/2010-Bilateral venous doppler of lower extremies- No DVTs. H/O echocardiogram 06/26/2019    Left ventricular systolic function is normal with an ejection fraction of 50-55%. Mild concentric left ventricular hypertrophy. Grade I diastolic dysfunction. No significant valvular disease noted. No evidence of pericardial effusion. Essentially unremarkable echo. H/O percutaneous left heart catheterization 12/22/2015    No evidence of hemodynamically significant coronary artery disease    Heart imaging 04/23/2020    muga - ef 58%    History of coronary artery stent placement 11/13/2013 11/13 -RCA - 3 stents placed    History of exercise stress test 06/08/2017    treadmill    History of Holter monitoring 01/04/2016    predominant rhythm sinus    History of MRI of brain and brain stem 06/04/2020    No evidence of an acute infarct, mod chronic microvascular white matter ischemic disease with associated cerebral parenchymal volume loss    History of nuclear stress test 02/24/2020    EF 38%,Myocardial perfusion scan shows moderate size, severe intensity, non  reversible perfusion defect in inferior wall. Hx of Doppler echocardiogram 01/26/2022    EF 45-50% Mod LV hypertrophy.  Grade 1 diastolic dysfunction. Mod MR and TR. Dilation of the aortic root and ascending aorta. Hyperlipidemia     Hypertension     Follows with PCP    Kidney disease     Dr. oJse Rogers    S/P cardiac catheterization 11/13/2013 11/13/2013-EF 55%, distal LAD mild disease,CX stent patent,but distal to stent 50% stenosis. RCA severe disease. SOB (shortness of breath)     Status post angioplasty with stent     Wears dentures     full upper denture        PSHX:  has a past surgical history that includes Coronary angioplasty with stent; Appendectomy; Femur fracture surgery (1984); Coronary angioplasty with stent (11/13/2013); Colonoscopy; Mandible fracture surgery (1984); Cardiac catheterization (11/13/2013); Cardiac surgery (07/03/2020); Coronary artery bypass graft (N/A, 7/3/2020); Dental surgery; and BIOPSY / LIGATION TEMPORAL ARTERY (Left, 8/17/2020). Fam HX: family history includes Cancer in his brother, father, and mother; Diabetes in his mother; Heart Disease in his father and mother; High Blood Pressure in his father and mother; Stroke in his father and mother. Soc HX:   Social History     Socioeconomic History    Marital status: Legally      Spouse name: None    Number of children: None    Years of education: None    Highest education level: None   Occupational History    Occupation:    Tobacco Use    Smoking status: Every Day     Packs/day: 0.50     Years: 46.00     Pack years: 23.00     Types: Cigarettes     Start date: 1974    Smokeless tobacco: Never    Tobacco comments:     Smokes approx 1 pack per day   Vaping Use    Vaping Use: Never used   Substance and Sexual Activity    Alcohol use: No     Comment: caffeine 6 bottles of unsweet tea a day    Drug use: Not Currently     Types: Cocaine     Comment: Last used: 7/1/20    Sexual activity: Yes     Partners: Female       Allergies: Allergies: Allergies   Allergen Reactions    Hydromorphone Other (See Comments)     Hallucinations. Medications:   Medications:    atorvastatin  80 mg Oral Nightly    carvedilol  12.5 mg Oral BID WC    clopidogrel  75 mg Oral Daily    pantoprazole  40 mg Oral Daily    sodium chloride flush  5-40 mL IntraVENous 2 times per day    albuterol sulfate HFA  2 puff Inhalation 4x daily    And    ipratropium  2 puff Inhalation 4x daily    methylPREDNISolone  40 mg IntraVENous Daily    hydrALAZINE  50 mg Oral 3 times per day    warfarin  4 mg Oral Once per day on Mon Tue Wed Thu Fri Sat    [START ON 7/17/2022] warfarin  6 mg Oral Once per day on Sun    cefTRIAXone (ROCEPHIN) IV  1,000 mg IntraVENous Q24H      Infusions:   PRN Meds: albuterol sulfate HFA, 2 puff, Q6H PRN  sodium chloride flush, 5-40 mL, PRN  acetaminophen, 650 mg, Q6H PRN  bisacodyl, 5 mg, Daily PRN  ondansetron, 4 mg, Q6H PRN  ipratropium-albuterol, 1 ampule, Q4H PRN  oxyCODONE, 5 mg, Q6H PRN  hydrALAZINE, 10 mg, Q4H PRN      Labs      CBC: No results for input(s): WBC, HGB, PLT in the last 72 hours. BMP:  No results for input(s): NA, K, CL, CO2, BUN, CREATININE, GLUCOSE in the last 72 hours. Hepatic: No results for input(s): AST, ALT, ALB, BILITOT, ALKPHOS in the last 72 hours. Lipids:   Lab Results   Component Value Date/Time    CHOL 153 01/06/2020 04:06 AM    HDL 43 01/06/2020 04:06 AM    TRIG 54 01/06/2020 04:06 AM     Hemoglobin A1C:   Lab Results   Component Value Date/Time    LABA1C 7.8 12/30/2020 03:40 AM     TSH: No results found for: TSH  Troponin:   Lab Results   Component Value Date/Time    TROPONINT 0.050 01/29/2022 08:25 PM    TROPONINT 0.071 01/29/2022 05:26 PM    TROPONINT <0.010 01/20/2022 11:03 PM     Lactic Acid: No results for input(s): LACTA in the last 72 hours. BNP: No results for input(s): PROBNP in the last 72 hours.   UA:  Lab Results   Component Value Date/Time    NITRU NEGATIVE 12/26/2020 03:39 AM    COLORU YELLOW 12/26/2020 03:39 AM    WBCUA NO CELLS SEEN 12/26/2020 03:39 AM    RBCUA 3 12/26/2020 03:39 AM    MUCUS RARE 07/04/2020 07:20 PM    TRICHOMONAS NONE SEEN 07/20/2020 02:20 AM    BACTERIA NEGATIVE 12/26/2020 03:39 AM    CLARITYU CLEAR 12/26/2020 03:39 AM    SPECGRAV 1.020 12/26/2020 03:39 AM    LEUKOCYTESUR NEGATIVE 12/26/2020 03:39 AM    UROBILINOGEN 0.2 12/26/2020 03:39 AM    BILIRUBINUR NEGATIVE 12/26/2020 03:39 AM    BLOODU TRACE 12/26/2020 03:39 AM    KETUA NEGATIVE 12/26/2020 03:39 AM     Urine Cultures: No results found for: LABURIN  Blood Cultures: No results found for: BC  No results found for: BLOODCULT2  Organism: No results found for: ORG    Imaging/Diagnostics Last 24 Hours   No results found.     Electronically signed by Kami Metcalf MD on 7/15/2022 at 7:53 AM

## 2022-07-15 NOTE — CONSULTS
[address] [facility],[address] ACUTE CARE PHYSICAL THERAPY EVALUATION  Lauren Bose, 1960, 3029/3029-A, 7/15/2022    History  [place]:  There were no encounter diagnoses. Patient  has a past medical history of Abnormal nuclear stress test, Atrial fibrillation (HCC), CAD (coronary artery disease), CHF (congestive heart failure) ([facility] [address] [address] COPD (chronic obstructive pulmonary disease) ([facility] [address] Decreased cardiac ejection fraction, Diabetes mellitus ([facility] [facility] GERD (gastroesophageal reflux disease), H/O cardiovascular stress test, H/O cardiovascular stress test, H/O cardiovascular stress test, H/O cardiovascular stress test, H/O Doppler ultrasound, H/O echocardiogram, H/O percutaneous left heart catheterization, Heart imaging, History of coronary artery stent placement, History of exercise stress test, History of Holter monitoring, History of MRI of brain and brain stem, History of nuclear stress test, Hx of Doppler echocardiogram, Hyperlipidemia, Hypertension, Kidney disease, S/P cardiac catheterization, SOB (shortness of breath), Status post angioplasty with stent, and Wears dentures. Patient  has a past surgical history that includes Coronary angioplasty with stent; Appendectomy; Femur fracture surgery (1984); Coronary angioplasty with stent (11/13/2013); Colonoscopy; Mandible fracture surgery (1984); Cardiac catheterization (11/13/2013); Cardiac surgery (07/03/2020); Coronary artery bypass graft (N/A, 7/3/2020); Dental surgery; and BIOPSY / LIGATION TEMPORAL ARTERY (Left, 8/17/2020). Subjective:  Patient states:  \"Can you fix this phone? \"    Pain:  denies pain.     Communication with other providers:  Handoff to RN, OT  Restrictions: general precautions, fall risk    Home Setup/Prior level of function  Social/Functional History  Lives With: Friend(s)  Type of Home: House  Home Layout: One level  Home Access: Level entry  Bathroom Shower/Tub: Walk-in shower, Tub/Shower unit  Bathroom Toilet: Standard  Bathroom Accessibility: Accessible  Home Equipment:  (Does not use AD at baseline)  Has the patient had two or more falls in the past year or any fall with injury in the past year?: No  ADL Assistance: Independent  Homemaking Assistance: Independent  Homemaking Responsibilities: Yes  Ambulation Assistance: Independent  Transfer Assistance: Independent  Active : Yes  Mode of Transportation: Car  Occupation: On disability (Social Security)  [address]: [employer] decks    Examination of body systems (includes body structures/functions, activity/participation limitations):  Observation:  Pt sitting EOB upon arrival and agreeable to therapy  Vision:  WFL  Hearing:  WFL  Cardiopulmonary:  no O2 needs  Cognition: slightly impulsive, see OT/SLP note for further evaluation. Musculoskeletal  ROM R/L:  WFL. Strength R/L:  5/5, minimal impairment in function and endurance. Neuro:  WFL      Mobility:  Rolling L/R:  NT, pt sitting EOB and up in chair at beginning and end of session  Supine to sit:  NT, pt sitting EOB and up in chair at beginning and end of session  Transfers: Pt completed STS to/from EOB x3 trials and to chair SBA   Sitting balance:  good. Standing balance:  good. Gait: pt ambulated 100' + 79' + 100' CGA progressing to SBA with slightly decreased anay but no LOB throughout. Standing rest breaks provided due to pt's decreased focus on task and slight SOB. SpO2 89%-94% throughout with cues for pursed lip breathing    AMPA 6 Clicks Inpatient Mobility:  AM-PAC Inpatient Mobility Raw Score : 22    Safety: patient left in chair (Rn allowing pt up ad jone), call light within reach, RN notified, gait belt used. Assessment:  Pt is a 65 y.o. male admitted to the hospital for acute on chronic respiratory failure with hypoxia. Pt is typically mod I for all ambulation and transfers without a device. Pt is currently performing bed mobility independently, transferring independently, and ambulating '.  Pt is functioning close to baseline and does not require any further acute care PT at this time. Complexity: Low    Prognosis: Good, no significant barriers to participation at this time.      Equipment: none    Recommendations for NURSING mobility: amb with gait belt    Time:   Time in: 0921  Time out: 0950  Timed treatment minutes: 15  Total time: 29    Electronically signed by:    Ynes Horvath, PT  7/15/2022, 11:33 AM

## 2022-07-16 ENCOUNTER — APPOINTMENT (OUTPATIENT)
Dept: GENERAL RADIOLOGY | Age: 62
DRG: 194 | End: 2022-07-16
Attending: STUDENT IN AN ORGANIZED HEALTH CARE EDUCATION/TRAINING PROGRAM
Payer: COMMERCIAL

## 2022-07-16 LAB
GLUCOSE BLD-MCNC: 153 MG/DL (ref 70–99)
GLUCOSE BLD-MCNC: 185 MG/DL (ref 70–99)
GLUCOSE BLD-MCNC: 217 MG/DL (ref 70–99)
GLUCOSE BLD-MCNC: 238 MG/DL (ref 70–99)
INR BLD: 1.47 INDEX
PRO-BNP: 5532 PG/ML
PROTHROMBIN TIME: 19.1 SECONDS (ref 11.7–14.5)

## 2022-07-16 PROCEDURE — 94761 N-INVAS EAR/PLS OXIMETRY MLT: CPT

## 2022-07-16 PROCEDURE — 6370000000 HC RX 637 (ALT 250 FOR IP): Performed by: STUDENT IN AN ORGANIZED HEALTH CARE EDUCATION/TRAINING PROGRAM

## 2022-07-16 PROCEDURE — 6360000002 HC RX W HCPCS: Performed by: STUDENT IN AN ORGANIZED HEALTH CARE EDUCATION/TRAINING PROGRAM

## 2022-07-16 PROCEDURE — 82962 GLUCOSE BLOOD TEST: CPT

## 2022-07-16 PROCEDURE — 83880 ASSAY OF NATRIURETIC PEPTIDE: CPT

## 2022-07-16 PROCEDURE — 2500000003 HC RX 250 WO HCPCS: Performed by: INTERNAL MEDICINE

## 2022-07-16 PROCEDURE — APPSS15 APP SPLIT SHARED TIME 0-15 MINUTES: Performed by: NURSE PRACTITIONER

## 2022-07-16 PROCEDURE — 71045 X-RAY EXAM CHEST 1 VIEW: CPT

## 2022-07-16 PROCEDURE — 6370000000 HC RX 637 (ALT 250 FOR IP): Performed by: NURSE PRACTITIONER

## 2022-07-16 PROCEDURE — 6370000000 HC RX 637 (ALT 250 FOR IP): Performed by: INTERNAL MEDICINE

## 2022-07-16 PROCEDURE — 85610 PROTHROMBIN TIME: CPT

## 2022-07-16 PROCEDURE — 36415 COLL VENOUS BLD VENIPUNCTURE: CPT

## 2022-07-16 PROCEDURE — 94640 AIRWAY INHALATION TREATMENT: CPT

## 2022-07-16 PROCEDURE — 2500000003 HC RX 250 WO HCPCS: Performed by: NURSE PRACTITIONER

## 2022-07-16 PROCEDURE — 2580000003 HC RX 258: Performed by: STUDENT IN AN ORGANIZED HEALTH CARE EDUCATION/TRAINING PROGRAM

## 2022-07-16 PROCEDURE — 1200000000 HC SEMI PRIVATE

## 2022-07-16 PROCEDURE — 99233 SBSQ HOSP IP/OBS HIGH 50: CPT | Performed by: INTERNAL MEDICINE

## 2022-07-16 RX ORDER — BUMETANIDE 0.25 MG/ML
2 INJECTION, SOLUTION INTRAMUSCULAR; INTRAVENOUS 3 TIMES DAILY
Status: DISCONTINUED | OUTPATIENT
Start: 2022-07-16 | End: 2022-07-17 | Stop reason: HOSPADM

## 2022-07-16 RX ORDER — AMLODIPINE BESYLATE 5 MG/1
5 TABLET ORAL DAILY
Status: DISCONTINUED | OUTPATIENT
Start: 2022-07-16 | End: 2022-07-17 | Stop reason: HOSPADM

## 2022-07-16 RX ORDER — BUMETANIDE 0.25 MG/ML
1 INJECTION, SOLUTION INTRAMUSCULAR; INTRAVENOUS 3 TIMES DAILY
Status: DISCONTINUED | OUTPATIENT
Start: 2022-07-16 | End: 2022-07-16

## 2022-07-16 RX ORDER — METOLAZONE 5 MG/1
2.5 TABLET ORAL DAILY
Status: DISCONTINUED | OUTPATIENT
Start: 2022-07-16 | End: 2022-07-17 | Stop reason: HOSPADM

## 2022-07-16 RX ORDER — HYDROXYZINE HYDROCHLORIDE 25 MG/1
25 TABLET, FILM COATED ORAL 3 TIMES DAILY PRN
Status: DISCONTINUED | OUTPATIENT
Start: 2022-07-16 | End: 2022-07-17 | Stop reason: HOSPADM

## 2022-07-16 RX ADMIN — PANTOPRAZOLE SODIUM 40 MG: 40 TABLET, DELAYED RELEASE ORAL at 06:50

## 2022-07-16 RX ADMIN — FLUTICASONE PROPIONATE 1 SPRAY: 50 SPRAY, METERED NASAL at 09:05

## 2022-07-16 RX ADMIN — KETOROLAC TROMETHAMINE 30 MG: 30 INJECTION, SOLUTION INTRAMUSCULAR; INTRAVENOUS at 21:06

## 2022-07-16 RX ADMIN — ALBUTEROL SULFATE 2 PUFF: 90 AEROSOL, METERED RESPIRATORY (INHALATION) at 21:45

## 2022-07-16 RX ADMIN — ALBUTEROL SULFATE 2 PUFF: 90 AEROSOL, METERED RESPIRATORY (INHALATION) at 16:18

## 2022-07-16 RX ADMIN — INSULIN LISPRO 2 UNITS: 100 INJECTION, SOLUTION INTRAVENOUS; SUBCUTANEOUS at 17:30

## 2022-07-16 RX ADMIN — HYDRALAZINE HYDROCHLORIDE 10 MG: 20 INJECTION INTRAMUSCULAR; INTRAVENOUS at 06:53

## 2022-07-16 RX ADMIN — INSULIN GLARGINE 20 UNITS: 100 INJECTION, SOLUTION SUBCUTANEOUS at 21:13

## 2022-07-16 RX ADMIN — ALBUTEROL SULFATE 2 PUFF: 90 AEROSOL, METERED RESPIRATORY (INHALATION) at 11:33

## 2022-07-16 RX ADMIN — ALBUTEROL SULFATE 2 PUFF: 90 AEROSOL, METERED RESPIRATORY (INHALATION) at 08:00

## 2022-07-16 RX ADMIN — KETOROLAC TROMETHAMINE 30 MG: 30 INJECTION, SOLUTION INTRAMUSCULAR; INTRAVENOUS at 09:05

## 2022-07-16 RX ADMIN — METOLAZONE 2.5 MG: 5 TABLET ORAL at 13:28

## 2022-07-16 RX ADMIN — METHYLPREDNISOLONE SODIUM SUCCINATE 40 MG: 40 INJECTION, POWDER, FOR SOLUTION INTRAMUSCULAR; INTRAVENOUS at 09:04

## 2022-07-16 RX ADMIN — ATORVASTATIN CALCIUM 80 MG: 40 TABLET, FILM COATED ORAL at 21:05

## 2022-07-16 RX ADMIN — Medication 2 PUFF: at 08:00

## 2022-07-16 RX ADMIN — Medication 2 PUFF: at 21:45

## 2022-07-16 RX ADMIN — DOXYCYCLINE HYCLATE 100 MG: 100 TABLET, COATED ORAL at 09:04

## 2022-07-16 RX ADMIN — HYDRALAZINE HYDROCHLORIDE 50 MG: 50 TABLET, FILM COATED ORAL at 13:28

## 2022-07-16 RX ADMIN — Medication 2 PUFF: at 11:32

## 2022-07-16 RX ADMIN — BUMETANIDE 2 MG: 0.25 INJECTION, SOLUTION INTRAMUSCULAR; INTRAVENOUS at 13:29

## 2022-07-16 RX ADMIN — CARVEDILOL 25 MG: 25 TABLET, FILM COATED ORAL at 09:04

## 2022-07-16 RX ADMIN — CARVEDILOL 25 MG: 25 TABLET, FILM COATED ORAL at 16:38

## 2022-07-16 RX ADMIN — INSULIN LISPRO 1 UNITS: 100 INJECTION, SOLUTION INTRAVENOUS; SUBCUTANEOUS at 09:16

## 2022-07-16 RX ADMIN — BUMETANIDE 2 MG: 0.25 INJECTION, SOLUTION INTRAMUSCULAR; INTRAVENOUS at 21:05

## 2022-07-16 RX ADMIN — DOXYCYCLINE HYCLATE 100 MG: 100 TABLET, COATED ORAL at 21:05

## 2022-07-16 RX ADMIN — HYDRALAZINE HYDROCHLORIDE 50 MG: 50 TABLET, FILM COATED ORAL at 21:05

## 2022-07-16 RX ADMIN — LISINOPRIL 20 MG: 20 TABLET ORAL at 09:04

## 2022-07-16 RX ADMIN — INSULIN LISPRO 1 UNITS: 100 INJECTION, SOLUTION INTRAVENOUS; SUBCUTANEOUS at 21:12

## 2022-07-16 RX ADMIN — HYDRALAZINE HYDROCHLORIDE 50 MG: 50 TABLET, FILM COATED ORAL at 06:50

## 2022-07-16 RX ADMIN — SODIUM CHLORIDE, PRESERVATIVE FREE 10 ML: 5 INJECTION INTRAVENOUS at 21:08

## 2022-07-16 RX ADMIN — WARFARIN SODIUM 4 MG: 4 TABLET ORAL at 16:38

## 2022-07-16 RX ADMIN — HYDROXYZINE HYDROCHLORIDE 25 MG: 25 TABLET, FILM COATED ORAL at 09:05

## 2022-07-16 RX ADMIN — AMLODIPINE BESYLATE 5 MG: 5 TABLET ORAL at 16:38

## 2022-07-16 RX ADMIN — INSULIN LISPRO 1 UNITS: 100 INJECTION, SOLUTION INTRAVENOUS; SUBCUTANEOUS at 13:30

## 2022-07-16 RX ADMIN — SODIUM CHLORIDE, PRESERVATIVE FREE 10 ML: 5 INJECTION INTRAVENOUS at 09:06

## 2022-07-16 RX ADMIN — BUMETANIDE 1 MG: 0.25 INJECTION, SOLUTION INTRAMUSCULAR; INTRAVENOUS at 09:13

## 2022-07-16 RX ADMIN — CLOPIDOGREL BISULFATE 75 MG: 75 TABLET ORAL at 09:05

## 2022-07-16 RX ADMIN — Medication 2 PUFF: at 16:18

## 2022-07-16 ASSESSMENT — PAIN DESCRIPTION - DESCRIPTORS
DESCRIPTORS: ACHING
DESCRIPTORS: SHARP

## 2022-07-16 ASSESSMENT — ENCOUNTER SYMPTOMS
DIARRHEA: 0
SHORTNESS OF BREATH: 1
COUGH: 1
VOMITING: 0
BLOOD IN STOOL: 0
EYE PAIN: 0
SINUS PAIN: 0
BACK PAIN: 0
ABDOMINAL PAIN: 0
CHEST TIGHTNESS: 0
EYE DISCHARGE: 0
SINUS PRESSURE: 0
VOICE CHANGE: 0
NAUSEA: 0

## 2022-07-16 ASSESSMENT — PAIN - FUNCTIONAL ASSESSMENT: PAIN_FUNCTIONAL_ASSESSMENT: ACTIVITIES ARE NOT PREVENTED

## 2022-07-16 ASSESSMENT — PAIN SCALES - GENERAL
PAINLEVEL_OUTOF10: 7
PAINLEVEL_OUTOF10: 8

## 2022-07-16 ASSESSMENT — PAIN DESCRIPTION - ORIENTATION
ORIENTATION: RIGHT;LEFT
ORIENTATION: RIGHT;LEFT

## 2022-07-16 ASSESSMENT — PAIN DESCRIPTION - LOCATION
LOCATION: LEG
LOCATION: LEG

## 2022-07-16 ASSESSMENT — PAIN DESCRIPTION - PAIN TYPE: TYPE: CHRONIC PAIN

## 2022-07-16 NOTE — PROGRESS NOTES
Tyler Mi is a 65 y.o. male on warfarin therapy for atrial fibrillation. Pharmacy consulted by Dr. Lizz Adams for monitoring and adjustment of treatment. Indication for anticoagulation: Atrial fibrillation  INR goal: 2 - 3  Warfarin dose prior to admission: 6 mg on Sun; 4 mg on all other days    INR MONITORING  Recent Labs     07/15/22  1017 07/16/22  1146   INR 1.68 1.47     Assessment/Plan:  Drug Interactions:   Plavix (home med)  Solu-Medrol IV (new start)  Acetaminophen (PRN)  Doxycycline (new start), can increase warfarin effects  INR subtherapeutic on admission at 1.8  INR now trending down @ 1.47  Patient last seen at anticoagulation clinic on 07/07/2022 with a subtherapeutic INR due to missed doses.   Given concurrent antibiotic therapy, will hold on increasing/boosted warfarin  Continue home dose of 4 mg daily except warfarin 6 mg every Sunday  Pharmacy will continue to monitor and adjust warfarin therapy as indicated    Thank you for the consult,  Tiago [facility], PharmD  7/16/2022 3:48 PM

## 2022-07-16 NOTE — PROGRESS NOTES
Cardiology Note    Admit Date:  7/15/2022  CARDIOLOGY ATTENDING ADDENDUM    MEDICAL DECISION MAKING;  1. Congestive heart failure: EF of 45% with moderate mitral regurgitation we will try and titrate up diuresis if he does not have a good response to 3 times daily Bumex and metolazone can consider switching to drip etc.  2.  Get chest x-ray, strict I's and O's Daily weights  3. Atrial fibrillation: S/p maze in sinus rhythm now continue anticoagulation on Coumadin INR subtherapeutic  4. Possible anxiety add Atarax history of cocaine use currently drug-free  5. History of coronary artery disease s/p PCI and CABG continue aspirin and statins  6. Ischemic cardiomyopathy concerns for noncompliance prepared by anxiety disorder and cocaine use  7. Uncontrolled hypertension Coreg 25 twice daily hydralazine 50 3 times daily is on hold due to renal failure  8. Add amlodipine 5 mg daily  9. Diabetes as per primary team        HPI:  I have reviewed the HPI  And agree with above   Carmen Fonseca is a 65 y. o.year old who and presents with had no chief complaint listed for this encounter. No chief complaint on file. Please review addendum/changes made to note above   Interval history: He was feeling very anxious this morning when I saw her felt restless distressed was more short of breath    Physical Exam:  General:  Awake, alert, NAD  Head:normal  Eye:normal  Neck:  No JVD   Chest:  Clear to auscultation, respiration easy  Cardiovascular:  S1 and S2 audible, No added heart sounds, No signs of ankle edema, or volume overload, No evidence of JVD, No crackles    Abdomen:   nontender  Extremities:  no edema  Pulses; palpable  Neuro: grossly normal        I agree with the plan, which was planned by myself and discussed with advanced level provider. My documented MDM is a substantive portion of the supervisory note. I have seen ,spoken to  and examined this patient personally, independently of the advanced level provider.   I have spent substantiate  portion of this encounter independently myself in examining patient and developing the medical management plan . I have reviewed the hospital care given to date and reviewed all pertinent labs and imaging. The plan was developed mutually at the time of the visit with the patient,  NP /PA  and myself. I have spoken with patient, nursing staff and provided written and verbal instructions . The above note has been reviewed and I agree with the assessment, diagnosis, and treatment plan with changes made by me as follows . Azeb Langley MD [facility] 07/16/22    Today's Plan: increase bumex 2 mg TID. Will add metalzone 2.5 mg daily. Will get chest xray    Admission diagnosis / Complaint: shortness of breath      Subjective:  Mr. Michael Black is sitting up on the side of the bed. He is very anxious. States he takes his medications early in the morning and is not getting in the hospital when he usually takes them. He states his shortness of breath is worse today. Denies chest pain, palpitations, lightheadedness, dizziness, or syncope. Assessment and Plan:    Acute on chronic systolic and diastolic heart failure: more short of breath today. Will increase bumex to 2 mg TID. Will add metalozone 2.5 mg daily, Will get chest xray. EF on ECHO 45%. Strict I and 0's. Daily weights  PHTN: RSVP 60 mm Hg  Dilated aortic root: 4.6 cm  CAD: stable. Continue statin and beta blocker  5. Atrial fibrillation: history of MAZE. Continue AC. 6. Dyslipidemia- continue statins  7. Venous Reflux- follow with Dr Mikaela [address] as an outpatient  8 COPD- per primary team  9. History of cocaine abuse  10. CKD  stable. 11.  Anxiety- started on atarax    Objective:   BP (!) 147/78   Pulse 67   Temp 97.5 °F (36.4 °C) (Oral)   Resp 20   Ht 6' 3\" (1.905 m)   Wt 273 lb (123.8 kg)   SpO2 98%   BMI 34.12 kg/m²     Intake/Output Summary (Last 24 hours) at 7/16/2022 4256  Last data filed at 7/15/2022 2300  Gross per 24 hour Intake 410 ml   Output --   Net 410 ml       TELEMETRY: Sinus    has a past medical history of Abnormal nuclear stress test, Atrial fibrillation ([facility]), CAD (coronary artery disease), CHF (congestive heart failure) ([facility]), COPD (chronic obstructive pulmonary disease) ([facility] [address] Decreased cardiac ejection fraction, Diabetes mellitus ([facility] [address] GERD (gastroesophageal reflux disease), H/O cardiovascular stress test, H/O cardiovascular stress test, H/O cardiovascular stress test, H/O cardiovascular stress test, H/O Doppler ultrasound, H/O echocardiogram, H/O percutaneous left heart catheterization, Heart imaging, History of coronary artery stent placement, History of exercise stress test, History of Holter monitoring, History of MRI of brain and brain stem, History of nuclear stress test, Hx of Doppler echocardiogram, Hyperlipidemia, Hypertension, Kidney disease, S/P cardiac catheterization, SOB (shortness of breath), Status post angioplasty with stent, and Wears dentures. has a past surgical history that includes Coronary angioplasty with stent; Appendectomy; Femur fracture surgery (1984); Coronary angioplasty with stent (11/13/2013); Colonoscopy; Mandible fracture surgery (1984); Cardiac catheterization (11/13/2013); Cardiac surgery (07/03/2020); Coronary artery bypass graft (N/A, 7/3/2020); Dental surgery; and BIOPSY / LIGATION TEMPORAL ARTERY (Left, 8/17/2020). Physical Exam:  General:  Awake, alert, NAD  Skin:  Warm and dry  Neck:  JVD not appreciated  Chest:  right posterior lung rhonchi throughout all lung fields. Left posterior lung very diminished breath sounds.    Cardiovascular:  RRR S1S2  Abdomen:  Soft nontender  Extremities:  trace bilateral lower leg edema    Medications:    bumetanide  1 mg IntraVENous TID    atorvastatin  80 mg Oral Nightly    clopidogrel  75 mg Oral Daily    pantoprazole  40 mg Oral Daily    sodium chloride flush  5-40 mL IntraVENous 2 times per day    albuterol sulfate HFA  2 puff Inhalation 4x daily    And    ipratropium  2 puff Inhalation 4x daily    methylPREDNISolone  40 mg IntraVENous Daily    hydrALAZINE  50 mg Oral 3 times per day    warfarin  4 mg Oral Once per day on Mon Tue Wed Thu Fri Sat    [START ON 7/17/2022] warfarin  6 mg Oral Once per day on Sun    insulin lispro  0-6 Units SubCUTAneous TID WC    insulin lispro  0-3 Units SubCUTAneous Nightly    doxycycline hyclate  100 mg Oral 2 times per day    fluticasone  1 spray Each Nostril Daily    insulin glargine  20 Units SubCUTAneous Nightly    carvedilol  25 mg Oral BID     lisinopril  20 mg Oral Daily      dextrose       hydrOXYzine HCl, albuterol sulfate HFA, sodium chloride flush, acetaminophen **OR** [DISCONTINUED] acetaminophen, bisacodyl, ondansetron, ipratropium-albuterol, hydrALAZINE, glucose, dextrose bolus **OR** dextrose bolus, glucagon (rDNA), dextrose, ketorolac, zolpidem    Lab Data:  CBC: No results for input(s): WBC, HGB, HCT, MCV, PLT in the last 72 hours. BMP:   Recent Labs     07/15/22  1017      K 4.3      CO2 25   PHOS 2.8   BUN 17   CREATININE 1.6*     LIVER PROFILE: No results for input(s): AST, ALT, LIPASE, BILIDIR, BILITOT, ALKPHOS in the last 72 hours. Invalid input(s): AMYLASE,  ALB  PT/INR:   Recent Labs     07/15/22  1017   PROTIME 21.8*   INR 1.68     APTT: No results for input(s): APTT in the last 72 hours. BNP:  No results for input(s): BNP in the last 72 hours.   TROPONIN: @TROPONINI:3@          TREY Canales - CNP, TREY-CNP 7/16/2022 9:29 AM

## 2022-07-16 NOTE — PROGRESS NOTES
Very anxious walking in [place]  Say he will go to other hospital  Say s breathing is not good  He feels he is getting worse    Try atarax  Increase bumex 2 mg tid, check intake and output  Requested cxr

## 2022-07-16 NOTE — PROGRESS NOTES
V2.0  [facility] Hospitalist Progress Note      Name:  Georgina Navarrete /Age/Sex: 1960  (65 y.o. male)   MRN & CSN:  [id number] & [id number] Encounter Date/Time: 2022 12:17 PM EDT    Location:  [address] [place] [address] PCP: Joel Stephens MD       Hospital Day: 2    Assessment and Plan:   Georgina Navarrete is a 65 y.o. male with pmh of respiratory fibrillation, coronary artery disease s/p CABG X4, CHF, COPD, diabetes, GERD, tobacco use disorder, hyperlipidemia, sleep apnea who presents with Acute on chronic respiratory failure with hypoxia ([address]    Plan:  Acute on chronic hypoxic hypercapnic respiratory failure with underlying multifactorial etiology likely in the setting of COPD exacerbation and CHF exacerbation: Hypervolemic on assessment. BNP of 2006 on admission. Chest x-ray showed pulmonary vascular congestion with no pulm edema and cardiomegaly. Lisinopril on hold because of MARLYN. IV Bumex 2 mg three times daily. No current urine output. Strict I's and O's orders in place. Dry weight goal of 265 pounds. Monitor urine output on regular basis. Cardiology consult in place. Appreciate recommendations. Echocardiogram showed EF of 45% with dilated IVC septal hypokinesis and severe tricuspid regurg with RVSP of 60. No velasco in place. Urinal at bedside. Chronic obstructive pulmonary disease Gold stage II: Currently on DuoNebs every 4 hours needed Proventil and Atrovent inhaler. Received 1 dose of IV Rocephin in the ED to be continued for 5 days. Solu-Medrol IV 40 mg daily, switch to p.o. as needed. On insulin-dependent diabetes mellitus type 2 Home medication include sulfonylurea that needs to be stopped on DC keeping in mind that patient has underlying history of CKD. On sliding scale insulin with a blood sugar goal of 140/180. Paroxysmal atrial fibrillation: INR 1.8 on Coumadin because of level of A. fib. Target INR 2-3.   Chronic venous insufficiency with bilateral lower extremity pain: weakness. Negative for dizziness, light-headedness and headaches. Psychiatric/Behavioral:  Negative for sleep disturbance. Objective: Intake/Output Summary (Last 24 hours) at 7/16/2022 1217  Last data filed at 7/15/2022 2300  Gross per 24 hour   Intake 410 ml   Output --   Net 410 ml        Vitals:   Vitals:    07/16/22 1134   BP:    Pulse: 61   Resp: 18   Temp:    SpO2: 95%       Physical Exam:   Physical Exam  Vitals reviewed. Constitutional:       Appearance: Normal appearance. He is obese. HENT:      Head: Normocephalic. Nose: Nose normal.      Mouth/Throat:      Mouth: Mucous membranes are moist.   Eyes:      Conjunctiva/sclera: Conjunctivae normal.      Pupils: Pupils are equal, round, and reactive to light. Cardiovascular:      Rate and Rhythm: Normal rate and regular rhythm. Pulses: Normal pulses. Heart sounds: Normal heart sounds. No murmur heard. Pulmonary:      Effort: Respiratory distress present. Breath sounds: Wheezing and rales present. No rhonchi. Abdominal:      General: Abdomen is flat. Bowel sounds are normal. There is distension. Palpations: Abdomen is soft. Tenderness: There is no abdominal tenderness. Musculoskeletal:         General: No deformity. Normal range of motion. Cervical back: Normal range of motion and neck supple. Right lower leg: No edema. Left lower leg: No edema. Skin:     Coloration: Skin is not jaundiced or pale. Neurological:      General: No focal deficit present. Mental Status: He is alert and oriented to person, place, and time. Mental status is at baseline. Motor: Weakness present.           Medications:   Medications:    bumetanide  2 mg IntraVENous TID    metOLazone  2.5 mg Oral Daily    atorvastatin  80 mg Oral Nightly    clopidogrel  75 mg Oral Daily    pantoprazole  40 mg Oral Daily    sodium chloride flush  5-40 mL IntraVENous 2 times per day    albuterol sulfate HFA  2 puff Inhalation 4x daily    And    ipratropium  2 puff Inhalation 4x daily    methylPREDNISolone  40 mg IntraVENous Daily    hydrALAZINE  50 mg Oral 3 times per day    warfarin  4 mg Oral Once per day on Mon Tue Wed Thu Fri Sat    [START ON 7/17/2022] warfarin  6 mg Oral Once per day on Sun    insulin lispro  0-6 Units SubCUTAneous TID WC    insulin lispro  0-3 Units SubCUTAneous Nightly    doxycycline hyclate  100 mg Oral 2 times per day    fluticasone  1 spray Each Nostril Daily    insulin glargine  20 Units SubCUTAneous Nightly    carvedilol  25 mg Oral BID WC    lisinopril  20 mg Oral Daily      Infusions:    dextrose       PRN Meds: hydrOXYzine HCl, 25 mg, TID PRN  albuterol sulfate HFA, 2 puff, Q6H PRN  sodium chloride flush, 5-40 mL, PRN  acetaminophen, 650 mg, Q6H PRN  bisacodyl, 5 mg, Daily PRN  ondansetron, 4 mg, Q6H PRN  ipratropium-albuterol, 1 ampule, Q4H PRN  hydrALAZINE, 10 mg, Q4H PRN  glucose, 4 tablet, PRN  dextrose bolus, 125 mL, PRN   Or  dextrose bolus, 250 mL, PRN  glucagon (rDNA), 1 mg, PRN  dextrose, 100 mL/hr, PRN  ketorolac, 30 mg, Q6H PRN  zolpidem, 5 mg, Nightly PRN        Labs      Recent Results (from the past 24 hour(s))   POCT Glucose    Collection Time: 07/15/22  4:10 PM   Result Value Ref Range    POC Glucose 282 (H) 70 - 99 MG/DL   Blood Gas, Venous    Collection Time: 07/15/22  6:38 PM   Result Value Ref Range    pH, Juan Manuel 7.34 7.32 - 7.42    pCO2, Juan Manuel 58 (H) 38 - 52 mmHG    pO2, Juan Manuel 49 (H) 28 - 48 mmHG    Base Exc, Mixed 3.9 (H) 0 - 1.2    HCO3, Venous 31.3 (H) 19 - 25 MMOL/L    O2 Sat, Juan Manuel 79.9 (H) 50 - 70 %    Comment VBG    POCT Glucose    Collection Time: 07/15/22  9:26 PM   Result Value Ref Range    POC Glucose 157 (H) 70 - 99 MG/DL   POCT Glucose    Collection Time: 07/16/22  7:01 AM   Result Value Ref Range    POC Glucose 153 (H) 70 - 99 MG/DL   POCT Glucose    Collection Time: 07/16/22 11:40 AM   Result Value Ref Range    POC Glucose 185 (H) 70 - 99 MG/DL        Imaging/Diagnostics Last 24 Hours   Echocardiogram complete 2D with doppler with color    Result Date: 7/15/2022  Transthoracic Echocardiography Report (TTE)  Demographics   Patient Name      Melvin ALEXANDER        Date of Study       07/15/2022   Date of Birth     1960          Gender              Male   Age               65 year(s)          Race                   Patient Number    [id number]          Room Number         3029   Visit Number      [id number]   Corporate ID      [id number]   Accession Number  [id number]          Maldonado Hunter CNP  Interpreting        Dariana Funes  Physician                             Physician           MD  Procedure Type of Study   TTE procedure:ECHOCARDIOGRAM COMPLETE 2D W DOPPLER W COLOR. Procedure Date Date: 07/15/2022 Start: 03:12 PM Study Location: Portable Technical Quality: Adequate visualization Indications:Congestive heart failure. Patient Status: Routine Height: 75 inches Weight: 275 pounds BSA: 2.51 m2 BMI: 34.37 kg/m2 BP: 180/91 mmHg  Conclusions   Summary  Technically difficult examination due to uncooperative patient. Left ventricular systolic function is low normal.  Ejection fraction is visually estimated at 45%. Septal wall appears slightly hypokinetic. Moderate bi-atrial enlargement. Mildly dilated right ventricle. Mild-moderate mitral regurgitation. Severe tricuspid regurgitation; RVSP: 60 mmHg. No evidence of any pericardial effusion. Dilated aortic root (4.6 cm). Inferior vena cava is dilated, measuring at 2.8 cm, and does not collapse  with respiration.    Signature   ------------------------------------------------------------------  Electronically signed by Dariana Funes MD (Interpreting  physician) on 07/15/2022 at 04:56 PM  ------------------------------------------------------------------   Findings   Left Ventricle  Left ventricular systolic function is low normal.  Ejection fraction is visually estimated at 45%. Septal wall appears slightly hypokinetic. Left ventricle size is normal.  Indeterminate diastolic function. Left Atrium  Moderately dilated left atrium. Right Atrium  Moderately dilated right atrium. Right Ventricle  Mildly dilated right ventricle. Aortic Valve  Structurally normal aortic valve. Mitral Valve  Mild- moderate mitral regurgitation. Tricuspid Valve  Severe tricuspid regurgitation; RVSP: 60 mmHg. Pulmonic Valve  The pulmonic valve was not well visualized. Pericardial Effusion  No evidence of any pericardial effusion. Pleural Effusion  No evidence of pleural effusion. Miscellaneous  Dilated aortic root (4.6 cm). Inferior vena cava is dilated, measuring at 2.8 cm, and does not collapse  with respiration.   M-Mode/2D Measurements & Calculations   LV Diastolic Dimension:  LV Systolic Dimension:  LA Dimension: 4.9 cmAO Root  5.72 cm                  4.38 cm                 Dimension: 4.6 cmLA Area:  LV FS:23.4 %             LV Volume Diastolic:    62.5 cm2  LV PW Diastolic: 5.42 cm 736 ml  LV PW Systolic: 2.27 cm  LV Volume Systolic: 697  Septum Diastolic: 8.69   ml  cm                       LV EDV/LV EDV Index:    RV Diastolic Dimension:  Septum Systolic: 1.20 cm 020 OD/49 m2LV ESV/LV   3.43 cm                           ESV Index: 102 ml/41 m2                           EF Calculated (A4C):    LA/Aorta: 9.62  LV Area Diastolic: 07.6  95.6 %                  Ascending Aorta: 3.5 cm  cm2                      EF Calculated (2D):     LA volume/Index: 65 ml  LV Area Systolic: 16.9   20.5 %                  /26m2  cm2                           LV Length: 9.54 cm                            LVOT: 2.2 cm  Doppler Measurements & Calculations                        AV Peak Velocity: 175 cm/s    LVOT Peak Velocity: 93.2                       AV Peak Gradient: 12.25 mmHg  cm/s                       AV Mean Velocity: 122 cm/s    LVOT Mean Velocity: 63.4  MV P1/2t: 33 msec    AV Mean Gradient: 7 mmHg      cm/s  MVA by PHT:6.67 cm2  AV VTI: 31.8 cm               LVOT Peak Gradient: 3                       AV Area (Continuity):2.45 cm2 mmHgLVOT Mean Gradient: 2  MV E' Septal                                       mmHg  Velocity: 8.55 cm/s  LVOT VTI: 20.5 cm             Estimated RVSP: 60 mmHg  MV E' Lateral                                      Estimated RAP:3 mmHg  Velocity: 14.4 cm/s  Estimated PASP: 59.55 mmHg                                                      TR Velocity:376 cm/s                                                     TR Gradient:56.55 mmHg      XR CHEST PORTABLE    Result Date: 7/16/2022  EXAMINATION: ONE XRAY VIEW OF THE CHEST 7/16/2022 8:49 am COMPARISON: Chest radiograph January 2022. HISTORY: ORDERING SYSTEM PROVIDED HISTORY: sob TECHNOLOGIST PROVIDED HISTORY: Reason for exam:->sob Reason for Exam: sob FINDINGS: Single view provided. Mild rotation to the left. Stable postsurgical change with prior sternotomy and atrial clip. Accentuation of the mediastinal and cardiac silhouettes due to rotation. Normal lung volumes. There is acute bilateral diffuse peribronchovascular ground-glass opacities. Mild patchy consolidation of the left lateral lung base. No lobar consolidation. No effusion or pneumothorax. Mild left hemidiaphragm elevation which may relate to rotation. No free subdiaphragmatic air. Acute bilateral diffuse pulmonary opacities which may represent developing pulmonary edema versus possible atypical pneumonia such as COVID pneumonia.        Electronically signed by Renee Bull MD, MD on 7/16/2022 at 12:17 PM

## 2022-07-17 VITALS
DIASTOLIC BLOOD PRESSURE: 89 MMHG | HEART RATE: 60 BPM | TEMPERATURE: 97.7 F | OXYGEN SATURATION: 98 % | HEIGHT: 75 IN | BODY MASS INDEX: 33.82 KG/M2 | WEIGHT: 272 LBS | RESPIRATION RATE: 18 BRPM | SYSTOLIC BLOOD PRESSURE: 142 MMHG

## 2022-07-17 PROBLEM — I50.9 ACUTE ON CHRONIC CONGESTIVE HEART FAILURE, UNSPECIFIED HEART FAILURE TYPE (HCC): Status: RESOLVED | Noted: 2022-07-15 | Resolved: 2022-07-17

## 2022-07-17 PROBLEM — I50.9 ACUTE ON CHRONIC CONGESTIVE HEART FAILURE (HCC): Status: ACTIVE | Noted: 2022-07-17

## 2022-07-17 PROBLEM — J96.21 ACUTE ON CHRONIC RESPIRATORY FAILURE WITH HYPOXIA (HCC): Status: RESOLVED | Noted: 2022-07-15 | Resolved: 2022-07-17

## 2022-07-17 PROBLEM — J20.9 ACUTE BRONCHITIS WITH COPD (HCC): Status: RESOLVED | Noted: 2022-07-15 | Resolved: 2022-07-17

## 2022-07-17 PROBLEM — J44.0 ACUTE BRONCHITIS WITH COPD (HCC): Status: RESOLVED | Noted: 2022-07-15 | Resolved: 2022-07-17

## 2022-07-17 PROBLEM — I16.0 HYPERTENSIVE URGENCY: Status: RESOLVED | Noted: 2020-01-05 | Resolved: 2022-07-17

## 2022-07-17 LAB
GLUCOSE BLD-MCNC: 118 MG/DL (ref 70–99)
GLUCOSE BLD-MCNC: 274 MG/DL (ref 70–99)
INR BLD: 1.47 INDEX
PROTHROMBIN TIME: 19 SECONDS (ref 11.7–14.5)

## 2022-07-17 PROCEDURE — 85025 COMPLETE CBC W/AUTO DIFF WBC: CPT

## 2022-07-17 PROCEDURE — 2500000003 HC RX 250 WO HCPCS: Performed by: NURSE PRACTITIONER

## 2022-07-17 PROCEDURE — 2580000003 HC RX 258: Performed by: STUDENT IN AN ORGANIZED HEALTH CARE EDUCATION/TRAINING PROGRAM

## 2022-07-17 PROCEDURE — 6370000000 HC RX 637 (ALT 250 FOR IP): Performed by: STUDENT IN AN ORGANIZED HEALTH CARE EDUCATION/TRAINING PROGRAM

## 2022-07-17 PROCEDURE — 99233 SBSQ HOSP IP/OBS HIGH 50: CPT | Performed by: INTERNAL MEDICINE

## 2022-07-17 PROCEDURE — 36415 COLL VENOUS BLD VENIPUNCTURE: CPT

## 2022-07-17 PROCEDURE — 94640 AIRWAY INHALATION TREATMENT: CPT

## 2022-07-17 PROCEDURE — 94761 N-INVAS EAR/PLS OXIMETRY MLT: CPT

## 2022-07-17 PROCEDURE — 6360000002 HC RX W HCPCS: Performed by: STUDENT IN AN ORGANIZED HEALTH CARE EDUCATION/TRAINING PROGRAM

## 2022-07-17 PROCEDURE — 82962 GLUCOSE BLOOD TEST: CPT

## 2022-07-17 PROCEDURE — APPSS15 APP SPLIT SHARED TIME 0-15 MINUTES: Performed by: NURSE PRACTITIONER

## 2022-07-17 PROCEDURE — 85610 PROTHROMBIN TIME: CPT

## 2022-07-17 PROCEDURE — 6370000000 HC RX 637 (ALT 250 FOR IP): Performed by: NURSE PRACTITIONER

## 2022-07-17 PROCEDURE — 6370000000 HC RX 637 (ALT 250 FOR IP): Performed by: INTERNAL MEDICINE

## 2022-07-17 RX ORDER — DOXYCYCLINE HYCLATE 100 MG
100 TABLET ORAL EVERY 12 HOURS SCHEDULED
Qty: 6 TABLET | Refills: 0 | Status: SHIPPED | OUTPATIENT
Start: 2022-07-17 | End: 2022-07-20

## 2022-07-17 RX ORDER — METOLAZONE 2.5 MG/1
2.5 TABLET ORAL DAILY
Qty: 30 TABLET | Refills: 0 | Status: SHIPPED | OUTPATIENT
Start: 2022-07-17 | End: 2022-10-27

## 2022-07-17 RX ORDER — BUMETANIDE 2 MG/1
2 TABLET ORAL 3 TIMES DAILY
Qty: 30 TABLET | Refills: 3 | Status: SHIPPED | OUTPATIENT
Start: 2022-07-17 | End: 2022-10-13 | Stop reason: ALTCHOICE

## 2022-07-17 RX ADMIN — Medication 2 PUFF: at 11:59

## 2022-07-17 RX ADMIN — BUMETANIDE 2 MG: 0.25 INJECTION, SOLUTION INTRAMUSCULAR; INTRAVENOUS at 08:05

## 2022-07-17 RX ADMIN — LISINOPRIL 20 MG: 20 TABLET ORAL at 08:04

## 2022-07-17 RX ADMIN — PANTOPRAZOLE SODIUM 40 MG: 40 TABLET, DELAYED RELEASE ORAL at 05:28

## 2022-07-17 RX ADMIN — CARVEDILOL 25 MG: 25 TABLET, FILM COATED ORAL at 08:05

## 2022-07-17 RX ADMIN — SODIUM CHLORIDE, PRESERVATIVE FREE 10 ML: 5 INJECTION INTRAVENOUS at 08:07

## 2022-07-17 RX ADMIN — ALBUTEROL SULFATE 2 PUFF: 90 AEROSOL, METERED RESPIRATORY (INHALATION) at 11:59

## 2022-07-17 RX ADMIN — DOXYCYCLINE HYCLATE 100 MG: 100 TABLET, COATED ORAL at 08:05

## 2022-07-17 RX ADMIN — HYDRALAZINE HYDROCHLORIDE 50 MG: 50 TABLET, FILM COATED ORAL at 05:29

## 2022-07-17 RX ADMIN — ALBUTEROL SULFATE 2 PUFF: 90 AEROSOL, METERED RESPIRATORY (INHALATION) at 08:48

## 2022-07-17 RX ADMIN — METHYLPREDNISOLONE SODIUM SUCCINATE 40 MG: 40 INJECTION, POWDER, FOR SOLUTION INTRAMUSCULAR; INTRAVENOUS at 08:04

## 2022-07-17 RX ADMIN — Medication 2 PUFF: at 08:49

## 2022-07-17 RX ADMIN — METOLAZONE 2.5 MG: 5 TABLET ORAL at 08:04

## 2022-07-17 RX ADMIN — CLOPIDOGREL BISULFATE 75 MG: 75 TABLET ORAL at 08:05

## 2022-07-17 RX ADMIN — FLUTICASONE PROPIONATE 1 SPRAY: 50 SPRAY, METERED NASAL at 08:04

## 2022-07-17 RX ADMIN — AMLODIPINE BESYLATE 5 MG: 5 TABLET ORAL at 08:05

## 2022-07-17 NOTE — PLAN OF CARE
Problem: Discharge Planning  Goal: Discharge to home or other facility with appropriate resources  7/17/2022 1124 by Prashanth Guevara RN  Outcome: Completed  7/17/2022 0037 by Delia Huerta RN  Outcome: Progressing     Problem: Pain  Goal: Verbalizes/displays adequate comfort level or baseline comfort level  7/17/2022 1124 by Prashanth Guevara RN  Outcome: Completed  7/17/2022 0037 by Delia Huerta RN  Outcome: Progressing     Problem: Safety - Adult  Goal: Free from fall injury  7/17/2022 1124 by Prashanth Guevara RN  Outcome: Completed  7/17/2022 0037 by Delia Huerta RN  Outcome: Progressing     Problem: ABCDS Injury Assessment  Goal: Absence of physical injury  7/17/2022 1124 by Prashanth Guevara RN  Outcome: Completed  7/17/2022 0037 by Delia Huerta RN  Outcome: Progressing

## 2022-07-17 NOTE — DISCHARGE INSTRUCTIONS
empagliflozin  Pronunciation: EM pa gli FLOE zin  Brand: Jardiance  What is the most important information I should know about empagliflozin? Stop taking this medicine and call your doctor at once if you have signs of a serious side effect, such as stomach pain, vomiting, tiredness, or trouble breathing. Tell your doctor if you are sick with vomiting or diarrhea, or if you eat ordrink less than usual.  Empagliflozin can cause serious infections around the penis or vagina. Get medical help right away if you have burning, itching, odor, discharge, pain, tenderness, redness or swelling of the genital or rectal area, fever, orif you don't feel well. What is empagliflozin? Empagliflozin is used together with diet and exercise to improve blood sugarcontrol in adults with type 2 diabetes mellitus. Empagliflozin is also used to lower the risk of death from heart attack, stroke, or heart failure in adults with type 2 diabetes who also have heartdisease. Empagliflozin is also used to lower the risk of dying or needing to be in ahospital for heart failure when your heart cannot pump blood properly. Empagliflozin is not for treating type 1 diabetes. Empagliflozin may also be used for purposes not listed in this medication guide. What should I discuss with my healthcare provider before taking empagliflozin? You should not use empagliflozin if you are allergic to it, or if you have:  severe kidney disease (or if you are on dialysis). Tell your doctor if you have ever had:  liver or kidney disease;  a bladder infection or urination problems;  a genital infection (penis or vagina);  problems with your pancreas, including surgery;  alcoholism, or if you currently drink large amounts of alcohol;  if you are on a low salt diet; or  if you are 65 or older. Follow your doctor's instructions about using this medicine if you are pregnant or you become pregnant.  Controlling diabetes is very important during pregnancy. You should not use empagliflozin during the second or third trimester of pregnancy. Do not breastfeed. Not approved for use by anyone younger than 25years old. How should I take empagliflozin? Follow all directions on your prescription label and read all medication guides or instruction sheets. Your doctor may occasionally change your dose. Use themedicine exactly as directed. You may take empagliflozin with or without food. Your blood sugar will need to be checked often, and you may also need to test the level of ketones in your urine. Empagliflozin can cause life-threatening ketoacidosis (too much acid in the blood). Even if your blood sugar is normal, contact your doctor if a urine test showsthat you have high ketones in the urine. Blood sugar can be affected by stress, illness, surgery, exercise, alcohol use,or skipping meals. Low blood sugar (hypoglycemia) can make you feel very hungry, dizzy, irritable, or shaky. To quickly treat hypoglycemia, eat or drink hard candy, crackers, raisins, fruit juice, or non-diet soda. Your doctor may prescribe glucagon injection in case of severehypoglycemia. You may get dehydrated during prolonged illness. Call your doctor if you aresick with vomiting or diarrhea, or if you eat or drink less than usual.  This medicine can affect the results of certain medical tests. Tell any doctorwho treats you that you are using empagliflozin. Your treatment may also include diet, exercise, weight control, and specialmedical care. Store at room temperature away from moisture and heat. What happens if I miss a dose? Take the medicine as soon as you can, but skip the missed dose if it is almost time for your next dose. Do not take two doses at one time. What happens if I overdose? Seek emergency medical attention or call the Poison Help line at 1-762.634.2652. What should I avoid while taking empagliflozin? Avoid drinking alcohol.   Avoid getting up too fast from empagliflozin. Remember, keep this and all other medicines out of the reach of children, never share your medicines with others, and use this medication only for the indication prescribed. Every effort has been made to ensure that the information provided by [address] is accurate, up-to-date, and complete, but no guarantee is made to that effect. Drug information contained herein may be time sensitive. [facility] information has been compiled for use by healthcare practitioners and consumers in the [place] and therefore [facility] does not warrant that uses outside of the [place] are appropriate, unless specifically indicated otherwise. [facility]'s drug information does not endorse drugs, diagnose patients or recommend therapy. [facility]'s drug information is an informational resource designed to assist licensed healthcare practitioners in caring for their patients and/or to serve consumers viewing this service as a supplement to, and not a substitute for, the expertise, skill, knowledge and judgment of healthcare practitioners. The absence of a warning for a given drug or drug combination in no way should be construed to indicate that the drug or drug combination is safe, effective or appropriate for any given patient. [facility] does not assume any responsibility for any aspect of healthcare administered with the aid of information [facility] provides. The information contained herein is not intended to cover all possible uses, directions, precautions, warnings, drug interactions, allergic reactions, or adverse effects. If you have questions about the drugs you are taking, check with yourdoctor, nurse or pharmacist.  Copyright 8338-1259 [facility] [place]. Version: 4.01. Revision date: 10/7/2021. Care instructions adapted under license by [facility] ([facility]).  If you have questions about a medical condition or this instruction, always ask your healthcare professional. [address] any warranty or liability for your use of this information. doxycycline (oral/injection)  Pronunciation: DOX dave combs  Brand:  Acticlate, Adoxa, Alodox, Avidoxy, Doryx, Mondoxyne NL, Monodox, Morgidox,Okebo, Oracea, Oraxyl, Targadox, Vibramycin  What is the most important information I should know about doxycycline? You should not take this medicine if you are allergic to any tetracyclineantibiotic. Children younger than 6years old should use doxycycline only in cases of severe or life-threatening conditions. This medicine can cause permanentyellowing or graying of the teeth in children  Using doxycycline during pregnancy could harm the unborn baby or cause permanent tooth discoloration later in the baby's life. What is doxycycline? Doxycycline is a tetracycline antibiotic that  Doxycycline is used to treat many different bacterial infections, such as acne, urinary tract infections, intestinal infections, eye infections, gonorrhea,chlamydia, periodontitis (gum disease), and others. Doxycycline is also used to treat blemishes, bumps, and acne-like lesionscaused by rosacea. Doxycycline will not treat facial redness caused by rosacea. Some forms of doxycycline are used to prevent malaria, to treat anthrax, or totreat infections caused by mites, ticks, or lice. Doxycycline may also be used for purposes not listed in this medication guide. What should I discuss with my healthcare provider before taking doxycycline? You should not take this medicine if you are allergic to doxycycline or other tetracycline antibiotics such as demeclocycline, minocycline, tetracycline, ortigecycline. Tell your doctor if you have ever had:  liver disease;  kidney disease;  asthma or sulfite allergy;  increased pressure inside your skull; or  if you also take isotretinoin, seizure medicine, or a blood thinner such as warfarin (Coumadin).   If you are using doxycycline to treat gonorrhea, your doctor may test you to make sure you do not also have syphilis, anothersexually transmitted disease. Taking this medicine during pregnancy may affect tooth and bone development in the unborn baby. Taking doxycycline during the last half of pregnancy can cause permanent tooth discoloration later in the baby's life. Tell your doctor if you are pregnant orif you become pregnant. Doxycycline can make birth control pills less effective. Ask your doctor about using a non-hormonal birth control (condom, diaphragm with spermicide) toprevent pregnancy. Doxycycline can pass into breast milk and may affect bone and tooth development in a nursing infant. Do not breastfeed while you are taking doxycycline. Doxycycline can cause permanent yellowing or graying of the teeth in children younger than 6years old. Children should use doxycycline only in cases of severe or life-threatening conditions such as anthrax or [facility] spotted fever. The benefit of treating a serious condition may outweigh any risks tothe child's tooth development. How should I take doxycycline? Follow all directions on your prescription label and read all medication guidesor instruction sheets. Use the medicine exactly as directed. Take doxycycline with a full glass of water. Drink plenty of liquids while youare taking doxycycline. Read and carefully follow any Instructions for Use provided with your medicine. Ask your doctor or pharmacist if you do not understand these instructions. Most brands of doxycyline may be taken with food or milk if the medicine upsetsyour stomach. Different brands of doxycycline may have different instructions about taking them with or without food. Take Oracea on an empty stomach, at least 1 hour before or 2 hours after a meal.  You may need to split a doxycycline tablet to get the correct dose. Follow yourdoctor's instructions. Swallow a delayed-release capsule or tablet whole. Do not crush, chew, break, or open it.   Measure liquid medicine with the dosing syringe provided, or with a special dose-measuring spoon or medicine cup. If you do not have a dose-measuring device, ask your pharmacistfor one. If you take doxycycline to prevent malaria: Start taking the medicine 1 or 2 days before entering an area where malaria is common. Continue taking the medicine every day during your stay and for atleast 4 weeks after you leave the area. Doxycycline is usually given by injection only if you are unable to take the medicine by mouth. A healthcare providerwill give you this injection as an infusion into a vein. Use this medicine for the full prescribed length of time, even if your symptoms quickly improve. Skipping doses can increase your risk of infection that is resistant to medication. Doxycycline will not treat a viral infection such asthe flu or a common cold. Store at room temperature away from moisture, heat, and light. Throw away any unused medicine after the expiration date on the label has passed. Using  doxycycline can cause damage to your kidneys. What happens if I miss a dose? Take the medicine as soon as you can, but skip the missed dose if it is almost time for your next dose. Do not take two doses at one time. What happens if I overdose? Seek emergency medical attention or call the Poison Help line at 1-285.227.4175. What should I avoid while taking doxycycline? Do not take iron supplements, multivitamins, calcium supplements, antacids, orlaxatives within 2 hours before or after taking doxycycline. Avoid taking any other antibiotics with doxycycline unless your doctor has told you to. Doxycycline could make you sunburn more easily. Avoid sunlight or tanning beds. Wear protective clothing and use sunscreen (SPF 30 or higher) when you areoutdoors. Antibiotic medicines can cause diarrhea, which may be a sign of a new infection. If you have diarrhea that is watery or bloody, call your doctor.  Do not use anti-diarrhea medicine unless your doctor tells you to. What are the possible side effects of doxycycline? Get emergency medical help if you have signs of an allergic reaction (hives, difficult breathing, swelling in your face or throat) or a severe skin reaction (fever, sore throat, burning in your eyes, skin pain, red or purple skin rashthat spreads and causes blistering and peeling). Seek medical treatment if you have a serious drug reaction that can affect many parts of your body. Symptoms may include: skin rash, fever, swollen glands, flu-like symptoms, muscle aches, severe weakness, unusual bruising, or yellowing of your skin oreyes. This reaction may occur several weeks after you began using doxycycline. Call your doctor at once if you have:  severe stomach pain, diarrhea that is watery or bloody;  throat irritation, trouble swallowing;  chest pain, irregular heart rhythm, feeling short of breath;  little or no urination;  low white blood cell counts --fever, chills, swollen glands, body aches, weakness, pale skin, easy bruising or bleeding;  increased pressure inside the skull --severe headaches, ringing in your ears, dizziness, nausea, vision problems, pain behind your eyes; or  signs of liver or pancreas problems --loss of appetite, upper stomach pain (that may spread to your back), tiredness, nausea or vomiting, fast heart rate, dark urine, jaundice (yellowing of the skin or eyes). Common side effects may include:  nausea, vomiting, upset stomach, loss of appetite;  mild diarrhea;  skin rash or itching;  darkened skin color; or  vaginal itching or discharge. This is not a complete list of side effects and others may occur. Call your doctor for medical advice about side effects. You may report side effects toFDA at 3-044-HFM-5583. What other drugs will affect doxycycline? Sometimes it is not safe to use certain medications at the same time.  Some drugs can affect your blood levels of other drugs you take, which about the drugs you are taking, check with yourdoctor, nurse or pharmacist.  Copyright 8194-3198 [address] Antonio: 21.04. Revision date:11/4/2020. Care instructions adapted under license by [facility] ([facility]). If you have questions about a medical condition or this instruction, always ask your healthcare professional. [address] any warranty or liability for your use of this information.

## 2022-07-17 NOTE — DISCHARGE SUMMARY
V2.0  Discharge Summary    Name:  Juan Steele /Age/Sex: 1960 (65 y.o. male)   Admit Date: 7/15/2022  Discharge Date: 22    MRN & CSN:  [id number] & [id number] Encounter Date and Time 22 10:41 AM EDT    Attending:  Renan Tay MD Discharging Provider: Renan Tay MD, MD       Hospital Course:     Brief HPI: Juan Steele is a 65 y.o. male who presented with acute CHF exacerbation. Brief Problem Based Course:   Acute on chronic hypoxic hypercapnic respiratory failure with underlying multifactorial etiology likely in the setting of COPD exacerbation and CHF exacerbation: Hypervolemic on initial assessment with BNP of 5532 on admission. Chest x-ray showed pulmonary vascular congestion with no pulm edema and cardiomegaly. Significantly improved with euvolemia achieved at the time of discharge. The patient was started on IV Bumex 2 mg 3 times daily through the use of metolazone 1 to 2 hours after the Bumex dose in the morning once a day. The patient is feeling fine and wants to go home. Cardiology at bedside when the discussion was done in detail regarding the safety index with the patient going home. Reinforcement was done in terms of discussing regarding the timing of the medications that is of utmost importance. Patient shows complete understanding AND wishes to go home. I went back again to the bedside to explain the process as to when to take bumex and when to take metolazone. Cardiology consult in place. Appreciate recommendations. Echocardiogram showed EF of 45% with dilated IVC septal hypokinesis and severe tricuspid regurg with RVSP of 60. No velasco in place. Chronic obstructive pulmonary disease Gold stage II: Stable. Received 1 dose of IV Rocephin in the ED to be continued for 5 days. Solu-Medrol IV 40 mg daily, will be discharged with 3 days of oral doxycycline 2 times a day to complete the course of antibiotics for COPD exacerbation.   On insulin-dependent diabetes mellitus type 2 Home medication include sulfonylurea that needs to be stopped on DC keeping in mind that patient has underlying history of CKD. We will resume metformin 1000 mg twice daily for the patient. On top of that we will start the patient on Januvia 10 mg once a day keeping in mind that the patient has heart failure and there has been significant studies supporting the idea of Januvia helping with heart failure patients. Valvular paroxysmal atrial fibrillation: Continue Coumadin with regular INR checks. Chronic venous insufficiency with bilateral lower extremity pain: Currently on compression stockings. Scheduled for venous procedure outpatient. Hypertensive urgency on admission: Resolved. Increased hydralazine to 50 mg 3 times daily. Amlodipine was discontinued keeping in mind the patient has bilateral lower extremity swelling with insufficiency significant amount of urine output with reduced appetite. Diabetic cardiac diet as tolerated with fluid restriction. It was highly encouraged with the patient to take care of the sodium intake and fluid restriction or daily basis. Patient does not seem to be reeling from that context has been repeatedly mentioning about going to [facility]. Advised otherwise. The patient expressed appropriate understanding of, and agreement with the discharge recommendations, medications, and plan.      Consults this admission:  IP CONSULT TO PHARMACY  IP CONSULT TO CARDIOLOGY  IP CONSULT TO HEART FAILURE NURSE/COORDINATOR    Discharge Diagnosis:   Acute on chronic respiratory failure with hypoxia ([address]    CHF exacerbation    Discharge Instruction:   Follow up appointments: Cardiology, PCP  Primary care physician: Kvng Fontaine MD within 2 weeks  Diet: cardiac diet   Activity: activity as tolerated  Disposition: Discharged to:   [x]Home, []C, []SNF, []Acute Rehab, []Hospice   Condition on discharge: Stable  Labs and Tests to be Followed up as an outpatient by membranes are moist.   Eyes:      Extraocular Movements: Extraocular movements intact. Pupils: Pupils are equal, round, and reactive to light. Cardiovascular:      Rate and Rhythm: Normal rate and regular rhythm. Pulses: Normal pulses. Heart sounds: Murmur heard. Pulmonary:      Effort: Pulmonary effort is normal.   Abdominal:      General: Abdomen is flat. Palpations: Abdomen is soft. Musculoskeletal:      Cervical back: Normal range of motion. No rigidity. Right lower leg: Edema present. Left lower leg: Edema present. Comments: Bilateral lower extremity edema is still there but it is much better than before. Associated with chronic venous stasis with venous insufficiency. Neurological:      General: No focal deficit present. Mental Status: He is alert and oriented to person, place, and time. Mental status is at baseline. Sensory: No sensory deficit. Motor: No weakness. Psychiatric:         Attention and Perception: Attention normal.         Mood and Affect: Affect is angry. Speech: Speech normal.         Cognition and Memory: Cognition normal.         Judgment: Judgment is impulsive.             Labs and Imaging   Echocardiogram complete 2D with doppler with color    Result Date: 7/15/2022  Transthoracic Echocardiography Report (TTE)  Demographics   Patient Name      Stephanie ALEXANDER        Date of Study       07/15/2022   Date of Birth     1960          Gender              Male   Age               65 year(s)          Race                   Patient Number    [id number]          Room Number         3029   Visit Number      [id number]   Corporate ID      [id number]   Accession Number  [id number]          Julio Jane CNP  Interpreting        Tierney Finnegan  Physician                             Physician           MD Procedure Type of Study   TTE procedure:ECHOCARDIOGRAM COMPLETE 2D W DOPPLER W COLOR. Procedure Date Date: 07/15/2022 Start: 03:12 PM Study Location: Portable Technical Quality: Adequate visualization Indications:Congestive heart failure. Patient Status: Routine Height: 75 inches Weight: 275 pounds BSA: 2.51 m2 BMI: 34.37 kg/m2 BP: 180/91 mmHg  Conclusions   Summary  Technically difficult examination due to uncooperative patient. Left ventricular systolic function is low normal.  Ejection fraction is visually estimated at 45%. Septal wall appears slightly hypokinetic. Moderate bi-atrial enlargement. Mildly dilated right ventricle. Mild-moderate mitral regurgitation. Severe tricuspid regurgitation; RVSP: 60 mmHg. No evidence of any pericardial effusion. Dilated aortic root (4.6 cm). Inferior vena cava is dilated, measuring at 2.8 cm, and does not collapse  with respiration. Signature   ------------------------------------------------------------------  Electronically signed by Maricruz Lemos MD (Interpreting  physician) on 07/15/2022 at 04:56 PM  ------------------------------------------------------------------   Findings   Left Ventricle  Left ventricular systolic function is low normal.  Ejection fraction is visually estimated at 45%. Septal wall appears slightly hypokinetic. Left ventricle size is normal.  Indeterminate diastolic function. Left Atrium  Moderately dilated left atrium. Right Atrium  Moderately dilated right atrium. Right Ventricle  Mildly dilated right ventricle. Aortic Valve  Structurally normal aortic valve. Mitral Valve  Mild- moderate mitral regurgitation. Tricuspid Valve  Severe tricuspid regurgitation; RVSP: 60 mmHg. Pulmonic Valve  The pulmonic valve was not well visualized. Pericardial Effusion  No evidence of any pericardial effusion. Pleural Effusion  No evidence of pleural effusion. Miscellaneous  Dilated aortic root (4.6 cm).   Inferior vena cava is dilated, measuring at 2.8 cm, and does not collapse  with respiration.   M-Mode/2D Measurements & Calculations   LV Diastolic Dimension:  LV Systolic Dimension:  LA Dimension: 4.9 cmAO Root  5.72 cm                  4.38 cm                 Dimension: 4.6 cmLA Area:  LV FS:23.4 %             LV Volume Diastolic:    36.6 cm2  LV PW Diastolic: 7.71 cm 158 ml  LV PW Systolic: 1.22 cm  LV Volume Systolic: 277  Septum Diastolic: 2.95   ml  cm                       LV EDV/LV EDV Index:    RV Diastolic Dimension:  Septum Systolic: 5.89 cm 373 QH/77 m2LV ESV/LV   3.43 cm                           ESV Index: 102 ml/41 m2                           EF Calculated (A4C):    LA/Aorta: 5.35  LV Area Diastolic: 22.3  49.0 %                  Ascending Aorta: 3.5 cm  cm2                      EF Calculated (2D):     LA volume/Index: 65 ml  LV Area Systolic: 19.0   71.6 %                  /26m2  cm2                           LV Length: 9.54 cm                            LVOT: 2.2 cm  Doppler Measurements & Calculations                        AV Peak Velocity: 175 cm/s    LVOT Peak Velocity: 93.2                       AV Peak Gradient: 12.25 mmHg  cm/s                       AV Mean Velocity: 122 cm/s    LVOT Mean Velocity: 63.4  MV P1/2t: 33 msec    AV Mean Gradient: 7 mmHg      cm/s  MVA by PHT:6.67 cm2  AV VTI: 31.8 cm               LVOT Peak Gradient: 3                       AV Area (Continuity):2.45 cm2 mmHgLVOT Mean Gradient: 2  MV E' Septal                                       mmHg  Velocity: 8.55 cm/s  LVOT VTI: 20.5 cm             Estimated RVSP: 60 mmHg  MV E' Lateral                                      Estimated RAP:3 mmHg  Velocity: 14.4 cm/s  Estimated PASP: 59.55 mmHg                                                      TR Velocity:376 cm/s                                                     TR Gradient:56.55 mmHg      XR CHEST PORTABLE    Result Date: 7/16/2022  EXAMINATION: ONE XRAY VIEW OF THE CHEST 7/16/2022 8:49 am COMPARISON: Chest radiograph January 2022. HISTORY: ORDERING SYSTEM PROVIDED HISTORY: sob TECHNOLOGIST PROVIDED HISTORY: Reason for exam:->sob Reason for Exam: sob FINDINGS: Single view provided. Mild rotation to the left. Stable postsurgical change with prior sternotomy and atrial clip. Accentuation of the mediastinal and cardiac silhouettes due to rotation. Normal lung volumes. There is acute bilateral diffuse peribronchovascular ground-glass opacities. Mild patchy consolidation of the left lateral lung base. No lobar consolidation. No effusion or pneumothorax. Mild left hemidiaphragm elevation which may relate to rotation. No free subdiaphragmatic air. Acute bilateral diffuse pulmonary opacities which may represent developing pulmonary edema versus possible atypical pneumonia such as COVID pneumonia. CBC: No results for input(s): WBC, HGB, PLT in the last 72 hours. BMP:    Recent Labs     07/15/22  1017      K 4.3      CO2 25   BUN 17   CREATININE 1.6*   GLUCOSE 276*     Hepatic: No results for input(s): AST, ALT, ALB, BILITOT, ALKPHOS in the last 72 hours. Lipids:   Lab Results   Component Value Date/Time    CHOL 153 01/06/2020 04:06 AM    HDL 43 01/06/2020 04:06 AM    TRIG 54 01/06/2020 04:06 AM     Hemoglobin A1C:   Lab Results   Component Value Date/Time    LABA1C 7.8 12/30/2020 03:40 AM     TSH: No results found for: TSH  Troponin:   Lab Results   Component Value Date/Time    TROPONINT 0.019 07/15/2022 10:17 AM    TROPONINT 0.050 01/29/2022 08:25 PM    TROPONINT 0.071 01/29/2022 05:26 PM     Lactic Acid: No results for input(s): LACTA in the last 72 hours.   BNP:   Recent Labs     07/16/22  1146   PROBNP 5,532*     UA:  Lab Results   Component Value Date/Time    NITRU NEGATIVE 12/26/2020 03:39 AM    COLORU YELLOW 12/26/2020 03:39 AM    WBCUA NO CELLS SEEN 12/26/2020 03:39 AM    RBCUA 3 12/26/2020 03:39 AM    MUCUS RARE 07/04/2020 07:20 PM    TRICHOMONAS NONE SEEN 07/20/2020 02:20 AM    BACTERIA NEGATIVE 12/26/2020 03:39 AM    CLARITYU CLEAR 12/26/2020 03:39 AM    SPECGRAV 1.020 12/26/2020 03:39 AM    LEUKOCYTESUR NEGATIVE 12/26/2020 03:39 AM    UROBILINOGEN 0.2 12/26/2020 03:39 AM    BILIRUBINUR NEGATIVE 12/26/2020 03:39 AM    BLOODU TRACE 12/26/2020 03:39 AM    KETUA NEGATIVE 12/26/2020 03:39 AM     Urine Cultures: No results found for: LABURIN  Blood Cultures: No results found for: BC  No results found for: BLOODCULT2  Organism: No results found for: ORG    Time Spent Discharging patient 50 minutes    Electronically signed by Kristine Malik MD, MD on 7/17/2022 at 10:41 AM

## 2022-07-17 NOTE — DISCHARGE INSTR - DIET
Good nutrition is important when healing from an illness, injury, or surgery. Follow any nutrition recommendations given to you during your hospital stay. If you were given an oral nutrition supplement while in the hospital, continue to take this supplement at home. You can take it with meals, in-between meals, and/or before bedtime. These supplements can be purchased at most local grocery stores, pharmacies, and chain [employer]-stores. If you have any questions about your diet or nutrition, call the hospital and ask for the dietitian. Diabetic cardiac diet as tolerated with fluid restriction. It was highly encouraged with the patient to take care of the sodium intake and fluid restriction or daily basis. Patient does not seem to be reeling from that context has been repeatedly mentioning about going to [facility]. Advised otherwise.

## 2022-07-18 ENCOUNTER — TELEPHONE (OUTPATIENT)
Dept: CARDIOLOGY CLINIC | Age: 62
End: 2022-07-18

## 2022-07-18 NOTE — TELEPHONE ENCOUNTER
Left message for patient requesting a return call to schedule a consult for acute on chronic CHF per referral from Ozzy Juan.

## 2022-07-19 NOTE — PROGRESS NOTES
Physician Progress Note      Murphy Connell  CSN #:                  [id number]  :                       1960  ADMIT DATE:       7/15/2022 5:10 AM  DISCH DATE:        2022 12:50 PM  RESPONDING  PROVIDER #:        Guerrero Finch          QUERY TEXT:    Hospitalists,    Patient admitted with acute on chronic respiratory failure due to bronchitis   and has COPD documented. If possible, please document in progress notes and   discharge summary if you are evaluating and /or treating any of the following: The medical record reflects the following:  Risk Factors: COPD  Clinical Indicators: Per H&P: \"Acute on chronic hypoxic respiratory failure,   -Contributing factors include acute bronchitis with COPD and heart   failure-Management as mentioned below.  Acute bronchitis with COPD\"  Treatment: labs, O2 prn    Thank you,  Ari Greco RN CDS  [phone]  Options provided:  -- Respond - Create new note now  -- Disagree - Not applicable / Not valid  -- Disagree - Clinically unable to determine / Unknown  -- Refer to Clinical Documentation Reviewer    PROVIDER RESPONSE TEXT:    Acute on chronic respiratory failure secondary to COPD exacerbation    Query created by: Moraima Cheung on 7/15/2022 9:44 AM      Electronically signed by:  Guerrero Finch 2022 7:13 AM

## 2022-07-20 ENCOUNTER — TELEPHONE (OUTPATIENT)
Dept: CARDIOLOGY CLINIC | Age: 62
End: 2022-07-20

## 2022-07-21 ENCOUNTER — TELEPHONE (OUTPATIENT)
Dept: CARDIOLOGY CLINIC | Age: 62
End: 2022-07-21

## 2022-07-21 ENCOUNTER — TELEPHONE (OUTPATIENT)
Dept: PHARMACY | Age: 62
End: 2022-07-21

## 2022-07-21 NOTE — TELEPHONE ENCOUNTER
Patient in office with his brother in law. Scheduled for 8/4/22 with Marquis Fowlering. For Pharmacy Admin Tracking Only    Intervention Detail:   Total # of Interventions Recommended:    Total # of Interventions Accepted:   Time Spent (min): 5

## 2022-07-21 NOTE — TELEPHONE ENCOUNTER
I called the patient to let them know that we got approval from [facility] for his ablation on his leg veins.

## 2022-07-26 ENCOUNTER — INITIAL CONSULT (OUTPATIENT)
Dept: CARDIOLOGY CLINIC | Age: 62
End: 2022-07-26
Payer: COMMERCIAL

## 2022-07-26 VITALS
WEIGHT: 274 LBS | HEART RATE: 66 BPM | HEIGHT: 75 IN | BODY MASS INDEX: 34.07 KG/M2 | OXYGEN SATURATION: 91 % | DIASTOLIC BLOOD PRESSURE: 78 MMHG | SYSTOLIC BLOOD PRESSURE: 140 MMHG | RESPIRATION RATE: 20 BRPM

## 2022-07-26 DIAGNOSIS — Z71.89 ENCOUNTER FOR EDUCATION ABOUT HEART FAILURE: ICD-10-CM

## 2022-07-26 DIAGNOSIS — I83.893 VARICOSE VEINS OF BOTH LEGS WITH EDEMA: ICD-10-CM

## 2022-07-26 DIAGNOSIS — Z78.9 MOTIVATIONAL BARRIER TO EDUCATION: ICD-10-CM

## 2022-07-26 DIAGNOSIS — I50.22 CHRONIC SYSTOLIC CONGESTIVE HEART FAILURE (HCC): Primary | ICD-10-CM

## 2022-07-26 PROCEDURE — G8417 CALC BMI ABV UP PARAM F/U: HCPCS | Performed by: NURSE PRACTITIONER

## 2022-07-26 PROCEDURE — 1111F DSCHRG MED/CURRENT MED MERGE: CPT | Performed by: NURSE PRACTITIONER

## 2022-07-26 PROCEDURE — 99214 OFFICE O/P EST MOD 30 MIN: CPT | Performed by: NURSE PRACTITIONER

## 2022-07-26 PROCEDURE — G8427 DOCREV CUR MEDS BY ELIG CLIN: HCPCS | Performed by: NURSE PRACTITIONER

## 2022-07-26 PROCEDURE — 4004F PT TOBACCO SCREEN RCVD TLK: CPT | Performed by: NURSE PRACTITIONER

## 2022-07-26 PROCEDURE — 3017F COLORECTAL CA SCREEN DOC REV: CPT | Performed by: NURSE PRACTITIONER

## 2022-07-26 ASSESSMENT — ENCOUNTER SYMPTOMS
COUGH: 0
SHORTNESS OF BREATH: 0

## 2022-07-26 NOTE — PROGRESS NOTES
CHF CLINICAL STAFF DOCUMENTATION    Have you had any Chest Pain? - No    Do you had any Shortness of Breath - Yes  If Yes - When on exertion    Have you had any dizziness - No  When do you feel dizzy none   How long does it last .none  none     Do you have any edema -  Yes  swelling in feet ankles legs      Are you on fluid restrictions -  he is unsure      Amount -   Are you on sodium restrictions - states not adding salt   Amount -     Do you feel fatigued - Yes    Do you have a cough - Yes    Lung sounds -    Normal - No   Abnormal - right base expiratory sonorous wheeze  Do you have abdominal bloating - Yes    How is your appetite - good    Do you have difficulty sleeping - Yes  Able to lie flat? - No sleeps in recliner     Do you have a history of sleep apnea - Yes disposed of machine - needing new test   CPAP no    6 min. Walk:   Pre heart rate 66  Time walked 2 minutes   Distance  250ft - stopped after 2 minutes due to pain in feet    Post heart rate 72     Heart failure education provided. Patient reports taking all his medications no more than twice daily.       Have you had Vaccine for Covid - Yes    Have you had Flu Vaccine - Yes    Have you had Pneumonia Vaccine - No

## 2022-07-26 NOTE — PROGRESS NOTES
[facility])     COPD (chronic obstructive pulmonary disease) ([facility] [address]     Follows with PCP    Decreased cardiac ejection fraction     Diabetes mellitus ([facility] [address]     GERD (gastroesophageal reflux disease)     H/O cardiovascular stress test 11/20/13, 3/20/2013    11/13-Observed defect is consistent with diaphragmatic attenuation. EF 58%. 3/13 -EF 51%. No ischemia. Normal Study. H/O cardiovascular stress test 06/08/2017    EF 50% normal study    H/O cardiovascular stress test 06/17/2019    Normal NM    H/O cardiovascular stress test 03/02/2021    depressed lvedf increased edv myocardial perfusion abnormal stress test    H/O Doppler ultrasound 10/14/2010    10/2010-Bilateral venous doppler of lower extremies- No DVTs. H/O echocardiogram 06/26/2019    Left ventricular systolic function is normal with an ejection fraction of 50-55%. Mild concentric left ventricular hypertrophy. Grade I diastolic dysfunction. No significant valvular disease noted. No evidence of pericardial effusion. Essentially unremarkable echo. H/O percutaneous left heart catheterization 12/22/2015    No evidence of hemodynamically significant coronary artery disease    Heart imaging 04/23/2020    muga - ef 58%    History of coronary artery stent placement 11/13/2013 11/13 -RCA - 3 stents placed    History of exercise stress test 06/08/2017    treadmill    History of Holter monitoring 01/04/2016    predominant rhythm sinus    History of MRI of brain and brain stem 06/04/2020    No evidence of an acute infarct, mod chronic microvascular white matter ischemic disease with associated cerebral parenchymal volume loss    History of nuclear stress test 02/24/2020    EF 38%,Myocardial perfusion scan shows moderate size, severe intensity, non  reversible perfusion defect in inferior wall. Hx of Doppler echocardiogram 01/26/2022    EF 45-50% Mod LV hypertrophy. Grade 1 diastolic dysfunction. Mod MR and TR. Dilation of the aortic root and ascending aorta. Hyperlipidemia     Hypertension     Follows with PCP    Kidney disease     Dr. Jeronimo Crowell    S/P cardiac catheterization 11/13/2013 11/13/2013-EF 55%, distal LAD mild disease,CX stent patent,but distal to stent 50% stenosis. RCA severe disease. SOB (shortness of breath)     Status post angioplasty with stent     Wears dentures     full upper denture     Past Surgical History:   Procedure Laterality Date    APPENDECTOMY      BIOPSY / LIGATION TEMPORAL ARTERY Left 8/17/2020    LEFT TEMPORAL ARTERY BIOPSY LIGATION performed by Erika Santoro MD at [address]  11/13/2013 11/13/2013-EF 55%, distal LAD mild disease,CX stent patent,but distal to stent 50% stenosis. RCA severe disease. CARDIAC SURGERY  07/03/2020    CABG X 3 LIMA-OM-PDA-LAD, Maze, LT Atrial Appendage clipping    COLONOSCOPY      CORONARY ANGIOPLASTY WITH STENT PLACEMENT      4 stents- RCA X3; CX X1    CORONARY ANGIOPLASTY WITH STENT PLACEMENT  11/13/2013 11/13/2013 -3 stents placed to RCA- Dr Manny Ramos N/A 7/3/2020    INTRAOPERATIVE ISMAEL, CABG X3   WITH LIMA  AND LEFT LEG ENDOVEIN HARVEST, INDUCED HYPOTHERMIA, MAZE BIPOLAR AND CRYO PROCEDURE, LEFT ATRIAL APPENDAGE CLIPPING performed by Jolene Nicole MD at [address] [place] [address]      all teeth extracted    [address]     Family History   Problem Relation Age of Onset    Cancer Mother     Diabetes Mother     Heart Disease Mother     High Blood Pressure Mother     Stroke Mother     Cancer Father     Heart Disease Father     High Blood Pressure Father     Stroke Father     Cancer Brother      Social History     Tobacco Use    Smoking status: Every Day     Packs/day: 1.00     Years: 46.00     Pack years: 46.00     Types: Cigarettes     Start date: 1974    Smokeless tobacco: Never    Tobacco comments:     Smokes approx 1 pack per day   Substance Use Topics    Alcohol use:  No Comment: caffeine 6 bottles of unsweet tea a day     Current Outpatient Medications   Medication Sig Dispense Refill    bumetanide (BUMEX) 2 MG tablet Take 1 tablet by mouth in the morning, at noon, and at bedtime Take metolazone exactly 30 minutes after the Bumex tablet every day after each dose of Bumex. 30 tablet 3    metOLazone (ZAROXOLYN) 2.5 MG tablet Take 1 tablet by mouth in the morning. Take 1-2 hours after Bumex. Please make sure of this. 30 tablet 0    warfarin (JANTOVEN) 4 MG tablet TAKE 1 TABLET BY MOUTH EVERY DAY, EXCEPT TAKE 1 AND 1/2 TABLETS ON SUNDAYS OR AS DIRECTED BY CLINIC 100 tablet 1    hydrALAZINE (APRESOLINE) 25 MG tablet Take 1 tablet by mouth 3 times daily 90 tablet 5    carvedilol (COREG) 12.5 MG tablet Take 1 tablet by mouth 2 times daily (with meals) 60 tablet 5    guaiFENesin (MUCINEX) 600 MG extended release tablet Take 1 tablet by mouth 2 times daily 60 tablet 5    ipratropium-albuterol (DUONEB) 0.5-2.5 (3) MG/3ML SOLN nebulizer solution Inhale 3 mLs into the lungs 4 times daily 360 mL 5    clopidogrel (PLAVIX) 75 MG tablet TAKE 1 TABLET BY MOUTH DAILY. pantoprazole (PROTONIX) 40 MG tablet TAKE 1 TABLET BY MOUTH EVERY DAY      potassium chloride (KLOR-CON M) 20 MEQ extended release tablet TAKE 2 TABLETS BY MOUTH EVERY DAY      lisinopril (PRINIVIL;ZESTRIL) 20 MG tablet TAKE 1 TABLET BY MOUTH DAILY      LIDOCAINE PAIN RELIEF 4 % external patch APPLY 1 PATCH EXTERNALLY EVERY DAY      nitroGLYCERIN (NITROSTAT) 0.4 MG SL tablet Place 1 tablet under the tongue every 5 minutes as needed for Chest pain 25 tablet 3    albuterol sulfate HFA (PROVENTIL HFA) 108 (90 Base) MCG/ACT inhaler Inhale 2 puffs into the lungs every 6 hours as needed for Wheezing or Shortness of Breath 18 g 0    atorvastatin (LIPITOR) 80 MG tablet Take 1 tablet by mouth nightly 30 tablet 3    empagliflozin (JARDIANCE) 10 MG tablet Take 1 tablet by mouth in the morning.  90 tablet 0    montelukast (SINGULAIR) 10 MG tablet Take 1 tablet by mouth daily 30 tablet 3    pregabalin (LYRICA) 150 MG capsule Take 1 capsule (150 mg total) by mouth 2 times a day (Patient not taking: Reported on 7/26/2022)       No current facility-administered medications for this visit. Allergies   Allergen Reactions    Hydromorphone Other (See Comments)     Hallucinations. SUBJECTIVE:     Review of Systems   Constitutional:  Negative for fatigue and fever. Respiratory:  Negative for cough and shortness of breath. Cardiovascular:  Positive for leg swelling. Negative for chest pain and palpitations. Musculoskeletal:  Positive for gait problem. Negative for arthralgias. Neurological:  Negative for dizziness, syncope, weakness, light-headedness and headaches. OBJECTIVE:   Today's Vitals:  BP (!) 140/78 (Site: Left Upper Arm, Position: Sitting, Cuff Size: Large Adult)   Pulse 66   Resp 20   Ht 6' 3\" (1.905 m)   Wt 274 lb (124.3 kg)   SpO2 91%   BMI 34.25 kg/m²     Wt Readings from Last 3 Encounters:   07/26/22 274 lb (124.3 kg)   07/17/22 272 lb (123.4 kg)   07/06/22 284 lb 8 oz (129 kg)     BP Readings from Last 3 Encounters:   07/26/22 (!) 140/78   07/17/22 (!) 142/89   07/06/22 130/80     Pulse Readings from Last 3 Encounters:   07/26/22 66   07/17/22 60   07/06/22 77     Body mass index is 34.25 kg/m². Physical Exam  Vitals reviewed. Constitutional:       General: He is not in acute distress. Appearance: Normal appearance. He is obese. He is not ill-appearing. HENT:      Head: Atraumatic. Neck:      Vascular: No carotid bruit. Cardiovascular:      Rate and Rhythm: Normal rate and regular rhythm. Pulses: Normal pulses. Heart sounds: Normal heart sounds. No murmur heard. Pulmonary:      Effort: Pulmonary effort is normal. No respiratory distress. Breath sounds: Normal breath sounds. Musculoskeletal:         General: No deformity. Cervical back: Neck supple. No muscular tenderness. Right lower leg: Edema present. Left lower leg: Edema present. Neurological:      Mental Status: He is alert. 6 minute walk test:  Time walked 2 min  Distance walked 250 feet  Required Oxygen No    Most recent ECHO:   7/15/2022  Summary   Technically difficult examination due to uncooperative patient. Left ventricular systolic function is low normal.   Ejection fraction is visually estimated at 45%. Septal wall appears slightly hypokinetic. Moderate bi-atrial enlargement. Mildly dilated right ventricle. Mild-moderate mitral regurgitation. Severe tricuspid regurgitation; RVSP: 60 mmHg. No evidence of any pericardial effusion. Dilated aortic root (4.6 cm). Inferior vena cava is dilated, measuring at 2.8 cm, and does not collapse with respiration.     Results reviewed:  BNP:   Lab Results   Component Value Date    BNP 17 03/09/2013    PROBNP 5,532 (H) 07/16/2022     CBC:   Lab Results   Component Value Date/Time    WBC 10.4 02/24/2022 01:21 PM    RBC 4.30 02/24/2022 01:21 PM    HGB 12.2 02/24/2022 01:21 PM    HCT 38.9 02/24/2022 01:21 PM     02/24/2022 01:21 PM     CMP:    Lab Results   Component Value Date/Time     07/15/2022 10:17 AM    K 4.3 07/15/2022 10:17 AM     07/15/2022 10:17 AM    CO2 25 07/15/2022 10:17 AM    BUN 17 07/15/2022 10:17 AM    CREATININE 1.6 07/15/2022 10:17 AM    GFRAA 53 07/15/2022 10:17 AM    LABGLOM 44 07/15/2022 10:17 AM    GLUCOSE 276 07/15/2022 10:17 AM    CALCIUM 9.2 07/15/2022 10:17 AM     Hepatic Function Panel:    Lab Results   Component Value Date/Time    ALKPHOS 123 02/24/2022 01:21 PM    ALT 8 02/24/2022 01:21 PM    AST 11 02/24/2022 01:21 PM    PROT 6.7 02/24/2022 01:21 PM    PROT 7.7 03/09/2013 11:30 PM    BILITOT 0.2 02/24/2022 01:21 PM    BILIDIR 0.2 07/31/2019 06:15 AM    IBILI 0.3 07/31/2019 06:15 AM    LABALBU 3.9 02/24/2022 01:21 PM     Magnesium:    Lab Results   Component Value Date/Time    MG 1.9 07/15/2022 10:17 AM PT/INR:    Lab Results   Component Value Date/Time    PROTIME 19.0 07/17/2022 06:32 AM    PROTIME 19.6 07/07/2022 04:42 PM    INR 1.47 07/17/2022 06:32 AM     Lipids:    Lab Results   Component Value Date/Time    TRIG 54 01/06/2020 04:06 AM    HDL 43 01/06/2020 04:06 AM    LDLDIRECT 111 01/06/2020 04:06 AM       Iron Studies:  No components found for: FE,  TIBC,  FERRITIN    Iron Deficiency Anemia:  No IV Iron Therapy:  na  2017 ACC/AHA HF Guidelines:   intravenous iron replacement in patients with New York Heart Association (NYHA) class II and III HF and iron deficiency (ferritin <100 ng/ml or 100-300 ng/ml if transferrin saturation <20%), to improve functional status and QoL. ASSESSMENT AND PLAN:     Beta blocker carvedilol. ACE/ARB/ARNi?lisinopril (Prinivil)    Is patient a candidate for transition to [facility] after discharge? Unknown. Patient is not intestedin disucusing medications today  On diuretic? bumetanide (BUMEX) 2 mg TID  IF NYHA class II-IV with eGFR >30/mil/min/173.m2, K <5.0 mEq/l and EF < 35% Aldactone? No   If not why? EF > 35 %  ICD counseling: NA  SLGT2 inhibitor? yes  Continue with daily weights- to bring in records on each office visit  Fluid restriction of 2 Liters per day  Limit sodium in diet to around 6435-7567 mg/day  Monitor BP at home- to bring in records on each office visit   Increase exercise as able    Referal to cardiac rehab if < 40% NA    Patient to follow up in 1 month for further assistance, that is the soonest when patient will agree to return. Will try to get nurse to discuss venous ablation schedule with him, maybe he will be more receptive of education once venous ablation is complete and he is not having as much pain.        Patient was instructed to call the [facility] for changes in the following symptoms:   Weight gain of 3 pounds in 1 day or 5 pounds in 1 week  Increased shortness of breath  Shortness of breath while laying down  Cough  Chest pain  Swelling in feet, ankles or legs  Tenderness or bloating in the abdomen  Fatigue   Decreased appetite or nausea   Confusion      No follow-ups on file. or sooner if needed     Patient given educational materials - see patient instructions. We discussed the importance of weighing oneself and recording daily. We also discussed the importance of a lowsodium diet, higher sodium foods to avoid and better low sodium food options. Discussed use, benefit, and side effects of prescribed medications. All patient questions answered. Patient verbalizes understanding of plan of care using teach back method, and is agreeable to the treatment plan.      Copy of note to be sent to consulting provider and primary cardiologist   Electronicallysigned by TREY Gross CNP on 7/26/2022 at 11:57 AM

## 2022-07-27 ENCOUNTER — TELEPHONE (OUTPATIENT)
Dept: CARDIOLOGY CLINIC | Age: 62
End: 2022-07-27

## 2022-07-27 ENCOUNTER — OFFICE VISIT (OUTPATIENT)
Dept: CARDIOLOGY CLINIC | Age: 62
End: 2022-07-27
Payer: COMMERCIAL

## 2022-07-27 VITALS
WEIGHT: 272.6 LBS | DIASTOLIC BLOOD PRESSURE: 68 MMHG | OXYGEN SATURATION: 98 % | HEART RATE: 68 BPM | BODY MASS INDEX: 33.89 KG/M2 | SYSTOLIC BLOOD PRESSURE: 134 MMHG | HEIGHT: 75 IN

## 2022-07-27 DIAGNOSIS — I48.0 PAROXYSMAL A-FIB (HCC): ICD-10-CM

## 2022-07-27 DIAGNOSIS — E78.2 MIXED HYPERLIPIDEMIA: ICD-10-CM

## 2022-07-27 DIAGNOSIS — I71.60 THORACOABDOMINAL AORTIC ANEURYSM (TAAA) WITHOUT RUPTURE: ICD-10-CM

## 2022-07-27 DIAGNOSIS — I25.5 ISCHEMIC CARDIOMYOPATHY: ICD-10-CM

## 2022-07-27 DIAGNOSIS — I10 PRIMARY HYPERTENSION: ICD-10-CM

## 2022-07-27 DIAGNOSIS — I25.10 CORONARY ARTERY DISEASE INVOLVING NATIVE HEART WITHOUT ANGINA PECTORIS, UNSPECIFIED VESSEL OR LESION TYPE: ICD-10-CM

## 2022-07-27 DIAGNOSIS — I87.2 VENOUS REFLUX: ICD-10-CM

## 2022-07-27 DIAGNOSIS — I87.2 VENOUS REFLUX: Primary | ICD-10-CM

## 2022-07-27 DIAGNOSIS — I10 PRIMARY HYPERTENSION: Primary | ICD-10-CM

## 2022-07-27 PROBLEM — I21.9 MYOCARDIAL INFARCTION ACUTE (HCC): Status: RESOLVED | Noted: 2019-07-30 | Resolved: 2022-07-27

## 2022-07-27 PROBLEM — I50.9 NEW ONSET OF CONGESTIVE HEART FAILURE (HCC): Status: RESOLVED | Noted: 2020-01-05 | Resolved: 2022-07-27

## 2022-07-27 PROBLEM — I83.893 VARICOSE VEINS OF BOTH LEGS WITH EDEMA: Status: RESOLVED | Noted: 2022-07-06 | Resolved: 2022-07-27

## 2022-07-27 PROBLEM — R19.09 GROIN MASS: Status: RESOLVED | Noted: 2022-06-08 | Resolved: 2022-07-27

## 2022-07-27 PROBLEM — J90 PLEURAL EFFUSION, BILATERAL: Status: RESOLVED | Noted: 2020-01-05 | Resolved: 2022-07-27

## 2022-07-27 PROCEDURE — 3017F COLORECTAL CA SCREEN DOC REV: CPT | Performed by: NURSE PRACTITIONER

## 2022-07-27 PROCEDURE — 1111F DSCHRG MED/CURRENT MED MERGE: CPT | Performed by: NURSE PRACTITIONER

## 2022-07-27 PROCEDURE — G8417 CALC BMI ABV UP PARAM F/U: HCPCS | Performed by: NURSE PRACTITIONER

## 2022-07-27 PROCEDURE — 99214 OFFICE O/P EST MOD 30 MIN: CPT | Performed by: NURSE PRACTITIONER

## 2022-07-27 PROCEDURE — G8427 DOCREV CUR MEDS BY ELIG CLIN: HCPCS | Performed by: NURSE PRACTITIONER

## 2022-07-27 PROCEDURE — 4004F PT TOBACCO SCREEN RCVD TLK: CPT | Performed by: NURSE PRACTITIONER

## 2022-07-27 ASSESSMENT — ENCOUNTER SYMPTOMS: SHORTNESS OF BREATH: 1

## 2022-07-27 NOTE — PROGRESS NOTES
DAVID ([facility] PHYSICAL [facility]  Torrey Fajardo  Phone: [phone]    Fax [phone]                  Flor Huang MD, Kaden Cardona MD, [address] Granado MD, MD Melo Otto MD Luan Miner, MD Nydia Shield, APRN      Aashish Pichardo, APRN Fleeta Rinne, APRN     Jose Mccullough, APRN    CARDIOLOGY  NOTE      7/27/2022    RE: Skylar Peter  (1960)                               TO:  Dr. Theresa Bird MD  The primary cardiologist is Dr. Alexandria Gomez     CC:   1. Venous reflux    2. Paroxysmal A-fib ([facility]    3. Ischemic cardiomyopathy    4. Coronary artery disease involving native heart without angina pectoris, unspecified vessel or lesion type    5. Primary hypertension    6. Mixed hyperlipidemia          Patient denies all of the following:  Chest Pain  Palpitations  Dizziness  Syncope      HPI: Thank you for involving me in taking care of your patient Skylar Peter, who is a  65y.o. year old male with a history as listed above. Patient presents to the office for follow up on CAD, HTN, CHF, A-fib, TAA, venous insufficiency and hyperlipidemia. Patient was seen multiple times in emergency department for chest pain, shortness of breath, and elevated BP but left AMA. He was recently hospitalized for CHF and was aggressively diuresed. Patient is awaiting venous ablation of right lower extremity. Patient is an active male who does not walk regularly. Vitals:    07/27/22 0843   BP: 134/68   Pulse: 68   SpO2: 98%       Current Outpatient Medications   Medication Sig Dispense Refill    bumetanide (BUMEX) 2 MG tablet Take 1 tablet by mouth in the morning, at noon, and at bedtime Take metolazone exactly 30 minutes after the Bumex tablet every day after each dose of Bumex. 30 tablet 3    empagliflozin (JARDIANCE) 10 MG tablet Take 1 tablet by mouth in the morning.  90 tablet 0    metOLazone (ZAROXOLYN) 2.5 MG tablet Take 1 tablet by mouth in the morning. Take 1-2 hours after Bumex. Please make sure of this. 30 tablet 0    warfarin (JANTOVEN) 4 MG tablet TAKE 1 TABLET BY MOUTH EVERY DAY, EXCEPT TAKE 1 AND 1/2 TABLETS ON SUNDAYS OR AS DIRECTED BY CLINIC 100 tablet 1    hydrALAZINE (APRESOLINE) 25 MG tablet Take 1 tablet by mouth 3 times daily 90 tablet 5    carvedilol (COREG) 12.5 MG tablet Take 1 tablet by mouth 2 times daily (with meals) 60 tablet 5    montelukast (SINGULAIR) 10 MG tablet Take 1 tablet by mouth daily 30 tablet 3    guaiFENesin (MUCINEX) 600 MG extended release tablet Take 1 tablet by mouth 2 times daily 60 tablet 5    ipratropium-albuterol (DUONEB) 0.5-2.5 (3) MG/3ML SOLN nebulizer solution Inhale 3 mLs into the lungs 4 times daily 360 mL 5    clopidogrel (PLAVIX) 75 MG tablet TAKE 1 TABLET BY MOUTH DAILY. pantoprazole (PROTONIX) 40 MG tablet TAKE 1 TABLET BY MOUTH EVERY DAY      potassium chloride (KLOR-CON M) 20 MEQ extended release tablet TAKE 2 TABLETS BY MOUTH EVERY DAY      lisinopril (PRINIVIL;ZESTRIL) 20 MG tablet TAKE 1 TABLET BY MOUTH DAILY      pregabalin (LYRICA) 150 MG capsule       LIDOCAINE PAIN RELIEF 4 % external patch APPLY 1 PATCH EXTERNALLY EVERY DAY      nitroGLYCERIN (NITROSTAT) 0.4 MG SL tablet Place 1 tablet under the tongue every 5 minutes as needed for Chest pain 25 tablet 3    albuterol sulfate HFA (PROVENTIL HFA) 108 (90 Base) MCG/ACT inhaler Inhale 2 puffs into the lungs every 6 hours as needed for Wheezing or Shortness of Breath 18 g 0    atorvastatin (LIPITOR) 80 MG tablet Take 1 tablet by mouth nightly 30 tablet 3     No current facility-administered medications for this visit.      Allergies: Hydromorphone  Past Medical History:   Diagnosis Date    Abnormal nuclear stress test     Atrial fibrillation ([facility] 11/2013    CAD (coronary artery disease)     Follows with Dr. Anjali Preston    CHF (congestive heart failure) (HCC)     [facility]     Follows with PCP    Decreased cardiac ejection fraction     Diabetes mellitus ([address]     GERD (gastroesophageal reflux disease)     H/O cardiovascular stress test 11/20/13, 3/20/2013    11/13-Observed defect is consistent with diaphragmatic attenuation. EF 58%. 3/13 -EF 51%. No ischemia. Normal Study. H/O cardiovascular stress test 06/08/2017    EF 50% normal study    H/O cardiovascular stress test 06/17/2019    Normal NM    H/O cardiovascular stress test 03/02/2021    depressed lvedf increased edv myocardial perfusion abnormal stress test    H/O Doppler ultrasound 10/14/2010    10/2010-Bilateral venous doppler of lower extremies- No DVTs. H/O echocardiogram 06/26/2019    Left ventricular systolic function is normal with an ejection fraction of 50-55%. Mild concentric left ventricular hypertrophy. Grade I diastolic dysfunction. No significant valvular disease noted. No evidence of pericardial effusion. Essentially unremarkable echo. H/O percutaneous left heart catheterization 12/22/2015    No evidence of hemodynamically significant coronary artery disease    Heart imaging 04/23/2020    muga - ef 58%    History of coronary artery stent placement 11/13/2013 11/13 -RCA - 3 stents placed    History of exercise stress test 06/08/2017    treadmill    History of Holter monitoring 01/04/2016    predominant rhythm sinus    History of MRI of brain and brain stem 06/04/2020    No evidence of an acute infarct, mod chronic microvascular white matter ischemic disease with associated cerebral parenchymal volume loss    History of nuclear stress test 02/24/2020    EF 38%,Myocardial perfusion scan shows moderate size, severe intensity, non  reversible perfusion defect in inferior wall. Hx of Doppler echocardiogram 01/26/2022    EF 45-50% Mod LV hypertrophy. Grade 1 diastolic dysfunction. Mod MR and TR. Dilation of the aortic root and ascending aorta.     Hyperlipidemia     Hypertension     Follows with PCP    Kidney disease      Wily    S/P cardiac catheterization 11/13/2013 11/13/2013-EF 55%, distal LAD mild disease,CX stent patent,but distal to stent 50% stenosis. RCA severe disease. SOB (shortness of breath)     Status post angioplasty with stent     Wears dentures     full upper denture     Past Surgical History:   Procedure Laterality Date    APPENDECTOMY      BIOPSY / LIGATION TEMPORAL ARTERY Left 8/17/2020    LEFT TEMPORAL ARTERY BIOPSY LIGATION performed by Clarisa Cantrell MD at [place] [address]  11/13/2013 11/13/2013-EF 55%, distal LAD mild disease,CX stent patent,but distal to stent 50% stenosis. RCA severe disease.     CARDIAC SURGERY  07/03/2020    CABG X 3 LIMA-OM-PDA-LAD, Maze, LT Atrial Appendage clipping    COLONOSCOPY      CORONARY ANGIOPLASTY WITH STENT PLACEMENT      4 stents- RCA X3; CX X1    CORONARY ANGIOPLASTY WITH STENT PLACEMENT  11/13/2013 11/13/2013 -3 stents placed to RCA- Dr Cassy Zafar N/A 7/3/2020    INTRAOPERATIVE ISMAEL, CABG X3   WITH LIMA  AND LEFT LEG ENDOVEIN HARVEST, INDUCED HYPOTHERMIA, MAZE BIPOLAR AND CRYO PROCEDURE, LEFT ATRIAL APPENDAGE CLIPPING performed by Shani Osei MD at [address]      all teeth extracted    [address]     Family History   Problem Relation Age of Onset    Cancer Mother     Diabetes Mother     Heart Disease Mother     High Blood Pressure Mother     Stroke Mother     Cancer Father     Heart Disease Father     High Blood Pressure Father     Stroke Father     Cancer Brother      Social History     Tobacco Use    Smoking status: Every Day     Packs/day: 1.00     Years: 46.00     Pack years: 46.00     Types: Cigarettes     Start date: 1974    Smokeless tobacco: Never    Tobacco comments:     Smokes approx 1 pack per day   Substance Use Topics    Alcohol use: No     Comment: caffeine 6 bottles of unsweet tea a day      Review of Systems   Respiratory: Positive for shortness of breath. Cardiovascular:  Positive for leg swelling. Negative for chest pain and palpitations. Musculoskeletal: Negative. Skin: Negative. Neurological:  Negative for dizziness and weakness. All other systems reviewed and are negative. Objective:      Physical Exam:  /68 (Site: Left Upper Arm, Position: Sitting, Cuff Size: Medium Adult)   Pulse 68   Ht 6' 3\" (1.905 m)   Wt 272 lb 9.6 oz (123.7 kg)   SpO2 98%   BMI 34.07 kg/m²   Wt Readings from Last 3 Encounters:   07/27/22 272 lb 9.6 oz (123.7 kg)   07/26/22 274 lb (124.3 kg)   07/17/22 272 lb (123.4 kg)     Body mass index is 34.07 kg/m². Physical Exam  Constitutional:       Appearance: He is well-developed. Cardiovascular:      Rate and Rhythm: Normal rate and regular rhythm. Pulses: Intact distal pulses. Dorsalis pedis pulses are 2+ on the right side and 2+ on the left side. Posterior tibial pulses are 2+ on the right side and 2+ on the left side. Heart sounds: Normal heart sounds, S1 normal and S2 normal.   Pulmonary:      Effort: Pulmonary effort is normal.      Breath sounds: Examination of the right-middle field reveals decreased breath sounds. Examination of the left-middle field reveals decreased breath sounds. Examination of the right-lower field reveals decreased breath sounds. Examination of the left-lower field reveals decreased breath sounds. Decreased breath sounds present. Musculoskeletal:         General: Normal range of motion. Right lower leg: Edema present. Left lower leg: Edema present. Skin:     General: Skin is warm and dry. Neurological:      Mental Status: He is alert and oriented to person, place, and time.        DATA:  Lab Results   Component Value Date/Time    CKTOTAL 34 01/29/2022 05:26 PM    CKMB 2.4 03/10/2013 07:05 AM    TROPONINI 0.014 06/09/2014 01:30 AM    TROPONINI 0.015 12/03/2011 07:35 PM     BNP:    Lab Results   Component Value X 18 and 2.5 X 23 overlapping stents followed by post stent balloon dilation using   3.5 X 12 balloon   2. SVG to Om and LIMA to LAd are potent   3. LVEDP was 15 mmHG   4. RCA and Circ are occluded   5. LAD is diffusely diseased , Diag and LAD are filled by   LIMA graft. 6. left main has 80% distal stenosis   Pt is referred to Cardiac Rehab Phase 2 as OP    The ASCVD Risk score (Vivek Zavala., et al., 2013) failed to calculate for the following reasons: The patient has a prior MI or stroke diagnosis    Assessment/ Plan:     Venous reflux  -Significant reflux of right GSV SF J tributary note of left GSV at knee and mid calf, significant reflux of left CFV. Recommend compression stockings and compression therapy but patient refuses. Patient would like venous ablation but struggling with CHF currently. Recently had venous ablation evaluation by Dr. Carlitos Dominguez and is awaiting procedure. TAAA (Thoracoabdominal aortic aneurysm without rupture)  New found aortic root aneurysm measuring 4.5 cm and ascending aorta measuring 4.7 cm. Patient needs better B/P control. CTA of chest in 10/10/21 showed normal size of aorta. Unable to obtain CTA of chest due to CKD and patient having claustrophobia. Recent echocardiogram at [facility] does not show any aortic root dilation. But had CT of abdomen which did not show any significant abnormality. Will conservatively manage. HTN (hypertension)   -Stable, continue with hydralazine 10 mg TID, tolerating increase in Coreg now 12.5 mg BID, lisinopril 20 mg daily, and hydrochlorothiazide 25 mg. Continue with Bumex 2 mg twice daily and Zaroxolyn to daily 2 hrs after Bumex. Paroxysmal atrial fibrillation   -Chads-Vas is 6, continue with Coumadin for anticoagulation. Recent EKG shows normal sinus rhythm. CAD (coronary artery disease)   -PCI of RCA in 2019, then CABG x 3 in 2020 LIMA-LAD, SVG to RCA/PDA, SVG to OM.   Had PCI of SVG to RCA/PDA with multiple stents overlapping 6 months post CABG. MUGA scan in January 2021 showed EF 36%. Stress test in March 2021 showed medium size severe intensity nonreversible perfusion defect in inferior wall. Patient has significant history of noncompliance and cocaine abuse. Primary and secondary prevention discussed with patient:              -Low sodium cardiac diet              -exercise 30 min a day three days a week  Continue current medications Plavix, Coumadin, Lipitor, Coreg, Lasix, lisinopril,      Ischemic cardiomyopathy   -MUGA scan in January 2021 showed EF 36%. Recent echocardiogram shows EF 45-50% which is slightly reduced. SOB improved, continue with aggressive diuresing with Bumex 2 mg BID and metolazone to 2.5 mg daily 2 hours after Bumex. Mixed hyperlipidemia   -At or near goal No    -He is to continue current medications (Lipitor 80 mg) Hepatic function panel WNL. No abdominal pain. No myalgias.      -The nature of cardiac risk has been fully discussed with this patient. I have made him aware of his LDL target goal given his cardiovascular risk analysis. I have discussed the appropriate diet. The need for lifelong compliance in order to reduce risk is stressed. A regular exercise program is recommended to help achieve and maintain normal body weight, fitness and improve lipid balance. A written copy of a low fat, low cholesterol diet has been given to the patient. Patient seen, interviewed and examined. Testing was reviewed. Patient is encouraged to exercise even a brisk walk for 30 minutes at least 3 to 4 times a week. Lifestyle and risk factor modificatons discussed. Various goals are discussed and questions answered. Continue current medications. Appropriate prescriptions are addressed. Questions answered and patient verbalizes understanding. Call for any problems, questions, or concerns. Pt is to follow up in 1 month for Cardiac management    Electronically signed by Karen Lopez.  TREY Cope - CNP on 7/27/2022 at 9:26 AM

## 2022-07-27 NOTE — TELEPHONE ENCOUNTER
Pre- Instructions Venous ablation     Date of Procedure: 8/1/22 Time: 11 AM Arrival Time: 1045 AM      Please keep yourself hydrated for 24 hours before the procedure ( drink lots of water)  Please  the Valium that was sent to [facility]  and bring it with you to the procedure. Please wear loose comfortable clothing. Patient does not need to wear compression stockings to the procedure. You will need someone with you to drive you home. Patient is aware he can't drive himself home if he takes the valium. Please eat a light breakfast in the morning  Please continue to take medications as needed. Confirmed with patient. Reviewed instructions and patient voiced understanding. Medication called in to pharmacy.

## 2022-07-27 NOTE — PATIENT INSTRUCTIONS
Please be informed that if you contact our office outside of normal business hours the physician on call cannot help with any scheduling or rescheduling issues, procedure instruction questions or any type of medication issue. We advise you for any urgent/emergency that you go to the nearest emergency room! PLEASE CALL OUR OFFICE DURING NORMAL BUSINESS HOURS    Monday - Friday   8 am to 5 pm    [place] [address]: [phone]    [place]:  [phone]    **It is YOUR responsibilty to bring medication bottles and/or updated medication list to [address] [place] [address].  This will allow us to better serve you and all your healthcare needs**

## 2022-08-01 ENCOUNTER — PROCEDURE VISIT (OUTPATIENT)
Dept: CARDIOLOGY CLINIC | Age: 62
End: 2022-08-01
Payer: COMMERCIAL

## 2022-08-01 DIAGNOSIS — I48.20 CHRONIC ATRIAL FIBRILLATION (HCC): ICD-10-CM

## 2022-08-01 DIAGNOSIS — J43.2 CENTRILOBULAR EMPHYSEMA (HCC): Chronic | ICD-10-CM

## 2022-08-01 DIAGNOSIS — I25.10 CORONARY ARTERY DISEASE INVOLVING NATIVE HEART WITHOUT ANGINA PECTORIS, UNSPECIFIED VESSEL OR LESION TYPE: ICD-10-CM

## 2022-08-01 DIAGNOSIS — E78.2 MIXED HYPERLIPIDEMIA: ICD-10-CM

## 2022-08-01 DIAGNOSIS — I10 PRIMARY HYPERTENSION: ICD-10-CM

## 2022-08-01 DIAGNOSIS — E11.9 TYPE 2 DIABETES MELLITUS WITHOUT COMPLICATION, WITHOUT LONG-TERM CURRENT USE OF INSULIN (HCC): Chronic | ICD-10-CM

## 2022-08-01 DIAGNOSIS — I83.893 VARICOSE VEINS OF BOTH LEGS WITH EDEMA: Primary | ICD-10-CM

## 2022-08-01 DIAGNOSIS — I25.5 ISCHEMIC CARDIOMYOPATHY: ICD-10-CM

## 2022-08-01 PROCEDURE — 36482 ENDOVEN THER CHEM ADHES 1ST: CPT | Performed by: INTERNAL MEDICINE

## 2022-08-01 RX ORDER — DIAZEPAM 5 MG/1
5 TABLET ORAL PRN
Qty: 2 TABLET | Refills: 0 | OUTPATIENT
Start: 2022-08-01 | End: 2022-08-02

## 2022-08-01 NOTE — PROGRESS NOTES
Endovenous Ablation with VenaSeal Operative Report    8/1/2022    Subjective:  Sabrina Duenas is a 65 y.o. male    Procedure Performed:    Endovenous Ablation of the Great Saphenous Vein with VenaSeal Closure System of the  right side. Indication  Duplex ultrasound was used to map out the insufficient saphenous vein, and access was determined and marked on the overlying skin. The depth and diameter of the vein(s) to be treated was documented. The patient was placed supine on the procedure table and the leg was prepped and draped using sterile technique. Ultrasound guidance was again used to localize the access site. 1% lidocaine was injected as a local anesthetic in the subcutaneous tissues at the target location in the GSV in the lower leg. Using ultrasound guidance, access was gained at this location with the 19 gauge thin walled access needle and followed by introduction of a short guidewire, location confirmed with ultrasound. A small, 3 mm incision was made at the access site to allow for introduction and placement of the 7 Fr x7cm introducer/dilator. The dilator and guidewire were removed. The 0.035 guidewire from the DR [facility] kit was then introduced and positioned at the saphenofemoral junction using ultrasound guidance. The 80 cm 7 Fr introducer sheath/dilator was positioned 5cm from the saphenofemoral junction. The guidewire and dilator were removed, and the remaining sheath was flushed with sterile saline, with the syringe remaining in place prior to the next steps. The cyanoacrylate adhesive was precisely primed into the 5 F delivery catheter and this catheter/syringe combination was attached within the dispenser gun. This \"assembly\" was introduced through the 7 F sheath and positioned 5 cm caudal of the saphenofemoral junction under ultrasound guidance.  The steps from the IFU were followed for dispensing amounts, locations and compression times, e.g 2 aliquots proximally with 3 minutes of compression, and 1 aliquot every 3cm distally with 30 sec of compression along the course of the vessel. Following the last injection and compression sequence, the catheter and introducer sheath were pulled out from the access site. Hemostasis was achieved with manual compression and an adhesive bandage was applied to the incision. Ultrasound confirmed complete coaptation and closure of the treated segments of the GSV, and the absence of any DVT at the saphenofemoral junction. Treatment dose was approximately 1.7 ml and the vein length treated was 45 cm. The drapes were removed and the patient cleaned and prepared for discharge. Post op ultrasound check is scheduled for   48- 72 hours and the patient was given written post-op instructions. Complications: none immediate    Blood Loss: minimal    Conclusion:   Successful Endovenous Ablation of the Great Saphenous Vein with VenaSeal Closure System of the  Right side.     aJja Anne MD, [facility]

## 2022-08-03 ENCOUNTER — PROCEDURE VISIT (OUTPATIENT)
Dept: CARDIOLOGY CLINIC | Age: 62
End: 2022-08-03
Payer: COMMERCIAL

## 2022-08-03 DIAGNOSIS — I87.301 IDIOPATHIC CHRONIC VENOUS HYPERTENSION OF RIGHT LOWER EXTREMITY WITHOUT COMPLICATIONS: Primary | ICD-10-CM

## 2022-08-03 PROCEDURE — 93971 EXTREMITY STUDY: CPT | Performed by: INTERNAL MEDICINE

## 2022-08-04 ENCOUNTER — ANTI-COAG VISIT (OUTPATIENT)
Dept: PHARMACY | Age: 62
End: 2022-08-04
Payer: COMMERCIAL

## 2022-08-04 DIAGNOSIS — I48.91 ATRIAL FIBRILLATION WITH RAPID VENTRICULAR RESPONSE (HCC): Primary | ICD-10-CM

## 2022-08-04 LAB
INR BLD: 1.8
POC INR: 1.8 INDEX
PROTHROMBIN TIME, POC: 21.7 SECONDS (ref 10–14.3)
PROTIME: 21.7 SECONDS

## 2022-08-04 PROCEDURE — 99212 OFFICE O/P EST SF 10 MIN: CPT

## 2022-08-04 PROCEDURE — 36416 COLLJ CAPILLARY BLOOD SPEC: CPT

## 2022-08-04 PROCEDURE — 85610 PROTHROMBIN TIME: CPT

## 2022-08-04 NOTE — PROGRESS NOTES
Medication Management Service  [facility]  [phone]    Visit Date: 8/4/2022   Subjective:       Brandin Fletcher is a 65 y.o. male who presents to clinic today for anticoagulation monitoring and adjustment. Patient seen in clinic for warfarin management due to  Indication:   atrial fibrillation. INR goal: of 2.0-3.0. Duration of therapy: indefinite. Patient reports the following:   Adherent with regimen  Missed or extra doses:  Missed x 1  Bleeding or thromboembolic side effects:  None  Significant medication changes:  None  Significant dietary changes: None  Significant alcohol or tobacco changes: None  Significant recent illness, disease state changes, or hospitalization:  ER visit 7/14 at [facility] and hospitalized 7/15 - 7/17 at [facility]  Upcoming surgeries or procedures:  None  Falls: Fall x 2 last night - denies hitting head           Assessment and Plan     PT/INR done in office per protocol. INR today is 1.8, slightly below goal range. Plan:  Take warfarin 6mg x 1 then will continue current regimen of warfarin 4mg daily except 6mg Sundays. Recheck INR in 2 week(s). Patient verbalized understanding of dosing directions and information discussed. Dosing schedule given to patient including phone number, appointment date, and time. Progress note sent to referring office. Patient acknowledges working in consult agreement with pharmacist as referred by his/her physician.       Electronically signed by DARA [facility] on 8/4/22 at 7:57 AM EDT    For Pharmacy Admin Tracking Only    Intervention Detail: Dose Adjustment: 1, reason: Therapy Optimization  Total # of Interventions Recommended: 1  Total # of Interventions Accepted: 1  Time Spent (min): 15

## 2022-08-05 ENCOUNTER — TELEPHONE (OUTPATIENT)
Dept: CARDIOLOGY CLINIC | Age: 62
End: 2022-08-05

## 2022-08-05 NOTE — TELEPHONE ENCOUNTER
Discussed with Dr Ritu Padron, advised 40mg Motrin TID, and heat. Patient states due to blood thinner he does not take Motrin . Advised that he could take sparingly for day or two.  Patient will call Monday morning, if still having pain will schedule for Dr Ritu Padron on Tuesday

## 2022-08-05 NOTE — TELEPHONE ENCOUNTER
Patient states pain from ablation site to near groin and top of leg knee to ankle, painful to touch 9/10, no swelling or discoloration. He is wearing stockings.  Advised to take tylenol

## 2022-08-08 ENCOUNTER — TELEPHONE (OUTPATIENT)
Dept: CARDIOLOGY CLINIC | Age: 62
End: 2022-08-08

## 2022-08-08 NOTE — TELEPHONE ENCOUNTER
I call the patient and he said it hurts badly and it is still red. I offer him an appointment.  And he is coming in tomorrow at 4:45pm

## 2022-08-08 NOTE — TELEPHONE ENCOUNTER
Pt is having bad leg pain procedure was done last Monday.  States leg is red knee to groin, please advise

## 2022-08-09 ENCOUNTER — OFFICE VISIT (OUTPATIENT)
Dept: CARDIOLOGY CLINIC | Age: 62
End: 2022-08-09
Payer: COMMERCIAL

## 2022-08-09 VITALS
WEIGHT: 275.4 LBS | DIASTOLIC BLOOD PRESSURE: 84 MMHG | BODY MASS INDEX: 34.24 KG/M2 | HEIGHT: 75 IN | HEART RATE: 70 BPM | SYSTOLIC BLOOD PRESSURE: 128 MMHG

## 2022-08-09 DIAGNOSIS — I48.20 CHRONIC ATRIAL FIBRILLATION (HCC): ICD-10-CM

## 2022-08-09 DIAGNOSIS — I25.10 CORONARY ARTERY DISEASE INVOLVING NATIVE HEART WITHOUT ANGINA PECTORIS, UNSPECIFIED VESSEL OR LESION TYPE: ICD-10-CM

## 2022-08-09 DIAGNOSIS — I10 PRIMARY HYPERTENSION: ICD-10-CM

## 2022-08-09 DIAGNOSIS — I83.893 VARICOSE VEINS OF BOTH LEGS WITH EDEMA: Primary | ICD-10-CM

## 2022-08-09 DIAGNOSIS — E78.2 MIXED HYPERLIPIDEMIA: ICD-10-CM

## 2022-08-09 DIAGNOSIS — I25.5 ISCHEMIC CARDIOMYOPATHY: ICD-10-CM

## 2022-08-09 DIAGNOSIS — E11.9 TYPE 2 DIABETES MELLITUS WITHOUT COMPLICATION, WITHOUT LONG-TERM CURRENT USE OF INSULIN (HCC): Chronic | ICD-10-CM

## 2022-08-09 PROCEDURE — 3017F COLORECTAL CA SCREEN DOC REV: CPT | Performed by: INTERNAL MEDICINE

## 2022-08-09 PROCEDURE — G8417 CALC BMI ABV UP PARAM F/U: HCPCS | Performed by: INTERNAL MEDICINE

## 2022-08-09 PROCEDURE — 1111F DSCHRG MED/CURRENT MED MERGE: CPT | Performed by: INTERNAL MEDICINE

## 2022-08-09 PROCEDURE — 4004F PT TOBACCO SCREEN RCVD TLK: CPT | Performed by: INTERNAL MEDICINE

## 2022-08-09 PROCEDURE — G8427 DOCREV CUR MEDS BY ELIG CLIN: HCPCS | Performed by: INTERNAL MEDICINE

## 2022-08-09 PROCEDURE — 2022F DILAT RTA XM EVC RTNOPTHY: CPT | Performed by: INTERNAL MEDICINE

## 2022-08-09 PROCEDURE — 3046F HEMOGLOBIN A1C LEVEL >9.0%: CPT | Performed by: INTERNAL MEDICINE

## 2022-08-09 PROCEDURE — 99214 OFFICE O/P EST MOD 30 MIN: CPT | Performed by: INTERNAL MEDICINE

## 2022-08-09 RX ORDER — DIPHENHYDRAMINE HCL 25 MG
25 CAPSULE ORAL 3 TIMES DAILY
Qty: 30 CAPSULE | Refills: 0 | Status: SHIPPED | OUTPATIENT
Start: 2022-08-09 | End: 2022-08-19

## 2022-08-09 NOTE — PROGRESS NOTES
Vein \"LEG PROBLEM Questionnaire\"  Do you have prominent leg veins? No   Do you have any skin discoloration? Yes  Do you have any healed or active sores? No  Do you have swelling of the legs? Yes  Do you have a family history of varicose veins? No  Does your profession involve pro-longed        standing or heavy lifting? No  7. Have you been fighting overweight problems? Yes  8. Do you have restless legs? No  9. Do you have any night time cramps? No  10. Do you have any of the following in your legs:        Aching and Itching sharp pains   11. If Yes - Have they worn compression stockings Yes  12.  If they have worn compression stockings     [id number] Score for Atrial Fibrillation Stroke Risk   Risk   Factors  Component Value   C CHF Yes 1   H HTN Yes 1   A2 Age >= 75 No,  (63 y.o.) 0   D DM Yes 1   S2 Prior Stroke/TIA Yes 2   V Vascular Disease Yes 1   A Age 74-69 No,  (63 y.o.) 0   Sc Sex male 0    [id number]  Score  6

## 2022-08-09 NOTE — LETTER
[address]  [address] [address]  Phone: [phone]  Fax: [phone]    Suzie Wu MD    August 9, 2022     Mehdi Chung, [address] [place] [address]    Patient: Galdino Gallo   MR Number: [id number]   YOB: 1960   Date of Visit: 8/9/2022       Dear Mehdi Chung: Thank you for referring Khalif Sawant to me for evaluation/treatment. Below are the relevant portions of my assessment and plan of care. If you have questions, please do not hesitate to call me. I look forward to following Josiah along with you.     Sincerely,      Suzie Wu MD

## 2022-08-09 NOTE — PROGRESS NOTES
OFFICE PROGRESS NOTES      Martha Stuart is a 65 y.o. male who has    CHIEF COMPLAINT AS FOLLOWS:  CHEST PAIN: Patient denies any C/O chest pains at this time. SOB: No C/O SOB at this time. LEG EDEMA: C/P itching, redness at the site of ablation. B/L Lower extremity edema is present but better than before. PALPITATIONS: Denies any C/O Palpitations   DIZZINESS: No C/O Dizziness   SYNCOPE: None   OTHER/ ADDITIONAL COMPLAINTS:                                     HPI: Patient is here for F/U on his CAD, CVI,  Arrhythmia, HTN & Dyslipidemia problems. CAD: Patient has known CAD. CVI: S/P right GSV ablation. Arrhythmia: Patient has known  A-Fib. HTN: Patient has known essential HTN. Has been treated with guideline recommended medical / physical/ diet therapy as stated below. Dyslipidemia: Patient has known mixed dyslipidemia. Has been treated with guideline recommended medical / physical/ diet therapy as stated below. Current Outpatient Medications   Medication Sig Dispense Refill    bumetanide (BUMEX) 2 MG tablet Take 1 tablet by mouth in the morning, at noon, and at bedtime Take metolazone exactly 30 minutes after the Bumex tablet every day after each dose of Bumex. 30 tablet 3    metOLazone (ZAROXOLYN) 2.5 MG tablet Take 1 tablet by mouth in the morning. Take 1-2 hours after Bumex. Please make sure of this.  30 tablet 0    warfarin (JANTOVEN) 4 MG tablet TAKE 1 TABLET BY MOUTH EVERY DAY, EXCEPT TAKE 1 AND 1/2 TABLETS ON SUNDAYS OR AS DIRECTED BY CLINIC 100 tablet 1    hydrALAZINE (APRESOLINE) 25 MG tablet Take 1 tablet by mouth 3 times daily 90 tablet 5    carvedilol (COREG) 12.5 MG tablet Take 1 tablet by mouth 2 times daily (with meals) 60 tablet 5    guaiFENesin (MUCINEX) 600 MG extended release tablet Take 1 tablet by mouth 2 times daily 60 tablet 5    ipratropium-albuterol (DUONEB) 0.5-2.5 (3) MG/3ML SOLN nebulizer solution Inhale 3 mLs into the lungs 4 times daily 360 mL 5    clopidogrel (PLAVIX) 75 MG tablet TAKE 1 TABLET BY MOUTH DAILY. pantoprazole (PROTONIX) 40 MG tablet TAKE 1 TABLET BY MOUTH EVERY DAY      potassium chloride (KLOR-CON M) 20 MEQ extended release tablet TAKE 2 TABLETS BY MOUTH EVERY DAY      lisinopril (PRINIVIL;ZESTRIL) 20 MG tablet TAKE 1 TABLET BY MOUTH DAILY      pregabalin (LYRICA) 150 MG capsule       LIDOCAINE PAIN RELIEF 4 % external patch APPLY 1 PATCH EXTERNALLY EVERY DAY      nitroGLYCERIN (NITROSTAT) 0.4 MG SL tablet Place 1 tablet under the tongue every 5 minutes as needed for Chest pain 25 tablet 3    albuterol sulfate HFA (PROVENTIL HFA) 108 (90 Base) MCG/ACT inhaler Inhale 2 puffs into the lungs every 6 hours as needed for Wheezing or Shortness of Breath 18 g 0    atorvastatin (LIPITOR) 80 MG tablet Take 1 tablet by mouth nightly 30 tablet 3    empagliflozin (JARDIANCE) 10 MG tablet Take 1 tablet by mouth in the morning. 90 tablet 0    montelukast (SINGULAIR) 10 MG tablet Take 1 tablet by mouth daily 30 tablet 3     No current facility-administered medications for this visit. Allergies: Hydromorphone  Review of Systems:    Constitutional: Negative for diaphoresis and fatigue  Respiratory: Negative for shortness of breath  Cardiovascular: Negative for chest pain, dyspnea on exertion, claudication, edema, irregular heartbeat, murmur, palpitations or shortness of breath  Musculoskeletal: Negative for muscle pain, muscular weakness, negative for pain in arm and leg or swelling in foot and leg    Objective:  /84   Pulse 70   Ht 6' 3\" (1.905 m)   Wt 275 lb 6.4 oz (124.9 kg)   BMI 34.42 kg/m²   Wt Readings from Last 3 Encounters:   08/09/22 275 lb 6.4 oz (124.9 kg)   07/27/22 272 lb 9.6 oz (123.7 kg)   07/26/22 274 lb (124.3 kg)     Body mass index is 34.42 kg/m². GENERAL - Alert, oriented, pleasant, in no apparent distress. EYES: No jaundice, no conjunctival pallor. Neck - Supple.   No jugular venous distention noted. No carotid bruits. Cardiovascular - Normal S1 and S2 without obvious murmur or gallop. Extremities - No cyanosis, clubbing, or significant edema. Patient has superficial phlebitis signs & symptoms along right GSV ablation site. In addition both his Legs are red & skin abrations due to scratching, which he says started last night. Pulmonary - No respiratory distress. No wheezes or rales.       MEDICAL DECISION MAKING & DATA REVIEW:    Lab Review   Lab Results   Component Value Date/Time    TROPONINT 0.019 07/15/2022 10:17 AM    TROPONINT 0.050 01/29/2022 08:25 PM     Lab Results   Component Value Date/Time    BNP 17 03/09/2013 11:30 PM    PROBNP 5,532 07/16/2022 11:46 AM    PROBNP 1,881 02/24/2022 01:21 PM     Lab Results   Component Value Date    INR 1.8 08/04/2022    INR 1.8 08/04/2022     Lab Results   Component Value Date    LABA1C 7.8 (H) 12/30/2020    LABA1C 8.9 (H) 06/29/2020     Lab Results   Component Value Date    WBC 10.4 02/24/2022    WBC 9.8 01/29/2022    HCT 38.9 (L) 02/24/2022    HCT 35.5 (L) 01/29/2022    MCV 90.5 02/24/2022    MCV 90.1 01/29/2022     02/24/2022     01/29/2022     Lab Results   Component Value Date    CHOL 153 01/06/2020    CHOL 181 07/31/2019    TRIG 54 01/06/2020    TRIG 162 (H) 07/31/2019    HDL 43 01/06/2020    HDL 36 (L) 07/31/2019    LDLDIRECT 111 (H) 01/06/2020    LDLDIRECT 132 (H) 07/31/2019     Lab Results   Component Value Date    ALT 8 (L) 02/24/2022    ALT 14 01/29/2022    AST 11 (L) 02/24/2022    AST 18 01/29/2022     BMP:    Lab Results   Component Value Date/Time     07/15/2022 10:17 AM     05/20/2022 09:44 AM    K 4.3 07/15/2022 10:17 AM    K 3.7 05/20/2022 09:44 AM     07/15/2022 10:17 AM     05/20/2022 09:44 AM    CO2 25 07/15/2022 10:17 AM    CO2 30 05/20/2022 09:44 AM    BUN 17 07/15/2022 10:17 AM    BUN 33 05/20/2022 09:44 AM    CREATININE 1.6 07/15/2022 10:17 AM    CREATININE 1.6 05/20/2022 09:44 AM     CMP: Lab Results   Component Value Date/Time     07/15/2022 10:17 AM     05/20/2022 09:44 AM    K 4.3 07/15/2022 10:17 AM    K 3.7 05/20/2022 09:44 AM     07/15/2022 10:17 AM     05/20/2022 09:44 AM    CO2 25 07/15/2022 10:17 AM    CO2 30 05/20/2022 09:44 AM    BUN 17 07/15/2022 10:17 AM    BUN 33 05/20/2022 09:44 AM    CREATININE 1.6 07/15/2022 10:17 AM    CREATININE 1.6 05/20/2022 09:44 AM    PROT 6.7 02/24/2022 01:21 PM    PROT 5.9 01/29/2022 08:25 PM    PROT 7.7 03/09/2013 11:30 PM    PROT 7.5 12/03/2011 07:35 PM     Lab Results   Component Value Date/Time    TSHHS 1.390 06/02/2020 01:36 PM    TSHHS 1.430 01/05/2020 10:55 PM       QUALITY MEASURES REVIEWED:  1.CAD:Patient is taking anti platelet agent:Yes  2. DYSLIPIDEMIA: Patient is on cholesterol lowering medication:Yes   3. Beta-Blocker therapy for CAD, if prior Myocardial Infarction:Yes  4. Counselled regarding smoking cessation. No   Patient does not Smoke. 5.Anticoagulation therapy (for A.Fib) Yes  6. Discussed weight management strategies. Assessment & Plan:  Primary / Secondary prevention is the goal by aggressive risk modification, healthy and therapeutic life style changes for cardiovascular risk reduction. CORONARY ARTERY DISEASE:Yes  clinically stable. Patient is on optimal medical regimen ( see medication list above )  Patient is currently  asymptomatic from CAD. -changes in  treatment:   no. Patient is being treated with Plavix & Coreg  Counseled regarding regular exercise & its benefits.  -Testing ordered:  no  [facility] classification: 1    HYPERTENSION:Yes  well controlled on current medical regimen. Not well controlled needs medication adjustment. - changes in  treatment:   no. Patient is on Lisinopril & Coreg  Counseled regarding low salt diet, exercise & weight control. DYSLIPIDEMIA: yes,   Patient's profile is at / near Goal.no,   HDL is low   Tolerating current medical regimen wellyes.  Takes Lipitor  Does not tolerate medications well due to side effects  See most recent Lab values:( Reviewed Labs from family Dr. VÍCTOR     )  LDL is 111  HDL is 42    ARRHYTHMIAS:yes,   Patient has H/O Atrial fibrillation  He is rate controlled & on anticoagulation. Takes Warfarin    CHRONIC VENOUS INSUFFICIENCY:yes, S/P right GSV ablation. 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 The Right GSV is non-compressible with no evidence of flow just past the    saphenofemoral junction to the knee. Patient to apply heat to the site. Take Motrin 400 mPO TID after food. Apply Calamine Lotion to both Legs & take Benadryl 25 mg PO TID for 3 days. TESTS ORDERED: none this visit     PREVIOUSLY ORDERED TESTS REVIEWED & DISCUSSED WITH THE PATIENT:     I personally reviewed & interpreted, all previously ordered tests as copied above. Latest Labs are pulled in to the note with dates. Labs, specially in Reference to Lipid profile, Cardiac testing in the form of Echo ( dated: ), stress tests ( dated: ) & other relevant cardiac testing reviewed with patient & recommendations made based on assessment of the results. Discussed role of Cardiac risk factors & effects + treatment of co morbidities with patient & advised accordingly. MEDICATIONS: List of medications patient is currently taking is reviewed in detail with the patient. Discussed any side effects or problems taking the medication. Recommend Continue present management & medications as listed. AFFIRMATION: I  reviewed patient's history, previous & current medical problems & all Labs + testing. This includes chart prep even prior to the vosit. Various goals are discussed and multiple questions answered. Relevant concelling performed. Office follow up per scheduled appointments.

## 2022-08-09 NOTE — PATIENT INSTRUCTIONS
CORONARY ARTERY DISEASE:Yes  clinically stable. Patient is on optimal medical regimen ( see medication list above )  Patient is currently  asymptomatic from CAD. -changes in  treatment:   no. Patient is being treated with Plavix & Coreg  Counseled regarding regular exercise & its benefits.  -Testing ordered:  no  [facility] classification: 1    HYPERTENSION:Yes  well controlled on current medical regimen. Not well controlled needs medication adjustment. - changes in  treatment:   no. Patient is on Lisinopril & Coreg  Counseled regarding low salt diet, exercise & weight control. DYSLIPIDEMIA: yes,   Patient's profile is at / near Goal.no,   HDL is low   Tolerating current medical regimen wellyes. Takes Lipitor  Does not tolerate medications well due to side effects  See most recent Lab values:( Reviewed Labs from family Dr. VÍCTOR     )  LDL is 111  HDL is 42    ARRHYTHMIAS:yes,   Patient has H/O Atrial fibrillation  He is rate controlled & on anticoagulation. Takes Warfarin    CHRONIC VENOUS INSUFFICIENCY:yes, S/P right GSV ablation. 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 The Right GSV is non-compressible with no evidence of flow just past the    saphenofemoral junction to the knee. Patient to apply heat to the site. Take Motrin 400 mPO TID after food. Apply Calamine Lotion to both Legs & take Benadryl 25 mg PO TID for 3 days. TESTS ORDERED: none this visit     PREVIOUSLY ORDERED TESTS REVIEWED & DISCUSSED WITH THE PATIENT:     I personally reviewed & interpreted, all previously ordered tests as copied above. Latest Labs are pulled in to the note with dates.    Labs, specially in Reference to Lipid profile, Cardiac testing in the form of Echo ( dated: ), stress tests ( dated: ) & other relevant cardiac testing reviewed with patient & recommendations made based on assessment of the results. Discussed role of Cardiac risk factors & effects + treatment of co morbidities with patient & advised accordingly. MEDICATIONS: List of medications patient is currently taking is reviewed in detail with the patient. Discussed any side effects or problems taking the medication. Recommend Continue present management & medications as listed. AFFIRMATION: I  reviewed patient's history, previous & current medical problems & all Labs + testing. This includes chart prep even prior to the vosit. Various goals are discussed and multiple questions answered. Relevant concelling performed. Office follow up per scheduled appointments.

## 2022-08-16 DIAGNOSIS — I48.20 CHRONIC ATRIAL FIBRILLATION (HCC): Primary | ICD-10-CM

## 2022-08-18 ENCOUNTER — ANTI-COAG VISIT (OUTPATIENT)
Dept: PHARMACY | Age: 62
End: 2022-08-18
Payer: COMMERCIAL

## 2022-08-18 DIAGNOSIS — I48.91 ATRIAL FIBRILLATION WITH RAPID VENTRICULAR RESPONSE (HCC): Primary | ICD-10-CM

## 2022-08-18 LAB
INTERNATIONAL NORMALIZATION RATIO, POC: 1.6
POC INR: 1.6 INDEX
PROTHROMBIN TIME, POC: 19.1 SECONDS (ref 10–14.3)

## 2022-08-18 PROCEDURE — 36416 COLLJ CAPILLARY BLOOD SPEC: CPT

## 2022-08-18 PROCEDURE — 85610 PROTHROMBIN TIME: CPT

## 2022-08-18 PROCEDURE — 99212 OFFICE O/P EST SF 10 MIN: CPT

## 2022-08-21 ENCOUNTER — HOSPITAL ENCOUNTER (EMERGENCY)
Age: 62
Discharge: HOME | End: 2022-08-21
Attending: EMERGENCY MEDICINE
Payer: COMMERCIAL

## 2022-08-21 VITALS
OXYGEN SATURATION: 95 % | BODY MASS INDEX: 35.37 KG/M2 | HEART RATE: 75 BPM | WEIGHT: 283 LBS | RESPIRATION RATE: 20 BRPM | DIASTOLIC BLOOD PRESSURE: 79 MMHG | TEMPERATURE: 98.1 F | SYSTOLIC BLOOD PRESSURE: 129 MMHG

## 2022-08-21 DIAGNOSIS — E16.2 HYPOGLYCEMIA: Primary | ICD-10-CM

## 2022-08-21 LAB
ALBUMIN SERPL-MCNC: 4.4 GM/DL (ref 3.4–5)
ALP BLD-CCNC: 121 IU/L (ref 40–129)
ALT SERPL-CCNC: 10 U/L (ref 10–40)
ANION GAP SERPL CALCULATED.3IONS-SCNC: 16 MMOL/L (ref 4–16)
AST SERPL-CCNC: 20 IU/L (ref 15–37)
BACTERIA: NEGATIVE /HPF
BASOPHILS ABSOLUTE: 0.1 K/CU MM
BASOPHILS RELATIVE PERCENT: 0.6 % (ref 0–1)
BILIRUB SERPL-MCNC: 0.3 MG/DL (ref 0–1)
BILIRUBIN URINE: NEGATIVE MG/DL
BLOOD, URINE: NEGATIVE
BUN BLDV-MCNC: 64 MG/DL (ref 6–23)
CALCIUM SERPL-MCNC: 9.4 MG/DL (ref 8.3–10.6)
CHLORIDE BLD-SCNC: 97 MMOL/L (ref 99–110)
CLARITY: CLEAR
CO2: 32 MMOL/L (ref 21–32)
COLOR: YELLOW
CREAT SERPL-MCNC: 2.7 MG/DL (ref 0.9–1.3)
DIFFERENTIAL TYPE: ABNORMAL
EOSINOPHILS ABSOLUTE: 0.5 K/CU MM
EOSINOPHILS RELATIVE PERCENT: 4.2 % (ref 0–3)
GFR AFRICAN AMERICAN: 29 ML/MIN/1.73M2
GFR NON-AFRICAN AMERICAN: 24 ML/MIN/1.73M2
GLUCOSE BLD-MCNC: 104 MG/DL (ref 70–99)
GLUCOSE BLD-MCNC: 128 MG/DL (ref 70–99)
GLUCOSE BLD-MCNC: 129 MG/DL (ref 70–99)
GLUCOSE BLD-MCNC: 43 MG/DL (ref 70–99)
GLUCOSE BLD-MCNC: 69 MG/DL (ref 70–99)
GLUCOSE BLD-MCNC: 75 MG/DL (ref 70–99)
GLUCOSE, URINE: NEGATIVE MG/DL
HCT VFR BLD CALC: 37.6 % (ref 42–52)
HEMOGLOBIN: 11.8 GM/DL (ref 13.5–18)
IMMATURE NEUTROPHIL %: 0.2 % (ref 0–0.43)
KETONES, URINE: NEGATIVE MG/DL
LEUKOCYTE ESTERASE, URINE: NEGATIVE
LYMPHOCYTES ABSOLUTE: 1.3 K/CU MM
LYMPHOCYTES RELATIVE PERCENT: 12.2 % (ref 24–44)
MCH RBC QN AUTO: 27.3 PG (ref 27–31)
MCHC RBC AUTO-ENTMCNC: 31.4 % (ref 32–36)
MCV RBC AUTO: 87 FL (ref 78–100)
MONOCYTES ABSOLUTE: 0.8 K/CU MM
MONOCYTES RELATIVE PERCENT: 7.4 % (ref 0–4)
NITRITE URINE, QUANTITATIVE: NEGATIVE
NUCLEATED RBC %: 0 %
PDW BLD-RTO: 16.8 % (ref 11.7–14.9)
PH, URINE: 5.5 (ref 5–8)
PLATELET # BLD: 221 K/CU MM (ref 140–440)
PMV BLD AUTO: 10.9 FL (ref 7.5–11.1)
POTASSIUM SERPL-SCNC: 3.8 MMOL/L (ref 3.5–5.1)
PROTEIN UA: NEGATIVE MG/DL
RBC # BLD: 4.32 M/CU MM (ref 4.6–6.2)
RBC URINE: <1 /HPF (ref 0–3)
SEGMENTED NEUTROPHILS ABSOLUTE COUNT: 8.2 K/CU MM
SEGMENTED NEUTROPHILS RELATIVE PERCENT: 75.4 % (ref 36–66)
SODIUM BLD-SCNC: 145 MMOL/L (ref 135–145)
SPECIFIC GRAVITY UA: <=1.005 (ref 1–1.03)
TOTAL IMMATURE NEUTOROPHIL: 0.02 K/CU MM
TOTAL NUCLEATED RBC: 0 K/CU MM
TOTAL PROTEIN: 7.8 GM/DL (ref 6.4–8.2)
TRICHOMONAS: NORMAL /HPF
UROBILINOGEN, URINE: 0.2 MG/DL (ref 0.2–1)
WBC # BLD: 10.8 K/CU MM (ref 4–10.5)
WBC UA: <1 /HPF (ref 0–2)

## 2022-08-21 PROCEDURE — 81001 URINALYSIS AUTO W/SCOPE: CPT

## 2022-08-21 PROCEDURE — 85025 COMPLETE CBC W/AUTO DIFF WBC: CPT

## 2022-08-21 PROCEDURE — 93005 ELECTROCARDIOGRAM TRACING: CPT | Performed by: EMERGENCY MEDICINE

## 2022-08-21 PROCEDURE — 99283 EMERGENCY DEPT VISIT LOW MDM: CPT

## 2022-08-21 PROCEDURE — 82962 GLUCOSE BLOOD TEST: CPT

## 2022-08-21 PROCEDURE — 80053 COMPREHEN METABOLIC PANEL: CPT

## 2022-08-21 ASSESSMENT — PAIN - FUNCTIONAL ASSESSMENT: PAIN_FUNCTIONAL_ASSESSMENT: NONE - DENIES PAIN

## 2022-08-21 NOTE — ED NOTES
Blood glucose 43. Pt. Awake and responsive, gave orange juice, apple juice, box lunch, pt. Is eating.       Lonnie House RN  08/21/22 [id number]

## 2022-08-21 NOTE — ED PROVIDER NOTES
[address] ENCOUNTER      Pt Name: Latesha Conner  MRN: [id number]  [place] 1960  Date of evaluation: 8/21/2022  Provider: Remy Yeboah MD    CHIEF COMPLAINT       Chief Complaint   Patient presents with    Hypoglycemia     BG in 46s upon arrival         HISTORY OF PRESENT ILLNESS   (Location/Symptom, Timing/Onset, Context/Setting, Quality, Duration, Modifying Factors, Severity)  Note limiting factors. Latesha Conner is a 65 y.o. male who presents to the emergency department with a hypoglycemic episode. HPI    Nursing Notes were reviewed. This is a 50-year-old man who comes the emergency department by EMS after a period of altered mental status at home. The patient's wife apparently found the patient mumbling and confused. As a result, she called 911. The patient's blood sugar was as low as 43 for EMS. As result, they gave him glucose and he had a significant improvement of his mental status in route to the hospital.    Patient is a diabetic who uses oral medication does not use insulin. He has not had any significant problems with his blood sugar in the past, however he says he has been feeling somewhat off over the past 1 to 2 days. He denies chest pain or difficulty breathing. Denies recent fevers. Denies cough. Denies nausea or vomiting. Denies diarrhea. Denies abdominal pain. The patient describes a remote history of Bell's palsy affecting left side of his face. He has some residual symptoms including numbness to the left side of his face since that episode, however he has not had any new periods of weakness, unilateral weakness, or new numbness. REVIEW OF SYSTEMS    (2-9 systems for level 4, 10 or more for level 5)     Review of Systems    10 systems were reviewed with the patient and all were negative with exception of those noted in the history of present illness above.       PAST MEDICAL HISTORY     Past Medical History:   Diagnosis Date    Abnormal nuclear stress test     Atrial fibrillation ([facility] [address] 11/2013    CAD (coronary artery disease)     Follows with Dr. Axel Lizarraga    CHF (congestive heart failure) ([facility])     COPD (chronic obstructive pulmonary disease) ([facility] [address]     Follows with PCP    Decreased cardiac ejection fraction     Diabetes mellitus ([facility] [address]     GERD (gastroesophageal reflux disease)     H/O cardiovascular stress test 11/20/13, 3/20/2013    11/13-Observed defect is consistent with diaphragmatic attenuation. EF 58%. 3/13 -EF 51%. No ischemia. Normal Study. H/O cardiovascular stress test 06/08/2017    EF 50% normal study    H/O cardiovascular stress test 06/17/2019    Normal NM    H/O cardiovascular stress test 03/02/2021    depressed lvedf increased edv myocardial perfusion abnormal stress test    H/O Doppler ultrasound 10/14/2010    10/2010-Bilateral venous doppler of lower extremies- No DVTs. H/O echocardiogram 06/26/2019    Left ventricular systolic function is normal with an ejection fraction of 50-55%. Mild concentric left ventricular hypertrophy. Grade I diastolic dysfunction. No significant valvular disease noted. No evidence of pericardial effusion. Essentially unremarkable echo.     H/O percutaneous left heart catheterization 12/22/2015    No evidence of hemodynamically significant coronary artery disease    Heart imaging 04/23/2020    muga - ef 58%    History of coronary artery stent placement 11/13/2013 11/13 -RCA - 3 stents placed    History of exercise stress test 06/08/2017    treadmill    History of Holter monitoring 01/04/2016    predominant rhythm sinus    History of MRI of brain and brain stem 06/04/2020    No evidence of an acute infarct, mod chronic microvascular white matter ischemic disease with associated cerebral parenchymal volume loss    History of nuclear stress test 02/24/2020    EF 38%,Myocardial perfusion scan shows moderate size, severe intensity, non  reversible perfusion defect in inferior wall. Hx of Doppler echocardiogram 01/26/2022    EF 45-50% Mod LV hypertrophy. Grade 1 diastolic dysfunction. Mod MR and TR. Dilation of the aortic root and ascending aorta. Hyperlipidemia     Hypertension     Follows with PCP    Kidney disease     Dr. Josse Elkins    S/P cardiac catheterization 11/13/2013 11/13/2013-EF 55%, distal LAD mild disease,CX stent patent,but distal to stent 50% stenosis. RCA severe disease. SOB (shortness of breath)     Status post angioplasty with stent     Wears dentures     full upper denture         SURGICAL HISTORY       Past Surgical History:   Procedure Laterality Date    APPENDECTOMY      BIOPSY / LIGATION TEMPORAL ARTERY Left 8/17/2020    LEFT TEMPORAL ARTERY BIOPSY LIGATION performed by Nancy Balderrama MD at [address]  11/13/2013 11/13/2013-EF 55%, distal LAD mild disease,CX stent patent,but distal to stent 50% stenosis. RCA severe disease.     CARDIAC SURGERY  07/03/2020    CABG X 3 LIMA-OM-PDA-LAD, Maze, LT Atrial Appendage clipping    COLONOSCOPY      CORONARY ANGIOPLASTY WITH STENT PLACEMENT      4 stents- RCA X3; CX X1    CORONARY ANGIOPLASTY WITH STENT PLACEMENT  11/13/2013 11/13/2013 -3 stents placed to RCA- Dr Casimiro Noyola N/A 7/3/2020    INTRAOPERATIVE ISMAEL, CABG X3   WITH LIMA  AND LEFT LEG ENDOVEIN HARVEST, INDUCED HYPOTHERMIA, MAZE BIPOLAR AND CRYO PROCEDURE, LEFT ATRIAL APPENDAGE CLIPPING performed by Pelon Salazar MD at [address] [place] [address]      all teeth extracted    [address]         CURRENT MEDICATIONS       Previous Medications    ALBUTEROL SULFATE HFA (PROVENTIL HFA) 108 (90 BASE) MCG/ACT INHALER    Inhale 2 puffs into the lungs every 6 hours as needed for Wheezing or Shortness of Breath    ATORVASTATIN (LIPITOR) 80 MG TABLET    Take 1 tablet by mouth nightly    BUMETANIDE (BUMEX) 2 MG TABLET    Take 1 tablet by mouth in the morning, at noon, and at bedtime Take metolazone exactly 30 minutes after the Bumex tablet every day after each dose of Bumex. CARVEDILOL (COREG) 12.5 MG TABLET    Take 1 tablet by mouth 2 times daily (with meals)    CLOPIDOGREL (PLAVIX) 75 MG TABLET    TAKE 1 TABLET BY MOUTH DAILY. EMPAGLIFLOZIN (JARDIANCE) 10 MG TABLET    Take 1 tablet by mouth in the morning. GUAIFENESIN (MUCINEX) 600 MG EXTENDED RELEASE TABLET    Take 1 tablet by mouth 2 times daily    HYDRALAZINE (APRESOLINE) 25 MG TABLET    Take 1 tablet by mouth 3 times daily    IPRATROPIUM-ALBUTEROL (DUONEB) 0.5-2.5 (3) MG/3ML SOLN NEBULIZER SOLUTION    Inhale 3 mLs into the lungs 4 times daily    LIDOCAINE PAIN RELIEF 4 % EXTERNAL PATCH    APPLY 1 PATCH EXTERNALLY EVERY DAY    LISINOPRIL (PRINIVIL;ZESTRIL) 20 MG TABLET    TAKE 1 TABLET BY MOUTH DAILY    METOLAZONE (ZAROXOLYN) 2.5 MG TABLET    Take 1 tablet by mouth in the morning. Take 1-2 hours after Bumex. Please make sure of this.     MONTELUKAST (SINGULAIR) 10 MG TABLET    Take 1 tablet by mouth daily    NITROGLYCERIN (NITROSTAT) 0.4 MG SL TABLET    Place 1 tablet under the tongue every 5 minutes as needed for Chest pain    PANTOPRAZOLE (PROTONIX) 40 MG TABLET    TAKE 1 TABLET BY MOUTH EVERY DAY    POTASSIUM CHLORIDE (KLOR-CON M) 20 MEQ EXTENDED RELEASE TABLET    TAKE 2 TABLETS BY MOUTH EVERY DAY    PREGABALIN (LYRICA) 150 MG CAPSULE        WARFARIN (JANTOVEN) 4 MG TABLET    TAKE 1 TABLET BY MOUTH EVERY DAY, EXCEPT TAKE 1 AND 1/2 TABLETS ON SUNDAYS OR AS DIRECTED BY CLINIC       ALLERGIES     Hydromorphone    FAMILY HISTORY       Family History   Problem Relation Age of Onset    Cancer Mother     Diabetes Mother     Heart Disease Mother     High Blood Pressure Mother     Stroke Mother     Cancer Father     Heart Disease Father     High Blood Pressure Father     Stroke Father     Cancer Brother           SOCIAL HISTORY       Social History     Socioeconomic History Marital status: Legally      Spouse name: None    Number of children: None    Years of education: None    Highest education level: None   Occupational History    Occupation:    Tobacco Use    Smoking status: Every Day     Packs/day: 1.00     Years: 46.00     Pack years: 46.00     Types: Cigarettes     Start date: 1974    Smokeless tobacco: Never    Tobacco comments:     Smokes approx 1 pack per day   Vaping Use    Vaping Use: Never used   Substance and Sexual Activity    Alcohol use: No     Comment: caffeine 6 bottles of unsweet tea a day    Drug use: Not Currently     Types: Cocaine     Comment: Last used: 7/1/20    Sexual activity: Yes     Partners: Female       SCREENINGS         [place] Coma Scale  Eye Opening: Spontaneous  Best Verbal Response: Confused  Best Motor Response: Obeys commands  [place] Coma Scale Score: 14                     CIWA Assessment  BP: 129/79  Heart Rate: 75                 PHYSICAL EXAM    (up to 7 for level 4, 8 or more for level 5)     ED Triage Vitals   BP Temp Temp src Pulse Resp SpO2 Height Weight   -- -- -- -- -- -- -- --       Physical Exam  Vitals reviewed. Constitutional:       General: He is not in acute distress. Appearance: Normal appearance. He is not toxic-appearing or diaphoretic. HENT:      Head: Normocephalic and atraumatic. Nose: Nose normal.      Mouth/Throat:      Mouth: Mucous membranes are moist.   Eyes:      Extraocular Movements: Extraocular movements intact. Cardiovascular:      Rate and Rhythm: Normal rate and regular rhythm. Pulses: Normal pulses. Heart sounds: Normal heart sounds. Pulmonary:      Effort: Pulmonary effort is normal. No respiratory distress. Breath sounds: Normal breath sounds. No wheezing, rhonchi or rales. Abdominal:      Palpations: Abdomen is soft. Tenderness: There is no abdominal tenderness. Musculoskeletal:         General: Normal range of motion.       Cervical back: Normal range of motion and neck supple. Right lower leg: Edema present. Left lower leg: Edema present. Skin:     General: Skin is warm and dry. Capillary Refill: Capillary refill takes less than 2 seconds. Neurological:      General: No focal deficit present. Mental Status: He is alert and oriented to person, place, and time. Cranial Nerves: No cranial nerve deficit. Psychiatric:         Mood and Affect: Mood normal.         Behavior: Behavior normal.       DIAGNOSTIC RESULTS     EKG: All EKG's are interpreted by the Emergency Department Physician who either signs or Co-signs this chart in the absence of a cardiologist.    Sinus rhythm. Ventricular rate of 73. NV is 182. QRS is 114. QTc is 469. There are no abnormal ST elevations or depressions. There are T wave inversions in the high lateral leads of unclear significance. This is unchanged from prior EKG from January 2022.     RADIOLOGY:   Non-plain film images such as CT, Ultrasound and MRI are read by the radiologist. Plain radiographic images are visualized and preliminarily interpreted by the emergency physician with the below findings:    Interpretation per the Radiologist below, if available at the time of this note:    No orders to display         ED BEDSIDE ULTRASOUND:   Performed by ED Physician - none    LABS:  Labs Reviewed   CBC WITH AUTO DIFFERENTIAL - Abnormal; Notable for the following components:       Result Value    WBC 10.8 (*)     RBC 4.32 (*)     Hemoglobin 11.8 (*)     Hematocrit 37.6 (*)     MCHC 31.4 (*)     RDW 16.8 (*)     Segs Relative 75.4 (*)     Lymphocytes % 12.2 (*)     Monocytes % 7.4 (*)     Eosinophils % 4.2 (*)     All other components within normal limits   COMPREHENSIVE METABOLIC PANEL W/ REFLEX TO MG FOR LOW K - Abnormal; Notable for the following components:    Chloride 97 (*)     BUN 64 (*)     Creatinine 2.7 (*)     GFR Non- 24 (*)     GFR  29 (*)     All other components within normal limits   POCT GLUCOSE - Abnormal; Notable for the following components:    POC Glucose 104 (*)     All other components within normal limits   POCT GLUCOSE - Abnormal; Notable for the following components:    POC Glucose 43 (*)     All other components within normal limits   POCT GLUCOSE - Abnormal; Notable for the following components:    POC Glucose 69 (*)     All other components within normal limits   POCT GLUCOSE - Abnormal; Notable for the following components:    POC Glucose 129 (*)     All other components within normal limits   URINALYSIS WITH REFLEX TO CULTURE       All other labs were within normal range or not returned as of this dictation. EMERGENCY DEPARTMENT COURSE and DIFFERENTIAL DIAGNOSIS/MDM:   Vitals:    Vitals:    08/21/22 1434 08/21/22 1435 08/21/22 1436   BP: 129/79     Pulse:  75    Resp: 20     Temp:  98.1 °F (36.7 °C)    SpO2:   95%   Weight: 283 lb (128.4 kg)           MDM  Merry concern in this patient is hypoglycemic episode. The patient has diabetes and has had a significantly low blood sugar as recorded by EMS. His symptoms have resolved with an improvement of his blood sugar. He has had no other symptoms. The possibility of refractory hypoglycemia needs to be considered. As a result, the patient will be observed for 2 hours in the emergency department and discharged if there are no other adverse events      REASSESSMENT      While here in the emergency department. The patient had a spontaneous drop in his blood sugar with point-of-care glucose readings in the 40s he did not have any change in mental status at the time. He was provided with oral intake glucose. Will continue to be observed in the emergency department. If he continues to have refractory hypoglycemia the patient will require admission to the hospital for further evaluation by medicine. The patient has been normoglycemic for over an hour. He has no other complaints.   He will be

## 2022-08-21 NOTE — ED NOTES
[facility] [address] [place] [address]  08/21/22 [id number]

## 2022-08-22 LAB
EKG ATRIAL RATE: 73 BPM
EKG DIAGNOSIS: NORMAL
EKG P AXIS: 55 DEGREES
EKG P-R INTERVAL: 182 MS
EKG Q-T INTERVAL: 426 MS
EKG QRS DURATION: 114 MS
EKG QTC CALCULATION (BAZETT): 469 MS
EKG R AXIS: -4 DEGREES
EKG T AXIS: 117 DEGREES
EKG VENTRICULAR RATE: 73 BPM

## 2022-08-22 PROCEDURE — 93010 ELECTROCARDIOGRAM REPORT: CPT | Performed by: INTERNAL MEDICINE

## 2022-08-23 ENCOUNTER — OFFICE VISIT (OUTPATIENT)
Dept: CARDIOLOGY CLINIC | Age: 62
End: 2022-08-23
Payer: COMMERCIAL

## 2022-08-23 VITALS
HEIGHT: 75 IN | SYSTOLIC BLOOD PRESSURE: 122 MMHG | DIASTOLIC BLOOD PRESSURE: 68 MMHG | HEART RATE: 67 BPM | WEIGHT: 271.8 LBS | OXYGEN SATURATION: 94 % | BODY MASS INDEX: 33.8 KG/M2

## 2022-08-23 DIAGNOSIS — I71.60 THORACOABDOMINAL AORTIC ANEURYSM (TAAA) WITHOUT RUPTURE: Primary | ICD-10-CM

## 2022-08-23 DIAGNOSIS — I25.5 ISCHEMIC CARDIOMYOPATHY: ICD-10-CM

## 2022-08-23 DIAGNOSIS — I87.2 VENOUS REFLUX: ICD-10-CM

## 2022-08-23 DIAGNOSIS — I25.10 CORONARY ARTERY DISEASE INVOLVING NATIVE HEART WITHOUT ANGINA PECTORIS, UNSPECIFIED VESSEL OR LESION TYPE: ICD-10-CM

## 2022-08-23 DIAGNOSIS — I48.0 PAROXYSMAL A-FIB (HCC): ICD-10-CM

## 2022-08-23 DIAGNOSIS — E78.2 MIXED HYPERLIPIDEMIA: ICD-10-CM

## 2022-08-23 DIAGNOSIS — I10 PRIMARY HYPERTENSION: ICD-10-CM

## 2022-08-23 PROCEDURE — G8417 CALC BMI ABV UP PARAM F/U: HCPCS | Performed by: NURSE PRACTITIONER

## 2022-08-23 PROCEDURE — 4004F PT TOBACCO SCREEN RCVD TLK: CPT | Performed by: NURSE PRACTITIONER

## 2022-08-23 PROCEDURE — G8427 DOCREV CUR MEDS BY ELIG CLIN: HCPCS | Performed by: NURSE PRACTITIONER

## 2022-08-23 PROCEDURE — 99214 OFFICE O/P EST MOD 30 MIN: CPT | Performed by: NURSE PRACTITIONER

## 2022-08-23 PROCEDURE — 3017F COLORECTAL CA SCREEN DOC REV: CPT | Performed by: NURSE PRACTITIONER

## 2022-08-23 ASSESSMENT — ENCOUNTER SYMPTOMS: SHORTNESS OF BREATH: 1

## 2022-08-23 NOTE — PROGRESS NOTES
[facility] [address] [facility],Third [address]  Phone: [phone]    Fax [phone]                  Shayne Nunes MD, Caryn Masterson MD, Nikko Enriquez MD, MD Asif Rangel MD Vince Murders, MD Linette Wynne, APRCLEMENT Gorman, APRCLEMENT Curtis, APRN     Ale Van, APRCLEMENT    CARDIOLOGY  NOTE      8/23/2022    RE: Leticia Mcrae  (1960)                               TO:  Dr. Mayco Arnett MD  The primary cardiologist is Dr. Elyssa Moran     CC:   1. Thoracoabdominal aortic aneurysm (TAAA) without rupture (HCC)    2. Venous reflux    3. Paroxysmal A-fib ([facility]    4. Ischemic cardiomyopathy    5. Coronary artery disease involving native heart without angina pectoris, unspecified vessel or lesion type    6. Primary hypertension    7. Mixed hyperlipidemia          Patient denies all of the following:  Chest Pain  Palpitations  Dizziness  Syncope      HPI: Thank you for involving me in taking care of your patient Leticia Mcrae, who is a  65y.o. year old male with a history as listed above. Patient presents to the office for follow up on CAD, HTN, CHF, A-fib, TAA, venous insufficiency and hyperlipidemia. Patient was seen multiple times in emergency department for chest pain, shortness of breath, and elevated BP but left AMA. He was recently hospitalized for CHF and was aggressively diuresed in July of 2022. Patient had venous ablation of right lower extremity on 8/1/22 and has some improvement in edema and pain. Patient is an active male who does not walk regularly. Recent ER visit due to hypoglycemia it was 43 and previous month was 32.        Vitals:    08/23/22 1042   BP: 122/68   Pulse: 67   SpO2: 94%       Current Outpatient Medications   Medication Sig Dispense Refill    bumetanide (BUMEX) 2 MG tablet Take 1 tablet by mouth in the morning, at noon, and at bedtime Take metolazone exactly 30 minutes after the Bumex tablet every day after each dose of Bumex. 30 tablet 3    empagliflozin (JARDIANCE) 10 MG tablet Take 1 tablet by mouth in the morning. 90 tablet 0    warfarin (JANTOVEN) 4 MG tablet TAKE 1 TABLET BY MOUTH EVERY DAY, EXCEPT TAKE 1 AND 1/2 TABLETS ON SUNDAYS OR AS DIRECTED BY CLINIC 100 tablet 1    hydrALAZINE (APRESOLINE) 25 MG tablet Take 1 tablet by mouth 3 times daily 90 tablet 5    carvedilol (COREG) 12.5 MG tablet Take 1 tablet by mouth 2 times daily (with meals) 60 tablet 5    montelukast (SINGULAIR) 10 MG tablet Take 1 tablet by mouth daily 30 tablet 3    guaiFENesin (MUCINEX) 600 MG extended release tablet Take 1 tablet by mouth 2 times daily 60 tablet 5    ipratropium-albuterol (DUONEB) 0.5-2.5 (3) MG/3ML SOLN nebulizer solution Inhale 3 mLs into the lungs 4 times daily 360 mL 5    clopidogrel (PLAVIX) 75 MG tablet TAKE 1 TABLET BY MOUTH DAILY. pantoprazole (PROTONIX) 40 MG tablet TAKE 1 TABLET BY MOUTH EVERY DAY      potassium chloride (KLOR-CON M) 20 MEQ extended release tablet TAKE 2 TABLETS BY MOUTH EVERY DAY      lisinopril (PRINIVIL;ZESTRIL) 20 MG tablet TAKE 1 TABLET BY MOUTH DAILY      pregabalin (LYRICA) 150 MG capsule       LIDOCAINE PAIN RELIEF 4 % external patch APPLY 1 PATCH EXTERNALLY EVERY DAY      nitroGLYCERIN (NITROSTAT) 0.4 MG SL tablet Place 1 tablet under the tongue every 5 minutes as needed for Chest pain 25 tablet 3    albuterol sulfate HFA (PROVENTIL HFA) 108 (90 Base) MCG/ACT inhaler Inhale 2 puffs into the lungs every 6 hours as needed for Wheezing or Shortness of Breath 18 g 0    atorvastatin (LIPITOR) 80 MG tablet Take 1 tablet by mouth nightly 30 tablet 3    metOLazone (ZAROXOLYN) 2.5 MG tablet Take 1 tablet by mouth in the morning. Take 1-2 hours after Bumex. Please make sure of this. 30 tablet 0     No current facility-administered medications for this visit.      Allergies: Hydromorphone  Past Medical History:   Diagnosis Date    Abnormal nuclear stress test Atrial fibrillation ([facility] 11/2013    CAD (coronary artery disease)     Follows with Dr. Memo Duque    CHF (congestive heart failure) ([facility])     COPD (chronic obstructive pulmonary disease) ([facility]     Follows with PCP    Decreased cardiac ejection fraction     Diabetes mellitus ([facility]     GERD (gastroesophageal reflux disease)     H/O cardiovascular stress test 11/20/13, 3/20/2013    11/13-Observed defect is consistent with diaphragmatic attenuation. EF 58%. 3/13 -EF 51%. No ischemia. Normal Study. H/O cardiovascular stress test 06/08/2017    EF 50% normal study    H/O cardiovascular stress test 06/17/2019    Normal NM    H/O cardiovascular stress test 03/02/2021    depressed lvedf increased edv myocardial perfusion abnormal stress test    H/O Doppler ultrasound 10/14/2010    10/2010-Bilateral venous doppler of lower extremies- No DVTs. H/O echocardiogram 06/26/2019    Left ventricular systolic function is normal with an ejection fraction of 50-55%. Mild concentric left ventricular hypertrophy. Grade I diastolic dysfunction. No significant valvular disease noted. No evidence of pericardial effusion. Essentially unremarkable echo. H/O percutaneous left heart catheterization 12/22/2015    No evidence of hemodynamically significant coronary artery disease    Heart imaging 04/23/2020    muga - ef 58%    History of coronary artery stent placement 11/13/2013 11/13 -RCA - 3 stents placed    History of exercise stress test 06/08/2017    treadmill    History of Holter monitoring 01/04/2016    predominant rhythm sinus    History of MRI of brain and brain stem 06/04/2020    No evidence of an acute infarct, mod chronic microvascular white matter ischemic disease with associated cerebral parenchymal volume loss    History of nuclear stress test 02/24/2020    EF 38%,Myocardial perfusion scan shows moderate size, severe intensity, non  reversible perfusion defect in inferior wall.     Hx of Doppler echocardiogram 01/26/2022    EF 45-50% Mod LV hypertrophy. Grade 1 diastolic dysfunction. Mod MR and TR. Dilation of the aortic root and ascending aorta. Hyperlipidemia     Hypertension     Follows with PCP    Kidney disease     Dr. Jonathon Paez    S/P cardiac catheterization 11/13/2013 11/13/2013-EF 55%, distal LAD mild disease,CX stent patent,but distal to stent 50% stenosis. RCA severe disease. SOB (shortness of breath)     Status post angioplasty with stent     Wears dentures     full upper denture     Past Surgical History:   Procedure Laterality Date    APPENDECTOMY      BIOPSY / LIGATION TEMPORAL ARTERY Left 8/17/2020    LEFT TEMPORAL ARTERY BIOPSY LIGATION performed by Saadia Green MD at [address] [facility]  11/13/2013 11/13/2013-EF 55%, distal LAD mild disease,CX stent patent,but distal to stent 50% stenosis. RCA severe disease.     CARDIAC SURGERY  07/03/2020    CABG X 3 LIMA-OM-PDA-LAD, Maze, LT Atrial Appendage clipping    COLONOSCOPY      CORONARY ANGIOPLASTY WITH STENT PLACEMENT      4 stents- RCA X3; CX X1    CORONARY ANGIOPLASTY WITH STENT PLACEMENT  11/13/2013 11/13/2013 -3 stents placed to RCA- Dr Meaghan Last N/A 7/3/2020    INTRAOPERATIVE ISMAEL, CABG X3   WITH LIMA  AND LEFT LEG ENDOVEIN HARVEST, INDUCED HYPOTHERMIA, MAZE BIPOLAR AND CRYO PROCEDURE, LEFT ATRIAL APPENDAGE CLIPPING performed by Nichole Calle MD at [address] [place] [address]      all teeth extracted    [address]     Family History   Problem Relation Age of Onset    Cancer Mother     Diabetes Mother     Heart Disease Mother     High Blood Pressure Mother     Stroke Mother     Cancer Father     Heart Disease Father     High Blood Pressure Father     Stroke Father     Cancer Brother      Social History     Tobacco Use    Smoking status: Every Day     Packs/day: 0.50     Years: 46.00     Pack years: 23.00     Types: Cigarettes     Start date: 66 Smokeless tobacco: Never   Substance Use Topics    Alcohol use: No     Comment: caffeine 2 cups coffee a week. pop ywice a week. austen tea 4 bottles a day      Review of Systems   Respiratory:  Positive for shortness of breath. Cardiovascular:  Positive for leg swelling. Negative for chest pain and palpitations. Musculoskeletal: Negative. Skin: Negative. Neurological:  Negative for dizziness and weakness. All other systems reviewed and are negative. Objective:      Physical Exam:  /68 (Site: Left Upper Arm, Position: Sitting, Cuff Size: Large Adult)   Pulse 67   Ht 6' 3\" (1.905 m)   Wt 271 lb 12.8 oz (123.3 kg)   SpO2 94%   BMI 33.97 kg/m²   Wt Readings from Last 3 Encounters:   08/23/22 271 lb 12.8 oz (123.3 kg)   08/21/22 283 lb (128.4 kg)   08/09/22 275 lb 6.4 oz (124.9 kg)     Body mass index is 33.97 kg/m². Physical Exam  Constitutional:       Appearance: He is well-developed. Cardiovascular:      Rate and Rhythm: Normal rate and regular rhythm. Pulses: Intact distal pulses. Dorsalis pedis pulses are 2+ on the right side and 2+ on the left side. Posterior tibial pulses are 2+ on the right side and 2+ on the left side. Heart sounds: Normal heart sounds, S1 normal and S2 normal.   Pulmonary:      Effort: Pulmonary effort is normal.      Breath sounds: Examination of the right-middle field reveals decreased breath sounds. Examination of the left-middle field reveals decreased breath sounds. Examination of the right-lower field reveals decreased breath sounds. Examination of the left-lower field reveals decreased breath sounds. Decreased breath sounds present. Musculoskeletal:         General: Normal range of motion. Right lower leg: Edema present. Left lower leg: Edema present. Skin:     General: Skin is warm and dry. Neurological:      Mental Status: He is alert and oriented to person, place, and time.        DATA:  Lab Results   Component Value Date/Time    CKTOTAL 34 01/29/2022 05:26 PM    CKMB 2.4 03/10/2013 07:05 AM    TROPONINI 0.014 06/09/2014 01:30 AM    TROPONINI 0.015 12/03/2011 07:35 PM     BNP:    Lab Results   Component Value Date/Time    BNP 17 03/09/2013 11:30 PM     PT/INR:  No results found for: PTINR  Lab Results   Component Value Date    LABA1C 7.8 (H) 12/30/2020    LABA1C 8.9 (H) 06/29/2020     Lab Results   Component Value Date    CHOL 153 01/06/2020    TRIG 54 01/06/2020    HDL 43 01/06/2020    LDLDIRECT 111 (H) 01/06/2020     Lab Results   Component Value Date    ALT 10 08/21/2022    AST 20 08/21/2022     TSH:  No results found for: TSH    Echo:1/7/2020  Left ventricular systolic function is abnormal.   Ejection fraction is visually estimated at 30 to 35 %. Moderate left ventricular hypertrophy. Cannot assess diastolic function due to AFib. Moderately dilated atrium bilaterally. No evidence of any pericardial effusion. Inferior vena cava is dilated, measuring at 2.8 cm, and does not collapse   with respiration. Echo: 12/30/20   Left ventricular systolic function is low normal.   Ejection fraction is visually estimated at 50%. Moderate left ventricular hypertrophy. No significant valvular disease noted. No evidence of any pericardial effusion. Pericardial fat pad present. Echo:7/15/22  Technically difficult examination due to uncooperative patient. Left ventricular systolic function is low normal.   Ejection fraction is visually estimated at 45%. Septal wall appears slightly hypokinetic. Moderate bi-atrial enlargement. Mildly dilated right ventricle. Mild-moderate mitral regurgitation. Severe tricuspid regurgitation; RVSP: 60 mmHg. No evidence of any pericardial effusion. Dilated aortic root (4.6 cm). Inferior vena cava is dilated, measuring at 2.8 cm, and does not collapse   with respiration.      MUGA: 1/13/21  EF 36 %    Cath: 7/30/19  PATIENT presenting with STEMI of RCA, s/p successful PCI of RCA with LEOLA, HAS multivessel disease as described above. Cath: 12/30/20  1. PCI to SVG to RCA/PDA at ostial anastomoses site for 99 % lesion reduced to 0 % using 3.0 X 28 LEOLA, 3.0 X 18 and 2.5 X 23 overlapping stents followed by post stent balloon dilation using   3.5 X 12 balloon   2. SVG to Om and LIMA to LAd are potent   3. LVEDP was 15 mmHG   4. RCA and Circ are occluded   5. LAD is diffusely diseased , Diag and LAD are filled by   LIMA graft. 6. left main has 80% distal stenosis   Pt is referred to Cardiac Rehab Phase 2 as OP    The ASCVD Risk score (Jose Alberto De La Rosa., et al., 2013) failed to calculate for the following reasons: The patient has a prior MI or stroke diagnosis    Assessment/ Plan:     Venous reflux  -Significant reflux of right GSV SF J tributary note of left GSV at knee and mid calf, significant reflux of left CFV. Recommend compression stockings and compression therapy but patient refuses. Only able to do right ablation. Improved BLE edema and pain. Normal LORA in 2021. TAAA (Thoracoabdominal aortic aneurysm without rupture)  New found aortic root aneurysm measuring 4.5 cm and ascending aorta measuring 4.7 cm. Patient needs better B/P control. CTA of chest in 10/10/21 showed normal size of aorta. Unable to obtain CTA of chest due to CKD and patient having claustrophobia. Recent echocardiogram at [facility] does not show any aortic root dilation. But had CT of abdomen which did not show any significant abnormality. Will conservatively manage. HTN (hypertension)   -Stable, continue with hydralazine 10 mg TID, tolerating increase in Coreg now 12.5 mg BID, lisinopril 20 mg daily, and hydrochlorothiazide 25 mg. Continue with Bumex 2 mg twice daily and Zaroxolyn to daily 2 hrs after Bumex. Reports getting home B/P monitor. Paroxysmal atrial fibrillation   -Chads-Vas is 6, continue with Coumadin for anticoagulation. Recent EKG shows normal sinus rhythm.       CAD (coronary artery disease)   -PCI of RCA in 2019, then CABG x 3 in 2020 LIMA-LAD, SVG to RCA/PDA, SVG to OM. Had PCI of SVG to RCA/PDA with multiple stents overlapping 6 months post CABG. MUGA scan in January 2021 showed EF 36%. Stress test in March 2021 showed medium size severe intensity nonreversible perfusion defect in inferior wall. Patient has significant history of noncompliance and cocaine abuse. Primary and secondary prevention discussed with patient:              -Low sodium cardiac diet              -exercise 30 min a day three days a week  Continue current medications Plavix, Coumadin, Lipitor, Coreg, Lasix, lisinopril,      Ischemic cardiomyopathy   -MUGA scan in January 2021 showed EF 36%. Recent echocardiogram shows EF 45-50% which is slightly reduced. SOB improved, continue with aggressive diuresing with Bumex 2 mg BID and metolazone to 2.5 mg daily 2 hours after Bumex. Mixed hyperlipidemia   -At or near goal No    -He is to continue current medications (Lipitor 80 mg) Hepatic function panel WNL. No abdominal pain. No myalgias.      -The nature of cardiac risk has been fully discussed with this patient. I have made him aware of his LDL target goal given his cardiovascular risk analysis. I have discussed the appropriate diet. The need for lifelong compliance in order to reduce risk is stressed. A regular exercise program is recommended to help achieve and maintain normal body weight, fitness and improve lipid balance. A written copy of a low fat, low cholesterol diet has been given to the patient. Patient seen, interviewed and examined. Testing was reviewed. Patient is encouraged to exercise even a brisk walk for 30 minutes at least 3 to 4 times a week. Lifestyle and risk factor modificatons discussed. Various goals are discussed and questions answered. Continue current medications. Appropriate prescriptions are addressed. Questions answered and patient verbalizes understanding.      Call for any problems, questions, or concerns. Pt is to follow up in 3 month for Cardiac management    Electronically signed by Kirk Joaquin.  TREY Snyder CNP on 8/23/2022 at 11:05 AM

## 2022-08-25 ENCOUNTER — TELEPHONE (OUTPATIENT)
Dept: CARDIOLOGY CLINIC | Age: 62
End: 2022-08-25

## 2022-08-25 NOTE — TELEPHONE ENCOUNTER
Patient discussed these symptoms with Dr Eugenia Barker at 8/9 OV. Patient was ordered benadryl and calamine lotion which he has not yet tried. Advised patient to  these OTC medications.

## 2022-09-01 ENCOUNTER — TELEPHONE (OUTPATIENT)
Dept: PHARMACY | Age: 62
End: 2022-09-01

## 2022-09-01 NOTE — TELEPHONE ENCOUNTER
No show to appointment. Left patient voicemail to call clinic to schedule. For Pharmacy Admin Tracking Only    Intervention Detail:   Total # of Interventions Recommended:    Total # of Interventions Accepted:   Time Spent (min): 5

## 2022-09-06 ENCOUNTER — TELEPHONE (OUTPATIENT)
Dept: PHARMACY | Age: 62
End: 2022-09-06

## 2022-09-06 NOTE — TELEPHONE ENCOUNTER
Gabrielle Cole left VM to reschedule for himself and patient. Returned call. Rescheduled for Thursday 9/15.     For Pharmacy Admin Tracking Only    Time Spent (min): 5

## 2022-09-14 ENCOUNTER — PROCEDURE VISIT (OUTPATIENT)
Dept: CARDIOLOGY CLINIC | Age: 62
End: 2022-09-14
Payer: COMMERCIAL

## 2022-09-14 DIAGNOSIS — I87.301 IDIOPATHIC CHRONIC VENOUS HYPERTENSION OF RIGHT LOWER EXTREMITY WITHOUT COMPLICATIONS: Primary | ICD-10-CM

## 2022-09-14 PROCEDURE — 93971 EXTREMITY STUDY: CPT | Performed by: INTERNAL MEDICINE

## 2022-09-15 ENCOUNTER — ANTI-COAG VISIT (OUTPATIENT)
Dept: PHARMACY | Age: 62
End: 2022-09-15
Payer: COMMERCIAL

## 2022-09-15 ENCOUNTER — TELEPHONE (OUTPATIENT)
Dept: CARDIOLOGY CLINIC | Age: 62
End: 2022-09-15

## 2022-09-15 DIAGNOSIS — I48.91 ATRIAL FIBRILLATION WITH RAPID VENTRICULAR RESPONSE (HCC): Primary | ICD-10-CM

## 2022-09-15 LAB
INTERNATIONAL NORMALIZATION RATIO, POC: 2.5
POC INR: 2.5 INDEX
PROTHROMBIN TIME, POC: 30 SECONDS (ref 10–14.3)

## 2022-09-15 PROCEDURE — 99212 OFFICE O/P EST SF 10 MIN: CPT

## 2022-09-15 PROCEDURE — 85610 PROTHROMBIN TIME: CPT

## 2022-09-15 PROCEDURE — 36416 COLLJ CAPILLARY BLOOD SPEC: CPT

## 2022-09-15 NOTE — PROGRESS NOTES
Medication Management Service  [facility]  [phone]    Visit Date: 9/15/2022   Subjective:       Blake Calderon is a 65 y.o. male who presents to clinic today for anticoagulation monitoring and adjustment. Patient seen in clinic for warfarin management due to  Indication:   atrial fibrillation. INR goal: of 2.0-3.0. Duration of therapy: indefinite. Patient reports the following:   NOT Adherent with regimen- did not take 6mg on Wednesdays  Missed or extra doses:  None   Bleeding or thromboembolic side effects:  None  Significant medication changes:  None  Significant dietary changes: None  Significant alcohol or tobacco changes: None  Significant recent illness, disease state changes, or hospitalization:  None  Upcoming surgeries or procedures:  None  Falls: None           Assessment and Plan     PT/INR done in office per protocol. INR today is 2.5, therapeutic, despite patient did not take 6mg on Wednesdays as instructed. Will continue dose patient reports he was taking  Plan: Will continue current regimen of warfarin 4mg daily, except 6mg on Sundays. This is a ~6% lower dose. Recheck INR in 4 week(s). Patient verbalized understanding of dosing directions and information discussed. Dosing schedule given to patient including phone number, appointment date, and time. Progress note sent to referring office. Patient acknowledges working in consult agreement with pharmacist as referred by his/her physician.       Electronically signed by Porsche [facility] on 9/15/22 at 1:09 PM EDT    For Pharmacy Admin Tracking Only    Intervention Detail: Dose Adjustment: 1, reason: Therapy Optimization  Total # of Interventions Recommended: 1  Total # of Interventions Accepted: 1  Time Spent (min): 15

## 2022-09-15 NOTE — TELEPHONE ENCOUNTER
Venous doppler 1 month post   Right Popliteal vein is patent with good compressibility and respirophasic    signal with good augmentation.     The Right GSV is non-compressible with no evidence of flow just past the    saphenofemoral junction to the knee     Left message

## 2022-09-19 ENCOUNTER — TELEPHONE (OUTPATIENT)
Dept: CARDIOLOGY CLINIC | Age: 62
End: 2022-09-19

## 2022-09-20 ENCOUNTER — OFFICE VISIT (OUTPATIENT)
Dept: CARDIOLOGY CLINIC | Age: 62
End: 2022-09-20
Payer: COMMERCIAL

## 2022-09-20 VITALS
HEART RATE: 68 BPM | WEIGHT: 283 LBS | HEIGHT: 75 IN | BODY MASS INDEX: 35.19 KG/M2 | DIASTOLIC BLOOD PRESSURE: 100 MMHG | SYSTOLIC BLOOD PRESSURE: 150 MMHG

## 2022-09-20 DIAGNOSIS — I25.10 CORONARY ARTERY DISEASE INVOLVING NATIVE HEART WITHOUT ANGINA PECTORIS, UNSPECIFIED VESSEL OR LESION TYPE: Primary | ICD-10-CM

## 2022-09-20 DIAGNOSIS — I48.0 PAROXYSMAL A-FIB (HCC): ICD-10-CM

## 2022-09-20 DIAGNOSIS — N18.30 STAGE 3 CHRONIC KIDNEY DISEASE, UNSPECIFIED WHETHER STAGE 3A OR 3B CKD (HCC): ICD-10-CM

## 2022-09-20 DIAGNOSIS — I25.5 ISCHEMIC CARDIOMYOPATHY: ICD-10-CM

## 2022-09-20 DIAGNOSIS — I83.893 VARICOSE VEINS OF BOTH LEGS WITH EDEMA: ICD-10-CM

## 2022-09-20 DIAGNOSIS — I10 PRIMARY HYPERTENSION: ICD-10-CM

## 2022-09-20 DIAGNOSIS — E11.9 TYPE 2 DIABETES MELLITUS WITHOUT COMPLICATION, WITHOUT LONG-TERM CURRENT USE OF INSULIN (HCC): Chronic | ICD-10-CM

## 2022-09-20 DIAGNOSIS — E66.09 CLASS 2 OBESITY DUE TO EXCESS CALORIES WITHOUT SERIOUS COMORBIDITY WITH BODY MASS INDEX (BMI) OF 35.0 TO 35.9 IN ADULT: ICD-10-CM

## 2022-09-20 DIAGNOSIS — I21.4 NSTEMI (NON-ST ELEVATED MYOCARDIAL INFARCTION) (HCC): ICD-10-CM

## 2022-09-20 DIAGNOSIS — J43.2 CENTRILOBULAR EMPHYSEMA (HCC): Chronic | ICD-10-CM

## 2022-09-20 DIAGNOSIS — I50.22 CHRONIC SYSTOLIC CONGESTIVE HEART FAILURE (HCC): ICD-10-CM

## 2022-09-20 DIAGNOSIS — I48.20 CHRONIC ATRIAL FIBRILLATION (HCC): ICD-10-CM

## 2022-09-20 PROCEDURE — 4004F PT TOBACCO SCREEN RCVD TLK: CPT | Performed by: INTERNAL MEDICINE

## 2022-09-20 PROCEDURE — G8417 CALC BMI ABV UP PARAM F/U: HCPCS | Performed by: INTERNAL MEDICINE

## 2022-09-20 PROCEDURE — 3023F SPIROM DOC REV: CPT | Performed by: INTERNAL MEDICINE

## 2022-09-20 PROCEDURE — 3017F COLORECTAL CA SCREEN DOC REV: CPT | Performed by: INTERNAL MEDICINE

## 2022-09-20 PROCEDURE — 3046F HEMOGLOBIN A1C LEVEL >9.0%: CPT | Performed by: INTERNAL MEDICINE

## 2022-09-20 PROCEDURE — G8427 DOCREV CUR MEDS BY ELIG CLIN: HCPCS | Performed by: INTERNAL MEDICINE

## 2022-09-20 PROCEDURE — 99214 OFFICE O/P EST MOD 30 MIN: CPT | Performed by: INTERNAL MEDICINE

## 2022-09-20 PROCEDURE — 2022F DILAT RTA XM EVC RTNOPTHY: CPT | Performed by: INTERNAL MEDICINE

## 2022-09-20 RX ORDER — GLIPIZIDE 5 MG/1
5 TABLET ORAL
COMMUNITY

## 2022-09-20 RX ORDER — NICOTINE 21 MG/24HR
1 PATCH, TRANSDERMAL 24 HOURS TRANSDERMAL DAILY
Qty: 42 PATCH | Refills: 0 | Status: SHIPPED | OUTPATIENT
Start: 2022-09-20 | End: 2022-11-01

## 2022-09-20 NOTE — LETTER
[facility]  [address] [address]  Phone: [phone]  Fax: [phone]    Kimberly Lundberg MD    September 20, 2022     Christine Arenas, [address] [address]    Patient: Johan Vela   MR Number: [id number]   YOB: 1960   Date of Visit: 9/20/2022       Dear Christine Arenas: Thank you for referring Aure Alyson to me for evaluation/treatment. Below are the relevant portions of my assessment and plan of care. If you have questions, please do not hesitate to call me. I look forward to following Josiah along with you.     Sincerely,      Kimberly Lundberg MD

## 2022-09-20 NOTE — PROGRESS NOTES
OFFICE PROGRESS NOTES      Peg Ross is a 65 y.o. male who has    CHIEF COMPLAINT AS FOLLOWS:  CHEST PAIN:  Patient C/O chest pain but symptoms appear to be atypical.   SOB:  Has SOB with exertion. SOB is worsening. LEG EDEMA: B/L Lower extremity edema is present but BETTER THAN BEFORE. PALPITATIONS: Denies any C/O Palpitations   DIZZINESS: No C/O Dizziness   SYNCOPE: None   OTHER/ ADDITIONAL COMPLAINTS:                                     HPI: Patient is here for F/U on his CAD, CVI, Arrhythmia, HTN & Dyslipidemia problems. CAD: Patient has known CAD. Had angioplasty / CABG, both in the past.  CVI: S/P right GSV ablation. Arrhythmia: Patient has known  A-fib. HTN: Patient has known essential HTN. Has been treated with guideline recommended medical / physical/ diet therapy as stated below. Dyslipidemia: Patient has known mixed dyslipidemia. Has been treated with guideline recommended medical / physical/ diet therapy as stated below. Current Outpatient Medications   Medication Sig Dispense Refill    glipiZIDE (GLUCOTROL) 5 MG tablet Take 5 mg by mouth 2 times daily (before meals)      bumetanide (BUMEX) 2 MG tablet Take 1 tablet by mouth in the morning, at noon, and at bedtime Take metolazone exactly 30 minutes after the Bumex tablet every day after each dose of Bumex. 30 tablet 3    empagliflozin (JARDIANCE) 10 MG tablet Take 1 tablet by mouth in the morning. 90 tablet 0    metOLazone (ZAROXOLYN) 2.5 MG tablet Take 1 tablet by mouth in the morning. Take 1-2 hours after Bumex. Please make sure of this.  30 tablet 0    warfarin (JANTOVEN) 4 MG tablet TAKE 1 TABLET BY MOUTH EVERY DAY, EXCEPT TAKE 1 AND 1/2 TABLETS ON SUNDAYS OR AS DIRECTED BY CLINIC 100 tablet 1    hydrALAZINE (APRESOLINE) 25 MG tablet Take 1 tablet by mouth 3 times daily 90 tablet 5    carvedilol (COREG) 12.5 MG tablet Take 1 tablet by mouth 2 times daily (with meals) 60 tablet 5    montelukast (SINGULAIR) 10 MG tablet Take 1 tablet by mouth daily 30 tablet 3    guaiFENesin (MUCINEX) 600 MG extended release tablet Take 1 tablet by mouth 2 times daily 60 tablet 5    ipratropium-albuterol (DUONEB) 0.5-2.5 (3) MG/3ML SOLN nebulizer solution Inhale 3 mLs into the lungs 4 times daily 360 mL 5    clopidogrel (PLAVIX) 75 MG tablet TAKE 1 TABLET BY MOUTH DAILY. pantoprazole (PROTONIX) 40 MG tablet TAKE 1 TABLET BY MOUTH EVERY DAY      potassium chloride (KLOR-CON M) 20 MEQ extended release tablet TAKE 2 TABLETS BY MOUTH EVERY DAY      lisinopril (PRINIVIL;ZESTRIL) 20 MG tablet TAKE 1 TABLET BY MOUTH DAILY      pregabalin (LYRICA) 150 MG capsule       LIDOCAINE PAIN RELIEF 4 % external patch APPLY 1 PATCH EXTERNALLY EVERY DAY      nitroGLYCERIN (NITROSTAT) 0.4 MG SL tablet Place 1 tablet under the tongue every 5 minutes as needed for Chest pain 25 tablet 3    albuterol sulfate HFA (PROVENTIL HFA) 108 (90 Base) MCG/ACT inhaler Inhale 2 puffs into the lungs every 6 hours as needed for Wheezing or Shortness of Breath 18 g 0    atorvastatin (LIPITOR) 80 MG tablet Take 1 tablet by mouth nightly 30 tablet 3     No current facility-administered medications for this visit. Allergies: Hydromorphone  Review of Systems:    Constitutional: Negative for diaphoresis and fatigue  Respiratory: Negative for shortness of breath  Cardiovascular: Negative for chest pain, dyspnea on exertion, claudication, edema, irregular heartbeat, murmur, palpitations or shortness of breath  Musculoskeletal: Negative for muscle pain, muscular weakness, negative for pain in arm and leg or swelling in foot and leg    Objective:  BP (!) 150/100   Pulse 68   Ht 6' 3\" (1.905 m)   Wt 283 lb (128.4 kg)   BMI 35.37 kg/m²   Wt Readings from Last 3 Encounters:   09/20/22 283 lb (128.4 kg)   08/23/22 271 lb 12.8 oz (123.3 kg)   08/21/22 283 lb (128.4 kg)     Body mass index is 35.37 kg/m². GENERAL - Alert, oriented, pleasant, in no apparent distress.   EYES: No jaundice, no conjunctival pallor. Neck - Supple. No jugular venous distention noted. No carotid bruits. Cardiovascular - Normal S1 and S2 without obvious murmur or gallop. Extremities - No cyanosis, clubbing, or significant edema. There are C4 changes noted. Pulmonary - No respiratory distress. No wheezes or rales.       MEDICAL DECISION MAKING & DATA REVIEW:    Lab Review   Lab Results   Component Value Date/Time    TROPONINT 0.019 07/15/2022 10:17 AM    TROPONINT 0.050 01/29/2022 08:25 PM     Lab Results   Component Value Date/Time    BNP 17 03/09/2013 11:30 PM    PROBNP 5,532 07/16/2022 11:46 AM    PROBNP 1,881 02/24/2022 01:21 PM     Lab Results   Component Value Date    INR 2.5 09/15/2022    INR 2.5 09/15/2022     Lab Results   Component Value Date    LABA1C 7.8 (H) 12/30/2020    LABA1C 8.9 (H) 06/29/2020     Lab Results   Component Value Date    WBC 10.8 (H) 08/21/2022    WBC 10.4 02/24/2022    HCT 37.6 (L) 08/21/2022    HCT 38.9 (L) 02/24/2022    MCV 87.0 08/21/2022    MCV 90.5 02/24/2022     08/21/2022     02/24/2022     Lab Results   Component Value Date    CHOL 153 01/06/2020    CHOL 181 07/31/2019    TRIG 54 01/06/2020    TRIG 162 (H) 07/31/2019    HDL 43 01/06/2020    HDL 36 (L) 07/31/2019    LDLDIRECT 111 (H) 01/06/2020    LDLDIRECT 132 (H) 07/31/2019     Lab Results   Component Value Date    ALT 10 08/21/2022    ALT 8 (L) 02/24/2022    AST 20 08/21/2022    AST 11 (L) 02/24/2022     BMP:    Lab Results   Component Value Date/Time     08/21/2022 02:40 PM     07/15/2022 10:17 AM    K 3.8 08/21/2022 02:40 PM    K 4.3 07/15/2022 10:17 AM    CL 97 08/21/2022 02:40 PM     07/15/2022 10:17 AM    CO2 32 08/21/2022 02:40 PM    CO2 25 07/15/2022 10:17 AM    BUN 64 08/21/2022 02:40 PM    BUN 17 07/15/2022 10:17 AM    CREATININE 2.7 08/21/2022 02:40 PM    CREATININE 1.6 07/15/2022 10:17 AM     CMP:   Lab Results   Component Value Date/Time     08/21/2022 02:40 PM    NA 138 07/15/2022 10:17 AM    K 3.8 08/21/2022 02:40 PM    K 4.3 07/15/2022 10:17 AM    CL 97 08/21/2022 02:40 PM     07/15/2022 10:17 AM    CO2 32 08/21/2022 02:40 PM    CO2 25 07/15/2022 10:17 AM    BUN 64 08/21/2022 02:40 PM    BUN 17 07/15/2022 10:17 AM    CREATININE 2.7 08/21/2022 02:40 PM    CREATININE 1.6 07/15/2022 10:17 AM    PROT 7.8 08/21/2022 02:40 PM    PROT 6.7 02/24/2022 01:21 PM    PROT 7.7 03/09/2013 11:30 PM    PROT 7.5 12/03/2011 07:35 PM     Lab Results   Component Value Date/Time    TSH 1.390 06/02/2020 01:36 PM    TSHHS 1.430 01/05/2020 10:55 PM       QUALITY MEASURES REVIEWED:  1.CAD:Patient is taking anti platelet agent:Yes  2. DYSLIPIDEMIA: Patient is on cholesterol lowering medication:Yes   3. Beta-Blocker therapy for CAD, if prior Myocardial Infarction:Yes   4. Counselled regarding smoking cessation. Yes   5. Anticoagulation therapy (for A.Fib) Yes  6. Discussed weight management strategies. Assessment & Plan:  Primary / Secondary prevention is the goal by aggressive risk modification, healthy and therapeutic life style changes for cardiovascular risk reduction. CORONARY ARTERY DISEASE:Yes  clinically stable. Patient is on optimal medical regimen ( see medication list above )  Patient is currently  asymptomatic from CAD. -changes in  treatment:   no. Patient is being treated with Plavix & Coreg  Counseled regarding regular exercise & its benefits.  -Testing ordered:  no  [facility] classification: 1     HYPERTENSION:Yes  well controlled on current medical regimen. Not well controlled needs medication adjustment. - changes in  treatment:   no. Patient is on Lisinopril & Coreg  Counseled regarding low salt diet, exercise & weight control. DYSLIPIDEMIA: yes,   Patient's profile is at / near Goal.no,   HDL is low   Tolerating current medical regimen wellyes.  Takes Lipitor  Does not tolerate medications well due to side effects  See most recent Lab values:( Reviewed Labs from family MD,      )  LDL is 111  HDL is 42    VHD: 7/15/2022    Technically difficult examination due to uncooperative patient. Left ventricular systolic function is low normal.   Ejection fraction is visually estimated at 45%. Septal wall appears slightly hypokinetic. Moderate bi-atrial enlargement. Mildly dilated right ventricle. Mild-moderate mitral regurgitation. Severe tricuspid regurgitation; RVSP: 60 mmHg. No evidence of any pericardial effusion. Dilated aortic root (4.6 cm). Inferior vena cava is dilated, measuring at 2.8 cm, and does not collapse    ARRHYTHMIAS:yes,   Patient has H/O Atrial fibrillation  He is rate controlled & on anticoagulation. Takes Warfarin     CHRONIC VENOUS INSUFFICIENCY:yes, S/P right GSV ablation. Patient had symptomatic C4 disease. fINDINGS BETTER THAN BEFORE ON eXAM.  9/14/2022   Right Popliteal vein is patent with good compressibility and respirophasic    signal with good augmentation. The Right GSV is non-compressible with no evidence of flow just past the    saphenofemoral junction to the knee. SOB: Has COPD. Has not been seeing Pulmonologist. Still smokes. Will prescribe Nicotein patches. TESTS ORDERED: none this visit. PREVIOUSLY ORDERED TESTS REVIEWED & DISCUSSED WITH THE PATIENT:     I personally reviewed & interpreted, all previously ordered tests as copied above. Latest Labs are pulled in to the note with dates. Labs, specially in Reference to Lipid profile, Cardiac testing in the form of Echo ( dated: ), stress tests ( dated: ) & other relevant cardiac testing reviewed with patient & recommendations made based on assessment of the results. Discussed role of Cardiac risk factors & effects + treatment of co morbidities with patient & advised accordingly. MEDICATIONS: List of medications patient is currently taking is reviewed in detail with the patient. Discussed any side effects or problems taking the medication. Recommend Continue present management & medications as listed. AFFIRMATION: I  reviewed patient's history, previous & current medical problems & all Labs + testing. This includes chart prep even prior to the vosit. Various goals are discussed and multiple questions answered. Relevant concelling performed. Office follow up in six months.                                                Valene Mortimer, MD, 9/20/2022 11:25 AM

## 2022-09-20 NOTE — PATIENT INSTRUCTIONS
CORONARY ARTERY DISEASE:Yes  clinically stable. Patient is on optimal medical regimen ( see medication list above )  Patient is currently  asymptomatic from CAD. -changes in  treatment:   no. Patient is being treated with Plavix & Coreg  Counseled regarding regular exercise & its benefits.  -Testing ordered:  no  [facility] classification: 1     HYPERTENSION:Yes  well controlled on current medical regimen. Not well controlled needs medication adjustment. - changes in  treatment:   no. Patient is on Lisinopril & Coreg  Counseled regarding low salt diet, exercise & weight control. DYSLIPIDEMIA: yes,   Patient's profile is at / near Goal.no,   HDL is low   Tolerating current medical regimen wellyes. Takes Lipitor  Does not tolerate medications well due to side effects  See most recent Lab values:( Reviewed Labs from family Dr. VÍCTOR     )  LDL is 111  HDL is 42     ARRHYTHMIAS:yes,   Patient has H/O Atrial fibrillation  He is rate controlled & on anticoagulation. Takes Warfarin     CHRONIC VENOUS INSUFFICIENCY:yes, S/P right GSV ablation. Patient had symptomatic C4 disease  9/14/2022   Right Popliteal vein is patent with good compressibility and respirophasic    signal with good augmentation. The Right GSV is non-compressible with no evidence of flow just past the    saphenofemoral junction to the knee. SOB: Has COPD. Has not been seeing Pulmonologist. Still smokes. Will prescribe Nicotein patches. TESTS ORDERED: none this visit. PREVIOUSLY ORDERED TESTS REVIEWED & DISCUSSED WITH THE PATIENT:     I personally reviewed & interpreted, all previously ordered tests as copied above. Latest Labs are pulled in to the note with dates. Labs, specially in Reference to Lipid profile, Cardiac testing in the form of Echo ( dated: ), stress tests ( dated: ) & other relevant cardiac testing reviewed with patient & recommendations made based on assessment of the results.     Discussed role of Cardiac risk factors & effects + treatment of co morbidities with patient & advised accordingly. MEDICATIONS: List of medications patient is currently taking is reviewed in detail with the patient. Discussed any side effects or problems taking the medication. Recommend Continue present management & medications as listed. AFFIRMATION: I  reviewed patient's history, previous & current medical problems & all Labs + testing. This includes chart prep even prior to the vosit. Various goals are discussed and multiple questions answered. Relevant concelling performed. Office follow up in six months.

## 2022-10-02 ENCOUNTER — HOSPITAL ENCOUNTER (INPATIENT)
Age: 62
LOS: 2 days | Discharge: HOME | DRG: 194 | End: 2022-10-04
Attending: STUDENT IN AN ORGANIZED HEALTH CARE EDUCATION/TRAINING PROGRAM | Admitting: STUDENT IN AN ORGANIZED HEALTH CARE EDUCATION/TRAINING PROGRAM
Payer: COMMERCIAL

## 2022-10-02 ENCOUNTER — APPOINTMENT (OUTPATIENT)
Dept: GENERAL RADIOLOGY | Age: 62
DRG: 194 | End: 2022-10-02
Attending: STUDENT IN AN ORGANIZED HEALTH CARE EDUCATION/TRAINING PROGRAM
Payer: COMMERCIAL

## 2022-10-02 PROBLEM — I50.23 ACUTE ON CHRONIC SYSTOLIC (CONGESTIVE) HEART FAILURE (HCC): Status: ACTIVE | Noted: 2022-10-02

## 2022-10-02 PROBLEM — I50.43 CHF (CONGESTIVE HEART FAILURE), NYHA CLASS I, ACUTE ON CHRONIC, COMBINED (HCC): Status: ACTIVE | Noted: 2022-10-02

## 2022-10-02 LAB
ALBUMIN SERPL-MCNC: 4 GM/DL (ref 3.4–5)
ALP BLD-CCNC: 112 IU/L (ref 40–129)
ALT SERPL-CCNC: 24 U/L (ref 10–40)
ANION GAP SERPL CALCULATED.3IONS-SCNC: 12 MMOL/L (ref 4–16)
AST SERPL-CCNC: 22 IU/L (ref 15–37)
BACTERIA: NEGATIVE /HPF
BASOPHILS ABSOLUTE: 0.1 K/CU MM
BASOPHILS RELATIVE PERCENT: 0.8 % (ref 0–1)
BILIRUB SERPL-MCNC: 0.4 MG/DL (ref 0–1)
BILIRUBIN URINE: NEGATIVE MG/DL
BLOOD, URINE: NEGATIVE
BUN BLDV-MCNC: 29 MG/DL (ref 6–23)
CALCIUM SERPL-MCNC: 9 MG/DL (ref 8.3–10.6)
CHLORIDE BLD-SCNC: 102 MMOL/L (ref 99–110)
CLARITY: CLEAR
CO2: 27 MMOL/L (ref 21–32)
COLOR: YELLOW
CREAT SERPL-MCNC: 1.8 MG/DL (ref 0.9–1.3)
DIFFERENTIAL TYPE: ABNORMAL
EOSINOPHILS ABSOLUTE: 0.5 K/CU MM
EOSINOPHILS RELATIVE PERCENT: 4.5 % (ref 0–3)
GFR AFRICAN AMERICAN: 47 ML/MIN/1.73M2
GFR NON-AFRICAN AMERICAN: 39 ML/MIN/1.73M2
GLUCOSE BLD-MCNC: 110 MG/DL (ref 70–99)
GLUCOSE BLD-MCNC: 115 MG/DL (ref 70–99)
GLUCOSE BLD-MCNC: 210 MG/DL (ref 70–99)
GLUCOSE, URINE: 500 MG/DL
HCT VFR BLD CALC: 35.2 % (ref 42–52)
HEMOGLOBIN: 10.9 GM/DL (ref 13.5–18)
IMMATURE NEUTROPHIL %: 0.3 % (ref 0–0.43)
INR BLD: 1.85 INDEX
IRON: 43 UG/DL (ref 59–158)
KETONES, URINE: NEGATIVE MG/DL
LEUKOCYTE ESTERASE, URINE: NEGATIVE
LYMPHOCYTES ABSOLUTE: 1.6 K/CU MM
LYMPHOCYTES RELATIVE PERCENT: 15.4 % (ref 24–44)
MAGNESIUM: 2.3 MG/DL (ref 1.8–2.4)
MCH RBC QN AUTO: 27.7 PG (ref 27–31)
MCHC RBC AUTO-ENTMCNC: 31 % (ref 32–36)
MCV RBC AUTO: 89.6 FL (ref 78–100)
MONOCYTES ABSOLUTE: 0.9 K/CU MM
MONOCYTES RELATIVE PERCENT: 8.6 % (ref 0–4)
NITRITE URINE, QUANTITATIVE: NEGATIVE
NUCLEATED RBC %: 0 %
PCT TRANSFERRIN: 15 % (ref 10–44)
PDW BLD-RTO: 17.2 % (ref 11.7–14.9)
PH, URINE: 5.5 (ref 5–8)
PLATELET # BLD: 241 K/CU MM (ref 140–440)
PMV BLD AUTO: 11.3 FL (ref 7.5–11.1)
POTASSIUM SERPL-SCNC: 4.1 MMOL/L (ref 3.5–5.1)
PROTEIN UA: 30 MG/DL
PROTHROMBIN TIME: 24 SECONDS (ref 11.7–14.5)
RBC # BLD: 3.93 M/CU MM (ref 4.6–6.2)
RBC URINE: ABNORMAL /HPF (ref 0–3)
SEGMENTED NEUTROPHILS ABSOLUTE COUNT: 7.2 K/CU MM
SEGMENTED NEUTROPHILS RELATIVE PERCENT: 70.4 % (ref 36–66)
SODIUM BLD-SCNC: 141 MMOL/L (ref 135–145)
SPECIFIC GRAVITY UA: 1.01 (ref 1–1.03)
TOTAL IMMATURE NEUTOROPHIL: 0.03 K/CU MM
TOTAL IRON BINDING CAPACITY: 280 UG/DL (ref 250–450)
TOTAL NUCLEATED RBC: 0 K/CU MM
TOTAL PROTEIN: 6.6 GM/DL (ref 6.4–8.2)
TRICHOMONAS: ABNORMAL /HPF
UNSATURATED IRON BINDING CAPACITY: 237 UG/DL (ref 110–370)
UROBILINOGEN, URINE: NORMAL MG/DL (ref 0.2–1)
WBC # BLD: 10.2 K/CU MM (ref 4–10.5)
WBC UA: <1 /HPF (ref 0–2)

## 2022-10-02 PROCEDURE — 85025 COMPLETE CBC W/AUTO DIFF WBC: CPT

## 2022-10-02 PROCEDURE — 83036 HEMOGLOBIN GLYCOSYLATED A1C: CPT

## 2022-10-02 PROCEDURE — 2140000000 HC CCU INTERMEDIATE R&B

## 2022-10-02 PROCEDURE — 85730 THROMBOPLASTIN TIME PARTIAL: CPT

## 2022-10-02 PROCEDURE — 80061 LIPID PANEL: CPT

## 2022-10-02 PROCEDURE — 71045 X-RAY EXAM CHEST 1 VIEW: CPT

## 2022-10-02 PROCEDURE — 2500000003 HC RX 250 WO HCPCS: Performed by: NURSE PRACTITIONER

## 2022-10-02 PROCEDURE — 2580000003 HC RX 258: Performed by: NURSE PRACTITIONER

## 2022-10-02 PROCEDURE — 83880 ASSAY OF NATRIURETIC PEPTIDE: CPT

## 2022-10-02 PROCEDURE — 93005 ELECTROCARDIOGRAM TRACING: CPT | Performed by: NURSE PRACTITIONER

## 2022-10-02 PROCEDURE — 82962 GLUCOSE BLOOD TEST: CPT

## 2022-10-02 PROCEDURE — 6370000000 HC RX 637 (ALT 250 FOR IP): Performed by: NURSE PRACTITIONER

## 2022-10-02 PROCEDURE — 84484 ASSAY OF TROPONIN QUANT: CPT

## 2022-10-02 PROCEDURE — 36415 COLL VENOUS BLD VENIPUNCTURE: CPT

## 2022-10-02 PROCEDURE — 83735 ASSAY OF MAGNESIUM: CPT

## 2022-10-02 PROCEDURE — 81001 URINALYSIS AUTO W/SCOPE: CPT

## 2022-10-02 PROCEDURE — 84439 ASSAY OF FREE THYROXINE: CPT

## 2022-10-02 PROCEDURE — 83540 ASSAY OF IRON: CPT

## 2022-10-02 PROCEDURE — 80053 COMPREHEN METABOLIC PANEL: CPT

## 2022-10-02 PROCEDURE — 85610 PROTHROMBIN TIME: CPT

## 2022-10-02 PROCEDURE — 99254 IP/OBS CNSLTJ NEW/EST MOD 60: CPT | Performed by: INTERNAL MEDICINE

## 2022-10-02 PROCEDURE — 94640 AIRWAY INHALATION TREATMENT: CPT

## 2022-10-02 PROCEDURE — 84443 ASSAY THYROID STIM HORMONE: CPT

## 2022-10-02 PROCEDURE — 83550 IRON BINDING TEST: CPT

## 2022-10-02 RX ORDER — LIDOCAINE 4 G/G
1 PATCH TOPICAL DAILY
Status: DISCONTINUED | OUTPATIENT
Start: 2022-10-02 | End: 2022-10-04 | Stop reason: HOSPADM

## 2022-10-02 RX ORDER — CARVEDILOL 6.25 MG/1
12.5 TABLET ORAL 2 TIMES DAILY WITH MEALS
Status: DISCONTINUED | OUTPATIENT
Start: 2022-10-02 | End: 2022-10-04 | Stop reason: HOSPADM

## 2022-10-02 RX ORDER — INSULIN LISPRO 100 [IU]/ML
0-4 INJECTION, SOLUTION INTRAVENOUS; SUBCUTANEOUS
Status: DISCONTINUED | OUTPATIENT
Start: 2022-10-02 | End: 2022-10-04 | Stop reason: HOSPADM

## 2022-10-02 RX ORDER — MONTELUKAST SODIUM 10 MG/1
10 TABLET ORAL DAILY
Status: DISCONTINUED | OUTPATIENT
Start: 2022-10-02 | End: 2022-10-04 | Stop reason: HOSPADM

## 2022-10-02 RX ORDER — POTASSIUM CHLORIDE 20 MEQ/1
20 TABLET, EXTENDED RELEASE ORAL 2 TIMES DAILY WITH MEALS
Status: DISCONTINUED | OUTPATIENT
Start: 2022-10-02 | End: 2022-10-04 | Stop reason: HOSPADM

## 2022-10-02 RX ORDER — SODIUM CHLORIDE 0.9 % (FLUSH) 0.9 %
5-40 SYRINGE (ML) INJECTION PRN
Status: DISCONTINUED | OUTPATIENT
Start: 2022-10-02 | End: 2022-10-04 | Stop reason: HOSPADM

## 2022-10-02 RX ORDER — ENOXAPARIN SODIUM 100 MG/ML
40 INJECTION SUBCUTANEOUS DAILY
Status: DISCONTINUED | OUTPATIENT
Start: 2022-10-02 | End: 2022-10-02

## 2022-10-02 RX ORDER — SODIUM CHLORIDE 9 MG/ML
INJECTION, SOLUTION INTRAVENOUS PRN
Status: DISCONTINUED | OUTPATIENT
Start: 2022-10-02 | End: 2022-10-04 | Stop reason: HOSPADM

## 2022-10-02 RX ORDER — HYDRALAZINE HYDROCHLORIDE 25 MG/1
25 TABLET, FILM COATED ORAL 3 TIMES DAILY
Status: DISCONTINUED | OUTPATIENT
Start: 2022-10-02 | End: 2022-10-03

## 2022-10-02 RX ORDER — ACETAMINOPHEN 325 MG/1
650 TABLET ORAL EVERY 6 HOURS PRN
Status: DISCONTINUED | OUTPATIENT
Start: 2022-10-02 | End: 2022-10-04 | Stop reason: HOSPADM

## 2022-10-02 RX ORDER — IPRATROPIUM BROMIDE AND ALBUTEROL SULFATE 2.5; .5 MG/3ML; MG/3ML
1 SOLUTION RESPIRATORY (INHALATION) 4 TIMES DAILY
Status: DISCONTINUED | OUTPATIENT
Start: 2022-10-02 | End: 2022-10-03

## 2022-10-02 RX ORDER — ALBUTEROL SULFATE 90 UG/1
2 AEROSOL, METERED RESPIRATORY (INHALATION) EVERY 6 HOURS PRN
Status: DISCONTINUED | OUTPATIENT
Start: 2022-10-02 | End: 2022-10-04 | Stop reason: HOSPADM

## 2022-10-02 RX ORDER — PREGABALIN 75 MG/1
150 CAPSULE ORAL 2 TIMES DAILY
Status: DISCONTINUED | OUTPATIENT
Start: 2022-10-02 | End: 2022-10-04 | Stop reason: HOSPADM

## 2022-10-02 RX ORDER — POLYETHYLENE GLYCOL 3350 17 G
2 POWDER IN PACKET (EA) ORAL
Status: DISCONTINUED | OUTPATIENT
Start: 2022-10-02 | End: 2022-10-04 | Stop reason: HOSPADM

## 2022-10-02 RX ORDER — BUMETANIDE 0.25 MG/ML
2 INJECTION, SOLUTION INTRAMUSCULAR; INTRAVENOUS EVERY 12 HOURS
Status: DISCONTINUED | OUTPATIENT
Start: 2022-10-02 | End: 2022-10-02

## 2022-10-02 RX ORDER — INSULIN LISPRO 100 [IU]/ML
0-4 INJECTION, SOLUTION INTRAVENOUS; SUBCUTANEOUS NIGHTLY
Status: DISCONTINUED | OUTPATIENT
Start: 2022-10-02 | End: 2022-10-04 | Stop reason: HOSPADM

## 2022-10-02 RX ORDER — LISINOPRIL 20 MG/1
20 TABLET ORAL DAILY
Status: DISCONTINUED | OUTPATIENT
Start: 2022-10-02 | End: 2022-10-04 | Stop reason: HOSPADM

## 2022-10-02 RX ORDER — ONDANSETRON 4 MG/1
4 TABLET, ORALLY DISINTEGRATING ORAL EVERY 8 HOURS PRN
Status: DISCONTINUED | OUTPATIENT
Start: 2022-10-02 | End: 2022-10-04 | Stop reason: HOSPADM

## 2022-10-02 RX ORDER — ONDANSETRON 2 MG/ML
4 INJECTION INTRAMUSCULAR; INTRAVENOUS EVERY 6 HOURS PRN
Status: DISCONTINUED | OUTPATIENT
Start: 2022-10-02 | End: 2022-10-04 | Stop reason: HOSPADM

## 2022-10-02 RX ORDER — NICOTINE 21 MG/24HR
1 PATCH, TRANSDERMAL 24 HOURS TRANSDERMAL DAILY
Status: DISCONTINUED | OUTPATIENT
Start: 2022-10-02 | End: 2022-10-04 | Stop reason: HOSPADM

## 2022-10-02 RX ORDER — GUAIFENESIN 600 MG/1
600 TABLET, EXTENDED RELEASE ORAL 2 TIMES DAILY
Status: DISCONTINUED | OUTPATIENT
Start: 2022-10-02 | End: 2022-10-04 | Stop reason: HOSPADM

## 2022-10-02 RX ORDER — DEXTROSE MONOHYDRATE 100 MG/ML
INJECTION, SOLUTION INTRAVENOUS CONTINUOUS PRN
Status: DISCONTINUED | OUTPATIENT
Start: 2022-10-02 | End: 2022-10-04 | Stop reason: HOSPADM

## 2022-10-02 RX ORDER — PANTOPRAZOLE SODIUM 40 MG/1
40 TABLET, DELAYED RELEASE ORAL DAILY
Status: DISCONTINUED | OUTPATIENT
Start: 2022-10-03 | End: 2022-10-04 | Stop reason: HOSPADM

## 2022-10-02 RX ORDER — SODIUM CHLORIDE 0.9 % (FLUSH) 0.9 %
5-40 SYRINGE (ML) INJECTION EVERY 12 HOURS SCHEDULED
Status: DISCONTINUED | OUTPATIENT
Start: 2022-10-02 | End: 2022-10-04 | Stop reason: HOSPADM

## 2022-10-02 RX ORDER — ATORVASTATIN CALCIUM 40 MG/1
80 TABLET, FILM COATED ORAL NIGHTLY
Status: DISCONTINUED | OUTPATIENT
Start: 2022-10-02 | End: 2022-10-04 | Stop reason: HOSPADM

## 2022-10-02 RX ORDER — BUMETANIDE 0.25 MG/ML
2 INJECTION, SOLUTION INTRAMUSCULAR; INTRAVENOUS 3 TIMES DAILY
Status: DISCONTINUED | OUTPATIENT
Start: 2022-10-02 | End: 2022-10-04

## 2022-10-02 RX ORDER — CLOPIDOGREL BISULFATE 75 MG/1
75 TABLET ORAL DAILY
Status: DISCONTINUED | OUTPATIENT
Start: 2022-10-02 | End: 2022-10-04 | Stop reason: HOSPADM

## 2022-10-02 RX ORDER — ACETAMINOPHEN 650 MG/1
650 SUPPOSITORY RECTAL EVERY 6 HOURS PRN
Status: DISCONTINUED | OUTPATIENT
Start: 2022-10-02 | End: 2022-10-04 | Stop reason: HOSPADM

## 2022-10-02 RX ORDER — METOLAZONE 2.5 MG/1
2.5 TABLET ORAL DAILY
Status: DISCONTINUED | OUTPATIENT
Start: 2022-10-02 | End: 2022-10-04 | Stop reason: HOSPADM

## 2022-10-02 RX ORDER — POLYETHYLENE GLYCOL 3350 17 G/17G
17 POWDER, FOR SOLUTION ORAL DAILY PRN
Status: DISCONTINUED | OUTPATIENT
Start: 2022-10-02 | End: 2022-10-04 | Stop reason: HOSPADM

## 2022-10-02 RX ADMIN — BUMETANIDE 2 MG: 0.25 INJECTION INTRAMUSCULAR; INTRAVENOUS at 20:16

## 2022-10-02 RX ADMIN — ATORVASTATIN CALCIUM 80 MG: 40 TABLET, FILM COATED ORAL at 20:14

## 2022-10-02 RX ADMIN — HYDRALAZINE HYDROCHLORIDE 25 MG: 25 TABLET, FILM COATED ORAL at 16:59

## 2022-10-02 RX ADMIN — INSULIN LISPRO 1 UNITS: 100 INJECTION, SOLUTION INTRAVENOUS; SUBCUTANEOUS at 18:17

## 2022-10-02 RX ADMIN — PREGABALIN 150 MG: 75 CAPSULE ORAL at 20:13

## 2022-10-02 RX ADMIN — POTASSIUM CHLORIDE 20 MEQ: 1500 TABLET, EXTENDED RELEASE ORAL at 17:00

## 2022-10-02 RX ADMIN — CARVEDILOL 12.5 MG: 6.25 TABLET, FILM COATED ORAL at 16:59

## 2022-10-02 RX ADMIN — HYDRALAZINE HYDROCHLORIDE 25 MG: 25 TABLET, FILM COATED ORAL at 20:13

## 2022-10-02 RX ADMIN — Medication 2 PUFF: at 21:11

## 2022-10-02 RX ADMIN — CLOPIDOGREL BISULFATE 75 MG: 75 TABLET ORAL at 16:59

## 2022-10-02 RX ADMIN — EMPAGLIFLOZIN 10 MG: 10 TABLET, FILM COATED ORAL at 17:07

## 2022-10-02 RX ADMIN — ALBUTEROL SULFATE 2 PUFF: 90 AEROSOL, METERED RESPIRATORY (INHALATION) at 21:07

## 2022-10-02 RX ADMIN — MONTELUKAST 10 MG: 10 TABLET, FILM COATED ORAL at 17:00

## 2022-10-02 RX ADMIN — SODIUM CHLORIDE, PRESERVATIVE FREE 10 ML: 5 INJECTION INTRAVENOUS at 20:13

## 2022-10-02 RX ADMIN — LISINOPRIL 20 MG: 20 TABLET ORAL at 17:00

## 2022-10-02 RX ADMIN — GUAIFENESIN 600 MG: 600 TABLET, EXTENDED RELEASE ORAL at 20:14

## 2022-10-02 RX ADMIN — METOLAZONE 2.5 MG: 2.5 TABLET ORAL at 17:00

## 2022-10-02 ASSESSMENT — ENCOUNTER SYMPTOMS
COUGH: 0
SORE THROAT: 0
EYES NEGATIVE: 1
SHORTNESS OF BREATH: 1
CONSTIPATION: 0
ABDOMINAL PAIN: 0
NAUSEA: 0
VOMITING: 0
ABDOMINAL DISTENTION: 1

## 2022-10-02 ASSESSMENT — PAIN SCALES - GENERAL: PAINLEVEL_OUTOF10: 0

## 2022-10-02 ASSESSMENT — LIFESTYLE VARIABLES
HOW OFTEN DO YOU HAVE A DRINK CONTAINING ALCOHOL: NEVER
HOW MANY STANDARD DRINKS CONTAINING ALCOHOL DO YOU HAVE ON A TYPICAL DAY: PATIENT DOES NOT DRINK

## 2022-10-02 NOTE — PLAN OF CARE
Problem: Discharge Planning  Goal: Discharge to home or other facility with appropriate resources  Recent Flowsheet Documentation  Taken 10/2/2022 1523 by Adalgisa Villanueva RN  Discharge to home or other facility with appropriate resources:   Identify barriers to discharge with patient and caregiver   Identify discharge learning needs (meds, wound care, etc)   Refer to discharge planning if patient needs post-hospital services based on physician order or complex needs related to functional status, cognitive ability or social support system   Arrange for needed discharge resources and transportation as appropriate   Arrange for interpreters to assist at discharge as needed

## 2022-10-02 NOTE — CONSULTS
INPATIENT CARDIOLOGY CONSULT NOTE         Reason for consultation:  CHF     Referring physician:  Otto Albert MD     Primary care physician: Osiel Rubin MD      Dear Otto Albert MD Thank you for the consult         History of present illness:Josiah is a 65 y. o.year old who  presents with shortness of breath      Patient is a 58-year-old gentleman with prior medical history significant for coronary disease s/p CABG and PCI, history of ischemic cardiomyopathy, atrial fibrillation s/p maze during CABG, history of venous reflux s/p ablation by Dr. Nestor Cavanaugh, COPD, history of noncompliance and cocaine abuse in the past, presents to the hospital with chief complaint of shortness of breath with exertion and lower extremity edema     Patient denies any chest pain. He has been complaining of shortness of breath and lower extremity edema for the past 2 weeks getting worse over the past 3 days. Patient also complains of paroxysmal nocturnal dyspnea and orthopnea. He mentioned that he feels his lungs are filling up with fluid along with his lower extremities. Denies any fever chills or rigors. Complains of productive white phlegm   coughing occasionally. EKG shows sinus rhythm nonspecific ST-T changes and Q waves noted in inferior leads suggestive of probable prior MI. Last echocardiogram in July 2022 shows LV ejection fraction of 45%. Technically difficult examination due to uncooperative patient. Left ventricular systolic function is low normal.   Ejection fraction is visually estimated at 45%. Septal wall appears slightly hypokinetic. Moderate bi-atrial enlargement. Mildly dilated right ventricle. Mild-moderate mitral regurgitation. Severe tricuspid regurgitation; RVSP: 60 mmHg. No evidence of any pericardial effusion. Dilated aortic root (4.6 cm). Inferior vena cava is dilated, measuring at 2.8 cm, and does not collapse   with respiration.     Past medical history:    has a past medical history of Abnormal nuclear stress test, Atrial fibrillation (HCC), CAD (coronary artery disease), CHF (congestive heart failure) ([facility] [address] COPD (chronic obstructive pulmonary disease) ([facility] [address] Decreased cardiac ejection fraction, [facility] GERD (gastroesophageal reflux disease), H/O cardiovascular stress test, H/O cardiovascular stress test, H/O cardiovascular stress test, H/O cardiovascular stress test, H/O Doppler ultrasound, H/O echocardiogram, H/O percutaneous left heart catheterization, Heart imaging, History of coronary artery stent placement, History of exercise stress test, History of Holter monitoring, History of MRI of brain and brain stem, History of nuclear stress test, Hx of Doppler echocardiogram, Hyperlipidemia, Hypertension, Kidney disease, S/P cardiac catheterization, SOB (shortness of breath), Status post angioplasty with stent, and Wears dentures. Past surgical history:   has a past surgical history that includes Coronary angioplasty with stent; Appendectomy; Femur fracture surgery (1984); Coronary angioplasty with stent (11/13/2013); Colonoscopy; Mandible fracture surgery (1984); Cardiac catheterization (11/13/2013); Cardiac surgery (07/03/2020); Coronary artery bypass graft (N/A, 7/3/2020); Dental surgery; and BIOPSY / LIGATION TEMPORAL ARTERY (Left, 8/17/2020). Social History:   reports that he has been smoking cigarettes. He started smoking about 48 years ago. He has a 46.00 pack-year smoking history. He has never used smokeless tobacco. He reports that he does not currently use drugs after having used the following drugs: Cocaine. He reports that he does not drink alcohol. Family history:   no family history of CAD, STROKE of DM    Allergies   Allergen Reactions    Hydromorphone Other (See Comments)     Hallucinations.         glucose chewable tablet 16 g, PRN  dextrose bolus 10% 125 mL, PRN  glucagon (rDNA) injection 1 mg, PRN  dextrose 10 % infusion, Continuous PRN  sodium chloride flush 0.9 % injection 5-40 mL, 2 times per day  sodium chloride flush 0.9 % injection 5-40 mL, PRN  0.9 % sodium chloride infusion, PRN  ondansetron (ZOFRAN-ODT) disintegrating tablet 4 mg, Q8H PRN   Or  ondansetron (ZOFRAN) injection 4 mg, Q6H PRN  polyethylene glycol (GLYCOLAX) packet 17 g, Daily PRN  acetaminophen (TYLENOL) tablet 650 mg, Q6H PRN   Or  acetaminophen (TYLENOL) suppository 650 mg, Q6H PRN  nicotine (NICODERM CQ) 14 MG/24HR 1 patch, Daily  nicotine polacrilex (COMMIT) lozenge 2 mg, Q1H PRN  insulin lispro (HUMALOG) injection vial 0-4 Units, TID WC  insulin lispro (HUMALOG) injection vial 0-4 Units, Nightly  [START ON 10/3/2022] pantoprazole (PROTONIX) tablet 40 mg, Daily  albuterol sulfate HFA (PROVENTIL;VENTOLIN;PROAIR) 108 (90 Base) MCG/ACT inhaler 2 puff, Q6H PRN  atorvastatin (LIPITOR) tablet 80 mg, Nightly  carvedilol (COREG) tablet 12.5 mg, BID WC  clopidogrel (PLAVIX) tablet 75 mg, Daily  potassium chloride (KLOR-CON M) extended release tablet 20 mEq, BID WC  lisinopril (PRINIVIL;ZESTRIL) tablet 20 mg, Daily  pregabalin (LYRICA) capsule 150 mg, BID  lidocaine 4 % external patch 1 patch, Daily  montelukast (SINGULAIR) tablet 10 mg, Daily  guaiFENesin (MUCINEX) extended release tablet 600 mg, BID  ipratropium-albuterol (DUONEB) nebulizer solution 3 mL, 4x Daily  hydrALAZINE (APRESOLINE) tablet 25 mg, TID  empagliflozin (JARDIANCE) tablet 10 mg, Daily  metOLazone (ZAROXOLYN) tablet 2.5 mg, Daily  bumetanide (BUMEX) injection 2 mg, TID      Current Facility-Administered Medications   Medication Dose Route Frequency Provider Last Rate Last Admin    glucose chewable tablet 16 g  4 tablet Oral PRN TREY Yung - CNP        dextrose bolus 10% 125 mL  125 mL IntraVENous PRN TREY Yung - CNP        glucagon (rDNA) injection 1 mg  1 mg SubCUTAneous PRN TREY Yung - CNP        dextrose 10 % infusion   IntraVENous Continuous PRN TREY Yung CNP        sodium chloride flush 0.9 % injection 5-40 mL  5-40 mL IntraVENous 2 times per day TREY Yung CNP        sodium chloride flush 0.9 % injection 5-40 mL  5-40 mL IntraVENous PRN TREY Yung CNP        0.9 % sodium chloride infusion   IntraVENous PRN TREY Yung CNP        ondansetron (ZOFRAN-ODT) disintegrating tablet 4 mg  4 mg Oral Q8H PRN TREY Yung CNP        Or    ondansetron (ZOFRAN) injection 4 mg  4 mg IntraVENous Q6H PRN TREY Yung CNP        polyethylene glycol (GLYCOLAX) packet 17 g  17 g Oral Daily PRN TREY Yung CNP        acetaminophen (TYLENOL) tablet 650 mg  650 mg Oral Q6H PRN TREY Yung CNP        Or    acetaminophen (TYLENOL) suppository 650 mg  650 mg Rectal Q6H PRN TREY Yung CNP        nicotine (NICODERM CQ) 14 MG/24HR 1 patch  1 patch TransDERmal Daily TREY Yung CNP   1 patch at 10/02/22 1700    nicotine polacrilex (COMMIT) lozenge 2 mg  2 mg Oral Q1H PRN TREY Yung CNP        insulin lispro (HUMALOG) injection vial 0-4 Units  0-4 Units SubCUTAneous TID WC TREY Yung CNP   1 Units at 10/02/22 1817    insulin lispro (HUMALOG) injection vial 0-4 Units  0-4 Units SubCUTAneous Nightly TREY Yung CNP        [START ON 10/3/2022] pantoprazole (PROTONIX) tablet 40 mg  40 mg Oral Daily TREY Yung CNP        albuterol sulfate HFA (PROVENTIL;VENTOLIN;PROAIR) 108 (90 Base) MCG/ACT inhaler 2 puff  2 puff Inhalation Q6H PRN TREY Yung CNP        atorvastatin (LIPITOR) tablet 80 mg  80 mg Oral Nightly TREY Yung - CNP        carvedilol (COREG) tablet 12.5 mg  12.5 mg Oral BID  TREY Yung CNP   12.5 mg at 10/02/22 1659    clopidogrel (PLAVIX) tablet 75 mg  75 mg Oral Daily TREY [place] CNP   75 mg at 10/02/22 1659    potassium chloride (KLOR-CON M) extended release tablet 20 mEq  20 mEq Oral BID WC TREY Yung CNP   20 mEq at 10/02/22 1700    lisinopril (PRINIVIL;ZESTRIL) tablet 20 mg  20 mg Oral Daily TREY Yung CNP   20 mg at 10/02/22 1700    pregabalin (LYRICA) capsule 150 mg  150 mg Oral BID TREY Yung CNP        lidocaine 4 % external patch 1 patch  1 patch Topical Daily TREY Yung CNP        montelukast (SINGULAIR) tablet 10 mg  10 mg Oral Daily TREY Yung CNP   10 mg at 10/02/22 1700    guaiFENesin (MUCINEX) extended release tablet 600 mg  600 mg Oral BID TREY Yung CNP        ipratropium-albuterol (DUONEB) nebulizer solution 3 mL  1 vial Inhalation 4x Daily TREY Yung CNP        hydrALAZINE (APRESOLINE) tablet 25 mg  25 mg Oral TID TREY [place] CNP   25 mg at 10/02/22 1659    empagliflozin (JARDIANCE) tablet 10 mg  10 mg Oral Daily TREY Yung CNP   10 mg at 10/02/22 1707    metOLazone (ZAROXOLYN) tablet 2.5 mg  2.5 mg Oral Daily TREY Yung CNP   2.5 mg at 10/02/22 1700    bumetanide (BUMEX) injection 2 mg  2 mg IntraVENous TID TREY Yung CNP             Review of Systems:     Constitutional: No Fever or Weight Loss   Eyes: No Decreased Vision  ENT: No Headaches, Hearing Loss or Vertigo  Cardiovascular:  no chest pain, ++ dyspnea on exertion,  no palpitations or loss of consciousness  Respiratory: No cough or wheezing    Gastrointestinal: No abdominal pain, appetite loss, blood in stools, constipation, diarrhea or heartburn  Genitourinary: No dysuria, trouble voiding, or hematuria  Musculoskeletal:  No gait disturbance, weakness or joint complaints  Integumentary: No rash or pruritis  Neurological: No TIA or stroke symptoms  Psychiatric: No anxiety or depression  Endocrine: No malaise, fatigue or temperature intolerance  Hematologic/Lymphatic: No bleeding problems, blood clots or swollen lymph nodes  Allergic/Immunologic: No nasal congestion or hives    All other systems were reviewed and were negative otherwise. Physical Examination:      Vitals:    10/02/22 1659   BP: (!) 174/95   Pulse: 77   Resp:    Temp:    SpO2:       Wt Readings from Last 3 Encounters:   10/02/22 270 lb (122.5 kg)   09/20/22 283 lb (128.4 kg)   08/23/22 271 lb 12.8 oz (123.3 kg)     Body mass index is 33.75 kg/m². General Appearance:  No distress, conversant  Constitutional:  Well developed, Well nourished  HEENT:  Normocephalic, Atraumatic, Oropharynx moist   Nose normal. Neck Supple Carotid: no carotid bruit  Eyes:  Conjunctiva normal, No discharge. Respiratory:    Bilateral basilar crackles. Mild wheezing  Cardiovascular: S1-S2 No murmurs auscultated. No rubs, thrills or gallops. Normal  rhythm. Pedal pulses are normal. +++ pedal edema  GI:  Soft Non tender, non distended. Musculoskeletal:   No tenderness, No cyanosis, No clubbing. Integument:  Warm, Dry, No erythema, No rash. Lymphatic:  No lymphadenopathy noted. Neurologic:  Alert & oriented x 3  No focal deficits noted. Psychiatric:  Affect normal, Judgment normal, Mood normal.       Lab Review     No results for input(s): WBC, HGB, HCT, PLT in the last 72 hours. No results for input(s): NA, K, CL, CO2, PHOS, BUN, CREATININE, CA in the last 72 hours. No results for input(s): AST, ALT, ALB, BILIDIR, BILITOT, ALKPHOS in the last 72 hours. No results for input(s): TROPONINI in the last 72 hours. Lab Results   Component Value Date    BNP 17 03/09/2013     Lab Results   Component Value Date    INR 2.5 09/15/2022    INR 2.5 09/15/2022    PROTIME 30.0 (H) 09/15/2022         All labs, images, EKGs were personally reviewed      Assessment: 65 y. o.year old with PMH of  has a past medical history of Abnormal nuclear stress test, Atrial fibrillation (HCC), CAD (coronary artery disease), CHF (congestive heart failure) ([facility] [address] COPD (chronic obstructive pulmonary disease) ([facility] [address] Decreased cardiac ejection fraction, Diabetes mellitus ([facility] [address] GERD (gastroesophageal reflux disease), H/O cardiovascular stress test, H/O cardiovascular stress test, H/O cardiovascular stress test, H/O cardiovascular stress test, H/O Doppler ultrasound, H/O echocardiogram, H/O percutaneous left heart catheterization, Heart imaging, History of coronary artery stent placement, History of exercise stress test, History of Holter monitoring, History of MRI of brain and brain stem, History of nuclear stress test, Hx of Doppler echocardiogram, Hyperlipidemia, Hypertension, Kidney disease, S/P cardiac catheterization, SOB (shortness of breath), Status post angioplasty with stent, and Wears dentures. Medical Decision Making :       Acute on Ch.mixed systolic/diastolic heart Failure due to ischemic heart disease: Last Echo shows borderline low LVEF. IV Bumex 2 mg 3 times daily, Zaroxolyn 2.5 mg p.o. daily, Strick I/O Monitoring, Daily weights and Low salt diet. Patient admits to salt indiscretion. He was advised to limit himself to 1800 mg of salt daily. Ischemic heart disease: Continue with hydralazine 25 mg p.o. 3 times daily, lisinopril 20 mg p.o. daily, Coreg 12.5 mg p.o. daily. Continue with Jardiance 10 mg daily  Hyperlipidemia: Continue with Lipitor 80 mg p.o. daily  Essential hypertension: Blood pressure poorly controlled. Continue with Coreg hydralazine lisinopril and diuresis for now. Depending on response, we may uptitrate beta-blocker or hydralazine in the morning. Insulin-dependent diabetes mellitus: Check A1c. On insulin  COPD: Noncompliant with CPAP  Nicotine dependence: Nicotine patch.   Counseled against smoking         Thank you for the consult    Dr. Dakota Faith  10/2/2022 6:31 PM

## 2022-10-02 NOTE — H&P
V2.0  History and Physical      Name:  Regla Butt /Age/Sex: 1960  (65 y.o. male)   MRN & CSN:  [id number] & [id number] Encounter Date/Time: 10/2/2022 4:13 PM EDT   Location:  [address] [place], [place] [id number] PCP: Lida Wilson MD       Hospital Day: 1    Assessment and Plan:   Regla Butt is a 65 y.o. male with a pmh as noted below presents with CHF exacerbation      Acute on chronic systolic heart failure  Ischemic cardiomyopathy  Coronary artery disease involving native artery without angina pectoris  Primary hypertension  Mixed hyperlipidemia  Venous insufficiency   Admit to inpatient, intermediate care   Secondary to fluid overload, acutely decompensated  Last echocardiogram EF 45%. Mildly dilated right ventricle. Mild to moderate mitral regurg, severe TR RVSP 60 mmHg. Consistent with right ventricular dysfunction, RVSP 60 mmHg . IVC dilated. Does not collapse with inspiration. Dilated aortic root. 4.6 cm  Bumex 2 mg IV 3 times daily plus metolazone 2.5 mg daily for diuresis    at [facility] ED, NT proBNP pending   High Sensitivity Troponin  wnl    Telemetry monitoring   Chest x-ray with Eddystone with bibasilar airspace opacities, trace right pleural effusion   EKG pending   Continue home dose Lipitor, carvedilol, Plavix   Continue home hydralazine, lisinopril for hypertension      Consult cardiology, follows with Dr. Isidro Pena to dose Coumadin for A. Fib.   INR goal 2-3   INR 2.7   Strict I&Os, Daily weights, Chem 7 qam   Consult to dietician for diet education, 2 GM NA 2 L FR diet       Chronic obstructive pulmonary disease  Obstructive sleep apnea   Untreated, does not tolerate CPAP   Patient would benefit from reattempting CPAP therapy due to RV dysfunction noted on echo    Tobacco abuse counseling   1 PPD  x 40 yrs    Nicotine patch and lozenges prescribed   Tobacco cessation advised    Insulin dependent type 2 diabetes   sliding scale with hypoglycemia protocol   -Hold home oral antihyperglycemics, check hemoglobin A1c           Obesity  CKD stage III secondary to type 2 diabetes mellitus  Polyneuropathy  Drug addiction in remission    Admission labs and imaging pending at time of H&P    Chronic Conditions: continue home medication as ordered       All testing  and results reviewed with patient . All questions answered. Patient and family voiced understanding    This patient was seen and examined in conjunction with Dr. Veda Ho. He/She was agreeable with the plan and management as dictated above. Disposition:   Expected Disposition: Likely home  Estimated D/C: 3 days    Diet ADULT DIET; Regular; 5 carb choices (75 gm/meal); Low Sodium (2 gm); 2000 ml   GI Prophylaxis  [x] PPI,  [] H2 Blocker,  [] Carafate,  [] Diet/Tube Feeds   DVT Prophylaxis [] Lovenox, []  Heparin, [] SCDs, [] Ambulation,  [x] Coumadin   Code Status Full Code   Surrogate Decision Maker/ POA          History from:     patient, electronic medical record,     History of Present Illness:     Chief Complaint: Acute on chronic systolic (congestive) heart failure ([facility] [address]  Camille Davenport is a 65 y.o. male with pmh of HFrEF, ischemic cardiomyopathy, hypertension, noninsulin-dependent type 2 diabetes mellitus, obesity who originally presented to [facility] ED with complaints of dyspnea on exertion, PND, orthopnea which began last night. Patient tells me that he has been able to sleep laying flat for years. Tells me he sleeps in a recliner (dyspnea at bedtime got worse. States he feels like his lungs are filling up with fluid. Denies any fevers or chills. Endorses cough productive with clear thick white sputum. Denies any nausea vomiting or diarrhea. Endorses difficulty breathing when bending or over. Denies any chest pain. Denies any numbness or tingling in his extremities that is new or different. Endorses swelling in his lower extremities but states that it is his baseline. Endorses increased swelling in his abdomen. Additional review of symptoms at as noted below     Review of Systems: Need 10 Elements   Review of Systems   Constitutional:  Positive for activity change and fatigue. Negative for appetite change, fever and unexpected weight change. HENT:  Negative for congestion and sore throat. Eyes: Negative. Respiratory:  Positive for shortness of breath (orthopnea). Negative for cough. Cardiovascular:  Positive for leg swelling. Negative for chest pain. Gastrointestinal:  Positive for abdominal distention. Negative for abdominal pain, constipation, nausea and vomiting. Endocrine: Negative. Genitourinary:  Negative for dysuria and hematuria. Musculoskeletal:  Negative for neck pain. Skin:  Negative for wound. Neurological:  Negative for dizziness and light-headedness. All other systems reviewed and are negative. Objective:   No intake or output data in the 24 hours ending 10/02/22 1639   Vitals:   Vitals:    10/02/22 1624   BP: (!) 174/95   Pulse: 74   Resp: 20   Temp: 98.9 °F (37.2 °C)   TempSrc: Oral   SpO2: 92%   Weight: 270 lb (122.5 kg)   Height: 6' 3\" (1.905 m)       Medications Prior to Admission     Prior to Admission medications    Medication Sig Start Date End Date Taking? Authorizing Provider   glipiZIDE (GLUCOTROL) 5 MG tablet Take 5 mg by mouth 2 times daily (before meals)    Historical Provider, MD   nicotine (NICODERM CQ) 14 MG/24HR Place 1 patch onto the skin daily 9/20/22 11/1/22  Nicholas Chan MD   bumetanide (BUMEX) 2 MG tablet Take 1 tablet by mouth in the morning, at noon, and at bedtime Take metolazone exactly 30 minutes after the Bumex tablet every day after each dose of Bumex. 7/17/22   Adeola Barfield MD   empagliflozin (JARDIANCE) 10 MG tablet Take 1 tablet by mouth in the morning. 7/17/22 10/15/22  Adeola Barfield MD   metOLazone (ZAROXOLYN) 2.5 MG tablet Take 1 tablet by mouth in the morning. Take 1-2 hours after Bumex. Elements   Physical Exam  Vitals and nursing note reviewed. Constitutional:       General: He is not in acute distress. Appearance: Normal appearance. He is obese. He is ill-appearing. HENT:      Head: Normocephalic. Nose: Nose normal.      Mouth/Throat:      Pharynx: Oropharynx is clear. Eyes:      Pupils: Pupils are equal, round, and reactive to light. Cardiovascular:      Rate and Rhythm: Tachycardia present. Rhythm irregular. Pulses: Normal pulses. Heart sounds:     Gallop present. S3 sounds present. Comments: +JVD and HJR   Pulmonary:      Effort: Pulmonary effort is normal. No respiratory distress. Breath sounds: Wheezing and rales present. No rhonchi. Abdominal:      General: Abdomen is flat. Bowel sounds are normal. There is no distension. Tenderness: There is no abdominal tenderness. Musculoskeletal:         General: Normal range of motion. Cervical back: Normal range of motion. Right lower le+ Pitting Edema present. Left lower le+ Pitting Edema present. Skin:     General: Skin is warm and dry. Capillary Refill: Capillary refill takes less than 2 seconds. Neurological:      General: No focal deficit present. Mental Status: He is alert and oriented to person, place, and time. Psychiatric:         Mood and Affect: Mood normal.          Past Medical History:   PMHx   Past Medical History:   Diagnosis Date    Abnormal nuclear stress test     Atrial fibrillation ([facility] [address] 2013    CAD (coronary artery disease)     Follows with Dr. Dianne Sanchez    CHF (congestive heart failure) ([facility])     COPD (chronic obstructive pulmonary disease) ([facility] [address]     Follows with PCP    Decreased cardiac ejection fraction     Diabetes mellitus ([facility] [address]     GERD (gastroesophageal reflux disease)     H/O cardiovascular stress test 13, 3/20/2013    11/13-Observed defect is consistent with diaphragmatic attenuation. EF 58%. 3/13 -EF 51%. No ischemia. Normal Study. H/O cardiovascular stress test 06/08/2017    EF 50% normal study    H/O cardiovascular stress test 06/17/2019    Normal NM    H/O cardiovascular stress test 03/02/2021    depressed lvedf increased edv myocardial perfusion abnormal stress test    H/O Doppler ultrasound 10/14/2010    10/2010-Bilateral venous doppler of lower extremies- No DVTs. H/O echocardiogram 06/26/2019    Left ventricular systolic function is normal with an ejection fraction of 50-55%. Mild concentric left ventricular hypertrophy. Grade I diastolic dysfunction. No significant valvular disease noted. No evidence of pericardial effusion. Essentially unremarkable echo. H/O percutaneous left heart catheterization 12/22/2015    No evidence of hemodynamically significant coronary artery disease    Heart imaging 04/23/2020    muga - ef 58%    History of coronary artery stent placement 11/13/2013 11/13 -RCA - 3 stents placed    History of exercise stress test 06/08/2017    treadmill    History of Holter monitoring 01/04/2016    predominant rhythm sinus    History of MRI of brain and brain stem 06/04/2020    No evidence of an acute infarct, mod chronic microvascular white matter ischemic disease with associated cerebral parenchymal volume loss    History of nuclear stress test 02/24/2020    EF 38%,Myocardial perfusion scan shows moderate size, severe intensity, non  reversible perfusion defect in inferior wall. Hx of Doppler echocardiogram 01/26/2022    EF 45-50% Mod LV hypertrophy. Grade 1 diastolic dysfunction. Mod MR and TR. Dilation of the aortic root and ascending aorta. Hyperlipidemia     Hypertension     Follows with PCP    Kidney disease     Dr. Matthew Eason    S/P cardiac catheterization 11/13/2013 11/13/2013-EF 55%, distal LAD mild disease,CX stent patent,but distal to stent 50% stenosis. RCA severe disease.     SOB (shortness of breath)     Status post angioplasty with stent     Wears dentures     full upper denture     PSHX:  has a past surgical history that includes Coronary angioplasty with stent; Appendectomy; Femur fracture surgery (1984); Coronary angioplasty with stent (11/13/2013); Colonoscopy; Mandible fracture surgery (1984); Cardiac catheterization (11/13/2013); Cardiac surgery (07/03/2020); Coronary artery bypass graft (N/A, 7/3/2020); Dental surgery; and BIOPSY / LIGATION TEMPORAL ARTERY (Left, 8/17/2020). Allergies: Allergies   Allergen Reactions    Hydromorphone Other (See Comments)     Hallucinations. Fam HX:  family history includes Cancer in his brother, father, and mother; Diabetes in his mother; Heart Disease in his father and mother; High Blood Pressure in his father and mother; Stroke in his father and mother. Soc HX:   Social History     Socioeconomic History    Marital status: Legally    Occupational History    Occupation:    Tobacco Use    Smoking status: Every Day     Packs/day: 1.00     Years: 46.00     Pack years: 46.00     Types: Cigarettes     Start date: 1974    Smokeless tobacco: Never   Vaping Use    Vaping Use: Never used   Substance and Sexual Activity    Alcohol use: No     Comment: caffeine 2 cups coffee a week. pop ywice a week.  austen tea 4 bottles a day    Drug use: Not Currently     Types: Cocaine     Comment: Last used: 7/1/20    Sexual activity: Yes     Partners: Female       Medications:   Medications:    sodium chloride flush  5-40 mL IntraVENous 2 times per day    nicotine  1 patch TransDERmal Daily    insulin lispro  0-4 Units SubCUTAneous TID     insulin lispro  0-4 Units SubCUTAneous Nightly    [START ON 10/3/2022] pantoprazole  40 mg Oral Daily    atorvastatin  80 mg Oral Nightly    carvedilol  12.5 mg Oral BID     clopidogrel  75 mg Oral Daily    potassium chloride  20 mEq Oral BID     lisinopril  20 mg Oral Daily    pregabalin  150 mg Oral BID    lidocaine  1 patch Topical Daily    montelukast  10 mg Oral Daily    guaiFENesin  600 mg Oral BID ipratropium-albuterol  1 vial Inhalation 4x Daily    hydrALAZINE  25 mg Oral TID    empagliflozin  10 mg Oral Daily    metOLazone  2.5 mg Oral Daily    bumetanide  2 mg IntraVENous TID      Infusions:    dextrose      sodium chloride       PRN Meds: glucose, 4 tablet, PRN  dextrose bolus, 125 mL, PRN  glucagon (rDNA), 1 mg, PRN  dextrose, , Continuous PRN  sodium chloride flush, 5-40 mL, PRN  sodium chloride, , PRN  ondansetron, 4 mg, Q8H PRN   Or  ondansetron, 4 mg, Q6H PRN  polyethylene glycol, 17 g, Daily PRN  acetaminophen, 650 mg, Q6H PRN   Or  acetaminophen, 650 mg, Q6H PRN  nicotine polacrilex, 2 mg, Q1H PRN  albuterol sulfate HFA, 2 puff, Q6H PRN      Labs      CBC: No results for input(s): WBC, HGB, PLT in the last 72 hours. BMP:  No results for input(s): NA, K, CL, CO2, BUN, CREATININE, GLUCOSE in the last 72 hours. Hepatic: No results for input(s): AST, ALT, ALB, BILITOT, ALKPHOS in the last 72 hours. Lipids:   Lab Results   Component Value Date/Time    CHOL 153 01/06/2020 04:06 AM    HDL 43 01/06/2020 04:06 AM    TRIG 54 01/06/2020 04:06 AM     Hemoglobin A1C:   Lab Results   Component Value Date/Time    LABA1C 7.8 12/30/2020 03:40 AM     TSH: No results found for: TSH  Troponin:   Lab Results   Component Value Date/Time    TROPONINT 0.019 07/15/2022 10:17 AM    TROPONINT 0.050 01/29/2022 08:25 PM    TROPONINT 0.071 01/29/2022 05:26 PM     Lactic Acid: No results for input(s): LACTA in the last 72 hours. BNP: No results for input(s): PROBNP in the last 72 hours.   UA:  Lab Results   Component Value Date/Time    NITRU NEGATIVE 08/21/2022 04:50 PM    COLORU YELLOW 08/21/2022 04:50 PM    WBCUA <1 08/21/2022 04:50 PM    RBCUA <1 08/21/2022 04:50 PM    MUCUS RARE 07/04/2020 07:20 PM    TRICHOMONAS NONE SEEN 08/21/2022 04:50 PM    BACTERIA NEGATIVE 08/21/2022 04:50 PM    CLARITYU CLEAR 08/21/2022 04:50 PM    SPECGRAV <=1.005 08/21/2022 04:50 PM    LEUKOCYTESUR NEGATIVE 08/21/2022 04:50 PM    UROBILINOGEN 0.2 08/21/2022 04:50 PM    BILIRUBINUR NEGATIVE 08/21/2022 04:50 PM    BLOODU NEGATIVE 08/21/2022 04:50 PM    KETUA NEGATIVE 08/21/2022 04:50 PM     Urine Cultures: No results found for: LABURIN  Blood Cultures: No results found for: BC  No results found for: BLOODCULT2  Organism: No results found for: ORG    Imaging/Diagnostics Last 24 Hours   No results found. Electronically signed by TREY Smith CNP on 10/2/2022 at 4:39 PM          This dictation was created with voice recognition software. While attempts have been made to review the dictation as it is transcribed, on occasion the spoken word can be misinterpreted by the technology leading to omissions or inappropriate words, phrases or sentences.      Electronically signed by TREY Smith CNP on 10/2/2022 at 4:39 PM

## 2022-10-03 LAB
CHOLESTEROL: 116 MG/DL
EKG ATRIAL RATE: 67 BPM
EKG DIAGNOSIS: NORMAL
EKG P AXIS: 69 DEGREES
EKG P-R INTERVAL: 194 MS
EKG Q-T INTERVAL: 444 MS
EKG QRS DURATION: 114 MS
EKG QTC CALCULATION (BAZETT): 469 MS
EKG R AXIS: 13 DEGREES
EKG T AXIS: 122 DEGREES
EKG VENTRICULAR RATE: 67 BPM
ESTIMATED AVERAGE GLUCOSE: 114 MG/DL
GLUCOSE BLD-MCNC: 151 MG/DL (ref 70–99)
GLUCOSE BLD-MCNC: 153 MG/DL (ref 70–99)
GLUCOSE BLD-MCNC: 172 MG/DL (ref 70–99)
HBA1C MFR BLD: 5.6 % (ref 4.2–6.3)
HDLC SERPL-MCNC: 32 MG/DL
INR BLD: 1.73 INDEX
LDL CHOLESTEROL CALCULATED: 59 MG/DL
PRO-BNP: 5467 PG/ML
PROTHROMBIN TIME: 22.5 SECONDS (ref 11.7–14.5)
T4 FREE: 1.11 NG/DL (ref 0.9–1.8)
TRIGL SERPL-MCNC: 125 MG/DL
TROPONIN T: 0.02 NG/ML
TSH HIGH SENSITIVITY: 0.83 UIU/ML (ref 0.27–4.2)

## 2022-10-03 PROCEDURE — 94640 AIRWAY INHALATION TREATMENT: CPT

## 2022-10-03 PROCEDURE — 2580000003 HC RX 258: Performed by: NURSE PRACTITIONER

## 2022-10-03 PROCEDURE — APPSS60 APP SPLIT SHARED TIME 46-60 MINUTES: Performed by: NURSE PRACTITIONER

## 2022-10-03 PROCEDURE — 2700000000 HC OXYGEN THERAPY PER DAY

## 2022-10-03 PROCEDURE — 99233 SBSQ HOSP IP/OBS HIGH 50: CPT | Performed by: INTERNAL MEDICINE

## 2022-10-03 PROCEDURE — 6370000000 HC RX 637 (ALT 250 FOR IP): Performed by: INTERNAL MEDICINE

## 2022-10-03 PROCEDURE — 85610 PROTHROMBIN TIME: CPT

## 2022-10-03 PROCEDURE — 6370000000 HC RX 637 (ALT 250 FOR IP): Performed by: NURSE PRACTITIONER

## 2022-10-03 PROCEDURE — 93010 ELECTROCARDIOGRAM REPORT: CPT | Performed by: INTERNAL MEDICINE

## 2022-10-03 PROCEDURE — 94761 N-INVAS EAR/PLS OXIMETRY MLT: CPT

## 2022-10-03 PROCEDURE — 2500000003 HC RX 250 WO HCPCS: Performed by: NURSE PRACTITIONER

## 2022-10-03 PROCEDURE — 2140000000 HC CCU INTERMEDIATE R&B

## 2022-10-03 PROCEDURE — 82962 GLUCOSE BLOOD TEST: CPT

## 2022-10-03 RX ORDER — HYDRALAZINE HYDROCHLORIDE 50 MG/1
50 TABLET, FILM COATED ORAL 3 TIMES DAILY
Status: DISCONTINUED | OUTPATIENT
Start: 2022-10-03 | End: 2022-10-04 | Stop reason: HOSPADM

## 2022-10-03 RX ADMIN — BUMETANIDE 2 MG: 0.25 INJECTION INTRAMUSCULAR; INTRAVENOUS at 15:28

## 2022-10-03 RX ADMIN — CLOPIDOGREL BISULFATE 75 MG: 75 TABLET ORAL at 09:51

## 2022-10-03 RX ADMIN — Medication 2 PUFF: at 21:25

## 2022-10-03 RX ADMIN — PANTOPRAZOLE SODIUM 40 MG: 40 TABLET, DELAYED RELEASE ORAL at 09:51

## 2022-10-03 RX ADMIN — GUAIFENESIN 600 MG: 600 TABLET, EXTENDED RELEASE ORAL at 20:41

## 2022-10-03 RX ADMIN — ATORVASTATIN CALCIUM 80 MG: 40 TABLET, FILM COATED ORAL at 20:41

## 2022-10-03 RX ADMIN — HYDRALAZINE HYDROCHLORIDE 25 MG: 25 TABLET, FILM COATED ORAL at 09:51

## 2022-10-03 RX ADMIN — IPRATROPIUM BROMIDE AND ALBUTEROL SULFATE 3 ML: .5; 3 SOLUTION RESPIRATORY (INHALATION) at 15:22

## 2022-10-03 RX ADMIN — EMPAGLIFLOZIN 10 MG: 10 TABLET, FILM COATED ORAL at 10:50

## 2022-10-03 RX ADMIN — WARFARIN SODIUM 4.5 MG: 2 TABLET ORAL at 17:26

## 2022-10-03 RX ADMIN — SODIUM CHLORIDE, PRESERVATIVE FREE 10 ML: 5 INJECTION INTRAVENOUS at 09:52

## 2022-10-03 RX ADMIN — POTASSIUM CHLORIDE 20 MEQ: 1500 TABLET, EXTENDED RELEASE ORAL at 15:27

## 2022-10-03 RX ADMIN — IPRATROPIUM BROMIDE AND ALBUTEROL SULFATE 3 ML: .5; 3 SOLUTION RESPIRATORY (INHALATION) at 07:30

## 2022-10-03 RX ADMIN — BUMETANIDE 2 MG: 0.25 INJECTION INTRAMUSCULAR; INTRAVENOUS at 09:52

## 2022-10-03 RX ADMIN — HYDRALAZINE HYDROCHLORIDE 50 MG: 50 TABLET, FILM COATED ORAL at 15:27

## 2022-10-03 RX ADMIN — LISINOPRIL 20 MG: 20 TABLET ORAL at 09:51

## 2022-10-03 RX ADMIN — BUMETANIDE 2 MG: 0.25 INJECTION INTRAMUSCULAR; INTRAVENOUS at 20:40

## 2022-10-03 RX ADMIN — PREGABALIN 150 MG: 75 CAPSULE ORAL at 20:41

## 2022-10-03 RX ADMIN — IPRATROPIUM BROMIDE AND ALBUTEROL SULFATE 3 ML: .5; 3 SOLUTION RESPIRATORY (INHALATION) at 11:35

## 2022-10-03 RX ADMIN — PREGABALIN 150 MG: 75 CAPSULE ORAL at 09:51

## 2022-10-03 RX ADMIN — POTASSIUM CHLORIDE 20 MEQ: 1500 TABLET, EXTENDED RELEASE ORAL at 09:51

## 2022-10-03 RX ADMIN — METOLAZONE 2.5 MG: 2.5 TABLET ORAL at 09:51

## 2022-10-03 RX ADMIN — CARVEDILOL 12.5 MG: 6.25 TABLET, FILM COATED ORAL at 15:27

## 2022-10-03 RX ADMIN — MONTELUKAST 10 MG: 10 TABLET, FILM COATED ORAL at 09:51

## 2022-10-03 RX ADMIN — HYDRALAZINE HYDROCHLORIDE 50 MG: 50 TABLET, FILM COATED ORAL at 20:41

## 2022-10-03 RX ADMIN — GUAIFENESIN 600 MG: 600 TABLET, EXTENDED RELEASE ORAL at 09:51

## 2022-10-03 RX ADMIN — ALBUTEROL SULFATE 2 PUFF: 90 AEROSOL, METERED RESPIRATORY (INHALATION) at 21:24

## 2022-10-03 RX ADMIN — CARVEDILOL 12.5 MG: 6.25 TABLET, FILM COATED ORAL at 09:51

## 2022-10-03 ASSESSMENT — PAIN SCALES - GENERAL: PAINLEVEL_OUTOF10: 0

## 2022-10-03 ASSESSMENT — ENCOUNTER SYMPTOMS: SHORTNESS OF BREATH: 1

## 2022-10-03 NOTE — CARE COORDINATION
Pt chart reviewed. Screened for discharge planning. Pt from home. Pt appears independent and has DME to include home o2. Pt has PCP and insurance. Discharge plan is home. CM will continue to follow for any needs.

## 2022-10-03 NOTE — CONSULTS
Nutrition Education    Educated on Fluid Restriction Nutrition Therapy. MD FR of 2000 ml per day. Provided additional handout over heart-healthy reduced sodium nutrition therapy, pt's wife follows low sodium diet at home but pt reports \"wants to eat whatever he wants\". Full low sodium education not appropriate at pt not willing to listen over low sodium at this time. Encourage for better hydration as pt does not drink water; drinks mainly tea, coffee, and pop. Provided tips to increase water intake and decrease other beverages. Learners: Patient  Readiness: Acceptance  Method: Explanation and Handout  Response: Demonstrated Understanding and Needs Reinforcement  Contact name and number provided.     Kinjal Shore RD, LD  Contact Number: [phone]

## 2022-10-03 NOTE — PLAN OF CARE
Problem: Discharge Planning  Goal: Discharge to home or other facility with appropriate resources  Outcome: Progressing  Flowsheets (Taken 10/2/2022 1523 by Jazmin Grant RN)  Discharge to home or other facility with appropriate resources:   Identify barriers to discharge with patient and caregiver   Identify discharge learning needs (meds, wound care, etc)   Refer to discharge planning if patient needs post-hospital services based on physician order or complex needs related to functional status, cognitive ability or social support system   Arrange for needed discharge resources and transportation as appropriate   Arrange for interpreters to assist at discharge as needed     Problem: Pain  Goal: Verbalizes/displays adequate comfort level or baseline comfort level  Outcome: Progressing

## 2022-10-03 NOTE — PROGRESS NOTES
V2.0  [facility] [place]      Name:  Bean Byrne /Age/Sex: 1960  (65 y.o. male)   MRN & CSN:  [id number] & [id number] Encounter Date/Time: 10/3/2022 9:59 AM EDT    Location:  /1672-Z PCP: Zoltan Cardenas MD       Hospital Day: 2    Assessment and Plan:   Bean Byrne is a 65 y.o. male with CHF exacerbation      Plan: Admitted for CHF exacerbation. IV diuresis ongoing. Acute CHF exacerbation  - mixed systolic/diastolic  - diuresis per cards    CAD, Afib, HTN  - multiple stents  - continue Plavix and Coumadin    COPD   - duonebs    Ppx: AC  Dispo: TBD    Subjective:     Chief Complaint: No chief complaint on file. Patient without complaints this AM. Requiring supp o2. Review of Systems:    Review of Systems    10 point ROS negative except as stated above in \"subjective\" section    Objective: Intake/Output Summary (Last 24 hours) at 10/3/2022 0959  Last data filed at 10/3/2022 8982  Gross per 24 hour   Intake 730 ml   Output 3000 ml   Net -2270 ml        Vitals:   Vitals:    10/03/22 0948   BP: (!) 157/74   Pulse: 64   Resp: 14   Temp: 98 °F (36.7 °C)   SpO2: (!) 89%       Physical Exam:     General: NAD  Eyes: EOMI  ENT: neck supple  Cardiovascular: Regular rate. Respiratory: Clear to auscultation  Gastrointestinal: Soft, non tender  Genitourinary: no suprapubic tenderness  Musculoskeletal: 1+ bilateral LE edema  Skin: warm, dry  Neuro: Alert. Psych: Mood appropriate.      Medications:   Medications:    warfarin  4.5 mg Oral Daily    sodium chloride flush  5-40 mL IntraVENous 2 times per day    nicotine  1 patch TransDERmal Daily    insulin lispro  0-4 Units SubCUTAneous TID WC    insulin lispro  0-4 Units SubCUTAneous Nightly    pantoprazole  40 mg Oral Daily    atorvastatin  80 mg Oral Nightly    carvedilol  12.5 mg Oral BID WC    clopidogrel  75 mg Oral Daily    potassium chloride  20 mEq Oral BID WC    lisinopril  20 mg Oral Daily    pregabalin  150 mg Oral BID    lidocaine  1 patch Topical Daily    montelukast  10 mg Oral Daily    guaiFENesin  600 mg Oral BID    ipratropium-albuterol  1 vial Inhalation 4x Daily    hydrALAZINE  25 mg Oral TID    empagliflozin  10 mg Oral Daily    metOLazone  2.5 mg Oral Daily    bumetanide  2 mg IntraVENous TID      Infusions:    dextrose      sodium chloride       PRN Meds: glucose, 4 tablet, PRN  dextrose bolus, 125 mL, PRN  glucagon (rDNA), 1 mg, PRN  dextrose, , Continuous PRN  sodium chloride flush, 5-40 mL, PRN  sodium chloride, , PRN  ondansetron, 4 mg, Q8H PRN   Or  ondansetron, 4 mg, Q6H PRN  polyethylene glycol, 17 g, Daily PRN  acetaminophen, 650 mg, Q6H PRN   Or  acetaminophen, 650 mg, Q6H PRN  nicotine polacrilex, 2 mg, Q1H PRN  albuterol sulfate HFA, 2 puff, Q6H PRN  ipratropium, 2 puff, Q4H PRN        Labs      Recent Results (from the past 24 hour(s))   POCT Glucose    Collection Time: 10/02/22  5:10 PM   Result Value Ref Range    POC Glucose 210 (H) 70 - 99 MG/DL   POCT Glucose    Collection Time: 10/02/22  8:10 PM   Result Value Ref Range    POC Glucose 115 (H) 70 - 99 MG/DL   Urinalysis with Reflex to Culture    Collection Time: 10/02/22  8:47 PM    Specimen: Urine   Result Value Ref Range    Color, UA YELLOW YELLOW    Clarity, UA CLEAR CLEAR    Glucose, Urine 500 (A) NEGATIVE MG/DL    Bilirubin Urine NEGATIVE NEGATIVE MG/DL    Ketones, Urine NEGATIVE NEGATIVE MG/DL    Specific Gravity, UA 1.015 1.001 - 1.035    Blood, Urine NEGATIVE NEGATIVE    pH, Urine 5.5 5.0 - 8.0    Protein, UA 30 (A) NEGATIVE MG/DL    Urobilinogen, Urine NORMAL 0.2 - 1.0 MG/DL    Nitrite Urine, Quantitative NEGATIVE NEGATIVE    Leukocyte Esterase, Urine NEGATIVE NEGATIVE    RBC, UA NONE SEEN 0 - 3 /HPF    WBC, UA <1 0 - 2 /HPF    Bacteria, UA NEGATIVE NEGATIVE /HPF    Trichomonas, UA NONE SEEN NONE SEEN /HPF   EKG 12 lead    Collection Time: 10/02/22  9:36 PM   Result Value Ref Range    Ventricular Rate 67 BPM    Atrial Rate 67 BPM P-R Interval 194 ms    QRS Duration 114 ms    Q-T Interval 444 ms    QTc Calculation (Bazett) 469 ms    P Axis 69 degrees    R Axis 13 degrees    T Axis 122 degrees    Diagnosis       Normal sinus rhythm  Inferior infarct (cited on or before 15-JUL-2022)  Cannot rule out Anterior infarct , age undetermined  ST & T wave abnormality, consider lateral ischemia  Abnormal ECG  When compared with ECG of 21-AUG-2022 14:39,  No significant change was found     Troponin    Collection Time: 10/02/22 10:12 PM   Result Value Ref Range    Troponin T 0.016 (H) <0.01 NG/ML   TSH without Reflex    Collection Time: 10/02/22 10:12 PM   Result Value Ref Range    TSH, High Sensitivity 0.826 0.270 - 4.20 uIu/ml   T4, free    Collection Time: 10/02/22 10:12 PM   Result Value Ref Range    T4 Free 1.11 0.9 - 1.8 NG/DL   Iron and TIBC    Collection Time: 10/02/22 10:12 PM   Result Value Ref Range    Iron 43 (L) 59 - 158 ug/dL    UIBC 237 110 - 370 ug/dL    TIBC 280 250 - 450 ug/dL    Transferrin % 15 10 - 44 %   Magnesium    Collection Time: 10/02/22 10:12 PM   Result Value Ref Range    Magnesium 2.3 1.8 - 2.4 mg/dl   Lipid Panel    Collection Time: 10/02/22 10:12 PM   Result Value Ref Range    Triglycerides 125 <150 MG/DL    Cholesterol 116 <200 MG/DL    HDL 32 (L) >40 MG/DL    LDL Calculated 59 <100 MG/DL   CBC with Auto Differential    Collection Time: 10/02/22 10:12 PM   Result Value Ref Range    WBC 10.2 4.0 - 10.5 K/CU MM    RBC 3.93 (L) 4.6 - 6.2 M/CU MM    Hemoglobin 10.9 (L) 13.5 - 18.0 GM/DL    Hematocrit 35.2 (L) 42 - 52 %    MCV 89.6 78 - 100 FL    MCH 27.7 27 - 31 PG    MCHC 31.0 (L) 32.0 - 36.0 %    RDW 17.2 (H) 11.7 - 14.9 %    Platelets 728 521 - 993 K/CU MM    MPV 11.3 (H) 7.5 - 11.1 FL    Differential Type AUTOMATED DIFFERENTIAL     Segs Relative 70.4 (H) 36 - 66 %    Lymphocytes % 15.4 (L) 24 - 44 %    Monocytes % 8.6 (H) 0 - 4 %    Eosinophils % 4.5 (H) 0 - 3 %    Basophils % 0.8 0 - 1 %    Segs Absolute 7.2 K/CU MM Lymphocytes Absolute 1.6 K/CU MM    Monocytes Absolute 0.9 K/CU MM    Eosinophils Absolute 0.5 K/CU MM    Basophils Absolute 0.1 K/CU MM    Nucleated RBC % 0.0 %    Total Nucleated RBC 0.0 K/CU MM    Total Immature Neutrophil 0.03 K/CU MM    Immature Neutrophil % 0.3 0 - 0.43 %   Comprehensive Metabolic Panel w/ Reflex to MG    Collection Time: 10/02/22 10:12 PM   Result Value Ref Range    Sodium 141 135 - 145 MMOL/L    Potassium 4.1 3.5 - 5.1 MMOL/L    Chloride 102 99 - 110 mMol/L    CO2 27 21 - 32 MMOL/L    BUN 29 (H) 6 - 23 MG/DL    Creatinine 1.8 (H) 0.9 - 1.3 MG/DL    Glucose 110 (H) 70 - 99 MG/DL    Calcium 9.0 8.3 - 10.6 MG/DL    Albumin 4.0 3.4 - 5.0 GM/DL    Total Protein 6.6 6.4 - 8.2 GM/DL    Total Bilirubin 0.4 0.0 - 1.0 MG/DL    ALT 24 10 - 40 U/L    AST 22 15 - 37 IU/L    Alkaline Phosphatase 112 40 - 129 IU/L    GFR Non- 39 (L) >60 mL/min/1.73m2    GFR  47 (L) >60 mL/min/1.73m2    Anion Gap 12 4 - 16   Protime-INR    Collection Time: 10/02/22 10:12 PM   Result Value Ref Range    Protime 24.0 (H) 11.7 - 14.5 SECONDS    INR 1.85 INDEX   Brain Natriuretic Peptide    Collection Time: 10/02/22 10:12 PM   Result Value Ref Range    Pro-BNP 5,467 (H) <300 PG/ML   Hemoglobin A1c    Collection Time: 10/02/22 10:27 PM   Result Value Ref Range    Hemoglobin A1C 5.6 4.2 - 6.3 %    eAG 114 mg/dL   Protime-INR    Collection Time: 10/03/22  5:20 AM   Result Value Ref Range    Protime 22.5 (H) 11.7 - 14.5 SECONDS    INR 1.73 INDEX        Imaging/Diagnostics Last 24 Hours   XR CHEST PORTABLE    Result Date: 10/3/2022  EXAMINATION: ONE XRAY VIEW OF THE CHEST 10/2/2022 4:32 pm COMPARISON: 07/16/2022 HISTORY: ORDERING SYSTEM PROVIDED HISTORY: chf exacerbation TECHNOLOGIST PROVIDED HISTORY: Reason for exam:->chf exacerbation Reason for Exam: CHF exacerbation FINDINGS: There is cardiomegaly. Median sternotomy wires are noted.   Diffuse interstitial opacities noted throughout both lungs suggesting mild interstitial pulmonary edema stable since prior exam.  No pneumothorax or pleural effusion. No new consolidation. Cardiomediastinal silhouette and bony thorax are unchanged. Stable examination with cardiomegaly and interstitial prominence throughout both lungs suggesting CHF.   Atypical pneumonia is in the differential.       Electronically signed by Huy Chung MD on 10/3/2022 at 9:59 AM

## 2022-10-03 NOTE — PLAN OF CARE
Problem: Discharge Planning  Goal: Discharge to home or other facility with appropriate resources  10/3/2022 1012 by Coral William RN  Outcome: Progressing  10/2/2022 2029 by Unruly Smith RN  Outcome: Progressing  Flowsheets (Taken 10/2/2022 1523 by Ravi Ramos RN)  Discharge to home or other facility with appropriate resources:   Identify barriers to discharge with patient and caregiver   Identify discharge learning needs (meds, wound care, etc)   Refer to discharge planning if patient needs post-hospital services based on physician order or complex needs related to functional status, cognitive ability or social support system   Arrange for needed discharge resources and transportation as appropriate   Arrange for interpreters to assist at discharge as needed     Problem: Pain  Goal: Verbalizes/displays adequate comfort level or baseline comfort level  10/3/2022 1012 by Coral William RN  Outcome: Progressing  10/2/2022 2029 by Unruly Smith RN  Outcome: Progressing     Problem: Safety - Adult  Goal: Free from fall injury  Outcome: Progressing

## 2022-10-03 NOTE — PROGRESS NOTES
Admitting diagnosis- Acute on chronic systolic CHF  Cardiologist- Alexandra  Heart Failure Education Nurse consulted-yes   Ejection fraction 45% as of 7/15/22  Pro BNP- 5,467 on 10/2/22  Hospital follow up appt-  [facility] on 10/13 ---Pt aware of appt and appt added to AVS  Cardiac rehabilitation referral-N/A   ICD information-N/A   Smoking Cessation information and referral-yes  Reports quitting alcohol. He is currently trying to quit [facility]. Pneumonia vaccine- patient voices interest in receivng vaccine prior to discharge- Hospitalist messaged to request vaccine order  Daniel Moran MD   Patient has a digital scale- yes   Able to obtain medications without difficulty- Denies difficulty taking or obtaining medications    Met with patient and re-introduced myself as the Heart failure education R.N. Patient is seen in the heart failure clinic at the [facility]. He reports making large lifestyle changes. States he tries to make one change at a time. Currently he is trying to quit [facility]. Patient was engaged and attentive during education. Reviewed Heart failure patient education book with patient. Questions answered. Patient's heart failure medications reviewed and information given on each. Reviewed the Stop Light Handout with patient and instructed when to call his provider. The following handouts were reviewed with patient and patient was given a copy of the following : Heart Failure education booklet, the [employer] handout, the 'Stop Light' Handout, a link to the [employer]'s Healthier Living with Heart Failure Interactive workbook and a list of heart failure related education available on the hospital TV and how to access it. 1 hour of education provided.

## 2022-10-03 NOTE — PROGRESS NOTES
Cardiology Progress Note     Today's Plan: increase hydralazine to 50 mg TID    Admit Date:  10/2/2022    Consult reason/ Seen today for: CHF     Subjective and  Overnight Events:  Edema and SOB improving. History of Presenting Illness:    Chief complain on admission : 65 y. o.year old who is admitted for CHF. Past medical history:    has a past medical history of Abnormal nuclear stress test, Atrial fibrillation (HCC), CAD (coronary artery disease), CHF (congestive heart failure) ([facility] [address] COPD (chronic obstructive pulmonary disease) ([facility] [address] Decreased cardiac ejection fraction, Diabetes mellitus ([facility] [address] GERD (gastroesophageal reflux disease), H/O cardiovascular stress test, H/O cardiovascular stress test, H/O cardiovascular stress test, H/O cardiovascular stress test, H/O Doppler ultrasound, H/O echocardiogram, H/O percutaneous left heart catheterization, Heart imaging, History of coronary artery stent placement, History of exercise stress test, History of Holter monitoring, History of MRI of brain and brain stem, History of nuclear stress test, Hx of Doppler echocardiogram, Hyperlipidemia, Hypertension, Kidney disease, S/P cardiac catheterization, SOB (shortness of breath), Status post angioplasty with stent, and Wears dentures. Past surgical history:   has a past surgical history that includes Coronary angioplasty with stent; Appendectomy; Femur fracture surgery (1984); Coronary angioplasty with stent (11/13/2013); Colonoscopy; Mandible fracture surgery (1984); Cardiac catheterization (11/13/2013); Cardiac surgery (07/03/2020); Coronary artery bypass graft (N/A, 7/3/2020); Dental surgery; and BIOPSY / LIGATION TEMPORAL ARTERY (Left, 8/17/2020). Social History:   reports that he has been smoking cigarettes. He started smoking about 48 years ago. He has a 46.00 pack-year smoking history.  He has never used smokeless tobacco. He reports that he does not currently use drugs after having used the following drugs: Cocaine. He reports that he does not drink alcohol. Family history:  family history includes Cancer in his brother, father, and mother; Diabetes in his mother; Heart Disease in his father and mother; High Blood Pressure in his father and mother; Stroke in his father and mother. Allergies   Allergen Reactions    Hydromorphone Other (See Comments)     Hallucinations. Review of Systems:  Review of Systems   Respiratory:  Positive for shortness of breath. Cardiovascular:  Positive for leg swelling. Negative for chest pain and palpitations. Musculoskeletal: Negative. Skin: Negative. Neurological:  Negative for dizziness and weakness. All other systems reviewed and are negative. BP (!) 145/73   Pulse 63   Temp 98.2 °F (36.8 °C) (Axillary)   Resp 15   Ht 6' 3\" (1.905 m)   Wt 270 lb (122.5 kg)   SpO2 97%   BMI 33.75 kg/m²     Intake/Output Summary (Last 24 hours) at 10/3/2022 1301  Last data filed at 10/3/2022 1108  Gross per 24 hour   Intake 730 ml   Output 4000 ml   Net -3270 ml       Physical Exam:  Physical Exam  Constitutional:       Appearance: He is well-developed. Cardiovascular:      Rate and Rhythm: Normal rate and regular rhythm. Pulses: Intact distal pulses. Dorsalis pedis pulses are 2+ on the right side and 2+ on the left side. Posterior tibial pulses are 2+ on the right side and 2+ on the left side. Heart sounds: Normal heart sounds, S1 normal and S2 normal.   Pulmonary:      Effort: Pulmonary effort is normal.      Breath sounds: Normal breath sounds. Musculoskeletal:         General: Normal range of motion. Right lower leg: Edema present. Left lower leg: Edema present. Skin:     General: Skin is warm and dry. Neurological:      Mental Status: He is alert and oriented to person, place, and time.         Medications:    warfarin  4.5 mg Oral Daily hydrALAZINE  50 mg Oral TID    sodium chloride flush  5-40 mL IntraVENous 2 times per day    nicotine  1 patch TransDERmal Daily    insulin lispro  0-4 Units SubCUTAneous TID WC    insulin lispro  0-4 Units SubCUTAneous Nightly    pantoprazole  40 mg Oral Daily    atorvastatin  80 mg Oral Nightly    carvedilol  12.5 mg Oral BID WC    clopidogrel  75 mg Oral Daily    potassium chloride  20 mEq Oral BID WC    lisinopril  20 mg Oral Daily    pregabalin  150 mg Oral BID    lidocaine  1 patch Topical Daily    montelukast  10 mg Oral Daily    guaiFENesin  600 mg Oral BID    ipratropium-albuterol  1 vial Inhalation 4x Daily    empagliflozin  10 mg Oral Daily    metOLazone  2.5 mg Oral Daily    bumetanide  2 mg IntraVENous TID      dextrose      sodium chloride       glucose, dextrose bolus **OR** [DISCONTINUED] dextrose bolus, glucagon (rDNA), dextrose, sodium chloride flush, sodium chloride, ondansetron **OR** ondansetron, polyethylene glycol, acetaminophen **OR** acetaminophen, nicotine polacrilex, albuterol sulfate HFA, ipratropium    Lab Data:  CBC:   Recent Labs     10/02/22  2212   WBC 10.2   HGB 10.9*   HCT 35.2*   MCV 89.6        BMP:   Recent Labs     10/02/22  2212      K 4.1      CO2 27   BUN 29*   CREATININE 1.8*     PT/INR:   Recent Labs     10/02/22  2212 10/03/22  0520   PROTIME 24.0* 22.5*   INR 1.85 1.73     BNP:    Recent Labs     10/02/22  2212   PROBNP 5,467*     TROPONIN:   Recent Labs     10/02/22  2212   TROPONINT 0.016*        Impression:  Principal Problem:    Acute on chronic systolic (congestive) heart failure (HCC)  Active Problems:    CHF (congestive heart failure), NYHA class I, acute on chronic, combined ([facility] [address]  Resolved Problems:    * No resolved hospital problems. *        All labs, medications and tests reviewed by myself, continue all other medications of all above medical condition listed as is except for changes mentioned above.     Thank you   Please call with questions. Electronically signed by Neymar Larsen. Luis Garcia, APRN - CNP on 10/3/2022 at 1:01 PM           [address] [facility]    I have seen, spoken to and examined this patient personally, independent of the NP/PAC. I have reviewed the hospital care given to date and reviewed all pertinent labs and imaging. I have spoken with patient, nursing staff and provided written and verbal instructions . The above note has been reviewed. I have spent substantive amount of time in formulating patient care. Physical Exam:    General:   Awake, alert  Head:normal  Eye:normal  Chest:   ++ Basilar crackles   Cardiovascular:  S1S2   Abdomen: soft   Extremities:  ++ edema  Pulses; palpable      MEDICAL DECISION MAKING :           Acute on Ch.mixed systolic/diastolic heart Failure due to ischemic heart disease: Last Echo shows borderline low LVEF. IV Bumex 2 mg 3 times daily, Zaroxolyn 2.5 mg p.o. daily,   -3.2 L Strick I/O Monitoring, Daily weights and Low salt diet. Patient admits to salt indiscretion. He was advised to limit himself to 1800 mg of salt daily. Ischemic heart disease: Continue with hydralazine 25 mg p.o. 3 times daily, lisinopril 20 mg p.o. daily, Coreg 12.5 mg p.o. daily. Continue with Jardiance 10 mg daily  Hyperlipidemia: Continue with Lipitor 80 mg p.o. daily  Essential hypertension: Blood pressure poorly controlled. Continue with Coreg hydralazine lisinopril and diuresis for now. Increase hydralazine to 50 mg TID     Insulin-dependent diabetes mellitus: Check A1c. On insulin  COPD: Noncompliant with CPAP  Nicotine dependence: Nicotine patch.   Counseled against smoking          Dr. Marisela Figueroa MD

## 2022-10-04 VITALS
DIASTOLIC BLOOD PRESSURE: 94 MMHG | HEART RATE: 66 BPM | TEMPERATURE: 98.1 F | RESPIRATION RATE: 21 BRPM | OXYGEN SATURATION: 97 % | HEIGHT: 75 IN | WEIGHT: 269.5 LBS | SYSTOLIC BLOOD PRESSURE: 145 MMHG | BODY MASS INDEX: 33.51 KG/M2

## 2022-10-04 LAB
ANION GAP SERPL CALCULATED.3IONS-SCNC: 11 MMOL/L (ref 4–16)
BUN BLDV-MCNC: 30 MG/DL (ref 6–23)
CALCIUM SERPL-MCNC: 9 MG/DL (ref 8.3–10.6)
CHLORIDE BLD-SCNC: 98 MMOL/L (ref 99–110)
CO2: 31 MMOL/L (ref 21–32)
CREAT SERPL-MCNC: 2 MG/DL (ref 0.9–1.3)
GFR AFRICAN AMERICAN: 41 ML/MIN/1.73M2
GFR NON-AFRICAN AMERICAN: 34 ML/MIN/1.73M2
GLUCOSE BLD-MCNC: 114 MG/DL (ref 70–99)
GLUCOSE BLD-MCNC: 123 MG/DL (ref 70–99)
GLUCOSE BLD-MCNC: 148 MG/DL (ref 70–99)
INR BLD: 1.34 INDEX
MAGNESIUM: 2.3 MG/DL (ref 1.8–2.4)
POTASSIUM SERPL-SCNC: 3.8 MMOL/L (ref 3.5–5.1)
PROTHROMBIN TIME: 17.3 SECONDS (ref 11.7–14.5)
SODIUM BLD-SCNC: 140 MMOL/L (ref 135–145)

## 2022-10-04 PROCEDURE — 2500000003 HC RX 250 WO HCPCS: Performed by: NURSE PRACTITIONER

## 2022-10-04 PROCEDURE — 6370000000 HC RX 637 (ALT 250 FOR IP): Performed by: NURSE PRACTITIONER

## 2022-10-04 PROCEDURE — 83735 ASSAY OF MAGNESIUM: CPT

## 2022-10-04 PROCEDURE — 80048 BASIC METABOLIC PNL TOTAL CA: CPT

## 2022-10-04 PROCEDURE — 94640 AIRWAY INHALATION TREATMENT: CPT

## 2022-10-04 PROCEDURE — APPSS60 APP SPLIT SHARED TIME 46-60 MINUTES: Performed by: NURSE PRACTITIONER

## 2022-10-04 PROCEDURE — 94664 DEMO&/EVAL PT USE INHALER: CPT

## 2022-10-04 PROCEDURE — 99233 SBSQ HOSP IP/OBS HIGH 50: CPT | Performed by: INTERNAL MEDICINE

## 2022-10-04 PROCEDURE — 2700000000 HC OXYGEN THERAPY PER DAY

## 2022-10-04 PROCEDURE — G0009 ADMIN PNEUMOCOCCAL VACCINE: HCPCS | Performed by: SURGERY

## 2022-10-04 PROCEDURE — 85610 PROTHROMBIN TIME: CPT

## 2022-10-04 PROCEDURE — 2580000003 HC RX 258: Performed by: NURSE PRACTITIONER

## 2022-10-04 PROCEDURE — 6360000002 HC RX W HCPCS: Performed by: SURGERY

## 2022-10-04 PROCEDURE — 94660 CPAP INITIATION&MGMT: CPT

## 2022-10-04 PROCEDURE — 90732 PPSV23 VACC 2 YRS+ SUBQ/IM: CPT | Performed by: SURGERY

## 2022-10-04 PROCEDURE — 82962 GLUCOSE BLOOD TEST: CPT

## 2022-10-04 PROCEDURE — 94761 N-INVAS EAR/PLS OXIMETRY MLT: CPT

## 2022-10-04 PROCEDURE — 36415 COLL VENOUS BLD VENIPUNCTURE: CPT

## 2022-10-04 RX ORDER — HYDRALAZINE HYDROCHLORIDE 50 MG/1
50 TABLET, FILM COATED ORAL 3 TIMES DAILY
Qty: 90 TABLET | Refills: 0 | Status: SHIPPED | OUTPATIENT
Start: 2022-10-04

## 2022-10-04 RX ORDER — HYDRALAZINE HYDROCHLORIDE 50 MG/1
50 TABLET, FILM COATED ORAL 3 TIMES DAILY
Status: ON HOLD | COMMUNITY
End: 2022-10-04 | Stop reason: SDUPTHER

## 2022-10-04 RX ORDER — BUMETANIDE 1 MG/1
2 TABLET ORAL 3 TIMES DAILY
Status: DISCONTINUED | OUTPATIENT
Start: 2022-10-04 | End: 2022-10-04 | Stop reason: HOSPADM

## 2022-10-04 RX ADMIN — EMPAGLIFLOZIN 10 MG: 10 TABLET, FILM COATED ORAL at 08:56

## 2022-10-04 RX ADMIN — PREGABALIN 150 MG: 75 CAPSULE ORAL at 08:55

## 2022-10-04 RX ADMIN — METOLAZONE 2.5 MG: 2.5 TABLET ORAL at 08:55

## 2022-10-04 RX ADMIN — GUAIFENESIN 600 MG: 600 TABLET, EXTENDED RELEASE ORAL at 08:55

## 2022-10-04 RX ADMIN — Medication 2 PUFF: at 07:48

## 2022-10-04 RX ADMIN — PNEUMOCOCCAL VACCINE POLYVALENT 0.5 ML
25; 25; 25; 25; 25; 25; 25; 25; 25; 25; 25; 25; 25; 25; 25; 25; 25; 25; 25; 25; 25; 25; 25 INJECTION, SOLUTION INTRAMUSCULAR; SUBCUTANEOUS at 08:53

## 2022-10-04 RX ADMIN — SODIUM CHLORIDE, PRESERVATIVE FREE 10 ML: 5 INJECTION INTRAVENOUS at 08:56

## 2022-10-04 RX ADMIN — CLOPIDOGREL BISULFATE 75 MG: 75 TABLET ORAL at 08:56

## 2022-10-04 RX ADMIN — ALBUTEROL SULFATE 2 PUFF: 90 AEROSOL, METERED RESPIRATORY (INHALATION) at 07:46

## 2022-10-04 RX ADMIN — Medication 2 PUFF: at 11:32

## 2022-10-04 RX ADMIN — LISINOPRIL 20 MG: 20 TABLET ORAL at 08:55

## 2022-10-04 RX ADMIN — BUMETANIDE 2 MG: 0.25 INJECTION INTRAMUSCULAR; INTRAVENOUS at 08:53

## 2022-10-04 RX ADMIN — MONTELUKAST 10 MG: 10 TABLET, FILM COATED ORAL at 08:55

## 2022-10-04 RX ADMIN — CARVEDILOL 12.5 MG: 6.25 TABLET, FILM COATED ORAL at 08:55

## 2022-10-04 RX ADMIN — HYDRALAZINE HYDROCHLORIDE 50 MG: 50 TABLET, FILM COATED ORAL at 08:55

## 2022-10-04 RX ADMIN — POTASSIUM CHLORIDE 20 MEQ: 1500 TABLET, EXTENDED RELEASE ORAL at 08:55

## 2022-10-04 RX ADMIN — PANTOPRAZOLE SODIUM 40 MG: 40 TABLET, DELAYED RELEASE ORAL at 08:55

## 2022-10-04 ASSESSMENT — ENCOUNTER SYMPTOMS: SHORTNESS OF BREATH: 1

## 2022-10-04 NOTE — PROGRESS NOTES
10/04/22 0410   NIV Type   $NIV $Daily Charge   NIV Started/Stopped On   Equipment Type Autopap   Mode CPAP   Mask Type Nasal pillows   Mask Size Medium   Bonnet size Medium   Settings/Measurements   CPAP/EPAP 4.5 cmH2O   Resp 13   O2 Flow Rate (L/min) 2 L/min   Comfort Level Good   SpO2 94   Patient's Home Machine No

## 2022-10-04 NOTE — PROGRESS NOTES
Cardiology Progress Note     Today's Plan: okay for discharge     Admit Date:  10/2/2022    Consult reason/ Seen today for: CHF     Subjective and  Overnight Events:  Edema and SOB improving. History of Presenting Illness:    Chief complain on admission : 65 y. o.year old who is admitted for CHF. Past medical history:    has a past medical history of Abnormal nuclear stress test, Atrial fibrillation (HCC), CAD (coronary artery disease), CHF (congestive heart failure) ([facility] [address] COPD (chronic obstructive pulmonary disease) ([facility] [address] Decreased cardiac ejection fraction, Diabetes mellitus ([facility] [address] GERD (gastroesophageal reflux disease), H/O cardiovascular stress test, H/O cardiovascular stress test, H/O cardiovascular stress test, H/O cardiovascular stress test, H/O Doppler ultrasound, H/O echocardiogram, H/O percutaneous left heart catheterization, Heart imaging, History of coronary artery stent placement, History of exercise stress test, History of Holter monitoring, History of MRI of brain and brain stem, History of nuclear stress test, Hx of Doppler echocardiogram, Hyperlipidemia, Hypertension, Kidney disease, S/P cardiac catheterization, SOB (shortness of breath), Status post angioplasty with stent, and Wears dentures. Past surgical history:   has a past surgical history that includes Coronary angioplasty with stent; Appendectomy; Femur fracture surgery (1984); Coronary angioplasty with stent (11/13/2013); Colonoscopy; Mandible fracture surgery (1984); Cardiac catheterization (11/13/2013); Cardiac surgery (07/03/2020); Coronary artery bypass graft (N/A, 7/3/2020); Dental surgery; and BIOPSY / LIGATION TEMPORAL ARTERY (Left, 8/17/2020). Social History:   reports that he has been smoking cigarettes. He started smoking about 48 years ago. He has a 46.00 pack-year smoking history.  He has never used smokeless tobacco. He reports that he does not currently use drugs after having used the following drugs: Cocaine. He reports that he does not drink alcohol. Family history:  family history includes Cancer in his brother, father, and mother; Diabetes in his mother; Heart Disease in his father and mother; High Blood Pressure in his father and mother; Stroke in his father and mother. Allergies   Allergen Reactions    Hydromorphone Other (See Comments)     Hallucinations. Review of Systems:  Review of Systems   Respiratory:  Positive for shortness of breath. Cardiovascular:  Positive for leg swelling. Negative for chest pain and palpitations. Musculoskeletal: Negative. Skin: Negative. Neurological:  Negative for dizziness and weakness. All other systems reviewed and are negative. BP (!) 145/94   Pulse 66   Temp 98.1 °F (36.7 °C) (Oral)   Resp 21   Ht 6' 3\" (1.905 m)   Wt 269 lb 8 oz (122.2 kg)   SpO2 97%   BMI 33.69 kg/m²     Intake/Output Summary (Last 24 hours) at 10/4/2022 1226  Last data filed at 10/4/2022 0855  Gross per 24 hour   Intake 1860 ml   Output 4525 ml   Net -2665 ml       Physical Exam:  Physical Exam  Constitutional:       Appearance: He is well-developed. Cardiovascular:      Rate and Rhythm: Normal rate and regular rhythm. Pulses: Intact distal pulses. Dorsalis pedis pulses are 2+ on the right side and 2+ on the left side. Posterior tibial pulses are 2+ on the right side and 2+ on the left side. Heart sounds: Normal heart sounds, S1 normal and S2 normal.   Pulmonary:      Effort: Pulmonary effort is normal.      Breath sounds: Normal breath sounds. Musculoskeletal:         General: Normal range of motion. Right lower leg: Edema present. Left lower leg: Edema present. Skin:     General: Skin is warm and dry. Neurological:      Mental Status: He is alert and oriented to person, place, and time.         Medications:    bumetanide  2 mg Oral TID    warfarin  4.5 mg Oral Daily    hydrALAZINE  50 mg Oral TID    ipratropium  2 puff Inhalation 4x daily    sodium chloride flush  5-40 mL IntraVENous 2 times per day    nicotine  1 patch TransDERmal Daily    insulin lispro  0-4 Units SubCUTAneous TID WC    insulin lispro  0-4 Units SubCUTAneous Nightly    pantoprazole  40 mg Oral Daily    atorvastatin  80 mg Oral Nightly    carvedilol  12.5 mg Oral BID WC    clopidogrel  75 mg Oral Daily    potassium chloride  20 mEq Oral BID WC    lisinopril  20 mg Oral Daily    pregabalin  150 mg Oral BID    lidocaine  1 patch Topical Daily    montelukast  10 mg Oral Daily    guaiFENesin  600 mg Oral BID    empagliflozin  10 mg Oral Daily    metOLazone  2.5 mg Oral Daily      dextrose      sodium chloride       glucose, dextrose bolus **OR** [DISCONTINUED] dextrose bolus, glucagon (rDNA), dextrose, sodium chloride flush, sodium chloride, ondansetron **OR** ondansetron, polyethylene glycol, acetaminophen **OR** acetaminophen, nicotine polacrilex, albuterol sulfate HFA, ipratropium    Lab Data:  CBC:   Recent Labs     10/02/22  2212   WBC 10.2   HGB 10.9*   HCT 35.2*   MCV 89.6        BMP:   Recent Labs     10/02/22  2212 10/04/22  0704    140   K 4.1 3.8    98*   CO2 27 31   BUN 29* 30*   CREATININE 1.8* 2.0*     PT/INR:   Recent Labs     10/02/22  2212 10/03/22  0520 10/04/22  0704   PROTIME 24.0* 22.5* 17.3*   INR 1.85 1.73 1.34     BNP:    Recent Labs     10/02/22  2212   PROBNP 5,467*     TROPONIN:   Recent Labs     10/02/22  2212   TROPONINT 0.016*        Impression:  Principal Problem:    Acute on chronic systolic (congestive) heart failure (HCC)  Active Problems:    CHF (congestive heart failure), NYHA class I, acute on chronic, combined ([facility]  Resolved Problems:    * No resolved hospital problems.  *           All labs, medications and tests reviewed by myself, continue all other medications of all above medical condition listed as is except for changes mentioned above.    Thank you   Please call with questions. Electronically signed by Jey Duncan. Surjit HernandezTREY avery - CNP on 10/4/2022 at 12:26 PM           CARDIOLOGY ATTENDING ADDENDUM    I have seen, spoken to and examined this patient personally, independent of the NP/PAC. I have reviewed the hospital care given to date and reviewed all pertinent labs and imaging. I have spoken with patient, nursing staff and provided written and verbal instructions . The above note has been reviewed. I have spent substantive amount of time in formulating patient care. Physical Exam:    General:   Awake, alert  Head:normal  Eye:normal  Chest:   Clear to auscultation  0 Basilar crackles   Cardiovascular:  S1S2 Abdomen: soft   Extremities:  mild edema  Pulses; palpable      MEDICAL DECISION MAKING :         Acute on Ch.mixed systolic/diastolic heart Failure due to ischemic heart disease: Last Echo shows borderline low LVEF. Change to PO Bumex 2 mg 3 times daily, Zaroxolyn 2.5 mg p.o. daily,   -3.2 L Strick I/O Monitoring, Daily weights and Low salt diet. Patient admits to salt indiscretion. He was advised to limit himself to 1800 mg of salt daily. Ischemic heart disease: Continue with hydralazine 25 mg p.o. 3 times daily, lisinopril 20 mg p.o. daily, Coreg 12.5 mg p.o. daily. Continue with Jardiance 10 mg daily  Hyperlipidemia: Continue with Lipitor 80 mg p.o. daily  Essential hypertension: Blood pressure poorly controlled. Continue with Coreg hydralazine lisinopril and diuresis for now. Continue with hydralazine to 50 mg TID. Insulin-dependent diabetes mellitus: Check A1c. On insulin  COPD: Noncompliant with CPAP  Nicotine dependence: Nicotine patch.   Counseled against smoking        Dr. Ernesto Alford MD

## 2022-10-04 NOTE — PLAN OF CARE
Problem: Discharge Planning  Goal: Discharge to home or other facility with appropriate resources  10/4/2022 1230 by Mao Dempsey RN  Outcome: Completed  10/4/2022 1004 by Mao Dempsey RN  Outcome: Progressing     Problem: Pain  Goal: Verbalizes/displays adequate comfort level or baseline comfort level  10/4/2022 1230 by Mao Dempsey RN  Outcome: Completed  10/4/2022 1004 by Mao Dempsey RN  Outcome: Progressing     Problem: Safety - Adult  Goal: Free from fall injury  10/4/2022 1230 by Mao Dempsey RN  Outcome: Completed  10/4/2022 1004 by Mao Dempsey RN  Outcome: Progressing

## 2022-10-04 NOTE — DISCHARGE SUMMARY
V2.0  Discharge Summary    Name:  Marzena Weldon /Age/Sex: 1960 (65 y.o. male)   Admit Date: 10/2/2022  Discharge Date: 10/4/22    MRN & CSN:  [id number] & [id number] Encounter Date and Time 10/4/22 12:26 PM EDT    Attending:  No att. providers found Discharging Provider: Pravin Rose MD       Hospital Course:     Brief HPI: Marzena Weldon is a 65 y.o. male who presented with shortness of breath. He was seen in [facility] ED and was transferred here and admitted. Brief Problem Based Course: While here he was in the hospital for 2 days and was treated for acute CHF. He was diuresed with IV Bumex. He reports that he was taking his medications. He has sleep apnea and reports that he is tolerant of CPAP. Advised him to follow-up with his pulmonologist and discuss other treatment options for sleep apnea. Also discussed sodium restriction. He was discharged home in stable condition. He reports that he has been working lately and doing some side jobs. Problem list    Acute on Chronic mixed systolic/diastolic heart failure due to ischemic heart disease  -Echo in July showed an EF 45%, septal wall slightly hypokinesis, mildly dilated right ventricle, mild to moderate mitral valve regurgitation, and severe tricuspid regurgitation with RVSP of 60  -Beta-blocker-continue Coreg.   -ACE inhibitor-continue lisinopril  -Spironolactone-not prescribed at this time as cardiology recommends Lasix on metolazone at this time  -Diuretics: He is on Bumex and Zaroxolyn.    -No changes made in meds this admit - was already on hydralazine 50 mg TID as outpatient      CAD s/p CABG x 3 in   -he also had a stent in   -On Plavix and Lipitor-continue    CKD III - last saw Dr. Becka Lopez in 2020 - cr is 2.0 upon dc    TAAA - noted on his problem list by cardiology - date of entry was 2022    COPD - last saw Dr. Kassandra George in     AD - as above    Venous insufficiency - noted in his records    DM  HTN  HLD    Tobacco use - reports he smokes 1 PPD  and that he has a nicotine patch at home      The patient expressed appropriate understanding of, and agreement with the discharge recommendations, medications, and plan. Consults this admission:  IP CONSULT TO HEART FAILURE NURSE/COORDINATOR  IP CONSULT TO DIETITIAN  IP CONSULT TO CARDIOLOGY  IP CONSULT TO PHARMACY    Discharge Diagnosis:   Acute on chronic systolic (congestive) heart failure ([address]        Discharge Instruction:   Follow up appointments: cardio  Primary care physician: Heron Morel MD within 2 weeks  Diet: cardiac diet   Activity: activity as tolerated  Disposition: Discharged to:   []Home, []C, []SNF, []Acute Rehab, []Hospice   Condition on discharge: Stable    Labs and Tests to be Followed up as an outpatient by PCP or Specialist: Follow up BMP as outpatient    Discharge Medications:        Medication List        ASK your doctor about these medications      albuterol sulfate  (90 Base) MCG/ACT inhaler  Commonly known as: Proventil HFA  Inhale 2 puffs into the lungs every 6 hours as needed for Wheezing or Shortness of Breath     atorvastatin 80 MG tablet  Commonly known as: LIPITOR  Take 1 tablet by mouth nightly     bumetanide 2 MG tablet  Commonly known as: BUMEX  Take 1 tablet by mouth in the morning, at noon, and at bedtime Take metolazone exactly 30 minutes after the Bumex tablet every day after each dose of Bumex. carvedilol 12.5 MG tablet  Commonly known as: COREG  Take 1 tablet by mouth 2 times daily (with meals)     clopidogrel 75 MG tablet  Commonly known as: PLAVIX     empagliflozin 10 MG tablet  Commonly known as: Jardiance  Take 1 tablet by mouth in the morning.      glipiZIDE 5 MG tablet  Commonly known as: GLUCOTROL     guaiFENesin 600 MG extended release tablet  Commonly known as: MUCINEX  Take 1 tablet by mouth 2 times daily     hydrALAZINE 50 MG tablet  Commonly known as: APRESOLINE  Take 1 tablet by mouth 3 times daily     ipratropium-albuterol 0.5-2.5 (3) MG/3ML Soln nebulizer solution  Commonly known as: DUONEB  Inhale 3 mLs into the lungs 4 times daily     Lidocaine Pain Relief 4 % external patch  Generic drug: lidocaine     lisinopril 20 MG tablet  Commonly known as: PRINIVIL;ZESTRIL     metOLazone 2.5 MG tablet  Commonly known as: ZAROXOLYN  Take 1 tablet by mouth in the morning. Take 1-2 hours after Bumex. Please make sure of this.     montelukast 10 MG tablet  Commonly known as: SINGULAIR  Take 1 tablet by mouth daily     nicotine 14 MG/24HR  Commonly known as: NICODERM CQ  Place 1 patch onto the skin daily     nitroGLYCERIN 0.4 MG SL tablet  Commonly known as: Nitrostat  Place 1 tablet under the tongue every 5 minutes as needed for Chest pain     pantoprazole 40 MG tablet  Commonly known as: PROTONIX     potassium chloride 20 MEQ extended release tablet  Commonly known as: KLOR-CON M     pregabalin 150 MG capsule  Commonly known as: LYRICA     warfarin 4 MG tablet  Commonly known as: Jantoven  Take as directed. If you are unsure how to take this medication, talk to your nurse or doctor. Original instructions: TAKE 1 TABLET BY MOUTH EVERY DAY, EXCEPT TAKE 1 AND 1/2 TABLETS ON SUNDAYS OR AS DIRECTED BY CLINIC     Currently - take 6 mg Sundays and 4 mg the rest of the days - goes to the [facility] clinic        Objective Findings at Discharge:   BP (!) 145/94   Pulse 66   Temp 98.1 °F (36.7 °C) (Oral)   Resp 21   Ht 6' 3\" (1.905 m)   Wt 269 lb 8 oz (122.2 kg)   SpO2 97%   BMI 33.69 kg/m²       Physical Exam:   General: NAD  Eyes: EOMI        Was walking out in [place] and anxious to leave.      Labs and Imaging   XR CHEST PORTABLE    Result Date: 10/3/2022  EXAMINATION: ONE XRAY VIEW OF THE CHEST 10/2/2022 4:32 pm COMPARISON: 07/16/2022 HISTORY: ORDERING SYSTEM PROVIDED HISTORY: chf exacerbation TECHNOLOGIST PROVIDED HISTORY: Reason for exam:->chf exacerbation Reason for Exam: CHF exacerbation FINDINGS: There is cardiomegaly. Median sternotomy wires are noted. Diffuse interstitial opacities noted throughout both lungs suggesting mild interstitial pulmonary edema stable since prior exam.  No pneumothorax or pleural effusion. No new consolidation. Cardiomediastinal silhouette and bony thorax are unchanged. Stable examination with cardiomegaly and interstitial prominence throughout both lungs suggesting CHF. Atypical pneumonia is in the differential.       CBC:   Recent Labs     10/02/22  2212   WBC 10.2   HGB 10.9*        BMP:    Recent Labs     10/02/22  2212 10/04/22  0704    140   K 4.1 3.8    98*   CO2 27 31   BUN 29* 30*   CREATININE 1.8* 2.0*   GLUCOSE 110* 114*     Hepatic:   Recent Labs     10/02/22  2212   AST 22   ALT 24   BILITOT 0.4   ALKPHOS 112     Lipids:   Lab Results   Component Value Date/Time    CHOL 116 10/02/2022 10:12 PM    HDL 32 10/02/2022 10:12 PM    TRIG 125 10/02/2022 10:12 PM     Hemoglobin A1C:   Lab Results   Component Value Date/Time    LABA1C 5.6 10/02/2022 10:27 PM     TSH: No results found for: TSH  Troponin:   Lab Results   Component Value Date/Time    TROPONINT 0.016 10/02/2022 10:12 PM    TROPONINT 0.019 07/15/2022 10:17 AM    TROPONINT 0.050 01/29/2022 08:25 PM     Lactic Acid: No results for input(s): LACTA in the last 72 hours.   BNP:   Recent Labs     10/02/22  2212   PROBNP 5,467*     UA:  Lab Results   Component Value Date/Time    NITRU NEGATIVE 10/02/2022 08:47 PM    COLORU YELLOW 10/02/2022 08:47 PM    WBCUA <1 10/02/2022 08:47 PM    RBCUA NONE SEEN 10/02/2022 08:47 PM    MUCUS RARE 07/04/2020 07:20 PM    TRICHOMONAS NONE SEEN 10/02/2022 08:47 PM    BACTERIA NEGATIVE 10/02/2022 08:47 PM    CLARITYU CLEAR 10/02/2022 08:47 PM    SPECGRAV 1.015 10/02/2022 08:47 PM    LEUKOCYTESUR NEGATIVE 10/02/2022 08:47 PM    UROBILINOGEN NORMAL 10/02/2022 08:47 PM    BILIRUBINUR NEGATIVE 10/02/2022 08:47 PM    BLOODU NEGATIVE 10/02/2022 08:47 PM    KETUA NEGATIVE 10/02/2022 08:47 PM       Time Spent Discharging patient 45 minutes    Electronically signed by Mat Adan MD on 10/4/2022 at 1:28 PM

## 2022-10-13 ENCOUNTER — TELEPHONE (OUTPATIENT)
Dept: PHARMACY | Age: 62
End: 2022-10-13

## 2022-10-13 ENCOUNTER — OFFICE VISIT (OUTPATIENT)
Dept: CARDIOLOGY CLINIC | Age: 62
End: 2022-10-13
Payer: COMMERCIAL

## 2022-10-13 VITALS
OXYGEN SATURATION: 93 % | WEIGHT: 284.2 LBS | DIASTOLIC BLOOD PRESSURE: 74 MMHG | HEIGHT: 75 IN | SYSTOLIC BLOOD PRESSURE: 156 MMHG | HEART RATE: 73 BPM | BODY MASS INDEX: 35.34 KG/M2

## 2022-10-13 DIAGNOSIS — I48.91 ATRIAL FIBRILLATION WITH RAPID VENTRICULAR RESPONSE (HCC): Primary | ICD-10-CM

## 2022-10-13 DIAGNOSIS — I50.43 ACUTE ON CHRONIC COMBINED SYSTOLIC AND DIASTOLIC CONGESTIVE HEART FAILURE (HCC): Primary | ICD-10-CM

## 2022-10-13 PROCEDURE — 3017F COLORECTAL CA SCREEN DOC REV: CPT | Performed by: NURSE PRACTITIONER

## 2022-10-13 PROCEDURE — 1111F DSCHRG MED/CURRENT MED MERGE: CPT | Performed by: NURSE PRACTITIONER

## 2022-10-13 PROCEDURE — G8484 FLU IMMUNIZE NO ADMIN: HCPCS | Performed by: NURSE PRACTITIONER

## 2022-10-13 PROCEDURE — G8427 DOCREV CUR MEDS BY ELIG CLIN: HCPCS | Performed by: NURSE PRACTITIONER

## 2022-10-13 PROCEDURE — 4004F PT TOBACCO SCREEN RCVD TLK: CPT | Performed by: NURSE PRACTITIONER

## 2022-10-13 PROCEDURE — G8417 CALC BMI ABV UP PARAM F/U: HCPCS | Performed by: NURSE PRACTITIONER

## 2022-10-13 PROCEDURE — 99214 OFFICE O/P EST MOD 30 MIN: CPT | Performed by: NURSE PRACTITIONER

## 2022-10-13 RX ORDER — METOLAZONE 2.5 MG/1
2.5 TABLET ORAL DAILY
Qty: 30 TABLET | Refills: 3 | Status: SHIPPED | OUTPATIENT
Start: 2022-10-13 | End: 2022-10-17

## 2022-10-13 RX ORDER — POTASSIUM CHLORIDE 20 MEQ/1
20 TABLET, EXTENDED RELEASE ORAL 2 TIMES DAILY
Qty: 60 TABLET | Refills: 5 | Status: SHIPPED | OUTPATIENT
Start: 2022-10-13 | End: 2022-10-17

## 2022-10-13 RX ORDER — BUMETANIDE 2 MG/1
2 TABLET ORAL 2 TIMES DAILY
Qty: 60 TABLET | Refills: 5 | Status: CANCELLED | OUTPATIENT
Start: 2022-10-13

## 2022-10-13 RX ORDER — POTASSIUM CHLORIDE 20 MEQ/1
20 TABLET, EXTENDED RELEASE ORAL 2 TIMES DAILY
Qty: 60 TABLET | Refills: 5 | Status: CANCELLED | OUTPATIENT
Start: 2022-10-13

## 2022-10-13 RX ORDER — METOLAZONE 2.5 MG/1
2.5 TABLET ORAL DAILY
Qty: 30 TABLET | Refills: 0 | Status: CANCELLED | OUTPATIENT
Start: 2022-10-13 | End: 2022-11-12

## 2022-10-13 RX ORDER — BUMETANIDE 2 MG/1
2 TABLET ORAL 2 TIMES DAILY
Qty: 60 TABLET | Refills: 5 | Status: SHIPPED | OUTPATIENT
Start: 2022-10-13

## 2022-10-13 ASSESSMENT — ENCOUNTER SYMPTOMS
BLOATING: 1
ORTHOPNEA: 0
SHORTNESS OF BREATH: 0

## 2022-10-13 NOTE — TELEPHONE ENCOUNTER
Spoke with pharmacy, rx received but not yet due for refill til 10/21. left message to explain to patient

## 2022-10-13 NOTE — PROGRESS NOTES
10/13/2022  Primary cardiologist: Dr. Edgardo Putnam:   Crow Faustin  is an established 65 y.o.  male here for a follow up on hospital for CHF      SUBJECTIVE/OBJECTIVE:  Crow Faustin is a 65 y.o. male with a history of coronary artery disease, CABG,  PCI of SVG TO RCA , hypertension, diabetes mellitus, dyslipidemia, atrial fibrillation CVI      HPI :   Josiah reports recent hospital with increased shortness of breath noted to be in acute systolic/diastolic heart failure secondary to ischemic heart disease. He states he has noticed increases shortness of breath/ edema and weight gain. Review of Systems   Constitutional: Negative for diaphoresis and malaise/fatigue. Cardiovascular:  Positive for dyspnea on exertion and leg swelling. Negative for chest pain, claudication, irregular heartbeat, near-syncope, orthopnea, palpitations and paroxysmal nocturnal dyspnea. Respiratory:  Negative for shortness of breath. Gastrointestinal:  Positive for bloating. Neurological:  Negative for dizziness and light-headedness. Vitals:    10/13/22 1325 10/13/22 1331   BP: (!) 160/70 (!) 156/74   Site: Left Upper Arm Left Upper Arm   Position: Sitting Sitting   Cuff Size: Large Adult Large Adult   Pulse: 73    SpO2: 93%    Weight: 284 lb 3.2 oz (128.9 kg)    Height: 6' 3\" (1.905 m)      No flowsheet data found. Wt Readings from Last 3 Encounters:   10/13/22 284 lb 3.2 oz (128.9 kg)   10/04/22 269 lb 8 oz (122.2 kg)   09/20/22 283 lb (128.4 kg)     Body mass index is 35.52 kg/m². Physical Exam  Vitals reviewed. Constitutional:       Appearance: He is obese. HENT:      Head: Normocephalic and atraumatic. Mouth/Throat:      Mouth: Mucous membranes are moist.   Eyes:      Extraocular Movements: Extraocular movements intact. Pupils: Pupils are equal, round, and reactive to light. Neck:      Vascular: No carotid bruit. Cardiovascular:      Rate and Rhythm: Normal rate and regular rhythm. Pulses: Normal pulses. Pulmonary:      Effort: Pulmonary effort is normal.      Breath sounds: Rales present. Abdominal:      General: There is no distension. Palpations: Abdomen is soft. Tenderness: There is no abdominal tenderness. Musculoskeletal:         General: Normal range of motion. Cervical back: No tenderness. Right lower leg: Edema present. Left lower leg: Edema present. Skin:     General: Skin is warm and dry. Capillary Refill: Capillary refill takes less than 2 seconds. Neurological:      General: No focal deficit present. Mental Status: He is alert and oriented to person, place, and time. Psychiatric:         Mood and Affect: Mood normal.         Behavior: Behavior normal.              Current Outpatient Medications   Medication Sig Dispense Refill    hydrALAZINE (APRESOLINE) 50 MG tablet Take 1 tablet by mouth 3 times daily 90 tablet 0    glipiZIDE (GLUCOTROL) 5 MG tablet Take 5 mg by mouth 2 times daily (before meals)      nicotine (NICODERM CQ) 14 MG/24HR Place 1 patch onto the skin daily 42 patch 0    bumetanide (BUMEX) 2 MG tablet Take 1 tablet by mouth in the morning, at noon, and at bedtime Take metolazone exactly 30 minutes after the Bumex tablet every day after each dose of Bumex. 30 tablet 3    empagliflozin (JARDIANCE) 10 MG tablet Take 1 tablet by mouth in the morning.  90 tablet 0    warfarin (JANTOVEN) 4 MG tablet TAKE 1 TABLET BY MOUTH EVERY DAY, EXCEPT TAKE 1 AND 1/2 TABLETS ON SUNDAYS OR AS DIRECTED BY CLINIC 100 tablet 1    carvedilol (COREG) 12.5 MG tablet Take 1 tablet by mouth 2 times daily (with meals) 60 tablet 5    montelukast (SINGULAIR) 10 MG tablet Take 1 tablet by mouth daily 30 tablet 3    guaiFENesin (MUCINEX) 600 MG extended release tablet Take 1 tablet by mouth 2 times daily 60 tablet 5    ipratropium-albuterol (DUONEB) 0.5-2.5 (3) MG/3ML SOLN nebulizer solution Inhale 3 mLs into the lungs 4 times daily 360 mL 5    clopidogrel (PLAVIX) 75 MG tablet TAKE 1 TABLET BY MOUTH DAILY. pantoprazole (PROTONIX) 40 MG tablet TAKE 1 TABLET BY MOUTH EVERY DAY      potassium chloride (KLOR-CON M) 20 MEQ extended release tablet TAKE 2 TABLETS BY MOUTH EVERY DAY      lisinopril (PRINIVIL;ZESTRIL) 20 MG tablet TAKE 1 TABLET BY MOUTH DAILY      pregabalin (LYRICA) 150 MG capsule 150 mg 2 times daily. LIDOCAINE PAIN RELIEF 4 % external patch APPLY 1 PATCH EXTERNALLY EVERY DAY      nitroGLYCERIN (NITROSTAT) 0.4 MG SL tablet Place 1 tablet under the tongue every 5 minutes as needed for Chest pain 25 tablet 3    albuterol sulfate HFA (PROVENTIL HFA) 108 (90 Base) MCG/ACT inhaler Inhale 2 puffs into the lungs every 6 hours as needed for Wheezing or Shortness of Breath 18 g 0    atorvastatin (LIPITOR) 80 MG tablet Take 1 tablet by mouth nightly 30 tablet 3    metOLazone (ZAROXOLYN) 2.5 MG tablet Take 1 tablet by mouth in the morning. Take 1-2 hours after Bumex. Please make sure of this. 30 tablet 0     No current facility-administered medications for this visit. All pertinent data reviewed and discussed with patient       ASSESSMENT/PLAN:    Combined systolic / diastolic heart failure   Acute decompensated heart failure- reported he was not given a RX for diuretic -   Start Bumex 2 mg bid : start metolazone 2.5 mg 30 minutes prior to bumex: start potassium bid   BMP in 2 weeks  Reports he is talking jardiance coreg and hydralazine   Refer to CHF clinic      Coronary artery disease  Stable - denies cp  Continue coreg and plavix       Hypertension  Not well controlled- add bumex and metolazone   Continue with lisinopril- and hydralazine  Return to clinic in 1 mo to reassess     Hyperlipidemia  10/2022- HDL 32 LDL 59  Near goal :  HDL is low  Continue atorvastatin       Tests ordered:  bmp   Follow-up  1 mo   Refer to [facility]      Signed:  TREY Valverde CNP, 10/13/2022, 1:33 PM    An electronic signature was used to authenticate this note.     Please note this

## 2022-10-13 NOTE — TELEPHONE ENCOUNTER
Patient returned call. No open appointments Monday. Patient to have blood draw at [facility] tomorrow. INR ordered and patient instructed to make sure he informs registration of all orders he needs. Informed results will be called to him from [facility] office.    For Pharmacy Admin Tracking Only    Intervention Detail: Lab(s) Ordered  Total # of Interventions Recommended: 1  Total # of Interventions Accepted: 1  Time Spent (min): 5

## 2022-10-14 ENCOUNTER — TELEPHONE (OUTPATIENT)
Dept: PHARMACY | Age: 62
End: 2022-10-14

## 2022-10-14 LAB
BUN BLDV-MCNC: 31 MG/DL
CALCIUM SERPL-MCNC: 9.8 MG/DL
CHLORIDE BLD-SCNC: 102 MMOL/L
CO2: 27 MMOL/L
CREAT SERPL-MCNC: 1.99 MG/DL
GFR CALCULATED: NORMAL
GLUCOSE BLD-MCNC: 41 MG/DL
POTASSIUM SERPL-SCNC: 3.9 MMOL/L
SODIUM BLD-SCNC: 145 MMOL/L

## 2022-10-14 NOTE — TELEPHONE ENCOUNTER
Patient no show to lab today for INR check. Called patient. Leave VM to reschedule.     For Pharmacy Admin Tracking Only    Time Spent (min): 5

## 2022-10-14 NOTE — TELEPHONE ENCOUNTER
Patient returned call. He states he will go to lab on Monday.     For Pharmacy Admin Tracking Only    Time Spent (min): 5

## 2022-10-17 ENCOUNTER — TELEPHONE (OUTPATIENT)
Dept: PHARMACY | Age: 62
End: 2022-10-17

## 2022-10-17 NOTE — TELEPHONE ENCOUNTER
Patient no show to lab today. Contacted patient. Scheduled for Monday 10/24.     For Pharmacy Admin Tracking Only    Time Spent (min): 5

## 2022-10-24 ENCOUNTER — ANTI-COAG VISIT (OUTPATIENT)
Dept: PHARMACY | Age: 62
End: 2022-10-24
Payer: COMMERCIAL

## 2022-10-24 LAB
INTERNATIONAL NORMALIZATION RATIO, POC: 1.4
POC INR: 1.4 INDEX
PROTHROMBIN TIME, POC: 16.2 SECONDS (ref 10–14.3)

## 2022-10-24 PROCEDURE — 85610 PROTHROMBIN TIME: CPT

## 2022-10-24 PROCEDURE — 36416 COLLJ CAPILLARY BLOOD SPEC: CPT

## 2022-10-24 PROCEDURE — 99212 OFFICE O/P EST SF 10 MIN: CPT

## 2022-10-24 NOTE — PROGRESS NOTES
Medication Management Service  [facility]  [phone]    Visit Date: 10/24/2022   Subjective:       Luz Maria Cabezas is a 65 y.o. male who presents to clinic today for anticoagulation monitoring and adjustment. Patient seen in clinic for warfarin management due to  Indication:   atrial fibrillation. INR goal: of 2.0-3.0. Duration of therapy: indefinite. Patient reports the following:   Adherent with regimen  Missed or extra doses:  missed doses, not sure of dates  Bleeding or thromboembolic side effects:  None  Significant medication changes:  None  Significant dietary changes: started low carb diet  Significant alcohol or tobacco changes: None  Significant recent illness, disease state changes, or hospitalization:  None  Upcoming surgeries or procedures:  None  Falls: fell out of chair           Assessment and Plan     PT/INR done in office per protocol. INR today is 1.4, subtherapeutic. Plan:  Take warfarin 6mg daily for 2 days then will continue current regimen of warfarin 4mg daily except 6mg on Sundays. Recheck INR in 1 week(s). Patient verbalized understanding of dosing directions and information discussed. Dosing schedule given to patient including phone number, appointment date, and time. Progress note sent to referring office. Patient acknowledges working in consult agreement with pharmacist as referred by his/her physician.       Electronically signed by Anabela Estrella, [facility] on 10/24/22 at 12:00 PM EDT  [address] Only    Intervention Detail: Dose Adjustment: 1, reason: Therapy Optimization  Total # of Interventions Recommended: 1  Total # of Interventions Accepted: 1  Time Spent (min): 15

## 2022-10-25 ENCOUNTER — APPOINTMENT (OUTPATIENT)
Dept: GENERAL RADIOLOGY | Age: 62
End: 2022-10-25
Payer: COMMERCIAL

## 2022-10-25 ENCOUNTER — HOSPITAL ENCOUNTER (EMERGENCY)
Age: 62
Discharge: HOME | End: 2022-10-25
Attending: EMERGENCY MEDICINE
Payer: COMMERCIAL

## 2022-10-25 VITALS
DIASTOLIC BLOOD PRESSURE: 123 MMHG | WEIGHT: 273 LBS | HEART RATE: 64 BPM | TEMPERATURE: 97.7 F | OXYGEN SATURATION: 96 % | SYSTOLIC BLOOD PRESSURE: 156 MMHG | HEIGHT: 75 IN | RESPIRATION RATE: 20 BRPM | BODY MASS INDEX: 33.94 KG/M2

## 2022-10-25 DIAGNOSIS — S50.02XA TRAUMATIC HEMATOMA OF ELBOW, LEFT, INITIAL ENCOUNTER: Primary | ICD-10-CM

## 2022-10-25 LAB
INR BLD: 1.25 INDEX
PROTHROMBIN TIME: 16.1 SECONDS (ref 11.7–14.5)

## 2022-10-25 PROCEDURE — 73090 X-RAY EXAM OF FOREARM: CPT

## 2022-10-25 PROCEDURE — 99284 EMERGENCY DEPT VISIT MOD MDM: CPT

## 2022-10-25 PROCEDURE — 73080 X-RAY EXAM OF ELBOW: CPT

## 2022-10-25 PROCEDURE — 73060 X-RAY EXAM OF HUMERUS: CPT

## 2022-10-25 RX ORDER — HYDROCODONE BITARTRATE AND ACETAMINOPHEN 5; 325 MG/1; MG/1
1 TABLET ORAL EVERY 6 HOURS PRN
Qty: 10 TABLET | Refills: 0 | Status: SHIPPED | OUTPATIENT
Start: 2022-10-25 | End: 2022-10-28

## 2022-10-25 NOTE — ED PROVIDER NOTES
[address] ENCOUNTER      Pt Name: Regla Butt  MRN: [id number]  [place] 1960  Date of evaluation: 10/25/2022  Provider: Nick Preston MD    CHIEF COMPLAINT       Chief Complaint   Patient presents with    Reese Wilhelm out of his recliner last Saturday night. Did hit head on the carpet and his right elbow. Only c/o right elbow pain. Does have a small abrasion on his forehead. Sts he does take blood thinners. HISTORY OF PRESENT ILLNESS    HPI    Nursing Notes were reviewed. Regla Butt is a 65 y.o. male who presents to the emergency department after a fall and left arm pain. 43-year-old man who comes to emergency department 3 days after falling asleep in his recliner and then falling forward out of the recliner and landing on his face and left arm. Radha Keita 3 days ago and at that time, says he had minimal symptoms in his left arm. However, he began to have progressively worse pain in his left elbow starting approximately 24 hours ago. Is located primarily on the dorsal aspect of the left elbow. He is able to move the arm, however with some difficulty due to the pain. He says he did land on his face and has some abrasions on his face, however he denies any new headache. He denies neck pain. He denies any other symptoms other than pain in his left elbow. The patient is currently taking Coumadin. REVIEW OF SYSTEMS       Review of Systems    10 Systems were reviewed with this patient and all were negative with exception of those noted in the history of present illness above.       PAST MEDICAL HISTORY     Past Medical History:   Diagnosis Date    Abnormal nuclear stress test     Atrial fibrillation ([facility] 11/2013    CAD (coronary artery disease)     Follows with Dr. Shira Hughes    CHF (congestive heart failure) (HCC)     COPD (chronic obstructive pulmonary disease) ([facility]     Follows with PCP    Decreased cardiac ejection fraction     Diabetes mellitus ([facility]     GERD (gastroesophageal reflux disease)     H/O cardiovascular stress test 11/20/13, 3/20/2013    11/13-Observed defect is consistent with diaphragmatic attenuation. EF 58%. 3/13 -EF 51%. No ischemia. Normal Study. H/O cardiovascular stress test 06/08/2017    EF 50% normal study    H/O cardiovascular stress test 06/17/2019    Normal NM    H/O cardiovascular stress test 03/02/2021    depressed lvedf increased edv myocardial perfusion abnormal stress test    H/O Doppler ultrasound 10/14/2010    10/2010-Bilateral venous doppler of lower extremies- No DVTs. H/O echocardiogram 06/26/2019    Left ventricular systolic function is normal with an ejection fraction of 50-55%. Mild concentric left ventricular hypertrophy. Grade I diastolic dysfunction. No significant valvular disease noted. No evidence of pericardial effusion. Essentially unremarkable echo. H/O percutaneous left heart catheterization 12/22/2015    No evidence of hemodynamically significant coronary artery disease    Heart imaging 04/23/2020    muga - ef 58%    History of coronary artery stent placement 11/13/2013 11/13 -RCA - 3 stents placed    History of exercise stress test 06/08/2017    treadmill    History of Holter monitoring 01/04/2016    predominant rhythm sinus    History of MRI of brain and brain stem 06/04/2020    No evidence of an acute infarct, mod chronic microvascular white matter ischemic disease with associated cerebral parenchymal volume loss    History of nuclear stress test 02/24/2020    EF 38%,Myocardial perfusion scan shows moderate size, severe intensity, non  reversible perfusion defect in inferior wall. Hx of Doppler echocardiogram 01/26/2022    EF 45-50% Mod LV hypertrophy. Grade 1 diastolic dysfunction. Mod MR and TR. Dilation of the aortic root and ascending aorta.     Hyperlipidemia     Hypertension     Follows with PCP    Kidney disease     Dr. Gavin Galarza    S/P cardiac catheterization 11/13/2013 11/13/2013-EF 55%, distal LAD mild disease,CX stent patent,but distal to stent 50% stenosis. RCA severe disease. SOB (shortness of breath)     Status post angioplasty with stent     Wears dentures     full upper denture         SURGICAL HISTORY       Past Surgical History:   Procedure Laterality Date    APPENDECTOMY      BIOPSY / LIGATION TEMPORAL ARTERY Left 8/17/2020    LEFT TEMPORAL ARTERY BIOPSY LIGATION performed by John Simpson MD at [address] [facility]  11/13/2013 11/13/2013-EF 55%, distal LAD mild disease,CX stent patent,but distal to stent 50% stenosis. RCA severe disease. CARDIAC SURGERY  07/03/2020    CABG X 3 LIMA-OM-PDA-LAD, Maze, LT Atrial Appendage clipping    COLONOSCOPY      CORONARY ANGIOPLASTY WITH STENT PLACEMENT      4 stents- RCA X3; CX X1    CORONARY ANGIOPLASTY WITH STENT PLACEMENT  11/13/2013 11/13/2013 -3 stents placed to RCA- Dr Stanford Taveras N/A 7/3/2020    INTRAOPERATIVE ISMAEL, CABG X3   WITH LIMA  AND LEFT LEG ENDOVEIN HARVEST, INDUCED HYPOTHERMIA, MAZE BIPOLAR AND CRYO PROCEDURE, LEFT ATRIAL APPENDAGE CLIPPING performed by Alma Burciaga MD at [address] [place] [address]      all teeth extracted    [address]         CURRENT MEDICATIONS       Previous Medications    ALBUTEROL SULFATE HFA (PROVENTIL HFA) 108 (90 BASE) MCG/ACT INHALER    Inhale 2 puffs into the lungs every 6 hours as needed for Wheezing or Shortness of Breath    ATORVASTATIN (LIPITOR) 80 MG TABLET    Take 1 tablet by mouth nightly    BUMETANIDE (BUMEX) 2 MG TABLET    Take 1 tablet by mouth 2 times daily    CARVEDILOL (COREG) 12.5 MG TABLET    Take 1 tablet by mouth 2 times daily (with meals)    CLOPIDOGREL (PLAVIX) 75 MG TABLET    TAKE 1 TABLET BY MOUTH DAILY. EMPAGLIFLOZIN (JARDIANCE) 10 MG TABLET    Take 1 tablet by mouth in the morning.     GLIPIZIDE (GLUCOTROL) 5 MG TABLET    Take 5 mg by mouth 2 times daily (before meals)    HYDRALAZINE (APRESOLINE) 50 MG TABLET    Take 1 tablet by mouth 3 times daily    IPRATROPIUM-ALBUTEROL (DUONEB) 0.5-2.5 (3) MG/3ML SOLN NEBULIZER SOLUTION    Inhale 3 mLs into the lungs 4 times daily    LISINOPRIL (PRINIVIL;ZESTRIL) 20 MG TABLET    TAKE 1 TABLET BY MOUTH DAILY    METFORMIN (GLUCOPHAGE) 500 MG TABLET    TAKE 1 TABLET BY MOUTH TWO TIMES A DAY WITH MEALS    METOLAZONE (ZAROXOLYN) 2.5 MG TABLET    Take 1 tablet by mouth in the morning. Take 1-2 hours after Bumex. Please make sure of this. NICOTINE (NICODERM CQ) 14 MG/24HR    Place 1 patch onto the skin daily    NITROGLYCERIN (NITROSTAT) 0.4 MG SL TABLET    Place 1 tablet under the tongue every 5 minutes as needed for Chest pain    PANTOPRAZOLE (PROTONIX) 40 MG TABLET    TAKE 1 TABLET BY MOUTH EVERY DAY    PREGABALIN (LYRICA) 150 MG CAPSULE    150 mg 2 times daily. WARFARIN (JANTOVEN) 4 MG TABLET    TAKE 1 TABLET BY MOUTH EVERY DAY, EXCEPT TAKE 1 AND 1/2 TABLETS ON SUNDAYS OR AS DIRECTED BY CLINIC       ALLERGIES     Hydromorphone    FAMILY HISTORY       Family History   Problem Relation Age of Onset    Cancer Mother     Diabetes Mother     Heart Disease Mother     High Blood Pressure Mother     Stroke Mother     Cancer Father     Heart Disease Father     High Blood Pressure Father     Stroke Father     Cancer Brother           SOCIAL HISTORY       Social History     Socioeconomic History    Marital status: Legally      Spouse name: None    Number of children: None    Years of education: None    Highest education level: None   Occupational History    Occupation:    Tobacco Use    Smoking status: Every Day     Packs/day: 0.50     Years: 46.00     Pack years: 23.00     Types: Cigarettes     Start date: 1974    Smokeless tobacco: Never   Vaping Use    Vaping Use: Never used   Substance and Sexual Activity    Alcohol use: No     Comment: caffeine 2 cups coffee a week.  pop twice a week. austen tea 4 bottles a day    Drug use: Not Currently     Types: Cocaine     Comment: Last used: 7/1/20    Sexual activity: Yes     Partners: Female       SCREENINGS         [place] Coma Scale  Eye Opening: Spontaneous  Best Verbal Response: Oriented  Best Motor Response: Obeys commands  [place] Coma Scale Score: 15                     CIWA Assessment  BP: (!) 156/123  Heart Rate: 64                 PHYSICAL EXAM       ED Triage Vitals [10/25/22 1100]   BP Temp Temp Source Heart Rate Resp SpO2 Height Weight   109/74 97.7 °F (36.5 °C) Oral 62 18 95 % 6' 3\" (1.905 m) 273 lb (123.8 kg)       Physical Exam  Vitals and nursing note reviewed. Constitutional:       General: He is not in acute distress. Appearance: Normal appearance. He is not ill-appearing, toxic-appearing or diaphoretic. HENT:      Head: Normocephalic. Mouth/Throat:      Mouth: Mucous membranes are moist.   Eyes:      Extraocular Movements: Extraocular movements intact. Cardiovascular:      Rate and Rhythm: Normal rate and regular rhythm. Pulses: Normal pulses. Heart sounds: Normal heart sounds. Pulmonary:      Effort: Pulmonary effort is normal.      Breath sounds: Normal breath sounds. Abdominal:      Palpations: Abdomen is soft. Musculoskeletal:      Left elbow: Swelling and effusion present. No lacerations. Decreased range of motion. Tenderness present. Cervical back: Normal range of motion and neck supple. No rigidity or tenderness. Lymphadenopathy:      Cervical: No cervical adenopathy. Skin:     General: Skin is warm and dry. Capillary Refill: Capillary refill takes less than 2 seconds. Neurological:      General: No focal deficit present. Mental Status: He is alert and oriented to person, place, and time.    Psychiatric:         Mood and Affect: Mood normal.         Behavior: Behavior normal.       DIAGNOSTIC RESULTS       RADIOLOGY:   Non-plain film images such as CT, Ultrasound and MRI are read by the radiologist. Plain radiographic images are visualized and preliminarily interpreted by the emergency physician with the below findings:    Interpretation per the Radiologist below, if available at the time of this note:    XR HUMERUS LEFT (MIN 2 VIEWS)   Final Result   No acute osseous abnormality. XR ELBOW LEFT (MIN 3 VIEWS)   Final Result   No acute osseous abnormality. XR RADIUS ULNA LEFT (2 VIEWS)   Final Result   No acute osseous abnormality. LABS:  Labs Reviewed   PROTIME-INR - Abnormal; Notable for the following components:       Result Value    Protime 16.1 (*)     All other components within normal limits       All other labs were within normal range or not returned as of this dictation. EMERGENCY DEPARTMENT COURSE and DIFFERENTIAL DIAGNOSIS/MDM:   Vitals:    Vitals:    10/25/22 1100 10/25/22 1336 10/25/22 1338   BP: 109/74 (!) 156/123    Pulse: 62 64    Resp: 18 20    Temp: 97.7 °F (36.5 °C)     TempSrc: Oral     SpO2: 95% (!) 88% 96%   Weight: 273 lb (123.8 kg)     Height: 6' 3\" (1.905 m)         MDM  This patient is acute injury to the left elbow. This will be evaluated with x-rays. The patient is on Coumadin and the possibility of hematoma due to significantly elevated INR needs to be considered. INR will be evaluated/verified in this patient with laboratory testing. REASSESSMENT      Fracture injury identified on x-ray imaging, however clinical signs and symptoms are consistent with hematoma possible hemarthrosis. This is likely secondary to the patient's use of Coumadin and Plavix and traumatic injury to the elbow. There is no indication for additional work-up or evaluation in the emergency department, however the patient does require a sling for comfort. He has been given referral information for orthopedics. I have encouraged him to follow-up with orthopedics soon as possible.   I Had a detailed conversation with the patient regarding etiology of his symptoms and the need for orthopedic follow-up. I have encouraged to come back to emergency department if he has any worsening symptoms including increasing swelling, increasing pain, paresthesias, numbness, weakness or any other concerning symptoms. CONSULTS:  None    PROCEDURES:  Unless otherwise noted below, none     Procedures      FINAL IMPRESSION      1. Traumatic hematoma of elbow, left, initial encounter          DISPOSITION/PLAN   DISPOSITION Decision To Discharge 10/25/2022 02:14:37 PM      PATIENT REFERRED TO:  Arvin Anand MD  [address] [facility]  [phone]    Call in 1 day      [place], [address] [facility] [address] [facility]  [phone]    Call in 1 day  Orthopedics    DISCHARGE MEDICATIONS:  New Prescriptions    HYDROCODONE-ACETAMINOPHEN (NORCO) 5-325 MG PER TABLET    Take 1 tablet by mouth every 6 hours as needed for Pain for up to 3 days. Intended supply: 3 days. Take lowest dose possible to manage pain     Controlled Substances Monitoring:     No flowsheet data found.     Leighton Barclay MD (electronically signed)  Attending Emergency Physician           Leighton Barclay MD  10/25/22 [address] MD  10/25/22 0255

## 2022-10-25 NOTE — ED TRIAGE NOTES
Fall three days ago out of his recliner after fall asleep while sitting up. Pt sts he has right elbow pain and elbow is a little swollen. Pt also reports he hit his head on the floor. Pt does have an abrasion on his forehead. Denies any LOC, but does report blood thinners daily.

## 2022-10-25 NOTE — CARE COORDINATION
Pt noted for possible readmission. Pt recently admitted 10/2-10/4 for SOB due to acute CHF. Pt has PCP, insurance and independent in ADL's. Pt DME includes home O2. Pt returns today with complaints of a fall three days ago. Pt reports elbow pain and hitting head on the floor. Pt was evaluated and given sling. Pt d/c home at this time.

## 2022-10-26 ENCOUNTER — OFFICE VISIT (OUTPATIENT)
Dept: ORTHOPEDIC SURGERY | Age: 62
End: 2022-10-26
Payer: COMMERCIAL

## 2022-10-26 VITALS
DIASTOLIC BLOOD PRESSURE: 47 MMHG | OXYGEN SATURATION: 95 % | BODY MASS INDEX: 34.12 KG/M2 | HEIGHT: 75 IN | HEART RATE: 67 BPM | SYSTOLIC BLOOD PRESSURE: 118 MMHG

## 2022-10-26 DIAGNOSIS — M70.22 OLECRANON BURSITIS OF LEFT ELBOW: Primary | ICD-10-CM

## 2022-10-26 PROCEDURE — 3074F SYST BP LT 130 MM HG: CPT | Performed by: ORTHOPAEDIC SURGERY

## 2022-10-26 PROCEDURE — 1111F DSCHRG MED/CURRENT MED MERGE: CPT | Performed by: ORTHOPAEDIC SURGERY

## 2022-10-26 PROCEDURE — G8427 DOCREV CUR MEDS BY ELIG CLIN: HCPCS | Performed by: ORTHOPAEDIC SURGERY

## 2022-10-26 PROCEDURE — 4004F PT TOBACCO SCREEN RCVD TLK: CPT | Performed by: ORTHOPAEDIC SURGERY

## 2022-10-26 PROCEDURE — G8482 FLU IMMUNIZE ORDER/ADMIN: HCPCS | Performed by: ORTHOPAEDIC SURGERY

## 2022-10-26 PROCEDURE — G8417 CALC BMI ABV UP PARAM F/U: HCPCS | Performed by: ORTHOPAEDIC SURGERY

## 2022-10-26 PROCEDURE — 3017F COLORECTAL CA SCREEN DOC REV: CPT | Performed by: ORTHOPAEDIC SURGERY

## 2022-10-26 PROCEDURE — 99203 OFFICE O/P NEW LOW 30 MIN: CPT | Performed by: ORTHOPAEDIC SURGERY

## 2022-10-26 PROCEDURE — 3078F DIAST BP <80 MM HG: CPT | Performed by: ORTHOPAEDIC SURGERY

## 2022-10-26 ASSESSMENT — ENCOUNTER SYMPTOMS
EYE REDNESS: 0
COLOR CHANGE: 0
SHORTNESS OF BREATH: 0
ABDOMINAL PAIN: 0

## 2022-10-26 NOTE — PROGRESS NOTES
Patient seen in office today for L elbow hematoma. Patient fell out of recliner and hit elbow and face on 10/26/22. Went to ER. Stated 7/10 sharp pain level today. Observed elbow flex/ext WNL. No injuries or numbness/tingling in elbow. Having trouble setting elbow down on table or surfaces. X-rays taken at ER 10/25/22. Impression   No acute osseous abnormality.

## 2022-10-26 NOTE — PROGRESS NOTES
Subjective:      Patient ID: Marvin Szymanski is a 65 y.o. male. Patient seen in office today for L elbow hematoma. Patient fell out of recliner and hit elbow and face on 10/26/22. Went to ER. Stated 7/10 sharp pain level today. Observed elbow flex/ext WNL. No injuries or numbness/tingling in elbow. Having trouble setting elbow down on table or surfaces. X-rays taken at ER 10/25/22. Impression  No acute osseous abnormality. He comes in today for evaluation of his left elbow pain. He states that he fell yesterday out of his chair and landed onto his left elbow and face. He states that since the injury he continues having a dull, aching pain primarily along the posterior and medial aspect of his left elbow. Patient denies any prior injury to the involved extremity/ joint, denies numbness or tingling in the involved extremity and denies fever or chills. Review of Systems   Constitutional:  Negative for activity change, chills and fever. HENT:  Negative for congestion and drooling. Eyes:  Negative for redness. Respiratory:  Negative for shortness of breath. Cardiovascular:  Negative for chest pain. Gastrointestinal:  Negative for abdominal pain. Endocrine: Negative for cold intolerance and heat intolerance. Musculoskeletal:  Positive for arthralgias, joint swelling and myalgias. Negative for gait problem. Skin:  Negative for color change, pallor, rash and wound. Neurological:  Negative for weakness and numbness. Psychiatric/Behavioral:  Negative for confusion.       Past Medical History:   Diagnosis Date    Abnormal nuclear stress test     At[facility] 11/2013    CAD (coronary artery disease)     Follows with Dr. Martir Hermna    CHF (congestive heart failure) (HCC)     COPD (chronic obstructive pulmonary disease) ([facility]     Follows with PCP    Decreased cardiac ejection fraction     Diabetes mellitus ([facility]     GERD (gastroesophageal reflux disease)     H/O cardiovascular stress test 11/20/13, 3/20/2013    11/13-Observed defect is consistent with diaphragmatic attenuation. EF 58%. 3/13 -EF 51%. No ischemia. Normal Study. H/O cardiovascular stress test 06/08/2017    EF 50% normal study    H/O cardiovascular stress test 06/17/2019    Normal NM    H/O cardiovascular stress test 03/02/2021    depressed lvedf increased edv myocardial perfusion abnormal stress test    H/O Doppler ultrasound 10/14/2010    10/2010-Bilateral venous doppler of lower extremies- No DVTs. H/O echocardiogram 06/26/2019    Left ventricular systolic function is normal with an ejection fraction of 50-55%. Mild concentric left ventricular hypertrophy. Grade I diastolic dysfunction. No significant valvular disease noted. No evidence of pericardial effusion. Essentially unremarkable echo. H/O percutaneous left heart catheterization 12/22/2015    No evidence of hemodynamically significant coronary artery disease    Heart imaging 04/23/2020    muga - ef 58%    History of coronary artery stent placement 11/13/2013 11/13 -RCA - 3 stents placed    History of exercise stress test 06/08/2017    treadmill    History of Holter monitoring 01/04/2016    predominant rhythm sinus    History of MRI of brain and brain stem 06/04/2020    No evidence of an acute infarct, mod chronic microvascular white matter ischemic disease with associated cerebral parenchymal volume loss    History of nuclear stress test 02/24/2020    EF 38%,Myocardial perfusion scan shows moderate size, severe intensity, non  reversible perfusion defect in inferior wall. Hx of Doppler echocardiogram 01/26/2022    EF 45-50% Mod LV hypertrophy. Grade 1 diastolic dysfunction. Mod MR and TR. Dilation of the aortic root and ascending aorta.     Hyperlipidemia     Hypertension     Follows with PCP    Kidney disease     Dr. Armin Anne    S/P cardiac catheterization 11/13/2013 11/13/2013-EF 55%, distal LAD mild disease,CX stent patent,but distal to stent 50% stenosis. RCA severe disease. SOB (shortness of breath)     Status post angioplasty with stent     Wears dentures     full upper denture       Objective:   Physical Exam  Constitutional:       Appearance: He is well-developed. HENT:      Head: Normocephalic and atraumatic. Nose: No congestion. Eyes:      Pupils: Pupils are equal, round, and reactive to light. Pulmonary:      Effort: Pulmonary effort is normal.   Musculoskeletal:         General: Swelling and tenderness present. No deformity. Normal range of motion. Right elbow: No swelling, deformity, effusion or lacerations. Normal range of motion. No tenderness. No radial head, medial epicondyle, lateral epicondyle or olecranon process tenderness. Left elbow: Swelling present. No deformity, effusion or lacerations. Normal range of motion. Tenderness present in medial epicondyle and olecranon process. No radial head or lateral epicondyle tenderness. Right wrist: Normal.      Left wrist: Normal.      Cervical back: Normal range of motion. Skin:     General: Skin is warm and dry. Capillary Refill: Capillary refill takes less than 2 seconds. Coloration: Skin is not pale. Findings: No erythema or rash. Neurological:      Mental Status: He is alert and oriented to person, place, and time. Left elbow-Skin intact with no erythema, ecchymosis or lacerations present. Mild swelling in the olecranon bursa and mild swelling along the medial aspect of the elbow. Full range of motion, no mechanical block to range of motion. XRAY  X-ray 3 views of the left elbow from October 25, 2022 reviewed by me today in the office demonstrates age appropriate bone density throughout with normal joint space, no subluxation or dislocation noted, no acute osseous abnormalities. Assessment:      Left elbow olecranon bursitis  Left elbow contusion      Plan:      I discussed with him today his x-ray findings.   I explained to him that he does have a contusion of his elbow as well as early olecranon bursitis. I explained to him that most of the time this will resolve in the next 4 to 6 weeks. If his pain persists or worsens, we could consider surgical excision. Continue weight-bearing as tolerated. Continue range of motion exercises as instructed. Ice and elevate as needed. Tylenol or Motrin for pain. Follow up as needed.             Miroslava Masterson, DO

## 2022-10-27 ENCOUNTER — OFFICE VISIT (OUTPATIENT)
Dept: CARDIOLOGY CLINIC | Age: 62
End: 2022-10-27
Payer: COMMERCIAL

## 2022-10-27 ENCOUNTER — HOSPITAL ENCOUNTER (OUTPATIENT)
Age: 62
Discharge: HOME | End: 2022-10-27
Payer: COMMERCIAL

## 2022-10-27 VITALS
HEIGHT: 75 IN | WEIGHT: 277 LBS | BODY MASS INDEX: 34.44 KG/M2 | OXYGEN SATURATION: 93 % | RESPIRATION RATE: 18 BRPM | SYSTOLIC BLOOD PRESSURE: 130 MMHG | HEART RATE: 77 BPM | DIASTOLIC BLOOD PRESSURE: 80 MMHG

## 2022-10-27 DIAGNOSIS — I50.22 CHRONIC SYSTOLIC CONGESTIVE HEART FAILURE (HCC): Primary | ICD-10-CM

## 2022-10-27 LAB
ANION GAP SERPL CALCULATED.3IONS-SCNC: 13 MMOL/L (ref 4–16)
BUN BLDV-MCNC: 63 MG/DL (ref 6–23)
CALCIUM SERPL-MCNC: 9.3 MG/DL (ref 8.3–10.6)
CHLORIDE BLD-SCNC: 98 MMOL/L (ref 99–110)
CO2: 30 MMOL/L (ref 21–32)
CREAT SERPL-MCNC: 2.3 MG/DL (ref 0.9–1.3)
GFR SERPL CREATININE-BSD FRML MDRD: 32 ML/MIN/1.73M2
GLUCOSE BLD-MCNC: 83 MG/DL (ref 70–99)
POTASSIUM SERPL-SCNC: 3.8 MMOL/L (ref 3.5–5.1)
PRO-BNP: 450.5 PG/ML
SODIUM BLD-SCNC: 141 MMOL/L (ref 135–145)

## 2022-10-27 PROCEDURE — 80048 BASIC METABOLIC PNL TOTAL CA: CPT

## 2022-10-27 PROCEDURE — 3074F SYST BP LT 130 MM HG: CPT | Performed by: NURSE PRACTITIONER

## 2022-10-27 PROCEDURE — G8417 CALC BMI ABV UP PARAM F/U: HCPCS | Performed by: NURSE PRACTITIONER

## 2022-10-27 PROCEDURE — 36415 COLL VENOUS BLD VENIPUNCTURE: CPT

## 2022-10-27 PROCEDURE — 4004F PT TOBACCO SCREEN RCVD TLK: CPT | Performed by: NURSE PRACTITIONER

## 2022-10-27 PROCEDURE — 3078F DIAST BP <80 MM HG: CPT | Performed by: NURSE PRACTITIONER

## 2022-10-27 PROCEDURE — 83880 ASSAY OF NATRIURETIC PEPTIDE: CPT

## 2022-10-27 PROCEDURE — 3017F COLORECTAL CA SCREEN DOC REV: CPT | Performed by: NURSE PRACTITIONER

## 2022-10-27 PROCEDURE — G8427 DOCREV CUR MEDS BY ELIG CLIN: HCPCS | Performed by: NURSE PRACTITIONER

## 2022-10-27 PROCEDURE — 99214 OFFICE O/P EST MOD 30 MIN: CPT | Performed by: NURSE PRACTITIONER

## 2022-10-27 PROCEDURE — G8482 FLU IMMUNIZE ORDER/ADMIN: HCPCS | Performed by: NURSE PRACTITIONER

## 2022-10-27 PROCEDURE — 1111F DSCHRG MED/CURRENT MED MERGE: CPT | Performed by: NURSE PRACTITIONER

## 2022-10-27 RX ORDER — FERROUS SULFATE 325(65) MG
325 TABLET ORAL 2 TIMES DAILY
Qty: 60 TABLET | Refills: 5 | Status: SHIPPED | OUTPATIENT
Start: 2022-10-27

## 2022-10-27 ASSESSMENT — ENCOUNTER SYMPTOMS
COUGH: 0
SHORTNESS OF BREATH: 1

## 2022-10-27 NOTE — PROGRESS NOTES
CHF CLINICAL STAFF DOCUMENTATION    Heart failure education reviewed with patient and post visit instructions added to AVS.     Have you had any Chest Pain? - No    Do you had any Shortness of Breath - Yes  If Yes - When on exertion    Have you had any dizziness - No  When do you feel dizzy none   How long does it last .none  none     Do you have any edema -  Yes  swelling in   toes        Are you on fluid restrictions - No    Amount - 64 oz   Are you on sodium restrictions - Yes   Amount - 2 gm - not adding salt- reeducated     Do you feel fatigued - Yes    Do you have a cough - Yes    Lung sounds -    Normal - No   Abnormal - expiratory wheezing    Do you have abdominal bloating - Yes    How is your appetite - good    Do you have difficulty sleeping - Yes  Able to lie flat? - Yes  sleeps in recliner    Do you have a history of sleep apnea - Yes - not using machine (disposed of it)  CPAP getting retested on 11/15     6 min.  Walk:   Pre heart rate  77  Time walked  3 minutes     Stopped early d/t c/o leg pain and being off balance  Distance  400 feet    Post heart rate  78       Have you had Flu Vaccine - Yes    Have you had Pneumonia Vaccine - Yes

## 2022-10-27 NOTE — PROGRESS NOTES
[address]      Visit Date: 10/27/2022  Cardiologist:  Dr. Kale Ramírez  Primary Care Physician: Dr. Carlos Alberto Kramer MD    Silvino Verdin is a 65 y.o. male who presents today for:  CC:   1. Chronic systolic congestive heart failure (HCC)        HPI:   Silvino Verdin is a 65 y.o. male who presents to the office for a new patient visit in the heart failure clinic. He has been in the hospital twice in the last 30 days for CHF. He does not keep track of his sodium or fluid intake. He states that his wife is tracking his sodium. He does drink a lot of fluid. Mostly     Chief Complaint   Patient presents with    Congestive Heart Failure     Recent hospitalization 10/2/22     Patient has:  Last hospital admission related to Heart Failure:  10/2/2022   baseline      baseline weight 275       Vaccinations:    flu Yes  pneumonia Yes  COVID 19 Yes      Device: No       Activity: poor  Can you walk 1-2 blocks or do a moderate amount of house/yard work? No    NYHA Class: III   Class I   Patients with no limitation of activities; they suffer no symptoms from ordinary activities. Class II   Patients with slight, mild limitation of activity; they are comfortable with rest or with mild exertion. Class III   Patients with marked limitation of activity; they are comfortable only at rest.   Class IV   Patients who should be at complete rest, confined to bed or chair; any physical activity brings on discomfort and symptoms occur at rest.    Sodium Restrictions: 2g  Fluid Restrictions: 48-64 oz/day  Sodium and fluid restriction compliance: he states that his wife controls his sodium intake. He does drink a lot of fluid.        Past Medical History:   Diagnosis Date    Abnormal nuclear stress test     Atrial fibrillation ([facility] [address] 11/2013    CAD (coronary artery disease)     Follows with Dr. Kale Ramírez    CHF (congestive heart failure) (HCC)     COPD (chronic obstructive pulmonary disease) ([facility] [address] Follows with PCP    Decreased cardiac ejection fraction     Diabetes mellitus ([address]     GERD (gastroesophageal reflux disease)     H/O cardiovascular stress test 11/20/13, 3/20/2013    11/13-Observed defect is consistent with diaphragmatic attenuation. EF 58%. 3/13 -EF 51%. No ischemia. Normal Study. H/O cardiovascular stress test 06/08/2017    EF 50% normal study    H/O cardiovascular stress test 06/17/2019    Normal NM    H/O cardiovascular stress test 03/02/2021    depressed lvedf increased edv myocardial perfusion abnormal stress test    H/O Doppler ultrasound 10/14/2010    10/2010-Bilateral venous doppler of lower extremies- No DVTs. H/O echocardiogram 06/26/2019    Left ventricular systolic function is normal with an ejection fraction of 50-55%. Mild concentric left ventricular hypertrophy. Grade I diastolic dysfunction. No significant valvular disease noted. No evidence of pericardial effusion. Essentially unremarkable echo. H/O percutaneous left heart catheterization 12/22/2015    No evidence of hemodynamically significant coronary artery disease    Heart imaging 04/23/2020    muga - ef 58%    History of coronary artery stent placement 11/13/2013 11/13 -RCA - 3 stents placed    History of exercise stress test 06/08/2017    treadmill    History of Holter monitoring 01/04/2016    predominant rhythm sinus    History of MRI of brain and brain stem 06/04/2020    No evidence of an acute infarct, mod chronic microvascular white matter ischemic disease with associated cerebral parenchymal volume loss    History of nuclear stress test 02/24/2020    EF 38%,Myocardial perfusion scan shows moderate size, severe intensity, non  reversible perfusion defect in inferior wall. Hx of Doppler echocardiogram 01/26/2022    EF 45-50% Mod LV hypertrophy. Grade 1 diastolic dysfunction. Mod MR and TR. Dilation of the aortic root and ascending aorta.     Hyperlipidemia     Hypertension     Follows with PCP    Kidney disease Dr. Armin Anne    S/P cardiac catheterization 11/13/2013 11/13/2013-EF 55%, distal LAD mild disease,CX stent patent,but distal to stent 50% stenosis. RCA severe disease. SOB (shortness of breath)     Status post angioplasty with stent     Wears dentures     full upper denture     Past Surgical History:   Procedure Laterality Date    APPENDECTOMY      BIOPSY / LIGATION TEMPORAL ARTERY Left 8/17/2020    LEFT TEMPORAL ARTERY BIOPSY LIGATION performed by Anderson Forman MD [facility]  11/13/2013 11/13/2013-EF 55%, distal LAD mild disease,CX stent patent,but distal to stent 50% stenosis. RCA severe disease. CARDIAC SURGERY  07/03/2020    CABG X 3 LIMA-OM-PDA-LAD, Maze, LT Atrial Appendage clipping    COLONOSCOPY      CORONARY ANGIOPLASTY WITH STENT PLACEMENT      4 stents- RCA X3; CX X1    CORONARY ANGIOPLASTY WITH STENT PLACEMENT  11/13/2013 11/13/2013 -3 stents placed to RCA- Dr Brady Dover N/A 7/3/2020    INTRAOPERATIVE ISMAEL, CABG X3   WITH LIMA  AND LEFT LEG ENDOVEIN HARVEST, INDUCED HYPOTHERMIA, MAZE BIPOLAR AND CRYO PROCEDURE, LEFT ATRIAL APPENDAGE CLIPPING performed by Maite Warren MD at [address] [place] [address]      all teeth extracted    [address]      Family History   Problem Relation Age of Onset    Cancer Mother     Diabetes Mother     Heart Disease Mother     High Blood Pressure Mother     Stroke Mother     Cancer Father     Heart Disease Father     High Blood Pressure Father     Stroke Father     Cancer Brother      Social History     Tobacco Use    Smoking status: Every Day     Packs/day: 1.00     Years: 46.00     Pack years: 46.00     Types: Cigarettes     Start date: 1974    Smokeless tobacco: Never   Substance Use Topics    Alcohol use: No     Comment: caffeine 2 cups coffee a day. pop twice a week.  austen tea 4 bottles a day     Current Outpatient Medications   Medication Sig Dispense Refill    HYDROcodone-acetaminophen (NORCO) 5-325 MG per tablet Take 1 tablet by mouth every 6 hours as needed for Pain for up to 3 days. Intended supply: 3 days. Take lowest dose possible to manage pain 10 tablet 0    metFORMIN (GLUCOPHAGE) 500 MG tablet TAKE 1 TABLET BY MOUTH TWO TIMES A DAY WITH MEALS      bumetanide (BUMEX) 2 MG tablet Take 1 tablet by mouth 2 times daily 60 tablet 5    hydrALAZINE (APRESOLINE) 50 MG tablet Take 1 tablet by mouth 3 times daily 90 tablet 0    glipiZIDE (GLUCOTROL) 5 MG tablet Take 5 mg by mouth 2 times daily (before meals)      empagliflozin (JARDIANCE) 10 MG tablet Take 1 tablet by mouth in the morning. 90 tablet 0    metOLazone (ZAROXOLYN) 2.5 MG tablet Take 1 tablet by mouth in the morning. Take 1-2 hours after Bumex. Please make sure of this. 30 tablet 0    warfarin (JANTOVEN) 4 MG tablet TAKE 1 TABLET BY MOUTH EVERY DAY, EXCEPT TAKE 1 AND 1/2 TABLETS ON SUNDAYS OR AS DIRECTED BY CLINIC 100 tablet 1    carvedilol (COREG) 12.5 MG tablet Take 1 tablet by mouth 2 times daily (with meals) 60 tablet 5    ipratropium-albuterol (DUONEB) 0.5-2.5 (3) MG/3ML SOLN nebulizer solution Inhale 3 mLs into the lungs 4 times daily 360 mL 5    clopidogrel (PLAVIX) 75 MG tablet TAKE 1 TABLET BY MOUTH DAILY. pantoprazole (PROTONIX) 40 MG tablet TAKE 1 TABLET BY MOUTH EVERY DAY      lisinopril (PRINIVIL;ZESTRIL) 20 MG tablet TAKE 1 TABLET BY MOUTH DAILY      pregabalin (LYRICA) 150 MG capsule 150 mg 2 times daily.       nitroGLYCERIN (NITROSTAT) 0.4 MG SL tablet Place 1 tablet under the tongue every 5 minutes as needed for Chest pain 25 tablet 3    albuterol sulfate HFA (PROVENTIL HFA) 108 (90 Base) MCG/ACT inhaler Inhale 2 puffs into the lungs every 6 hours as needed for Wheezing or Shortness of Breath 18 g 0    atorvastatin (LIPITOR) 80 MG tablet Take 1 tablet by mouth nightly 30 tablet 3    nicotine (NICODERM CQ) 14 MG/24HR Place 1 patch onto the skin daily (Patient not taking: Reported on 10/27/2022) 42 patch 0     No current facility-administered medications for this visit. Allergies   Allergen Reactions    Hydromorphone Other (See Comments)     Hallucinations. SUBJECTIVE:     Review of Systems   Constitutional:  Negative for fatigue and fever. Respiratory:  Positive for shortness of breath. Negative for cough. Cardiovascular:  Negative for chest pain, palpitations and leg swelling. Musculoskeletal:  Negative for arthralgias and gait problem. Leg pain   Neurological:  Negative for dizziness, syncope, weakness, light-headedness and headaches. OBJECTIVE:   Today's Vitals:  /80 (Site: Right Upper Arm, Position: Sitting, Cuff Size: Large Adult)   Pulse 77   Resp 18   Ht 6' 3\" (1.905 m)   Wt 277 lb (125.6 kg)   SpO2 93%   BMI 34.62 kg/m²     Wt Readings from Last 3 Encounters:   10/27/22 277 lb (125.6 kg)   10/25/22 273 lb (123.8 kg)   10/17/22 279 lb 12.8 oz (126.9 kg)     BP Readings from Last 3 Encounters:   10/27/22 130/80   10/26/22 (!) 118/47   10/25/22 (!) 156/123     Pulse Readings from Last 3 Encounters:   10/27/22 77   10/26/22 67   10/25/22 64     Body mass index is 34.62 kg/m². EXAM  Physical Exam  Vitals reviewed. Constitutional:       General: He is not in acute distress. Appearance: Normal appearance. He is obese. He is not ill-appearing. HENT:      Head: Atraumatic. Neck:      Vascular: No carotid bruit. Cardiovascular:      Rate and Rhythm: Normal rate and regular rhythm. Pulses: Normal pulses. Heart sounds: Murmur heard. Pulmonary:      Effort: Pulmonary effort is normal. No respiratory distress. Breath sounds: Normal breath sounds. Musculoskeletal:         General: No swelling or deformity. Cervical back: Neck supple. No muscular tenderness. Neurological:      Mental Status: He is alert.          Most recent ECHO:   7/2022  Summary   Technically difficult examination due to uncooperative patient. Left ventricular systolic function is low normal.   Ejection fraction is visually estimated at 45%. Septal wall appears slightly hypokinetic. Moderate bi-atrial enlargement. Mildly dilated right ventricle. Mild-moderate mitral regurgitation. Severe tricuspid regurgitation; RVSP: 60 mmHg. No evidence of any pericardial effusion. Dilated aortic root (4.6 cm). Inferior vena cava is dilated, measuring at 2.8 cm, and does not collapse   with respiration.     Results reviewed:  BNP:   Lab Results   Component Value Date    BNP 17 03/09/2013    PROBNP 5,467 (H) 10/02/2022     CBC:   Lab Results   Component Value Date/Time    WBC 10.2 10/02/2022 10:12 PM    RBC 3.93 10/02/2022 10:12 PM    HGB 10.9 10/02/2022 10:12 PM    HCT 35.2 10/02/2022 10:12 PM     10/02/2022 10:12 PM     CMP:    Lab Results   Component Value Date/Time     10/14/2022 12:00 AM    K 3.9 10/14/2022 12:00 AM     10/14/2022 12:00 AM    CO2 27 10/14/2022 12:00 AM    BUN 31 10/14/2022 12:00 AM    CREATININE 1.99 10/14/2022 12:00 AM    GFRAA 41 10/04/2022 07:04 AM    LABGLOM 34 10/04/2022 07:04 AM    GLUCOSE 41 10/14/2022 12:00 AM    CALCIUM 9.8 10/14/2022 12:00 AM     Hepatic Function Panel:    Lab Results   Component Value Date/Time    ALKPHOS 112 10/02/2022 10:12 PM    ALT 24 10/02/2022 10:12 PM    AST 22 10/02/2022 10:12 PM    PROT 6.6 10/02/2022 10:12 PM    PROT 7.7 03/09/2013 11:30 PM    BILITOT 0.4 10/02/2022 10:12 PM    BILIDIR 0.2 07/31/2019 06:15 AM    IBILI 0.3 07/31/2019 06:15 AM    LABALBU 4.0 10/02/2022 10:12 PM     Magnesium:    Lab Results   Component Value Date/Time    MG 2.3 10/04/2022 07:04 AM     PT/INR:    Lab Results   Component Value Date/Time    PROTIME 16.1 10/25/2022 11:30 AM    PROTIME 16.2 10/24/2022 12:01 PM    INR 1.25 10/25/2022 11:30 AM     Lipids:    Lab Results   Component Value Date/Time    TRIG 125 10/02/2022 10:12 PM    HDL 32 10/02/2022 10:12 PM    LDLCALC 59 10/02/2022 10:12 PM LDLDIRECT 111 01/06/2020 04:06 AM       Iron Studies:  No components found for: FE,  TIBC,  FERRITIN    Iron Deficiency Anemia:  Yes IV Iron Therapy:  will try iron PO first  2017 ACC/AHA HF Guidelines:   intravenous iron replacement in patients with New York Heart Association (NYHA) class II and III HF and iron deficiency (ferritin <100 ng/ml or 100-300 ng/ml if transferrin saturation <20%), to improve functional status and QoL. ASSESSMENT AND PLAN:   Chronic systolic congestive heart failure (HCC)  -     Basic Metabolic Panel; Future  -     Brain Natriuretic Peptide; Future  Beta blocker carvedilol. ACE/ARB/ARNi?lisinopril (Prinivil)     Is patient a candidate for transition to [facility] after discharge? No    If not why? ? Costly. Will try to get approved from insurance  On diuretic? bumetanide (BUMEX) 2 mg BID  metolazone (ZAROXOLYN) 2.5 mg QD - (give 30 minutes before any loop diuretic)  IF NYHA class II-IV with eGFR >30/mil/min/173.m2, K <5.0 mEq/l and EF < 35% Aldactone? No   If not why? Renal dysfunction   ICD counseling: No EF > 45 %  SLGT2 inhibitor? Yes   Continue with daily weights- to bring in records on each office visit  Fluid restriction of 2 Liters per day  Limit sodium in diet to around 3422-2003 mg/day  Monitor BP at home- to bring in records on each office visit   Increase exercise as able    Referal to cardiac rehab if < 40% NA          Patient was instructed to call the [facility] for changes in the following symptoms:   Weight gain of 3 pounds in 1 day or 5 pounds in 1 week  Increased shortness of breath  Shortness of breath while laying down  Cough  Chest pain  Swelling in feet, ankles or legs  Tenderness or bloating in the abdomen  Fatigue   Decreased appetite or nausea   Confusion      Return in about 1 month (around 11/27/2022). or sooner if needed     Patient given educational materials - see patient instructions.    We discussed the importance of weighing oneself and recording daily. We also discussed the importance of a lowsodium diet, higher sodium foods to avoid and better low sodium food options. Discussed use, benefit, and side effects of prescribed medications. All patient questions answered. Patient verbalizes understanding of plan of care using teach back method, and is agreeable to the treatment plan.      Copy of note to be sent to consulting provider and primary cardiologist   Electronicallysigned by TREY Johnson CNP on 10/27/2022 at 2:35 PM

## 2022-10-27 NOTE — PATIENT INSTRUCTIONS
HEART FAILURE INSTRUCTIONS:         MEDICATIONS:  Please notify the the heart failure clinic R.N. or your doctor if you are not able to take your medications for any reason. WEIGHT MONITORING:   Weigh yourself  everyday in the morning after urination and record the weight on your weight log. Notify the doctor/clinic nurse of a weight gain of 3 pounds or more in 1 day   OR  a total of 5 pounds or more in 1 week             DIET  Cardiac heart healthy diet- Low saturated / low trans fat, no added salt, caffeine restricted,   Low sodium diet- no  more than 2,000mg (2 grams) of salt / sodium per day (which equals to a little less than  a teaspoon of salt)  The doctor may also recommend a fluid limit -  Fluid restriction- 2,000 ml (milliliters) = 64 ounces = you can have 8 glasses of fluid per day (each glass 8 ounces)    Avoid using salt at the table, avoid / limit use of canned soups, processed / packaged foods, salted snacks, olives and pickles. Do not use a salt substitute without checking with the doctor. (Mrs. Yevgeniy Carolina is safe to use). NOTIFY THE  DOCTOR THE FIRST DAY OF ONSET OF ANY OF THESE   SYMPTOMS:   Weight gain of 3 pounds or more in 1 day         OR 5 pounds or more in one week  More shortness of breath  More swelling in stomach, legs, ankles or feet  Feeling more tired, No energy  Dry hacky cough  Dizziness  More chest pain / discomfort           Please visit  American Heart Association Healthy Living with Heart Failure  at [email or web address]      [facility]  Heart Failure Clinic  POLY UREÑA, R.N.  Phone: [phone] or [place]

## 2022-10-31 ENCOUNTER — ANTI-COAG VISIT (OUTPATIENT)
Dept: PHARMACY | Age: 62
End: 2022-10-31
Payer: COMMERCIAL

## 2022-10-31 LAB
INTERNATIONAL NORMALIZATION RATIO, POC: 1.8
POC INR: 1.8 INDEX
PROTHROMBIN TIME, POC: 21.1 SECONDS (ref 10–14.3)

## 2022-10-31 PROCEDURE — 36416 COLLJ CAPILLARY BLOOD SPEC: CPT

## 2022-10-31 PROCEDURE — 85610 PROTHROMBIN TIME: CPT

## 2022-10-31 PROCEDURE — 99212 OFFICE O/P EST SF 10 MIN: CPT

## 2022-10-31 NOTE — PROGRESS NOTES
Medication Management Service  [facility]  [phone]    Visit Date: 10/31/2022   Subjective:       Ernesto Solis is a 65 y.o. male who presents to clinic today for anticoagulation monitoring and adjustment. Patient seen in clinic for warfarin management due to  Indication:   atrial fibrillation. INR goal: of 2.0-3.0. Duration of therapy: indefinite. Patient reports the following:   Adherent with regimen  Missed or extra doses:  missed Saturday  Bleeding or thromboembolic side effects:  None  Significant medication changes:  None  Significant dietary changes: None  Significant alcohol or tobacco changes: None  Significant recent illness, disease state changes, or hospitalization:  ED visit 10/25  Upcoming surgeries or procedures:  None  Falls: None           Assessment and Plan     PT/INR done in office per protocol. INR today is 1.8, subtherapeutic, due to missed dose. Plan: Take warfarin 6mg tomorrow then  will continue current regimen of warfarin 4mg daily except 6mg on Sundays. Recheck INR in 2 week(s). Patient verbalized understanding of dosing directions and information discussed. Dosing schedule given to patient including phone number, appointment date, and time. Progress note sent to referring office. Patient acknowledges working in consult agreement with pharmacist as referred by his/her physician.       Electronically signed by Suzanne Esteban [facility] on 10/31/22 at 11:36 AM EDT    For Pharmacy Admin Tracking Only    Intervention Detail: Dose Adjustment: 1, reason: Therapy Optimization  Total # of Interventions Recommended: 1  Total # of Interventions Accepted: 1  Time Spent (min): 15

## 2022-11-07 ENCOUNTER — HOSPITAL ENCOUNTER (INPATIENT)
Age: 62
LOS: 1 days | Discharge: LEFT BEFORE DISCHARGE | DRG: 469 | End: 2022-11-08
Attending: SURGERY | Admitting: SURGERY
Payer: MEDICAID

## 2022-11-07 PROBLEM — N17.9 ACUTE KIDNEY INJURY (HCC): Status: ACTIVE | Noted: 2022-11-07

## 2022-11-07 PROBLEM — J44.1 COPD EXACERBATION (HCC): Status: ACTIVE | Noted: 2022-11-07

## 2022-11-07 LAB
ALBUMIN SERPL-MCNC: 4.3 GM/DL (ref 3.4–5)
AMMONIA: 23 UMOL/L (ref 16–60)
AMPHETAMINES: NEGATIVE
ANION GAP SERPL CALCULATED.3IONS-SCNC: 13 MMOL/L (ref 4–16)
BARBITURATE SCREEN URINE: NEGATIVE
BASE EXCESS MIXED: 0.2 (ref 0–1.2)
BASE EXCESS MIXED: 3.1 (ref 0–1.2)
BASOPHILS ABSOLUTE: 0 K/CU MM
BASOPHILS RELATIVE PERCENT: 0.2 % (ref 0–1)
BENZODIAZEPINE SCREEN, URINE: NEGATIVE
BUN BLDV-MCNC: 58 MG/DL (ref 6–23)
CALCIUM SERPL-MCNC: 8.9 MG/DL (ref 8.3–10.6)
CANNABINOID SCREEN URINE: NEGATIVE
CARBON MONOXIDE, BLOOD: 3.1 % (ref 0–5)
CHLORIDE BLD-SCNC: 98 MMOL/L (ref 99–110)
CO2 CONTENT: 31.1 MMOL/L (ref 19–24)
CO2: 26 MMOL/L (ref 21–32)
COCAINE METABOLITE: ABNORMAL
COMMENT: ABNORMAL
COMMENT: ABNORMAL
CREAT SERPL-MCNC: 2.3 MG/DL (ref 0.9–1.3)
DIFFERENTIAL TYPE: ABNORMAL
EOSINOPHILS ABSOLUTE: 0 K/CU MM
EOSINOPHILS RELATIVE PERCENT: 0.4 % (ref 0–3)
GFR SERPL CREATININE-BSD FRML MDRD: 32 ML/MIN/1.73M2
GLUCOSE BLD-MCNC: 121 MG/DL (ref 70–99)
GLUCOSE BLD-MCNC: 142 MG/DL (ref 70–99)
GLUCOSE BLD-MCNC: 235 MG/DL (ref 70–99)
GLUCOSE BLD-MCNC: 378 MG/DL (ref 70–99)
HCO3 ARTERIAL: 29.5 MMOL/L (ref 18–23)
HCO3 VENOUS: 26.9 MMOL/L (ref 19–25)
HCT VFR BLD CALC: 37.1 % (ref 42–52)
HEMOGLOBIN: 11.9 GM/DL (ref 13.5–18)
IMMATURE NEUTROPHIL %: 0.5 % (ref 0–0.43)
INR BLD: 1.38 INDEX
LYMPHOCYTES ABSOLUTE: 0.8 K/CU MM
LYMPHOCYTES RELATIVE PERCENT: 8.9 % (ref 24–44)
MCH RBC QN AUTO: 27.7 PG (ref 27–31)
MCHC RBC AUTO-ENTMCNC: 32.1 % (ref 32–36)
MCV RBC AUTO: 86.3 FL (ref 78–100)
METHEMOGLOBIN ARTERIAL: 0.4 %
MONOCYTES ABSOLUTE: 0.2 K/CU MM
MONOCYTES RELATIVE PERCENT: 2.6 % (ref 0–4)
NUCLEATED RBC %: 0 %
O2 SAT, VEN: 93.4 % (ref 50–70)
O2 SATURATION: 90.2 % (ref 96–97)
OPIATES, URINE: NEGATIVE
OXYCODONE: NEGATIVE
PCO2 ARTERIAL: 51 MMHG (ref 32–45)
PCO2, VEN: 51 MMHG (ref 38–52)
PDW BLD-RTO: 16.6 % (ref 11.7–14.9)
PH BLOOD: 7.37 (ref 7.34–7.45)
PH VENOUS: 7.33 (ref 7.32–7.42)
PHENCYCLIDINE, URINE: NEGATIVE
PHOSPHORUS: 4.1 MG/DL (ref 2.5–4.9)
PLATELET # BLD: 177 K/CU MM (ref 140–440)
PMV BLD AUTO: 10.6 FL (ref 7.5–11.1)
PO2 ARTERIAL: 68 MMHG (ref 75–100)
PO2, VEN: 158 MMHG (ref 28–48)
POTASSIUM SERPL-SCNC: 4 MMOL/L (ref 3.5–5.1)
PROTHROMBIN TIME: 17.8 SECONDS (ref 11.7–14.5)
RBC # BLD: 4.3 M/CU MM (ref 4.6–6.2)
REASON FOR REJECTION: NORMAL
REJECTED TEST: NORMAL
SEGMENTED NEUTROPHILS ABSOLUTE COUNT: 7.4 K/CU MM
SEGMENTED NEUTROPHILS RELATIVE PERCENT: 87.4 % (ref 36–66)
SODIUM BLD-SCNC: 137 MMOL/L (ref 135–145)
TOTAL IMMATURE NEUTOROPHIL: 0.04 K/CU MM
TOTAL NUCLEATED RBC: 0 K/CU MM
TSH HIGH SENSITIVITY: 0.35 UIU/ML (ref 0.27–4.2)
VITAMIN B-12: 554.1 PG/ML (ref 211–911)
WBC # BLD: 8.5 K/CU MM (ref 4–10.5)

## 2022-11-07 PROCEDURE — 99205 OFFICE O/P NEW HI 60 MIN: CPT | Performed by: STUDENT IN AN ORGANIZED HEALTH CARE EDUCATION/TRAINING PROGRAM

## 2022-11-07 PROCEDURE — 82803 BLOOD GASES ANY COMBINATION: CPT

## 2022-11-07 PROCEDURE — 6370000000 HC RX 637 (ALT 250 FOR IP): Performed by: INTERNAL MEDICINE

## 2022-11-07 PROCEDURE — 6370000000 HC RX 637 (ALT 250 FOR IP): Performed by: SURGERY

## 2022-11-07 PROCEDURE — G0378 HOSPITAL OBSERVATION PER HR: HCPCS

## 2022-11-07 PROCEDURE — 83880 ASSAY OF NATRIURETIC PEPTIDE: CPT

## 2022-11-07 PROCEDURE — 80307 DRUG TEST PRSMV CHEM ANLYZR: CPT

## 2022-11-07 PROCEDURE — 84443 ASSAY THYROID STIM HORMONE: CPT

## 2022-11-07 PROCEDURE — 94762 N-INVAS EAR/PLS OXIMTRY CONT: CPT

## 2022-11-07 PROCEDURE — 36600 WITHDRAWAL OF ARTERIAL BLOOD: CPT

## 2022-11-07 PROCEDURE — 80069 RENAL FUNCTION PANEL: CPT

## 2022-11-07 PROCEDURE — 81003 URINALYSIS AUTO W/O SCOPE: CPT

## 2022-11-07 PROCEDURE — G0379 DIRECT REFER HOSPITAL OBSERV: HCPCS

## 2022-11-07 PROCEDURE — 6360000002 HC RX W HCPCS: Performed by: SURGERY

## 2022-11-07 PROCEDURE — 2580000003 HC RX 258: Performed by: SURGERY

## 2022-11-07 PROCEDURE — 83036 HEMOGLOBIN GLYCOSYLATED A1C: CPT

## 2022-11-07 PROCEDURE — 82140 ASSAY OF AMMONIA: CPT

## 2022-11-07 PROCEDURE — 80048 BASIC METABOLIC PNL TOTAL CA: CPT

## 2022-11-07 PROCEDURE — 84145 PROCALCITONIN (PCT): CPT

## 2022-11-07 PROCEDURE — 94761 N-INVAS EAR/PLS OXIMETRY MLT: CPT

## 2022-11-07 PROCEDURE — 82962 GLUCOSE BLOOD TEST: CPT

## 2022-11-07 PROCEDURE — 6370000000 HC RX 637 (ALT 250 FOR IP): Performed by: FAMILY MEDICINE

## 2022-11-07 PROCEDURE — 82805 BLOOD GASES W/O2 SATURATION: CPT

## 2022-11-07 PROCEDURE — 84300 ASSAY OF URINE SODIUM: CPT

## 2022-11-07 PROCEDURE — 85025 COMPLETE CBC W/AUTO DIFF WBC: CPT

## 2022-11-07 PROCEDURE — 85610 PROTHROMBIN TIME: CPT

## 2022-11-07 PROCEDURE — 94640 AIRWAY INHALATION TREATMENT: CPT

## 2022-11-07 PROCEDURE — 2700000000 HC OXYGEN THERAPY PER DAY

## 2022-11-07 PROCEDURE — 82607 VITAMIN B-12: CPT

## 2022-11-07 RX ORDER — WARFARIN SODIUM 4 MG/1
4 TABLET ORAL DAILY
Status: DISCONTINUED | OUTPATIENT
Start: 2022-11-07 | End: 2022-11-07

## 2022-11-07 RX ORDER — INSULIN LISPRO 100 [IU]/ML
10 INJECTION, SOLUTION INTRAVENOUS; SUBCUTANEOUS
Status: DISCONTINUED | OUTPATIENT
Start: 2022-11-08 | End: 2022-11-08 | Stop reason: HOSPADM

## 2022-11-07 RX ORDER — CHOLECALCIFEROL (VITAMIN D3) 125 MCG
5 CAPSULE ORAL NIGHTLY PRN
Status: DISCONTINUED | OUTPATIENT
Start: 2022-11-07 | End: 2022-11-08 | Stop reason: HOSPADM

## 2022-11-07 RX ORDER — ALBUTEROL SULFATE 90 UG/1
2 AEROSOL, METERED RESPIRATORY (INHALATION) 4 TIMES DAILY
Status: DISCONTINUED | OUTPATIENT
Start: 2022-11-07 | End: 2022-11-08 | Stop reason: HOSPADM

## 2022-11-07 RX ORDER — PREDNISONE 10 MG/1
40 TABLET ORAL DAILY
Status: DISCONTINUED | OUTPATIENT
Start: 2022-11-07 | End: 2022-11-08 | Stop reason: HOSPADM

## 2022-11-07 RX ORDER — ONDANSETRON 4 MG/1
4 TABLET, ORALLY DISINTEGRATING ORAL EVERY 8 HOURS PRN
Status: DISCONTINUED | OUTPATIENT
Start: 2022-11-07 | End: 2022-11-08 | Stop reason: HOSPADM

## 2022-11-07 RX ORDER — ACETAMINOPHEN 650 MG/1
650 SUPPOSITORY RECTAL EVERY 6 HOURS PRN
Status: DISCONTINUED | OUTPATIENT
Start: 2022-11-07 | End: 2022-11-08 | Stop reason: HOSPADM

## 2022-11-07 RX ORDER — ACETAMINOPHEN 325 MG/1
650 TABLET ORAL EVERY 6 HOURS PRN
Status: DISCONTINUED | OUTPATIENT
Start: 2022-11-07 | End: 2022-11-08 | Stop reason: HOSPADM

## 2022-11-07 RX ORDER — IPRATROPIUM BROMIDE AND ALBUTEROL SULFATE 2.5; .5 MG/3ML; MG/3ML
1 SOLUTION RESPIRATORY (INHALATION) EVERY 4 HOURS PRN
Status: DISCONTINUED | OUTPATIENT
Start: 2022-11-07 | End: 2022-11-08 | Stop reason: HOSPADM

## 2022-11-07 RX ORDER — INSULIN LISPRO 100 [IU]/ML
0-8 INJECTION, SOLUTION INTRAVENOUS; SUBCUTANEOUS
Status: DISCONTINUED | OUTPATIENT
Start: 2022-11-08 | End: 2022-11-08 | Stop reason: HOSPADM

## 2022-11-07 RX ORDER — AZITHROMYCIN 250 MG/1
500 TABLET, FILM COATED ORAL DAILY
Status: DISCONTINUED | OUTPATIENT
Start: 2022-11-07 | End: 2022-11-08 | Stop reason: HOSPADM

## 2022-11-07 RX ORDER — SODIUM CHLORIDE 0.9 % (FLUSH) 0.9 %
5-40 SYRINGE (ML) INJECTION EVERY 12 HOURS SCHEDULED
Status: DISCONTINUED | OUTPATIENT
Start: 2022-11-07 | End: 2022-11-08 | Stop reason: HOSPADM

## 2022-11-07 RX ORDER — HYDRALAZINE HYDROCHLORIDE 50 MG/1
50 TABLET, FILM COATED ORAL 3 TIMES DAILY
Status: DISCONTINUED | OUTPATIENT
Start: 2022-11-07 | End: 2022-11-08 | Stop reason: HOSPADM

## 2022-11-07 RX ORDER — IPRATROPIUM BROMIDE AND ALBUTEROL SULFATE 2.5; .5 MG/3ML; MG/3ML
1 SOLUTION RESPIRATORY (INHALATION)
Status: DISCONTINUED | OUTPATIENT
Start: 2022-11-07 | End: 2022-11-07

## 2022-11-07 RX ORDER — MONTELUKAST SODIUM 10 MG/1
10 TABLET ORAL NIGHTLY
Status: DISCONTINUED | OUTPATIENT
Start: 2022-11-07 | End: 2022-11-08 | Stop reason: HOSPADM

## 2022-11-07 RX ORDER — ATORVASTATIN CALCIUM 40 MG/1
80 TABLET, FILM COATED ORAL NIGHTLY
Status: DISCONTINUED | OUTPATIENT
Start: 2022-11-07 | End: 2022-11-08 | Stop reason: HOSPADM

## 2022-11-07 RX ORDER — INSULIN GLARGINE 100 [IU]/ML
30 INJECTION, SOLUTION SUBCUTANEOUS NIGHTLY
Status: DISCONTINUED | OUTPATIENT
Start: 2022-11-07 | End: 2022-11-08

## 2022-11-07 RX ORDER — INSULIN LISPRO 100 [IU]/ML
0-4 INJECTION, SOLUTION INTRAVENOUS; SUBCUTANEOUS NIGHTLY
Status: DISCONTINUED | OUTPATIENT
Start: 2022-11-07 | End: 2022-11-07

## 2022-11-07 RX ORDER — INSULIN LISPRO 100 [IU]/ML
10 INJECTION, SOLUTION INTRAVENOUS; SUBCUTANEOUS ONCE
Status: DISCONTINUED | OUTPATIENT
Start: 2022-11-07 | End: 2022-11-08 | Stop reason: HOSPADM

## 2022-11-07 RX ORDER — BENZONATATE 100 MG/1
100 CAPSULE ORAL 3 TIMES DAILY PRN
Status: DISCONTINUED | OUTPATIENT
Start: 2022-11-07 | End: 2022-11-08 | Stop reason: HOSPADM

## 2022-11-07 RX ORDER — GUAIFENESIN 600 MG/1
600 TABLET, EXTENDED RELEASE ORAL 2 TIMES DAILY PRN
Status: DISCONTINUED | OUTPATIENT
Start: 2022-11-07 | End: 2022-11-08 | Stop reason: HOSPADM

## 2022-11-07 RX ORDER — INSULIN GLARGINE 100 [IU]/ML
5 INJECTION, SOLUTION SUBCUTANEOUS ONCE
Status: COMPLETED | OUTPATIENT
Start: 2022-11-07 | End: 2022-11-07

## 2022-11-07 RX ORDER — DEXTROSE MONOHYDRATE 100 MG/ML
INJECTION, SOLUTION INTRAVENOUS CONTINUOUS PRN
Status: DISCONTINUED | OUTPATIENT
Start: 2022-11-07 | End: 2022-11-08 | Stop reason: HOSPADM

## 2022-11-07 RX ORDER — SODIUM CHLORIDE 9 MG/ML
INJECTION, SOLUTION INTRAVENOUS PRN
Status: DISCONTINUED | OUTPATIENT
Start: 2022-11-07 | End: 2022-11-08 | Stop reason: HOSPADM

## 2022-11-07 RX ORDER — NICOTINE 21 MG/24HR
1 PATCH, TRANSDERMAL 24 HOURS TRANSDERMAL DAILY
Status: DISCONTINUED | OUTPATIENT
Start: 2022-11-07 | End: 2022-11-08 | Stop reason: HOSPADM

## 2022-11-07 RX ORDER — ONDANSETRON 2 MG/ML
4 INJECTION INTRAMUSCULAR; INTRAVENOUS EVERY 6 HOURS PRN
Status: DISCONTINUED | OUTPATIENT
Start: 2022-11-07 | End: 2022-11-08 | Stop reason: HOSPADM

## 2022-11-07 RX ORDER — HEPARIN SODIUM 5000 [USP'U]/ML
5000 INJECTION, SOLUTION INTRAVENOUS; SUBCUTANEOUS EVERY 8 HOURS SCHEDULED
Status: DISCONTINUED | OUTPATIENT
Start: 2022-11-07 | End: 2022-11-07

## 2022-11-07 RX ORDER — SODIUM CHLORIDE 0.9 % (FLUSH) 0.9 %
5-40 SYRINGE (ML) INJECTION PRN
Status: DISCONTINUED | OUTPATIENT
Start: 2022-11-07 | End: 2022-11-08 | Stop reason: HOSPADM

## 2022-11-07 RX ORDER — POLYETHYLENE GLYCOL 3350 17 G/17G
17 POWDER, FOR SOLUTION ORAL DAILY PRN
Status: DISCONTINUED | OUTPATIENT
Start: 2022-11-07 | End: 2022-11-08 | Stop reason: HOSPADM

## 2022-11-07 RX ORDER — BUMETANIDE 0.5 MG/1
2 TABLET ORAL 2 TIMES DAILY
Status: DISCONTINUED | OUTPATIENT
Start: 2022-11-07 | End: 2022-11-07

## 2022-11-07 RX ORDER — NALOXONE HYDROCHLORIDE 1 MG/ML
1 INJECTION INTRAMUSCULAR; INTRAVENOUS; SUBCUTANEOUS ONCE
Status: COMPLETED | OUTPATIENT
Start: 2022-11-07 | End: 2022-11-07

## 2022-11-07 RX ORDER — INSULIN LISPRO 100 [IU]/ML
15 INJECTION, SOLUTION INTRAVENOUS; SUBCUTANEOUS
Status: DISCONTINUED | OUTPATIENT
Start: 2022-11-08 | End: 2022-11-07

## 2022-11-07 RX ORDER — CLOPIDOGREL BISULFATE 75 MG/1
75 TABLET ORAL DAILY
Status: DISCONTINUED | OUTPATIENT
Start: 2022-11-07 | End: 2022-11-08 | Stop reason: HOSPADM

## 2022-11-07 RX ORDER — INSULIN LISPRO 100 [IU]/ML
0-4 INJECTION, SOLUTION INTRAVENOUS; SUBCUTANEOUS
Status: DISCONTINUED | OUTPATIENT
Start: 2022-11-07 | End: 2022-11-07

## 2022-11-07 RX ORDER — TRAMADOL HYDROCHLORIDE 50 MG/1
50 TABLET ORAL ONCE
Status: COMPLETED | OUTPATIENT
Start: 2022-11-07 | End: 2022-11-07

## 2022-11-07 RX ORDER — CARVEDILOL 6.25 MG/1
12.5 TABLET ORAL 2 TIMES DAILY WITH MEALS
Status: DISCONTINUED | OUTPATIENT
Start: 2022-11-07 | End: 2022-11-08 | Stop reason: HOSPADM

## 2022-11-07 RX ORDER — BUMETANIDE 0.5 MG/1
1 TABLET ORAL 2 TIMES DAILY
Status: DISCONTINUED | OUTPATIENT
Start: 2022-11-07 | End: 2022-11-08 | Stop reason: HOSPADM

## 2022-11-07 RX ORDER — INSULIN GLARGINE 100 [IU]/ML
10 INJECTION, SOLUTION SUBCUTANEOUS ONCE
Status: DISCONTINUED | OUTPATIENT
Start: 2022-11-07 | End: 2022-11-07

## 2022-11-07 RX ORDER — INSULIN LISPRO 100 [IU]/ML
0-8 INJECTION, SOLUTION INTRAVENOUS; SUBCUTANEOUS
Status: DISCONTINUED | OUTPATIENT
Start: 2022-11-07 | End: 2022-11-08 | Stop reason: HOSPADM

## 2022-11-07 RX ADMIN — CARVEDILOL 12.5 MG: 6.25 TABLET, FILM COATED ORAL at 17:12

## 2022-11-07 RX ADMIN — NALOXONE HYDROCHLORIDE 1 MG: 1 INJECTION, SOLUTION INTRAMUSCULAR; INTRAVENOUS; SUBCUTANEOUS at 04:36

## 2022-11-07 RX ADMIN — CARVEDILOL 12.5 MG: 6.25 TABLET, FILM COATED ORAL at 07:48

## 2022-11-07 RX ADMIN — MONTELUKAST 10 MG: 10 TABLET, FILM COATED ORAL at 21:52

## 2022-11-07 RX ADMIN — INSULIN GLARGINE 5 UNITS: 100 INJECTION, SOLUTION SUBCUTANEOUS at 23:18

## 2022-11-07 RX ADMIN — SODIUM CHLORIDE, PRESERVATIVE FREE 10 ML: 5 INJECTION INTRAVENOUS at 21:52

## 2022-11-07 RX ADMIN — HYDRALAZINE HYDROCHLORIDE 50 MG: 50 TABLET, FILM COATED ORAL at 07:47

## 2022-11-07 RX ADMIN — ATORVASTATIN CALCIUM 80 MG: 40 TABLET, FILM COATED ORAL at 21:52

## 2022-11-07 RX ADMIN — BENZONATATE 100 MG: 100 CAPSULE ORAL at 23:18

## 2022-11-07 RX ADMIN — BUMETANIDE 1 MG: 0.5 TABLET ORAL at 21:52

## 2022-11-07 RX ADMIN — INSULIN LISPRO 4 UNITS: 100 INJECTION, SOLUTION INTRAVENOUS; SUBCUTANEOUS at 17:16

## 2022-11-07 RX ADMIN — AZITHROMYCIN MONOHYDRATE 500 MG: 250 TABLET ORAL at 07:47

## 2022-11-07 RX ADMIN — Medication 2 PUFF: at 22:58

## 2022-11-07 RX ADMIN — ALBUTEROL SULFATE 2 PUFF: 90 AEROSOL, METERED RESPIRATORY (INHALATION) at 22:58

## 2022-11-07 RX ADMIN — ACETAMINOPHEN 650 MG: 325 TABLET ORAL at 17:12

## 2022-11-07 RX ADMIN — WARFARIN SODIUM 4 MG: 4 TABLET ORAL at 18:59

## 2022-11-07 RX ADMIN — CLOPIDOGREL BISULFATE 75 MG: 75 TABLET ORAL at 07:48

## 2022-11-07 RX ADMIN — ACETAMINOPHEN 650 MG: 325 TABLET ORAL at 07:47

## 2022-11-07 RX ADMIN — SODIUM CHLORIDE, PRESERVATIVE FREE 10 ML: 5 INJECTION INTRAVENOUS at 07:48

## 2022-11-07 RX ADMIN — TRAMADOL HYDROCHLORIDE 50 MG: 50 TABLET ORAL at 14:38

## 2022-11-07 RX ADMIN — BUMETANIDE 2 MG: 0.5 TABLET ORAL at 07:47

## 2022-11-07 RX ADMIN — HYDRALAZINE HYDROCHLORIDE 50 MG: 50 TABLET, FILM COATED ORAL at 21:52

## 2022-11-07 RX ADMIN — PREDNISONE 40 MG: 10 TABLET ORAL at 07:47

## 2022-11-07 RX ADMIN — HEPARIN SODIUM 5000 UNITS: 5000 INJECTION INTRAVENOUS; SUBCUTANEOUS at 05:21

## 2022-11-07 ASSESSMENT — PAIN SCALES - GENERAL
PAINLEVEL_OUTOF10: 0
PAINLEVEL_OUTOF10: 10

## 2022-11-07 NOTE — CONSULTS
Nephrology Service Consultation    Patient:  Carolee Garsia  MRN: [id number]  Consulting physician:  Shima Weber MD  Reason for Consult:  chronic kidney disease with acute renal failure    History Obtained From:  patient, electronic medical record  PCP: Maire Bosworth, MD    HISTORY OF PRESENT ILLNESS:   The patient is a 65 y.o. male who presents with  weakness shortness of breath sent from outside ER to [facility] with change in mental status with COPD and positive for cocaine use with shortness of breath. Patient seen by nephrology in the past underlying COPD exacerbation as well initially with confusion which is now improving acute renal failure and above setting with a chronic kidney disease with history of diabetes coronary disease and TIA. Patient stabilized blood pressure increased somewhat confused and renal asked to evaluate in case needs contrasted studies or further work-up. As well as to manage renal insufficiency    Past Medical History:        Diagnosis Date    Abnormal nuclear stress test     Atrial fibrillation ([facility] 11/2013    CAD (coronary artery disease)     Follows with Dr. Sarah Prakash    CHF (congestive heart failure) ([facility])     COPD (chronic obstructive pulmonary disease) ([facility] [address]     Follows with PCP    Decreased cardiac ejection fraction     Diabetes mellitus ([address]     GERD (gastroesophageal reflux disease)     H/O cardiovascular stress test 11/20/13, 3/20/2013    11/13-Observed defect is consistent with diaphragmatic attenuation. EF 58%. 3/13 -EF 51%. No ischemia. Normal Study. H/O cardiovascular stress test 06/08/2017    EF 50% normal study    H/O cardiovascular stress test 06/17/2019    Normal NM    H/O cardiovascular stress test 03/02/2021    depressed lvedf increased edv myocardial perfusion abnormal stress test    H/O Doppler ultrasound 10/14/2010    10/2010-Bilateral venous doppler of lower extremies- No DVTs.     H/O echocardiogram 06/26/2019    Left ventricular systolic function is normal with an ejection fraction of 50-55%. Mild concentric left ventricular hypertrophy. Grade I diastolic dysfunction. No significant valvular disease noted. No evidence of pericardial effusion. Essentially unremarkable echo. H/O percutaneous left heart catheterization 12/22/2015    No evidence of hemodynamically significant coronary artery disease    Heart imaging 04/23/2020    muga - ef 58%    History of coronary artery stent placement 11/13/2013 11/13 -RCA - 3 stents placed    History of exercise stress test 06/08/2017    treadmill    History of Holter monitoring 01/04/2016    predominant rhythm sinus    History of MRI of brain and brain stem 06/04/2020    No evidence of an acute infarct, mod chronic microvascular white matter ischemic disease with associated cerebral parenchymal volume loss    History of nuclear stress test 02/24/2020    EF 38%,Myocardial perfusion scan shows moderate size, severe intensity, non  reversible perfusion defect in inferior wall. Hx of Doppler echocardiogram 01/26/2022    EF 45-50% Mod LV hypertrophy. Grade 1 diastolic dysfunction. Mod MR and TR. Dilation of the aortic root and ascending aorta. Hyperlipidemia     Hypertension     Follows with PCP    Kidney disease     Dr. Earnest Mendoza    S/P cardiac catheterization 11/13/2013 11/13/2013-EF 55%, distal LAD mild disease,CX stent patent,but distal to stent 50% stenosis. RCA severe disease. SOB (shortness of breath)     Status post angioplasty with stent     Wears dentures     full upper denture       Past Surgical History:        Procedure Laterality Date    APPENDECTOMY      BIOPSY / LIGATION TEMPORAL ARTERY Left 8/17/2020    LEFT TEMPORAL ARTERY BIOPSY LIGATION performed by Sim Shen MD at [facility]  11/13/2013 11/13/2013-EF 55%, distal LAD mild disease,CX stent patent,but distal to stent 50% stenosis. RCA severe disease.     CARDIAC SURGERY 07/03/2020    CABG X 3 LIMA-OM-PDA-LAD, Maze, LT Atrial Appendage clipping    COLONOSCOPY      CORONARY ANGIOPLASTY WITH STENT PLACEMENT      4 stents- RCA X3; CX X1    CORONARY ANGIOPLASTY WITH STENT PLACEMENT  11/13/2013 11/13/2013 -3 stents placed to RCA- Dr Ordonez Patches N/A 7/3/2020    INTRAOPERATIVE ISMAEL, CABG X3   WITH LIMA  AND LEFT LEG ENDOVEIN HARVEST, INDUCED HYPOTHERMIA, MAZE BIPOLAR AND CRYO PROCEDURE, LEFT ATRIAL APPENDAGE CLIPPING performed by Thony Pizarro MD at [address] [place] [address]      all teeth extracted    [address]       Medications:   Scheduled Meds:   sodium chloride flush  5-40 mL IntraVENous 2 times per day    atorvastatin  80 mg Oral Nightly    carvedilol  12.5 mg Oral BID WC    clopidogrel  75 mg Oral Daily    hydrALAZINE  50 mg Oral TID    predniSONE  40 mg Oral Daily    azithromycin  500 mg Oral Daily    insulin lispro  0-4 Units SubCUTAneous TID WC    insulin lispro  0-4 Units SubCUTAneous Nightly    bumetanide  1 mg Oral BID    nicotine  1 patch TransDERmal Daily    warfarin  4 mg Oral Daily     Continuous Infusions:   sodium chloride      dextrose       PRN Meds:.sodium chloride flush, sodium chloride, ondansetron **OR** ondansetron, polyethylene glycol, acetaminophen **OR** acetaminophen, glucose, dextrose bolus **OR** dextrose bolus, glucagon (rDNA), dextrose    Allergies:  Hydromorphone    Social History:   TOBACCO:   reports that he has been smoking cigarettes. He started smoking about 48 years ago. He has a 46.00 pack-year smoking history. He has never used smokeless tobacco.  ETOH:   reports no history of alcohol use.   OCCUPATION:      Family History:       Problem Relation Age of Onset    Cancer Mother     Diabetes Mother     Heart Disease Mother     High Blood Pressure Mother     Stroke Mother     Cancer Father     Heart Disease Father     High Blood Pressure Father     Stroke Father     Cancer Brother        REVIEW OF SYSTEMS:  Negative except for  weak tired short of breath anxious confused. Physical Exam:    I/O: 11/06 0701 - 11/07 0700  In: -   Out: 500 [Urine:500]    Vitals: BP (!) 144/85   Pulse 76   Temp 98.3 °F (36.8 °C) (Oral)   Resp 17   Ht 6' 3\" (1.905 m)   Wt 273 lb (123.8 kg)   SpO2 96%   BMI 34.12 kg/m²   General appearance: awake weak  HEENT: Head: Normal, normocephalic, atraumatic. Neck: supple, symmetrical, trachea midline  Lungs: diminished breath sounds bilaterally  Heart: S1, S2 normal  Abdomen: abnormal findings:  soft NT  Extremities: edema trace  Neurologic: Mental status: alertness:  awake but sleepy      CBC:   Recent Labs     11/07/22  1220   WBC 8.5   HGB 11.9*        BMP:    Recent Labs     11/07/22  1220      K 4.0   CL 98*   CO2 26   BUN 58*   CREATININE 2.3*   GLUCOSE 235*     Hepatic: No results for input(s): AST, ALT, ALB, BILITOT, ALKPHOS in the last 72 hours. Troponin: No results for input(s): TROPONINI in the last 72 hours. BNP: No results for input(s): BNP in the last 72 hours. Lipids: No results for input(s): CHOL, HDL in the last 72 hours. Invalid input(s): LDLCALCU  ABGs:   Lab Results   Component Value Date/Time    PO2ART 68 11/07/2022 04:15 AM    BRT7CDJ 51.0 11/07/2022 04:15 AM     INR: No results for input(s): INR in the last 72 hours.   Renal Labs  Albumin:    Lab Results   Component Value Date/Time    LABALBU 4.3 11/07/2022 12:20 PM     Calcium:    Lab Results   Component Value Date/Time    CALCIUM 8.9 11/07/2022 12:20 PM     Phosphorus:    Lab Results   Component Value Date/Time    PHOS 4.1 11/07/2022 12:20 PM     U/A:    Lab Results   Component Value Date/Time    NITRU NEGATIVE 10/02/2022 08:47 PM    COLORU YELLOW 10/02/2022 08:47 PM    WBCUA <1 10/02/2022 08:47 PM    RBCUA NONE SEEN 10/02/2022 08:47 PM    MUCUS RARE 07/04/2020 07:20 PM    TRICHOMONAS NONE SEEN 10/02/2022 08:47 PM    BACTERIA NEGATIVE 10/02/2022 08:47 PM CLARITYU CLEAR 10/02/2022 08:47 PM    SPECGRAV 1.015 10/02/2022 08:47 PM    UROBILINOGEN NORMAL 10/02/2022 08:47 PM    BILIRUBINUR NEGATIVE 10/02/2022 08:47 PM    BLOODU NEGATIVE 10/02/2022 08:47 PM    KETUA NEGATIVE 10/02/2022 08:47 PM     ABG:    Lab Results   Component Value Date/Time    XRL1WMO 51.0 11/07/2022 04:15 AM    PO2ART 68 11/07/2022 04:15 AM    FOR3HXS 29.5 11/07/2022 04:15 AM     HgBA1c:    Lab Results   Component Value Date/Time    LABA1C 5.6 10/02/2022 10:27 PM     Microalbumen/Creatinine ratio:  No components found for: RUCREAT  TSH:  No results found for: TSH  IRON:    Lab Results   Component Value Date/Time    IRON 43 10/02/2022 10:12 PM     Iron Saturation:  No components found for: PERCENTFE  TIBC:    Lab Results   Component Value Date/Time    TIBC 280 10/02/2022 10:12 PM     FERRITIN:  No results found for: FERRITIN  RPR:  No results found for: RPR  BEAN:  No results found for: ANATITER, BEAN  24 Hour Urine for Creatinine Clearance:  No components found for: CREAT4, UHRS10, UTV10  -----------------------------------------------------------------      Assessment and Recommendations     Patient Active Problem List   Diagnosis Code    DM w/o complication type II ([facility] E11.9    CAD (coronary artery disease) I25.10    HTN (hypertension) I10    Smoking F17.200    COPD (chronic obstructive pulmonary disease) ([facility] J44.9    STEMI (ST elevation myocardial infarction) ([facility] I21.3    Nausea R11.0    COPD with acute exacerbation (HCC) J44.1    Acute pulmonary edema (HCC) J81.0    Chronic systolic congestive heart failure (HCC) I50.22    TIA (transient ischemic attack) G45.9    Chronic kidney disease, stage III (moderate) (HCC) N18.30    Temporal pain R51.9    Class 2 obesity due to excess calories without serious comorbidity with body mass index (BMI) of 35.0 to 35.9 in adult E66.09, Z68.35    Decreased cardiac ejection fraction R93.1    NSTEMI (non-ST elevated myocardial infarction) ([facility] I21.4    Ischemic cardiomyopathy I25.5    Mixed hyperlipidemia E78.2    Thoracoabdominal aortic aneurysm (TAAA) without rupture I71.60    Venous reflux I87.2    Varicose veins of both legs with edema I83.893    Acute on chronic respiratory failure with hypoxia ([facility]) J96.21    Acute bronchitis with COPD ([facility]) J44.0, J20.9    Paroxysmal A-fib ([facility]) I48.0    Chronic atrial fibrillation ([facility]) I48.20    Acute on chronic congestive heart failure ([facility]) I50.9    Acute on chronic systolic (congestive) heart failure ([facility]) I50.23    CHF (congestive heart failure), NYHA class I, acute on chronic, combined ([facility]) I50.43    Acute kidney injury ([facility]) N17.9    COPD exacerbation ([facility]) J44.1      impression plan   #1 CKD 3 with acute renal failure   #2 cocaine positive with continued mental status   #3 hypertension controlled   #4 COPD exacerbation   #5 type 2 diabetes     Plan   #1 renal function monitor supportive care hydration avoid ACE ARB's for now   #2 drug rehab and counseling on above setting monitor for any new infection   #3 blood pressure variable and controlled monitor for now   #4 treated COPD monitor with steroids   No. 5 signs of insulin we will follow   agree with current treatment supportive care monitor and follow-up  Electronically signed by Pablo Noel MD on 11/7/2022 at 5:02 PM

## 2022-11-07 NOTE — PROGRESS NOTES
Hospitalist    Spoke with wife over the phone - she had questions about why patient has been confused. \"Not acting right\". Discussed MARLYN and blood gas results. Wife expresses \"he won't quit smoking, he gets mad when I talk to him about it\". Wife is at work currently, and agrees patient needs to stay. Patient did not give permission to discuss drug screen results with his wife.

## 2022-11-07 NOTE — PROGRESS NOTES
V2.0  [facility] Hospitalist Progress Note      Name:  Carolina Borjas /Age/Sex: 1960  (65 y.o. male)   MRN & CSN:  [id number] & [id number] Encounter Date/Time: 2022 12:39 PM EST    Location:  -A PCP: Edel Celeste MD       Hospital Day: 1    Assessment and Plan:   Carolina oBrjas is a 65 y.o. male         -Patient reports he is going home in the morning  -Left elbow pain and swelling noted, likely olecranon bursitis  -Consulted Dr. Sal Lowery  -Consulted IR as he may possibly benefit from the left elbow bursa being aspirated to rule -Held Coumadin for possible procedure  -Held Plavix for possible procedure.  -If procedure not done on  consider Lovenox for DVT prophylaxis      Delirium/metabolic encephalopathy  -Supportive care. Appreciate neurology consult    Acute respiratory failure with hypercapnia  -BiPAP    AD-the patient apparently is not wearing PAP therapy at home  -Sleep study documented in chart done 2010 showed severe AD-scanned in under the media tab    COPD exacerbation  -Bronchodilators  -Prednisone  -Zithromax ordered with stop date     Chronic mixed systolic/diastolic heart failure due to ischemic heart disease  -EF 45%  -On Coreg, lisinopril, Bumex, and Zaroxolyn at home-lisinopril and Zaroxolyn held due to MARLYN. Coreg and Bumex continued  -On hydralazine at home    Cocaine positive  -Reports he only did it this 1 time-advised to avoid    Left elbow pain and swelling-possible left olecranon bursitis  -Orthopedics and IR consult    Tobacco use-recommend cessation    CAD status post CABG x3 in   -Plavix and Lipitor    TAAA-listed on his problem list by cardiologist in 2022-outpatient follow-up    Venous insufficiency noted in his records    Diabetes mellitus type 2, uncontrolled-endocrinology consult    Hyperlipidemia-atorvastatin    Obesity with BMI 34    Diet ADULT DIET; Regular; 4 carb choices (60 gm/meal);  No Added Salt (3-4 gm)   DVT Prophylaxis [] Lovenox, []  Heparin, [] SCDs, [] Ambulation,  [] Eliquis, [] Xarelto  [] Coumadin    11/7-held warfarin and Plavix for possible IR procedure in the morning     Code Status Full Code   Disposition From: Home  Expected Disposition: Home  Estimated Date of Discharge: In about 2 days  Patient requires continued admission due to multiple issues including left elbow pain and swelling   Surrogate Decision Maker/ POA Wife     Subjective:     Chief Complaint: Presented to [facility] ED complaining of back pain and elbow pain and a mental status change       Leana Barnard is a 65 y.o. male      Was wanting to leave. Please see other notes. Review of Systems:    Review of Systems    No vomiting or bleeding reported    Objective: Intake/Output Summary (Last 24 hours) at 11/7/2022 1239  Last data filed at 11/7/2022 7123  Gross per 24 hour   Intake --   Output 850 ml   Net -850 ml        Vitals:   Vitals:    11/07/22 0859   BP:    Pulse: 72   Resp:    Temp:    SpO2:        Physical Exam:     Physical Exam  Eyes:      General:         Right eye: No discharge. Left eye: No discharge. Pulmonary:      Effort: Pulmonary effort is normal.   Neurological:      Cranial Nerves: No cranial nerve deficit.         Medications:   Medications:    sodium chloride flush  5-40 mL IntraVENous 2 times per day    atorvastatin  80 mg Oral Nightly    carvedilol  12.5 mg Oral BID WC    clopidogrel  75 mg Oral Daily    hydrALAZINE  50 mg Oral TID    predniSONE  40 mg Oral Daily    azithromycin  500 mg Oral Daily    insulin lispro  0-4 Units SubCUTAneous TID WC    insulin lispro  0-4 Units SubCUTAneous Nightly    bumetanide  1 mg Oral BID    nicotine  1 patch TransDERmal Daily      Infusions:    sodium chloride      dextrose       PRN Meds: sodium chloride flush, 5-40 mL, PRN  sodium chloride, , PRN  ondansetron, 4 mg, Q8H PRN   Or  ondansetron, 4 mg, Q6H PRN  polyethylene glycol, 17 g, Daily PRN  acetaminophen, 650 mg, Q6H PRN   Or  acetaminophen, 650 mg, Q6H PRN  glucose, 4 tablet, PRN  dextrose bolus, 125 mL, PRN   Or  dextrose bolus, 250 mL, PRN  glucagon (rDNA), 1 mg, PRN  dextrose, , Continuous PRN        Labs      Recent Results (from the past 24 hour(s))   Blood gas, arterial    Collection Time: 11/07/22  4:15 AM   Result Value Ref Range    pH, Bld 7.37 7.34 - 7.45    pCO2, Arterial 51.0 (H) 32 - 45 MMHG    pO2, Arterial 68 (L) 75 - 100 MMHG    Base Exc, Mixed 3.1 (H) 0 - 1.2    HCO3, Arterial 29.5 (H) 18 - 23 MMOL/L    CO2 Content 31.1 (H) 19 - 24 MMOL/L    O2 Sat 90.2 (L) 96 - 97 %    Carbon Monoxide, Blood 3.1 0 - 5 %    Methemoglobin, Arterial 0.4 <1.5 %    Comment 2L NC    Urine Drug Screen    Collection Time: 11/07/22  5:10 AM   Result Value Ref Range    Cannabinoid Scrn, Ur NEGATIVE NEGATIVE    Amphetamines NEGATIVE NEGATIVE    Cocaine Metabolite UNCONFIRMED POSITIVE (A) NEGATIVE    Benzodiazepine Screen, Urine NEGATIVE NEGATIVE    Barbiturate Screen, Ur NEGATIVE NEGATIVE    Opiates, Urine NEGATIVE NEGATIVE    Phencyclidine, Urine NEGATIVE NEGATIVE    Oxycodone NEGATIVE NEGATIVE        Imaging/Diagnostics Last 24 Hours   No results found.     Electronically signed by Raza Love MD on 11/7/2022 at 12:39 PM

## 2022-11-07 NOTE — PROGRESS NOTES
Patient seen and examined seen by nephrology in the past now cocaine positive shortness of breath dyspnea on exertion.   Baseline creatinine about 1.5-1.8 with acute renal failure recently repeat labs was in [facility] ER prior and renal to monitor in above setting with gentle hydration if able discussed with patient supportive care for now monitor neuro status as well in above setting no acute dialysis yet

## 2022-11-07 NOTE — CARE COORDINATION
Attempted to contact patient by phone; hung up by patient twice. Will attempt bedside visit. Inez Swan RN     Chart reviewed. Patient is from home; appears to have some mobility issues as he has fallen several times. He does have a PCP and insurance that assist with Rx when needed. Noted- patient was positive for cocaine on admission. Will attempt to discuss substance abuse issues and offer assistance with needs.  Inez Swan RN

## 2022-11-07 NOTE — PROGRESS NOTES
Ki Elizabeth is a 65 y.o. male on warfarin therapy for A-fib. Pharmacy consulted by Dr. Rosemary Valentin for monitoring and adjustment of treatment. Indication for anticoagulation: A-fib  INR goal: 2-3  Warfarin dose prior to admission: warfarin 4 mg daily except 6 mg every Sunday    Pertinent Laboratory Values   Recent Labs     11/07/22  1220   HGB 11.9*   HCT 37.1*          Assessment/Plan:  Drug Interactions:   Prednisone may increase effects of warfarin  Azithromycin may increase effects of warfarin  Clopidogrel may increase risk of bleeding (home medication)  INR - pending  Plan to resume home warfarin dose pending INR results  Additional dose adjustments to be made as appropriate per INR trends, interacting medications, and other clinical factors.   Pharmacy will continue to monitor and adjust warfarin therapy as indicated    Thank you for the consult,  Maryann Ritchie [facility], PharmD  11/7/2022 3:35 PM

## 2022-11-07 NOTE — H&P
V2.0  History and Physical      Name:  Nadir Major /Age/Sex: 1960  (65 y.o. male)   MRN & CSN:  [id number] & [id number] Encounter Date/Time: 2022 6:09 AM EST   Location:  -A PCP: Osiel Rubin MD       Hospital Day: 1    Assessment and Plan:   Nadir Major is a 65 y.o. male with a pmh of COPD, cocaine use who presents with lethargy and shortness of breath    Hospital Problems             Last Modified POA    * (Principal) COPD exacerbation ([facility] [address] 2022 Yes    Acute kidney injury ([facility] [address] 2022 Yes     #Hypercarbic and hypoxic respiratory failure  #COPD exacerbation  - Pateint endorses dyspnea, increased sputum, sputum color change  - no baseline O2 requirement, requiring 2 liters to stay above 90% now  - AB.33 / 51; serum HCO3 68  - CXR: w/o consolidation or effusions  - Duonebs q4h while awake  - Prednisone x5 d   - Azithromycin x5 d  - f/u procalcitonin and add ceftriaxone for CAP coverage if elevated  - f/u uStrep & uLegionella  - Bipap, f/u AM ABG    # Altered Mental Status (metabolic encephalopathy)  Most likely due to 1) CO2 retention vs 2) pregabilin accumulation in setting of MARLYN 3) drug use. Ddx also includes infectious encephalopathy, substance/tox, metabolic derangement, and endocrinopathy.   - CT-head w/o contrast (outside ED): negative for acute pathology  - reviewed CXR, UA/Ucx  - defer Bcx unless procal elevated  - reviewed BMP, ABG, CBC  - reviewed Utox, positive for cocaine    #MARLYN on CKD3  - Cr 2.7 on admission (baseline Cr: 1.8)  - Suspect pre-renal etiology 2/2 dehydration in setting of general illness  - Likely etiology of CKD: hypertension, CKD, cocaine use  - s/p 500 cc IVF, f/u repeat Cr in AM  - NS 75/cc hr initiated   - No recent contrast or offending medications (other than cocaine)  - Strict I/Os, monitor UOP  - Renal dose med, avoid nephrotoxins  - f/u AM creatinine (s/p 500 cc bolus at outside ED, started on gentle fluids on arrival to [facility])    #Cocaine use  - tested positive on Utox  - patient nodding off during my exam barely able to complete a sentence before fading out; this raised suspicion for opiate use but Utox negative for opiates  - continue Carvedilol as it has alpha blockade as well so negligible risk of hypertensive crisis     Chronic:  DM2 - LDSSI  CAD/CHF - f/u bnp, continue Bumex, Coreg, hydralazine, Plavix  TIA - continue Plavix    Ppx: Coumadin for afib, Protonix for steroid use        Disposition:   Current Living situation: unknown  Expected Disposition: unknown  Estimated D/C: 2 days    Diet ADULT DIET; Regular; 4 carb choices (60 gm/meal); No Added Salt (3-4 gm)   DVT Prophylaxis [] Lovenox, []  Heparin, [] SCDs, [] Ambulation,  [] Eliquis, [] Xarelto   Code Status Full Code   Surrogate Decision Maker/ POA Next of kin     History from:     Patient, OSH provider    History of Present Illness:     Chief Complaint: COPD exacerbation ([address] wheezing, drowsiness  Ernesto Solis is a 65 y.o. male with pmh of COPD, cocaine use,  who presents with lethargy and hypoxia. Patient is too lethargic to provide meaningful history. Outside ED provider stated that patient arrived complaining of drowsiness and wheezing. He was found to have MARLYN on CKD and transferred to [facility]. On arrival patient still lethargic. 1mg Narcan ordered for suspicion of opiate use, but later Utox negative for opiates, however positive for cocaine. Patient has      Review of Systems: Need 10 Elements   Review of Systems    Unable to obtain 2/2 lethargy     Objective:   No intake or output data in the 24 hours ending 11/07/22 0609   Vitals:   Vitals:    11/07/22 0224 11/07/22 0227 11/07/22 0441   BP:  (!) 162/85    Pulse:  86 82   Resp:  18 19   Temp:  98.7 °F (37.1 °C)    TempSrc:  Oral    Weight: 273 lb (123.8 kg)     Height: 6' 3\" (1.905 m)         Medications Prior to Admission     Prior to Admission medications    Medication Sig Start Date End Date Taking? Authorizing Provider   ferrous sulfate (IRON 325) 325 (65 Fe) MG tablet Take 1 tablet by mouth 2 times daily 10/27/22   TREY Ritchie CNP   metFORMIN (GLUCOPHAGE) 500 MG tablet TAKE 1 TABLET BY MOUTH TWO TIMES A DAY WITH MEALS 10/7/22   Historical Provider, MD   bumetanide (BUMEX) 2 MG tablet Take 1 tablet by mouth 2 times daily 10/13/22   TREY Colon CNP   hydrALAZINE (APRESOLINE) 50 MG tablet Take 1 tablet by mouth 3 times daily 10/4/22   Cheng Tesfaye MD   glipiZIDE (GLUCOTROL) 5 MG tablet Take 5 mg by mouth 2 times daily (before meals)    Historical Provider, MD   nicotine (NICODERM CQ) 14 MG/24HR Place 1 patch onto the skin daily  Patient not taking: Reported on 10/27/2022 9/20/22 11/1/22  Nicholas Chan MD   empagliflozin (JARDIANCE) 10 MG tablet Take 1 tablet by mouth in the morning. 7/17/22 10/27/22  Adeola Barfield MD   metOLazone (ZAROXOLYN) 2.5 MG tablet Take 1 tablet by mouth in the morning. Take 1-2 hours after Bumex. Please make sure of this. 7/17/22 10/27/22  Adeola Barfield MD   warfarin (JANTOVEN) 4 MG tablet TAKE 1 TABLET BY MOUTH EVERY DAY, EXCEPT TAKE 1 AND 1/2 TABLETS ON SUNDAYS OR AS DIRECTED BY CLINIC 7/7/22   Aleks Maria MD   carvedilol (COREG) 12.5 MG tablet Take 1 tablet by mouth 2 times daily (with meals) 7/6/22   Nicholas Chan MD   ipratropium-albuterol (DUONEB) 0.5-2.5 (3) MG/3ML SOLN nebulizer solution Inhale 3 mLs into the lungs 4 times daily 6/1/22   Mario Cline MD   clopidogrel (PLAVIX) 75 MG tablet TAKE 1 TABLET BY MOUTH DAILY. 4/13/22   Historical Provider, MD   pantoprazole (PROTONIX) 40 MG tablet TAKE 1 TABLET BY MOUTH EVERY DAY 4/25/22   Historical Provider, MD   lisinopril (PRINIVIL;ZESTRIL) 20 MG tablet TAKE 1 TABLET BY MOUTH DAILY 4/27/22   Historical Provider, MD   pregabalin (LYRICA) 150 MG capsule 150 mg 2 times daily.  4/27/22   Historical Provider, MD   nitroGLYCERIN (NITROSTAT) 0.4 MG SL tablet Place 1 tablet under the tongue every 5 minutes as needed for Chest pain 1/21/22   Victor Hugo Rivas MD   albuterol sulfate HFA (PROVENTIL HFA) 108 (90 Base) MCG/ACT inhaler Inhale 2 puffs into the lungs every 6 hours as needed for Wheezing or Shortness of Breath 10/10/21   Gilda Trejo PA-C   atorvastatin (LIPITOR) 80 MG tablet Take 1 tablet by mouth nightly 10/14/20   Julio Snyder, APRN - CNP       Physical Exam: Need 8 Elements   Physical Exam     General: NAD  Eyes: EOMI  ENT: neck supple  Cardiovascular: Regular rate. Respiratory: expiratory wheezes, scattered rhonchi  Gastrointestinal: Soft, non tender  Genitourinary: no suprapubic tenderness  Musculoskeletal: No edema  Skin: warm, dry  Neuro: Alert. Psych: Mood appropriate. Past Medical History:   PMHx   Past Medical History:   Diagnosis Date    Abnormal nuclear stress test     Atrial fibrillation ([facility] [address] 11/2013    CAD (coronary artery disease)     Follows with Dr. Jessica Salas    CHF (congestive heart failure) ([facility])     COPD (chronic obstructive pulmonary disease) ([facility] [address]     Follows with PCP    Decreased cardiac ejection fraction     Diabetes mellitus ([facility] [address]     GERD (gastroesophageal reflux disease)     H/O cardiovascular stress test 11/20/13, 3/20/2013    11/13-Observed defect is consistent with diaphragmatic attenuation. EF 58%. 3/13 -EF 51%. No ischemia. Normal Study. H/O cardiovascular stress test 06/08/2017    EF 50% normal study    H/O cardiovascular stress test 06/17/2019    Normal NM    H/O cardiovascular stress test 03/02/2021    depressed lvedf increased edv myocardial perfusion abnormal stress test    H/O Doppler ultrasound 10/14/2010    10/2010-Bilateral venous doppler of lower extremies- No DVTs. H/O echocardiogram 06/26/2019    Left ventricular systolic function is normal with an ejection fraction of 50-55%. Mild concentric left ventricular hypertrophy. Grade I diastolic dysfunction. No significant valvular disease noted. No evidence of pericardial effusion. Essentially unremarkable echo. H/O percutaneous left heart catheterization 12/22/2015    No evidence of hemodynamically significant coronary artery disease    Heart imaging 04/23/2020    muga - ef 58%    History of coronary artery stent placement 11/13/2013 11/13 -RCA - 3 stents placed    History of exercise stress test 06/08/2017    treadmill    History of Holter monitoring 01/04/2016    predominant rhythm sinus    History of MRI of brain and brain stem 06/04/2020    No evidence of an acute infarct, mod chronic microvascular white matter ischemic disease with associated cerebral parenchymal volume loss    History of nuclear stress test 02/24/2020    EF 38%,Myocardial perfusion scan shows moderate size, severe intensity, non  reversible perfusion defect in inferior wall. Hx of Doppler echocardiogram 01/26/2022    EF 45-50% Mod LV hypertrophy. Grade 1 diastolic dysfunction. Mod MR and TR. Dilation of the aortic root and ascending aorta. Hyperlipidemia     Hypertension     Follows with PCP    Kidney disease     Dr. Daniel Nguyen    S/P cardiac catheterization 11/13/2013 11/13/2013-EF 55%, distal LAD mild disease,CX stent patent,but distal to stent 50% stenosis. RCA severe disease. SOB (shortness of breath)     Status post angioplasty with stent     Wears dentures     full upper denture     PSHX:  has a past surgical history that includes Coronary angioplasty with stent; Appendectomy; Femur fracture surgery (1984); Coronary angioplasty with stent (11/13/2013); Colonoscopy; Mandible fracture surgery (1984); Cardiac catheterization (11/13/2013); Cardiac surgery (07/03/2020); Coronary artery bypass graft (N/A, 7/3/2020); Dental surgery; and BIOPSY / LIGATION TEMPORAL ARTERY (Left, 8/17/2020). Allergies: Allergies   Allergen Reactions    Hydromorphone Other (See Comments)     Hallucinations.       Fam HX:  family history includes Cancer in his brother, father, and mother; Diabetes in his mother; Heart Disease in his father and mother; High Blood Pressure in his father and mother; Stroke in his father and mother. Soc HX:   Social History     Socioeconomic History    Marital status: Legally    Occupational History    Occupation:    Tobacco Use    Smoking status: Every Day     Packs/day: 1.00     Years: 46.00     Pack years: 46.00     Types: Cigarettes     Start date: 1974    Smokeless tobacco: Never   Vaping Use    Vaping Use: Never used   Substance and Sexual Activity    Alcohol use: No     Comment: caffeine 2 cups coffee a day. pop twice a week. austen tea 4 bottles a day    Drug use: Not Currently     Types: Cocaine     Comment: Last used: 7/1/20    Sexual activity: Yes     Partners: Female       Medications:   Medications:    sodium chloride flush  5-40 mL IntraVENous 2 times per day    heparin (porcine)  5,000 Units SubCUTAneous 3 times per day      Infusions:    sodium chloride       PRN Meds: sodium chloride flush, 5-40 mL, PRN  sodium chloride, , PRN  ondansetron, 4 mg, Q8H PRN   Or  ondansetron, 4 mg, Q6H PRN  polyethylene glycol, 17 g, Daily PRN  acetaminophen, 650 mg, Q6H PRN   Or  acetaminophen, 650 mg, Q6H PRN        Labs      CBC: No results for input(s): WBC, HGB, PLT in the last 72 hours. BMP:  No results for input(s): NA, K, CL, CO2, BUN, CREATININE, GLUCOSE in the last 72 hours. Hepatic: No results for input(s): AST, ALT, ALB, BILITOT, ALKPHOS in the last 72 hours.   Lipids:   Lab Results   Component Value Date/Time    CHOL 116 10/02/2022 10:12 PM    HDL 32 10/02/2022 10:12 PM    TRIG 125 10/02/2022 10:12 PM     Hemoglobin A1C:   Lab Results   Component Value Date/Time    LABA1C 5.6 10/02/2022 10:27 PM     TSH: No results found for: TSH  Troponin:   Lab Results   Component Value Date/Time    TROPONINT 0.016 10/02/2022 10:12 PM    TROPONINT 0.019 07/15/2022 10:17 AM    TROPONINT 0.050 01/29/2022 08:25 PM     Lactic Acid: No results for input(s): LACTA in the last 72 hours.  BNP: No results for input(s): PROBNP in the last 72 hours. UA:  Lab Results   Component Value Date/Time    NITRU NEGATIVE 10/02/2022 08:47 PM    COLORU YELLOW 10/02/2022 08:47 PM    WBCUA <1 10/02/2022 08:47 PM    RBCUA NONE SEEN 10/02/2022 08:47 PM    MUCUS RARE 07/04/2020 07:20 PM    TRICHOMONAS NONE SEEN 10/02/2022 08:47 PM    BACTERIA NEGATIVE 10/02/2022 08:47 PM    CLARITYU CLEAR 10/02/2022 08:47 PM    SPECGRAV 1.015 10/02/2022 08:47 PM    LEUKOCYTESUR NEGATIVE 10/02/2022 08:47 PM    UROBILINOGEN NORMAL 10/02/2022 08:47 PM    BILIRUBINUR NEGATIVE 10/02/2022 08:47 PM    BLOODU NEGATIVE 10/02/2022 08:47 PM    KETUA NEGATIVE 10/02/2022 08:47 PM     Urine Cultures: No results found for: LABURIN  Blood Cultures: No results found for: BC  No results found for: BLOODCULT2  Organism: No results found for: ORG    Imaging/Diagnostics Last 24 Hours   No results found.     Electronically signed by Tawnya Schmitt MD on 11/7/2022 at 6:09 AM

## 2022-11-07 NOTE — PROGRESS NOTES
[place] [facility] alert and oriented. Pt arrived on 2L NC. Pt placed on tele and in hospital gown. Skin assessment completed with Colleen RN, no skin issues noted. Pt oriented to room, call light and staff. All belongings with pt. Pt denies any needs at this time. Call light in reach.

## 2022-11-07 NOTE — PROGRESS NOTES
Hospitalist    Patient in chair when I walked in asleep. Was able to wake him up, but he fell asleep twice while I was taking to him. During his wakeful moments he was somewhat agitated. Patient is wanting to go home. Triage note at [facility] ED:    pt with c/o back pain and elbow pian. states he is \"delusional and is fading in and out\" . Patient with delirium/metabolic encephalopathy. Consult neurology to rule out any other disease process. Wants to go home, but is very different from his baseline when I recently saw him earlier this month. Not stable to go him right now. Discussed with RN. Because of his current mental status he is not a candidate to leave AMA right now.

## 2022-11-07 NOTE — CONSULTS
Neurology Service Consult Note  [address]   Patient Name: Oj Lobato  : 1960        Subjective:   Reason for consult:   65 y.o.  male with history of A. fib, CAD, CHF, COPD, DM, HTN, HLD, CKD 3, AD, tobacco use, Bell's palsy, [facility] ED with complaints of drowsiness and wheezing. Sats improved with O2 at 4 L per nasal cannula. Initial lab work noted elevated creatinine at 2.76 and elevated BUN of 61. Patient was transferred to [facility] as he was requiring inpatient admission. On arrival patient was noted to be lethargic and confused. This was significantly different than his baseline mental status at recent admission in October. Of note patient was positive for cocaine on UDS which he readily admits to using on Saturday evening. He states that his symptoms started after using cocaine and that he has felt confused ever since. Neurology has been asked to evaluate this patient for encephalopathy. Chart was reviewed in detail. Patient was seen and assessed. He is sitting up in chair at bedside. He is alert and oriented x4. Patient is easily agitated and restless. He expresses frustration at only receiving Tylenol for the pain he has in his elbow. He has an audible wheeze/congestion during the exam and endorses continued shortness of breath. He complains of worsening edema in the lower extremities and feet. He denies any headache, vision change, extremity weakness or sensory change.         Past Medical History:   Diagnosis Date    Abnormal nuclear stress test     Atrial fibrillation ([facility] [address] 2013    CAD (coronary artery disease)     Follows with Dr. Artem Blake    CHF (congestive heart failure) (HCC)     COPD (chronic obstructive pulmonary disease) ([facility] [address]     Follows with PCP    Decreased cardiac ejection fraction     Diabetes mellitus ([facility] [address]     GERD (gastroesophageal reflux disease)     H/O cardiovascular stress test 11/20/13, 3/20/2013    11/13-Observed defect is consistent with diaphragmatic attenuation. EF 58%. 3/13 -EF 51%. No ischemia. Normal Study. H/O cardiovascular stress test 06/08/2017    EF 50% normal study    H/O cardiovascular stress test 06/17/2019    Normal NM    H/O cardiovascular stress test 03/02/2021    depressed lvedf increased edv myocardial perfusion abnormal stress test    H/O Doppler ultrasound 10/14/2010    10/2010-Bilateral venous doppler of lower extremies- No DVTs. H/O echocardiogram 06/26/2019    Left ventricular systolic function is normal with an ejection fraction of 50-55%. Mild concentric left ventricular hypertrophy. Grade I diastolic dysfunction. No significant valvular disease noted. No evidence of pericardial effusion. Essentially unremarkable echo. H/O percutaneous left heart catheterization 12/22/2015    No evidence of hemodynamically significant coronary artery disease    Heart imaging 04/23/2020    muga - ef 58%    History of coronary artery stent placement 11/13/2013 11/13 -RCA - 3 stents placed    History of exercise stress test 06/08/2017    treadmill    History of Holter monitoring 01/04/2016    predominant rhythm sinus    History of MRI of brain and brain stem 06/04/2020    No evidence of an acute infarct, mod chronic microvascular white matter ischemic disease with associated cerebral parenchymal volume loss    History of nuclear stress test 02/24/2020    EF 38%,Myocardial perfusion scan shows moderate size, severe intensity, non  reversible perfusion defect in inferior wall. Hx of Doppler echocardiogram 01/26/2022    EF 45-50% Mod LV hypertrophy. Grade 1 diastolic dysfunction. Mod MR and TR. Dilation of the aortic root and ascending aorta. Hyperlipidemia     Hypertension     Follows with PCP    Kidney disease     Dr. Neymar Diamond    S/P cardiac catheterization 11/13/2013 11/13/2013-EF 55%, distal LAD mild disease,CX stent patent,but distal to stent 50% stenosis. RCA severe disease. SOB (shortness of breath)     Status post angioplasty with stent     Wears dentures     full upper denture    :   Past Surgical History:   Procedure Laterality Date    APPENDECTOMY      BIOPSY / LIGATION TEMPORAL ARTERY Left 8/17/2020    LEFT TEMPORAL ARTERY BIOPSY LIGATION performed by Evangelina Yepez MD at [facility]  11/13/2013 11/13/2013-EF 55%, distal LAD mild disease,CX stent patent,but distal to stent 50% stenosis. RCA severe disease.     CARDIAC SURGERY  07/03/2020    CABG X 3 LIMA-OM-PDA-LAD, Maze, LT Atrial Appendage clipping    COLONOSCOPY      CORONARY ANGIOPLASTY WITH STENT PLACEMENT      4 stents- RCA X3; CX X1    CORONARY ANGIOPLASTY WITH STENT PLACEMENT  11/13/2013 11/13/2013 -3 stents placed to RCA- Dr Soila Cote N/A 7/3/2020    INTRAOPERATIVE ISMAEL, CABG X3   WITH LIMA  AND LEFT LEG ENDOVEIN HARVEST, INDUCED HYPOTHERMIA, MAZE BIPOLAR AND CRYO PROCEDURE, LEFT ATRIAL APPENDAGE CLIPPING performed by Remy Sweet MD at [address]      all teeth extracted    [address]     Medications:  Scheduled Meds:   sodium chloride flush  5-40 mL IntraVENous 2 times per day    atorvastatin  80 mg Oral Nightly    carvedilol  12.5 mg Oral BID WC    clopidogrel  75 mg Oral Daily    hydrALAZINE  50 mg Oral TID    predniSONE  40 mg Oral Daily    azithromycin  500 mg Oral Daily    insulin lispro  0-4 Units SubCUTAneous TID WC    insulin lispro  0-4 Units SubCUTAneous Nightly    bumetanide  1 mg Oral BID    nicotine  1 patch TransDERmal Daily     Continuous Infusions:   sodium chloride      dextrose       PRN Meds:.sodium chloride flush, sodium chloride, ondansetron **OR** ondansetron, polyethylene glycol, acetaminophen **OR** acetaminophen, glucose, dextrose bolus **OR** dextrose bolus, glucagon (rDNA), dextrose    Allergies   Allergen Reactions    Hydromorphone Other (See Comments)     Hallucinations. Social History     Socioeconomic History    Marital status: Legally      Spouse name: Not on file    Number of children: Not on file    Years of education: Not on file    Highest education level: Not on file   Occupational History    Occupation:    Tobacco Use    Smoking status: Every Day     Packs/day: 1.00     Years: 46.00     Pack years: 46.00     Types: Cigarettes     Start date: 1974    Smokeless tobacco: Never   Vaping Use    Vaping Use: Never used   Substance and Sexual Activity    Alcohol use: No     Comment: caffeine 2 cups coffee a day. pop twice a week. austen tea 4 bottles a day    Drug use: Not Currently     Types: Cocaine     Comment: Last used: 7/1/20    Sexual activity: Yes     Partners: Female   Other Topics Concern    Not on file   Social History Narrative    Not on file     Social Determinants of Health     Financial Resource Strain: Not on file   Food Insecurity: Not on file   Transportation Needs: Not on file   Physical Activity: Not on file   Stress: Not on file   Social Connections: Not on file   Intimate Partner Violence: Not on file   Housing Stability: Not on file      Family History   Problem Relation Age of Onset    Cancer Mother     Diabetes Mother     Heart Disease Mother     High Blood Pressure Mother     Stroke Mother     Cancer Father     Heart Disease Father     High Blood Pressure Father     Stroke Father     Cancer Brother          ROS (10 systems)  In addition to that documented in the HPI above, the additional ROS was obtained:  Constitutional: Denies fevers or chills  Eyes: Denies vision changes  ENMT: Denies sore throat  CV: Denies chest pain  Resp:  Worsening SOB  GI: Denies vomiting or diarrhea  : Denies painful urination  MSK: Denies recent trauma  Skin: Denies new rashes  Neuro: Denies new numbness or tingling or weakness  Endocrine: Denies unexpected weight loss  Heme: Denies bleeding disorders    Physical Exam:       Wt Readings from Last 3 Encounters:   11/07/22 273 lb (123.8 kg)   10/27/22 277 lb (125.6 kg)   10/25/22 273 lb (123.8 kg)     Temp Readings from Last 3 Encounters:   11/07/22 98.1 °F (36.7 °C) (Oral)   10/25/22 97.7 °F (36.5 °C) (Oral)   10/04/22 98.1 °F (36.7 °C) (Oral)     BP Readings from Last 3 Encounters:   11/07/22 (S) (!) 147/99   10/27/22 130/80   10/26/22 (!) 118/47     Pulse Readings from Last 3 Encounters:   11/07/22 72   10/27/22 77   10/26/22 67        Gen: A&O x 4, NAD, cooperative  HEENT: NC/AT, EOMI, PERRL, mmm, neck supple, no meningeal signs  Heart: RRR,  Lungs: Respirations even and unlabored  Ext: Bilateral trace lower extremity edema, no calf tenderness b/l  Psych: normal mood and affect, intermittently states \"he is leaving\"  Skin: no rashes or lesions    NEUROLOGIC EXAM:    Mental Status: A&O to self, location, month and year, NAD, speech clear, language fluent, repetition and naming intact, follows commands appropriately    Cranial Nerve Exam:   CN II-XII:  PERRL, VFF, no nystagmus, no gaze paresis, sensation V1-V3 intact b/l, muscles of facial expression symmetric with some mild left lower facial weakness likely secondary to history of Bell's palsy, is apparent on his picture as well and likely chronic, hearing intact to conversational tone, palate elevates symmetrically, shoulder elevation symmetric and tongue protrudes midline with movement side to side.     Motor Exam:       Strength 5/5 UE's/LE's b/l  Tone and bulk normal   No pronator drift    Deep Tendon Reflexes: 2/4 biceps, triceps, brachioradialis, 1/4 patellar, and achilles b/l; flexor plantar responses b/l    Sensation: Intact light touch/pinprick UE's/LE's b/l    Coordination/Cerebellum:       Tremors--none      Rapidly alternating movements: no dysdiadochokinesia b/l                Heel-to-Shin: no dysmetria b/l      Finger-to-Nose: no dysmetria b/l    Gait and stance:      Gait: deferred      LABS:         IMAGING: CT head w/o contrast at OSH:  IMPRESSION:     No acute pathology   All imaging was personally reviewed    ASSESSMENT/PLAN:   69-year-old male presenting with increasing shortness of breath and altered mental status. Patient is seen, sitting up in chair at bedside. He is alert and oriented x4. Speech is clear, language is fluent. He denies vision change, headache. Strength and sensation are intact in upper and lower extremities. He is noted to have edema in the bilateral lower extremities. He has a congested cough/wheeze with O2 per nasal cannula in place. Patient is easily frustrated during the exam.  He does can down lethargy and confusion last night but feels back to his baseline today. Altered mental status and lethargy likely secondary to metabolic encephalopathy superimposed onCO2 narcosis,  COPD, acute on chronic kidney disease and recent cocaine use. Neuroimaging as above, nonacute  BUN 63, creatinine 2.3 -management per nephrology  B12 554.1, ammonia 23, TSH 0.346, CO2/ABG 29.5 -management per IM  Patient is showing significant improvement and has returned to his neurologic baseline. Do not feel that additional imaging is warranted at this time. Patient has no focal or lateralizing deficits on exam concerning for acute stroke  No further recommendations at this time, neurology will sign off. Please contact our team with any new concerns. > 60 minutes of time spent included chart review, obtaining history, patient examination, developing plan of care, and documentation. Patient was discussed with attending neurologist Dr. Rd Jimenez. Thank you for allowing us to participate in the care of your patient. If there are any questions regarding evaluation please feel free to contact us. TREY Jerome CNP, 11/7/2022     Attending Note:  I have rounded on this patient with TREY Jerome CNP. I have reviewed the chart and we have discussed this case in detail.  The patient was seen and examined by myself. Pertinent labs and imaging have been personally reviewed. Changes were made to the note as appropriate. I concur with the above assessment and plan. Patient appears improved and is interacting much better this afternoon. Higher suspicion of metabolic encephalopathy given his MARLYN and elevated CO2. Less concern of new neurological etiology given his exam which is normal and only demonstrates mild chronic findings of left facial weakness. We did discuss with him that cocaine can increase risk of stroke. He does not believe that it was laced with anything, he states that he is apparently visiting a friend and a \"line was just brought out\". He continues to report left elbow pain which has been ongoing. No other neurological symptoms, no new weakness or paresthesias. He is oriented to person, place, date, situation. No further neurodiagnostics, we will sign off.     Georgi Slater DO 11/7/2022 5:22 PM  Neurology

## 2022-11-07 NOTE — PROGRESS NOTES
Techs heard pt chair alarm going off and went into room and pt was agitated and stated \"I don't want this damn thing on, I'm going to leave this place\". Pt then started to rip things off. RN came into room and notified pt the risks of not having chair alarm on.

## 2022-11-07 NOTE — PROGRESS NOTES
Melvin Vasques is a 65 y.o. male on warfarin therapy for A-fib. Pharmacy consulted by Dr. Botello Daily for monitoring and adjustment of treatment. Indication for anticoagulation: A-fib  INR goal: 2-3  Warfarin dose prior to admission: warfarin 4 mg daily except 6 mg every Sunday    Pertinent Laboratory Values   Recent Labs     11/07/22  1220   HGB 11.9*   HCT 37.1*          Assessment/Plan:  Drug Interactions:   Prednisone may increase effects of warfarin  Azithromycin may increase effects of warfarin  Clopidogrel may increase risk of bleeding (home medication)  INR - 1.38  Will resume home warfarin dose (4 mg tonight)   Additional dose adjustments to be made as appropriate per INR trends, interacting medications, and other clinical factors.   Pharmacy will continue to monitor and adjust warfarin therapy as indicated    Thank you for the consult,  Tracy Reilly [facility], PharmD

## 2022-11-08 ENCOUNTER — APPOINTMENT (OUTPATIENT)
Dept: ULTRASOUND IMAGING | Age: 62
DRG: 469 | End: 2022-11-08
Attending: SURGERY
Payer: MEDICAID

## 2022-11-08 ENCOUNTER — APPOINTMENT (OUTPATIENT)
Dept: GENERAL RADIOLOGY | Age: 62
DRG: 469 | End: 2022-11-08
Attending: SURGERY
Payer: MEDICAID

## 2022-11-08 ENCOUNTER — APPOINTMENT (OUTPATIENT)
Dept: INTERVENTIONAL RADIOLOGY/VASCULAR | Age: 62
DRG: 469 | End: 2022-11-08
Attending: SURGERY
Payer: MEDICAID

## 2022-11-08 VITALS
OXYGEN SATURATION: 90 % | WEIGHT: 273 LBS | HEART RATE: 77 BPM | BODY MASS INDEX: 33.94 KG/M2 | DIASTOLIC BLOOD PRESSURE: 79 MMHG | TEMPERATURE: 97.6 F | RESPIRATION RATE: 19 BRPM | HEIGHT: 75 IN | SYSTOLIC BLOOD PRESSURE: 113 MMHG

## 2022-11-08 PROBLEM — G93.40 ENCEPHALOPATHY ACUTE: Status: ACTIVE | Noted: 2022-11-08

## 2022-11-08 LAB
ALBUMIN SERPL-MCNC: 4.1 GM/DL (ref 3.4–5)
ANION GAP SERPL CALCULATED.3IONS-SCNC: 10 MMOL/L (ref 4–16)
BASOPHILS ABSOLUTE: 0 K/CU MM
BASOPHILS RELATIVE PERCENT: 0.2 % (ref 0–1)
BUN BLDV-MCNC: 58 MG/DL (ref 6–23)
C-REACTIVE PROTEIN, HIGH SENSITIVITY: 33.2 MG/L
CALCIUM SERPL-MCNC: 8.9 MG/DL (ref 8.3–10.6)
CHLORIDE BLD-SCNC: 98 MMOL/L (ref 99–110)
CO2: 29 MMOL/L (ref 21–32)
CREAT SERPL-MCNC: 2.4 MG/DL (ref 0.9–1.3)
DIFFERENTIAL TYPE: ABNORMAL
EOSINOPHILS ABSOLUTE: 0.1 K/CU MM
EOSINOPHILS RELATIVE PERCENT: 1.2 % (ref 0–3)
ERYTHROCYTE SEDIMENTATION RATE: 33 MM/HR (ref 0–20)
ESTIMATED AVERAGE GLUCOSE: 123 MG/DL
GFR SERPL CREATININE-BSD FRML MDRD: 30 ML/MIN/1.73M2
GLUCOSE BLD-MCNC: 125 MG/DL (ref 70–99)
GLUCOSE BLD-MCNC: 153 MG/DL (ref 70–99)
GLUCOSE BLD-MCNC: 165 MG/DL (ref 70–99)
HBA1C MFR BLD: 5.9 % (ref 4.2–6.3)
HCT VFR BLD CALC: 36.3 % (ref 42–52)
HEMOGLOBIN: 11.5 GM/DL (ref 13.5–18)
IMMATURE NEUTROPHIL %: 0.4 % (ref 0–0.43)
INR BLD: 1.22 INDEX
LYMPHOCYTES ABSOLUTE: 1.5 K/CU MM
LYMPHOCYTES RELATIVE PERCENT: 14.9 % (ref 24–44)
MAGNESIUM: 2.2 MG/DL (ref 1.8–2.4)
MCH RBC QN AUTO: 27.5 PG (ref 27–31)
MCHC RBC AUTO-ENTMCNC: 31.7 % (ref 32–36)
MCV RBC AUTO: 86.8 FL (ref 78–100)
MONOCYTES ABSOLUTE: 0.7 K/CU MM
MONOCYTES RELATIVE PERCENT: 7.1 % (ref 0–4)
NUCLEATED RBC %: 0 %
PDW BLD-RTO: 16.1 % (ref 11.7–14.9)
PHOSPHORUS: 4 MG/DL (ref 2.5–4.9)
PLATELET # BLD: 173 K/CU MM (ref 140–440)
PMV BLD AUTO: 11 FL (ref 7.5–11.1)
POTASSIUM SERPL-SCNC: 3.6 MMOL/L (ref 3.5–5.1)
PRO-BNP: 590.4 PG/ML
PROCALCITONIN: 0.15
PROTHROMBIN TIME: 15.8 SECONDS (ref 11.7–14.5)
RBC # BLD: 4.18 M/CU MM (ref 4.6–6.2)
SEGMENTED NEUTROPHILS ABSOLUTE COUNT: 7.7 K/CU MM
SEGMENTED NEUTROPHILS RELATIVE PERCENT: 76.2 % (ref 36–66)
SODIUM BLD-SCNC: 137 MMOL/L (ref 135–145)
TOTAL IMMATURE NEUTOROPHIL: 0.04 K/CU MM
TOTAL NUCLEATED RBC: 0 K/CU MM
URIC ACID: 10.9 MG/DL (ref 3.5–7.2)
WBC # BLD: 10.1 K/CU MM (ref 4–10.5)

## 2022-11-08 PROCEDURE — 2060000000 HC ICU INTERMEDIATE R&B

## 2022-11-08 PROCEDURE — 80069 RENAL FUNCTION PANEL: CPT

## 2022-11-08 PROCEDURE — 94640 AIRWAY INHALATION TREATMENT: CPT

## 2022-11-08 PROCEDURE — 6370000000 HC RX 637 (ALT 250 FOR IP): Performed by: FAMILY MEDICINE

## 2022-11-08 PROCEDURE — 76775 US EXAM ABDO BACK WALL LIM: CPT

## 2022-11-08 PROCEDURE — 6370000000 HC RX 637 (ALT 250 FOR IP): Performed by: INTERNAL MEDICINE

## 2022-11-08 PROCEDURE — 6370000000 HC RX 637 (ALT 250 FOR IP): Performed by: SURGERY

## 2022-11-08 PROCEDURE — 83735 ASSAY OF MAGNESIUM: CPT

## 2022-11-08 PROCEDURE — 6370000000 HC RX 637 (ALT 250 FOR IP)

## 2022-11-08 PROCEDURE — 82962 GLUCOSE BLOOD TEST: CPT

## 2022-11-08 PROCEDURE — 84145 PROCALCITONIN (PCT): CPT

## 2022-11-08 PROCEDURE — 71045 X-RAY EXAM CHEST 1 VIEW: CPT

## 2022-11-08 PROCEDURE — 85610 PROTHROMBIN TIME: CPT

## 2022-11-08 PROCEDURE — 85652 RBC SED RATE AUTOMATED: CPT

## 2022-11-08 PROCEDURE — 84550 ASSAY OF BLOOD/URIC ACID: CPT

## 2022-11-08 PROCEDURE — 86140 C-REACTIVE PROTEIN: CPT

## 2022-11-08 PROCEDURE — 36415 COLL VENOUS BLD VENIPUNCTURE: CPT

## 2022-11-08 PROCEDURE — 85025 COMPLETE CBC W/AUTO DIFF WBC: CPT

## 2022-11-08 PROCEDURE — 2580000003 HC RX 258: Performed by: SURGERY

## 2022-11-08 PROCEDURE — 94761 N-INVAS EAR/PLS OXIMETRY MLT: CPT

## 2022-11-08 RX ORDER — INSULIN GLARGINE 100 [IU]/ML
10 INJECTION, SOLUTION SUBCUTANEOUS NIGHTLY
Status: DISCONTINUED | OUTPATIENT
Start: 2022-11-08 | End: 2022-11-08 | Stop reason: HOSPADM

## 2022-11-08 RX ADMIN — BUMETANIDE 1 MG: 0.5 TABLET ORAL at 09:14

## 2022-11-08 RX ADMIN — ACETAMINOPHEN 650 MG: 325 TABLET ORAL at 09:13

## 2022-11-08 RX ADMIN — ALBUTEROL SULFATE 2 PUFF: 90 AEROSOL, METERED RESPIRATORY (INHALATION) at 08:27

## 2022-11-08 RX ADMIN — Medication 2 PUFF: at 08:27

## 2022-11-08 RX ADMIN — BENZONATATE 100 MG: 100 CAPSULE ORAL at 09:13

## 2022-11-08 RX ADMIN — PREDNISONE 40 MG: 10 TABLET ORAL at 09:14

## 2022-11-08 RX ADMIN — HYDRALAZINE HYDROCHLORIDE 50 MG: 50 TABLET, FILM COATED ORAL at 09:14

## 2022-11-08 RX ADMIN — CARVEDILOL 12.5 MG: 6.25 TABLET, FILM COATED ORAL at 09:18

## 2022-11-08 RX ADMIN — AZITHROMYCIN MONOHYDRATE 500 MG: 250 TABLET ORAL at 09:14

## 2022-11-08 RX ADMIN — Medication 5 MG: at 01:50

## 2022-11-08 RX ADMIN — ACETAMINOPHEN 650 MG: 325 TABLET ORAL at 01:50

## 2022-11-08 RX ADMIN — SODIUM CHLORIDE, PRESERVATIVE FREE 10 ML: 5 INJECTION INTRAVENOUS at 09:14

## 2022-11-08 ASSESSMENT — PAIN DESCRIPTION - LOCATION: LOCATION: ELBOW

## 2022-11-08 ASSESSMENT — PAIN DESCRIPTION - DESCRIPTORS: DESCRIPTORS: ACHING;DISCOMFORT

## 2022-11-08 ASSESSMENT — PAIN - FUNCTIONAL ASSESSMENT: PAIN_FUNCTIONAL_ASSESSMENT: ACTIVITIES ARE NOT PREVENTED

## 2022-11-08 ASSESSMENT — PAIN SCALES - GENERAL: PAINLEVEL_OUTOF10: 6

## 2022-11-08 NOTE — PROGRESS NOTES
Nephrology Progress Note  11/8/2022 9:17 AM  Subjective: Interval History: Ede Gustafson is a 65 y.o. male with sitting in a chair more lucid interactive but weak        Data:   Scheduled Meds:   insulin glargine  10 Units SubCUTAneous Nightly    sodium chloride flush  5-40 mL IntraVENous 2 times per day    atorvastatin  80 mg Oral Nightly    carvedilol  12.5 mg Oral BID WC    [Held by provider] clopidogrel  75 mg Oral Daily    hydrALAZINE  50 mg Oral TID    predniSONE  40 mg Oral Daily    azithromycin  500 mg Oral Daily    bumetanide  1 mg Oral BID    nicotine  1 patch TransDERmal Daily    montelukast  10 mg Oral Nightly    albuterol sulfate HFA  2 puff Inhalation 4x daily    ipratropium  2 puff Inhalation 4x daily    insulin lispro  0-8 Units SubCUTAneous TID WC    insulin lispro  0-8 Units SubCUTAneous 2 times per day    insulin lispro  10 Units SubCUTAneous Once    insulin lispro  10 Units SubCUTAneous TID WC     Continuous Infusions:   sodium chloride      dextrose           CBC   Recent Labs     11/07/22  1220 11/08/22  0458   WBC 8.5 10.1   HGB 11.9* 11.5*   HCT 37.1* 36.3*    173      BMP   Recent Labs     11/07/22  1220 11/08/22  0458    137   K 4.0 3.6   CL 98* 98*   CO2 26 29   PHOS 4.1 4.0   BUN 58* 58*   CREATININE 2.3* 2.4*     Hepatic: No results for input(s): AST, ALT, ALB, BILITOT, ALKPHOS in the last 72 hours. Troponin: No results for input(s): TROPONINI in the last 72 hours. BNP: No results for input(s): BNP in the last 72 hours. Lipids: No results for input(s): CHOL, HDL in the last 72 hours.     Invalid input(s): LDLCALCU  ABGs:   Lab Results   Component Value Date/Time    PO2ART 68 11/07/2022 04:15 AM    IKV8PUB 51.0 11/07/2022 04:15 AM     INR:   Recent Labs     11/07/22  1759 11/08/22  0458   INR 1.38 1.22     Renal Labs  Albumin:    Lab Results   Component Value Date/Time    LABALBU 4.1 11/08/2022 04:58 AM     Calcium:    Lab Results   Component Value Date/Time    CALCIUM 8.9 11/08/2022 04:58 AM     Phosphorus:    Lab Results   Component Value Date/Time    PHOS 4.0 11/08/2022 04:58 AM     U/A:    Lab Results   Component Value Date/Time    NITRU NEGATIVE 10/02/2022 08:47 PM    COLORU YELLOW 10/02/2022 08:47 PM    WBCUA <1 10/02/2022 08:47 PM    RBCUA NONE SEEN 10/02/2022 08:47 PM    MUCUS RARE 07/04/2020 07:20 PM    TRICHOMONAS NONE SEEN 10/02/2022 08:47 PM    BACTERIA NEGATIVE 10/02/2022 08:47 PM    CLARITYU CLEAR 10/02/2022 08:47 PM    SPECGRAV 1.015 10/02/2022 08:47 PM    UROBILINOGEN NORMAL 10/02/2022 08:47 PM    BILIRUBINUR NEGATIVE 10/02/2022 08:47 PM    BLOODU NEGATIVE 10/02/2022 08:47 PM    KETUA NEGATIVE 10/02/2022 08:47 PM     ABG:    Lab Results   Component Value Date/Time    SGA6JEK 51.0 11/07/2022 04:15 AM    PO2ART 68 11/07/2022 04:15 AM    MLA1FFX 29.5 11/07/2022 04:15 AM     HgBA1c:    Lab Results   Component Value Date/Time    LABA1C 5.6 10/02/2022 10:27 PM     Microalbumen/Creatinine ratio:  No components found for: RUCREAT  TSH:  No results found for: TSH  IRON:    Lab Results   Component Value Date/Time    IRON 43 10/02/2022 10:12 PM     Iron Saturation:  No components found for: PERCENTFE  TIBC:    Lab Results   Component Value Date/Time    TIBC 280 10/02/2022 10:12 PM     FERRITIN:  No results found for: FERRITIN  RPR:  No results found for: RPR  BEAN:  No results found for: ANATITER, BEAN  24 Hour Urine for Creatinine Clearance:  No components found for: CREAT4, UHRS10, UTV10      Objective:   I/O: 11/07 0701 - 11/08 0700  In: -   Out: [address] [Urine:1850]  I/O last 3 completed shifts:  In: -   Out: 2350 [Urine:2350]  No intake/output data recorded. Vitals: /66   Pulse 58   Temp 97.6 °F (36.4 °C) (Oral)   Resp 13   Ht 6' 3\" (1.905 m)   Wt 273 lb (123.8 kg)   SpO2 90%   BMI 34.12 kg/m²  {  General appearance: awake weak  HEENT: Head: Normal, normocephalic, atraumatic.   Neck: supple, symmetrical, trachea midline  Lungs: diminished breath sounds bilaterally  Heart: S1, S2 normal  Abdomen: abnormal findings:  soft nt obese  Extremities: edema positive  Neurologic: Mental status: alertness: alert        Assessment and Plan:      IMP:   #1 CKD 3 with acute renal failure   #2 cocaine positive with continued mental status   #3 hypertension controlled   #4 COPD exacerbation   #5 type 2 diabetes    Plan     #1 creatinine holding 2.4 from baseline about 1.4-1.7 with no hematuria but mild proteinuria we will get a renal ultrasound not uremic supportive care likely worsened in setting of cocaine use  #2 monitor for rehab options affect slowly improving  #3 blood pressure stable  #4 oxygenation improved  #5 glucose stable  Will need to monitor from renal standpoint but can do as an outpatient from renal standpoint can even discharge and follow-up outpatient when cleared otherwise           Savannah Wayne MD, MD

## 2022-11-08 NOTE — PROGRESS NOTES
V2.0  [facility] Hospitalist Progress Note      Name:  Marzena Weldon /Age/Sex: 1960  (65 y.o. male)   MRN & CSN:  [id number] & [id number] Encounter Date/Time: 2022 12:39 PM EST    Location:  -A PCP: Eliza Stephens MD       Hospital Day: 2    Assessment and Plan:   Marzena Weldon is a 65 y.o. male with past medical history of COPD, substance use(cocaine) admitted on  with history of lethargy and shortness of breath. # Hypercarbic and hypoxic respiratory failure secondary to COPD exacerbation:  -Treated with BiPAP, patient improved, patient on 2 L of oxygen  - Pulmonary following  - Continue IV antibiotics, duo nebs every 4 hours, steroids    # Left elbow pain and swelling-possible left olecranon bursitis  - Orthopedics and IR consult pending evaluation,  - Held warfarin for possible procedure    # Delirium/metabolic encephalopathy - resolved  - Neurology signed off    # AD-the patient apparently is not wearing PAP therapy at home  - Sleep study documented in chart done 2010 showed severe AD-scanned in under the media tab    # Chronic mixed systolic/diastolic heart failure due to ischemic heart disease  - EF 45%  - On Coreg, lisinopril, Bumex, and Zaroxolyn at home-lisinopril and Zaroxolyn held due to MARLYN. Coreg and Bumex continued  - On hydralazine at home    # Substance use: UDS positive for cocaine  - Reports he only did it this 1 time-advised to avoid    # Tobacco use-recommend cessation    # CAD status post CABG x3 in   - Plavix and Lipitor    TAAA-listed on his problem list by cardiologist in 2022-outpatient follow-up    Venous insufficiency noted in his records    Diabetes mellitus type 2, uncontrolled-endocrinology consult    Hyperlipidemia-atorvastatin    Obesity with BMI 34    Diet ADULT DIET; Regular; 4 carb choices (60 gm/meal);  No Added Salt (3-4 gm)   DVT Prophylaxis [] Lovenox, []  Heparin, [x] SCDs, [] Ambulation,  [] Eliquis, [] Xarelto  [] Coumadin    11/7-held warfarin and Plavix for possible IR procedure in the morning     Code Status Full Code   Disposition From: Home  Expected Disposition: Home  Estimated Date of Discharge: TBD  Patient requires continued admission due to multiple issues including left elbow pain and swelling   Surrogate Decision Maker/ POA Wife     Subjective:     Chief Complaint: Presented to [facility] ED complaining of back pain and elbow pain and a mental status change       Aleksey Grajeda is a 65 y.o. male   Patient states he did not come for his swelling in left elbow so he want to leave AMA this morning. Review of Systems:    Review of Systems    Constitutional: No fever no chills  HEENT: No headache no dizziness  Cardiovascular: No chest pain no palpitations  Respiratory: No cough no shortness of breath  Abdomen: No diarrhea, no nausea, no vomiting, no abdominal pain  Musculoskeletal: Swelling in left elbow  Neuro: No numbness or tingling  Skin: No rash      Objective: Intake/Output Summary (Last 24 hours) at 11/8/2022 1150  Last data filed at 11/7/2022 1834  Gross per 24 hour   Intake --   Output 1500 ml   Net -1500 ml          Vitals:   Vitals:    11/08/22 0922   BP: 113/79   Pulse: 77   Resp: 19   Temp:    SpO2:        Physical Exam:     Constitutional: Afebrile, no distress  HEENT: Normocephalic atraumatic mucous membrane pink/moist  Cardiovascular: S1-S2 normal no murmur  Respiratory:  Air entry good bilaterally with bilateral wheezing  Abdomen: Soft nontender bowel sounds normal  Musculoskeletal: Tender swelling over the left elbow with local rise of temperature over the skin  Neuro: Alert, oriented no focal deficit  Psych: Affect normal    Medications:   Medications:    insulin glargine  10 Units SubCUTAneous Nightly    sodium chloride flush  5-40 mL IntraVENous 2 times per day    atorvastatin  80 mg Oral Nightly    carvedilol  12.5 mg Oral BID WC    [Held by provider] clopidogrel  75 mg Oral Daily hydrALAZINE  50 mg Oral TID    predniSONE  40 mg Oral Daily    azithromycin  500 mg Oral Daily    bumetanide  1 mg Oral BID    nicotine  1 patch TransDERmal Daily    montelukast  10 mg Oral Nightly    albuterol sulfate HFA  2 puff Inhalation 4x daily    ipratropium  2 puff Inhalation 4x daily    insulin lispro  0-8 Units SubCUTAneous TID WC    insulin lispro  0-8 Units SubCUTAneous 2 times per day    insulin lispro  10 Units SubCUTAneous Once    insulin lispro  10 Units SubCUTAneous TID       Infusions:    sodium chloride      dextrose       PRN Meds: sodium chloride flush, 5-40 mL, PRN  sodium chloride, , PRN  ondansetron, 4 mg, Q8H PRN   Or  ondansetron, 4 mg, Q6H PRN  polyethylene glycol, 17 g, Daily PRN  acetaminophen, 650 mg, Q6H PRN   Or  acetaminophen, 650 mg, Q6H PRN  glucose, 4 tablet, PRN  dextrose bolus, 125 mL, PRN   Or  dextrose bolus, 250 mL, PRN  glucagon (rDNA), 1 mg, PRN  dextrose, , Continuous PRN  ipratropium-albuterol, 1 ampule, Q4H PRN  guaiFENesin, 600 mg, BID PRN  benzonatate, 100 mg, TID PRN  benzocaine-menthol, 1 lozenge, Q2H PRN  melatonin, 5 mg, Nightly PRN      Labs      Recent Results (from the past 24 hour(s))   TSH    Collection Time: 11/07/22 12:20 PM   Result Value Ref Range    TSH, High Sensitivity 0.346 0.270 - 4.20 uIu/ml   Vitamin B12    Collection Time: 11/07/22 12:20 PM   Result Value Ref Range    Vitamin B-12 554.1 211 - 911 pg/ml   Ammonia    Collection Time: 11/07/22 12:20 PM   Result Value Ref Range    Ammonia 23 16 - 60 UMOL/L   Renal Function Panel    Collection Time: 11/07/22 12:20 PM   Result Value Ref Range    Sodium 137 135 - 145 MMOL/L    Potassium 4.0 3.5 - 5.1 MMOL/L    Chloride 98 (L) 99 - 110 mMol/L    CO2 26 21 - 32 MMOL/L    Anion Gap 13 4 - 16    BUN 58 (H) 6 - 23 MG/DL    Creatinine 2.3 (H) 0.9 - 1.3 MG/DL    Est, Glom Filt Rate 32 (L) >60 mL/min/1.73m2    Glucose 235 (H) 70 - 99 MG/DL    Calcium 8.9 8.3 - 10.6 MG/DL    Albumin 4.3 3.4 - 5.0 GM/DL Phosphorus 4.1 2.5 - 4.9 MG/DL   CBC with Auto Differential    Collection Time: 11/07/22 12:20 PM   Result Value Ref Range    WBC 8.5 4.0 - 10.5 K/CU MM    RBC 4.30 (L) 4.6 - 6.2 M/CU MM    Hemoglobin 11.9 (L) 13.5 - 18.0 GM/DL    Hematocrit 37.1 (L) 42 - 52 %    MCV 86.3 78 - 100 FL    MCH 27.7 27 - 31 PG    MCHC 32.1 32.0 - 36.0 %    RDW 16.6 (H) 11.7 - 14.9 %    Platelets 936 943 - 157 K/CU MM    MPV 10.6 7.5 - 11.1 FL    Differential Type AUTOMATED DIFFERENTIAL     Segs Relative 87.4 (H) 36 - 66 %    Lymphocytes % 8.9 (L) 24 - 44 %    Monocytes % 2.6 0 - 4 %    Eosinophils % 0.4 0 - 3 %    Basophils % 0.2 0 - 1 %    Segs Absolute 7.4 K/CU MM    Lymphocytes Absolute 0.8 K/CU MM    Monocytes Absolute 0.2 K/CU MM    Eosinophils Absolute 0.0 K/CU MM    Basophils Absolute 0.0 K/CU MM    Nucleated RBC % 0.0 %    Total Nucleated RBC 0.0 K/CU MM    Total Immature Neutrophil 0.04 K/CU MM    Immature Neutrophil % 0.5 (H) 0 - 0.43 %   Blood Gas, Venous    Collection Time: 11/07/22  2:00 PM   Result Value Ref Range    pH, Juan Manuel 7.33 7.32 - 7.42    pCO2, Juan Manuel 51 38 - 52 mmHG    pO2, Juan Manuel 158 (H) 28 - 48 mmHG    Base Exc, Mixed . 2 0 - 1.2    HCO3, Venous 26.9 (H) 19 - 25 MMOL/L    O2 Sat, Juan Manuel 93.4 (H) 50 - 70 %    Comment VBG    POCT Glucose    Collection Time: 11/07/22  4:40 PM   Result Value Ref Range    POC Glucose 378 (H) 70 - 99 MG/DL   Protime-INR    Collection Time: 11/07/22  5:59 PM   Result Value Ref Range    Protime 17.8 (H) 11.7 - 14.5 SECONDS    INR 1.38 INDEX   SPECIMEN REJECTION    Collection Time: 11/07/22  5:59 PM   Result Value Ref Range    Rejected Test PRCAL     Reason for Rejection UNABLE TO PERFORM TESTING:    POCT Glucose    Collection Time: 11/07/22  8:49 PM   Result Value Ref Range    POC Glucose 142 (H) 70 - 99 MG/DL   POCT Glucose    Collection Time: 11/07/22  9:40 PM   Result Value Ref Range    POC Glucose 121 (H) 70 - 99 MG/DL   POCT Glucose    Collection Time: 11/08/22  1:32 AM   Result Value Ref Range    POC Glucose 125 (H) 70 - 99 MG/DL   CBC with Auto Differential    Collection Time: 11/08/22  4:58 AM   Result Value Ref Range    WBC 10.1 4.0 - 10.5 K/CU MM    RBC 4.18 (L) 4.6 - 6.2 M/CU MM    Hemoglobin 11.5 (L) 13.5 - 18.0 GM/DL    Hematocrit 36.3 (L) 42 - 52 %    MCV 86.8 78 - 100 FL    MCH 27.5 27 - 31 PG    MCHC 31.7 (L) 32.0 - 36.0 %    RDW 16.1 (H) 11.7 - 14.9 %    Platelets 822 861 - 871 K/CU MM    MPV 11.0 7.5 - 11.1 FL    Differential Type AUTOMATED DIFFERENTIAL     Segs Relative 76.2 (H) 36 - 66 %    Lymphocytes % 14.9 (L) 24 - 44 %    Monocytes % 7.1 (H) 0 - 4 %    Eosinophils % 1.2 0 - 3 %    Basophils % 0.2 0 - 1 %    Segs Absolute 7.7 K/CU MM    Lymphocytes Absolute 1.5 K/CU MM    Monocytes Absolute 0.7 K/CU MM    Eosinophils Absolute 0.1 K/CU MM    Basophils Absolute 0.0 K/CU MM    Nucleated RBC % 0.0 %    Total Nucleated RBC 0.0 K/CU MM    Total Immature Neutrophil 0.04 K/CU MM    Immature Neutrophil % 0.4 0 - 0.43 %   Protime-INR    Collection Time: 11/08/22  4:58 AM   Result Value Ref Range    Protime 15.8 (H) 11.7 - 14.5 SECONDS    INR 1.22 INDEX   Renal Function Panel    Collection Time: 11/08/22  4:58 AM   Result Value Ref Range    Sodium 137 135 - 145 MMOL/L    Potassium 3.6 3.5 - 5.1 MMOL/L    Chloride 98 (L) 99 - 110 mMol/L    CO2 29 21 - 32 MMOL/L    Anion Gap 10 4 - 16    BUN 58 (H) 6 - 23 MG/DL    Creatinine 2.4 (H) 0.9 - 1.3 MG/DL    Est, Glom Filt Rate 30 (L) >60 mL/min/1.73m2    Glucose 165 (H) 70 - 99 MG/DL    Calcium 8.9 8.3 - 10.6 MG/DL    Albumin 4.1 3.4 - 5.0 GM/DL    Phosphorus 4.0 2.5 - 4.9 MG/DL   Magnesium    Collection Time: 11/08/22  4:58 AM   Result Value Ref Range    Magnesium 2.2 1.8 - 2.4 mg/dl   Uric Acid    Collection Time: 11/08/22  4:58 AM   Result Value Ref Range    Uric Acid 10.9 (H) 3.5 - 7.2 MG/DL   C-Reactive Protein    Collection Time: 11/08/22  4:58 AM   Result Value Ref Range    CRP High Sensitivity 33.2 (H) <5.0 mg/L   Sedimentation Rate Collection Time: 11/08/22  4:58 AM   Result Value Ref Range    Sed Rate 33 (H) 0 - 20 MM/HR   Procalcitonin    Collection Time: 11/08/22  4:58 AM   Result Value Ref Range    Procalcitonin 0.150    POCT Glucose    Collection Time: 11/08/22  9:14 AM   Result Value Ref Range    POC Glucose 153 (H) 70 - 99 MG/DL        Imaging/Diagnostics Last 24 Hours   No results found.     Electronically signed by Olman Diego MD on 11/8/2022 at 11:50 AM

## 2022-11-08 NOTE — PROGRESS NOTES
Progress Note( Dr. Bubba Zuñiga)  11/8/2022  Subjective:   Admit Date: 11/7/2022  PCP: Maco Sanchez MD    Admitted For :Shortness of breath and exacerbation of COPD    Consulted For: Better control of blood glucose       Interval History: Patient patient feels lot better even though still he is some short of breath    Denies any chest pains,   Yes  SOB . Denies nausea or vomiting. No new bowel or bladder symptoms. Intake/Output Summary (Last 24 hours) at 11/8/2022 1150  Last data filed at 11/7/2022 1834  Gross per 24 hour   Intake --   Output 1500 ml   Net -1500 ml       DATA    CBC:   Recent Labs     11/07/22  1220 11/08/22  0458   WBC 8.5 10.1   HGB 11.9* 11.5*    173    CMP:  Recent Labs     11/07/22  1220 11/08/22  0458    137   K 4.0 3.6   CL 98* 98*   CO2 26 29   BUN 58* 58*   CREATININE 2.3* 2.4*   CALCIUM 8.9 8.9   LABALBU 4.3 4.1     Lipids:   Lab Results   Component Value Date/Time    CHOL 116 10/02/2022 10:12 PM    HDL 32 10/02/2022 10:12 PM    TRIG 125 10/02/2022 10:12 PM     Glucose:  Recent Labs     11/07/22  2140 11/08/22  0132 11/08/22  0914   POCGLU 121* 125* 153*     [id number]:  Lab Results   Component Value Date/Time    LABA1C 5.6 10/02/2022 10:27 PM     High Sensitivity TSH:   Lab Results   Component Value Date/Time    TSHHS 0.346 11/07/2022 12:20 PM     Free T3: No results found for: FT3  Free T4:  Lab Results   Component Value Date/Time    T4FREE 1.11 10/02/2022 10:12 PM       US RETROPERITONEAL LIMITED   Final Result   No sonographic evidence of hydronephrosis seen. XR CHEST PORTABLE   Final Result   1. Right basilar airspace disease concerning for pneumonia. Atelectasis   could have a similar appearance. 2.  Cardiomegaly without evidence of pulmonary edema or pleural effusions.          IR FLUORO GUIDED NEEDLE PLACEMENT    (Results Pending)        Scheduled Medicines   Medications:    insulin glargine  10 Units SubCUTAneous Nightly    sodium chloride flush  5-40 mL IntraVENous 2 times per day    atorvastatin  80 mg Oral Nightly    carvedilol  12.5 mg Oral BID WC    [Held by provider] clopidogrel  75 mg Oral Daily    hydrALAZINE  50 mg Oral TID    predniSONE  40 mg Oral Daily    azithromycin  500 mg Oral Daily    bumetanide  1 mg Oral BID    nicotine  1 patch TransDERmal Daily    montelukast  10 mg Oral Nightly    albuterol sulfate HFA  2 puff Inhalation 4x daily    ipratropium  2 puff Inhalation 4x daily    insulin lispro  0-8 Units SubCUTAneous TID WC    insulin lispro  0-8 Units SubCUTAneous 2 times per day    insulin lispro  10 Units SubCUTAneous Once    insulin lispro  10 Units SubCUTAneous TID WC      Infusions:    sodium chloride      dextrose           Objective:   Vitals: /79   Pulse 77   Temp 97.6 °F (36.4 °C) (Oral)   Resp 19   Ht 6' 3\" (1.905 m)   Wt 273 lb (123.8 kg)   SpO2 90%   BMI 34.12 kg/m²   General appearance: alert and cooperative with exam  Neck: no JVD or bruit  Thyroid : Normal lobes   Lungs: Has Vesicular Breath sounds with wheezing some rales occasional  Heart:  regular rate and rhythm  Abdomen: soft, non-tender; bowel sounds normal; no masses,  no organomegaly  Musculoskeletal: Normal  Extremities: extremities normal, , no edema  Neurologic:  Awake, alert, oriented to name, place and time. Cranial nerves II-XII are grossly intact. Motor is  intact. Sensory is intact. ,  and gait is normal.    Assessment:     Patient Active Problem List:     DM w/o complication type II (HCC)     CAD (coronary artery disease)     HTN (hypertension)     Smoking     COPD (chronic obstructive pulmonary disease) (HCC)     STEMI (ST elevation myocardial infarction) (HCC)     Nausea     COPD with acute exacerbation (HCC)     Acute pulmonary edema (HCC)     Chronic systolic congestive heart failure (HCC)     TIA (transient ischemic attack)     Chronic kidney disease, stage III (moderate) ([facility])     Temporal pain     Class 2 obesity due to excess calories without serious comorbidity with body mass index (BMI) of 35.0 to 35.9 in adult     Decreased cardiac ejection fraction     NSTEMI (non-ST elevated myocardial infarction) ([address]     Ischemic cardiomyopathy     Mixed hyperlipidemia     Thoracoabdominal aortic aneurysm (TAAA) without rupture     Venous reflux     Varicose veins of both legs with edema     Acute on chronic respiratory failure with hypoxia (HCC)     Acute bronchitis with COPD (HCC)     Paroxysmal A-fib (HCC)     Chronic atrial fibrillation (HCC)     Acute on chronic congestive heart failure (HCC)     Acute on chronic systolic (congestive) heart failure (HCC)     CHF (congestive heart failure), NYHA class I, acute on chronic, combined ([address]     Acute kidney injury ([address]     COPD exacerbation (HCC)     Encephalopathy acute      Plan:     Reviewed POC blood glucose . Labs and X ray results   Reviewed Current Medicines   On meal/ Correction bolus Humalog/ Basal Lantus Insulin regime   Hypoglycemic drugs   Monitor Blood glucose frequently   Modified  the dose of Insulin/ other medicines as needed   Will follow     .      Karson Jimenez MD, MD

## 2022-11-08 NOTE — CONSULTS
Endocrinology   Consult Note  11/7/2022  2:15 AM     Primary Care provider: Jeet Lerner MD     Referring physician:  Giovanna Cordero MD     Dear Doctor Julia Galvan     Thank You for the Consult     Pt. Was Admitted for : Shortness of breath and exacerbation of COPD    Reason for Consult: Better control of blood glucose      History Obtained From:  Patient/ EMR       HISTORY OF PRESENT ILLNESS:                The patient is a 65 y.o. male with significant past medical history of CAD, CABG X2, PTCA debilitation on atrial fibrillation CHF, COPD, diabetes mellitus, GERD, hypertension, hyperlipidemia, use of cocaine admitted to hospital for sore shortness of breath and exacerbation of COPD been running high blood glucoses patient started on high-dose steroids I was  consulted for better control of blood glucose. ROS:   Pt's ROS done in detail. Abnormal ROS are noted in Medical and Surgical History Section below: Other Medical History:        Diagnosis Date    Abnormal nuclear stress test     Atrial fibrillation ([facility] 11/2013    CAD (coronary artery disease)     Follows with Dr. Dianne Sanchez    CHF (congestive heart failure) ([facility])     COPD (chronic obstructive pulmonary disease) ([facility]     Follows with PCP    Decreased cardiac ejection fraction     Diabetes mellitus ([facility]     GERD (gastroesophageal reflux disease)     H/O cardiovascular stress test 11/20/13, 3/20/2013    11/13-Observed defect is consistent with diaphragmatic attenuation. EF 58%. 3/13 -EF 51%. No ischemia. Normal Study. H/O cardiovascular stress test 06/08/2017    EF 50% normal study    H/O cardiovascular stress test 06/17/2019    Normal NM    H/O cardiovascular stress test 03/02/2021    depressed lvedf increased edv myocardial perfusion abnormal stress test    H/O Doppler ultrasound 10/14/2010    10/2010-Bilateral venous doppler of lower extremies- No DVTs.     H/O echocardiogram 06/26/2019    Left ventricular systolic function is normal with an ejection fraction of 50-55%. Mild concentric left ventricular hypertrophy. Grade I diastolic dysfunction. No significant valvular disease noted. No evidence of pericardial effusion. Essentially unremarkable echo. H/O percutaneous left heart catheterization 12/22/2015    No evidence of hemodynamically significant coronary artery disease    Heart imaging 04/23/2020    muga - ef 58%    History of coronary artery stent placement 11/13/2013 11/13 -RCA - 3 stents placed    History of exercise stress test 06/08/2017    treadmill    History of Holter monitoring 01/04/2016    predominant rhythm sinus    History of MRI of brain and brain stem 06/04/2020    No evidence of an acute infarct, mod chronic microvascular white matter ischemic disease with associated cerebral parenchymal volume loss    History of nuclear stress test 02/24/2020    EF 38%,Myocardial perfusion scan shows moderate size, severe intensity, non  reversible perfusion defect in inferior wall. Hx of Doppler echocardiogram 01/26/2022    EF 45-50% Mod LV hypertrophy. Grade 1 diastolic dysfunction. Mod MR and TR. Dilation of the aortic root and ascending aorta. Hyperlipidemia     Hypertension     Follows with PCP    Kidney disease     Dr. José Miguel Chung    S/P cardiac catheterization 11/13/2013 11/13/2013-EF 55%, distal LAD mild disease,CX stent patent,but distal to stent 50% stenosis. RCA severe disease. SOB (shortness of breath)     Status post angioplasty with stent     Wears dentures     full upper denture     Surgical History:        Procedure Laterality Date    APPENDECTOMY      BIOPSY / LIGATION TEMPORAL ARTERY Left 8/17/2020    LEFT TEMPORAL ARTERY BIOPSY LIGATION performed by Georges Nunes MD at [address]  11/13/2013 11/13/2013-EF 55%, distal LAD mild disease,CX stent patent,but distal to stent 50% stenosis. RCA severe disease.     CARDIAC SURGERY  07/03/2020    CABG X 3 LIMA-OM-PDA-LAD, Maze, LT Atrial Appendage clipping    COLONOSCOPY      CORONARY ANGIOPLASTY WITH STENT PLACEMENT      4 stents- RCA X3; CX X1    CORONARY ANGIOPLASTY WITH STENT PLACEMENT  11/13/2013 11/13/2013 -3 stents placed to RCA- Dr Vipin Castro N/A 7/3/2020    INTRAOPERATIVE ISMAEL, CABG X3   WITH LIMA  AND LEFT LEG ENDOVEIN HARVEST, INDUCED HYPOTHERMIA, MAZE BIPOLAR AND CRYO PROCEDURE, LEFT ATRIAL APPENDAGE CLIPPING performed by Oscar Dias MD at [address] [place] [address]      all teeth extracted    [address]       Allergies:  Hydromorphone    Family History:       Problem Relation Age of Onset    Cancer Mother     Diabetes Mother     Heart Disease Mother     High Blood Pressure Mother     Stroke Mother     Cancer Father     Heart Disease Father     High Blood Pressure Father     Stroke Father     Cancer Brother      REVIEW OF SYSTEMS:  Review of System Done as noted above     PHYSICAL EXAM:      Vitals:    BP (!) 165/97   Pulse 75   Temp 98 °F (36.7 °C) (Oral)   Resp 13   Ht 6' 3\" (1.905 m)   Wt 273 lb (123.8 kg)   SpO2 97%   BMI 34.12 kg/m²     CONSTITUTIONAL:  awake, alert, cooperative, appears stated age   EYES:  vision intact Fundoscopic Exam not performed   ENT:Normal  NECK:  Supple, No JVD. Thyroid Exam:Normal   LUNGS:  Has Vesicular Breath Sounds, wheezing and rales bilateral  CARDIOVASCULAR:  Normal apical impulse, regular rate and rhythm, normal S1 and S2, no S3 or S4, and has no  murmur   ABDOMEN:  No scars, normal bowel sounds, soft, non-distended, non-tender, no masses palpated, no hepatolienomegaly  Musculoskeletal: Normal  Extremities: Normal, peripheral pulses normal, , has no edema   NEUROLOGIC:  Awake, alert, oriented to name, place and time. Cranial nerves II-XII are grossly intact. Motor is  intact. Sensory neuropathy.  ,  and gait is normal.    DATA:    CBC:   Recent Labs     11/07/22  1220   WBC 8.5   HGB 11.9*    CMP:  Recent Labs     11/07/22  1220      K 4.0   CL 98*   CO2 26   BUN 58*   CREATININE 2.3*   CALCIUM 8.9   LABALBU 4.3     Lipids:   Lab Results   Component Value Date/Time    CHOL 116 10/02/2022 10:12 PM    HDL 32 10/02/2022 10:12 PM    TRIG 125 10/02/2022 10:12 PM     Glucose:   Recent Labs     11/07/22  1640   POCGLU 378*     Hemoglobin A1C:   Lab Results   Component Value Date/Time    LABA1C 5.6 10/02/2022 10:27 PM     Free T4:   Lab Results   Component Value Date/Time    T4FREE 1.11 10/02/2022 10:12 PM     Free T3: No results found for: FT3  TSH High Sensitivity:   Lab Results   Component Value Date/Time    TSHHS 0.346 11/07/2022 12:20 PM       XR CHEST PORTABLE    (Results Pending)          Scheduled Medicines   Medications:    sodium chloride flush  5-40 mL IntraVENous 2 times per day    atorvastatin  80 mg Oral Nightly    carvedilol  12.5 mg Oral BID WC    clopidogrel  75 mg Oral Daily    hydrALAZINE  50 mg Oral TID    predniSONE  40 mg Oral Daily    azithromycin  500 mg Oral Daily    bumetanide  1 mg Oral BID    nicotine  1 patch TransDERmal Daily    warfarin  4 mg Oral Daily    montelukast  10 mg Oral Nightly    albuterol sulfate HFA  2 puff Inhalation 4x daily    ipratropium  2 puff Inhalation 4x daily    [START ON 11/8/2022] insulin lispro  15 Units SubCUTAneous TID WC    insulin glargine  30 Units SubCUTAneous Nightly    [START ON 11/8/2022] insulin lispro  0-8 Units SubCUTAneous TID WC    insulin lispro  0-8 Units SubCUTAneous 2 times per day    insulin lispro  10 Units SubCUTAneous Once    [START ON 11/8/2022] insulin NPH  20 Units SubCUTAneous QAM      Infusions:    sodium chloride      dextrose           IMPRESSION    Patient Active Problem List   Diagnosis    DM w/o complication type II (HCC)    CAD (coronary artery disease)    HTN (hypertension)    Smoking    COPD (chronic obstructive pulmonary disease) (HCC)    STEMI (ST elevation myocardial infarction) ([facility]    Nausea    COPD with acute exacerbation (HCC)    Acute pulmonary edema (HCC)    Chronic systolic congestive heart failure (HCC)    TIA (transient ischemic attack)    Chronic kidney disease, stage III (moderate) (HCC)    Temporal pain    Class 2 obesity due to excess calories without serious comorbidity with body mass index (BMI) of 35.0 to 35.9 in adult    Decreased cardiac ejection fraction    NSTEMI (non-ST elevated myocardial infarction) ([facility] [address]    Ischemic cardiomyopathy    Mixed hyperlipidemia    Thoracoabdominal aortic aneurysm (TAAA) without rupture    Venous reflux    Varicose veins of both legs with edema    Acute on chronic respiratory failure with hypoxia (HCC)    Acute bronchitis with COPD (HCC)    Paroxysmal A-fib (HCC)    Chronic atrial fibrillation (HCC)    Acute on chronic congestive heart failure (HCC)    Acute on chronic systolic (congestive) heart failure (HCC)    CHF (congestive heart failure), NYHA class I, acute on chronic, combined ([facility] [address]    Acute kidney injury ([facility] [address]    COPD exacerbation (HCC)         RECOMMENDATIONS:      Reviewed POC blood glucose . Labs and X ray results   Reviewed Home and Current Medicines   Will Start On meal/ Correction bolus Humalog/ Lantus Insulin regime  Monitor Blood glucose frequently   Modify  the dose of Insulin as needed        Will follow with you  Again thank you for sharing pt's care with me.      Truly yours,       Naeem Mcdonough MD

## 2022-11-08 NOTE — DISCHARGE SUMMARY
V2.0  AMA/Discharge Summary    Name:  Silvino Verdin /Age/Sex: 1960 (65 y.o. male)   Admit Date: 2022  Discharge Date: 22    MRN & CSN:  [id number] & [id number] Encounter Date and Time 22 2:25 PM EST    Attending:  No att. providers found Discharging Provider: Mauricio Blankenship MD       Hospital Course:     Brief HPI and Hospital course: Silvino Verdin is a 65 y.o. male  with past medical history of COPD, substance use(cocaine) admitted on  with history of lethargy and shortness of breath. Patient found to have hypercarbic and hypoxic respiratory failure secondary to COPD exacerbation treated with BiPAP, patient improved and still on 2 L of oxygen this morning, pulmonary consulted and was following, patient treated with IV antibiotics, duo nebs and steroids. Patient UDS positive for cocaine, patient also had left elbow pain and swelling suspected left olecranon bursitis consulted orthopedics and IR for possible aspiration, pending evaluation, also admitted with altered mental status consulted neurology imaging negative signed off and also patient improved his mentation back to baseline, patient improving but left AMA this afternoon. The patient expressed appropriate understanding of, and agreement with the discharge recommendations, medications, and plan.      Consults this admission:  IP CONSULT TO PHARMACY  IP CONSULT TO PULMONOLOGY  IP CONSULT TO NEPHROLOGY  IP CONSULT TO NEUROLOGY  IP CONSULT TO ORTHOPEDIC SURGERY  IP CONSULT TO ENDOCRINOLOGY  IP CONSULT TO PHARMACY  IP CONSULT TO INTERVENTIONAL RADIOLOGY    Discharge Diagnosis:   COPD exacerbation ([address]  Hypercarbic and hypoxic respiratory failure  Left elbow pain and swelling suspected secondary to olecranon bursitis  Delirium/metabolic encephalopathy  Substance abuse    Discharge Instruction:   Follow up appointments: N/A  Primary care physician: Carlos Alberto Kramer MD within 2 weeks  Diet: N/A  Activity: N/A  Disposition: Discharged to:   []Home, []HHC, []SNF, []Acute Rehab, []Hospice AMA  Condition on discharge: Stable  Labs and Tests to be Followed up as an outpatient by PCP or Specialist: N/A    Discharge Medications:        Medication List        STOP taking these medications      nitroGLYCERIN 0.4 MG SL tablet  Commonly known as: Nitrostat     pantoprazole 40 MG tablet  Commonly known as: [address] [facility] your doctor about these medications      albuterol sulfate  (90 Base) MCG/ACT inhaler  Commonly known as: Proventil HFA  Inhale 2 puffs into the lungs every 6 hours as needed for Wheezing or Shortness of Breath     atorvastatin 80 MG tablet  Commonly known as: LIPITOR  Take 1 tablet by mouth nightly     bumetanide 2 MG tablet  Commonly known as: BUMEX  Take 1 tablet by mouth 2 times daily     carvedilol 12.5 MG tablet  Commonly known as: COREG  Take 1 tablet by mouth 2 times daily (with meals)     clopidogrel 75 MG tablet  Commonly known as: PLAVIX     empagliflozin 10 MG tablet  Commonly known as: Jardiance  Take 1 tablet by mouth in the morning. ferrous sulfate 325 (65 Fe) MG tablet  Commonly known as: IRON 325  Take 1 tablet by mouth 2 times daily     glipiZIDE 5 MG tablet  Commonly known as: GLUCOTROL     hydrALAZINE 50 MG tablet  Commonly known as: APRESOLINE  Take 1 tablet by mouth 3 times daily     ipratropium-albuterol 0.5-2.5 (3) MG/3ML Soln nebulizer solution  Commonly known as: DUONEB  Inhale 3 mLs into the lungs 4 times daily     lisinopril 20 MG tablet  Commonly known as: PRINIVIL;ZESTRIL     metFORMIN 500 MG tablet  Commonly known as: GLUCOPHAGE     metOLazone 2.5 MG tablet  Commonly known as: ZAROXOLYN  Take 1 tablet by mouth in the morning. Take 1-2 hours after Bumex. Please make sure of this.      nicotine 14 MG/24HR  Commonly known as: NICODERM CQ  Place 1 patch onto the skin daily     pregabalin 150 MG capsule  Commonly known as: LYRICA     warfarin 4 MG tablet  Commonly known as: Isrrael Garza  Take as directed. If you are unsure how to take this medication, talk to your nurse or doctor. Original instructions: TAKE 1 TABLET BY MOUTH EVERY DAY, EXCEPT TAKE 1 AND 1/2 TABLETS ON SUNDAYS OR AS DIRECTED BY CLINIC             Objective Findings at Discharge:   /79   Pulse 77   Temp 97.6 °F (36.4 °C) (Oral)   Resp 19   Ht 6' 3\" (1.905 m)   Wt 273 lb (123.8 kg)   SpO2 90%   BMI 34.12 kg/m²       Physical Exam:   Patient left AMA did not do physical exam        Labs and Imaging   XR CHEST PORTABLE    Result Date: 11/8/2022  EXAMINATION: ONE XRAY VIEW OF THE CHEST 11/8/2022 5:06 am COMPARISON: 10/02/2022 chest radiograph HISTORY: ORDERING SYSTEM PROVIDED HISTORY: SOB TECHNOLOGIST PROVIDED HISTORY: Reason for exam:->SOB Reason for Exam: SOB FINDINGS: Prior median sternotomy, CABG, and left atrial appendage clip placement. The cardiomediastinal silhouette is enlarged, unchanged. New right basilar airspace disease concerning for pneumonia. No pulmonary edema. No pleural effusion or pneumothorax. Osseous structures are unchanged. 1.  Right basilar airspace disease concerning for pneumonia. Atelectasis could have a similar appearance. 2.  Cardiomegaly without evidence of pulmonary edema or pleural effusions. US RETROPERITONEAL LIMITED    Result Date: 11/8/2022  EXAMINATION: ULTRASOUND OF THE KIDNEYS 11/8/2022 10:58 am COMPARISON: CT abdomen pelvis dated 06/23/2022. HISTORY: ORDERING SYSTEM PROVIDED HISTORY: arf TECHNOLOGIST PROVIDED HISTORY: Reason for exam:->arf FINDINGS: Evaluation is partially limited due to overlying bowel gas. The right kidney measures 13.2 cm in length and the left kidney measures 13.4 cm in length. Kidneys demonstrate normal cortical echogenicity. No hydronephrosis or intrarenal stones. There is subcentimeter focus of low echogenicity seen in the lower pole of the right kidney, too small to further characterize, statistically benign.      No sonographic evidence of hydronephrosis seen. CBC:   Recent Labs     11/07/22  1220 11/08/22  0458   WBC 8.5 10.1   HGB 11.9* 11.5*    173     BMP:    Recent Labs     11/07/22  1220 11/08/22  0458    137   K 4.0 3.6   CL 98* 98*   CO2 26 29   BUN 58* 58*   CREATININE 2.3* 2.4*   GLUCOSE 235* 165*     Hepatic: No results for input(s): AST, ALT, ALB, BILITOT, ALKPHOS in the last 72 hours. Lipids:   Lab Results   Component Value Date/Time    CHOL 116 10/02/2022 10:12 PM    HDL 32 10/02/2022 10:12 PM    TRIG 125 10/02/2022 10:12 PM     Hemoglobin A1C:   Lab Results   Component Value Date/Time    LABA1C 5.6 10/02/2022 10:27 PM     TSH: No results found for: TSH  Troponin:   Lab Results   Component Value Date/Time    TROPONINT 0.016 10/02/2022 10:12 PM    TROPONINT 0.019 07/15/2022 10:17 AM    TROPONINT 0.050 01/29/2022 08:25 PM     Lactic Acid: No results for input(s): LACTA in the last 72 hours.   BNP:   Recent Labs     11/07/22  1220   PROBNP 590.4*     UA:  Lab Results   Component Value Date/Time    NITRU NEGATIVE 10/02/2022 08:47 PM    COLORU YELLOW 10/02/2022 08:47 PM    WBCUA <1 10/02/2022 08:47 PM    RBCUA NONE SEEN 10/02/2022 08:47 PM    MUCUS RARE 07/04/2020 07:20 PM    TRICHOMONAS NONE SEEN 10/02/2022 08:47 PM    BACTERIA NEGATIVE 10/02/2022 08:47 PM    CLARITYU CLEAR 10/02/2022 08:47 PM    SPECGRAV 1.015 10/02/2022 08:47 PM    LEUKOCYTESUR NEGATIVE 10/02/2022 08:47 PM    UROBILINOGEN NORMAL 10/02/2022 08:47 PM    BILIRUBINUR NEGATIVE 10/02/2022 08:47 PM    BLOODU NEGATIVE 10/02/2022 08:47 PM    KETUA NEGATIVE 10/02/2022 08:47 PM     Urine Cultures: No results found for: LABURIN  Blood Cultures: No results found for: BC  No results found for: BLOODCULT2  Organism: No results found for: ORG    Time Spent Discharging patient 20 minutes    Electronically signed by Kezia Navarro MD on 11/8/2022 at 2:25 PM

## 2022-11-08 NOTE — CONSULTS
[address], [address] [facility]                                  CONSULTATION    PATIENT NAME: Tania Ackerman                       :        1960  MED REC NO:   [id number]                          ROOM:  ACCOUNT NO:   [de-identified]                           ADMIT DATE: 2022  PROVIDER:     Ford Rangel MD    CONSULT DATE:  2022    HISTORY OF PRESENT ILLNESS:  The patient is a 78-year-old gentleman with  multiple medical problems including COPD, coronary artery disease,  congestive heart failure, atrial fibrillation, hypertension,  hyperlipidemia, chronic kidney disease who was admitted through the  emergency room with complaints of increasing shortness of breath and  change of mental status. He was also having wheezing. In the emergency  room, he was given Narcan which he was positive for cocaine. The  patient has improved in the last 12 hours and is much more awake. The  patient has history of snoring and excessive daytime somnolence. He was  scheduled for sleep study, which he did not get. He is complaining of  some cough productive of whitish to slightly yellowish tinged phlegm. He denies any hemoptysis. He denies any fever or chills. He denies any  nausea or vomiting. PAST MEDICAL HISTORY:  Significant for COPD, coronary artery disease,  congestive heart failure, hypertension, hyperlipidemia, chronic kidney  disease. PAST SURGICAL HISTORY:  Remarkable for appendectomy, colonoscopy,  coronary angioplasty with stent placement, dental surgery. FAMILY HISTORY:  Reveals that his mother had cancer, diabetes, stroke,  coronary artery disease, hypertension. Father had cancer, heart  disease, hypertension. SOCIAL HISTORY:  Reveals that he smokes about a pack per day and has  been smoking for 46 years. No history of alcohol abuse. He uses  cocaine. MEDICATIONS:  Reviewed.     ALLERGIES: Allergic to HYDROMORPHONE. REVIEW OF SYSTEMS:  10 to 14-point review of systems were reviewed and  are negative except for what is mentioned in history of present illness. PHYSICAL EXAMINATION:  GENERAL:  The patient is alert, oriented x3, in no acute respiratory  distress. VITAL SIGNS:  His blood pressure is 136/82 mmHg, pulse of 75 per minute,  and respiratory rate of 17 per minute. He is afebrile. His saturation  is 97% on 2 liters nasal canula. HEENT:  Essentially unremarkable. There is no JVD. No lymphadenopathy. NECK:  Supple. LUNGS:  Reveals diminished breath sounds and occasional rhonchi. HEART:  Showed normal S1, S2. There was no S3, S4 noted. ABDOMEN:  Benign. There is no evidence of any organomegaly. The bowel  sounds are present. NEUROLOGIC:  Reveals that he is awake and responsive, answering  questions appropriately, and moving his extremities. His ABG showed a pH of 7.37, pCO2 of 51, pO2 of 68 with 90% saturation. His urine drug screen was positive for cocaine. His CBC showed a white  count of 8.5, hemoglobin 11.9, and hematocrit 37.1. His electrolytes  showed a sodium of 137, potassium 4.0, chloride 98, carbon dioxide 26,  BUN 58, creatinine 2.3. His chest x-ray was reported to show no acute disease in the outside  facility. IMPRESSION:  1. Acute respiratory failure secondary to #2.  2.  Acute exacerbation of COPD. 3.  Possible underlying obstructive sleep apnea. 4.  History of drug abuse. 5.  Chronic kidney disease. PLAN:  1. Continue present antibiotic therapy. 2.  We will start DuoNeb q.4h. while awake. 3.  Continue steroids. 4.  Sputum for culture and sensitivity. 5.  Recheck ApneaLink. 6.  Check mag and phos. 7.  As per orders.         Daniele Harman MD    D: 11/07/2022 17:34:48       T: 11/07/2022 17:38:23     [email or web address]  Job#: [id number]     Doc#: [id number]    CC:

## 2022-11-14 ENCOUNTER — ANTI-COAG VISIT (OUTPATIENT)
Dept: PHARMACY | Age: 62
End: 2022-11-14
Payer: MEDICAID

## 2022-11-14 ENCOUNTER — OFFICE VISIT (OUTPATIENT)
Dept: CARDIOLOGY CLINIC | Age: 62
End: 2022-11-14
Payer: MEDICAID

## 2022-11-14 VITALS
DIASTOLIC BLOOD PRESSURE: 70 MMHG | HEART RATE: 70 BPM | SYSTOLIC BLOOD PRESSURE: 126 MMHG | HEIGHT: 75 IN | BODY MASS INDEX: 33.37 KG/M2 | WEIGHT: 268.4 LBS

## 2022-11-14 DIAGNOSIS — I25.10 CORONARY ARTERY DISEASE INVOLVING NATIVE HEART WITHOUT ANGINA PECTORIS, UNSPECIFIED VESSEL OR LESION TYPE: ICD-10-CM

## 2022-11-14 DIAGNOSIS — I50.22 CHRONIC SYSTOLIC CONGESTIVE HEART FAILURE (HCC): Primary | ICD-10-CM

## 2022-11-14 LAB
INTERNATIONAL NORMALIZATION RATIO, POC: 1.8
POC INR: 1.8 INDEX
PROTHROMBIN TIME, POC: 21.4 SECONDS (ref 10–14.3)

## 2022-11-14 PROCEDURE — 3078F DIAST BP <80 MM HG: CPT | Performed by: INTERNAL MEDICINE

## 2022-11-14 PROCEDURE — 85610 PROTHROMBIN TIME: CPT

## 2022-11-14 PROCEDURE — 99212 OFFICE O/P EST SF 10 MIN: CPT

## 2022-11-14 PROCEDURE — 3074F SYST BP LT 130 MM HG: CPT | Performed by: INTERNAL MEDICINE

## 2022-11-14 PROCEDURE — 36416 COLLJ CAPILLARY BLOOD SPEC: CPT

## 2022-11-14 PROCEDURE — 99214 OFFICE O/P EST MOD 30 MIN: CPT | Performed by: INTERNAL MEDICINE

## 2022-11-14 NOTE — PATIENT INSTRUCTIONS
Please be informed that if you contact our office outside of normal business hours the physician on call cannot help with any scheduling or rescheduling issues, procedure instruction questions or any type of medication issue. We advise you for any urgent/emergency that you go to the nearest emergency room! PLEASE CALL OUR OFFICE DURING NORMAL BUSINESS HOURS    Monday - Friday   8 am to 5 pm    [facility] [address]: [phone]    [place]:  [phone]    **It is YOUR responsibilty to bring medication bottles and/or updated medication list to [address] [place] [address]. This will allow us to better serve you and all your healthcare needs**      Thank you for allowing us to care for you today! We want to ensure we can follow your treatment plan and we strive to give you the best outcomes and experience possible. If you ever have a life threatening emergency and call 911 - for an ambulance (EMS)   Our providers can only care for you at:   [facility] or [facility]. Even if you have someone take you or you drive yourself we can only care for you in a [facility] facility. Our providers are not setup at the other healthcare locations!

## 2022-11-14 NOTE — PROGRESS NOTES
Preston Villalta MD        OFFICE  FOLLOWUP NOTE    Chief complaints: patient is here for management of CAD s/p CABG, DM, HTN, DYSLPIDEMIA, AFIB, COVID 19    Subjective: patient feels better, no chest pain, no shortness of breath, no dizziness, no palpitations    HPI Meaghan James is a 65 y. o.year old who  has a past medical history of Abnormal nuclear stress test, Atrial fibrillation (HCC), CAD (coronary artery disease), CHF (congestive heart failure) ([facility] [address] COPD (chronic obstructive pulmonary disease) ([facility] [address] Decreased cardiac ejection fraction, Diabetes mellitus ([facility] [address] GERD (gastroesophageal reflux disease), H/O cardiovascular stress test, H/O cardiovascular stress test, H/O cardiovascular stress test, H/O cardiovascular stress test, H/O Doppler ultrasound, H/O echocardiogram, H/O percutaneous left heart catheterization, Heart imaging, History of coronary artery stent placement, History of exercise stress test, History of Holter monitoring, History of MRI of brain and brain stem, History of nuclear stress test, Hx of Doppler echocardiogram, Hyperlipidemia, Hypertension, Kidney disease, S/P cardiac catheterization, SOB (shortness of breath), Status post angioplasty with stent, and Wears dentures. and presents for management of CAD s/p CABG, DM, HTN, DYSLPIDEMIA, AFIB, COVID 19 which are well controlled      Current Outpatient Medications   Medication Sig Dispense Refill    ferrous sulfate (IRON 325) 325 (65 Fe) MG tablet Take 1 tablet by mouth 2 times daily 60 tablet 5    metFORMIN (GLUCOPHAGE) 500 MG tablet TAKE 1 TABLET BY MOUTH TWO TIMES A DAY WITH MEALS      bumetanide (BUMEX) 2 MG tablet Take 1 tablet by mouth 2 times daily 60 tablet 5    hydrALAZINE (APRESOLINE) 50 MG tablet Take 1 tablet by mouth 3 times daily 90 tablet 0    glipiZIDE (GLUCOTROL) 5 MG tablet Take 5 mg by mouth 2 times daily (before meals)      empagliflozin (JARDIANCE) 10 MG tablet Take 1 tablet by mouth in the morning.  90 tablet 0 metOLazone (ZAROXOLYN) 2.5 MG tablet Take 1 tablet by mouth in the morning. Take 1-2 hours after Bumex. Please make sure of this. 30 tablet 0    warfarin (JANTOVEN) 4 MG tablet TAKE 1 TABLET BY MOUTH EVERY DAY, EXCEPT TAKE 1 AND 1/2 TABLETS ON SUNDAYS OR AS DIRECTED BY CLINIC 100 tablet 1    carvedilol (COREG) 12.5 MG tablet Take 1 tablet by mouth 2 times daily (with meals) 60 tablet 5    ipratropium-albuterol (DUONEB) 0.5-2.5 (3) MG/3ML SOLN nebulizer solution Inhale 3 mLs into the lungs 4 times daily 360 mL 5    clopidogrel (PLAVIX) 75 MG tablet TAKE 1 TABLET BY MOUTH DAILY. lisinopril (PRINIVIL;ZESTRIL) 20 MG tablet TAKE 1 TABLET BY MOUTH DAILY      pregabalin (LYRICA) 150 MG capsule 150 mg 2 times daily. albuterol sulfate HFA (PROVENTIL HFA) 108 (90 Base) MCG/ACT inhaler Inhale 2 puffs into the lungs every 6 hours as needed for Wheezing or Shortness of Breath 18 g 0    atorvastatin (LIPITOR) 80 MG tablet Take 1 tablet by mouth nightly 30 tablet 3    nicotine (NICODERM CQ) 14 MG/24HR Place 1 patch onto the skin daily (Patient not taking: Reported on 10/27/2022) 42 patch 0     No current facility-administered medications for this visit. Allergies: Hydromorphone  Past Medical History:   Diagnosis Date    Abnormal nuclear stress test     Atrial fibrillation ([facility] [address] 11/2013    CAD (coronary artery disease)     Follows with Dr. Dianne Sanchez    CHF (congestive heart failure) (HCC)     COPD (chronic obstructive pulmonary disease) ([facility] [address]     Follows with PCP    Decreased cardiac ejection fraction     Diabetes mellitus ([facility] [address]     GERD (gastroesophageal reflux disease)     H/O cardiovascular stress test 11/20/13, 3/20/2013    11/13-Observed defect is consistent with diaphragmatic attenuation. EF 58%. 3/13 -EF 51%. No ischemia. Normal Study.     H/O cardiovascular stress test 06/08/2017    EF 50% normal study    H/O cardiovascular stress test 06/17/2019    Normal NM    H/O cardiovascular stress test 03/02/2021 depressed lvedf increased edv myocardial perfusion abnormal stress test    H/O Doppler ultrasound 10/14/2010    10/2010-Bilateral venous doppler of lower extremies- No DVTs. H/O echocardiogram 06/26/2019    Left ventricular systolic function is normal with an ejection fraction of 50-55%. Mild concentric left ventricular hypertrophy. Grade I diastolic dysfunction. No significant valvular disease noted. No evidence of pericardial effusion. Essentially unremarkable echo. H/O percutaneous left heart catheterization 12/22/2015    No evidence of hemodynamically significant coronary artery disease    Heart imaging 04/23/2020    muga - ef 58%    History of coronary artery stent placement 11/13/2013 11/13 -RCA - 3 stents placed    History of exercise stress test 06/08/2017    treadmill    History of Holter monitoring 01/04/2016    predominant rhythm sinus    History of MRI of brain and brain stem 06/04/2020    No evidence of an acute infarct, mod chronic microvascular white matter ischemic disease with associated cerebral parenchymal volume loss    History of nuclear stress test 02/24/2020    EF 38%,Myocardial perfusion scan shows moderate size, severe intensity, non  reversible perfusion defect in inferior wall. Hx of Doppler echocardiogram 01/26/2022    EF 45-50% Mod LV hypertrophy. Grade 1 diastolic dysfunction. Mod MR and TR. Dilation of the aortic root and ascending aorta. Hyperlipidemia     Hypertension     Follows with PCP    Kidney disease     Dr. Meche Mayorga    S/P cardiac catheterization 11/13/2013 11/13/2013-EF 55%, distal LAD mild disease,CX stent patent,but distal to stent 50% stenosis. RCA severe disease.     SOB (shortness of breath)     Status post angioplasty with stent     Wears dentures     full upper denture     Past Surgical History:   Procedure Laterality Date    APPENDECTOMY      BIOPSY / LIGATION TEMPORAL ARTERY Left 8/17/2020    LEFT TEMPORAL ARTERY BIOPSY LIGATION performed by Erica Bernstein MD Bety at [facility]  11/13/2013 11/13/2013-EF 55%, distal LAD mild disease,CX stent patent,but distal to stent 50% stenosis. RCA severe disease. CARDIAC SURGERY  07/03/2020    CABG X 3 LIMA-OM-PDA-LAD, Maze, LT Atrial Appendage clipping    COLONOSCOPY      CORONARY ANGIOPLASTY WITH STENT PLACEMENT      4 stents- RCA X3; CX X1    CORONARY ANGIOPLASTY WITH STENT PLACEMENT  11/13/2013 11/13/2013 -3 stents placed to RCA- Dr Marvin Mom N/A 7/3/2020    INTRAOPERATIVE ISMAEL, CABG X3   WITH LIMA  AND LEFT LEG ENDOVEIN HARVEST, INDUCED HYPOTHERMIA, MAZE BIPOLAR AND CRYO PROCEDURE, LEFT ATRIAL APPENDAGE CLIPPING performed by Queta Womack MD at [address] [place] [address]      all teeth extracted    [address]     Family History   Problem Relation Age of Onset    Cancer Mother     Diabetes Mother     Heart Disease Mother     High Blood Pressure Mother     Stroke Mother     Cancer Father     Heart Disease Father     High Blood Pressure Father     Stroke Father     Cancer Brother      Social History     Tobacco Use    Smoking status: Every Day     Packs/day: 0.50     Years: 46.00     Pack years: 23.00     Types: Cigarettes     Start date: 1974    Smokeless tobacco: Never   Substance Use Topics    Alcohol use: No     Comment: caffeine 2 cups coffee a day. pop twice a week.  austen tea 4 bottles a day      [unfilled]  Review of Systems:   Constitutional: No Fever or Weight Loss   Eyes: No Decreased Vision  ENT: No Headaches, Hearing Loss or Vertigo  Cardiovascular: No chest pain, dyspnea on exertion, palpitations or loss of consciousness  Respiratory: No cough or wheezing    Gastrointestinal: No abdominal pain, appetite loss, blood in stools, constipation, diarrhea or heartburn  Genitourinary: No dysuria, trouble voiding, or hematuria  Musculoskeletal:  No gait disturbance, weakness or joint complaints  Integumentary: No rash or pruritis  Neurological: No TIA or stroke symptoms  Psychiatric: No anxiety or depression  Endocrine: No malaise, fatigue or temperature intolerance  Hematologic/Lymphatic: No bleeding problems, blood clots or swollen lymph nodes  Allergic/Immunologic: No nasal congestion or hives  All systems negative except as marked. Objective:  /70 (Site: Left Upper Arm, Position: Sitting, Cuff Size: Medium Adult)   Pulse 70   Ht 6' 3\" (1.905 m)   Wt 268 lb 6.4 oz (121.7 kg)   BMI 33.55 kg/m²   Wt Readings from Last 3 Encounters:   11/14/22 268 lb 6.4 oz (121.7 kg)   11/07/22 273 lb (123.8 kg)   10/27/22 277 lb (125.6 kg)     Body mass index is 33.55 kg/m². GENERAL - Alert, oriented, pleasant, in no apparent distress,normal grooming  HEENT - pupils are intact, cornea intact, conjunctive normal color, ears are normal in exam,  Neck - Supple. No jugular venous distention noted. No carotid bruits, no apical -carotid delay  Respiratory:  Normal breath sounds, No respiratory distress, No wheezing, No chest tenderness. ,no use of accessory muscles, diaphragm movement is normal  Cardiovascular: (PMI) apex non displaced,no lifts no thrills, no s3,no s4, Normal heart rate, Normal rhythm, No murmurs, No rubs, No gallops. Carotid arteries pulse and amplitude are normal no bruit, no abdominal bruit noted ( normal abdominal aorta ausculation),   Extremities - No cyanosis, clubbing, or significant edema, no varicose veins    Abdomen - No masses, tenderness, or organomegaly, no hepato-splenomegally, no bruits  Musculoskeletal - No significant edema, no kyphosis or scoliosis, no deformity in any extremity noted, muscle strength and tone are normal  Skin: no ulcer,no scar,no stasis dermatitis   Neurologic - alert oriented times 3,Cranial nerves II through XII are grossly intact. There were no gross focal neurologic abnormalities.    Psychiatric: normal mood and affect    Lab Results   Component Value Date/Time    CKTOTAL 34 01/29/2022 05:26 PM    CKMB 2.4 03/10/2013 07:05 AM    TROPONINI 0.014 06/09/2014 01:30 AM    TROPONINI 0.015 12/03/2011 07:35 PM     BNP:    Lab Results   Component Value Date/Time    BNP 17 03/09/2013 11:30 PM     PT/INR:  No results found for: PTINR  Lab Results   Component Value Date    LABA1C 5.9 11/07/2022    LABA1C 5.6 10/02/2022     Lab Results   Component Value Date    CHOL 116 10/02/2022    TRIG 125 10/02/2022    HDL 32 (L) 10/02/2022    LDLCALC 59 10/02/2022    LDLDIRECT 111 (H) 01/06/2020     Lab Results   Component Value Date    ALT 24 10/02/2022    AST 22 10/02/2022     TSH:  No results found for: TSH    Impression:  Joann Chau is a 65 y. o.year old who  has a past medical history of Abnormal nuclear stress test, Atrial fibrillation (HCC), CAD (coronary artery disease), CHF (congestive heart failure) ([facility] [address] COPD (chronic obstructive pulmonary disease) ([facility] [address] Decreased cardiac ejection fraction, Diabetes mellitus ([facility] [address] GERD (gastroesophageal reflux disease), H/O cardiovascular stress test, H/O cardiovascular stress test, H/O cardiovascular stress test, H/O cardiovascular stress test, H/O Doppler ultrasound, H/O echocardiogram, H/O percutaneous left heart catheterization, Heart imaging, History of coronary artery stent placement, History of exercise stress test, History of Holter monitoring, History of MRI of brain and brain stem, History of nuclear stress test, Hx of Doppler echocardiogram, Hyperlipidemia, Hypertension, Kidney disease, S/P cardiac catheterization, SOB (shortness of breath), Status post angioplasty with stent, and Wears dentures. and presents with     Plan:  Recommend to stop cocaine  Acute renal failure on CKD: CREATINIE is2.4, base line is1.4-1.7, RECENTLY seen Dr. Edgar Serna, uses 2 direutics, recommend to follow up with nephrology ASAP  HTN: controlled now, contineu hydralazine, norvasc 10mg daily, continue coreg. lisinopril,   Leg swelling:s/p ablation of rt GSV severe reflux and left popliteal baker cyst,had normal CT abdomen for swollen lymphnodes of groin,   COVID 19: RECOVERED LAST MONTH, HE WILL NEED BOOSTER VACCINE. CHEST PAIN: resolved,:s/p PCI of SVG TO RCA Graft at the end F December 2020,  WITH HISTORY OF  CABG X4  muga LVEF was 36%, however ECHO was 50%, Continue aspirin , continue statins, betablockers,, continue LISINOPRIL TO 20MG, BB, POST CABG EKG IS NSR. continue PLAVIX,  CONTINUE PLAVIX AND COUMADIN,  He is not working. recomemnd to stop smoking  Leg pain: it does not sound like intermittent claudication as it occurs with laying down and not with walking  DM: stable continue glipizide  Paroxysmal afib: continue COUAMDIN, off amidarone  Dyslipidemia: continue statins  All labs, medications and tests reviewed, continue all other medications of all above medical condition listed as is.     @[facility]@

## 2022-11-14 NOTE — PROGRESS NOTES
Medication Management Service  [facility]  [phone]    Visit Date: 11/14/2022   Subjective:       Yahaira Rivera is a 65 y.o. male who presents to clinic today for anticoagulation monitoring and adjustment. Patient seen in clinic for warfarin management due to  Indication:   atrial fibrillation. INR goal: of 2.0-3.0. Duration of therapy: indefinite. Patient reports the following:   Adherent with regimen  Missed or extra doses:  None   Bleeding or thromboembolic side effects:  None  Significant medication changes:  None  Significant dietary changes: None  Significant alcohol or tobacco changes: None  Significant recent illness, disease state changes, or hospitalization:  [facility] 11/7-11/8  Upcoming surgeries or procedures:  None  Falls: None           Assessment and Plan     PT/INR done in office per protocol. INR today is 1.8, subtherapeutic, but increased since discharge from [facility]. Advised he call PCP regarding leg cramps when waking up in AM.   INR MONITORING  INR   Date Value Ref Range Status   11/08/2022 1.22 INDEX Final     Comment:     THERAPEUTIC RANGE:  INDICATIONS: INR 2.0-3.0  Most (DVT, PE,  Atrial Fibillation, Bioprosthetic valve,   [place] Judes bicuspid aortic valve)  INDICATIONS: 2.5-3.5 Most mechanical valves  recurrent thrombosis. 11/07/2022 1.38 INDEX Final     Comment:     THERAPEUTIC RANGE:  INDICATIONS: INR 2.0-3.0  Most (DVT, PE,  Atrial Fibillation, Bioprosthetic valve,   [place] Judes bicuspid aortic valve)  INDICATIONS: 2.5-3.5 Most mechanical valves  recurrent thrombosis. 10/25/2022 1.25 INDEX Final     Comment:     THERAPEUTIC RANGE:  INDICATIONS: INR 2.0-3.0  Most (DVT, PE,  Atrial Fibillation, Bioprosthetic valve,   [place] Judes bicuspid aortic valve)  INDICATIONS: 2.5-3.5 Most mechanical valves  recurrent thrombosis.      10/04/2022 1.34 INDEX Final     Comment:     THERAPEUTIC RANGE:  INDICATIONS: INR 2.0-3.0  Most (DVT, PE,  Atrial Fibillation, Bioprosthetic valve,   [place] Judes bicuspid aortic valve)  INDICATIONS: 2.5-3.5 Most mechanical valves  recurrent thrombosis. 10/03/2022 1.73 INDEX Final     Comment:     THERAPEUTIC RANGE:  INDICATIONS: INR 2.0-3.0  Most (DVT, PE,  Atrial Fibillation, Bioprosthetic valve,   St Judes bicuspid aortic valve)  INDICATIONS: 2.5-3.5 Most mechanical valves  recurrent thrombosis. INR,(POC)   Date Value Ref Range Status   11/14/2022 1.8  Final   10/31/2022 1.8  Final   10/24/2022 1.4  Final     POC INR   Date Value Ref Range Status   10/31/2022 1.8 INDEX Final     Comment:     (NOTE)  Indications:                                                INR  Most (DVT,PE,Atrial Fibrillation, Bioprosthetic valve,    2.0-3.0  [place] Judes bicuspid aortic valve)    Most mechanical valves, recurrent thrombosis              2.5-3.5     10/24/2022 1.4 INDEX Final     Comment:     (NOTE)  Indications:                                                INR  Most (DVT,PE,Atrial Fibrillation, Bioprosthetic valve,    2.0-3.0  St. Judes bicuspid aortic valve)    Most mechanical valves, recurrent thrombosis              2.5-3.5           Plan: Take warfarin 6mg x1 then  will continue current regimen of warfarin 4mg daily except . Recheck INR in 2.5 week(s). Patient verbalized understanding of dosing directions and information discussed. Dosing schedule given to patient including phone number, appointment date, and time. Progress note sent to referring office. Patient acknowledges working in consult agreement with pharmacist as referred by his/her physician.       Electronically signed by Leopoldo Coward, [facility] on 11/14/22 at 2:32 PM EST    For Pharmacy Admin Tracking Only    Intervention Detail: Dose Adjustment: 1, reason: Therapy Optimization  Total # of Interventions Recommended: 1  Total # of Interventions Accepted: 1  Time Spent (min): 15

## 2022-11-15 ENCOUNTER — HOSPITAL ENCOUNTER (OUTPATIENT)
Dept: SLEEP CENTER | Age: 62
Discharge: HOME | End: 2022-11-15

## 2022-12-01 ENCOUNTER — TELEPHONE (OUTPATIENT)
Dept: CARDIOLOGY CLINIC | Age: 62
End: 2022-12-01

## 2022-12-01 ENCOUNTER — HOSPITAL ENCOUNTER (OUTPATIENT)
Age: 62
Discharge: HOME | End: 2022-12-01
Payer: MEDICAID

## 2022-12-01 ENCOUNTER — ANTI-COAG VISIT (OUTPATIENT)
Dept: PHARMACY | Age: 62
End: 2022-12-01
Payer: MEDICAID

## 2022-12-01 LAB
ALBUMIN SERPL-MCNC: 4.2 GM/DL (ref 3.4–5)
ANION GAP SERPL CALCULATED.3IONS-SCNC: 11 MMOL/L (ref 4–16)
BACTERIA: NEGATIVE /HPF
BILIRUBIN URINE: NEGATIVE MG/DL
BLOOD, URINE: NEGATIVE
BUN BLDV-MCNC: 39 MG/DL (ref 6–23)
CALCIUM SERPL-MCNC: 9.1 MG/DL (ref 8.3–10.6)
CHLORIDE BLD-SCNC: 105 MMOL/L (ref 99–110)
CLARITY: CLEAR
CO2: 26 MMOL/L (ref 21–32)
COLOR: YELLOW
CREAT SERPL-MCNC: 1.8 MG/DL (ref 0.9–1.3)
CREATININE URINE: 35.7 MG/DL (ref 39–259)
GFR SERPL CREATININE-BSD FRML MDRD: 42 ML/MIN/1.73M2
GLUCOSE BLD-MCNC: 164 MG/DL (ref 70–99)
GLUCOSE, URINE: >1000 MG/DL
INTERNATIONAL NORMALIZATION RATIO, POC: 1.5
KETONES, URINE: NEGATIVE MG/DL
LEUKOCYTE ESTERASE, URINE: NEGATIVE
MAGNESIUM: 2.2 MG/DL (ref 1.8–2.4)
MUCUS: ABNORMAL HPF
NITRITE URINE, QUANTITATIVE: NEGATIVE
PH, URINE: 6.5 (ref 5–8)
PHOSPHORUS: 3.5 MG/DL (ref 2.5–4.9)
POC INR: 1.5 INDEX
POTASSIUM SERPL-SCNC: 4.8 MMOL/L (ref 3.5–5.1)
PROT/CREAT RATIO, UR: 1.3
PROTEIN UA: 30 MG/DL
PROTHROMBIN TIME, POC: 18.5 SECONDS (ref 10–14.3)
RBC URINE: <1 /HPF (ref 0–3)
SODIUM BLD-SCNC: 142 MMOL/L (ref 135–145)
SPECIFIC GRAVITY UA: 1.01 (ref 1–1.03)
TRICHOMONAS: ABNORMAL /HPF
URINE TOTAL PROTEIN: 46.8 MG/DL
UROBILINOGEN, URINE: 0.2 MG/DL (ref 0.2–1)
WBC UA: 5 /HPF (ref 0–2)

## 2022-12-01 PROCEDURE — 84156 ASSAY OF PROTEIN URINE: CPT

## 2022-12-01 PROCEDURE — 85610 PROTHROMBIN TIME: CPT

## 2022-12-01 PROCEDURE — 36415 COLL VENOUS BLD VENIPUNCTURE: CPT

## 2022-12-01 PROCEDURE — 82040 ASSAY OF SERUM ALBUMIN: CPT

## 2022-12-01 PROCEDURE — 83735 ASSAY OF MAGNESIUM: CPT

## 2022-12-01 PROCEDURE — 82570 ASSAY OF URINE CREATININE: CPT

## 2022-12-01 PROCEDURE — 80048 BASIC METABOLIC PNL TOTAL CA: CPT

## 2022-12-01 PROCEDURE — 36416 COLLJ CAPILLARY BLOOD SPEC: CPT

## 2022-12-01 PROCEDURE — 84100 ASSAY OF PHOSPHORUS: CPT

## 2022-12-01 PROCEDURE — 81001 URINALYSIS AUTO W/SCOPE: CPT

## 2022-12-01 PROCEDURE — 99211 OFF/OP EST MAY X REQ PHY/QHP: CPT

## 2022-12-01 NOTE — PROGRESS NOTES
Medication Management Service  [facility][1]  [phone]    Visit Date: 12/1/2022   Subjective:       Phyllis Eduardo is a 65 y.o. male who presents to clinic today for anticoagulation monitoring and adjustment. Patient seen in clinic for warfarin management due to  Indication:   atrial fibrillation. INR goal: of 2.0-3.0. Duration of therapy: indefinite. Patient reports the following:   Adherent with regimen  Missed or extra doses:  missed, not sure when  Bleeding or thromboembolic side effects:  None  Significant medication changes:  Augmentin starts today, started Medrol Wednesday  Significant dietary changes: None  Significant alcohol or tobacco changes: None  Significant recent illness, disease state changes, or hospitalization:  None  Upcoming surgeries or procedures:  None  Falls: None           Assessment and Plan     PT/INR done in office per protocol. INR today is 1.5, subtherapeutic. He states he will start Augmentin today once he picks up from pharmacy. Augmentin and prednisonecan interact with warfarin to increase INR, so no dose change at this time. From PCP:   amoxicillin-clavulanate (AUGMENTIN) 875-125 mg tablet    Indications: Bronchitis Take 1 tablet by mouth in the morning and 1 tablet before bedtime. Do all this for 10 days. 20 tablet   0 11/29/2022 12/09/2022   methylPREDNISolone (MEDROL DOSEPAK) 4 mg dose pack    Indications: Acute cough Follow package directions 21 tablet   0 11/29/2022            Plan: Will continue current regimen of warfarin 4mg daily except 6mg on Sundays    Recheck INR in 2 week(s). Patient verbalized understanding of dosing directions and information discussed. Dosing schedule given to patient including phone number, appointment date, and time. Progress note sent to referring office. Patient acknowledges working in consult agreement with pharmacist as referred by his/her physician.       Electronically signed by Evan Limon, [facility] on 12/1/22 at 11:08 AM EST    For Pharmacy Admin Tracking Only    Intervention Detail:   Total # of Interventions Recommended:    Total # of Interventions Accepted:   Time Spent (min): 15

## 2022-12-02 ENCOUNTER — TELEPHONE (OUTPATIENT)
Dept: CARDIOLOGY CLINIC | Age: 62
End: 2022-12-02

## 2022-12-02 NOTE — TELEPHONE ENCOUNTER
Call placed to patient to reschedule [facility] appt on 12/15. [facility] nurse not available that day). Left  requesting return call.

## 2022-12-08 ENCOUNTER — OFFICE VISIT (OUTPATIENT)
Dept: CARDIOLOGY CLINIC | Age: 62
End: 2022-12-08

## 2022-12-08 VITALS
SYSTOLIC BLOOD PRESSURE: 160 MMHG | WEIGHT: 284 LBS | HEART RATE: 59 BPM | BODY MASS INDEX: 35.31 KG/M2 | HEIGHT: 75 IN | OXYGEN SATURATION: 95 % | DIASTOLIC BLOOD PRESSURE: 84 MMHG

## 2022-12-08 DIAGNOSIS — I50.23 ACUTE ON CHRONIC SYSTOLIC (CONGESTIVE) HEART FAILURE (HCC): Primary | ICD-10-CM

## 2022-12-08 RX ORDER — HYDRALAZINE HYDROCHLORIDE 50 MG/1
50 TABLET, FILM COATED ORAL 3 TIMES DAILY
Qty: 90 TABLET | Refills: 0 | Status: SHIPPED | OUTPATIENT
Start: 2022-12-08 | End: 2023-01-19

## 2022-12-12 ENCOUNTER — TELEPHONE (OUTPATIENT)
Dept: PHARMACY | Age: 62
End: 2022-12-12

## 2022-12-12 NOTE — TELEPHONE ENCOUNTER
Patient left message he can't make appointment today. Returned call and notified appointment is 12/15/22. For Pharmacy Admin Tracking Only    Intervention Detail:   Total # of Interventions Recommended:    Total # of Interventions Accepted:   Time Spent (min): 5

## 2022-12-15 ENCOUNTER — TELEPHONE (OUTPATIENT)
Dept: PHARMACY | Age: 62
End: 2022-12-15

## 2022-12-15 NOTE — TELEPHONE ENCOUNTER
Patient left message to cancel appointment 12/15. Returned call to schedule. Patient refused to schedule prior to 1/3/22 due to lack of gas. He has to see PCP tomorrow. Encouraged patient to discuss with his PCP at visit tomorrow to see if they can check INR. Scheduled for 1/9/23. For Pharmacy Admin Tracking Only    Intervention Detail:   Total # of Interventions Recommended:    Total # of Interventions Accepted:   Time Spent (min): 5

## 2022-12-27 ENCOUNTER — HOSPITAL ENCOUNTER (OUTPATIENT)
Age: 62
Discharge: HOME | End: 2022-12-27
Payer: MEDICAID

## 2022-12-27 ENCOUNTER — HOSPITAL ENCOUNTER (OUTPATIENT)
Dept: GENERAL RADIOLOGY | Age: 62
Discharge: HOME | End: 2022-12-27
Payer: MEDICAID

## 2022-12-27 DIAGNOSIS — S42.402G ELBOW FRACTURE, LEFT, WITH DELAYED HEALING, SUBSEQUENT ENCOUNTER: ICD-10-CM

## 2022-12-27 PROCEDURE — 73080 X-RAY EXAM OF ELBOW: CPT

## 2023-01-09 ENCOUNTER — TELEPHONE (OUTPATIENT)
Dept: PHARMACY | Age: 63
End: 2023-01-09

## 2023-01-09 NOTE — TELEPHONE ENCOUNTER
No show to appointment. Called patient and scheduled for 1/16/23. For Pharmacy Admin Tracking Only    Intervention Detail:   Total # of Interventions Recommended:    Total # of Interventions Accepted:   Time Spent (min): 5

## 2023-01-13 NOTE — PROGRESS NOTES
[address] [facility]  [phone]    Visit Date: 1/16/2023   Subjective:       Nigel Aly is a 65 y.o. male who presents to clinic today for anticoagulation monitoring and adjustment. Patient seen in clinic for warfarin management due to  Indication:   atrial fibrillation. INR goal: of 2.0-3.0. Duration of therapy: indefinite. Patient reports the following:   Adherent with regimen  Missed or extra doses:  None   Bleeding or thromboembolic side effects:  None  Significant medication changes:  None  Significant dietary changes: None  Significant alcohol or tobacco changes: None  Significant recent illness, disease state changes, or hospitalization:  None  Upcoming surgeries or procedures:  None  Falls: None           Assessment and Plan     PT/INR done in office per protocol. INR today is 2.3, therapeutic. Refill needed  Plan: Will continue current regimen of warfarin 4mg daily except 6mg on Sundays. ERx sent      Recheck INR in 4 week(s). Patient verbalized understanding of dosing directions and information discussed. Dosing schedule given to patient including phone number, appointment date, and time. Progress note sent to referring office. Patient acknowledges working in consult agreement with pharmacist as referred by his/her physician.       Electronically signed by Mariela Cantu [facility] on 1/13/23 at 3:19 PM EST  For Pharmacy Admin Tracking Only    Intervention Detail: Refill(s) Provided  Total # of Interventions Recommended: 1  Total # of Interventions Accepted: 1  Time Spent (min): 15

## 2023-01-16 ENCOUNTER — ANTI-COAG VISIT (OUTPATIENT)
Dept: PHARMACY | Age: 63
End: 2023-01-16
Payer: MEDICARE

## 2023-01-16 LAB
INTERNATIONAL NORMALIZATION RATIO, POC: 2.3
POC INR: 2.3 INDEX
PROTHROMBIN TIME, POC: 28.1 SECONDS (ref 10–14.3)

## 2023-01-16 PROCEDURE — 36416 COLLJ CAPILLARY BLOOD SPEC: CPT

## 2023-01-16 PROCEDURE — 85610 PROTHROMBIN TIME: CPT

## 2023-01-16 PROCEDURE — 99212 OFFICE O/P EST SF 10 MIN: CPT

## 2023-01-16 RX ORDER — WARFARIN SODIUM 4 MG/1
TABLET ORAL
Qty: 100 TABLET | Refills: 1 | Status: SHIPPED | OUTPATIENT
Start: 2023-01-16

## 2023-01-19 PROBLEM — E66.3 OVERWEIGHT: Status: ACTIVE | Noted: 2023-01-19

## 2023-01-19 RX ORDER — ATORVASTATIN CALCIUM 80 MG/1
80 TABLET, FILM COATED ORAL NIGHTLY
Qty: 30 TABLET | Refills: 3 | Status: SHIPPED | OUTPATIENT
Start: 2023-01-19

## 2023-01-19 RX ORDER — BUMETANIDE 2 MG/1
2 TABLET ORAL 2 TIMES DAILY
Qty: 60 TABLET | Refills: 5 | Status: SHIPPED | OUTPATIENT
Start: 2023-01-19

## 2023-01-19 RX ORDER — CARVEDILOL 12.5 MG/1
12.5 TABLET ORAL 2 TIMES DAILY WITH MEALS
Qty: 60 TABLET | Refills: 5 | Status: SHIPPED | OUTPATIENT
Start: 2023-01-19

## 2023-01-23 ENCOUNTER — HOSPITAL ENCOUNTER (OUTPATIENT)
Dept: SLEEP CENTER | Age: 63
Discharge: HOME | End: 2023-01-23
Payer: MEDICARE

## 2023-01-23 DIAGNOSIS — G47.10 HYPERSOMNOLENCE: ICD-10-CM

## 2023-01-23 DIAGNOSIS — R06.83 SNORING: ICD-10-CM

## 2023-01-23 PROCEDURE — 95810 POLYSOM 6/> YRS 4/> PARAM: CPT

## 2023-01-23 ASSESSMENT — SLEEP AND FATIGUE QUESTIONNAIRES
HOW LIKELY ARE YOU TO NOD OFF OR FALL ASLEEP WHILE WATCHING TV: 3
HOW LIKELY ARE YOU TO NOD OFF OR FALL ASLEEP IN A CAR, WHILE STOPPED FOR A FEW MINUTES IN TRAFFIC: 0
HOW LIKELY ARE YOU TO NOD OFF OR FALL ASLEEP WHILE SITTING INACTIVE IN A PUBLIC PLACE: 2
HOW LIKELY ARE YOU TO NOD OFF OR FALL ASLEEP WHEN YOU ARE A PASSENGER IN A CAR FOR AN HOUR WITHOUT A BREAK: 0
ESS TOTAL SCORE: 6
HOW LIKELY ARE YOU TO NOD OFF OR FALL ASLEEP WHILE SITTING AND TALKING TO SOMEONE: 1
HOW LIKELY ARE YOU TO NOD OFF OR FALL ASLEEP WHILE SITTING AND READING: 0
HOW LIKELY ARE YOU TO NOD OFF OR FALL ASLEEP WHILE LYING DOWN TO REST IN THE AFTERNOON WHEN CIRCUMSTANCES PERMIT: 0
HOW LIKELY ARE YOU TO NOD OFF OR FALL ASLEEP WHILE SITTING QUIETLY AFTER LUNCH WITHOUT ALCOHOL: 0

## 2023-01-24 NOTE — PROGRESS NOTES
1/23/2023  sleep study  for Leticia Mcrae  1960 is complete. Results are pending physician review.     Electronically signed by Harrington Saint on 1/23/2023 at 8:24 PM

## 2023-02-08 ENCOUNTER — HOSPITAL ENCOUNTER (INPATIENT)
Age: 63
LOS: 1 days | Discharge: HOME | End: 2023-02-10
Attending: EMERGENCY MEDICINE | Admitting: INTERNAL MEDICINE
Payer: MEDICARE

## 2023-02-08 ENCOUNTER — HOSPITAL ENCOUNTER (EMERGENCY)
Age: 63
Discharge: LEFT BEFORE DISCHARGE | End: 2023-02-08
Payer: MEDICARE

## 2023-02-08 ENCOUNTER — APPOINTMENT (OUTPATIENT)
Dept: GENERAL RADIOLOGY | Age: 63
End: 2023-02-08
Payer: MEDICARE

## 2023-02-08 VITALS
SYSTOLIC BLOOD PRESSURE: 166 MMHG | TEMPERATURE: 98 F | OXYGEN SATURATION: 95 % | WEIGHT: 278 LBS | HEIGHT: 75 IN | DIASTOLIC BLOOD PRESSURE: 82 MMHG | HEART RATE: 67 BPM | RESPIRATION RATE: 19 BRPM | BODY MASS INDEX: 34.57 KG/M2

## 2023-02-08 DIAGNOSIS — Z51.81 SUBTHERAPEUTIC ANTICOAGULATION: ICD-10-CM

## 2023-02-08 DIAGNOSIS — E11.65 TYPE 2 DIABETES MELLITUS WITH HYPERGLYCEMIA, WITH LONG-TERM CURRENT USE OF INSULIN (HCC): ICD-10-CM

## 2023-02-08 DIAGNOSIS — I10 UNCONTROLLED HYPERTENSION: ICD-10-CM

## 2023-02-08 DIAGNOSIS — Z86.79 HISTORY OF ATRIAL FIBRILLATION: ICD-10-CM

## 2023-02-08 DIAGNOSIS — Z79.4 TYPE 2 DIABETES MELLITUS WITH HYPERGLYCEMIA, WITH LONG-TERM CURRENT USE OF INSULIN (HCC): ICD-10-CM

## 2023-02-08 DIAGNOSIS — R07.9 CHEST PAIN, UNSPECIFIED TYPE: Primary | ICD-10-CM

## 2023-02-08 DIAGNOSIS — R77.8 ELEVATED TROPONIN: ICD-10-CM

## 2023-02-08 DIAGNOSIS — R73.9 HYPERGLYCEMIA: ICD-10-CM

## 2023-02-08 DIAGNOSIS — F14.10 COCAINE ABUSE (HCC): ICD-10-CM

## 2023-02-08 DIAGNOSIS — I21.4 NSTEMI (NON-ST ELEVATED MYOCARDIAL INFARCTION) (HCC): ICD-10-CM

## 2023-02-08 DIAGNOSIS — R06.00 DYSPNEA, UNSPECIFIED TYPE: ICD-10-CM

## 2023-02-08 DIAGNOSIS — Z79.01 SUBTHERAPEUTIC ANTICOAGULATION: ICD-10-CM

## 2023-02-08 LAB
ALBUMIN SERPL-MCNC: 4.1 GM/DL (ref 3.4–5)
ALP BLD-CCNC: 172 IU/L (ref 40–128)
ALT SERPL-CCNC: 12 U/L (ref 10–40)
AMPHETAMINES: NEGATIVE
ANION GAP SERPL CALCULATED.3IONS-SCNC: 13 MMOL/L (ref 4–16)
APTT: 32.6 SECONDS (ref 25.1–37.1)
AST SERPL-CCNC: 16 IU/L (ref 15–37)
B-OH-BUTYR SERPL-MCNC: 2.4 MG/DL (ref 0–3)
BACTERIA: NEGATIVE /HPF
BARBITURATE SCREEN URINE: NEGATIVE
BASE EXCESS MIXED: 4.8 (ref 0–1.2)
BASOPHILS ABSOLUTE: 0.1 K/CU MM
BASOPHILS RELATIVE PERCENT: 1.2 % (ref 0–1)
BENZODIAZEPINE SCREEN, URINE: NEGATIVE
BILIRUB SERPL-MCNC: 0.2 MG/DL (ref 0–1)
BILIRUBIN URINE: NEGATIVE MG/DL
BLOOD, URINE: NEGATIVE
BUN SERPL-MCNC: 47 MG/DL (ref 6–23)
CALCIUM SERPL-MCNC: 9.5 MG/DL (ref 8.3–10.6)
CANNABINOID SCREEN URINE: NEGATIVE
CHLORIDE BLD-SCNC: 99 MMOL/L (ref 99–110)
CLARITY: CLEAR
CO2: 28 MMOL/L (ref 21–32)
COCAINE METABOLITE: ABNORMAL
COLOR: YELLOW
COMMENT: ABNORMAL
CREAT SERPL-MCNC: 2.1 MG/DL (ref 0.9–1.3)
DIFFERENTIAL TYPE: ABNORMAL
EOSINOPHILS ABSOLUTE: 0.3 K/CU MM
EOSINOPHILS RELATIVE PERCENT: 4.3 % (ref 0–3)
GFR SERPL CREATININE-BSD FRML MDRD: 35 ML/MIN/1.73M2
GLUCOSE BLD-MCNC: 175 MG/DL (ref 70–99)
GLUCOSE BLD-MCNC: 310 MG/DL (ref 70–99)
GLUCOSE BLD-MCNC: 316 MG/DL (ref 70–99)
GLUCOSE BLD-MCNC: 403 MG/DL (ref 70–99)
GLUCOSE SERPL-MCNC: 295 MG/DL (ref 70–99)
GLUCOSE, URINE: >1000 MG/DL
HCO3 VENOUS: 30.4 MMOL/L (ref 19–25)
HCT VFR BLD CALC: 38.1 % (ref 42–52)
HEMOGLOBIN: 11.9 GM/DL (ref 13.5–18)
IMMATURE NEUTROPHIL %: 0.3 % (ref 0–0.43)
INR BLD: 1.16 INDEX
KETONES, URINE: NEGATIVE MG/DL
LEUKOCYTE ESTERASE, URINE: NEGATIVE
LYMPHOCYTES ABSOLUTE: 1 K/CU MM
LYMPHOCYTES RELATIVE PERCENT: 14.3 % (ref 24–44)
MCH RBC QN AUTO: 27.1 PG (ref 27–31)
MCHC RBC AUTO-ENTMCNC: 31.2 % (ref 32–36)
MCV RBC AUTO: 86.8 FL (ref 78–100)
MONOCYTES ABSOLUTE: 0.6 K/CU MM
MONOCYTES RELATIVE PERCENT: 8.2 % (ref 0–4)
NITRITE URINE, QUANTITATIVE: NEGATIVE
NUCLEATED RBC %: 0 %
O2 SAT, VEN: 95.9 % (ref 50–70)
OPIATES, URINE: NEGATIVE
OSMOLALITY UR: 313 MOS/L (ref 280–300)
OXYCODONE: NEGATIVE
PCO2, VEN: 48 MMHG (ref 38–52)
PDW BLD-RTO: 15.8 % (ref 11.7–14.9)
PH VENOUS: 7.41 (ref 7.32–7.42)
PH, URINE: 7 (ref 5–8)
PHENCYCLIDINE, URINE: NEGATIVE
PLATELET # BLD: 188 K/CU MM (ref 140–440)
PMV BLD AUTO: 11.5 FL (ref 7.5–11.1)
PO2, VEN: 158 MMHG (ref 28–48)
POTASSIUM SERPL-SCNC: 3.8 MMOL/L (ref 3.5–5.1)
PRO-BNP: 825.3 PG/ML
PROTEIN UA: ABNORMAL MG/DL
PROTHROMBIN TIME: 15 SECONDS (ref 11.7–14.5)
RBC # BLD: 4.39 M/CU MM (ref 4.6–6.2)
RBC URINE: <1 /HPF (ref 0–3)
SARS-COV-2 RDRP RESP QL NAA+PROBE: NOT DETECTED
SEGMENTED NEUTROPHILS ABSOLUTE COUNT: 5.2 K/CU MM
SEGMENTED NEUTROPHILS RELATIVE PERCENT: 71.7 % (ref 36–66)
SODIUM BLD-SCNC: 140 MMOL/L (ref 135–145)
SOURCE: NORMAL
SPECIFIC GRAVITY UA: <1.005 (ref 1–1.03)
TOTAL IMMATURE NEUTOROPHIL: 0.02 K/CU MM
TOTAL NUCLEATED RBC: 0 K/CU MM
TOTAL PROTEIN: 7 GM/DL (ref 6.4–8.2)
TROPONIN T: 0.04 NG/ML
TROPONIN T: 0.05 NG/ML
UROBILINOGEN, URINE: 0.2 MG/DL (ref 0.2–1)
WBC # BLD: 7.3 K/CU MM (ref 4–10.5)
WBC UA: <1 /HPF (ref 0–2)

## 2023-02-08 PROCEDURE — 6370000000 HC RX 637 (ALT 250 FOR IP): Performed by: EMERGENCY MEDICINE

## 2023-02-08 PROCEDURE — 81003 URINALYSIS AUTO W/O SCOPE: CPT

## 2023-02-08 PROCEDURE — 83880 ASSAY OF NATRIURETIC PEPTIDE: CPT

## 2023-02-08 PROCEDURE — 93005 ELECTROCARDIOGRAM TRACING: CPT | Performed by: PHYSICIAN ASSISTANT

## 2023-02-08 PROCEDURE — 81001 URINALYSIS AUTO W/SCOPE: CPT

## 2023-02-08 PROCEDURE — 83930 ASSAY OF BLOOD OSMOLALITY: CPT

## 2023-02-08 PROCEDURE — 93005 ELECTROCARDIOGRAM TRACING: CPT | Performed by: EMERGENCY MEDICINE

## 2023-02-08 PROCEDURE — 96374 THER/PROPH/DIAG INJ IV PUSH: CPT

## 2023-02-08 PROCEDURE — 6360000002 HC RX W HCPCS: Performed by: EMERGENCY MEDICINE

## 2023-02-08 PROCEDURE — 84484 ASSAY OF TROPONIN QUANT: CPT

## 2023-02-08 PROCEDURE — 96372 THER/PROPH/DIAG INJ SC/IM: CPT

## 2023-02-08 PROCEDURE — 99285 EMERGENCY DEPT VISIT HI MDM: CPT

## 2023-02-08 PROCEDURE — 6370000000 HC RX 637 (ALT 250 FOR IP): Performed by: PHYSICIAN ASSISTANT

## 2023-02-08 PROCEDURE — 85025 COMPLETE CBC W/AUTO DIFF WBC: CPT

## 2023-02-08 PROCEDURE — 80053 COMPREHEN METABOLIC PANEL: CPT

## 2023-02-08 PROCEDURE — 85730 THROMBOPLASTIN TIME PARTIAL: CPT

## 2023-02-08 PROCEDURE — 87635 SARS-COV-2 COVID-19 AMP PRB: CPT

## 2023-02-08 PROCEDURE — 82962 GLUCOSE BLOOD TEST: CPT

## 2023-02-08 PROCEDURE — 82805 BLOOD GASES W/O2 SATURATION: CPT

## 2023-02-08 PROCEDURE — 71045 X-RAY EXAM CHEST 1 VIEW: CPT

## 2023-02-08 PROCEDURE — 85610 PROTHROMBIN TIME: CPT

## 2023-02-08 PROCEDURE — 82010 KETONE BODYS QUAN: CPT

## 2023-02-08 PROCEDURE — 80307 DRUG TEST PRSMV CHEM ANLYZR: CPT

## 2023-02-08 RX ORDER — ASPIRIN 81 MG/1
324 TABLET, CHEWABLE ORAL ONCE
Status: COMPLETED | OUTPATIENT
Start: 2023-02-08 | End: 2023-02-08

## 2023-02-08 RX ORDER — NITROGLYCERIN 20 MG/100ML
5-200 INJECTION INTRAVENOUS CONTINUOUS
Status: DISCONTINUED | OUTPATIENT
Start: 2023-02-08 | End: 2023-02-10

## 2023-02-08 RX ORDER — HEPARIN SODIUM 1000 [USP'U]/ML
4000 INJECTION, SOLUTION INTRAVENOUS; SUBCUTANEOUS ONCE
Status: COMPLETED | OUTPATIENT
Start: 2023-02-08 | End: 2023-02-08

## 2023-02-08 RX ORDER — HEPARIN SODIUM 1000 [USP'U]/ML
4000 INJECTION, SOLUTION INTRAVENOUS; SUBCUTANEOUS PRN
Status: DISCONTINUED | OUTPATIENT
Start: 2023-02-08 | End: 2023-02-10

## 2023-02-08 RX ORDER — CYCLOBENZAPRINE HCL 10 MG
10 TABLET ORAL ONCE
Status: COMPLETED | OUTPATIENT
Start: 2023-02-08 | End: 2023-02-09

## 2023-02-08 RX ORDER — HEPARIN SODIUM 1000 [USP'U]/ML
2000 INJECTION, SOLUTION INTRAVENOUS; SUBCUTANEOUS PRN
Status: DISCONTINUED | OUTPATIENT
Start: 2023-02-08 | End: 2023-02-10

## 2023-02-08 RX ORDER — INSULIN LISPRO 100 [IU]/ML
8 INJECTION, SOLUTION INTRAVENOUS; SUBCUTANEOUS ONCE
Status: COMPLETED | OUTPATIENT
Start: 2023-02-08 | End: 2023-02-08

## 2023-02-08 RX ORDER — HEPARIN SODIUM 10000 [USP'U]/100ML
5-30 INJECTION, SOLUTION INTRAVENOUS CONTINUOUS
Status: DISCONTINUED | OUTPATIENT
Start: 2023-02-08 | End: 2023-02-10

## 2023-02-08 RX ORDER — NITROGLYCERIN 0.4 MG/1
0.4 TABLET SUBLINGUAL ONCE
Status: COMPLETED | OUTPATIENT
Start: 2023-02-08 | End: 2023-02-08

## 2023-02-08 RX ADMIN — HEPARIN SODIUM 4000 UNITS: 1000 INJECTION INTRAVENOUS; SUBCUTANEOUS at 23:59

## 2023-02-08 RX ADMIN — ASPIRIN 324 MG: 81 TABLET, CHEWABLE ORAL at 11:15

## 2023-02-08 RX ADMIN — NITROGLYCERIN 0.4 MG: 0.4 TABLET, ORALLY DISINTEGRATING SUBLINGUAL at 23:02

## 2023-02-08 RX ADMIN — INSULIN LISPRO 8 UNITS: 100 INJECTION, SOLUTION INTRAVENOUS; SUBCUTANEOUS at 23:02

## 2023-02-08 ASSESSMENT — PAIN - FUNCTIONAL ASSESSMENT: PAIN_FUNCTIONAL_ASSESSMENT: 0-10

## 2023-02-08 ASSESSMENT — ENCOUNTER SYMPTOMS
ABDOMINAL PAIN: 0
NAUSEA: 0
VOMITING: 0
COUGH: 0
DIARRHEA: 0
SHORTNESS OF BREATH: 1

## 2023-02-08 ASSESSMENT — PAIN DESCRIPTION - LOCATION: LOCATION: CHEST

## 2023-02-08 ASSESSMENT — PAIN SCALES - GENERAL: PAINLEVEL_OUTOF10: 7

## 2023-02-08 NOTE — ED PROVIDER NOTES
ED attending EKG interpretation (otherwise did not participate in the care of this patient)    EKG Interpretation  Interpreted by me  Compared to 10/2/2022  Rhythm: normal sinus   Rate: normal 67  Axis: normal  Ectopy: none  Conduction: normal  ST Segments: no acute change  T Waves: no acute change  Clinical Impression: normal sinus rhythm, no acugr change     Otis Hayes MD  02/08/23 [id number]

## 2023-02-08 NOTE — ED PROVIDER NOTES
[address] ENCOUNTER        Pt Name: Gianni Montoya  MRN: [id number]  [place] 1960  Date of evaluation: 2/8/2023  Provider: Yonis Uribe PA-C  PCP: Arun Mcgovern MD      JANEY. I have evaluated this patient. My supervising physician was available for consultation. CHIEF COMPLAINT       Chief Complaint   Patient presents with    Hyperglycemia     States his B/S is high. Was at 80 prior to arrival       HISTORY OF PRESENT ILLNESS: 1 or more Elements     History from : Patient    Limitations to history : None    Josiah D Chalino Argueta is a 65 y.o. male who presents with concern for elevated blood sugars, mentioning that he measured his at home anywhere in 400. And discussion with him, he states yesterday to 280s. He feels his blood pressure is also elevated. He admits that he started to have some shortness of breath this morning, no relief after breathing treatment at home. In discussion, he states that he developed some right-sided chest pain \"three minutes ago\", describes it as nonradiating non migrating. he mentioned they switched him from metformin and Layla Khat recently. He denies any noncompliance with his medications, or recent illness. He does mention he has some chronic swelling in his legs, but no worse today. Denies: URI, fevers, cough, n/v, diarrhea, abdominal pain, diaphoresis,        Nursing Notes were all reviewed and agreed with or any disagreements were addressed in the HPI. REVIEW OF SYSTEMS :      Review of Systems   Constitutional:  Negative for chills and fever. Respiratory:  Positive for shortness of breath. Negative for cough. Cardiovascular:  Positive for chest pain. Gastrointestinal:  Negative for abdominal pain, diarrhea, nausea and vomiting. Genitourinary:  Negative for dysuria. Neurological:  Negative for weakness.    Psychiatric/Behavioral:  Negative for confusion. Positives and Pertinent negatives as per HPI. SURGICAL HISTORY     Past Surgical History:   Procedure Laterality Date    APPENDECTOMY      BIOPSY / LIGATION TEMPORAL ARTERY Left 8/17/2020    LEFT TEMPORAL ARTERY BIOPSY LIGATION performed by Lisa Perry MD at [facility]  11/13/2013 11/13/2013-EF 55%, distal LAD mild disease,CX stent patent,but distal to stent 50% stenosis. RCA severe disease. CARDIAC SURGERY  07/03/2020    CABG X 3 LIMA-OM-PDA-LAD, Maze, LT Atrial Appendage clipping    COLONOSCOPY      CORONARY ANGIOPLASTY WITH STENT PLACEMENT      4 stents- RCA X3; CX X1    CORONARY ANGIOPLASTY WITH STENT PLACEMENT  11/13/2013 11/13/2013 -3 stents placed to RCA- Dr Dena Dean N/A 7/3/2020    INTRAOPERATIVE ISMAEL, CABG X3   WITH LIMA  AND LEFT LEG ENDOVEIN HARVEST, INDUCED HYPOTHERMIA, MAZE BIPOLAR AND CRYO PROCEDURE, LEFT ATRIAL APPENDAGE CLIPPING performed by China Cowart MD at [address] [place] [address]      all teeth extracted    [address]       CURRENTMEDICATIONS       Previous Medications    ALBUTEROL SULFATE HFA (PROVENTIL HFA) 108 (90 BASE) MCG/ACT INHALER    Inhale 2 puffs into the lungs every 6 hours as needed for Wheezing or Shortness of Breath    ATORVASTATIN (LIPITOR) 80 MG TABLET    Take 1 tablet by mouth nightly    BUMETANIDE (BUMEX) 2 MG TABLET    Take 1 tablet by mouth 2 times daily    CARVEDILOL (COREG) 12.5 MG TABLET    Take 1 tablet by mouth 2 times daily (with meals)    CLOPIDOGREL (PLAVIX) 75 MG TABLET    TAKE 1 TABLET BY MOUTH DAILY. DAPAGLIFLOZIN (FARXIGA) 5 MG TABLET    Take 1 tablet by mouth every morning    GLIPIZIDE (GLUCOTROL) 5 MG TABLET    Take 5 mg by mouth daily    HYDRALAZINE (APRESOLINE) 100 MG TABLET    Take 1 tablet  by mouth in the morning and 1 tablet at noon and 1 tablet  in the evening.     IPRATROPIUM-ALBUTEROL (DUONEB) 0.5-2.5 (3) MG/3ML SOLN NEBULIZER SOLUTION    Inhale 3 mLs into the lungs 4 times daily    LISINOPRIL (PRINIVIL;ZESTRIL) 40 MG TABLET    Take 40 mg by mouth daily    METOLAZONE (ZAROXOLYN) 5 MG TABLET    Take 1 tablet by mouth 2 times daily    NICOTINE (NICODERM CQ) 14 MG/24HR    Place 1 patch onto the skin daily    POTASSIUM CHLORIDE (KLOR-CON M) 20 MEQ EXTENDED RELEASE TABLET    TAKE 1 TABLET BY MOUTH TWO TIMES A DAY    PREGABALIN (LYRICA) 150 MG CAPSULE    150 mg 2 times daily. WARFARIN (JANTOVEN) 4 MG TABLET    TAKE 1 TABLET BY MOUTH EVERY DAY, EXCEPT TAKE 1 AND 1/2 TABLETS ON SUNDAYS OR AS DIRECTED BY CLINIC       ALLERGIES     Hydromorphone    FAMILYHISTORY       Family History   Problem Relation Age of Onset    Cancer Mother     Diabetes Mother     Heart Disease Mother     High Blood Pressure Mother     Stroke Mother     Cancer Father     Heart Disease Father     High Blood Pressure Father     Stroke Father     Cancer Brother         SOCIAL HISTORY       Social History     Tobacco Use    Smoking status: Every Day     Packs/day: 0.50     Years: 46.00     Pack years: 23.00     Types: Cigarettes     Start date: 1974    Smokeless tobacco: Never   Vaping Use    Vaping Use: Never used   Substance Use Topics    Alcohol use: No     Comment: caffeine 2 cups coffee a day. pop twice a week. austen tea 4 bottles a day    Drug use: Not Currently     Types: Cocaine     Comment: Last used: 7/1/20       SCREENINGS        [place] Coma Scale  Eye Opening: Spontaneous  Best Verbal Response: Oriented  Best Motor Response: Obeys commands  [place] Coma Scale Score: 15                CIWA Assessment  BP: (!) 166/82  Heart Rate: 67               PHYSICAL EXAM  1 or more Elements     ED Triage Vitals [02/08/23 0929]   BP Temp Temp Source Heart Rate Resp SpO2 Height Weight   (!) 166/92 98 °F (36.7 °C) Oral 72 18 96 % 6' 3\" (1.905 m) 278 lb (126.1 kg)       Physical Exam  Vitals and nursing note reviewed.    Constitutional:       Appearance: Normal appearance. He is well-developed. He is not ill-appearing or diaphoretic. HENT:      Head: Normocephalic and atraumatic. Right Ear: External ear normal.      Left Ear: External ear normal.      Nose: Nose normal.   Eyes:      General:         Right eye: No discharge. Left eye: No discharge. Pulmonary:      Effort: Pulmonary effort is normal. No respiratory distress. Breath sounds: No stridor. Musculoskeletal:         General: Normal range of motion. Cervical back: Normal range of motion and neck supple. Skin:     General: Skin is warm and dry. Coloration: Skin is not pale. Neurological:      General: No focal deficit present. Mental Status: He is alert and oriented to person, place, and time. Psychiatric:         Mood and Affect: Mood normal.         Behavior: Behavior normal.       EMERGENCY DEPARTMENT COURSE and DIFFERENTIAL DIAGNOSIS/MDM:     Vitals:    02/08/23 1020 02/08/23 1030 02/08/23 1115 02/08/23 1152   BP:  (!) 161/81 (!) 166/82    Pulse: 64 65 67 67   Resp: 16 14 19    Temp:       TempSrc:       SpO2: 94% 97% 95%    Weight:       Height:           Pt is a 65 y.o. male who presents with above HPI. Nursing notes and vital signs reviewed. Accu-Chek today 408. EKG, sinus rhythm, no tachycardia, no ST elevations or depressions or arrhythmias. patient seen and examined he is alert and oriented no acute distress. Emergent conditions considered  at this point we will plan for lab work, chest pain work-up, shortness of breath work-up, and reevaluation. Accu-Chek this morning is 408, he does have a significant cardiac history including  CHF, so we will plan for lab work and consideration for fluids. Given his acute onset of chest pain, consideration for admission for ACS rule out at this point, but will continue work-up for shortness of breath and hyperglycemia. @ 12:42 PM EST:  Patient's repeat Accu-Chek: 75 his blood sugars are improving.   Urinalysis shows no evidence of urinary tract infection, some noted glucosuria, however no urine ketones. VBG does not appear to be consistent with DKA. No elevation of serum ketones. No leukocytosis. His renal function does appear to be consistent with his baseline in the past and has history of chronic kidney disease, creatinine 2.1, BUN 47. He did have a elevation in his BNP of 825.3 also consistent with history of CHF. Additionally, noted detectable troponin. 0.043. Get EKG no acute findings ST elevations or depressions. I did review EKG with Dr. Alejandra Reyes. No acute findings. Due to his onset of chest pain, elevated troponin, and his dyspnea, I did offer admission for further evaluation. However he had declined. Patient states that his symptoms have resolved and he feels better. He mentions he came here for elevation of his blood sugars, and feels comfortable now that they have improved. Work-up does not appear to be consistent with DKA. No altered mental status. I also offered repeat troponin, cardiology consult but he declined. Patient is refusing any further work-up and evaluation and is agreeable to leave [address] Patient was given thefollowing medications:  Medications   aspirin chewable tablet 324 mg (324 mg Oral Given 2/8/23 1115)             CONSULTS: (Who and What was discussed, see also below)  None  Discussion with Other Professionals : As indicated in SUMMARY, ED COURSE AND REASSESSMENT, MDM      CC/HPI SUMMARY, DDX, ED COURSE, AND REASSESSMENT, MDM:     Patient with history as above presented with concern for hyperglycemia however he also mentioned that he had acute onset of chest pain upon arrival here to the emergency department. History obtained from History from : Patient  Patient was nontoxic, stable. Exam as above  EKG reviewed. Labs reviewed. I independently reviewed imaging. However please refer to final radiologist's report for definitive impression and findings. On my interpretation : no obvious consolidation  Reviewed external records. Differential diagnosis considered. Patient was treated with   Medications   aspirin chewable tablet 324 mg (324 mg Oral Given 2/8/23 1115)      DDx:Differential diagnosis considered. DDx: acute coronary syndrome, pulmonary embolism, pericarditis , myocarditis, pericardial tamponade, aortic dissection, pneumothorax, mediastinitis/esophageal rupture. Gastritis, peptic ulcer disease, ADAM/GERD, biliary colic, cholecystitis, pancreatitis, gastroenteritis, viral pneumonia, acute bronchitis, bacterial pneumonia, COPD exacerbation, CHF evaluation, blunt thoracic trauma, chest contusion, zoster, costochondritis, musculoskeletal strain, non-organic chest pain (anxiety, malingering). Disposition Considerations (tests considered but not done, Admit vs D/C, Shared Decision Making, Pt Expectation of Test or Tx.):  repeat troponin, admission,  however patient declined    Admission for chest pain, ACS rule out, offered. Patient declined. The patient is clinically sober, and has no distracting injuries, or AMS. I see no evidence that that patient has any deficits in insight, judgment, or reasoning. Patient appears to have adequate insight and judgment. In my opinion, the patient has capacity to make decisions. Pt had presented with hyperglycemia and chest pain. I discussed possible causes, specifically my concern for acute coronary syndrome, infection, pulmonary embolism. I also discussed results of her exam, workup, and diagnostic testing, mentioning that even though with these results appear normal, does not rule out a more concerning acute condition. Pt was able to verbalize and understand my concerns. In fact, he states he does not feel that it is his heart and complains to follow-up with his cardiologist tomorrow. Repeat troponin in the emergency department, cardiology consult was also offered, analgesics offered for discomfort. However, Patient is  unwilling to stay for mission or additional work-up. Patient  is refusing any further care and is leaving against medical advice. Is this patient to be included in the SEP-1 Core Measure due to severe sepsis or septic shock? No   Exclusion criteria - the patient is NOT to be included for SEP-1 Core Measure due to:  2+ SIRS criteria are not met    Chronic Conditions affecting care:    has a past medical history of Abnormal nuclear stress test, Atrial fibrillation ([facility] (11/2013), CAD (coronary artery disease), CHF (congestive heart failure) ([facility] [facility] COPD (chronic obstructive pulmonary disease) ([facility] [facility] Decreased cardiac ejection fraction, Diabetes mellitus ([facility] [address] [facility] GERD (gastroesophageal reflux disease), H/O cardiovascular stress test (11/20/13, 3/20/2013), H/O cardiovascular stress test (06/08/2017), H/O cardiovascular stress test (06/17/2019), H/O cardiovascular stress test (03/02/2021), H/O Doppler ultrasound (10/14/2010), H/O echocardiogram (06/26/2019), H/O percutaneous left heart catheterization (12/22/2015), Heart imaging (04/23/2020), History of coronary artery stent placement (11/13/2013), History of exercise stress test (06/08/2017), History of Holter monitoring (01/04/2016), History of MRI of brain and brain stem (06/04/2020), History of nuclear stress test (02/24/2020), Doppler echocardiogram (01/26/2022), Hyperlipidemia, Hypertension, Kidney disease, S/P cardiac catheterization (11/13/2013), SOB (shortness of breath), Status post angioplasty with stent, and Wears dentures. Social Determinants : None    Records Reviewed : Inpatient Notes   Outpatient Notes   Care Everywhere    I am the Primary Clinician of Record.       DIAGNOSTIC RESULTS   LABS:    Labs Reviewed   TROPONIN - Abnormal; Notable for the following components:       Result Value    Troponin T 0.043 (*)     All other components within normal limits   CBC WITH AUTO DIFFERENTIAL - Abnormal; Notable for the following components:    RBC 4.39 (*)     Hemoglobin 11.9 (*)     Hematocrit 38.1 (*)     MCHC 31.2 (*)     RDW 15.8 (*)     MPV 11.5 (*)     Segs Relative 71.7 (*)     Lymphocytes % 14.3 (*)     Monocytes % 8.2 (*)     Eosinophils % 4.3 (*)     Basophils % 1.2 (*)     All other components within normal limits   COMPREHENSIVE METABOLIC PANEL W/ REFLEX TO MG FOR LOW K - Abnormal; Notable for the following components:    BUN 47 (*)     Creatinine 2.1 (*)     Est, Glom Filt Rate 35 (*)     Glucose 295 (*)     Alkaline Phosphatase 172 (*)     All other components within normal limits   URINALYSIS WITH REFLEX TO CULTURE - Abnormal; Notable for the following components:    Glucose, Urine >1,000 (*)     Protein, UA TRACE (*)     All other components within normal limits   BLOOD GAS, VENOUS - Abnormal; Notable for the following components:    pO2, Juan Manuel 158 (*)     Base Exc, Mixed 4.8 (*)     HCO3, Venous 30.4 (*)     O2 Sat, Juan Manuel 95.9 (*)     All other components within normal limits   OSMOLALITY - Abnormal; Notable for the following components:    Osmolality 313 (*)     All other components within normal limits   BRAIN NATRIURETIC PEPTIDE - Abnormal; Notable for the following components:    Pro-.3 (*)     All other components within normal limits   PROTIME/INR & PTT - Abnormal; Notable for the following components:    Protime 15.0 (*)     All other components within normal limits   POCT GLUCOSE - Abnormal; Notable for the following components:    POC Glucose 403 (*)     All other components within normal limits   POCT GLUCOSE - Abnormal; Notable for the following components:    POC Glucose 310 (*)     All other components within normal limits   POCT GLUCOSE - Abnormal; Notable for the following components:    POC Glucose 175 (*)     All other components within normal limits   COVID-19, RAPID   BETA-HYDROXYBUTYRATE       When ordered only abnormal lab results are displayed.  All other labs were within normal range or not returned as of this dictation. EKG: When ordered, EKG's are interpreted by the Emergency Department Physician in the absence of a cardiologist.  Please see their note for interpretation of EKG. RADIOLOGY:   Non-plain film images such as CT, Ultrasound and MRI are read by the radiologist. Plain radiographic images are visualized and preliminarily interpreted by the ED Provider with the below findings:      Interpretation per the Radiologist below, if available at the time of this note:    XR CHEST PORTABLE    Result Date: 2/8/2023  EXAMINATION: [address] 2/8/2023 10:25 am COMPARISON: 11/08/2022 and prior HISTORY: [facility]: cxr/sob x few hours TECHNOLOGIST PROVIDED HISTORY: Reason for exam:->cxr/sob x few hours Reason for Exam: cxr/sob x few hours FINDINGS: Improved opacities overlying the right lower lung zone. Unchanged mild cardiomegaly. No pleural effusion or pneumothorax. No acute osseous abnormality. Status post CABG and median sternotomy. Left atrial appendage clip in place. Improved opacities overlying the right lower lung zone likely represent atelectasis with infection not excluded. Unchanged mild cardiomegaly. XR CHEST PORTABLE   Final Result   Improved opacities overlying the right lower lung zone likely represent   atelectasis with infection not excluded. Unchanged mild cardiomegaly.                PROCEDURES   Unless otherwise noted below, none     Procedures    CRITICAL CARE TIME (.cctime)       PAST MEDICAL HISTORY      has a past medical history of Abnormal nuclear stress test, Atrial fibrillation ([address] (11/2013), CAD (coronary artery disease), CHF (congestive heart failure) ([address] COPD (chronic obstructive pulmonary disease) ([address] Decreased cardiac ejection fraction, Diabetes mellitus ([address] GERD (gastroesophageal reflux disease), H/O cardiovascular stress test (11/20/13, 3/20/2013), H/O cardiovascular stress test (06/08/2017), H/O cardiovascular stress test (06/17/2019), H/O cardiovascular stress test (03/02/2021), H/O Doppler ultrasound (10/14/2010), H/O echocardiogram (06/26/2019), H/O percutaneous left heart catheterization (12/22/2015), Heart imaging (04/23/2020), History of coronary artery stent placement (11/13/2013), History of exercise stress test (06/08/2017), History of Holter monitoring (01/04/2016), History of MRI of brain and brain stem (06/04/2020), History of nuclear stress test (02/24/2020), Doppler echocardiogram (01/26/2022), Hyperlipidemia, Hypertension, Kidney disease, S/P cardiac catheterization (11/13/2013), SOB (shortness of breath), Status post angioplasty with stent, and Wears dentures. FINAL IMPRESSION      1. Chest pain, unspecified type    2. Dyspnea, unspecified type    3. Hyperglycemia    4. Elevated troponin          DISPOSITION/PLAN     DISPOSITION Rives 02/08/2023 12:38:38 PM      PATIENT REFERRED TO:  Arun Mcgovern, [address] [place] [address]          Gricelda Lim MD  [address] [address]  [phone]          DISCHARGE MEDICATIONS:  New Prescriptions    No medications on file       DISCONTINUED MEDICATIONS:  Discontinued Medications    No medications on file              (Please note that portions of this note were completed with a voice recognition program.  Efforts were made to edit the dictations but occasionally words are mis-transcribed.)    Yonis Uribe PA-C (electronically signed)       Yonis Uribe PA-C  02/08/23 2003

## 2023-02-08 NOTE — DISCHARGE INSTRUCTIONS
As we discussed with you today, you were offered admission for your chest pain, and your elevated troponin and further evaluation however you had declined and agreed to leave [address] At any point if you change your mind you can return to this emergency department for continued evaluation. Otherwise please call your cardiologist office today to discuss your symptoms your visit to the ED, and schedule or confirm his appointment for tomorrow. Follow-up with your primary care provider for reevaluation of your blood sugars and your blood pressure which was elevated today. Return to emergency department with any worsening chest pain, shortness of breath, fevers, nausea, vomiting, confusion, worsening symptoms or any new concerns. Return to emergency department if you change your mind and want further evaluation.

## 2023-02-09 PROBLEM — R07.9 CHEST PAIN: Status: ACTIVE | Noted: 2023-02-09

## 2023-02-09 LAB
APTT: 31.8 SECONDS (ref 25.1–37.1)
APTT: 33.8 SECONDS (ref 25.1–37.1)
APTT: 44.5 SECONDS (ref 25.1–37.1)
APTT: 46.6 SECONDS (ref 25.1–37.1)
EKG ATRIAL RATE: 67 BPM
EKG ATRIAL RATE: 71 BPM
EKG DIAGNOSIS: NORMAL
EKG DIAGNOSIS: NORMAL
EKG P AXIS: 23 DEGREES
EKG P AXIS: 36 DEGREES
EKG P-R INTERVAL: 196 MS
EKG P-R INTERVAL: 200 MS
EKG Q-T INTERVAL: 428 MS
EKG Q-T INTERVAL: 440 MS
EKG QRS DURATION: 116 MS
EKG QRS DURATION: 118 MS
EKG QTC CALCULATION (BAZETT): 464 MS
EKG QTC CALCULATION (BAZETT): 465 MS
EKG R AXIS: -10 DEGREES
EKG R AXIS: 0 DEGREES
EKG T AXIS: 79 DEGREES
EKG T AXIS: 87 DEGREES
EKG VENTRICULAR RATE: 67 BPM
EKG VENTRICULAR RATE: 71 BPM
ESTIMATED AVERAGE GLUCOSE: 151 MG/DL
GLUCOSE BLD-MCNC: 147 MG/DL (ref 70–99)
GLUCOSE BLD-MCNC: 180 MG/DL (ref 70–99)
GLUCOSE BLD-MCNC: 208 MG/DL (ref 70–99)
GLUCOSE BLD-MCNC: 94 MG/DL (ref 70–99)
HBA1C MFR BLD: 6.9 % (ref 4.2–6.3)
TROPONIN T: 0.04 NG/ML
TROPONIN T: 0.05 NG/ML

## 2023-02-09 PROCEDURE — 6370000000 HC RX 637 (ALT 250 FOR IP): Performed by: INTERNAL MEDICINE

## 2023-02-09 PROCEDURE — 93010 ELECTROCARDIOGRAM REPORT: CPT | Performed by: INTERNAL MEDICINE

## 2023-02-09 PROCEDURE — 2500000003 HC RX 250 WO HCPCS: Performed by: EMERGENCY MEDICINE

## 2023-02-09 PROCEDURE — 36415 COLL VENOUS BLD VENIPUNCTURE: CPT

## 2023-02-09 PROCEDURE — 2500000003 HC RX 250 WO HCPCS: Performed by: INTERNAL MEDICINE

## 2023-02-09 PROCEDURE — 2580000003 HC RX 258: Performed by: INTERNAL MEDICINE

## 2023-02-09 PROCEDURE — 2140000000 HC CCU INTERMEDIATE R&B

## 2023-02-09 PROCEDURE — 83036 HEMOGLOBIN GLYCOSYLATED A1C: CPT

## 2023-02-09 PROCEDURE — 85730 THROMBOPLASTIN TIME PARTIAL: CPT

## 2023-02-09 PROCEDURE — 84484 ASSAY OF TROPONIN QUANT: CPT

## 2023-02-09 PROCEDURE — 99222 1ST HOSP IP/OBS MODERATE 55: CPT | Performed by: INTERNAL MEDICINE

## 2023-02-09 PROCEDURE — 94761 N-INVAS EAR/PLS OXIMETRY MLT: CPT

## 2023-02-09 PROCEDURE — 6370000000 HC RX 637 (ALT 250 FOR IP): Performed by: EMERGENCY MEDICINE

## 2023-02-09 PROCEDURE — 6370000000 HC RX 637 (ALT 250 FOR IP): Performed by: FAMILY MEDICINE

## 2023-02-09 PROCEDURE — 94640 AIRWAY INHALATION TREATMENT: CPT

## 2023-02-09 PROCEDURE — 94664 DEMO&/EVAL PT USE INHALER: CPT

## 2023-02-09 PROCEDURE — 6360000002 HC RX W HCPCS: Performed by: INTERNAL MEDICINE

## 2023-02-09 PROCEDURE — 82962 GLUCOSE BLOOD TEST: CPT

## 2023-02-09 RX ORDER — INSULIN GLARGINE 100 [IU]/ML
5 INJECTION, SOLUTION SUBCUTANEOUS
Status: DISCONTINUED | OUTPATIENT
Start: 2023-02-09 | End: 2023-02-10 | Stop reason: HOSPADM

## 2023-02-09 RX ORDER — ACETAMINOPHEN 650 MG/1
650 SUPPOSITORY RECTAL EVERY 6 HOURS PRN
Status: DISCONTINUED | OUTPATIENT
Start: 2023-02-09 | End: 2023-02-10 | Stop reason: SDUPTHER

## 2023-02-09 RX ORDER — SODIUM CHLORIDE 0.9 % (FLUSH) 0.9 %
5-40 SYRINGE (ML) INJECTION PRN
Status: DISCONTINUED | OUTPATIENT
Start: 2023-02-09 | End: 2023-02-10 | Stop reason: HOSPADM

## 2023-02-09 RX ORDER — NICOTINE 21 MG/24HR
1 PATCH, TRANSDERMAL 24 HOURS TRANSDERMAL DAILY
Status: DISCONTINUED | OUTPATIENT
Start: 2023-02-09 | End: 2023-02-10 | Stop reason: HOSPADM

## 2023-02-09 RX ORDER — SODIUM CHLORIDE 9 MG/ML
INJECTION, SOLUTION INTRAVENOUS PRN
Status: DISCONTINUED | OUTPATIENT
Start: 2023-02-09 | End: 2023-02-10 | Stop reason: HOSPADM

## 2023-02-09 RX ORDER — PREGABALIN 75 MG/1
150 CAPSULE ORAL 2 TIMES DAILY
Status: DISCONTINUED | OUTPATIENT
Start: 2023-02-09 | End: 2023-02-10 | Stop reason: HOSPADM

## 2023-02-09 RX ORDER — POLYETHYLENE GLYCOL 3350 17 G/17G
17 POWDER, FOR SOLUTION ORAL DAILY PRN
Status: DISCONTINUED | OUTPATIENT
Start: 2023-02-09 | End: 2023-02-10 | Stop reason: HOSPADM

## 2023-02-09 RX ORDER — HYDRALAZINE HYDROCHLORIDE 50 MG/1
100 TABLET, FILM COATED ORAL 3 TIMES DAILY
Status: DISCONTINUED | OUTPATIENT
Start: 2023-02-09 | End: 2023-02-10 | Stop reason: HOSPADM

## 2023-02-09 RX ORDER — ONDANSETRON 4 MG/1
4 TABLET, ORALLY DISINTEGRATING ORAL EVERY 8 HOURS PRN
Status: DISCONTINUED | OUTPATIENT
Start: 2023-02-09 | End: 2023-02-10 | Stop reason: HOSPADM

## 2023-02-09 RX ORDER — ASPIRIN 81 MG/1
81 TABLET, CHEWABLE ORAL DAILY
Status: DISCONTINUED | OUTPATIENT
Start: 2023-02-10 | End: 2023-02-10 | Stop reason: HOSPADM

## 2023-02-09 RX ORDER — INSULIN LISPRO 100 [IU]/ML
0-8 INJECTION, SOLUTION INTRAVENOUS; SUBCUTANEOUS
Status: DISCONTINUED | OUTPATIENT
Start: 2023-02-09 | End: 2023-02-10 | Stop reason: HOSPADM

## 2023-02-09 RX ORDER — CLOPIDOGREL BISULFATE 75 MG/1
75 TABLET ORAL DAILY
Status: DISCONTINUED | OUTPATIENT
Start: 2023-02-09 | End: 2023-02-10 | Stop reason: HOSPADM

## 2023-02-09 RX ORDER — ACETAMINOPHEN 325 MG/1
650 TABLET ORAL EVERY 6 HOURS PRN
Status: DISCONTINUED | OUTPATIENT
Start: 2023-02-09 | End: 2023-02-10 | Stop reason: SDUPTHER

## 2023-02-09 RX ORDER — METOLAZONE 2.5 MG/1
5 TABLET ORAL 2 TIMES DAILY
Status: DISCONTINUED | OUTPATIENT
Start: 2023-02-09 | End: 2023-02-10 | Stop reason: HOSPADM

## 2023-02-09 RX ORDER — POTASSIUM CHLORIDE 20 MEQ/1
20 TABLET, EXTENDED RELEASE ORAL 2 TIMES DAILY WITH MEALS
Status: DISCONTINUED | OUTPATIENT
Start: 2023-02-09 | End: 2023-02-10 | Stop reason: HOSPADM

## 2023-02-09 RX ORDER — DEXTROSE MONOHYDRATE 100 MG/ML
INJECTION, SOLUTION INTRAVENOUS CONTINUOUS PRN
Status: DISCONTINUED | OUTPATIENT
Start: 2023-02-09 | End: 2023-02-10 | Stop reason: HOSPADM

## 2023-02-09 RX ORDER — ATORVASTATIN CALCIUM 40 MG/1
40 TABLET, FILM COATED ORAL NIGHTLY
Status: DISCONTINUED | OUTPATIENT
Start: 2023-02-09 | End: 2023-02-10 | Stop reason: HOSPADM

## 2023-02-09 RX ORDER — ALBUTEROL SULFATE 90 UG/1
2 AEROSOL, METERED RESPIRATORY (INHALATION) EVERY 6 HOURS PRN
Status: DISCONTINUED | OUTPATIENT
Start: 2023-02-09 | End: 2023-02-10 | Stop reason: HOSPADM

## 2023-02-09 RX ORDER — BUMETANIDE 1 MG/1
2 TABLET ORAL 2 TIMES DAILY
Status: DISCONTINUED | OUTPATIENT
Start: 2023-02-09 | End: 2023-02-10 | Stop reason: HOSPADM

## 2023-02-09 RX ORDER — SODIUM CHLORIDE 0.9 % (FLUSH) 0.9 %
5-40 SYRINGE (ML) INJECTION EVERY 12 HOURS SCHEDULED
Status: DISCONTINUED | OUTPATIENT
Start: 2023-02-09 | End: 2023-02-10 | Stop reason: HOSPADM

## 2023-02-09 RX ORDER — INSULIN LISPRO 100 [IU]/ML
0-4 INJECTION, SOLUTION INTRAVENOUS; SUBCUTANEOUS NIGHTLY
Status: DISCONTINUED | OUTPATIENT
Start: 2023-02-09 | End: 2023-02-10 | Stop reason: HOSPADM

## 2023-02-09 RX ORDER — HYDROCODONE BITARTRATE AND ACETAMINOPHEN 5; 325 MG/1; MG/1
1 TABLET ORAL ONCE
Status: COMPLETED | OUTPATIENT
Start: 2023-02-09 | End: 2023-02-09

## 2023-02-09 RX ORDER — ONDANSETRON 2 MG/ML
4 INJECTION INTRAMUSCULAR; INTRAVENOUS EVERY 6 HOURS PRN
Status: DISCONTINUED | OUTPATIENT
Start: 2023-02-09 | End: 2023-02-10 | Stop reason: HOSPADM

## 2023-02-09 RX ADMIN — METOLAZONE 5 MG: 2.5 TABLET ORAL at 20:10

## 2023-02-09 RX ADMIN — CYCLOBENZAPRINE 10 MG: 10 TABLET, FILM COATED ORAL at 00:03

## 2023-02-09 RX ADMIN — POTASSIUM CHLORIDE 20 MEQ: 1500 TABLET, EXTENDED RELEASE ORAL at 08:06

## 2023-02-09 RX ADMIN — PREGABALIN 150 MG: 75 CAPSULE ORAL at 20:10

## 2023-02-09 RX ADMIN — BUMETANIDE 2 MG: 1 TABLET ORAL at 08:07

## 2023-02-09 RX ADMIN — HYDRALAZINE HYDROCHLORIDE 100 MG: 50 TABLET, FILM COATED ORAL at 20:10

## 2023-02-09 RX ADMIN — NITROGLYCERIN 5 MCG/MIN: 20 INJECTION INTRAVENOUS at 10:31

## 2023-02-09 RX ADMIN — PREGABALIN 150 MG: 75 CAPSULE ORAL at 08:06

## 2023-02-09 RX ADMIN — HEPARIN SODIUM 14 UNITS/KG/HR: 10000 INJECTION, SOLUTION INTRAVENOUS at 18:26

## 2023-02-09 RX ADMIN — ATORVASTATIN CALCIUM 40 MG: 40 TABLET, FILM COATED ORAL at 20:10

## 2023-02-09 RX ADMIN — HYDROCODONE BITARTRATE AND ACETAMINOPHEN 1 TABLET: 5; 325 TABLET ORAL at 22:36

## 2023-02-09 RX ADMIN — POTASSIUM CHLORIDE 20 MEQ: 1500 TABLET, EXTENDED RELEASE ORAL at 17:22

## 2023-02-09 RX ADMIN — INSULIN LISPRO 2 UNITS: 100 INJECTION, SOLUTION INTRAVENOUS; SUBCUTANEOUS at 17:22

## 2023-02-09 RX ADMIN — METOLAZONE 5 MG: 2.5 TABLET ORAL at 08:07

## 2023-02-09 RX ADMIN — ALBUTEROL SULFATE 2 PUFF: 90 AEROSOL, METERED RESPIRATORY (INHALATION) at 15:57

## 2023-02-09 RX ADMIN — BUMETANIDE 2 MG: 1 TABLET ORAL at 20:10

## 2023-02-09 RX ADMIN — CLOPIDOGREL BISULFATE 75 MG: 75 TABLET ORAL at 08:07

## 2023-02-09 RX ADMIN — NITROGLYCERIN 5 MCG/MIN: 20 INJECTION INTRAVENOUS at 18:23

## 2023-02-09 RX ADMIN — HYDRALAZINE HYDROCHLORIDE 100 MG: 50 TABLET, FILM COATED ORAL at 08:07

## 2023-02-09 RX ADMIN — HEPARIN SODIUM 7 UNITS/KG/HR: 10000 INJECTION, SOLUTION INTRAVENOUS at 00:03

## 2023-02-09 RX ADMIN — HYDRALAZINE HYDROCHLORIDE 100 MG: 50 TABLET, FILM COATED ORAL at 14:15

## 2023-02-09 RX ADMIN — ACETAMINOPHEN 650 MG: 325 TABLET ORAL at 20:10

## 2023-02-09 RX ADMIN — ACETAMINOPHEN 650 MG: 325 TABLET ORAL at 05:37

## 2023-02-09 RX ADMIN — SODIUM CHLORIDE, PRESERVATIVE FREE 10 ML: 5 INJECTION INTRAVENOUS at 08:18

## 2023-02-09 RX ADMIN — HEPARIN SODIUM 4000 UNITS: 1000 INJECTION INTRAVENOUS; SUBCUTANEOUS at 08:10

## 2023-02-09 RX ADMIN — HEPARIN SODIUM 14 UNITS/KG/HR: 10000 INJECTION, SOLUTION INTRAVENOUS at 20:16

## 2023-02-09 ASSESSMENT — PAIN DESCRIPTION - LOCATION
LOCATION: CHEST
LOCATION: TOE (COMMENT WHICH ONE)
LOCATION: TOE (COMMENT WHICH ONE)
LOCATION: LEG;GENERALIZED
LOCATION: CHEST;NECK
LOCATION: NECK

## 2023-02-09 ASSESSMENT — PAIN DESCRIPTION - DESCRIPTORS
DESCRIPTORS: ACHING
DESCRIPTORS: ACHING;THROBBING

## 2023-02-09 ASSESSMENT — PAIN SCALES - GENERAL
PAINLEVEL_OUTOF10: 7
PAINLEVEL_OUTOF10: 0
PAINLEVEL_OUTOF10: 7
PAINLEVEL_OUTOF10: 6
PAINLEVEL_OUTOF10: 0

## 2023-02-09 ASSESSMENT — PAIN DESCRIPTION - ORIENTATION: ORIENTATION: RIGHT;LEFT

## 2023-02-09 NOTE — PROGRESS NOTES
Lab called for a PTT ordered for 1430. Stated they would be up shortly. aPTT collected at 1554, has not resulted at 1729. Called lab and they said they would look into it.

## 2023-02-09 NOTE — ED NOTES
ED TO INPATIENT SBAR HANDOFF    Patient Name: Coco Ladd   :  1960  65 y.o. MRN:  [id number]  Preferred Name     ED Room #:  TR01/01TR-01  Family/Caregiver Present no   Restraints no   Sitter no   Sepsis Risk Score Sepsis Risk Score: 1.99    Situation  Code Status: Prior No additional code details. Allergies: Hydromorphone  Weight: Patient Vitals for the past 96 hrs (Last 3 readings):   Weight   23 2201 278 lb (126.1 kg)     Arrived from: home  Chief Complaint:   Chief Complaint   Patient presents with    Hypertension    Chest Pain     Hospital Problem/Diagnosis:  Active Problems:    * No active hospital problems. *  Resolved Problems:    * No resolved hospital problems. *    Imaging:   No orders to display     Abnormal labs:   Abnormal Labs Reviewed   TROPONIN - Abnormal; Notable for the following components:       Result Value    Troponin T 0.046 (*)     All other components within normal limits   URINE DRUG SCREEN - Abnormal; Notable for the following components:    Cocaine Metabolite UNCONFIRMED POSITIVE (*)     All other components within normal limits   POCT GLUCOSE - Abnormal; Notable for the following components:    POC Glucose 316 (*)     All other components within normal limits     Critical values: no     Abnormal Assessment Findings: none    Background  History:   Past Medical History:   Diagnosis Date    Abnormal nuclear stress test     Atrial fibrillation (HCC) 2013    CAD (coronary artery disease)     Follows with Dr. Gokul Welsh CHF (congestive [facility] failure) [facility]     COPD (chronic obstructive pulmonary disease) ([facility]     Follows with PCP    Decreased cardiac ejection fraction     Diabetes mellitus ([facility]     GERD (gastroesophageal reflux disease)     H/O cardiovascular stress test 13, 3/20/2013    11/13-Observed defect is consistent with diaphragmatic attenuation. EF 58%. 3/13 -EF 51%. No ischemia. Normal Study.     H/O cardiovascular stress test 2017 EF 50% normal study    H/O cardiovascular stress test 06/17/2019    Normal NM    H/O cardiovascular stress test 03/02/2021    depressed lvedf increased edv myocardial perfusion abnormal stress test    H/O Doppler ultrasound 10/14/2010    10/2010-Bilateral venous doppler of lower extremies- No DVTs.  H/O echocardiogram 06/26/2019    Left ventricular systolic function is normal with an ejection fraction of 50-55%. Mild concentric left ventricular hypertrophy. Grade I diastolic dysfunction. No significant valvular disease noted. No evidence of pericardial effusion. Essentially unremarkable echo.  H/O percutaneous left heart catheterization 12/22/2015    No evidence of hemodynamically significant coronary artery disease    Heart imaging 04/23/2020    muga - ef 58%    History of coronary artery stent placement 11/13/2013 11/13 -RCA - 3 stents placed    History of exercise stress test 06/08/2017    treadmill    History of Holter monitoring 01/04/2016    predominant rhythm sinus    History of MRI of brain and brain stem 06/04/2020    No evidence of an acute infarct, mod chronic microvascular white matter ischemic disease with associated cerebral parenchymal volume loss    History of nuclear stress test 02/24/2020    EF 38%,Myocardial perfusion scan shows moderate size, severe intensity, non  reversible perfusion defect in inferior wall.  Hx of Doppler echocardiogram 01/26/2022    EF 45-50% Mod LV hypertrophy. Grade 1 diastolic dysfunction. Mod MR and TR. Dilation of the aortic root and ascending aorta.  Hyperlipidemia     Hypertension     Follows with PCP    Kidney disease     Dr. Joyce Monsivais S/P cardiac catheterization 11/13/2013 11/13/2013-EF 55%, distal LAD mild disease,CX stent patent,but distal to stent 50% stenosis. RCA severe disease.     SOB (shortness of breath)     Status post angioplasty with stent     Wears dentures     full upper denture       Assessment    Vitals/MEWS: MEWS Score: 1 Level of Consciousness: Alert (0)   Vitals:    02/08/23 2233 02/08/23 2303 02/08/23 2332 02/09/23 0003   BP: (!) 171/83 (!) 160/112 (!) 156/83 (!) 158/97   Pulse: 70 69 65 67   Resp: 12 17 17 15   Temp:    98.7 °F (37.1 °C)   TempSrc:       SpO2: 97% 96% 92% 95%   Weight:       Height:         FiO2 (%):    O2 Flow Rate:     Room Air  Cardiac Rhythm: NSR  Pain Assessment:   [x] Verbal [] Marielena Lucas Scale  Pain Scale: Pain Assessment  Pain Assessment: 0-10  Pain Level: 7  Patient's Stated Pain Goal: 0 - No pain  Pain Location: Neck  Last documented pain score (0-10 scale) Pain Level: 7  Last documented pain medication administered:    Mental Status: oriented and alert  NIH Score: NIH     C-SSRS: Risk of Suicide: No Risk  Bedside swallow:    Jonathan Coma Scale (GCS): Active LDA's:   Peripheral IV 02/08/23 Left Forearm (Active)       Peripheral IV 02/08/23 Right Forearm (Active)   Site Assessment Clean, dry & intact 02/08/23 2358   Line Status Blood return noted;Normal saline locked; Flushed 02/08/23 2358   Phlebitis Assessment No symptoms 02/08/23 2358   Infiltration Assessment 0 02/08/23 2358     PO Status: Regular  Pertinent or High Risk Medications/Drips: yes  o If Yes, please provide details: Heparin  Pending Blood Product Administration: no     You may also review the ED PT Care Timeline found under the Summary Nursing Index tab. Recommendation    Pending orders    Plan for Discharge (if known):    Additional Comments: Pt alert and oriented and ambulatory   If any further questions, please call Sending RN at [phone]    Electronically signed by: Electronically signed by Can Robertson RN on 2/9/2023 at 12:09 AM       Can Robertson RN  02/09/23 0010

## 2023-02-09 NOTE — PROGRESS NOTES
4 Eyes Skin Assessment     NAME:  Yareli Hernandez  YOB: 1960  MEDICAL RECORD NUMBER:  [id number]    The patient is being assessed for  Admission    I agree that One RN has performed a thorough Head to Toe Skin Assessment on the patient. ALL assessment sites listed below have been assessed. Areas assessed by both nurses:    Head, Face, Ears, Shoulders, Back, Chest, Arms, Elbows, Hands, Sacrum. Buttock, Coccyx, Ischium, and Legs. Feet and Heels        Does the Patient have a Wound?  No noted wound(s)       Ernesto Prevention initiated by RN: No   Wound Care Orders initiated by RN: No    Pressure Injury (Stage 3,4, Unstageable, DTI, NWPT, and Complex wounds) if present, place referral order by RN under : No    New and Established Ostomies, if present place, referral order under : No      Nurse 1 eSignature: Electronically signed by Savannah Bearden RN on 2/9/23 at 5:42 AM EST    **SHARE this note so that the co-signing nurse can place an eSignature**    Nurse 2 eSignature: Electronically signed by Javed Rdz RN on 2/9/23 at 5:55 AM EST

## 2023-02-09 NOTE — CARE COORDINATION
Case Management Assessment  Initial Evaluation    Date/Time of Evaluation: 2/9/2023 3:52 PM  Assessment Completed by: MEI Layne    If patient is discharged prior to next notation, then this note serves as note for discharge by case management. Patient Name: Coco Ladd                   YOB: 1960  Diagnosis: Chest pain [R07.9]  Cocaine abuse ([facility] [F14.10]  History of atrial fibrillation [Z86.79]  NSTEMI (non-ST elevated myocardial infarction) ([facility] [I21.4]  Uncontrolled hypertension [I10]  Subtherapeutic anticoagulation [Z51.81, Z79.01]  Type 2 diabetes mellitus with hyperglycemia, with long-term current use of insulin [facility] [E11.65, Z79.4]  Chest pain, unspecified type [R07.9]                   Date / Time: 2/8/2023 10:04 PM    Patient Admission Status: Inpatient   Readmission Risk (Low < 19, Mod (19-27), High > 27): Readmission Risk Score: 18.3    Current PCP: Adalberto Barroso MD  PCP verified by CM? Yes    Chart Reviewed: Yes      History Provided by: Patient  Patient Orientation: Alert and Oriented    Patient Cognition: Alert    Hospitalization in the last 30 days (Readmission):  No    If yes, Readmission Assessment in CM Navigator will be completed. Advance Directives:      Code Status: Full Code   Patient's Primary Decision Maker is: Legal Next of Kin    Primary Decision MakerArmstnela Liang - [phone]    Discharge Planning:    Patient lives with: Spouse/Significant Other Type of Home: House  Primary Care Giver: Spouse  Patient Support Systems include: Spouse/Significant Other   Current Financial resources: Medicare, Medicaid  Current community resources:    Current services prior to admission: None            Current DME:              Type of Home Care services:  None    ADLS  Prior functional level: Independent in ADLs/IADLs  Current functional level:  Independent in ADLs/IADLs    PT AM-PAC:   /24  OT AM-PAC:   /24    Family can provide assistance at DC: Yes  Would you like Case Management to discuss the discharge plan with any other family members/significant others, and if so, who? No  Plans to Return to Present Housing: Yes  Other Identified Issues/Barriers to RETURNING to current housing: cardiology consult  Potential Assistance needed at discharge: N/A            Potential DME:  none  Patient expects to discharge to: [address] [place] [address] for transportation at discharge:  spouse    Financial    Payor: Liya Hugo / Plan: Guillermo Garcia / Product Type: *No Product type* /     Does insurance require precert for SNF: Yes    Potential assistance Purchasing Medications:    Meds-to-Beds request: Yes      [facility] [facility] [phone] - F [address]  Phone: [phone] Fax: [phone]    [id number] [place] [facility], [address] [place] [address] [phone] [place] [phone]  [address] [address]  Phone: [phone] Fax: [phone] [facility] [facility]  60[address] [facility] [address]  Phone: [phone] Fax: [phone]      Notes:    Factors facilitating achievement of predicted outcomes: Family support    Barriers to discharge: cardiology consult    Additional Case Management Notes: CM in to see Pt to initiate discharge planning. Plan is home with his wife. Pt with history of cocaine use. Pt denies the need for inpatient or outpatient rehab at this time. Pt denies the need for resources. Pt denies any needs at this time.   CM following     The Plan for Transition of Care is related to the following treatment goals of Chest pain [R07.9]  Cocaine abuse ([facility] [F14.10]  History of atrial fibrillation [Z86.79]  NSTEMI (non-ST elevated myocardial infarction) ([facility] [I21.4]  Uncontrolled hypertension [I10]  Subtherapeutic anticoagulation [Z51.81, Z79.01]  Type 2 diabetes mellitus with hyperglycemia, with long-term current use of insulin (HCC) [E11.65, Z79.4]  Chest pain, unspecified type [R07.9]      The Patient and/or Patient Representative Agree with the Discharge Plan?   Yes     Finn Finn [place]  Case Management Department  Ph: [phone]

## 2023-02-09 NOTE — PROGRESS NOTES
Educated patient on need for left heart catheterization. Procedure was explained in detail as well as risks and benefits. Patient voiced understanding and was agreeable to undergoing procedure. Consent was obtained. Plan for left heart catheterization tomorrow. Patient to be NPO after midnight tonight.     Bryce Trujillo PA-C

## 2023-02-09 NOTE — H&P
History and Physical      Name:  Jaydon Monroy /Age/Sex: 1960  (65 y.o. male)   MRN & CSN:  [id number] & [id number] Encounter Date/Time: 2023 12:49 AM EST   Location:   PCP: Benita Bonner MD       Hospital Day: 2    Assessment and Plan:     #. Hypertensive emergency  --192/ at presentation  -Troponin elevation 0.043- 0.046  -Patient started on nitro drip in ER  -Resume home medications and wean off nitro drip as tolerated. #.  Chest pain  -EKG with no acute ST-T wave changes.  -Troponin elevated at 0.043, 0.046  -Serial troponins, repeat EKG  -N.p.o. until evaluated by cardiology  -Patient started on heparin drip in ER.  -Continue aspirin, Plavix    #. Uncontrolled hypertension  -Patient is on hydralazine 100 mg every 8 hours, carvedilol, lisinopril 40 mg daily  -Continue hydralazine  -Holding carvedilol as patient urine drug screen is positive for cocaine, holding lisinopril due to CKD. #.  Uncontrolled diabetes mellitus  -Random blood glucose 310  -Patient is on Farxiga, glipizide  -Continue Lantus 5 units daily a.m., insulin sliding scale with hypoglycemia protocol  -Check HbA1c. #.  Cocaine abuse  -Urine drug screen positive for cocaine    #. Coronary artery disease s/p CABG  -RCA stent-2013  -STEMI-2019-RCA stent   -CABG 2020    #. Systolic CHF  -BVYJ--CL 45%  -Appears compensated  -Resume home medications-Bumex, metolazone    #. Chronic atrial fibrillation  -Patient is on carvedilol, Coumadin    #. CKD stage III  -Nephrologist-Dr. Daniel Martinez    #. Diabetic neuropathy-continue Lyrica    #. COPD-does not appear to be in exacerbation    #. AD    #. Obesity with BMI 34.75    Disposition:   Current Living situation: Home    Diet N.p.o. until evaluated by cardiology. DVT Prophylaxis On heparin drip   Code Status FULL   Surrogate Decision Maker/ POA      History from:   EMR, patient.     History of Present Illness:     Chief Complaint: Chest pain  Jaydon Monroy is a 65 y.o. male with coronary artery disease s/p CABG, PCI and stenting to RCA, COPD, hypertension, diabetes mellitus type 2, chronic atrial fibrillation, on anticoagulation, chronic tobacco dependence, coccaine abuse presented to ED with complaints of chest pain. Patient reported sharp pain radiating from right to left,  denied any aggravating or relieving factors. Patient admitted to having constant pain, associated with shortness of breath. Patient was seen in ED yesterday a.m, but left AMA. Patient returned later in the evening with continued chest pain. Patient denying any fever, chills, cough, denied any abdominal pain, denied any urinary complaints, denied any constipation or diarrhea. Denied any lower extremity swelling. At presentation patient was noted to have /94, HR 74, RR 16, temp 98.7, saturating 85% on room air. Lab work done during prior ER visit in the morning-significant for BUN 47, creatinine 2.1, random glucose 295, troponin 0.043, hemoglobin 11.9. Repeat troponin 0.046. Urine drug screen positive for cocaine. EKG with no acute ST-T wave changes. Chest x-ray-improved opacities overlying the right lower lung zone. Patient started on heparin drip, nitro drip. Review of Systems: Need 10 Elements   10 point review of systems conducted and pertinent positives and negatives as per HPI. Objective:   No intake or output data in the 24 hours ending 02/09/23 0049   Vitals:   Vitals:    02/08/23 2233 02/08/23 2303 02/08/23 2332 02/09/23 0003   BP: (!) 171/83 (!) 160/112 (!) 156/83 (!) 158/97   Pulse: 70 69 65 67   Resp: 12 17 17 15   Temp:    98.7 °F (37.1 °C)   TempSrc:       SpO2: 97% 96% 92% 95%   Weight:       Height:           Medications Prior to Admission   Reviewed medications with patient    Prior to Admission medications    Medication Sig Start Date End Date Taking?  Authorizing Provider   potassium chloride (KLOR-CON M) 20 MEQ extended release tablet TAKE 1 TABLET BY MOUTH TWO TIMES A DAY 12/27/22   Historical Provider, MD   hydrALAZINE (APRESOLINE) 100 MG tablet Take 1 tablet  by mouth in the morning and 1 tablet at noon and 1 tablet  in the evening. 12/16/22   Historical Provider, MD   dapagliflozin (FARXIGA) 5 MG tablet Take 1 tablet by mouth every morning 1/19/23   Agueda Maya MD   metOLazone (ZAROXOLYN) 5 MG tablet Take 1 tablet by mouth 2 times daily 1/19/23   Agueda Maya MD   carvedilol (COREG) 12.5 MG tablet Take 1 tablet by mouth 2 times daily (with meals) 1/19/23   TREY Rosas - CNP   bumetanide (BUMEX) 2 MG tablet Take 1 tablet by mouth 2 times daily 1/19/23   TREY Rosas - CNP   atorvastatin (LIPITOR) 80 MG tablet Take 1 tablet by mouth nightly 1/19/23   TREY Rosas - CNP   warfarin (JANTOVEN) 4 MG tablet TAKE 1 TABLET BY MOUTH EVERY DAY, EXCEPT TAKE 1 AND 1/2 TABLETS ON SUNDAYS OR AS DIRECTED BY CLINIC 1/16/23   Annika Sevilla MD   ipratropium-albuterol (DUONEB) 0.5-2.5 (3) MG/3ML SOLN nebulizer solution Inhale 3 mLs into the lungs 4 times daily 12/6/22   Radha He MD   albuterol sulfate HFA (PROVENTIL HFA) 108 (90 Base) MCG/ACT inhaler Inhale 2 puffs into the lungs every 6 hours as needed for Wheezing or Shortness of Breath 12/6/22   Radha He MD   glipiZIDE (GLUCOTROL) 5 MG tablet Take 5 mg by mouth daily    Historical Provider, MD   nicotine (NICODERM CQ) 14 MG/24HR Place 1 patch onto the skin daily 9/20/22 12/8/22  Werner Marie MD   clopidogrel (PLAVIX) 75 MG tablet TAKE 1 TABLET BY MOUTH DAILY. 4/13/22   Historical Provider, MD   lisinopril (PRINIVIL;ZESTRIL) 40 MG tablet Take 40 mg by mouth daily 4/27/22   Historical Provider, MD   pregabalin (LYRICA) 150 MG capsule 150 mg 2 times daily. 4/27/22   Historical Provider, MD       Physical Exam: Need 8 Elements   Physical Exam     GEN  -Awake, alert, NAD.   EYES   -PERRL.   HENT  -MM are moist.   RESP -LS CTA equal bilat, no wheezes, rales or rhonchi. Symmetric chest movement. No respiratory distress noted. C/V  -S1/S2 auscultated. RRR without appreciable M/R/G. No JVD or carotid bruits. Peripheral pulses equal bilaterally and palpable. No peripheral edema. No reproducible chest wall tenderness. GI  -Abdomen is soft, non-distended, no significant tenderness. No masses or guarding. + BS in all quadrants. Rectal exam deferred.   -No CVA tenderness. Maddox catheter is not present. MS  -B/L extremities strong muscles strength. Full movements. No gross joint deformities. No swelling, intact sensation symmetrical.   SKIN  -Normal coloration, warm, dry. NEURO  - Awake, alert, oriented x 3, no focal deficits. PSYC  - Appropriate affect. Past Medical History:   Reviewed patient's past medical, surgical, social, family history and allergies. PMHx   Past Medical History:   Diagnosis Date    Abnormal nuclear stress test     Atrial fibrillation ([facility] [address] 11/2013    CAD (coronary artery disease)     Follows with Dr. Olesya Kingston    CHF (congestive heart failure) (HCC)     COPD (chronic obstructive pulmonary disease) ([facility] [address]     Follows with PCP    Decreased cardiac ejection fraction     Diabetes mellitus ([facility] [address]     GERD (gastroesophageal reflux disease)     H/O cardiovascular stress test 11/20/13, 3/20/2013    11/13-Observed defect is consistent with diaphragmatic attenuation. EF 58%. 3/13 -EF 51%. No ischemia. Normal Study. H/O cardiovascular stress test 06/08/2017    EF 50% normal study    H/O cardiovascular stress test 06/17/2019    Normal NM    H/O cardiovascular stress test 03/02/2021    depressed lvedf increased edv myocardial perfusion abnormal stress test    H/O Doppler ultrasound 10/14/2010    10/2010-Bilateral venous doppler of lower extremies- No DVTs. H/O echocardiogram 06/26/2019    Left ventricular systolic function is normal with an ejection fraction of 50-55%. Mild concentric left ventricular hypertrophy. Grade I diastolic dysfunction. No significant valvular disease noted. No evidence of pericardial effusion. Essentially unremarkable echo. H/O percutaneous left heart catheterization 12/22/2015    No evidence of hemodynamically significant coronary artery disease    Heart imaging 04/23/2020    muga - ef 58%    History of coronary artery stent placement 11/13/2013 11/13 -RCA - 3 stents placed    History of exercise stress test 06/08/2017    treadmill    History of Holter monitoring 01/04/2016    predominant rhythm sinus    History of MRI of brain and brain stem 06/04/2020    No evidence of an acute infarct, mod chronic microvascular white matter ischemic disease with associated cerebral parenchymal volume loss    History of nuclear stress test 02/24/2020    EF 38%,Myocardial perfusion scan shows moderate size, severe intensity, non  reversible perfusion defect in inferior wall. Hx of Doppler echocardiogram 01/26/2022    EF 45-50% Mod LV hypertrophy. Grade 1 diastolic dysfunction. Mod MR and TR. Dilation of the aortic root and ascending aorta. Hyperlipidemia     Hypertension     Follows with PCP    Kidney disease     Dr. Stacey Brunson    S/P cardiac catheterization 11/13/2013 11/13/2013-EF 55%, distal LAD mild disease,CX stent patent,but distal to stent 50% stenosis. RCA severe disease. SOB (shortness of breath)     Status post angioplasty with stent     Wears dentures     full upper denture     PSHX:  has a past surgical history that includes Coronary angioplasty with stent; Appendectomy; Femur fracture surgery (1984); Coronary angioplasty with stent (11/13/2013); Colonoscopy; Mandible fracture surgery (1984); Cardiac catheterization (11/13/2013); Cardiac surgery (07/03/2020); Coronary artery bypass graft (N/A, 7/3/2020); Dental surgery; and BIOPSY / LIGATION TEMPORAL ARTERY (Left, 8/17/2020). Allergies: Allergies   Allergen Reactions    Hydromorphone Other (See Comments)     Hallucinations. Fam HX: family history includes Cancer in his brother, father, and mother; Diabetes in his mother; Heart Disease in his father and mother; High Blood Pressure in his father and mother; Stroke in his father and mother. Soc HX:   Social History     Socioeconomic History    Marital status: Legally    Occupational History    Occupation:    Tobacco Use    Smoking status: Every Day     Packs/day: 0.50     Years: 46.00     Pack years: 23.00     Types: Cigarettes     Start date: 1974    Smokeless tobacco: Never   Vaping Use    Vaping Use: Never used   Substance and Sexual Activity    Alcohol use: No     Comment: caffeine 2 cups coffee a day. pop twice a week. austen tea 4 bottles a day    Drug use: Not Currently     Types: Cocaine     Comment: Last used: 7/1/20    Sexual activity: Yes     Partners: Female       Medications:   Medications:    Infusions:    nitroGLYCERIN      heparin (PORCINE) Infusion 7 Units/kg/hr (02/09/23 0003)     PRN Meds: heparin (porcine), 4,000 Units, PRN  heparin (porcine), 2,000 Units, PRN        Labs      CBC:   Recent Labs     02/08/23  1011   WBC 7.3   HGB 11.9*        BMP:    Recent Labs     02/08/23  1011      K 3.8   CL 99   CO2 28   BUN 47*   CREATININE 2.1*   GLUCOSE 295*     Hepatic:   Recent Labs     02/08/23  1011   AST 16   ALT 12   BILITOT 0.2   ALKPHOS 172*     Lipids:   Lab Results   Component Value Date/Time    CHOL 116 10/02/2022 10:12 PM    HDL 32 10/02/2022 10:12 PM    TRIG 125 10/02/2022 10:12 PM     Hemoglobin A1C:   Lab Results   Component Value Date/Time    LABA1C 5.9 11/07/2022 12:20 PM     TSH: No results found for: TSH  Troponin:   Lab Results   Component Value Date/Time    TROPONINT 0.046 02/08/2023 10:19 PM    TROPONINT 0.043 02/08/2023 10:11 AM    TROPONINT 0.016 10/02/2022 10:12 PM     Lactic Acid: No results for input(s): LACTA in the last 72 hours.   BNP:   Recent Labs     02/08/23  1011   PROBNP 825.3*     UA:  Lab Results Component Value Date/Time    NITRU NEGATIVE 02/08/2023 10:55 AM    COLORU YELLOW 02/08/2023 10:55 AM    WBCUA <1 02/08/2023 10:55 AM    RBCUA <1 02/08/2023 10:55 AM    MUCUS RARE 12/01/2022 11:46 AM    TRICHOMONAS NONE SEEN 12/01/2022 11:46 AM    BACTERIA NEGATIVE 02/08/2023 10:55 AM    CLARITYU CLEAR 02/08/2023 10:55 AM    SPECGRAV <1.005 02/08/2023 10:55 AM    LEUKOCYTESUR NEGATIVE 02/08/2023 10:55 AM    UROBILINOGEN 0.2 02/08/2023 10:55 AM    BILIRUBINUR NEGATIVE 02/08/2023 10:55 AM    BLOODU NEGATIVE 02/08/2023 10:55 AM    KETUA NEGATIVE 02/08/2023 10:55 AM     Urine Cultures: No results found for: Luceroshelli Dobson  Blood Cultures: No results found for: BC  No results found for: BLOODCULT2  Organism: No results found for: ORG    Imaging/Diagnostics Last 24 Hours   XR CHEST PORTABLE    Result Date: 2/8/2023  EXAMINATION: ONE XRAY VIEW OF THE CHEST 2/8/2023 10:25 am COMPARISON: 11/08/2022 and prior HISTORY: ORDERING SYSTEM PROVIDED HISTORY: cxr/sob x few hours TECHNOLOGIST PROVIDED HISTORY: Reason for exam:->cxr/sob x few hours Reason for Exam: cxr/sob x few hours FINDINGS: Improved opacities overlying the right lower lung zone. Unchanged mild cardiomegaly. No pleural effusion or pneumothorax. No acute osseous abnormality. Status post CABG and median sternotomy. Left atrial appendage clip in place. Improved opacities overlying the right lower lung zone likely represent atelectasis with infection not excluded. Unchanged mild cardiomegaly. Personally reviewed Lab Studies, Imaging, and discussed case with ED physician.     Electronically signed by Foreign Gay MD on 2/9/2023 at 12:49 AM

## 2023-02-09 NOTE — CONSULTS
CARDIOLOGY CONSULT NOTE   Reason for consultation: Chest pain    Referring physician:  Anca Moeller MD     Primary care physician: Ale Hernandez MD      Chief Complaints :  Chief Complaint   Patient presents with    Hypertension    Chest Pain        History of present illness:Josiah is a 65 y. o.year old male who has prior history of coronary artery disease as well as coronary to bypass grafting. He is now admitted with the ongoing chest pain. He has positive troponin for past 1 month. He also uses cocaine. Initially when he came in he was quite hypertensive and was managed with heparin and nitroglycerin drip. Subsequently the nitroglycerin drip has been stopped. Upon my evaluation patient still complains of intermittent chest pain but is definitely better than before. Past medical history:    has a past medical history of Abnormal nuclear stress test, Atrial fibrillation (HCC), CAD (coronary artery disease), CHF (congestive heart failure) ([address] COPD (chronic obstructive pulmonary disease) ([facility] [address] Decreased cardiac ejection fraction, Diabetes mellitus ([facility] [address] GERD (gastroesophageal reflux disease), H/O cardiovascular stress test, H/O cardiovascular stress test, H/O cardiovascular stress test, H/O cardiovascular stress test, H/O Doppler ultrasound, H/O echocardiogram, H/O percutaneous left heart catheterization, Heart imaging, History of coronary artery stent placement, History of exercise stress test, History of Holter monitoring, History of MRI of brain and brain stem, History of nuclear stress test, Hx of Doppler echocardiogram, Hyperlipidemia, Hypertension, Kidney disease, S/P cardiac catheterization, SOB (shortness of breath), Status post angioplasty with stent, and Wears dentures. Past surgical history:   has a past surgical history that includes Coronary angioplasty with stent; Appendectomy; Femur fracture surgery (1984); Coronary angioplasty with stent (11/13/2013);  Colonoscopy; Mandible fracture surgery (1984); Cardiac catheterization (11/13/2013); Cardiac surgery (07/03/2020); Coronary artery bypass graft (N/A, 7/3/2020); Dental surgery; and BIOPSY / LIGATION TEMPORAL ARTERY (Left, 8/17/2020). Social History:   reports that he has been smoking cigarettes. He started smoking about 49 years ago. He has a 23.00 pack-year smoking history. He has never used smokeless tobacco. He reports that he does not currently use drugs after having used the following drugs: Cocaine. He reports that he does not drink alcohol. Family history:   no family history of CAD, STROKE of DM at early age    Allergies   Allergen Reactions    Hydromorphone Other (See Comments)     Hallucinations.         sodium chloride flush 0.9 % injection 5-40 mL, 2 times per day  sodium chloride flush 0.9 % injection 5-40 mL, PRN  0.9 % sodium chloride infusion, PRN  ondansetron (ZOFRAN-ODT) disintegrating tablet 4 mg, Q8H PRN   Or  ondansetron (ZOFRAN) injection 4 mg, Q6H PRN  acetaminophen (TYLENOL) tablet 650 mg, Q6H PRN   Or  acetaminophen (TYLENOL) suppository 650 mg, Q6H PRN  polyethylene glycol (GLYCOLAX) packet 17 g, Daily PRN  [START ON 2/10/2023] aspirin chewable tablet 81 mg, Daily  atorvastatin (LIPITOR) tablet 40 mg, Nightly  albuterol sulfate HFA (PROVENTIL;VENTOLIN;PROAIR) 108 (90 Base) MCG/ACT inhaler 2 puff, Q6H PRN  bumetanide (BUMEX) tablet 2 mg, BID  clopidogrel (PLAVIX) tablet 75 mg, Daily  hydrALAZINE (APRESOLINE) tablet 100 mg, TID  pregabalin (LYRICA) capsule 150 mg, BID  metOLazone (ZAROXOLYN) tablet 5 mg, BID  potassium chloride (KLOR-CON M) extended release tablet 20 mEq, BID WC  insulin lispro (HUMALOG) injection vial 0-8 Units, TID WC  insulin lispro (HUMALOG) injection vial 0-4 Units, Nightly  glucose chewable tablet 16 g, PRN  dextrose bolus 10% 125 mL, PRN   Or  dextrose bolus 10% 250 mL, PRN  glucagon (rDNA) injection 1 mg, PRN  dextrose 10 % infusion, Continuous PRN  insulin glargine (LANTUS) injection vial 5 Units, Daily with breakfast  nicotine (NICODERM CQ) 21 MG/24HR 1 patch, Daily  nitroGLYCERIN 50 mg in dextrose 5% 250 mL infusion, Continuous  heparin (porcine) injection 4,000 Units, PRN  heparin (porcine) injection 2,000 Units, PRN  heparin 25,000 unit in sodium chloride 0.45% 250 mL (premix) infusion, Continuous      Current Facility-Administered Medications   Medication Dose Route Frequency Provider Last Rate Last Admin    sodium chloride flush 0.9 % injection 5-40 mL  5-40 mL IntraVENous 2 times per day Anca Moeller MD        sodium chloride flush 0.9 % injection 5-40 mL  5-40 mL IntraVENous PRN Anca Moeller MD   10 mL at 02/09/23 0818    0.9 % sodium chloride infusion   IntraVENous PRN Anca Moeller MD        ondansetron (ZOFRAN-ODT) disintegrating tablet 4 mg  4 mg Oral Q8H PRN Anca Moeller MD        Or    ondansetron (ZOFRAN) injection 4 mg  4 mg IntraVENous Q6H PRN Anca Moeller MD        acetaminophen (TYLENOL) tablet 650 mg  650 mg Oral Q6H PRN Anca Moeller MD   650 mg at 02/09/23 7602    Or    acetaminophen (TYLENOL) suppository 650 mg  650 mg Rectal Q6H PRN Anca Moeller MD        polyethylene glycol (GLYCOLAX) packet 17 g  17 g Oral Daily PRN MD Rigoberto Doherty ON 2/10/2023] aspirin chewable tablet 81 mg  81 mg Oral Daily Baltazar Fish MD        atorvastatin (LIPITOR) tablet 40 mg  40 mg Oral Nightly Anca Moeller MD        albuterol sulfate HFA (PROVENTIL;VENTOLIN;PROAIR) 108 (90 Base) MCG/ACT inhaler 2 puff  2 puff Inhalation Q6H PRN Anca Moeller MD   2 puff at 02/09/23 1557    bumetanide (BUMEX) tablet 2 mg  2 mg Oral BID Anca Moeller MD   2 mg at 02/09/23 0807    clopidogrel (PLAVIX) tablet 75 mg  75 mg Oral Daily Anca Moeller MD   75 mg at 02/09/23 9243    hydrALAZINE (APRESOLINE) tablet 100 mg  100 mg Oral TID Anca Moeller MD   100 mg at 02/09/23 1301 pregabalin (LYRICA) capsule 150 mg  150 mg Oral BID Brad Van MD   150 mg at 02/09/23 0806    metOLazone (ZAROXOLYN) tablet 5 mg  5 mg Oral BID Brad Van MD   5 mg at 02/09/23 0807    potassium chloride (KLOR-CON M) extended release tablet 20 mEq  20 mEq Oral BID  Brad Van MD   20 mEq at 02/09/23 1722    insulin lispro (HUMALOG) injection vial 0-8 Units  0-8 Units SubCUTAneous TID  Brad Van MD   2 Units at 02/09/23 1722    insulin lispro (HUMALOG) injection vial 0-4 Units  0-4 Units SubCUTAneous Nightly Brad Van MD        glucose chewable tablet 16 g  4 tablet Oral PRN Brad Van MD        dextrose bolus 10% 125 mL  125 mL IntraVENous PRN Brad Van MD        Or    dextrose bolus 10% 250 mL  250 mL IntraVENous PRCLEMENT Van MD        glucagon (rDNA) injection 1 mg  1 mg SubCUTAneous PRN Brad Van MD        dextrose 10 % infusion   IntraVENous Continuous ROCK Van MD        insulin glargine (LANTUS) injection vial 5 Units  5 Units SubCUTAneous Daily with breakfast Brad Van MD        nicotine (NICODERM CQ) 21 MG/24HR 1 patch  1 patch TransDERmal Daily Roselia Garcia MD   1 patch at 02/09/23 1134    nitroGLYCERIN 50 mg in dextrose 5% 250 mL infusion  5-200 mcg/min IntraVENous Continuous Brad Van MD   Stopped at 02/09/23 1136    heparin (porcine) injection 4,000 Units  4,000 Units IntraVENous PRCLEMENT Van MD   4,000 Units at 02/09/23 0810    heparin (porcine) injection 2,000 Units  2,000 Units IntraVENous PRCLEMENT Van MD        heparin 25,000 unit in sodium chloride 0.45% 250 mL (premix) infusion  5-30 Units/kg/hr IntraVENous Continuous Brad Van MD 15.1 mL/hr at 02/09/23 0817 12 Units/kg/hr at 02/09/23 0817     Review of Systems:   Constitutional: No Fever or Weight Loss   Eyes: No Decreased Vision  ENT: No Headaches, Hearing Loss or Vertigo  Cardiovascular: As per HPI  Respiratory: As per HPI  Gastrointestinal: No abdominal pain, appetite loss, blood in stools, constipation, diarrhea or heartburn  Genitourinary: No dysuria, trouble voiding, or hematuria  Musculoskeletal:  No gait disturbance, weakness or joint complaints  Integumentary: No rash or pruritis  Neurological: No TIA or stroke symptoms  Psychiatric: No anxiety or depression  Endocrine: No malaise, fatigue or temperature intolerance  Hematologic/Lymphatic: No bleeding problems, blood clots or swollen lymph nodes  Allergic/Immunologic: No nasal congestion or hives  All systems negative except as marked. Physical Examination:    Vitals:    02/09/23 1557   BP:    Pulse: 75   Resp: 16   Temp:    SpO2:         General Appearance:  No distress, conversant    Constitutional:  No acute distress, Non-toxic appearance. HENT:  Normocephalic, Atraumatic,   Eyes:  PERRL, EOMI, Conjunctiva normal, No discharge. Respiratory:  No respiratory distress, No wheezing  Cardiovascular: S1, S2, no murmurs, gallops. JVD wnl  Abdomen /GI:   Soft, No tenderness   Genitourinary: No costovertebral angle tenderness   Musculoskeletal:  No edema, no tenderness, no deformities.    Integument:  Well hydrated, no rash   Neurologic:  Alert & oriented x 3, no focal deficits noted       Medical decision making and Data review:    Lab Review   Recent Labs     02/08/23  1011   WBC 7.3   HGB 11.9*   HCT 38.1*         Recent Labs     02/08/23  1011      K 3.8   CL 99   CO2 28   BUN 47*   CREATININE 2.1*     Recent Labs     02/08/23  1011   AST 16   ALT 12   BILITOT 0.2   ALKPHOS 172*     Recent Labs     02/08/23  2219 02/09/23  0619 02/09/23  1554   TROPONINT 0.046* 0.040* 0.052*       Recent Labs     02/08/23  1011   PROBNP 825.3*     Lab Results   Component Value Date    INR 1.16 02/08/2023    PROTIME 15.0 (H) 02/08/2023       EKG: (reviewed by myself): Sinus rhythm without any significant ischemic changes. ECHO:(reviewed by myself) his last echocardiogram from July 2022 showed mildly reduced ejection fraction (45%). All labs, medications and tests reviewed by myself including data  from outside source , patient and available family . Continue all other medications of all above medical condition listed as is. Impression and Recommendations:    Hypertensive urgency  Unstable angina  Elevated troponin      65 y. o.year old with above medical history. It is most likely that he has chest pain in the setting of cocaine abuse. Given prior coronary artery bypass grafting surgery we will perform another coronary angiography tomorrow. For now would continue him on aspirin, Plavix, Bumex, hydralazine. N.p.o. after midnight for cath tomorrow morning. Thank you  much for consult and giving us the opportunity in contributing in the care of this patient. Please feel free to call me for any questions.        Richard Strickland MD, 2/9/2023 5:59 PM

## 2023-02-09 NOTE — ED PROVIDER NOTES
CHIEF COMPLAINT    Chief Complaint   Patient presents with    Hypertension    Chest Pain     HPI  Kevin Herrera is a 65 y.o. male with history of coronary artery disease with CABG as well as stent placement, atrial fibrillation, chronic anticoagulation, diabetes, CHF, cocaine abuse who presents to the ED with complaints of continued chest pain, elevated blood pressure, and hyperglycemia. Patient was seen earlier today for same complaints of hyperglycemia, chest pain, elevated blood pressure. At that time his EKG was without acute ischemic changes and troponin was mildly detectable at 0.043. He was found to be hyperglycemic without DKA. His chest x-ray showed improving opacity in the right lung and discussion was had with patient about hospitalization but he refused and left AMA. He did have aspirin prior to leaving. He returns stating that he continues to have chest pain radiating from right to left describes a 7/10 pressure and throbbing pain that is constant. Nothing makes it better or worse. He is also concerned that his blood sugar increased into the 200s after returning home. His blood sugar was in the 400s when he arrived earlier today. In addition to cardiac testing today he was found to be subtherapeutic with his INR and also was COVID-negative. Denies fevers, chills, cough, nausea, vomiting, dizziness, lightheadedness. REVIEW OF SYSTEMS  Constitutional: No fever, chills or recent illness. Eye: No visual changes  HENT: No earache or sore throat. Resp: No productive cough. Cardio: No chest pain or palpitations. GI: No abdominal pain, nausea, vomiting, constipation or diarrhea. No melena. : No dysuria, urgency or frequency. Endocrine: No heat intolerance, no cold intolerance, no polydipsia   Lymphatics: No adenopathy  Musculoskeletal: No new muscle aches or joint pain. Neuro: No headaches. Psych: No homicidal or suicidal thoughts  Skin: No rash, No itching. ?  ?   PAST MEDICAL HISTORY  Past Medical History:   Diagnosis Date    Abnormal nuclear stress test     Atrial fibrillation ([facility] 11/2013    CAD (coronary artery disease)     Follows with Dr. Jonathan Shore    CHF (congestive heart failure) ([facility])     COPD (chronic obstructive pulmonary disease) ([facility]     Follows with PCP    Decreased cardiac ejection fraction     Diabetes mellitus ([facility]     GERD (gastroesophageal reflux disease)     H/O cardiovascular stress test 11/20/13, 3/20/2013    11/13-Observed defect is consistent with diaphragmatic attenuation. EF 58%. 3/13 -EF 51%. No ischemia. Normal Study. H/O cardiovascular stress test 06/08/2017    EF 50% normal study    H/O cardiovascular stress test 06/17/2019    Normal NM    H/O cardiovascular stress test 03/02/2021    depressed lvedf increased edv myocardial perfusion abnormal stress test    H/O Doppler ultrasound 10/14/2010    10/2010-Bilateral venous doppler of lower extremies- No DVTs. H/O echocardiogram 06/26/2019    Left ventricular systolic function is normal with an ejection fraction of 50-55%. Mild concentric left ventricular hypertrophy. Grade I diastolic dysfunction. No significant valvular disease noted. No evidence of pericardial effusion. Essentially unremarkable echo.     H/O percutaneous left heart catheterization 12/22/2015    No evidence of hemodynamically significant coronary artery disease    Heart imaging 04/23/2020    muga - ef 58%    History of coronary artery stent placement 11/13/2013 11/13 -RCA - 3 stents placed    History of exercise stress test 06/08/2017    treadmill    History of Holter monitoring 01/04/2016    predominant rhythm sinus    History of MRI of brain and brain stem 06/04/2020    No evidence of an acute infarct, mod chronic microvascular white matter ischemic disease with associated cerebral parenchymal volume loss    History of nuclear stress test 02/24/2020    EF 38%,Myocardial perfusion scan shows moderate size, severe intensity, non  reversible perfusion defect in inferior wall. Hx of Doppler echocardiogram 01/26/2022    EF 45-50% Mod LV hypertrophy. Grade 1 diastolic dysfunction. Mod MR and TR. Dilation of the aortic root and ascending aorta. Hyperlipidemia     Hypertension     Follows with PCP    Kidney disease     Dr. Eligio Borjas    S/P cardiac catheterization 11/13/2013 11/13/2013-EF 55%, distal LAD mild disease,CX stent patent,but distal to stent 50% stenosis. RCA severe disease. SOB (shortness of breath)     Status post angioplasty with stent     Wears dentures     full upper denture     FAMILY HISTORY  Family History   Problem Relation Age of Onset    Cancer Mother     Diabetes Mother     Heart Disease Mother     High Blood Pressure Mother     Stroke Mother     Cancer Father     Heart Disease Father     High Blood Pressure Father     Stroke Father     Cancer Brother      SOCIAL HISTORY  Social History     Socioeconomic History    Marital status: Legally    Occupational History    Occupation:    Tobacco Use    Smoking status: Every Day     Packs/day: 0.50     Years: 46.00     Pack years: 23.00     Types: Cigarettes     Start date: 1974    Smokeless tobacco: Never   Vaping Use    Vaping Use: Never used   Substance and Sexual Activity    Alcohol use: No     Comment: caffeine 2 cups coffee a day. pop twice a week. austen tea 4 bottles a day    Drug use: Not Currently     Types: Cocaine     Comment: Last used: 7/1/20    Sexual activity: Yes     Partners: Female       SURGICAL HISTORY  Past Surgical History:   Procedure Laterality Date    APPENDECTOMY      BIOPSY / LIGATION TEMPORAL ARTERY Left 8/17/2020    LEFT TEMPORAL ARTERY BIOPSY LIGATION performed by Elisabeth Portillo MD at [address] [place] [address]  11/13/2013 11/13/2013-EF 55%, distal LAD mild disease,CX stent patent,but distal to stent 50% stenosis. RCA severe disease.     CARDIAC SURGERY  07/03/2020    CABG X 3 LIMA-OM-PDA-LAD, Maze, LT Atrial Appendage clipping    COLONOSCOPY      CORONARY ANGIOPLASTY WITH STENT PLACEMENT      4 stents- RCA X3; CX X1    CORONARY ANGIOPLASTY WITH STENT PLACEMENT  11/13/2013 11/13/2013 -3 stents placed to RCA- Dr Kendra Green N/A 7/3/2020    INTRAOPERATIVE ISMAEL, CABG X3   WITH LIMA  AND LEFT LEG ENDOVEIN HARVEST, INDUCED HYPOTHERMIA, MAZE BIPOLAR AND CRYO PROCEDURE, LEFT ATRIAL APPENDAGE CLIPPING performed by Taye Rivera MD at [address] [place] [address]      all teeth extracted    [address]     CURRENT MEDICATIONS  Previous Medications    ALBUTEROL SULFATE HFA (PROVENTIL HFA) 108 (90 BASE) MCG/ACT INHALER    Inhale 2 puffs into the lungs every 6 hours as needed for Wheezing or Shortness of Breath    ATORVASTATIN (LIPITOR) 80 MG TABLET    Take 1 tablet by mouth nightly    BUMETANIDE (BUMEX) 2 MG TABLET    Take 1 tablet by mouth 2 times daily    CARVEDILOL (COREG) 12.5 MG TABLET    Take 1 tablet by mouth 2 times daily (with meals)    CLOPIDOGREL (PLAVIX) 75 MG TABLET    TAKE 1 TABLET BY MOUTH DAILY. DAPAGLIFLOZIN (FARXIGA) 5 MG TABLET    Take 1 tablet by mouth every morning    GLIPIZIDE (GLUCOTROL) 5 MG TABLET    Take 5 mg by mouth daily    HYDRALAZINE (APRESOLINE) 100 MG TABLET    Take 1 tablet  by mouth in the morning and 1 tablet at noon and 1 tablet  in the evening. IPRATROPIUM-ALBUTEROL (DUONEB) 0.5-2.5 (3) MG/3ML SOLN NEBULIZER SOLUTION    Inhale 3 mLs into the lungs 4 times daily    LISINOPRIL (PRINIVIL;ZESTRIL) 40 MG TABLET    Take 40 mg by mouth daily    METOLAZONE (ZAROXOLYN) 5 MG TABLET    Take 1 tablet by mouth 2 times daily    NICOTINE (NICODERM CQ) 14 MG/24HR    Place 1 patch onto the skin daily    POTASSIUM CHLORIDE (KLOR-CON M) 20 MEQ EXTENDED RELEASE TABLET    TAKE 1 TABLET BY MOUTH TWO TIMES A DAY    PREGABALIN (LYRICA) 150 MG CAPSULE    150 mg 2 times daily.     WARFARIN (JANTOVEN) 4 MG TABLET    TAKE 1 TABLET BY MOUTH EVERY DAY, EXCEPT TAKE 1 AND 1/2 TABLETS ON SUNDAYS OR AS DIRECTED BY CLINIC     ALLERGIES  Allergies   Allergen Reactions    Hydromorphone Other (See Comments)     Hallucinations. Nursing notes reviewed by myself for past medical history, family history, social history, surgical history, current medications, and allergies. PHYSICAL EXAM  VITAL SIGNS: Triage VS:    ED Triage Vitals [02/08/23 2201]   Enc Vitals Group      BP (!) 192/94      Heart Rate 74      Resp 16      Temp 98.7 °F (37.1 °C)      Temp Source Oral      SpO2 95 %      Weight       Height       Head Circumference       Peak Flow       Pain Score       Pain Loc       Pain Edu? Excl. in [address]? Constitutional: Well developed, Well nourished, nontoxic-appearing  HENT: Normocephalic, Atraumatic, Bilateral external ears normal, Oropharynx moist, No oral exudates, Nose normal.   Eyes: PERRL, EOMI, Conjunctiva normal, No discharge. No scleral icterus. Neck: Normal range of motion, No tenderness, Supple. Lymphatic: No lymphadenopathy noted. Cardiovascular: Normal heart rate, Normal rhythm, No murmurs, gallops or rubs. Thorax & Lungs: Slightly diminished breath sounds bilaterally without definitive wheezing, rhonchi, or rales  Abdomen: Soft, No tenderness, No masses, No pulsatile masses, No distention, Normal bowel sounds  Skin: Warm, Dry, Pink, No mottling, No erythema, No rash. Back: No tenderness, No CVA tenderness. Extremities: No edema, No tenderness, No cyanosis, Normal perfusion, No clubbing. Musculoskeletal: Good range of motion in all major joints as observed. No major deformities noted. Neurologic: Alert & oriented x 3, Normal motor function, Normal sensory function, CN II-XII grossly intact as tested, No focal deficits noted. Psychiatric: Affect normal, Judgment normal, Mood normal.   EKG  Per my interpretation demonstrates normal sinus rhythm at a rate of 71 bpm.  Normal axis. Normal intervals.   No acute ST segment changes. RADIOLOGY  Labs Reviewed   TROPONIN - Abnormal; Notable for the following components:       Result Value    Troponin T 0.046 (*)     All other components within normal limits   URINE DRUG SCREEN - Abnormal; Notable for the following components:    Cocaine Metabolite UNCONFIRMED POSITIVE (*)     All other components within normal limits   POCT GLUCOSE - Abnormal; Notable for the following components:    POC Glucose 316 (*)     All other components within normal limits   APTT   APTT   APTT     I personally reviewed the images. The radiologist's interpretation reveals:  Last Imaging results   No orders to display       MEDS GIVEN IN ED:  Medications   nitroGLYCERIN 50 mg in dextrose 5% 250 mL infusion (5 mcg/min IntraVENous Not Given 2/9/23 0008)   heparin (porcine) injection 4,000 Units (has no administration in time range)   heparin (porcine) injection 2,000 Units (has no administration in time range)   heparin 25,000 unit in sodium chloride 0.45% 250 mL (premix) infusion (7 Units/kg/hr × 126.1 kg IntraVENous New Bag 2/9/23 0003)   nitroGLYCERIN (NITROSTAT) SL tablet 0.4 mg (0.4 mg SubLINGual Given 2/8/23 2302)   insulin lispro (HUMALOG) injection vial 8 Units (8 Units SubCUTAneous Given 2/8/23 2302)   cyclobenzaprine (FLEXERIL) tablet 10 mg (10 mg Oral Given 2/9/23 0003)   heparin (porcine) injection 4,000 Units (4,000 Units IntraVENous Given 2/8/23 2359)     COURSE & MEDICAL DECISION MAKING  78-year-old male presents to the emergency department with complaints of continued chest pain and hyperglycemia with elevated blood pressure. He was evaluated earlier today and left AMA at which time his troponin was mildly elevated at 0.043. He did receive aspirin prior to discharge. Initial vital signs here demonstrate elevated blood pressure of 182/94. His lungs are clear to auscultation bilaterally. Abdomen is soft and nontender. At this time we will obtain repeat EKG and troponin.   An Accu-Chek was obtained here and found to be 316. Therefore, 8 units subcutaneous Humalog was ordered for the patient. In addition nitroglycerin was ordered for his pain. EKG is without acute ischemic changes. Troponin is elevated 0.046. Following nitroglycerin the patient is feeling better with respect to pain and states the pain is now 2-3/10. At this time nitroglycerin infusion as well as heparin drip ordered. Perfect serve has been sent to Dr. Clari Daniels of cardiology. Patient discussed with Dr. Rolanda Barclay of hospitalist team for admission secondary to continued chest pain with elevated troponin with known coronary artery disease. Critical Care Time: I have personally provided 45 minutes of critical care time (excluding separately listed billable procedures) through obtaining a history, examining the patient, reviewing relevant medical records, discussing care with family and/or other providers, performing multiple reassessments and directing care. Amount and/or Complexity of Data Reviewed  Clinical lab tests: reviewed  Decide to obtain previous medical records or to obtain history from someone other than the patient: yes       -  Patient seen and evaluated in the emergency department. -  Triage and nursing notes reviewed and incorporated. -  Old chart records reviewed and incorporated. -  Work-up included:  See above      Appropriate PPE utilized as indicated for entire patient encounter? Time of Disposition: See timeline  ? New Prescriptions    No medications on file     FINAL IMPRESSION  1. Chest pain, unspecified type    2. Type 2 diabetes mellitus with hyperglycemia, with long-term current use of insulin (HCC)    3. Uncontrolled hypertension    4. Subtherapeutic anticoagulation    5. History of atrial fibrillation    6. NSTEMI (non-ST elevated myocardial infarction) ([facility] [address]    7. Cocaine abuse [facility])        Electronically signed by:  [address], DO, 2/9/2023         [address], DO  02/09/23 [place]

## 2023-02-09 NOTE — DISCHARGE INSTRUCTIONS
Please do not take Bumex, Zaroxolyn, Lisinopril, or Potassium Until Sunday (2 days from now) - per Dr. Ludwin Aranda having INR checked on Monday (3 days from now) at the anticoagulation clinic.   -Dr. Carlos Manuel Stewart   [phone] or [phone]   [address]  Intensive Outpatient and Outpatient treatment for both men & women    Lupis Flash   [facility]   Intensive outpatient and residential for men & Intensive outpatient for women     [place]      [phone]                      [address] [place] [address] treatment for both men & women:   Call for insurance eligibility vs cost  [facility] [place] [phone]  [employer] [phone]  Brian Carr [facility] [phone]    Drug and Alcohol Treatment Facilities:   Call for insurance eligibility vs cost  [phone]  The [address] [place] [address] at [place] [address]  [employer] [address] [phone]  [address] [place] [address] for [address] [place] [address] [phone]  [facility] [address] [facility] 218 [facility] [phone]    Alcoholics Anonymous/ BASSAM/ALATEEN [phone] or [phone]  https://cogf. meetingfor. me/meetings or www. aacentraloh[place]. org      Narcotics Anonymous [phone] or [phone]   http://sascna.org/ or www.nacentralohio. org  Cocaine Anonymous 541-007-5309 www.caohio. org   Marijuana Anonymous 256-817-3695 www.marijuana-anonymous. org     Mental Health Crisis Line: [address]: [address]: (smoking) https://[place]. quitlogix. org 800-QUIT-NOW (074-469-4685)  24/7 crisis line for [facility]: [phone]  24-hour crisis text hotline: Text the word \"4hope\" to 349-732 for crisis support    Information & Referral for [facility]  Referral line to connect with available resources/services  [facility] [phone] or [place] [phone]

## 2023-02-10 VITALS
RESPIRATION RATE: 16 BRPM | BODY MASS INDEX: 35.03 KG/M2 | TEMPERATURE: 97.4 F | SYSTOLIC BLOOD PRESSURE: 124 MMHG | DIASTOLIC BLOOD PRESSURE: 77 MMHG | OXYGEN SATURATION: 98 % | WEIGHT: 281.75 LBS | HEIGHT: 75 IN | HEART RATE: 82 BPM

## 2023-02-10 LAB
ACTIVATED CLOTTING TIME, LOW RANGE: 183 SEC
ANION GAP SERPL CALCULATED.3IONS-SCNC: 12 MMOL/L (ref 4–16)
APTT: 59.2 SECONDS (ref 25.1–37.1)
BACTERIA: NEGATIVE /HPF
BILIRUBIN URINE: NEGATIVE MG/DL
BLOOD, URINE: NEGATIVE
BUN SERPL-MCNC: 42 MG/DL (ref 6–23)
CALCIUM SERPL-MCNC: 9.5 MG/DL (ref 8.3–10.6)
CHLORIDE BLD-SCNC: 98 MMOL/L (ref 99–110)
CLARITY: CLEAR
CO2: 28 MMOL/L (ref 21–32)
COLOR: YELLOW
CREAT SERPL-MCNC: 2.1 MG/DL (ref 0.9–1.3)
CREATININE URINE: 34.2 MG/DL (ref 39–259)
GFR SERPL CREATININE-BSD FRML MDRD: 35 ML/MIN/1.73M2
GLUCOSE BLD-MCNC: 160 MG/DL (ref 70–99)
GLUCOSE BLD-MCNC: 187 MG/DL (ref 70–99)
GLUCOSE BLD-MCNC: 222 MG/DL (ref 70–99)
GLUCOSE SERPL-MCNC: 155 MG/DL (ref 70–99)
GLUCOSE, URINE: 500 MG/DL
HCT VFR BLD CALC: 41.9 % (ref 42–52)
HEMOGLOBIN: 13.2 GM/DL (ref 13.5–18)
KETONES, URINE: NEGATIVE MG/DL
LEUKOCYTE ESTERASE, URINE: NEGATIVE
MCH RBC QN AUTO: 27.3 PG (ref 27–31)
MCHC RBC AUTO-ENTMCNC: 31.5 % (ref 32–36)
MCV RBC AUTO: 86.7 FL (ref 78–100)
NITRITE URINE, QUANTITATIVE: NEGATIVE
PDW BLD-RTO: 15.8 % (ref 11.7–14.9)
PH, URINE: 8 (ref 5–8)
PLATELET # BLD: 184 K/CU MM (ref 140–440)
PMV BLD AUTO: 11.4 FL (ref 7.5–11.1)
POTASSIUM SERPL-SCNC: 3.7 MMOL/L (ref 3.5–5.1)
PROT/CREAT RATIO, UR: 1
PROTEIN UA: 30 MG/DL
RBC # BLD: 4.83 M/CU MM (ref 4.6–6.2)
RBC URINE: 1 /HPF (ref 0–3)
SODIUM BLD-SCNC: 138 MMOL/L (ref 135–145)
SPECIFIC GRAVITY UA: 1.01 (ref 1–1.03)
TRICHOMONAS: NORMAL /HPF
URINE TOTAL PROTEIN: 32.9 MG/DL
UROBILINOGEN, URINE: 0.2 MG/DL (ref 0.2–1)
WBC # BLD: 8.2 K/CU MM (ref 4–10.5)
WBC UA: <1 /HPF (ref 0–2)

## 2023-02-10 PROCEDURE — 36415 COLL VENOUS BLD VENIPUNCTURE: CPT

## 2023-02-10 PROCEDURE — C1894 INTRO/SHEATH, NON-LASER: HCPCS

## 2023-02-10 PROCEDURE — 2500000003 HC RX 250 WO HCPCS

## 2023-02-10 PROCEDURE — B2131ZZ FLUOROSCOPY OF MULTIPLE CORONARY ARTERY BYPASS GRAFTS USING LOW OSMOLAR CONTRAST: ICD-10-PCS | Performed by: INTERNAL MEDICINE

## 2023-02-10 PROCEDURE — 6370000000 HC RX 637 (ALT 250 FOR IP): Performed by: INTERNAL MEDICINE

## 2023-02-10 PROCEDURE — 85730 THROMBOPLASTIN TIME PARTIAL: CPT

## 2023-02-10 PROCEDURE — 80048 BASIC METABOLIC PNL TOTAL CA: CPT

## 2023-02-10 PROCEDURE — 6360000002 HC RX W HCPCS

## 2023-02-10 PROCEDURE — 82570 ASSAY OF URINE CREATININE: CPT

## 2023-02-10 PROCEDURE — 93459 L HRT ART/GRFT ANGIO: CPT | Performed by: INTERNAL MEDICINE

## 2023-02-10 PROCEDURE — 85347 COAGULATION TIME ACTIVATED: CPT

## 2023-02-10 PROCEDURE — 81001 URINALYSIS AUTO W/SCOPE: CPT

## 2023-02-10 PROCEDURE — 82962 GLUCOSE BLOOD TEST: CPT

## 2023-02-10 PROCEDURE — C1769 GUIDE WIRE: HCPCS

## 2023-02-10 PROCEDURE — 84156 ASSAY OF PROTEIN URINE: CPT

## 2023-02-10 PROCEDURE — 6370000000 HC RX 637 (ALT 250 FOR IP)

## 2023-02-10 PROCEDURE — 2700000000 HC OXYGEN THERAPY PER DAY

## 2023-02-10 PROCEDURE — 93459 L HRT ART/GRFT ANGIO: CPT

## 2023-02-10 PROCEDURE — 2709999900 HC NON-CHARGEABLE SUPPLY

## 2023-02-10 PROCEDURE — B2111ZZ FLUOROSCOPY OF MULTIPLE CORONARY ARTERIES USING LOW OSMOLAR CONTRAST: ICD-10-PCS | Performed by: INTERNAL MEDICINE

## 2023-02-10 PROCEDURE — 4A023N7 MEASUREMENT OF CARDIAC SAMPLING AND PRESSURE, LEFT HEART, PERCUTANEOUS APPROACH: ICD-10-PCS | Performed by: INTERNAL MEDICINE

## 2023-02-10 PROCEDURE — 94761 N-INVAS EAR/PLS OXIMETRY MLT: CPT

## 2023-02-10 PROCEDURE — 85027 COMPLETE CBC AUTOMATED: CPT

## 2023-02-10 PROCEDURE — B2151ZZ FLUOROSCOPY OF LEFT HEART USING LOW OSMOLAR CONTRAST: ICD-10-PCS | Performed by: INTERNAL MEDICINE

## 2023-02-10 RX ORDER — ASPIRIN 81 MG/1
81 TABLET, CHEWABLE ORAL DAILY
COMMUNITY

## 2023-02-10 RX ORDER — SODIUM CHLORIDE 0.9 % (FLUSH) 0.9 %
5-40 SYRINGE (ML) INJECTION PRN
Status: DISCONTINUED | OUTPATIENT
Start: 2023-02-10 | End: 2023-02-10 | Stop reason: HOSPADM

## 2023-02-10 RX ORDER — SODIUM CHLORIDE 9 MG/ML
INJECTION, SOLUTION INTRAVENOUS PRN
Status: DISCONTINUED | OUTPATIENT
Start: 2023-02-10 | End: 2023-02-10 | Stop reason: HOSPADM

## 2023-02-10 RX ORDER — ISOSORBIDE MONONITRATE 30 MG/1
30 TABLET, EXTENDED RELEASE ORAL DAILY
Status: DISCONTINUED | OUTPATIENT
Start: 2023-02-10 | End: 2023-02-10 | Stop reason: HOSPADM

## 2023-02-10 RX ORDER — ACETAMINOPHEN 325 MG/1
650 TABLET ORAL EVERY 4 HOURS PRN
Status: DISCONTINUED | OUTPATIENT
Start: 2023-02-10 | End: 2023-02-10 | Stop reason: HOSPADM

## 2023-02-10 RX ORDER — ISOSORBIDE MONONITRATE 30 MG/1
30 TABLET, EXTENDED RELEASE ORAL DAILY
Qty: 30 TABLET | Refills: 1 | Status: SHIPPED | OUTPATIENT
Start: 2023-02-11

## 2023-02-10 RX ORDER — SODIUM CHLORIDE 0.9 % (FLUSH) 0.9 %
5-40 SYRINGE (ML) INJECTION EVERY 12 HOURS SCHEDULED
Status: DISCONTINUED | OUTPATIENT
Start: 2023-02-10 | End: 2023-02-10 | Stop reason: HOSPADM

## 2023-02-10 RX ORDER — CARVEDILOL 6.25 MG/1
12.5 TABLET ORAL 2 TIMES DAILY WITH MEALS
Status: DISCONTINUED | OUTPATIENT
Start: 2023-02-10 | End: 2023-02-10 | Stop reason: HOSPADM

## 2023-02-10 RX ORDER — HYDROCODONE BITARTRATE AND ACETAMINOPHEN 5; 325 MG/1; MG/1
1 TABLET ORAL ONCE
Status: COMPLETED | OUTPATIENT
Start: 2023-02-10 | End: 2023-02-10

## 2023-02-10 RX ORDER — CARVEDILOL 6.25 MG/1
3.12 TABLET ORAL 2 TIMES DAILY WITH MEALS
Status: DISCONTINUED | OUTPATIENT
Start: 2023-02-10 | End: 2023-02-10

## 2023-02-10 RX ADMIN — PREGABALIN 150 MG: 75 CAPSULE ORAL at 08:50

## 2023-02-10 RX ADMIN — INSULIN LISPRO 2 UNITS: 100 INJECTION, SOLUTION INTRAVENOUS; SUBCUTANEOUS at 16:40

## 2023-02-10 RX ADMIN — CARVEDILOL 12.5 MG: 6.25 TABLET, FILM COATED ORAL at 16:39

## 2023-02-10 RX ADMIN — METOLAZONE 5 MG: 2.5 TABLET ORAL at 08:50

## 2023-02-10 RX ADMIN — HYDRALAZINE HYDROCHLORIDE 100 MG: 50 TABLET, FILM COATED ORAL at 08:50

## 2023-02-10 RX ADMIN — HYDROCODONE BITARTRATE AND ACETAMINOPHEN 1 TABLET: 5; 325 TABLET ORAL at 11:42

## 2023-02-10 RX ADMIN — POTASSIUM CHLORIDE 20 MEQ: 1500 TABLET, EXTENDED RELEASE ORAL at 08:50

## 2023-02-10 RX ADMIN — ISOSORBIDE MONONITRATE 30 MG: 30 TABLET, EXTENDED RELEASE ORAL at 16:39

## 2023-02-10 ASSESSMENT — PAIN DESCRIPTION - LOCATION: LOCATION: BACK

## 2023-02-10 ASSESSMENT — PAIN SCALES - GENERAL: PAINLEVEL_OUTOF10: 10

## 2023-02-10 NOTE — OP NOTE
Operative Note      Patient: Lila Pagan  YOB: 1960  MRN: [id number]    Procedure  Left cardiac catheterization selective coronary angiography  Ventriculography  LIMA injection  SVG injections    Indication  Possible non-STEMI  EBL 10 cc    A JL for a JR4 pigtail multipurpose and a AL-1 catheter used to inject the coronaries bypasses and LV gram was done      Findings  The left main is a large tubular vessel proximally distally has about 90% stenosis and divides into the circumflex and the LAD  The LAD is patent competitive flow is seen from the LIMA  Circumflex OM's are occluded  RCA is occluded however seem to be filling from collaterals from the left  The SVG to the RCA is occluded which was stented about 2-1/2 years ago  SVG to circumflex is patent has no significant disease  The LIMA to the LAD is patent her the LAD itself has some mild disease  EF is about 40% with inferior wall hypokinesis    Assessment and plan  The SVG appears to be occluded filling from collaterals patient is pain-free right now hence will manage medically with antiplatelet therapy and will follow  At this point no intervention is needed

## 2023-02-10 NOTE — PROGRESS NOTES
[address], [address]  Phone: [phone]    Fax [phone]                  Sruthi Mejia MD, Dayna Mcnair MD, Jeanette Espinal MD, MD Ioana Díaz MD Erika Levy, MD Bailey Snipes, MD Dr. Annamae Purpura MD Felicitas Lawn, APRCLEMENT George, APRCLEMENT Chairez, APRCLEMENT Martini, APRN  Jorge Galarza PA-C    CARDIOLOGY  NOTE      Name:  Bria Logan /Age/Sex: 1960  (65 y.o. male)   MRN & CSN:  [id number] & [id number] Admission Date/Time: 2023 10:04 PM   Location:  [address] [place] [address]-A PCP: Isaura Avina, Dayan Stanton Day: 3    - Cardiology consult is for:  Chest pain      ASSESSMENT/ PLAN:   Chest pain with history of Coronary artery disease s/p CABG and PCI  -LHC per below  -New occluded SVG-RCA with signs of collaterals  -Continue Plavix and Lipitor  -Start Imdur 30 mg daily  Chronic HFmrEF  Valvular Heart Disease  -Echo per below. EF 40-45%  -Appears euvolemic at this time  -Continue GDMT with: Coreg 12.5 mg twice daily, Lisinopril 40 mg daily, and farxiga 5 mg daily. Continue Bumex  Paroxysmal Atrial Fibrillation  -Currently Sinus rhythm  -On warfarin  Hypertension  -Blood pressure well controlled at this time  -Continue hydralazine 100 mg 3 times daily  Cocaine use  -Advised on cessation  -Patient is to avoid using beta-blockers outside of Coreg           Subjective:  Karlos Johnson is a 65 y. o.year old     Advised patient on limitations.  -No lifting over 20 pounds for 7-10 days.  -Avoid driving for 32-90 hours  -No water submersion (bath, hot tub, swimming), but okay to shower. Some bruising is common and should not be alarming. Advised patient to return to emergency department ASAP if they notice bleeding bright red blood. Potential life threatening condition.   Informed to contact the office if they notice new fever, excessive warmth, redness, swelling, palpable mass, or pus draining from site. Objective: Temperature:  Current - Temp: 97.4 °F (36.3 °C); Max - Temp  Av.5 °F (36.4 °C)  Min: 97.1 °F (36.2 °C)  Max: 97.9 °F (36.6 °C)    Respiratory Rate : Resp  Avg: 15.8  Min: 14  Max: 19    Pulse Range: Pulse  Av  Min: 67  Max: 82    Blood Presuure Range:  Systolic (36BBI), JKF:389 , Min:123 , WM   ; Diastolic (19ZPW), VEY:72, Min:77, Max:87      Pulse ox Range: SpO2  Av %  Min: 90 %  Max: 98 %    24hr I & O:    Intake/Output Summary (Last 24 hours) at 2/10/2023 1612  Last data filed at 2/10/2023 2753  Gross per 24 hour   Intake 960 ml   Output 2825 ml   Net -1865 ml         /77   Pulse 82   Temp 97.4 °F (36.3 °C) (Oral)   Resp 16   Ht 6' 3\" (1.905 m)   Wt 281 lb 12 oz (127.8 kg)   SpO2 98%   BMI 35.22 kg/m²         TELEMETRY: Sinus      has a past medical history of Abnormal nuclear stress test, Atrial fibrillation (HCC), CAD (coronary artery disease), CHF (congestive heart failure) ([facility]), COPD (chronic obstructive pulmonary disease) ([facility]), Decreased cardiac ejection fraction, Diabetes mellitus ([facility] [address] GERD (gastroesophageal reflux disease), H/O cardiovascular stress test, H/O cardiovascular stress test, H/O cardiovascular stress test, H/O cardiovascular stress test, H/O Doppler ultrasound, H/O echocardiogram, H/O percutaneous left heart catheterization, Heart imaging, History of coronary artery stent placement, History of exercise stress test, History of Holter monitoring, History of MRI of brain and brain stem, History of nuclear stress test, Hx of Doppler echocardiogram, Hyperlipidemia, Hypertension, Kidney disease, S/P cardiac catheterization, SOB (shortness of breath), Status post angioplasty with stent, and Wears dentures. has a past surgical history that includes Coronary angioplasty with stent; Appendectomy; Femur fracture surgery ();  Coronary angioplasty with stent (2013); Colonoscopy; Mandible fracture surgery (1984); Cardiac catheterization (11/13/2013); Cardiac surgery (07/03/2020); Coronary artery bypass graft (N/A, 7/3/2020); Dental surgery; and BIOPSY / LIGATION TEMPORAL ARTERY (Left, 8/17/2020). Physical Exam  Constitutional:       General: He is not in acute distress. Appearance: He is not diaphoretic. HENT:      Head: Normocephalic and atraumatic. Right Ear: External ear normal.      Left Ear: External ear normal.      Nose: Nose normal.      Mouth/Throat:      Mouth: Mucous membranes are moist.   Eyes:      Extraocular Movements: Extraocular movements intact. Comments: Pupils equal and round   Neck:      Vascular: No carotid bruit. Cardiovascular:      Rate and Rhythm: Normal rate and regular rhythm. Pulses: Normal pulses. Heart sounds: S1 normal and S2 normal. No murmur heard. No friction rub. No gallop. Comments: right groin site dressing is clean dry and intact, site is soft without hematoma, bruising or bleeding noted. Pulmonary:      Effort: Pulmonary effort is normal. No respiratory distress. Breath sounds: Normal breath sounds. No rales. Chest:      Chest wall: No tenderness. Abdominal:      Palpations: Abdomen is soft. Tenderness: There is no abdominal tenderness. Musculoskeletal:      Right lower leg: No edema. Left lower leg: No edema. Skin:     General: Skin is warm and dry. Capillary Refill: Capillary refill takes less than 2 seconds. Findings: No rash. Comments: Skin turgor brisk   Neurological:      Mental Status: He is alert and oriented to person, place, and time. Mental status is at baseline. Psychiatric:         Behavior: Behavior is cooperative. Thought Content:  Thought content normal.         Judgment: Judgment normal.        Medications:    sodium chloride flush  5-40 mL IntraVENous 2 times per day    isosorbide mononitrate  30 mg Oral Daily    carvedilol  12.5 mg Oral BID WC    sodium chloride flush  5-40 mL IntraVENous 2 times per day    aspirin  81 mg Oral Daily    atorvastatin  40 mg Oral Nightly    [Held by provider] bumetanide  2 mg Oral BID    clopidogrel  75 mg Oral Daily    hydrALAZINE  100 mg Oral TID    pregabalin  150 mg Oral BID    [Held by provider] metOLazone  5 mg Oral BID    [Held by provider] potassium chloride  20 mEq Oral BID WC    insulin lispro  0-8 Units SubCUTAneous TID WC    insulin lispro  0-4 Units SubCUTAneous Nightly    insulin glargine  5 Units SubCUTAneous Daily with breakfast    nicotine  1 patch TransDERmal Daily      sodium chloride      sodium chloride      dextrose       sodium chloride flush, sodium chloride, acetaminophen, sodium chloride flush, sodium chloride, ondansetron **OR** ondansetron, polyethylene glycol, albuterol sulfate HFA, glucose, dextrose bolus **OR** dextrose bolus, glucagon (rDNA), dextrose    Lab Data:  CBC:   Recent Labs     02/08/23  1011 02/10/23  0723   WBC 7.3 8.2   HGB 11.9* 13.2*   HCT 38.1* 41.9*   MCV 86.8 86.7    184     BMP:   Recent Labs     02/08/23  1011 02/10/23  0723    138   K 3.8 3.7   CL 99 98*   CO2 28 28   BUN 47* 42*   CREATININE 2.1* 2.1*     LIVER PROFILE:   Recent Labs     02/08/23  1011   AST 16   ALT 12   BILITOT 0.2   ALKPHOS 172*     PT/INR:   Recent Labs     02/08/23  1011   PROTIME 15.0*   INR 1.16     APTT:   Recent Labs     02/09/23  1554 02/09/23  2302 02/10/23  0723   APTT 44.5* 46.6* 59.2*     BNP:  No results for input(s): BNP in the last 72 hours.   TROPONIN:   Recent Labs     02/08/23  2219 02/09/23  0619 02/09/23  1554   TROPONINT 0.046* 0.040* 0.052*     Labs, consult, tests reviewed          Rodriguez OLIVIA Armstrong, 2/10/2023 4:12 PM

## 2023-02-10 NOTE — PROGRESS NOTES
Patient up in chair stating he is having chest pain. Currently on nitro drip going at 5mcg/min. Nurse notified Jen Ivey NP, to see if nurse should increase nitro drip. Korey Ventura stated to leave drip at current dose. Patient also complaining of pain in feet and neck. Tylenol not effective, 1 time order for norco given, awaiting pharmacy approval of med so that it can be given. Advancement Flap (Double) Text: The defect edges were debeveled with a #15 scalpel blade.  Given the location of the defect and the proximity to free margins a double advancement flap was deemed most appropriate.  Using a sterile surgical marker, the appropriate advancement flaps were drawn incorporating the defect and placing the expected incisions within the relaxed skin tension lines where possible.    The area thus outlined was incised deep to adipose tissue with a #15 scalpel blade.  The skin margins were undermined to an appropriate distance in all directions utilizing iris scissors.

## 2023-02-10 NOTE — PROGRESS NOTES
Patient returned from cath lab not wanting to be compliant with laying flat. Educated patient on risk. Patient put in reverse  trendelenburg to provide comfort. Patient requesting pain medicine. Received order for one time dose of Norco. Patient demanding food. Dietary on the phone to take order. Patient keeps taking BP cuff off.  Reapplied and education provided

## 2023-02-10 NOTE — PROGRESS NOTES
Physician Progress Note      Shireen Crespo  CSN #:                  [id number]  :                       1960  ADMIT DATE:       2023 10:04 PM  [address] [place] DATE:  RESPONDING  PROVIDER #:        Josep Yepez MD          QUERY TEXT:    Hospitalists,    Patient admitted with HTN emergency and chest pain. Patient has PMH of CABG. Patient underwent LHC and was found to have coronary occlusions and previous   stents. It is unclear if stents were patent. If possible, please document in   progress notes and discharge summary if you are evaluating and/or treating any   of the following: The medical record reflects the following:  Risk Factors: CAD  Clinical Indicators: Per [facility] procedure note: \"Findings-The left main is a   large tubular vessel proximally distally has about 90% stenosis and divides   into the circumflex and the LAD. The LAD is patent competitive flow is seen   from the LIMA Circumflex OM's are occluded RCA is occluded however seem to be   filling from collaterals from the left The SVG to the RCA is occluded which   was stented about 2-1/2 years ago SVG to circumflex is patent has no   significant disease. \"  Treatment: labs, imaging, Cardiology consult, LHC, antiplatelet therapy    Thank you,  Lori Porras RN CDS  [phone]  Options provided:  -- Stenosis of coronary artery stent  -- Stenosis of coronary artery stent and CABG graft  -- Occlusion of SVG-RCA CABG graft only  -- Other - I will add my own diagnosis  -- Disagree - Not applicable / Not valid  -- Disagree - Clinically unable to determine / Unknown  -- Refer to Clinical Documentation Reviewer    PROVIDER RESPONSE TEXT:    Per cardiology: \"The SVG to the RCA is occluded which was stented about 2-1/2   years ago\". Query created by: Vanita Groves on 2/10/2023 3:10 PM      QUERY TEXT:    Hospitalists,    Pt admitted with HTN emergency and chest pain. Patient has CAD, CABG hx w/   coronary stents.  If possible, please document in progress notes and discharge   summary if you are evaluating and/or treating any of the following as the   suspected cause of the chest pain:    The medical record reflects the following:  Risk Factors: CAD, cocaine abuse  Clinical Indicators: coronary occlusions and CABG occlusions documented in [facility]   report, Per Cardiology documentation, chest suspected to be related to   cocaine abuse with urine drug screen + cocaine, HTN urgency  Treatment: labs, imaging, Cardiology consult, [facility], medical management with   antiplatelet therapy    Thank you,  [place]  [phone]  Options provided:  -- Chest pain due to CAD with unstable angina  -- Chest pain due to CAD with stable angina  -- Chest pain due to NSTEMI related to cocaine abuse  -- Chest pain due to cocaine abuse without NSTEMI  -- Chest pain due to coronary  in-stent stenosis w/ NSTEMI  -- Chest pain due to coronary  in-stent stenosis without NSTEMI  -- Chest pain due to CABG occlusion w/ NSTEMI  -- Chest pain due to CABG occlusion without NSTEMI  -- Chest pain due to ##please specify cause, please specify cause. -- Other - I will add my own diagnosis  -- Disagree - Not applicable / Not valid  -- Disagree - Clinically unable to determine / Unknown  -- Refer to Clinical Documentation Reviewer    PROVIDER RESPONSE TEXT:    This patient has CAD with unstable angina.     Query created by: Edwin Lorenzo on 2/10/2023 3:32 PM      Electronically signed by:  Nydia Vázquez MD 2/10/2023 4:36 PM

## 2023-02-10 NOTE — PLAN OF CARE
Problem: Discharge Planning  Goal: Discharge to home or other facility with appropriate resources  Outcome: Progressing  Flowsheets (Taken 2/9/2023 2010)  Discharge to home or other facility with appropriate resources: Identify barriers to discharge with patient and caregiver     Problem: Pain  Goal: Verbalizes/displays adequate comfort level or baseline comfort level  Outcome: Progressing  Flowsheets (Taken 2/9/2023 2010)  Verbalizes/displays adequate comfort level or baseline comfort level:   Encourage patient to monitor pain and request assistance   Assess pain using appropriate pain scale     Problem: Safety - Adult  Goal: Free from fall injury  Outcome: Progressing     Problem: ABCDS Injury Assessment  Goal: Absence of physical injury  Outcome: Progressing     Problem: Chronic Conditions and Co-morbidities  Goal: Patient's chronic conditions and co-morbidity symptoms are monitored and maintained or improved  Outcome: Progressing  Flowsheets (Taken 2/9/2023 2010)  Care Plan - Patient's Chronic Conditions and Co-Morbidity Symptoms are Monitored and Maintained or Improved: Monitor and assess patient's chronic conditions and comorbid symptoms for stability, deterioration, or improvement

## 2023-02-10 NOTE — PROGRESS NOTES
Nephrology Service Consultation    Patient:  Marina Chilel  MRN: [id number]  Consulting physician:  Nader Bhatti MD  Reason for Consult: CKD 3 with risk of acute renal failure    History Obtained From:  patient, electronic medical record  PCP: Adri Gonzales MD    HISTORY OF PRESENT ILLNESS:   The patient is a 65 y.o. male who presents with weakness shortness of breath chest pain the day prior with known history of coronary disease with CABG prior PCI COPD hypertension type 2 diabetes atrial fibrillation history of tobacco and cocaine use. Who I have seen in the outpatient last month with underlying stage III kidney disease creatinine had been around 1.5-1.6 up until 6 months ago when increased up to 2 and has been holding there since then. In the above setting patient was planned for heart cath today and renal was asked to evaluate prior to procedure I explained to the patient the risk and benefits creatinine close to baseline and he is aware he agrees he states he does feel better less short of breath no chest pain at this time. Explained to him risk and benefits and discussed with Dr. Julee Pavon as well and patient wants to have the procedure done today currently on a heparin drip as well. Patient with underlying congestive heart failure in the setting of CKD 3 COPD and hypertension and known to be overweight. I recently started the patient on Quin Reji as an outpatient and was already on Bumex twice daily with Zaroxolyn twice daily. When I saw the patient in January patient's weight was 286 pounds.   Currently he weighs 281 pounds    Past Medical History:        Diagnosis Date    Abnormal nuclear stress test     Atrial fibrillation ([facility] 11/2013    CAD (coronary artery disease)     Follows with Dr. Chayo Callahan    CHF (congestive heart failure) (HCC)     COPD (chronic obstructive pulmonary disease) ([facility]     Follows with PCP    Decreased cardiac ejection fraction     Diabetes mellitus ([facility] GERD (gastroesophageal reflux disease)     H/O cardiovascular stress test 11/20/13, 3/20/2013    11/13-Observed defect is consistent with diaphragmatic attenuation. EF 58%. 3/13 -EF 51%. No ischemia. Normal Study. H/O cardiovascular stress test 06/08/2017    EF 50% normal study    H/O cardiovascular stress test 06/17/2019    Normal NM    H/O cardiovascular stress test 03/02/2021    depressed lvedf increased edv myocardial perfusion abnormal stress test    H/O Doppler ultrasound 10/14/2010    10/2010-Bilateral venous doppler of lower extremies- No DVTs. H/O echocardiogram 06/26/2019    Left ventricular systolic function is normal with an ejection fraction of 50-55%. Mild concentric left ventricular hypertrophy. Grade I diastolic dysfunction. No significant valvular disease noted. No evidence of pericardial effusion. Essentially unremarkable echo. H/O percutaneous left heart catheterization 12/22/2015    No evidence of hemodynamically significant coronary artery disease    Heart imaging 04/23/2020    muga - ef 58%    History of coronary artery stent placement 11/13/2013 11/13 -RCA - 3 stents placed    History of exercise stress test 06/08/2017    treadmill    History of Holter monitoring 01/04/2016    predominant rhythm sinus    History of MRI of brain and brain stem 06/04/2020    No evidence of an acute infarct, mod chronic microvascular white matter ischemic disease with associated cerebral parenchymal volume loss    History of nuclear stress test 02/24/2020    EF 38%,Myocardial perfusion scan shows moderate size, severe intensity, non  reversible perfusion defect in inferior wall. Hx of Doppler echocardiogram 01/26/2022    EF 45-50% Mod LV hypertrophy. Grade 1 diastolic dysfunction. Mod MR and TR. Dilation of the aortic root and ascending aorta.     Hyperlipidemia     Hypertension     Follows with PCP    Kidney disease     Dr. Sofía Whiteside    S/P cardiac catheterization 11/13/2013 11/13/2013-EF 55%, distal LAD mild disease,CX stent patent,but distal to stent 50% stenosis. RCA severe disease. SOB (shortness of breath)     Status post angioplasty with stent     Wears dentures     full upper denture       Past Surgical History:        Procedure Laterality Date    APPENDECTOMY      BIOPSY / LIGATION TEMPORAL ARTERY Left 8/17/2020    LEFT TEMPORAL ARTERY BIOPSY LIGATION performed by Geoff Colon MD at [facility]  11/13/2013 11/13/2013-EF 55%, distal LAD mild disease,CX stent patent,but distal to stent 50% stenosis. RCA severe disease.     CARDIAC SURGERY  07/03/2020    CABG X 3 LIMA-OM-PDA-LAD, Maze, LT Atrial Appendage clipping    COLONOSCOPY      CORONARY ANGIOPLASTY WITH STENT PLACEMENT      4 stents- RCA X3; CX X1    CORONARY ANGIOPLASTY WITH STENT PLACEMENT  11/13/2013 11/13/2013 -3 stents placed to RCA- Dr Caryn Delgadillo N/A 7/3/2020    INTRAOPERATIVE ISMAEL, CABG X3   WITH LIMA  AND LEFT LEG ENDOVEIN HARVEST, INDUCED HYPOTHERMIA, MAZE BIPOLAR AND CRYO PROCEDURE, LEFT ATRIAL APPENDAGE CLIPPING performed by Caroline Garduno MD at [address] [place] [address]      all teeth extracted    [address]       Medications:   Scheduled Meds:   sodium chloride flush  5-40 mL IntraVENous 2 times per day    aspirin  81 mg Oral Daily    atorvastatin  40 mg Oral Nightly    bumetanide  2 mg Oral BID    clopidogrel  75 mg Oral Daily    hydrALAZINE  100 mg Oral TID    pregabalin  150 mg Oral BID    metOLazone  5 mg Oral BID    potassium chloride  20 mEq Oral BID WC    insulin lispro  0-8 Units SubCUTAneous TID WC    insulin lispro  0-4 Units SubCUTAneous Nightly    insulin glargine  5 Units SubCUTAneous Daily with breakfast    nicotine  1 patch TransDERmal Daily     Continuous Infusions:   sodium chloride      dextrose      nitroGLYCERIN Stopped (02/10/23 0043)    heparin (PORCINE) Infusion 16 Units/kg/hr (02/10/23 0005)     PRN Meds:.sodium chloride flush, sodium chloride, ondansetron **OR** ondansetron, acetaminophen **OR** acetaminophen, polyethylene glycol, albuterol sulfate HFA, glucose, dextrose bolus **OR** dextrose bolus, glucagon (rDNA), dextrose, heparin (porcine), heparin (porcine)    Allergies:  Hydromorphone    Social History:   TOBACCO:   reports that he has been smoking cigarettes. He started smoking about 49 years ago. He has a 23.00 pack-year smoking history. He has never used smokeless tobacco.  ETOH:   reports no history of alcohol use. OCCUPATION:      Family History:       Problem Relation Age of Onset    Cancer Mother     Diabetes Mother     Heart Disease Mother     High Blood Pressure Mother     Stroke Mother     Cancer Father     Heart Disease Father     High Blood Pressure Father     Stroke Father     Cancer Brother        REVIEW OF SYSTEMS:  Negative except for weak tired short of breath chest pain. Physical Exam:    I/O: 02/09 0701 - 02/10 0700  In: 960 [P.O.:960]  Out: 4350 [Urine:4350]    Vitals: /85   Pulse 67   Temp 97.4 °F (36.3 °C) (Oral)   Resp 16   Ht 6' 3\" (1.905 m)   Wt 281 lb 12 oz (127.8 kg)   SpO2 98%   BMI 35.22 kg/m²   General appearance: awake weak  HEENT: Head: Normal, normocephalic, atraumatic. Neck: supple, symmetrical, trachea midline  Lungs: diminished breath sounds bilaterally  Heart: S1, S2 normal  Abdomen: abnormal findings:  soft NT obese  Extremities: edema trace  Neurologic: Mental status: alertness: alert anxious alert      CBC:   Recent Labs     02/08/23  1011 02/10/23  0723   WBC 7.3 8.2   HGB 11.9* 13.2*    184     BMP:    Recent Labs     02/08/23  1011 02/10/23  0723    138   K 3.8 3.7   CL 99 98*   CO2 28 28   BUN 47* 42*   CREATININE 2.1* 2.1*   GLUCOSE 295* 155*     Hepatic:   Recent Labs     02/08/23  1011   AST 16   ALT 12   BILITOT 0.2   ALKPHOS 172*     Troponin: No results for input(s): TROPONINI in the last 72 hours.   BNP: No results for input(s): BNP in the last 72 hours. Lipids: No results for input(s): CHOL, HDL in the last 72 hours.     Invalid input(s): LDLCALCU  ABGs:   Lab Results   Component Value Date/Time    PO2ART 68 11/07/2022 04:15 AM    AXM0BSC 51.0 11/07/2022 04:15 AM     INR:   Recent Labs     02/08/23  1011   INR 1.16     Renal Labs  Albumin:    Lab Results   Component Value Date/Time    LABALBU 4.1 02/08/2023 10:11 AM     Calcium:    Lab Results   Component Value Date/Time    CALCIUM 9.5 02/10/2023 07:23 AM     Phosphorus:    Lab Results   Component Value Date/Time    PHOS 3.5 12/01/2022 11:47 AM     U/A:    Lab Results   Component Value Date/Time    NITRU NEGATIVE 02/08/2023 10:55 AM    COLORU YELLOW 02/08/2023 10:55 AM    WBCUA <1 02/08/2023 10:55 AM    RBCUA <1 02/08/2023 10:55 AM    MUCUS RARE 12/01/2022 11:46 AM    TRICHOMONAS NONE SEEN 12/01/2022 11:46 AM    BACTERIA NEGATIVE 02/08/2023 10:55 AM    CLARITYU CLEAR 02/08/2023 10:55 AM    SPECGRAV <1.005 02/08/2023 10:55 AM    UROBILINOGEN 0.2 02/08/2023 10:55 AM    BILIRUBINUR NEGATIVE 02/08/2023 10:55 AM    BLOODU NEGATIVE 02/08/2023 10:55 AM    KETUA NEGATIVE 02/08/2023 10:55 AM     ABG:    Lab Results   Component Value Date/Time    BHO5VQW 51.0 11/07/2022 04:15 AM    PO2ART 68 11/07/2022 04:15 AM    KGV3HSY 29.5 11/07/2022 04:15 AM     HgBA1c:    Lab Results   Component Value Date/Time    LABA1C 6.9 02/09/2023 06:19 AM     Microalbumen/Creatinine ratio:  No components found for: RUCREAT  TSH:  No results found for: TSH  IRON:    Lab Results   Component Value Date/Time    IRON 43 10/02/2022 10:12 PM     Iron Saturation:  No components found for: PERCENTFE  TIBC:    Lab Results   Component Value Date/Time    TIBC 280 10/02/2022 10:12 PM     FERRITIN:  No results found for: FERRITIN  RPR:  No results found for: RPR  BEAN:  No results found for: ANATITER, BEAN  24 Hour Urine for Creatinine Clearance:  No components found for: CREAT4, UHRS10, [facility]  -----------------------------------------------------------------      Assessment and Recommendations     Patient Active Problem List   Diagnosis Code    DM w/o complication type II ([facility] E11.9    CAD (coronary artery disease) I25.10    HTN (hypertension) I10    Smoking F17.200    COPD (chronic obstructive pulmonary disease) ([facility]) J44.9    STEMI (ST elevation myocardial infarction) ([facility]) I21.3    Nausea R11.0    COPD with acute exacerbation ([facility]) J44.1    Acute pulmonary edema ([facility]) J81.0    Chronic systolic congestive heart failure ([facility]) I50.22    TIA (transient ischemic attack) G45.9    Chronic kidney disease, stage III (moderate) ([facility]) N18.30    Temporal pain R51.9    Class 2 obesity due to excess calories without serious comorbidity with body mass index (BMI) of 35.0 to 35.9 in adult E66.09, Z68.35    Decreased cardiac ejection fraction R93.1    NSTEMI (non-ST elevated myocardial infarction) ([facility] I21.4    Ischemic cardiomyopathy I25.5    Mixed hyperlipidemia E78.2    Thoracoabdominal aortic aneurysm (TAAA) without rupture I71.60    Venous reflux I87.2    Varicose veins of both legs with edema I83.893    Acute on chronic respiratory failure with hypoxia ([facility]) J96.21    Acute bronchitis with COPD ([facility]) J44.0, J20.9    Paroxysmal A-fib ([facility]) I48.0    Chronic atrial fibrillation ([facility]) I48.20    Acute on chronic congestive heart failure ([facility]) I50.9    Acute on chronic systolic (congestive) heart failure ([facility]) I50.23    CHF (congestive heart failure), NYHA class I, acute on chronic, combined ([facility] I50.43    Acute kidney injury ([facility] N17.9    COPD exacerbation ([facility]) J44.1    Encephalopathy acute G93.40    Overweight E66.3    Chest pain R07.9     Impression plan  #1 CKD 3 with acute renal failure  #2 coronary disease with chest pain  #3 hypertension uncontrolled  #4 type 2 diabetes  #5 history of cocaine abuse  #6 known history of congestive heart failure EF 45% with COPD  #7 atrial fibrillation    Plan  #1 renal function close to baseline hold diuresis today in setting of heart cath and contrast patient is aware of risk and benefits and agreed to proceed today with contrast study and aware dialysis is a risk in the above setting but low hopefully very minimal  #2 cardiac status monitor heart cath today  #3 blood pressure overall doing better improved  #4 monitor glucose use sliding scale  #5 drug counseling as able  #6 volume and O2 monitor restart diuresis after procedure  #7 currently on heparin hold anticoagulation needed and adjust dosing  We will follow with supportive care monitor    Electronically signed by Chase Mckeon MD on 2/10/2023 at 9:04 AM

## 2023-02-10 NOTE — DISCHARGE SUMMARY
V2.0  Discharge Summary    Name:  Arturo Oliver /Age/Sex: 1960 (65 y.o. male)   Admit Date: 2023  Discharge Date: 2/10/23    MRN & CSN:  [id number] & [id number] Encounter Date and Time 2/10/23 5:47 PM EST    Attending:  Madalyn Ann MD Discharging Provider: Gita Sousa MD       Hospital Course:     Brief HPI: Arturo Oliver is a 65 y.o. male who presented with ***    Brief Problem Based Course:   ***      The patient expressed appropriate understanding of, and agreement with the discharge recommendations, medications, and plan. Consults this admission:  IP CONSULT TO CARDIOLOGY    Discharge Diagnosis:   Chest pain    ***    Discharge Instruction:   Follow up appointments: ***  Primary care physician: Marianela Castellanos MD within 2 weeks  Diet: {diet:35057}   Activity: {discharge activity:11662}  Disposition: Discharged to:   []Home, []C, []SNF, []Acute Rehab, []Hospice ***  Condition on discharge: Stable  Labs and Tests to be Followed up as an outpatient by PCP or Specialist: ***    Discharge Medications:        Medication List        START taking these medications      isosorbide mononitrate 30 MG extended release tablet  Commonly known as: IMDUR  Take 1 tablet by mouth daily  Start taking on: 2023            CHANGE how you take these medications      warfarin 4 MG tablet  Commonly known as: Jantoven  Take as directed. If you are unsure how to take this medication, talk to your nurse or doctor.   Original instructions: TAKE 1 TABLET BY MOUTH EVERY DAY, EXCEPT TAKE 1 AND 1/2 TABLETS ON SUNDAYS OR AS DIRECTED BY CLINIC  What changed: additional instructions            CONTINUE taking these medications      albuterol sulfate  (90 Base) MCG/ACT inhaler  Commonly known as: Proventil HFA  Inhale 2 puffs into the lungs every 6 hours as needed for Wheezing or Shortness of Breath     aspirin 81 MG chewable tablet     atorvastatin 80 MG tablet  Commonly known as: LIPITOR  Take 1 tablet by mouth nightly     bumetanide 2 MG tablet  Commonly known as: BUMEX  Take 1 tablet by mouth 2 times daily     carvedilol 12.5 MG tablet  Commonly known as: COREG  Take 1 tablet by mouth 2 times daily (with meals)     clopidogrel 75 MG tablet  Commonly known as: PLAVIX     dapagliflozin 5 MG tablet  Commonly known as: Farxiga  Take 1 tablet by mouth every morning     glipiZIDE 5 MG tablet  Commonly known as: GLUCOTROL     hydrALAZINE 100 MG tablet  Commonly known as: APRESOLINE     ipratropium-albuterol 0.5-2.5 (3) MG/3ML Soln nebulizer solution  Commonly known as: DUONEB  Inhale 3 mLs into the lungs 4 times daily     lisinopril 40 MG tablet  Commonly known as: PRINIVIL;ZESTRIL     metOLazone 5 MG tablet  Commonly known as: ZAROXOLYN  Take 1 tablet by mouth 2 times daily     nicotine 14 MG/24HR  Commonly known as: NICODERM CQ  Place 1 patch onto the skin daily     potassium chloride 20 MEQ extended release tablet  Commonly known as: KLOR-CON M     pregabalin 150 MG capsule  Commonly known as: LYRICA               Where to Get Your Medications        These medications were sent to [address] [place] [address]. 31 - F [phone]  [facility], [facility]      Phone: [phone]   isosorbide mononitrate 30 MG extended release tablet        Objective Findings at Discharge:   /77   Pulse 82   Temp 97.4 °F (36.3 °C) (Oral)   Resp 16   Ht 6' 3\" (1.905 m)   Wt 281 lb 12 oz (127.8 kg)   SpO2 98%   BMI 35.22 kg/m²       Physical Exam:   General: NAD  Eyes: EOMI  ENT: neck supple  Cardiovascular: Regular rate. Respiratory: Clear to auscultation  Gastrointestinal: Soft, non tender  Genitourinary: no suprapubic tenderness  Musculoskeletal: No edema  Skin: warm, dry  Neuro: Alert. Psych: Mood appropriate.          Labs and Imaging   XR CHEST PORTABLE    Result Date: 2/8/2023  EXAMINATION: ONE XRAY VIEW OF THE CHEST 2/8/2023 10:25 am COMPARISON: 11/08/2022 and prior HISTORY: ORDERING SYSTEM PROVIDED HISTORY: cxr/sob x few hours TECHNOLOGIST PROVIDED HISTORY: Reason for exam:->cxr/sob x few hours Reason for Exam: cxr/sob x few hours FINDINGS: Improved opacities overlying the right lower lung zone. Unchanged mild cardiomegaly. No pleural effusion or pneumothorax. No acute osseous abnormality. Status post CABG and median sternotomy. Left atrial appendage clip in place. Improved opacities overlying the right lower lung zone likely represent atelectasis with infection not excluded. Unchanged mild cardiomegaly. CBC:   Recent Labs     02/08/23  1011 02/10/23  0723   WBC 7.3 8.2   HGB 11.9* 13.2*    184     BMP:    Recent Labs     02/08/23  1011 02/10/23  0723    138   K 3.8 3.7   CL 99 98*   CO2 28 28   BUN 47* 42*   CREATININE 2.1* 2.1*   GLUCOSE 295* 155*     Hepatic:   Recent Labs     02/08/23  1011   AST 16   ALT 12   BILITOT 0.2   ALKPHOS 172*     Lipids:   Lab Results   Component Value Date/Time    CHOL 116 10/02/2022 10:12 PM    HDL 32 10/02/2022 10:12 PM    TRIG 125 10/02/2022 10:12 PM     Hemoglobin A1C:   Lab Results   Component Value Date/Time    LABA1C 6.9 02/09/2023 06:19 AM     TSH: No results found for: TSH  Troponin:   Lab Results   Component Value Date/Time    TROPONINT 0.052 02/09/2023 03:54 PM    TROPONINT 0.040 02/09/2023 06:19 AM    TROPONINT 0.046 02/08/2023 10:19 PM     Lactic Acid: No results for input(s): LACTA in the last 72 hours.   BNP:   Recent Labs     02/08/23  1011   PROBNP 825.3*     UA:  Lab Results   Component Value Date/Time    NITRU NEGATIVE 02/10/2023 11:10 AM    COLORU YELLOW 02/10/2023 11:10 AM    WBCUA <1 02/10/2023 11:10 AM    RBCUA 1 02/10/2023 11:10 AM    MUCUS RARE 12/01/2022 11:46 AM    TRICHOMONAS NONE SEEN 02/10/2023 11:10 AM    BACTERIA NEGATIVE 02/10/2023 11:10 AM    CLARITYU CLEAR 02/10/2023 11:10 AM    SPECGRAV 1.010 02/10/2023 11:10 AM    LEUKOCYTESUR NEGATIVE 02/10/2023 11:10 AM    UROBILINOGEN 0.2 02/10/2023 11:10 AM    BILIRUBINUR NEGATIVE 02/10/2023 11:10 AM    BLOODU NEGATIVE 02/10/2023 11:10 AM    KETUA NEGATIVE 02/10/2023 11:10 AM     Urine Cultures: No results found for: Amarilys Alcantara  Blood Cultures: No results found for: BC  No results found for: BLOODCULT2  Organism: No results found for: ORG    Time Spent Discharging patient *** minutes    Electronically signed by Dante Park MD on 2/10/2023 at 5:47 PM

## 2023-02-10 NOTE — PROGRESS NOTES
Discharge instruction went over with patient. Patient denied any questions or concerns. R hand PIV removed by this RN. Left forearm IV removed by different RN. Patient stated someone else removed it.

## 2023-02-10 NOTE — PROGRESS NOTES
Cardio rounded with patient and cleared him for discharge. Patient took himself off tele and got dressed. Hospitalist to still see him to discharge him. Patient does not want back on monitor. Education provided.

## 2023-02-13 ENCOUNTER — TELEPHONE (OUTPATIENT)
Dept: PHARMACY | Age: 63
End: 2023-02-13

## 2023-02-13 NOTE — TELEPHONE ENCOUNTER
No show to appointment. Called patient and scheduled for 2/16. [facility] admitted 2/8-2/10. For Pharmacy Admin Tracking Only    Intervention Detail:   Total # of Interventions Recommended:    Total # of Interventions Accepted:   Time Spent (min): 5

## 2023-02-16 ENCOUNTER — HOSPITAL ENCOUNTER (OUTPATIENT)
Age: 63
Discharge: HOME | End: 2023-02-16
Payer: MEDICARE

## 2023-02-16 ENCOUNTER — ANTI-COAG VISIT (OUTPATIENT)
Dept: PHARMACY | Age: 63
End: 2023-02-16
Payer: MEDICARE

## 2023-02-16 LAB
ALBUMIN SERPL-MCNC: 4.3 GM/DL (ref 3.4–5)
ANION GAP SERPL CALCULATED.3IONS-SCNC: 16 MMOL/L (ref 4–16)
BACTERIA: NEGATIVE /HPF
BILIRUBIN URINE: NEGATIVE MG/DL
BLOOD, URINE: NEGATIVE
BUN SERPL-MCNC: 82 MG/DL (ref 6–23)
CALCIUM SERPL-MCNC: 9.2 MG/DL (ref 8.3–10.6)
CHLORIDE BLD-SCNC: 95 MMOL/L (ref 99–110)
CLARITY: CLEAR
CO2: 28 MMOL/L (ref 21–32)
COLOR: YELLOW
CREAT SERPL-MCNC: 3.8 MG/DL (ref 0.9–1.3)
CREATININE URINE: 78.5 MG/DL (ref 39–259)
GFR SERPL CREATININE-BSD FRML MDRD: 17 ML/MIN/1.73M2
GLUCOSE SERPL-MCNC: 180 MG/DL (ref 70–99)
GLUCOSE, URINE: 500 MG/DL
HYALINE CASTS: 0 /LPF
INTERNATIONAL NORMALIZATION RATIO, POC: 1.4
KETONES, URINE: NEGATIVE MG/DL
LEUKOCYTE ESTERASE, URINE: NEGATIVE
MAGNESIUM: 2.7 MG/DL (ref 1.8–2.4)
NITRITE URINE, QUANTITATIVE: NEGATIVE
PH, URINE: 6 (ref 5–8)
PHOSPHORUS: 6.1 MG/DL (ref 2.5–4.9)
POC INR: 1.4 INDEX
POTASSIUM SERPL-SCNC: 4.4 MMOL/L (ref 3.5–5.1)
PROT/CREAT RATIO, UR: 0.4
PROTEIN UA: 30 MG/DL
PROTHROMBIN TIME, POC: 16.7 SECONDS (ref 10–14.3)
RBC URINE: 1 /HPF (ref 0–3)
SODIUM BLD-SCNC: 139 MMOL/L (ref 135–145)
SPECIFIC GRAVITY UA: 1.01 (ref 1–1.03)
TRICHOMONAS: NORMAL /HPF
URINE TOTAL PROTEIN: 34.4 MG/DL
UROBILINOGEN, URINE: 0.2 MG/DL (ref 0.2–1)
WBC UA: <1 /HPF (ref 0–2)

## 2023-02-16 PROCEDURE — 82570 ASSAY OF URINE CREATININE: CPT

## 2023-02-16 PROCEDURE — 81001 URINALYSIS AUTO W/SCOPE: CPT

## 2023-02-16 PROCEDURE — 84100 ASSAY OF PHOSPHORUS: CPT

## 2023-02-16 PROCEDURE — 80048 BASIC METABOLIC PNL TOTAL CA: CPT

## 2023-02-16 PROCEDURE — 83735 ASSAY OF MAGNESIUM: CPT

## 2023-02-16 PROCEDURE — 85610 PROTHROMBIN TIME: CPT

## 2023-02-16 PROCEDURE — 82040 ASSAY OF SERUM ALBUMIN: CPT

## 2023-02-16 PROCEDURE — 84156 ASSAY OF PROTEIN URINE: CPT

## 2023-02-16 PROCEDURE — 36415 COLL VENOUS BLD VENIPUNCTURE: CPT

## 2023-02-16 PROCEDURE — 36416 COLLJ CAPILLARY BLOOD SPEC: CPT

## 2023-02-16 PROCEDURE — 99212 OFFICE O/P EST SF 10 MIN: CPT

## 2023-02-16 NOTE — PROGRESS NOTES
Medication Management Service  [facility]  [phone]    Visit Date: 2/16/2023   Subjective:       Jase Torres is a 65 y.o. male who presents to clinic today for anticoagulation monitoring and adjustment. Patient seen in clinic for warfarin management due to  Indication:   atrial fibrillation. INR goal: of 2.0-3.0. Duration of therapy: indefinite. Patient reports the following:   Adherent with regimen  Missed or extra doses:  was held while hospitalized  Bleeding or thromboembolic side effects:  None  Significant medication changes:  None  Significant dietary changes: None  Significant alcohol or tobacco changes: None  Significant recent illness, disease state changes, or hospitalization:  [facility] 2/8-2/10  Upcoming surgeries or procedures:  None  Falls: None           Assessment and Plan     PT/INR done in office per protocol. INR today is 1.4, subtherapeutic. Plan: Take warfarin 6mg today then will continue current regimen of warfarin 4mg daily except 6mg on Sundays. Recheck INR in 1 week(s). Patient verbalized understanding of dosing directions and information discussed. Dosing schedule given to patient including phone number, appointment date, and time. Progress note sent to referring office. Patient acknowledges working in consult agreement with pharmacist as referred by his/her physician.       Electronically signed by DARA Connolly [facility] on 2/16/23 at 9:45 AM EST    For Pharmacy Admin Tracking Only    Intervention Detail: Dose Adjustment: 1, reason: Therapy Optimization  Total # of Interventions Recommended: 1  Total # of Interventions Accepted: 1  Time Spent (min): 15

## 2023-02-23 ENCOUNTER — ANTI-COAG VISIT (OUTPATIENT)
Dept: PHARMACY | Age: 63
End: 2023-02-23
Payer: MEDICARE

## 2023-02-23 PROCEDURE — 99213 OFFICE O/P EST LOW 20 MIN: CPT

## 2023-02-23 PROCEDURE — 85610 PROTHROMBIN TIME: CPT

## 2023-02-23 PROCEDURE — 36416 COLLJ CAPILLARY BLOOD SPEC: CPT

## 2023-02-23 RX ORDER — WARFARIN SODIUM 4 MG/1
TABLET ORAL
Qty: 110 TABLET | Refills: 1 | Status: SHIPPED | OUTPATIENT
Start: 2023-02-23

## 2023-02-23 NOTE — PROGRESS NOTES
Medication Management Service  [facility]  [phone]    Visit Date: 2/23/2023   Subjective:       Jesus Valdez is a 65 y.o. male who presents to clinic today for anticoagulation monitoring and adjustment. Patient seen in clinic for warfarin management due to  Indication:   atrial fibrillation. INR goal: of 2.0-3.0. Duration of therapy: indefinite. Patient reports the following:   Adherent with regimen  Missed or extra doses:  None   Bleeding or thromboembolic side effects:  None  Significant medication changes:  None  Significant dietary changes: None  Significant alcohol or tobacco changes: smoking less  Significant recent illness, disease state changes, or hospitalization:  pain in chest past few days- advised he go to ED  Upcoming surgeries or procedures:  None  Falls: None           Assessment and Plan     PT/INR done in office per protocol. INR today is 1.4, subtherapeutic. INR remains unchanged despite 6mg booster dose after last visit. AM dose  New Rx needed  Plan: Increase weekly dose ~13% to warfarin 4mg daily except 6mg on MWF. Erx sent  Recheck INR in 1.5 week(s). Patient verbalized understanding of dosing directions and information discussed. Dosing schedule given to patient including phone number, appointment date, and time. Progress note sent to referring office. Patient acknowledges working in consult agreement with pharmacist as referred by his/her physician.       Electronically signed by Curtis Yuen [facility] on 2/23/23 at 9:44 AM EST    For Pharmacy Admin Tracking Only    Intervention Detail: Dose Adjustment: 1, reason: Therapy Optimization and Refill(s) Provided  Total # of Interventions Recommended: 1  Total # of Interventions Accepted: 1  Time Spent (min): 15

## 2023-03-06 ENCOUNTER — TELEPHONE (OUTPATIENT)
Dept: PHARMACY | Age: 63
End: 2023-03-06

## 2023-03-13 ENCOUNTER — TELEPHONE (OUTPATIENT)
Dept: PHARMACY | Age: 63
End: 2023-03-13

## 2023-03-14 ENCOUNTER — OFFICE VISIT (OUTPATIENT)
Dept: CARDIOLOGY CLINIC | Age: 63
End: 2023-03-14
Payer: MEDICARE

## 2023-03-14 ENCOUNTER — TELEPHONE (OUTPATIENT)
Dept: CARDIOLOGY CLINIC | Age: 63
End: 2023-03-14

## 2023-03-14 VITALS
HEART RATE: 72 BPM | HEIGHT: 75 IN | WEIGHT: 270 LBS | BODY MASS INDEX: 33.57 KG/M2 | SYSTOLIC BLOOD PRESSURE: 132 MMHG | DIASTOLIC BLOOD PRESSURE: 76 MMHG

## 2023-03-14 DIAGNOSIS — I21.4 NSTEMI (NON-ST ELEVATED MYOCARDIAL INFARCTION) (HCC): Primary | ICD-10-CM

## 2023-03-14 PROCEDURE — 3078F DIAST BP <80 MM HG: CPT | Performed by: INTERNAL MEDICINE

## 2023-03-14 PROCEDURE — G8427 DOCREV CUR MEDS BY ELIG CLIN: HCPCS | Performed by: INTERNAL MEDICINE

## 2023-03-14 PROCEDURE — G8417 CALC BMI ABV UP PARAM F/U: HCPCS | Performed by: INTERNAL MEDICINE

## 2023-03-14 PROCEDURE — 99214 OFFICE O/P EST MOD 30 MIN: CPT | Performed by: INTERNAL MEDICINE

## 2023-03-14 PROCEDURE — 3075F SYST BP GE 130 - 139MM HG: CPT | Performed by: INTERNAL MEDICINE

## 2023-03-14 PROCEDURE — G8482 FLU IMMUNIZE ORDER/ADMIN: HCPCS | Performed by: INTERNAL MEDICINE

## 2023-03-14 PROCEDURE — 4004F PT TOBACCO SCREEN RCVD TLK: CPT | Performed by: INTERNAL MEDICINE

## 2023-03-14 PROCEDURE — 3017F COLORECTAL CA SCREEN DOC REV: CPT | Performed by: INTERNAL MEDICINE

## 2023-03-14 NOTE — PATIENT INSTRUCTIONS
Please be informed that if you contact our office outside of normal business hours the physician on call cannot help with any scheduling or rescheduling issues, procedure instruction questions or any type of medication issue. We advise you for any urgent/emergency that you go to the nearest emergency room! PLEASE CALL OUR OFFICE DURING NORMAL BUSINESS HOURS    Monday - Friday   8 am to 5 pm    [place]: [phone]    [place]:  [phone]  **It is YOUR responsibilty to bring medication bottles and/or updated medication list to [address] [place] [address]. This will allow us to better serve you and all your healthcare needs**  [facility] Locations - No appointment necessary. Sites open Monday to Friday. Call your preferred location for test preparation, business   hours and other information you need. [employer]. [address] [place], [address] [facility]  Phone: [phone] [place]  [address] [facility]  [address] [place], [address] [facility]  Phone: [phone]     Thank you for allowing us to care for you today! We want to ensure we can follow your treatment plan and we strive to give you the best outcomes and experience possible. If you ever have a life threatening emergency and call 911 - for an ambulance (EMS)   Our providers can only care for you at:   [facility] or LTAC, located within [facility]. Even if you have someone take you or you drive yourself we can only care for you in a [facility]. Our providers are not setup at the other healthcare locations!

## 2023-03-14 NOTE — PROGRESS NOTES
Martin White MD        OFFICE  FOLLOWUP NOTE    Chief complaints: patient is here for management of CAD s/p CABG, DM, HTN, DYSLPIDEMIA, AFIB, COVID 23    DISCHARGED FROM hospital, with uncontrolled  and NSTEMI HAD [facility] 2/4 grafts are patent now    Subjective: patient feels better, no chest pain, no shortness of breath, no dizziness, no palpitations    HPI Lisa Gil is a 65 y. o.year old who  has a past medical history of Abnormal nuclear stress test, Atrial fibrillation (HCC), CAD (coronary artery disease), CHF (congestive heart failure) ([facility] [address] COPD (chronic obstructive pulmonary disease) ([facility] [address] Decreased cardiac ejection fraction, Diabetes mellitus ([facility] [address] GERD (gastroesophageal reflux disease), H/O cardiovascular stress test, H/O cardiovascular stress test, H/O cardiovascular stress test, H/O cardiovascular stress test, H/O Doppler ultrasound, H/O echocardiogram, H/O percutaneous left heart catheterization, Heart imaging, History of coronary artery stent placement, History of exercise stress test, History of Holter monitoring, History of MRI of brain and brain stem, History of nuclear stress test, Hx of Doppler echocardiogram, Hyperlipidemia, Hypertension, Kidney disease, S/P cardiac catheterization, SOB (shortness of breath), Status post angioplasty with stent, and Wears dentures.  and presents for management of CAD, DM, HTN, AFIB, which are well controlled  Had [facility]  The left main is a large tubular vessel proximally distally has about 90% stenosis and divides into the circumflex and the LAD  The LAD is patent competitive flow is seen from the LIMA  Circumflex OM's are occluded  RCA is occluded however seem to be filling from collaterals from the left  The SVG to the RCA is occluded which was stented about 2-1/2 years ago  SVG to circumflex is patent has no significant disease  The LIMA to the LAD is patent her the LAD itself has some mild disease  EF is about 40% with inferior wall hypokinesis    Assessment and plan  The SVG appears to be occluded filling from collaterals patient is pain-free right now hence will manage medically with antiplatelet therapy and will follow  Current Outpatient Medications   Medication Sig Dispense Refill    albuterol (PROVENTIL) (2.5 MG/3ML) 0.083% nebulizer solution Take 3 mLs by nebulization every 6 hours as needed for Wheezing 120 each 3    ipratropium (ATROVENT) 0.02 % nebulizer solution Take 2.5 mLs by nebulization 4 times daily 2.5 mL 3    albuterol (PROVENTIL) (2.5 MG/3ML) 0.083% nebulizer solution Take 3 mLs by nebulization every 6 hours as needed for Wheezing 120 each 3    warfarin (JANTOVEN) 4 MG tablet TAKE 1 TABLET BY MOUTH EVERY DAY, EXCEPT TAKE 1 AND 1/2 TABLETS ON MONDAYS, WEDNESDAYS, AND FRIDAYS OR AS DIRECTED BY OFFICE 110 tablet 1    aspirin 81 MG chewable tablet Take 81 mg by mouth daily      isosorbide mononitrate (IMDUR) 30 MG extended release tablet Take 1 tablet by mouth daily 30 tablet 1    potassium chloride (KLOR-CON M) 20 MEQ extended release tablet TAKE 1 TABLET BY MOUTH TWO TIMES A DAY      hydrALAZINE (APRESOLINE) 100 MG tablet Take 1 tablet  by mouth in the morning and 1 tablet at noon and 1 tablet  in the evening.       dapagliflozin (FARXIGA) 5 MG tablet Take 1 tablet by mouth every morning 90 tablet 3    metOLazone (ZAROXOLYN) 5 MG tablet Take 1 tablet by mouth 2 times daily 180 tablet 3    carvedilol (COREG) 12.5 MG tablet Take 1 tablet by mouth 2 times daily (with meals) 60 tablet 5    bumetanide (BUMEX) 2 MG tablet Take 1 tablet by mouth 2 times daily 60 tablet 5    atorvastatin (LIPITOR) 80 MG tablet Take 1 tablet by mouth nightly 30 tablet 3    albuterol sulfate HFA (PROVENTIL HFA) 108 (90 Base) MCG/ACT inhaler Inhale 2 puffs into the lungs every 6 hours as needed for Wheezing or Shortness of Breath 18 g 2    glipiZIDE (GLUCOTROL) 5 MG tablet Take 5 mg by mouth daily      nicotine (NICODERM CQ) 14 MG/24HR Place 1 patch onto the skin daily 42 patch 0    clopidogrel (PLAVIX) 75 MG tablet TAKE 1 TABLET BY MOUTH DAILY. lisinopril (PRINIVIL;ZESTRIL) 40 MG tablet Take 40 mg by mouth daily      pregabalin (LYRICA) 150 MG capsule 150 mg 2 times daily. No current facility-administered medications for this visit. Allergies: Hydromorphone  Past Medical History:   Diagnosis Date    Abnormal nuclear stress test     Atrial fibrillation ([facility] [address] 11/2013    CAD (coronary artery disease)     Follows with Dr. Tee Nation    CHF (congestive heart failure) ([facility])     COPD (chronic obstructive pulmonary disease) ([facility] [address]     Follows with PCP    Decreased cardiac ejection fraction     Diabetes mellitus ([facility] [address]     GERD (gastroesophageal reflux disease)     H/O cardiovascular stress test 11/20/13, 3/20/2013    11/13-Observed defect is consistent with diaphragmatic attenuation. EF 58%. 3/13 -EF 51%. No ischemia. Normal Study. H/O cardiovascular stress test 06/08/2017    EF 50% normal study    H/O cardiovascular stress test 06/17/2019    Normal NM    H/O cardiovascular stress test 03/02/2021    depressed lvedf increased edv myocardial perfusion abnormal stress test    H/O Doppler ultrasound 10/14/2010    10/2010-Bilateral venous doppler of lower extremies- No DVTs. H/O echocardiogram 06/26/2019    Left ventricular systolic function is normal with an ejection fraction of 50-55%. Mild concentric left ventricular hypertrophy. Grade I diastolic dysfunction. No significant valvular disease noted. No evidence of pericardial effusion. Essentially unremarkable echo.     H/O percutaneous left heart catheterization 12/22/2015    No evidence of hemodynamically significant coronary artery disease    Heart imaging 04/23/2020    muga - ef 58%    History of coronary artery stent placement 11/13/2013 11/13 -RCA - 3 stents placed    History of exercise stress test 06/08/2017    treadmill    History of Holter monitoring 01/04/2016    predominant rhythm sinus    History of MRI of brain and brain stem 06/04/2020    No evidence of an acute infarct, mod chronic microvascular white matter ischemic disease with associated cerebral parenchymal volume loss    History of nuclear stress test 02/24/2020    EF 38%,Myocardial perfusion scan shows moderate size, severe intensity, non  reversible perfusion defect in inferior wall. Hx of Doppler echocardiogram 01/26/2022    EF 45-50% Mod LV hypertrophy. Grade 1 diastolic dysfunction. Mod MR and TR. Dilation of the aortic root and ascending aorta. Hyperlipidemia     Hypertension     Follows with PCP    Kidney disease     Dr. Linh Gallego    S/P cardiac catheterization 11/13/2013 11/13/2013-EF 55%, distal LAD mild disease,CX stent patent,but distal to stent 50% stenosis. RCA severe disease. SOB (shortness of breath)     Status post angioplasty with stent     Wears dentures     full upper denture     Past Surgical History:   Procedure Laterality Date    APPENDECTOMY      BIOPSY / LIGATION TEMPORAL ARTERY Left 8/17/2020    LEFT TEMPORAL ARTERY BIOPSY LIGATION performed by Valentino Hamilton MD at [address]  11/13/2013 11/13/2013-EF 55%, distal LAD mild disease,CX stent patent,but distal to stent 50% stenosis. RCA severe disease.     CARDIAC SURGERY  07/03/2020    CABG X 3 LIMA-OM-PDA-LAD, Maze, LT Atrial Appendage clipping    COLONOSCOPY      CORONARY ANGIOPLASTY WITH STENT PLACEMENT      4 stents- RCA X3; CX X1    CORONARY ANGIOPLASTY WITH STENT PLACEMENT  11/13/2013 11/13/2013 -3 stents placed to RCA- Dr Clark Pi N/A 7/3/2020    INTRAOPERATIVE ISMAEL, CABG X3   WITH LIMA  AND LEFT LEG ENDOVEIN HARVEST, INDUCED HYPOTHERMIA, MAZE BIPOLAR AND CRYO PROCEDURE, LEFT ATRIAL APPENDAGE CLIPPING performed by Fermín Kraus MD at [address] [place] [address]      all teeth extracted    [address]     Family History   Problem Relation Age of Onset Cancer Mother     Diabetes Mother     Heart Disease Mother     High Blood Pressure Mother     Stroke Mother     Cancer Father     Heart Disease Father     High Blood Pressure Father     Stroke Father     Cancer Brother      Social History     Tobacco Use    Smoking status: Every Day     Packs/day: 0.50     Years: 46.00     Pack years: 23.00     Types: Cigarettes     Start date: 1974    Smokeless tobacco: Never   Substance Use Topics    Alcohol use: No     Comment: caffeine 2 cups coffee a day. pop twice a week. austen tea 4 bottles a day      [unfilled]  Review of Systems:   Constitutional: No Fever or Weight Loss   Eyes: No Decreased Vision  ENT: No Headaches, Hearing Loss or Vertigo  Cardiovascular: No chest pain, dyspnea on exertion, palpitations or loss of consciousness  Respiratory: No cough or wheezing    Gastrointestinal: No abdominal pain, appetite loss, blood in stools, constipation, diarrhea or heartburn  Genitourinary: No dysuria, trouble voiding, or hematuria  Musculoskeletal:  No gait disturbance, weakness or joint complaints  Integumentary: No rash or pruritis  Neurological: No TIA or stroke symptoms  Psychiatric: No anxiety or depression  Endocrine: No malaise, fatigue or temperature intolerance  Hematologic/Lymphatic: No bleeding problems, blood clots or swollen lymph nodes  Allergic/Immunologic: No nasal congestion or hives  All systems negative except as marked. Objective:  /76 (Site: Right Upper Arm, Position: Sitting, Cuff Size: Large Adult)   Pulse 72   Ht 6' 3\" (1.905 m)   Wt 270 lb (122.5 kg)   BMI 33.75 kg/m²   Wt Readings from Last 3 Encounters:   03/14/23 270 lb (122.5 kg)   02/21/23 281 lb (127.5 kg)   02/09/23 281 lb 12 oz (127.8 kg)     Body mass index is 33.75 kg/m². GENERAL - Alert, oriented, pleasant, in no apparent distress,normal grooming  HEENT - pupils are intact, cornea intact, conjunctive normal color, ears are normal in exam,  Neck - Supple.   No jugular venous distention noted. No carotid bruits, no apical -carotid delay  Respiratory:  Normal breath sounds, No respiratory distress, No wheezing, No chest tenderness. ,no use of accessory muscles, diaphragm movement is normal  Cardiovascular: (PMI) apex non displaced,no lifts no thrills, no s3,no s4, Normal heart rate, Normal rhythm, No murmurs, No rubs, No gallops. Carotid arteries pulse and amplitude are normal no bruit, no abdominal bruit noted ( normal abdominal aorta ausculation),   Extremities - No cyanosis, clubbing, or significant edema, no varicose veins    Abdomen - No masses, tenderness, or organomegaly, no hepato-splenomegally, no bruits  Musculoskeletal - No significant edema, no kyphosis or scoliosis, no deformity in any extremity noted, muscle strength and tone are normal  Skin: no ulcer,no scar,no stasis dermatitis   Neurologic - alert oriented times 3,Cranial nerves II through XII are grossly intact. There were no gross focal neurologic abnormalities. Psychiatric: normal mood and affect    Lab Results   Component Value Date/Time    CKTOTAL 34 01/29/2022 05:26 PM    CKMB 2.4 03/10/2013 07:05 AM    TROPONINI 0.014 06/09/2014 01:30 AM    TROPONINI 0.015 12/03/2011 07:35 PM     BNP:    Lab Results   Component Value Date/Time    BNP 17 03/09/2013 11:30 PM     PT/INR:  No results found for: PTINR  Lab Results   Component Value Date    LABA1C 6.9 (H) 02/09/2023    LABA1C 5.9 11/07/2022     Lab Results   Component Value Date    CHOL 116 10/02/2022    TRIG 125 10/02/2022    HDL 32 (L) 10/02/2022    LDLCALC 59 10/02/2022    LDLDIRECT 111 (H) 01/06/2020     Lab Results   Component Value Date    ALT 12 02/08/2023    AST 16 02/08/2023     TSH:  No results found for: TSH    Impression:  Hilda Carter is a 65 y. o.year old who  has a past medical history of Abnormal nuclear stress test, Atrial fibrillation ([facility] [address] CAD (coronary artery disease), CHF (congestive heart failure) ([facility] [address] COPD (chronic obstructive pulmonary disease) ([facility] [address] Decreased cardiac ejection fraction, Diabetes mellitus ([facility] [address] GERD (gastroesophageal reflux disease), H/O cardiovascular stress test, H/O cardiovascular stress test, H/O cardiovascular stress test, H/O cardiovascular stress test, H/O Doppler ultrasound, H/O echocardiogram, H/O percutaneous left heart catheterization, Heart imaging, History of coronary artery stent placement, History of exercise stress test, History of Holter monitoring, History of MRI of brain and brain stem, History of nuclear stress test, Hx of Doppler echocardiogram, Hyperlipidemia, Hypertension, Kidney disease, S/P cardiac catheterization, SOB (shortness of breath), Status post angioplasty with stent, and Wears dentures. and presents with     Plan:  Recommend to stop cocaine  CAD ONLY 2/4 GRAFTS ARE PATENT Now, will repeat echo, ;[facility] as above, will add nicotine patches  Acute renal failure on CKD: CREATINIE is2.4, base line is1.4-1.7, RECENTLY seen Dr. Harmony Valentine, uses 2 direutics, recommend to follow up with nephrology ASAP  HTN: controlled now, contineu hydralazine, norvasc 10mg daily, continue coreg. lisinopril,   Leg swelling:s/p ablation of rt GSV severe reflux and left popliteal baker cyst,had normal CT abdomen for swollen lymphnodes of groin,   COVID 19: RECOVERED LAST MONTH, HE WILL NEED BOOSTER VACCINE.  CAD,H/O PCI of SVG TO RCA Graft at the end F December 2020,  WITH HISTORY OF  CABG X4  muga LVEF was 36%, however ECHO was 50%, Continue aspirin , continue statins, betablockers,, continue LISINOPRIL TO 20MG, BB, POST CABG EKG IS NSR. continue PLAVIX,  CONTINUE PLAVIX AND COUMADIN,  He is not working. recomemnd to stop smoking  Leg pain: it does not sound like intermittent claudication as it occurs with laying down and not with walking  DM: stable continue glipizide  Paroxysmal afib: coNTINUE COUAMDIN, off amidarone  Dyslipidemia: continue statinsAll labs, medications and tests reviewed, continue all other medications of all above medical condition listed as is.     [unfilled]

## 2023-03-23 ENCOUNTER — TELEPHONE (OUTPATIENT)
Dept: PHARMACY | Age: 63
End: 2023-03-23

## 2023-03-23 NOTE — TELEPHONE ENCOUNTER
Called patient to schedule. He does not have license/transportation right now. Provided phone for [employer] and recommended he call his [employer]. Will call back next week. For Pharmacy Admin Tracking Only    Intervention Detail:   Total # of Interventions Recommended:    Total # of Interventions Accepted:   Time Spent (min): 5

## 2023-04-03 NOTE — PROGRESS NOTES
This RN rounded with patient care technicians and he stated he did not want chair alarm. Patient alert and oriented x4 and stated he did not want the alarm. Patient verbalized understanding of dangers of getting up unassisted. Call light within reach. Patient has proper foot ware on. Patient to be rounded on frequently. [address] [facility] aware. Nsaids Counseling: NSAID Counseling: I discussed with the patient that NSAIDs should be taken with food. Prolonged use of NSAIDs can result in the development of stomach ulcers.  Patient advised to stop taking NSAIDs if abdominal pain occurs.  The patient verbalized understanding of the proper use and possible adverse effects of NSAIDs.  All of the patient's questions and concerns were addressed.

## 2023-04-17 ENCOUNTER — TELEPHONE (OUTPATIENT)
Dept: PHARMACY | Age: 63
End: 2023-04-17

## 2023-04-17 NOTE — TELEPHONE ENCOUNTER
Scheduled for 5/1/23. For Pharmacy Admin Tracking Only    Intervention Detail:   Total # of Interventions Recommended:    Total # of Interventions Accepted:   Time Spent (min): 5

## 2023-04-25 ENCOUNTER — PROCEDURE VISIT (OUTPATIENT)
Dept: CARDIOLOGY CLINIC | Age: 63
End: 2023-04-25
Payer: MEDICARE

## 2023-04-25 DIAGNOSIS — I21.4 NSTEMI (NON-ST ELEVATED MYOCARDIAL INFARCTION) (HCC): ICD-10-CM

## 2023-04-25 LAB
LV EF: 58 %
LVEF MODALITY: NORMAL

## 2023-04-25 PROCEDURE — 93306 TTE W/DOPPLER COMPLETE: CPT | Performed by: INTERNAL MEDICINE

## 2023-05-01 ENCOUNTER — TELEPHONE (OUTPATIENT)
Dept: PHARMACY | Age: 63
End: 2023-05-01

## 2023-05-01 NOTE — TELEPHONE ENCOUNTER
No show to appointment. Called patient and scheduled for 5/8/23. For Pharmacy Admin Tracking Only    Intervention Detail:   Total # of Interventions Recommended:    Total # of Interventions Accepted:   Time Spent (min): 5

## 2023-05-02 ENCOUNTER — TELEPHONE (OUTPATIENT)
Dept: CARDIOLOGY CLINIC | Age: 63
End: 2023-05-02

## 2023-05-02 NOTE — TELEPHONE ENCOUNTER
Echo done on 4/25/2023:     Left ventricular systolic function is normal with an ejection fraction of   55-60%. Moderate concentric left ventricular hypertrophy. Mildly dilated left and right atrium. No significant valvular disease noted. Normal pulmonary artery pressure. No evidence of pericardial effusion. Patient called back for testing results. Given results of Echo. Reminded patient to follow up with Dr. Yamini Luu as scheduled. Patient advised and voices understanding.

## 2023-05-08 ENCOUNTER — ANTI-COAG VISIT (OUTPATIENT)
Dept: PHARMACY | Age: 63
End: 2023-05-08
Payer: MEDICARE

## 2023-05-08 PROCEDURE — 36416 COLLJ CAPILLARY BLOOD SPEC: CPT

## 2023-05-08 PROCEDURE — 99212 OFFICE O/P EST SF 10 MIN: CPT

## 2023-05-08 PROCEDURE — 85610 PROTHROMBIN TIME: CPT

## 2023-05-08 NOTE — PROGRESS NOTES
Medication Management Service  [facility]  [phone]    Visit Date: 5/8/2023   Subjective:       Zeus German is a 59 y.o. male who presents to clinic today for anticoagulation monitoring and adjustment. Patient seen in clinic for warfarin management due to  Indication:   atrial fibrillation. INR goal: of 2.0-3.0. Duration of therapy: indefinite. Patient reports the following:   Adherent with regimen_ NO, has been taking 6mg only on Sundays  Missed or extra doses:  None   Bleeding or thromboembolic side effects:  None  Significant medication changes:  None  Significant dietary changes: None  Significant alcohol or tobacco changes: None  Significant recent illness, disease state changes, or hospitalization:  None  Upcoming surgeries or procedures:  None  Falls: None           Assessment and Plan     PT/INR done in office per protocol. INR today is 1.8, subtherapeutic. Has been taking 4mg daily except 6mg on Sunday for past month. Plan: Take warfarin 4mg daily except 6mg on Sundays and Wednesdays. Recheck INR in 2 week(s). Patient verbalized understanding of dosing directions and information discussed. Dosing schedule given to patient including phone number, appointment date, and time. Progress note sent to referring office. Patient acknowledges working in consult agreement with pharmacist as referred by his/her physician.       Electronically signed by Miryam Shepherd [facility] on 5/8/23 at 10:10 AM EDT    For Pharmacy [address] Only    Intervention Detail: Dose Adjustment: 1, reason: Therapy Optimization  Total # of Interventions Recommended: 1  Total # of Interventions Accepted: 1  Time Spent (min): 15

## 2023-05-22 ENCOUNTER — ANTI-COAG VISIT (OUTPATIENT)
Dept: PHARMACY | Age: 63
End: 2023-05-22
Payer: MEDICARE

## 2023-05-22 LAB
INTERNATIONAL NORMALIZATION RATIO, POC: 2.2
POC INR: 2.2 INDEX
PROTHROMBIN TIME, POC: 26.9 SECONDS (ref 10–14.3)

## 2023-05-22 PROCEDURE — 99212 OFFICE O/P EST SF 10 MIN: CPT

## 2023-05-22 PROCEDURE — 36416 COLLJ CAPILLARY BLOOD SPEC: CPT

## 2023-05-22 PROCEDURE — 85610 PROTHROMBIN TIME: CPT

## 2023-05-22 NOTE — PROGRESS NOTES
Medication Management Service  [facility]  [phone]    Visit Date: 5/22/2023   Subjective:       Sofía Hester is a 59 y.o. male who presents to clinic today for anticoagulation monitoring and adjustment. Patient seen in clinic for warfarin management due to  Indication:   atrial fibrillation. INR goal: of 2.0-3.0. Duration of therapy: indefinite. Patient reports the following:   Adherent with regimen  Missed or extra doses:  None   Bleeding or thromboembolic side effects:  None  Significant medication changes:  None  Significant dietary changes: None  Significant alcohol or tobacco changes: None  Significant recent illness, disease state changes, or hospitalization:  None  Upcoming surgeries or procedures:  None  Falls: None           Assessment and Plan     PT/INR done in office per protocol. INR today is 2.2, therapeutic. Has been taking 6mg on Fridays in addition to Sun- will continue with his reported dose  Plan:  Increase weekly dose ~6% (to what patient has been taking the past 2 weeks) to warfarin 4mg daily except 6mg on Sundays, Wednesdays, and Fridays. Recheck INR in 3 week(s). Patient verbalized understanding of dosing directions and information discussed. Dosing schedule given to patient including phone number, appointment date, and time. Progress note sent to referring office. Patient acknowledges working in consult agreement with pharmacist as referred by his/her physician.       Electronically signed by Richard Snellen, [facility] on 5/22/23 at 9:51 AM EDT    For Pharmacy Admin Tracking Only    Intervention Detail: Dose Adjustment: 1, reason: Therapy Optimization  Total # of Interventions Recommended: 1  Total # of Interventions Accepted: 1  Time Spent (min): 15

## 2023-06-26 ENCOUNTER — TELEPHONE (OUTPATIENT)
Dept: CARDIOLOGY CLINIC | Age: 63
End: 2023-06-26

## 2023-07-06 ENCOUNTER — TELEPHONE (OUTPATIENT)
Dept: PHARMACY | Age: 63
End: 2023-07-06

## 2023-07-10 DIAGNOSIS — I48.20 CHRONIC ATRIAL FIBRILLATION (HCC): Primary | ICD-10-CM

## 2023-07-13 RX ORDER — FERROUS SULFATE 325(65) MG
TABLET ORAL
Qty: 60 TABLET | Refills: 11 | Status: SHIPPED | OUTPATIENT
Start: 2023-07-13 | End: 2023-07-13

## 2023-07-14 ENCOUNTER — TELEPHONE (OUTPATIENT)
Dept: PHARMACY | Age: 63
End: 2023-07-14

## 2023-07-19 ENCOUNTER — APPOINTMENT (OUTPATIENT)
Dept: GENERAL RADIOLOGY | Age: 63
End: 2023-07-19
Payer: MEDICARE

## 2023-07-19 ENCOUNTER — HOSPITAL ENCOUNTER (EMERGENCY)
Age: 63
Discharge: HOME | End: 2023-07-19
Payer: MEDICARE

## 2023-07-19 VITALS
HEART RATE: 63 BPM | DIASTOLIC BLOOD PRESSURE: 83 MMHG | RESPIRATION RATE: 18 BRPM | BODY MASS INDEX: 33.11 KG/M2 | OXYGEN SATURATION: 99 % | SYSTOLIC BLOOD PRESSURE: 139 MMHG | WEIGHT: 258 LBS | HEIGHT: 74 IN | TEMPERATURE: 98 F

## 2023-07-19 DIAGNOSIS — M10.9 ACUTE GOUT OF LEFT FOOT, UNSPECIFIED CAUSE: Primary | ICD-10-CM

## 2023-07-19 PROCEDURE — 6360000002 HC RX W HCPCS: Performed by: NURSE PRACTITIONER

## 2023-07-19 PROCEDURE — 6370000000 HC RX 637 (ALT 250 FOR IP): Performed by: NURSE PRACTITIONER

## 2023-07-19 PROCEDURE — 73630 X-RAY EXAM OF FOOT: CPT

## 2023-07-19 PROCEDURE — 99283 EMERGENCY DEPT VISIT LOW MDM: CPT

## 2023-07-19 RX ORDER — OXYCODONE HYDROCHLORIDE AND ACETAMINOPHEN 5; 325 MG/1; MG/1
1 TABLET ORAL ONCE
Status: COMPLETED | OUTPATIENT
Start: 2023-07-19 | End: 2023-07-19

## 2023-07-19 RX ORDER — PREDNISONE 20 MG/1
40 TABLET ORAL DAILY
Qty: 10 TABLET | Refills: 0 | Status: SHIPPED | OUTPATIENT
Start: 2023-07-19 | End: 2023-07-24

## 2023-07-19 RX ORDER — DEXAMETHASONE SODIUM PHOSPHATE 10 MG/ML
10 INJECTION, SOLUTION INTRAMUSCULAR; INTRAVENOUS ONCE
Status: COMPLETED | OUTPATIENT
Start: 2023-07-19 | End: 2023-07-19

## 2023-07-19 RX ADMIN — DEXAMETHASONE SODIUM PHOSPHATE 10 MG: 10 INJECTION, SOLUTION INTRAMUSCULAR; INTRAVENOUS at 10:47

## 2023-07-19 RX ADMIN — OXYCODONE HYDROCHLORIDE AND ACETAMINOPHEN 1 TABLET: 5; 325 TABLET ORAL at 10:47

## 2023-07-19 ASSESSMENT — PAIN DESCRIPTION - ORIENTATION: ORIENTATION: LEFT

## 2023-07-19 ASSESSMENT — PAIN DESCRIPTION - LOCATION: LOCATION: FOOT

## 2023-07-19 ASSESSMENT — PAIN SCALES - GENERAL: PAINLEVEL_OUTOF10: 9

## 2023-07-19 NOTE — ED PROVIDER NOTES
[address] ENCOUNTER        Pt Name: Samara Rosado  MRN: [id number]  [facility]  Date of evaluation: 7/19/2023  Provider: TREY Iverson - CNP  PCP: Tereso Soto MD    JANEY. I have evaluated this patient. Triage CHIEF COMPLAINT       Chief Complaint   Patient presents with    Foot Pain     States he has gout in left foot, started yesterday. Hx of gout         HISTORY OF PRESENT ILLNESS      Chief Complaint: Left foot pain    Samara Rosado is a 59 y.o. male who presents for evaluation of left foot pain for the last couple of days. Patient has a history of gout and typically gets flares over the dorsal midfoot which he reports is where the pain is today. Denies any injury. He has been having more flares in recent months, last was in May and was treated with steroids. He does have a history of diabetes but reports his blood sugars have been well controlled in the 110s at home recently. He has a follow-up appointment with his primary care physician in the next couple of weeks where they were going to discuss putting him on allopurinol. Nursing Notes were all reviewed and agreed with or any disagreements were addressed in the HPI. REVIEW OF SYSTEMS     Pertinent ROS as noted in HPI. PAST MEDICAL HISTORY     Past Medical History:   Diagnosis Date    Abnormal nuclear stress test     Atrial fibrillation ([address]) 11/2013    CAD (coronary artery disease)     Follows with Dr. Wendy Yee    CHF (congestive heart failure) ([facility]     COPD (chronic obstructive pulmonary disease) ([address])     Follows with PCP    Decreased cardiac ejection fraction     Diabetes mellitus ([address])     GERD (gastroesophageal reflux disease)     H/O cardiovascular stress test 11/20/13, 3/20/2013    11/13-Observed defect is consistent with diaphragmatic attenuation. EF 58%. 3/13 -EF 51%. No ischemia. Normal Study.     H/O

## 2023-07-31 ENCOUNTER — TELEPHONE (OUTPATIENT)
Dept: PHARMACY | Age: 63
End: 2023-07-31

## 2023-07-31 NOTE — TELEPHONE ENCOUNTER
Called patient to schedule. Patient states he still doesn't have a ride and not ready to schedule. Advised patient he will be discharged if not seen in office by 8/22/23. Patient agreed to schedule for 8/10/23. For Pharmacy Admin Tracking Only    Intervention Detail:   Total # of Interventions Recommended:    Total # of Interventions Accepted:   Time Spent (min): 5

## 2023-08-10 ENCOUNTER — TELEPHONE (OUTPATIENT)
Dept: PHARMACY | Age: 63
End: 2023-08-10

## 2023-08-17 ENCOUNTER — TELEPHONE (OUTPATIENT)
Dept: PHARMACY | Age: 63
End: 2023-08-17

## 2023-08-17 NOTE — TELEPHONE ENCOUNTER
Patient no show to last appointment. Called patient. Scheduled for 8/21.     For Pharmacy Admin Tracking Only    Time Spent (min): 5

## 2023-08-21 ENCOUNTER — OFFICE VISIT (OUTPATIENT)
Dept: CARDIOLOGY CLINIC | Age: 63
End: 2023-08-21
Payer: MEDICARE

## 2023-08-21 ENCOUNTER — ANTI-COAG VISIT (OUTPATIENT)
Dept: PHARMACY | Age: 63
End: 2023-08-21
Payer: MEDICARE

## 2023-08-21 VITALS
BODY MASS INDEX: 30.88 KG/M2 | DIASTOLIC BLOOD PRESSURE: 70 MMHG | SYSTOLIC BLOOD PRESSURE: 112 MMHG | WEIGHT: 240.6 LBS | OXYGEN SATURATION: 96 % | HEART RATE: 63 BPM | HEIGHT: 74 IN

## 2023-08-21 DIAGNOSIS — I48.0 PAROXYSMAL A-FIB (HCC): Primary | ICD-10-CM

## 2023-08-21 DIAGNOSIS — I25.5 ISCHEMIC CARDIOMYOPATHY: ICD-10-CM

## 2023-08-21 LAB
INTERNATIONAL NORMALIZATION RATIO, POC: 2.4
POC INR: 2.4 INDEX
PROTHROMBIN TIME, POC: 29.3 SECONDS (ref 10–14.3)

## 2023-08-21 PROCEDURE — 4004F PT TOBACCO SCREEN RCVD TLK: CPT | Performed by: NURSE PRACTITIONER

## 2023-08-21 PROCEDURE — 3078F DIAST BP <80 MM HG: CPT | Performed by: NURSE PRACTITIONER

## 2023-08-21 PROCEDURE — 99214 OFFICE O/P EST MOD 30 MIN: CPT | Performed by: NURSE PRACTITIONER

## 2023-08-21 PROCEDURE — G8427 DOCREV CUR MEDS BY ELIG CLIN: HCPCS | Performed by: NURSE PRACTITIONER

## 2023-08-21 PROCEDURE — 85610 PROTHROMBIN TIME: CPT

## 2023-08-21 PROCEDURE — 36416 COLLJ CAPILLARY BLOOD SPEC: CPT

## 2023-08-21 PROCEDURE — G8417 CALC BMI ABV UP PARAM F/U: HCPCS | Performed by: NURSE PRACTITIONER

## 2023-08-21 PROCEDURE — 3074F SYST BP LT 130 MM HG: CPT | Performed by: NURSE PRACTITIONER

## 2023-08-21 PROCEDURE — 3017F COLORECTAL CA SCREEN DOC REV: CPT | Performed by: NURSE PRACTITIONER

## 2023-08-21 PROCEDURE — 99211 OFF/OP EST MAY X REQ PHY/QHP: CPT

## 2023-08-21 ASSESSMENT — ENCOUNTER SYMPTOMS: SHORTNESS OF BREATH: 0

## 2023-08-21 NOTE — PATIENT INSTRUCTIONS
[facility] Laboratory Locations - No appointment necessary. Sites open Monday to Friday. Call your preferred location for test preparation, business   hours and other information you need. [employer]. [address] [facility]. [address] [place], [address] [facility]  Phone: [phone]     **It is YOUR responsibilty to bring medication bottles and/or updated medication list to [address] [facility]. This will allow us to better serve you and all your healthcare needs**    Please be informed that if you contact our office outside of normal business hours the physician on call cannot help with any scheduling or rescheduling issues, procedure instruction questions or any type of medication issue. We advise you for any urgent/emergency that you go to the nearest emergency room! PLEASE CALL OUR OFFICE DURING NORMAL BUSINESS HOURS    Monday - Friday   8 am to 5 pm    [place]: [address] [place]: [phone]    [place]:  [phone]    Thank you for allowing us to care for you today! We want to ensure we can follow your treatment plan and we strive to give you the best outcomes and experience possible. If you ever have a life threatening emergency and call 911 - for an ambulance (EMS)   Our providers can only care for you at:   [facility] or [facility]. Even if you have someone take you or you drive yourself we can only care for you in a [facility] facility. Our providers are not setup at the other healthcare locations! We are committed to providing you the best care possible. If you receive a survey after visiting one of our offices, please take time to share your experience concerning your physician office visit. These surveys are confidential and no health information about you is shared. We are eager to improve for you and we are counting on your feedback to help make that happen.

## 2023-08-21 NOTE — PROGRESS NOTES
DAVID ([facility]HABILITATION [address], [address] [place] [address]  Phone: [phone]    Fax [phone]                  Javi Pyle MD, Erasmo Clemente MD, [address] MD, MD Maureen Baer MD Consuelo Breaker, MD Candido Pinna, APRN      Saima Geronimo, APRN  Willian Ano, APRN     Cassidy Dover, APRN    CARDIOLOGY  NOTE      8/21/2023    RE: Allen Dover  (1960)                               TO:  Dr. Junior Trinidad MD  The primary cardiologist is Dr. Alejandro Churchill     CC:   1. Paroxysmal A-fib [facility])            Patient denies all of the following:  Chest Pain  Palpitations  Dizziness  Syncope      HPI: Thank you for involving me in taking care of your patient Allen Dover, who is a  59y.o. year old male with a history as listed above. Patient presents to the office for follow up on CAD, HTN, CHF, A-fib, TAA, venous insufficiency and hyperlipidemia. Patient was seen multiple times in emergency department for chest pain, shortness of breath, and elevated BP but left AMA. He was recently hospitalized for CHF and was aggressively diuresed in July of 2022. Patient had venous ablation of right lower extremity on 8/1/22 and has some improvement in edema and pain. Patient was hospitalized for uncontrolled diabetes and STEMI found to have 2/4 patent grafts now.       Vitals:    08/21/23 1054   BP: 112/70   Pulse: 63   SpO2: 96%       Current Outpatient Medications   Medication Sig Dispense Refill    montelukast (SINGULAIR) 10 MG tablet TAKE ONE TABLET BY MOUTH DAILY AT 9AM (VIAL) 30 tablet 11    albuterol (PROVENTIL) (2.5 MG/3ML) 0.083% nebulizer solution Take 3 mLs by nebulization every 6 hours as needed for Wheezing 120 each 3    ipratropium (ATROVENT) 0.02 % nebulizer solution Take 2.5 mLs by nebulization 4 times daily 2.5 mL 3    albuterol (PROVENTIL) (2.5 MG/3ML) 0.083% nebulizer solution Take 3 mLs by nebulization every 6 hours as

## 2023-08-21 NOTE — PROGRESS NOTES
Medication Management Service  [facility]  [phone]    Visit Date: 8/21/2023   Subjective:       Pedro Luis Kowalski is a 59 y.o. male who presents to clinic today for anticoagulation monitoring and adjustment. Patient seen in clinic for warfarin management due to  Indication:   atrial fibrillation. INR goal: of 2.0-3.0. Duration of therapy: indefinite. Patient reports the following:   Adherent with regimen  Missed or extra doses:  None   Bleeding or thromboembolic side effects:  None  Significant medication changes:  None  Significant dietary changes: None  Significant alcohol or tobacco changes: None  Significant recent illness, disease state changes, or hospitalization:  None  Upcoming surgeries or procedures:  None  Falls: None           Assessment and Plan     PT/INR done in office per protocol. INR today is 2.4, therapeutic. Plan: Will continue current regimen of warfarin 4mg daily except 6mg on Sundays, Wednesdays, and Fridays. Recheck INR in 5 week(s). Patient verbalized understanding of dosing directions and information discussed. Dosing schedule given to patient including phone number, appointment date, and time. Progress note sent to referring office. Electronically signed by DARA [facility] on 8/21/23     For Pharmacy Admin Tracking Only    Intervention Detail:   Total # of Interventions Recommended:    Total # of Interventions Accepted:   Time Spent (min): 15

## 2023-09-08 ENCOUNTER — HOSPITAL ENCOUNTER (OUTPATIENT)
Age: 63
Discharge: HOME | End: 2023-09-08
Payer: MEDICARE

## 2023-09-08 DIAGNOSIS — N18.32 STAGE 3B CHRONIC KIDNEY DISEASE (HCC): ICD-10-CM

## 2023-09-08 LAB
ALBUMIN SERPL-MCNC: 5.1 GM/DL (ref 3.4–5)
ANION GAP SERPL CALCULATED.3IONS-SCNC: 13 MMOL/L (ref 4–16)
BACTERIA: NEGATIVE /HPF
BASOPHILS ABSOLUTE: 0.1 K/CU MM
BASOPHILS RELATIVE PERCENT: 0.8 % (ref 0–1)
BILIRUBIN URINE: NEGATIVE MG/DL
BLOOD, URINE: NEGATIVE
BUN SERPL-MCNC: 72 MG/DL (ref 6–23)
CALCIUM SERPL-MCNC: 9.7 MG/DL (ref 8.3–10.6)
CHLORIDE BLD-SCNC: 95 MMOL/L (ref 99–110)
CLARITY: CLEAR
CO2: 28 MMOL/L (ref 21–32)
COLOR: YELLOW
CREAT SERPL-MCNC: 3.9 MG/DL (ref 0.9–1.3)
CREATININE URINE: 147.5 MG/DL (ref 39–259)
DIFFERENTIAL TYPE: ABNORMAL
EOSINOPHILS ABSOLUTE: 0.5 K/CU MM
EOSINOPHILS RELATIVE PERCENT: 4.3 % (ref 0–3)
GFR SERPL CREATININE-BSD FRML MDRD: 17 ML/MIN/1.73M2
GLUCOSE SERPL-MCNC: 114 MG/DL (ref 70–99)
GLUCOSE, URINE: NEGATIVE MG/DL
HCT VFR BLD CALC: 42.4 % (ref 42–52)
HEMOGLOBIN: 13.9 GM/DL (ref 13.5–18)
IMMATURE NEUTROPHIL %: 0.4 % (ref 0–0.43)
KETONES, URINE: NEGATIVE MG/DL
LEUKOCYTE ESTERASE, URINE: NEGATIVE
LYMPHOCYTES ABSOLUTE: 1.8 K/CU MM
LYMPHOCYTES RELATIVE PERCENT: 16.8 % (ref 24–44)
MAGNESIUM: 2.8 MG/DL (ref 1.8–2.4)
MCH RBC QN AUTO: 30.1 PG (ref 27–31)
MCHC RBC AUTO-ENTMCNC: 32.8 % (ref 32–36)
MCV RBC AUTO: 91.8 FL (ref 78–100)
MONOCYTES ABSOLUTE: 0.8 K/CU MM
MONOCYTES RELATIVE PERCENT: 7.2 % (ref 0–4)
NITRITE URINE, QUANTITATIVE: NEGATIVE
NUCLEATED RBC %: 0 %
PDW BLD-RTO: 13.3 % (ref 11.7–14.9)
PH, URINE: 6 (ref 5–8)
PHOSPHORUS: 4.9 MG/DL (ref 2.5–4.9)
PLATELET # BLD: 226 K/CU MM (ref 140–440)
PMV BLD AUTO: 10.7 FL (ref 7.5–11.1)
POTASSIUM SERPL-SCNC: 4 MMOL/L (ref 3.5–5.1)
PROT/CREAT RATIO, UR: 0.5
PROTEIN UA: 100 MG/DL
RBC # BLD: 4.62 M/CU MM (ref 4.6–6.2)
RBC URINE: 0 /HPF (ref 0–3)
SEGMENTED NEUTROPHILS ABSOLUTE COUNT: 7.3 K/CU MM
SEGMENTED NEUTROPHILS RELATIVE PERCENT: 70.5 % (ref 36–66)
SODIUM BLD-SCNC: 136 MMOL/L (ref 135–145)
SPECIFIC GRAVITY UA: 1.01 (ref 1–1.03)
TOTAL IMMATURE NEUTOROPHIL: 0.04 K/CU MM
TOTAL NUCLEATED RBC: 0 K/CU MM
TRICHOMONAS: NORMAL /HPF
URINE TOTAL PROTEIN: 73.9 MG/DL
UROBILINOGEN, URINE: 0.2 MG/DL (ref 0.2–1)
WBC # BLD: 10.4 K/CU MM (ref 4–10.5)
WBC UA: <1 /HPF (ref 0–2)

## 2023-09-08 PROCEDURE — 82040 ASSAY OF SERUM ALBUMIN: CPT

## 2023-09-08 PROCEDURE — 85025 COMPLETE CBC W/AUTO DIFF WBC: CPT

## 2023-09-08 PROCEDURE — 80048 BASIC METABOLIC PNL TOTAL CA: CPT

## 2023-09-08 PROCEDURE — 83735 ASSAY OF MAGNESIUM: CPT

## 2023-09-08 PROCEDURE — 36415 COLL VENOUS BLD VENIPUNCTURE: CPT

## 2023-09-08 PROCEDURE — 82570 ASSAY OF URINE CREATININE: CPT

## 2023-09-08 PROCEDURE — 81001 URINALYSIS AUTO W/SCOPE: CPT

## 2023-09-08 PROCEDURE — 84100 ASSAY OF PHOSPHORUS: CPT

## 2023-09-08 PROCEDURE — 84156 ASSAY OF PROTEIN URINE: CPT

## 2023-09-11 PROBLEM — N18.4 CHRONIC KIDNEY DISEASE, STAGE IV (SEVERE) (HCC): Status: ACTIVE | Noted: 2023-09-11

## 2023-09-21 ENCOUNTER — TELEPHONE (OUTPATIENT)
Dept: PHARMACY | Age: 63
End: 2023-09-21

## 2023-09-21 NOTE — TELEPHONE ENCOUNTER
He left message to cancel Monday, no ride. Called patient, scheduled for 9/28 at 9:45am.     For Pharmacy Admin Tracking Only    Intervention Detail:   Total # of Interventions Recommended:    Total # of Interventions Accepted:   Time Spent (min): 5

## 2023-09-28 ENCOUNTER — TELEPHONE (OUTPATIENT)
Dept: PHARMACY | Age: 63
End: 2023-09-28

## 2023-09-28 NOTE — TELEPHONE ENCOUNTER
Patient called, scheduled for 10/5. For Pharmacy Admin Tracking Only    Intervention Detail:   Total # of Interventions Recommended:    Total # of Interventions Accepted:   Time Spent (min): 5

## 2023-09-28 NOTE — TELEPHONE ENCOUNTER
Patient left message to cancel due to no ride. For Pharmacy Admin Tracking Only    Intervention Detail:   Total # of Interventions Recommended:    Total # of Interventions Accepted:   Time Spent (min): 5

## 2023-10-05 ENCOUNTER — ANTI-COAG VISIT (OUTPATIENT)
Dept: PHARMACY | Age: 63
End: 2023-10-05
Payer: MEDICARE

## 2023-10-05 LAB
INTERNATIONAL NORMALIZATION RATIO, POC: 2.4
POC INR: 2.4 INDEX
PROTHROMBIN TIME, POC: 28.3 SECONDS (ref 10–14.3)

## 2023-10-05 PROCEDURE — 99211 OFF/OP EST MAY X REQ PHY/QHP: CPT

## 2023-10-05 PROCEDURE — 85610 PROTHROMBIN TIME: CPT

## 2023-10-05 PROCEDURE — 36416 COLLJ CAPILLARY BLOOD SPEC: CPT

## 2023-10-05 NOTE — PROGRESS NOTES
Medication Management Service  [facility]  [phone]    Visit Date: 10/5/2023   Subjective:       Galilea Acosta is a 59 y.o. male who presents to clinic today for anticoagulation monitoring and adjustment. Patient seen in clinic for warfarin management due to  Indication:   atrial fibrillation. INR goal: of 2.0-3.0. Duration of therapy: indefinite. Patient reports the following:   Adherent with regimen  Missed or extra doses:  None   Bleeding or thromboembolic side effects:  None  Significant medication changes:  None  Significant dietary changes: sanjay  Significant alcohol or tobacco changes: None  Significant recent illness, disease state changes, or hospitalization:  None  Upcoming surgeries or procedures:  None  Falls: passed out while getting out of car and fell, did not hit head. Has appt with PCP           Assessment and Plan     PT/INR done in office per protocol. INR today is 2.4, therapeutic. Plan: Will continue current regimen of warfarin 4mg daily except 6mg on Sundays, Wednesdays, and Fridays. Recheck INR in 4 week(s). Patient verbalized understanding of dosing directions and information discussed. Dosing schedule given to patient including phone number, appointment date, and time. Progress note sent to referring office. Electronically signed by Shilpi Pete [facility] on 10/5/23     For Pharmacy Admin Tracking Only    Intervention Detail:   Total # of Interventions Recommended:    Total # of Interventions Accepted:   Time Spent (min): 15

## 2023-10-06 NOTE — ED PROVIDER NOTES
ED attending EKG interpretation (I otherwise did not participate in the care of this patient)       EKG Interpretation  Interpreted by me  Compared to 7/11/19  Rhythm: sinus bradycardia  Rate: bradycardic 50  Axis: normal  Ectopy: none  Conduction: normal  ST Segments: no acute change  T Waves: no acute change  Clinical Impression: sinus bradycardia, no acute changes     Maryuri Hammond MD  07/18/19 6065
High Dose flu vaccination administered today
Denies focal weakness or sensory changes   Endocrine:  Denies polyuria or polydypsia   Lymphatic:  Denies swollen glands     All other review of systems are negative  See HPI and nursing notes for additional information     PAST MEDICAL & SURGICAL HISTORY    Past Medical History:   Diagnosis Date    Atrial fibrillation ([facility] [address] 11/2013    CAD (coronary artery disease)     Diabetes mellitus ([facility] [address]     GERD (gastroesophageal reflux disease)     H/O cardiovascular stress test 11/20/13, 3/20/2013    11/13-Observed defect is consistent with diaphragmatic attenuation. EF 58%. 3/13 -EF 51%. No ischemia. Normal Study.  H/O cardiovascular stress test 06/08/2017    EF 50% normal study    H/O cardiovascular stress test 06/17/2019    Normal NM    H/O Doppler ultrasound 10/14/2010    10/2010-Bilateral venous doppler of lower extremies- No DVTs.  H/O echocardiogram 06/26/2019    Left ventricular systolic function is normal with an ejection fraction of 50-55%. Mild concentric left ventricular hypertrophy. Grade I diastolic dysfunction. No significant valvular disease noted. No evidence of pericardial effusion. Essentially unremarkable echo.  H/O percutaneous left heart catheterization 12/22/15    No evidence of hemodynamically significant coronary artery disease    History of coronary artery stent placement 11/13/2013 11/13 -RCA - 3 stents placed    History of exercise stress test 06/08/2017    treadmill    History of Holter monitoring 1/4/2016    predominant rhythm sinus    Hyperlipidemia     Hypertension     S/P cardiac catheterization 11/13/2013 11/13/2013-EF 55%, distal LAD mild disease,CX stent patent,but distal to stent 50% stenosis. RCA severe disease.  Status post angioplasty with stent      Past Surgical History:   Procedure Laterality Date    APPENDECTOMY      CARDIAC CATHETERIZATION  11/13/2013 11/13/2013-EF 55%, distal LAD mild disease,CX stent patent,but distal to stent 50% stenosis.  RCA

## 2023-10-13 RX ORDER — NICOTINE 21 MG/24HR
PATCH, TRANSDERMAL 24 HOURS TRANSDERMAL
Refills: 0 | OUTPATIENT
Start: 2023-10-13

## 2023-10-16 RX ORDER — NICOTINE 21 MG/24HR
PATCH, TRANSDERMAL 24 HOURS TRANSDERMAL
Refills: 0 | OUTPATIENT
Start: 2023-10-16

## 2023-11-02 ENCOUNTER — TELEPHONE (OUTPATIENT)
Dept: PHARMACY | Age: 63
End: 2023-11-02

## 2023-11-02 ENCOUNTER — APPOINTMENT (OUTPATIENT)
Dept: PHARMACY | Age: 63
End: 2023-11-02
Payer: MEDICARE

## 2023-11-02 NOTE — TELEPHONE ENCOUNTER
Patient left message to cancel due to he didn't schedule ride. Returned call. Scheduled for 11/9     For Pharmacy Admin Tracking Only    Intervention Detail:   Total # of Interventions Recommended:    Total # of Interventions Accepted:   Time Spent (min): 5

## 2023-11-06 RX ORDER — BUMETANIDE 2 MG/1
2 TABLET ORAL 2 TIMES DAILY
Qty: 60 TABLET | Refills: 5 | Status: SHIPPED | OUTPATIENT
Start: 2023-11-06

## 2023-11-09 ENCOUNTER — ANTI-COAG VISIT (OUTPATIENT)
Dept: PHARMACY | Age: 63
End: 2023-11-09
Payer: MEDICARE

## 2023-11-09 LAB
INR BLD: 2
POC INR: 2 INDEX
PROTHROMBIN TIME, POC: 24.2 SECONDS (ref 10–14.3)
PROTIME: 24.2 SECONDS

## 2023-11-09 PROCEDURE — 99211 OFF/OP EST MAY X REQ PHY/QHP: CPT

## 2023-11-09 PROCEDURE — 85610 PROTHROMBIN TIME: CPT

## 2023-11-09 PROCEDURE — 36416 COLLJ CAPILLARY BLOOD SPEC: CPT

## 2023-11-09 NOTE — PROGRESS NOTES
Medication Management Service  [facility]  [phone]    Visit Date: 11/9/2023   Subjective:       Bianca Dumont is a 59 y.o. male who presents to clinic today for anticoagulation monitoring and adjustment. Patient seen in clinic for warfarin management due to  Indication:   atrial fibrillation. INR goal: of 2.0-3.0. Duration of therapy: indefinite. Patient reports the following:   Adherent with regimen  Missed or extra doses:  None   Bleeding or thromboembolic side effects:  None  Significant medication changes:  None  Significant dietary changes: None  Significant alcohol or tobacco changes: None  Significant recent illness, disease state changes, or hospitalization:  None  Upcoming surgeries or procedures:  None  Falls: None           Assessment and Plan     PT/INR done in office per protocol. INR today is 2, therapeutic. Plan: Will continue current regimen of warfarin 4mg daily except 6mg Sundays, Wednesdays, and Fridays. Recheck INR in 4 week(s). Patient verbalized understanding of dosing directions and information discussed. Dosing schedule given to patient including phone number, appointment date, and time. Progress note sent to referring office.     Electronically signed by DARA [facility] on 11/9/23     For Pharmacy Admin Tracking Only    Total # of Interventions Recommended: 0  Total # of Interventions Accepted: 0  Time Spent (min): 15

## 2023-11-20 ENCOUNTER — HOSPITAL ENCOUNTER (OUTPATIENT)
Age: 63
Discharge: HOME | End: 2023-11-20
Payer: MEDICARE

## 2023-11-20 ENCOUNTER — HOSPITAL ENCOUNTER (OUTPATIENT)
Dept: GENERAL RADIOLOGY | Age: 63
Discharge: HOME | End: 2023-11-20
Payer: MEDICARE

## 2023-11-20 DIAGNOSIS — F17.200 SMOKING: Chronic | ICD-10-CM

## 2023-11-20 DIAGNOSIS — J43.2 CENTRILOBULAR EMPHYSEMA (HCC): Chronic | ICD-10-CM

## 2023-11-20 DIAGNOSIS — E11.9 TYPE 2 DIABETES MELLITUS WITHOUT COMPLICATION, WITHOUT LONG-TERM CURRENT USE OF INSULIN (HCC): Chronic | ICD-10-CM

## 2023-11-20 DIAGNOSIS — N18.4 CHRONIC KIDNEY DISEASE, STAGE IV (SEVERE) (HCC): ICD-10-CM

## 2023-11-20 DIAGNOSIS — I50.43 ACUTE ON CHRONIC COMBINED SYSTOLIC AND DIASTOLIC CONGESTIVE HEART FAILURE (HCC): ICD-10-CM

## 2023-11-20 DIAGNOSIS — J43.2 CENTRILOBULAR EMPHYSEMA (HCC): ICD-10-CM

## 2023-11-20 DIAGNOSIS — J41.0 SIMPLE CHRONIC BRONCHITIS (HCC): ICD-10-CM

## 2023-11-20 LAB
ALBUMIN SERPL-MCNC: 4.3 GM/DL (ref 3.4–5)
ANION GAP SERPL CALCULATED.3IONS-SCNC: 12 MMOL/L (ref 4–16)
BASOPHILS ABSOLUTE: 0.1 K/CU MM
BASOPHILS RELATIVE PERCENT: 1.4 % (ref 0–1)
BILIRUBIN URINE: NEGATIVE MG/DL
BLOOD, URINE: NEGATIVE
BUN SERPL-MCNC: 96 MG/DL (ref 6–23)
CALCIUM SERPL-MCNC: 10 MG/DL (ref 8.3–10.6)
CHLORIDE BLD-SCNC: 98 MMOL/L (ref 99–110)
CLARITY: CLEAR
CO2: 30 MMOL/L (ref 21–32)
COLOR: YELLOW
COMMENT UA: NORMAL
CREAT SERPL-MCNC: 4.1 MG/DL (ref 0.9–1.3)
CREATININE URINE: 66.7 MG/DL (ref 39–259)
DIFFERENTIAL TYPE: ABNORMAL
EOSINOPHILS ABSOLUTE: 0.8 K/CU MM
EOSINOPHILS RELATIVE PERCENT: 8.8 % (ref 0–3)
GFR SERPL CREATININE-BSD FRML MDRD: 16 ML/MIN/1.73M2
GLUCOSE SERPL-MCNC: 90 MG/DL (ref 70–99)
GLUCOSE, URINE: NEGATIVE MG/DL
HCT VFR BLD CALC: 37.3 % (ref 42–52)
HEMOGLOBIN: 11.8 GM/DL (ref 13.5–18)
IMMATURE NEUTROPHIL %: 0.5 % (ref 0–0.43)
KETONES, URINE: NEGATIVE MG/DL
LEUKOCYTE ESTERASE, URINE: NEGATIVE
LYMPHOCYTES ABSOLUTE: 1.7 K/CU MM
LYMPHOCYTES RELATIVE PERCENT: 19.8 % (ref 24–44)
MCH RBC QN AUTO: 30.3 PG (ref 27–31)
MCHC RBC AUTO-ENTMCNC: 31.6 % (ref 32–36)
MCV RBC AUTO: 95.9 FL (ref 78–100)
MONOCYTES ABSOLUTE: 0.8 K/CU MM
MONOCYTES RELATIVE PERCENT: 9.3 % (ref 0–4)
NITRITE URINE, QUANTITATIVE: NEGATIVE
NUCLEATED RBC %: 0 %
PDW BLD-RTO: 14.3 % (ref 11.7–14.9)
PH, URINE: 5.5 (ref 5–8)
PHOSPHORUS: 5.5 MG/DL (ref 2.5–4.9)
PLATELET # BLD: 193 K/CU MM (ref 140–440)
PMV BLD AUTO: 11 FL (ref 7.5–11.1)
POTASSIUM SERPL-SCNC: 4.3 MMOL/L (ref 3.5–5.1)
PROT/CREAT RATIO, UR: 0.1
PROTEIN UA: NEGATIVE MG/DL
RBC # BLD: 3.89 M/CU MM (ref 4.6–6.2)
SEGMENTED NEUTROPHILS ABSOLUTE COUNT: 5.2 K/CU MM
SEGMENTED NEUTROPHILS RELATIVE PERCENT: 60.2 % (ref 36–66)
SODIUM BLD-SCNC: 140 MMOL/L (ref 135–145)
SPECIFIC GRAVITY UA: 1.01 (ref 1–1.03)
TOTAL IMMATURE NEUTOROPHIL: 0.04 K/CU MM
TOTAL NUCLEATED RBC: 0 K/CU MM
URINE TOTAL PROTEIN: 6 MG/DL
UROBILINOGEN, URINE: 0.2 MG/DL (ref 0.2–1)
WBC # BLD: 8.6 K/CU MM (ref 4–10.5)

## 2023-11-20 PROCEDURE — 71046 X-RAY EXAM CHEST 2 VIEWS: CPT

## 2023-11-20 PROCEDURE — 82040 ASSAY OF SERUM ALBUMIN: CPT

## 2023-11-20 PROCEDURE — 84156 ASSAY OF PROTEIN URINE: CPT

## 2023-11-20 PROCEDURE — 84100 ASSAY OF PHOSPHORUS: CPT

## 2023-11-20 PROCEDURE — 85025 COMPLETE CBC W/AUTO DIFF WBC: CPT

## 2023-11-20 PROCEDURE — 82570 ASSAY OF URINE CREATININE: CPT

## 2023-11-20 PROCEDURE — 36415 COLL VENOUS BLD VENIPUNCTURE: CPT

## 2023-11-20 PROCEDURE — 80048 BASIC METABOLIC PNL TOTAL CA: CPT

## 2023-11-20 PROCEDURE — 81003 URINALYSIS AUTO W/O SCOPE: CPT

## 2023-11-29 RX ORDER — WARFARIN SODIUM 4 MG/1
TABLET ORAL
Qty: 36 TABLET | Refills: 5 | Status: SHIPPED | OUTPATIENT
Start: 2023-11-29

## 2023-12-07 ENCOUNTER — TELEPHONE (OUTPATIENT)
Dept: PHARMACY | Age: 63
End: 2023-12-07

## 2023-12-07 NOTE — TELEPHONE ENCOUNTER
Patient left message to cancel next appointment. Returned call and scheduled for 12-14. For Pharmacy Admin Tracking Only    Intervention Detail:   Total # of Interventions Recommended:    Total # of Interventions Accepted:   Time Spent (min): 5

## 2023-12-08 ENCOUNTER — HOSPITAL ENCOUNTER (OUTPATIENT)
Age: 63
Discharge: HOME | End: 2023-12-08
Payer: MEDICARE

## 2023-12-08 LAB
BASOPHILS ABSOLUTE: 0.1 K/CU MM
BASOPHILS RELATIVE PERCENT: 1.5 % (ref 0–1)
CHOLEST SERPL-MCNC: 201 MG/DL
DIFFERENTIAL TYPE: ABNORMAL
EOSINOPHILS ABSOLUTE: 0.7 K/CU MM
EOSINOPHILS RELATIVE PERCENT: 7.6 % (ref 0–3)
ESTIMATED AVERAGE GLUCOSE: 103 MG/DL
HBA1C MFR BLD: 5.2 % (ref 4.2–6.3)
HCT VFR BLD CALC: 39.2 % (ref 42–52)
HDLC SERPL-MCNC: 33 MG/DL
HEMOGLOBIN: 12.2 GM/DL (ref 13.5–18)
IMMATURE NEUTROPHIL %: 0.5 % (ref 0–0.43)
LDLC SERPL CALC-MCNC: 122 MG/DL
LYMPHOCYTES ABSOLUTE: 1.9 K/CU MM
LYMPHOCYTES RELATIVE PERCENT: 22.2 % (ref 24–44)
MCH RBC QN AUTO: 30.5 PG (ref 27–31)
MCHC RBC AUTO-ENTMCNC: 31.1 % (ref 32–36)
MCV RBC AUTO: 98 FL (ref 78–100)
MONOCYTES ABSOLUTE: 0.5 K/CU MM
MONOCYTES RELATIVE PERCENT: 6 % (ref 0–4)
NUCLEATED RBC %: 0 %
PDW BLD-RTO: 14.1 % (ref 11.7–14.9)
PLATELET # BLD: 217 K/CU MM (ref 140–440)
PMV BLD AUTO: 10.5 FL (ref 7.5–11.1)
RBC # BLD: 4 M/CU MM (ref 4.6–6.2)
SEGMENTED NEUTROPHILS ABSOLUTE COUNT: 5.3 K/CU MM
SEGMENTED NEUTROPHILS RELATIVE PERCENT: 62.2 % (ref 36–66)
TOTAL IMMATURE NEUTOROPHIL: 0.04 K/CU MM
TOTAL NUCLEATED RBC: 0 K/CU MM
TRIGL SERPL-MCNC: 230 MG/DL
TSH SERPL DL<=0.005 MIU/L-ACNC: 0.78 UIU/ML (ref 0.27–4.2)
WBC # BLD: 8.5 K/CU MM (ref 4–10.5)

## 2023-12-08 PROCEDURE — 84443 ASSAY THYROID STIM HORMONE: CPT

## 2023-12-08 PROCEDURE — 83036 HEMOGLOBIN GLYCOSYLATED A1C: CPT

## 2023-12-08 PROCEDURE — 36415 COLL VENOUS BLD VENIPUNCTURE: CPT

## 2023-12-08 PROCEDURE — 80061 LIPID PANEL: CPT

## 2023-12-08 PROCEDURE — 85025 COMPLETE CBC W/AUTO DIFF WBC: CPT

## 2023-12-14 ENCOUNTER — TELEPHONE (OUTPATIENT)
Dept: PHARMACY | Age: 63
End: 2023-12-14

## 2023-12-14 NOTE — TELEPHONE ENCOUNTER
Patient left  requesting to reschedule. He forgot to set up a ride for 12/14. Returned call. No answer. Left  requesting call back to schedule. Cancelled appointment for today.     For Pharmacy Admin Tracking Only    Time Spent (min): 5

## 2024-01-01 NOTE — PROGRESS NOTES
[id number] Score for Atrial Fibrillation Stroke Risk   Risk   Factors  Component Value   C CHF Yes 1   H HTN Yes 1   A2 Age >= 76 No,  (63 y.o.) 0   D DM Yes 1   S2 Prior Stroke/TIA Yes 2   V Vascular Disease Yes 1   A Age 74-69 No,  (63 y.o.) 0   Sc Sex male 0    [id number]  Score  6   Score last updated 9/20/22 42:17 AM EDT    Click here for a link to the UpToDate guideline \"Atrial Fibrillation: Anticoagulation therapy to prevent embolization    Disclaimer: Risk Score calculation is dependent on accuracy of patient problem list and past encounter diagnosis. knowledge deficit

## 2024-01-02 ENCOUNTER — TELEPHONE (OUTPATIENT)
Dept: PHARMACY | Age: 64
End: 2024-01-02

## 2024-01-02 NOTE — TELEPHONE ENCOUNTER
Patient left  stating he is unable to attend appointment tomorrow. He forgot to set up his ride.    Returned call. Scheduled for 1/11 and reminded patient to set up ride. Patient wrote down appointment and voiced understanding.    For Pharmacy Admin Tracking Only    Time Spent (min): 5

## 2024-01-09 ENCOUNTER — APPOINTMENT (OUTPATIENT)
Dept: CT IMAGING | Age: 64
End: 2024-01-09
Payer: MEDICARE

## 2024-01-09 ENCOUNTER — HOSPITAL ENCOUNTER (EMERGENCY)
Age: 64
Discharge: HOME | End: 2024-01-09
Attending: EMERGENCY MEDICINE
Payer: MEDICARE

## 2024-01-09 VITALS
SYSTOLIC BLOOD PRESSURE: 136 MMHG | BODY MASS INDEX: 33.38 KG/M2 | HEART RATE: 64 BPM | TEMPERATURE: 97.9 F | OXYGEN SATURATION: 95 % | WEIGHT: 260 LBS | RESPIRATION RATE: 18 BRPM | DIASTOLIC BLOOD PRESSURE: 88 MMHG

## 2024-01-09 DIAGNOSIS — S39.012A BACK STRAIN, INITIAL ENCOUNTER: Primary | ICD-10-CM

## 2024-01-09 LAB
ALBUMIN SERPL-MCNC: 4 GM/DL (ref 3.4–5)
ALP BLD-CCNC: 101 IU/L (ref 40–129)
ALT SERPL-CCNC: 10 U/L (ref 10–40)
AMPHETAMINES: NEGATIVE
ANION GAP SERPL CALCULATED.3IONS-SCNC: 13 MMOL/L (ref 7–16)
AST SERPL-CCNC: 17 IU/L (ref 15–37)
BACTERIA: ABNORMAL /HPF
BARBITURATE SCREEN URINE: NEGATIVE
BASOPHILS ABSOLUTE: 0.1 K/CU MM
BASOPHILS RELATIVE PERCENT: 1.1 % (ref 0–1)
BENZODIAZEPINE SCREEN, URINE: NEGATIVE
BILIRUB SERPL-MCNC: 0.1 MG/DL (ref 0–1)
BILIRUBIN URINE: NEGATIVE MG/DL
BLOOD, URINE: NEGATIVE
BUN SERPL-MCNC: 48 MG/DL (ref 6–23)
CALCIUM SERPL-MCNC: 9.1 MG/DL (ref 8.3–10.6)
CANNABINOID SCREEN URINE: NEGATIVE
CHLORIDE BLD-SCNC: 104 MMOL/L (ref 99–110)
CLARITY: CLEAR
CO2: 20 MMOL/L (ref 21–32)
COCAINE METABOLITE: ABNORMAL
COLOR: YELLOW
CREAT SERPL-MCNC: 2.8 MG/DL (ref 0.9–1.3)
DIFFERENTIAL TYPE: ABNORMAL
EOSINOPHILS ABSOLUTE: 0.4 K/CU MM
EOSINOPHILS RELATIVE PERCENT: 4.1 % (ref 0–3)
FENTANYL URINE: ABNORMAL
GFR SERPL CREATININE-BSD FRML MDRD: 25 ML/MIN/1.73M2
GLUCOSE SERPL-MCNC: 77 MG/DL (ref 70–99)
GLUCOSE, URINE: 100 MG/DL
HCT VFR BLD CALC: 40.4 % (ref 42–52)
HEMOGLOBIN: 12.7 GM/DL (ref 13.5–18)
IMMATURE NEUTROPHIL %: 0.3 % (ref 0–0.43)
KETONES, URINE: NEGATIVE MG/DL
LEUKOCYTE ESTERASE, URINE: NEGATIVE
LYMPHOCYTES ABSOLUTE: 1.4 K/CU MM
LYMPHOCYTES RELATIVE PERCENT: 14.7 % (ref 24–44)
MCH RBC QN AUTO: 29.9 PG (ref 27–31)
MCHC RBC AUTO-ENTMCNC: 31.4 % (ref 32–36)
MCV RBC AUTO: 95.1 FL (ref 78–100)
MONOCYTES ABSOLUTE: 0.7 K/CU MM
MONOCYTES RELATIVE PERCENT: 7.7 % (ref 0–4)
NITRITE URINE, QUANTITATIVE: NEGATIVE
NUCLEATED RBC %: 0 %
OPIATES, URINE: NEGATIVE
OXYCODONE: NEGATIVE
PDW BLD-RTO: 13.9 % (ref 11.7–14.9)
PH, URINE: 5.5 (ref 5–8)
PLATELET # BLD: 233 K/CU MM (ref 140–440)
PMV BLD AUTO: 10.4 FL (ref 7.5–11.1)
POTASSIUM SERPL-SCNC: 4.6 MMOL/L (ref 3.5–5.1)
PROTEIN UA: ABNORMAL MG/DL
RBC # BLD: 4.25 M/CU MM (ref 4.6–6.2)
RBC URINE: <1 /HPF (ref 0–3)
SEGMENTED NEUTROPHILS ABSOLUTE COUNT: 6.8 K/CU MM
SEGMENTED NEUTROPHILS RELATIVE PERCENT: 72.1 % (ref 36–66)
SODIUM BLD-SCNC: 137 MMOL/L (ref 135–145)
SPECIFIC GRAVITY UA: 1.01 (ref 1–1.03)
SQUAMOUS EPITHELIAL: 1 /HPF
TOTAL IMMATURE NEUTOROPHIL: 0.03 K/CU MM
TOTAL NUCLEATED RBC: 0 K/CU MM
TOTAL PROTEIN: 7.5 GM/DL (ref 6.4–8.2)
TRICHOMONAS: ABNORMAL /HPF
TROPONIN, HIGH SENSITIVITY: 59 NG/L (ref 0–22)
TROPONIN, HIGH SENSITIVITY: 60 NG/L (ref 0–22)
UROBILINOGEN, URINE: 0.2 MG/DL (ref 0.2–1)
WBC # BLD: 9.5 K/CU MM (ref 4–10.5)
WBC UA: 3 /HPF (ref 0–2)

## 2024-01-09 PROCEDURE — 6360000002 HC RX W HCPCS: Performed by: EMERGENCY MEDICINE

## 2024-01-09 PROCEDURE — 96374 THER/PROPH/DIAG INJ IV PUSH: CPT

## 2024-01-09 PROCEDURE — 80053 COMPREHEN METABOLIC PANEL: CPT

## 2024-01-09 PROCEDURE — 81001 URINALYSIS AUTO W/SCOPE: CPT

## 2024-01-09 PROCEDURE — 96375 TX/PRO/DX INJ NEW DRUG ADDON: CPT

## 2024-01-09 PROCEDURE — 96376 TX/PRO/DX INJ SAME DRUG ADON: CPT

## 2024-01-09 PROCEDURE — 84484 ASSAY OF TROPONIN QUANT: CPT

## 2024-01-09 PROCEDURE — 80307 DRUG TEST PRSMV CHEM ANLYZR: CPT

## 2024-01-09 PROCEDURE — 85025 COMPLETE CBC W/AUTO DIFF WBC: CPT

## 2024-01-09 PROCEDURE — 71250 CT THORAX DX C-: CPT

## 2024-01-09 PROCEDURE — 6370000000 HC RX 637 (ALT 250 FOR IP): Performed by: EMERGENCY MEDICINE

## 2024-01-09 PROCEDURE — 99284 EMERGENCY DEPT VISIT MOD MDM: CPT

## 2024-01-09 PROCEDURE — 93005 ELECTROCARDIOGRAM TRACING: CPT | Performed by: EMERGENCY MEDICINE

## 2024-01-09 RX ORDER — FENTANYL CITRATE 50 UG/ML
50 INJECTION, SOLUTION INTRAMUSCULAR; INTRAVENOUS
Status: DISCONTINUED | OUTPATIENT
Start: 2024-01-09 | End: 2024-01-09 | Stop reason: HOSPADM

## 2024-01-09 RX ORDER — FENTANYL CITRATE 50 UG/ML
50 INJECTION, SOLUTION INTRAMUSCULAR; INTRAVENOUS ONCE
Status: COMPLETED | OUTPATIENT
Start: 2024-01-09 | End: 2024-01-09

## 2024-01-09 RX ORDER — DIAZEPAM 5 MG/1
5 TABLET ORAL ONCE
Status: COMPLETED | OUTPATIENT
Start: 2024-01-09 | End: 2024-01-09

## 2024-01-09 RX ORDER — MORPHINE SULFATE 4 MG/ML
4 INJECTION, SOLUTION INTRAMUSCULAR; INTRAVENOUS ONCE
Status: COMPLETED | OUTPATIENT
Start: 2024-01-09 | End: 2024-01-09

## 2024-01-09 RX ADMIN — FENTANYL CITRATE 50 MCG: 50 INJECTION, SOLUTION INTRAMUSCULAR; INTRAVENOUS at 09:49

## 2024-01-09 RX ADMIN — FENTANYL CITRATE 50 MCG: 50 INJECTION, SOLUTION INTRAMUSCULAR; INTRAVENOUS at 08:10

## 2024-01-09 RX ADMIN — FENTANYL CITRATE 50 MCG: 50 INJECTION, SOLUTION INTRAMUSCULAR; INTRAVENOUS at 11:01

## 2024-01-09 RX ADMIN — DIAZEPAM 5 MG: 5 TABLET ORAL at 08:10

## 2024-01-09 RX ADMIN — MORPHINE SULFATE 4 MG: 4 INJECTION, SOLUTION INTRAMUSCULAR; INTRAVENOUS at 12:21

## 2024-01-09 ASSESSMENT — PAIN SCALES - GENERAL
PAINLEVEL_OUTOF10: 10

## 2024-01-09 ASSESSMENT — PAIN DESCRIPTION - DESCRIPTORS: DESCRIPTORS: SHARP;SHOOTING

## 2024-01-09 ASSESSMENT — PAIN - FUNCTIONAL ASSESSMENT: PAIN_FUNCTIONAL_ASSESSMENT: 0-10

## 2024-01-09 NOTE — ED NOTES
Lab called about why urine is not in process yet, Lab stating they don't have a urine for the pt. Supervisor notified.

## 2024-01-09 NOTE — ED PROVIDER NOTES
Emergency Department Encounter    Patient: Josiah Parr  MRN: [id number]  : 1960  Date of Evaluation: 2024  ED Provider:  Master Bailey MD    Triage Chief Complaint:   Back Pain (States the pain radiated from his neck to his left shoulder blade down the left arm, states its been going on for 3 days)    [place]:  Josiah Parr is a 63 y.o. male that presents for evaluation of 3 days of back pain that shoots down his left arm   he woke up 3 days ago and had some mild pain on the back on the left side which has become worse.      He took Tylenol and one of his wife's norcos    Unsure if he lifted anything that may have worsened it.      Has history of CABG x 3.        Denies nausea or vomiting.       ROS - see HPI, below listed is current ROS at time of my eval:  10 systems reviewed and negative except as above.     Past Medical History:   Diagnosis Date    Abnormal nuclear stress test     Atrial fibrillation ([facility]) 2013    CAD (coronary artery disease)     Follows with Dr. Morris    CHF (congestive heart failure) ([facility])     COPD (chronic obstructive pulmonary disease) ([facility])     Follows with PCP    Decreased cardiac ejection fraction     Diabetes mellitus ([facility])     GERD (gastroesophageal reflux disease)     H/O cardiovascular stress test 13, 3/20/2013    11/13-Observed defect is consistent with diaphragmatic attenuation. EF 58%. 3/13 -EF 51%. No ischemia. Normal Study.    H/O cardiovascular stress test 2017    EF 50% normal study    H/O cardiovascular stress test 2019    Normal NM    H/O cardiovascular stress test 2021    depressed lvedf increased edv myocardial perfusion abnormal stress test    H/O Doppler ultrasound 10/14/2010    10/2010-Bilateral venous doppler of lower extremies- No DVTs.    H/O echocardiogram 2019    Left ventricular systolic function is normal with an ejection fraction of 50-55%.Mild concentric left ventricular hypertrophy.Grade I diastolic

## 2024-01-09 NOTE — ED NOTES
Pt asking for coffee, pt educated on the need to wait for CT scans to come back. PT stating that he took his wife's percocet at home and that did not do anything for his pain.

## 2024-01-09 NOTE — ED NOTES
Pt stating that we are not doing anything for his pain, pt is upset at this time because \"I've been here for 4 hours and you haven't done anything for me.\" Pt educated that on his blood being drawn and going to CT.\" Pt educated that he has been medicated several times for pain. PT is still upset at this time that \"you aren't doing anything for me.\" MD notified.

## 2024-01-10 LAB
EKG ATRIAL RATE: 71 BPM
EKG DIAGNOSIS: NORMAL
EKG P AXIS: 38 DEGREES
EKG P-R INTERVAL: 190 MS
EKG Q-T INTERVAL: 412 MS
EKG QRS DURATION: 100 MS
EKG QTC CALCULATION (BAZETT): 447 MS
EKG R AXIS: 6 DEGREES
EKG T AXIS: 83 DEGREES
EKG VENTRICULAR RATE: 71 BPM

## 2024-01-10 PROCEDURE — 93010 ELECTROCARDIOGRAM REPORT: CPT | Performed by: INTERNAL MEDICINE

## 2024-01-10 NOTE — PROGRESS NOTES
Medication Management Service  [facility]  [phone]    Visit Date: 1/11/2024   Subjective:       Josiah Parr is a 63 y.o. male who presents to clinic today for anticoagulation monitoring and adjustment.    Patient seen in clinic for warfarin management due to  Indication:   atrial fibrillation.   INR goal: of 2.0-3.0.  Duration of therapy: indefinite.    Patient reports the following:   Adherent with regimen  Missed or extra doses:  None   Bleeding or thromboembolic side effects:  None  Significant medication changes:  Glipizide discontinued  Significant dietary changes: None  Significant alcohol or tobacco changes: None  Significant recent illness, disease state changes, or hospitalization: ER 1/9 for back strain  Upcoming surgeries or procedures:  None  Falls: None           Assessment and Plan     PT/INR done in office per protocol.   INR today is 2, therapeutic.          Plan:  Will continue current regimen of warfarin 4mg daily except 6mg Sundays, Wednesdays, and Fridays.     Recheck INR in 4 week(s).     Patient verbalized understanding of dosing directions and information discussed. Dosing schedule given to patient including phone number, appointment date, and time. Progress note sent to referring office.    Electronically signed by Antoinette Navas RPH on 1/11/24     For Pharmacy Admin Tracking Only    Total # of Interventions Recommended: 0  Total # of Interventions Accepted: 0  Time Spent (min): 15

## 2024-01-11 ENCOUNTER — ANTI-COAG VISIT (OUTPATIENT)
Dept: PHARMACY | Age: 64
End: 2024-01-11
Payer: MEDICAID

## 2024-01-11 LAB
INR BLD: 2
POC INR: 2 INDEX
PROTHROMBIN TIME, POC: 23.7 SECONDS (ref 10–14.3)
PROTIME: 23.7 SECONDS

## 2024-01-11 PROCEDURE — 85610 PROTHROMBIN TIME: CPT

## 2024-01-11 PROCEDURE — 36416 COLLJ CAPILLARY BLOOD SPEC: CPT

## 2024-01-11 PROCEDURE — 99211 OFF/OP EST MAY X REQ PHY/QHP: CPT

## 2024-02-01 NOTE — PROGRESS NOTES
[facility]  Discharge Note  Short Stay    Admit Date: 1/23/2024    Discharge Date and Time: 1/29/2024  2:10 PM     Attending Physician: No att. providers found     Discharge Provider: Mikael Richards    Diagnoses:  Active Hospital Problems    Diagnosis  POA    *Closed fracture of neck of left femur [S72.002A]  Yes    Closed displaced fracture of left calcaneus [S92.002A]  Yes      Resolved Hospital Problems   No resolved problems to display.       Discharged Condition: good    Hospital Course:   Patient presented for robotic-assisted left total hip arthroplasty secondary to displaced left hip femoral neck fracture sustained in a fall off of a ladder in addition to a calcaneus fracture.  She underwent ORIF of the calcaneus fracture on her left foot by Dr. Selby patient was transferred to [facility].  She subsequently underwent the total hip arthroplasty at the orthopedic hospital.  The patient had no complications during the hospital stay and was discharged home in stable condition partial weightbearing with the assistance of a walker. Consults for physical therapy and rehab were given to the patient as well as a follow-up appointment in our office in 4 weeks for reevaluation. The patient was instructed on wound care and dressing changes as well as to continue WILBER hose while at home. Prescriptions were given for continued DVT prophylaxis and pain control. Home medications were resumed please see discharge med rec for these.     Final Diagnoses: Same as principal problem.    Disposition: Home or Self Care    Follow up/Patient Instructions:    Medications:  Reconciled Home Medications:      Medication List        START taking these medications      acetaminophen 500 MG tablet  Commonly known as: TYLENOL  Take 1 tablet (500 mg total) by mouth every 4 (four) hours. for 14 days     aspirin 81 MG Chew  Take 1 tablet (81 mg total) by mouth 2 (two) times a day. Blood clot  Patient waking up and able to follow commands. Mello RT at bedside, reviewed blood gasses and patient placed on SIMV. prevention     methocarbamoL 750 MG Tab  Commonly known as: ROBAXIN  Take 1 tablet (750 mg total) by mouth every 8 (eight) hours as needed (Muscle spasms/Thigh Pain).     senna-docusate 8.6-50 mg 8.6-50 mg per tablet  Commonly known as: PERICOLACE  Take 2 tablets by mouth 2 (two) times a day. for 14 days     traMADoL 50 mg tablet  Commonly known as: ULTRAM  Take 1 tablet (50 mg total) by mouth every 4 (four) hours as needed for Pain.            CONTINUE taking these medications      citalopram 40 MG tablet  Commonly known as: CeleXA  Take 40 mg by mouth once daily.     rosuvastatin 20 MG tablet  Commonly known as: CRESTOR  Take 20 mg by mouth every evening.     traZODone 100 MG tablet  Commonly known as: DESYREL  Take 100 mg by mouth every evening.            Discharge Procedure Orders   Ambulatory referral/consult to Home Health   Standing Status: Future   Referral Priority: Routine Referral Type: Home Health   Referral Reason: Specialty Services Required   Requested Specialty: Home Health Services   Number of Visits Requested: 1     She will follow up in 2 weeks with doctor's Christ      Discharge Procedure Orders (must include Diet, Follow-up, Activity):   Discharge Procedure Orders (must include Diet, Follow-up, Activity)   Ambulatory referral/consult to Home Health   Standing Status: Future   Referral Priority: Routine Referral Type: Home Health   Referral Reason: Specialty Services Required   Requested Specialty: Home Health Services   Number of Visits Requested: 1

## 2024-02-08 ENCOUNTER — ANTI-COAG VISIT (OUTPATIENT)
Dept: PHARMACY | Age: 64
End: 2024-02-08
Payer: MEDICARE

## 2024-02-08 PROCEDURE — 36416 COLLJ CAPILLARY BLOOD SPEC: CPT

## 2024-02-08 PROCEDURE — 85610 PROTHROMBIN TIME: CPT

## 2024-02-08 PROCEDURE — 99211 OFF/OP EST MAY X REQ PHY/QHP: CPT

## 2024-02-08 NOTE — PROGRESS NOTES
Medication Management Service  [facility]  [phone]    Visit Date: 2/8/2024   Subjective:       Josiah Parr is a 63 y.o. male who presents to clinic today for anticoagulation monitoring and adjustment.    Patient seen in clinic for warfarin management due to  Indication:   atrial fibrillation.   INR goal: of 2.0-3.0.  Duration of therapy: indefinite.    Patient reports the following:   Adherent with regimen  Missed or extra doses:  None   Bleeding or thromboembolic side effects:  None  Significant medication changes:  Will be starting nicotine patches  Significant dietary changes: None  Significant alcohol or tobacco changes: None  Significant recent illness, disease state changes, or hospitalization:  None  Upcoming surgeries or procedures:  None  Falls: None           Assessment and Plan     PT/INR done in office per protocol.   INR today is 2.6, therapeutic.          Plan:  Will continue current regimen of warfarin 4mg daily except 6mg on Sundays, Wednesdays, and Fridays.     Recheck INR in 4 week(s).     Patient verbalized understanding of dosing directions and information discussed. Dosing schedule given to patient including phone number, appointment date, and time. Progress note sent to referring office.    Electronically signed by Ciera Chan RPH on 2/8/24     For Pharmacy Admin Tracking Only    Intervention Detail:   Total # of Interventions Recommended:   Total # of Interventions Accepted:   Time Spent (min): 15

## 2024-02-29 ENCOUNTER — OFFICE VISIT (OUTPATIENT)
Dept: CARDIOLOGY CLINIC | Age: 64
End: 2024-02-29
Payer: MEDICARE

## 2024-02-29 VITALS
HEART RATE: 66 BPM | OXYGEN SATURATION: 96 % | SYSTOLIC BLOOD PRESSURE: 128 MMHG | BODY MASS INDEX: 33.62 KG/M2 | WEIGHT: 262 LBS | HEIGHT: 74 IN | DIASTOLIC BLOOD PRESSURE: 70 MMHG

## 2024-02-29 DIAGNOSIS — I48.0 PAROXYSMAL A-FIB (HCC): ICD-10-CM

## 2024-02-29 DIAGNOSIS — I10 PRIMARY HYPERTENSION: ICD-10-CM

## 2024-02-29 DIAGNOSIS — I87.2 VENOUS REFLUX: Primary | ICD-10-CM

## 2024-02-29 DIAGNOSIS — E78.2 MIXED HYPERLIPIDEMIA: ICD-10-CM

## 2024-02-29 DIAGNOSIS — F19.10 SUBSTANCE ABUSE (HCC): ICD-10-CM

## 2024-02-29 DIAGNOSIS — I25.5 ISCHEMIC CARDIOMYOPATHY: ICD-10-CM

## 2024-02-29 DIAGNOSIS — R07.89 OTHER CHEST PAIN: ICD-10-CM

## 2024-02-29 PROCEDURE — 3078F DIAST BP <80 MM HG: CPT | Performed by: NURSE PRACTITIONER

## 2024-02-29 PROCEDURE — 99214 OFFICE O/P EST MOD 30 MIN: CPT | Performed by: NURSE PRACTITIONER

## 2024-02-29 PROCEDURE — 3074F SYST BP LT 130 MM HG: CPT | Performed by: NURSE PRACTITIONER

## 2024-02-29 RX ORDER — GABAPENTIN 300 MG/1
CAPSULE ORAL
COMMUNITY
Start: 2024-02-02

## 2024-02-29 ASSESSMENT — ENCOUNTER SYMPTOMS: SHORTNESS OF BREATH: 0

## 2024-02-29 NOTE — PROGRESS NOTES
and oriented to person, place, and time.         DATA:  Lab Results   Component Value Date/Time    CKTOTAL 34 01/29/2022 05:26 PM    CKMB 2.4 03/10/2013 07:05 AM    TROPONINI 0.014 06/09/2014 01:30 AM    TROPONINI 0.015 12/03/2011 07:35 PM     BNP:    Lab Results   Component Value Date/Time    BNP 17 03/09/2013 11:30 PM     PT/INR:  No results found for: \"PTINR\"  Lab Results   Component Value Date    LABA1C 5.2 12/08/2023    LABA1C 6.9 (H) 02/09/2023     Lab Results   Component Value Date    CHOL 201 (H) 12/08/2023    TRIG 230 (H) 12/08/2023    HDL 33 (L) 12/08/2023    LDLCALC 122 (H) 12/08/2023    LDLDIRECT 111 (H) 01/06/2020     Lab Results   Component Value Date    ALT 10 01/09/2024    AST 17 01/09/2024     TSH:  No results found for: \"TSH\"    Echo:1/7/2020  Left ventricular systolic function is abnormal.   Ejection fraction is visually estimated at 30 to 35 %.   Moderate left ventricular hypertrophy.   Cannot assess diastolic function due to AFib.   Moderately dilated atrium bilaterally.   No evidence of any pericardial effusion.   Inferior vena cava is dilated, measuring at 2.8 cm, and does not collapse   with respiration.    Echo:4/25/23  Left ventricular systolic function is normal with an ejection fraction of   55-60%.   Moderate concentric left ventricular hypertrophy.   Mildly dilated left and right atrium.   No significant valvular disease noted.   Normal pulmonary artery pressure.   No evidence of pericardial effusion.     MUGA: 1/13/21  EF 36 %    Cath: 7/30/19  PATIENT presenting with STEMI of RCA, s/p successful PCI of RCA with LEOLA, HAS multivessel disease as described above.     Cath: 12/30/20  1. PCI to SVG to RCA/PDA at ostial anastomoses site for 99 % lesion reduced to 0 % using 3.0 X 28 LEOLA, 3.0 X 18 and 2.5 X 23 overlapping stents followed by post stent balloon dilation using   3.5 X 12 balloon   2. SVG to Om and LIMA to LAd are potent   3. LVEDP was 15 mmHG   4. RCA and Circ are occluded   5.

## 2024-03-07 ENCOUNTER — TELEPHONE (OUTPATIENT)
Dept: PHARMACY | Age: 64
End: 2024-03-07

## 2024-03-07 NOTE — TELEPHONE ENCOUNTER
Patient left messages x3 to cancel appointment 3/7 and reschedule request for Monday 3/11.     Returned call to patient. Left message to tentatively schedule for 3/14 at 2:00pm.   For Pharmacy Admin Tracking Only    Intervention Detail:   Total # of Interventions Recommended:   Total # of Interventions Accepted:   Time Spent (min): 10

## 2024-03-14 ENCOUNTER — TELEPHONE (OUTPATIENT)
Dept: PHARMACY | Age: 64
End: 2024-03-14

## 2024-03-14 NOTE — TELEPHONE ENCOUNTER
Patient left message to change 3/14 appointment to Thursday 3/21/24.   Returned call. Left message to schedule for 3/21 @ 10:15am.     For Pharmacy Admin Tracking Only    Intervention Detail:   Total # of Interventions Recommended:   Total # of Interventions Accepted:   Time Spent (min): 5

## 2024-03-21 ENCOUNTER — TELEPHONE (OUTPATIENT)
Dept: PHARMACY | Age: 64
End: 2024-03-21

## 2024-03-21 NOTE — TELEPHONE ENCOUNTER
No show to appointment.   Called and spoke to patient. Scheduled for 3/25    For Pharmacy Admin Tracking Only    Intervention Detail:   Total # of Interventions Recommended:   Total # of Interventions Accepted:   Time Spent (min): 5

## 2024-03-25 ENCOUNTER — ANTI-COAG VISIT (OUTPATIENT)
Dept: PHARMACY | Age: 64
End: 2024-03-25
Payer: MEDICARE

## 2024-03-25 LAB
INTERNATIONAL NORMALIZATION RATIO, POC: 1.8
POC INR: 1.8 INDEX
PROTHROMBIN TIME, POC: 21.7 SECONDS (ref 10–14.3)

## 2024-03-25 PROCEDURE — 99211 OFF/OP EST MAY X REQ PHY/QHP: CPT

## 2024-03-25 PROCEDURE — 36416 COLLJ CAPILLARY BLOOD SPEC: CPT

## 2024-03-25 PROCEDURE — 85610 PROTHROMBIN TIME: CPT

## 2024-03-25 NOTE — PROGRESS NOTES
Medication Management Service  [facility]  [phone]    Visit Date: 3/25/2024   Subjective:       Josiah Parr is a 63 y.o. male who presents to clinic today for anticoagulation monitoring and adjustment.    Patient seen in clinic for warfarin management due to  Indication:   atrial fibrillation.   INR goal: of 2.0-3.0.  Duration of therapy: indefinite.    Patient reports the following:   Adherent with regimen  Missed or extra doses:  None   Bleeding or thromboembolic side effects:  None  Significant medication changes:  None  Significant dietary changes: None  Significant alcohol or tobacco changes: None  Significant recent illness, disease state changes, or hospitalization:  None  Upcoming surgeries or procedures:  None  Falls: fell off porch about a month ago, missed a step           Assessment and Plan     PT/INR done in office per protocol.   INR today is 1.8, subtherapeutic.        Missed last night, took 6mg today  Plan:  Will continue current regimen of warfarin 4mg daily except 6mg on Sundays, Wednesdays, and Fridays.     Recheck INR in 2 week(s).     Patient verbalized understanding of dosing directions and information discussed. Dosing schedule given to patient including phone number, appointment date, and time. Progress note sent to referring office.    Electronically signed by Ciera Chan RPH on 3/25/24     For Pharmacy Admin Tracking Only    Intervention Detail:   Total # of Interventions Recommended:   Total # of Interventions Accepted:   Time Spent (min): 15

## 2024-04-05 ENCOUNTER — TELEPHONE (OUTPATIENT)
Dept: PHARMACY | Age: 64
End: 2024-04-05

## 2024-04-05 RX ORDER — BUMETANIDE 2 MG/1
2 TABLET ORAL 2 TIMES DAILY
Qty: 60 TABLET | Refills: 5 | Status: SHIPPED | OUTPATIENT
Start: 2024-04-05

## 2024-04-05 NOTE — TELEPHONE ENCOUNTER
Patient left message to cancel Monday's appointment and reschedule for Thursday due to ride.     Returned call and left message to schedule 4/11.     For Pharmacy Admin Tracking Only    Intervention Detail:   Total # of Interventions Recommended:   Total # of Interventions Accepted:   Time Spent (min): 5

## 2024-04-11 ENCOUNTER — ANTI-COAG VISIT (OUTPATIENT)
Dept: PHARMACY | Age: 64
End: 2024-04-11
Payer: MEDICARE

## 2024-04-11 LAB
INTERNATIONAL NORMALIZATION RATIO, POC: 1.6
POC INR: 1.6 INDEX
PROTHROMBIN TIME, POC: 18.7 SECONDS (ref 10–14.3)

## 2024-04-11 PROCEDURE — 36416 COLLJ CAPILLARY BLOOD SPEC: CPT

## 2024-04-11 PROCEDURE — 99212 OFFICE O/P EST SF 10 MIN: CPT

## 2024-04-11 PROCEDURE — 85610 PROTHROMBIN TIME: CPT

## 2024-04-11 NOTE — PROGRESS NOTES
Medication Management Service  [facility]  [phone]    Visit Date: 4/11/2024   Subjective:       Josiah Parr is a 63 y.o. male who presents to clinic today for anticoagulation monitoring and adjustment.    Patient seen in clinic for warfarin management due to  Indication:   atrial fibrillation.   INR goal: of 2.0-3.0.  Duration of therapy: indefinite.    Patient reports the following:   Adherent with regimen  Missed or extra doses:  missed dose yesterday morning  Bleeding or thromboembolic side effects:  None  Significant medication changes:  None  Significant dietary changes: None  Significant alcohol or tobacco changes: None  Significant recent illness, disease state changes, or hospitalization:  None  Upcoming surgeries or procedures:  None  Falls: fell yesterday, tripped. Didn't hit head           Assessment and Plan     PT/INR done in office per protocol.   INR today is 1.6, subtherapeutic, likely due to missed dose.     Plan: Take warfarin 6mg daily for 2 days then  will continue current regimen of warfarin 4mg daily except 6mg on Sundays, Wednesdays, and Fridays.     Recheck INR in 2 week(s).     Patient verbalized understanding of dosing directions and information discussed. Dosing schedule given to patient including phone number, appointment date, and time. Progress note sent to referring office.    Electronically signed by Ciera Chan RPH on 4/11/24     For Pharmacy Admin Tracking Only    Intervention Detail: Dose Adjustment: 1, reason: Therapy Optimization  Total # of Interventions Recommended: 1  Total # of Interventions Accepted: 1  Time Spent (min): 15

## 2024-04-25 ENCOUNTER — APPOINTMENT (OUTPATIENT)
Dept: PHARMACY | Age: 64
End: 2024-04-25
Payer: MEDICARE

## 2024-04-25 LAB
INTERNATIONAL NORMALIZATION RATIO, POC: 1.9
POC INR: 1.9 INDEX
PROTHROMBIN TIME, POC: 22.9 SECONDS (ref 10–14.3)

## 2024-04-25 PROCEDURE — 85610 PROTHROMBIN TIME: CPT

## 2024-04-25 PROCEDURE — 99211 OFF/OP EST MAY X REQ PHY/QHP: CPT

## 2024-04-25 PROCEDURE — 36416 COLLJ CAPILLARY BLOOD SPEC: CPT

## 2024-04-25 NOTE — PROGRESS NOTES
Medication Management Service  [facility]  [phone]    Visit Date: 4/25/2024   Subjective:       Josiah Parr is a 64 y.o. male who presents to clinic today for anticoagulation monitoring and adjustment.    Patient seen in clinic for warfarin management due to  Indication:   atrial fibrillation.   INR goal: of 2.0-3.0.  Duration of therapy: indefinite.    Patient reports the following:   Adherent with regimen  Missed or extra doses:  None   Bleeding or thromboembolic side effects:  None  Significant medication changes:  None  Significant dietary changes: None  Significant alcohol or tobacco changes: None  Significant recent illness, disease state changes, or hospitalization:  None  Upcoming surgeries or procedures:  None  Falls: None           Assessment and Plan     PT/INR done in office per protocol.   INR today is 1.9, subtherapeutic, just    Plan:  Will continue current regimen of warfarin 4mg daily except 6mg on Sundays, Wednesdays, and Fridays.     Recheck INR in 2 week(s).     Patient verbalized understanding of dosing directions and information discussed. Dosing schedule given to patient including phone number, appointment date, and time. Progress note sent to referring office.    Electronically signed by Ciera Chan RPH on 4/25/24     For Pharmacy Admin Tracking Only    Intervention Detail:   Total # of Interventions Recommended:   Total # of Interventions Accepted:   Time Spent (min): 15

## 2024-05-09 ENCOUNTER — APPOINTMENT (OUTPATIENT)
Dept: PHARMACY | Age: 64
End: 2024-05-09
Payer: MEDICARE

## 2024-05-09 ENCOUNTER — TELEPHONE (OUTPATIENT)
Dept: PHARMACY | Age: 64
End: 2024-05-09

## 2024-05-09 NOTE — TELEPHONE ENCOUNTER
Patient left message to reschedule, forgot to schedule ride.     Returned call. Left message to reschedule for 5/16/24 at 10:30am. Requested call back if alternate time needed.     For Pharmacy Admin Tracking Only    Intervention Detail:   Total # of Interventions Recommended:   Total # of Interventions Accepted:   Time Spent (min): 5

## 2024-05-15 NOTE — PROGRESS NOTES
Medication Management Service  [facility] [place]  [phone]    Visit Date: 5/16/2024   Subjective:       Josiah Parr is a 64 y.o. male who presents to clinic today for anticoagulation monitoring and adjustment.    Patient seen in clinic for warfarin management due to  Indication:   atrial fibrillation.   INR goal: of 2.0-3.0.  Duration of therapy: indefinite.    Patient reports the following:   Adherent with regimen  Missed or extra doses: Missed 5/11  Bleeding or thromboembolic side effects:  None  Significant medication changes:  None  Significant dietary changes: None  Significant alcohol or tobacco changes: None  Significant recent illness, disease state changes, or hospitalization:  None  Upcoming surgeries or procedures:  None  Falls: None           Assessment and Plan     PT/INR done in office per protocol.   INR today is 1.7, subtherapeutic.      Plan: Take warfarin 8mg x 1 then will continue current regimen of warfarin 4mg daily except 6mg Sundays, Wednesdays, and Fridays.     Recheck INR in 3 week(s).     Patient verbalized understanding of dosing directions and information discussed. Dosing schedule given to patient including phone number, appointment date, and time. Progress note sent to referring office.    Electronically signed by Antoinette Navas RPH on 5/16/24    For Pharmacy Admin Tracking Only    Intervention Detail: Dose Adjustment: 1, reason: Therapy Optimization  Total # of Interventions Recommended: 1  Total # of Interventions Accepted: 1  Time Spent (min): 15

## 2024-05-16 ENCOUNTER — ANTI-COAG VISIT (OUTPATIENT)
Dept: PHARMACY | Age: 64
End: 2024-05-16
Payer: MEDICARE

## 2024-05-16 LAB
INR BLD: 1.7
POC INR: 1.7 INDEX
PROTHROMBIN TIME, POC: 20.9 SECONDS (ref 10–14.3)
PROTIME: 20.9 SECONDS

## 2024-05-16 PROCEDURE — 99212 OFFICE O/P EST SF 10 MIN: CPT

## 2024-05-16 PROCEDURE — 85610 PROTHROMBIN TIME: CPT

## 2024-05-16 PROCEDURE — 36416 COLLJ CAPILLARY BLOOD SPEC: CPT

## 2024-06-06 ENCOUNTER — TELEPHONE (OUTPATIENT)
Dept: PHARMACY | Age: 64
End: 2024-06-06

## 2024-06-06 NOTE — TELEPHONE ENCOUNTER
No show.   Called patient, no answer and voicemail not set up.    For Pharmacy Admin Tracking Only    Intervention Detail:   Total # of Interventions Recommended:   Total # of Interventions Accepted:   Time Spent (min): 5

## 2024-06-14 ENCOUNTER — TELEPHONE (OUTPATIENT)
Dept: PHARMACY | Age: 64
End: 2024-06-14

## 2024-06-17 DIAGNOSIS — I48.0 PAROXYSMAL A-FIB (HCC): Primary | ICD-10-CM

## 2024-06-17 NOTE — TELEPHONE ENCOUNTER
[phone] (Mobile) from [facility] notes.     ED 6/6 Doxy and prednisone     Called patient, confirmed new phone number.     Scheduled for 6/24.     For Pharmacy Admin Tracking Only    Intervention Detail:   Total # of Interventions Recommended:   Total # of Interventions Accepted:   Time Spent (min): 5

## 2024-06-18 ENCOUNTER — OFFICE VISIT (OUTPATIENT)
Dept: CARDIOLOGY CLINIC | Age: 64
End: 2024-06-18
Payer: MEDICARE

## 2024-06-18 VITALS
DIASTOLIC BLOOD PRESSURE: 88 MMHG | HEIGHT: 74 IN | BODY MASS INDEX: 34.52 KG/M2 | SYSTOLIC BLOOD PRESSURE: 146 MMHG | OXYGEN SATURATION: 94 % | HEART RATE: 65 BPM | WEIGHT: 269 LBS

## 2024-06-18 DIAGNOSIS — M79.605 BILATERAL LEG PAIN: ICD-10-CM

## 2024-06-18 DIAGNOSIS — I10 PRIMARY HYPERTENSION: ICD-10-CM

## 2024-06-18 DIAGNOSIS — I25.5 ISCHEMIC CARDIOMYOPATHY: ICD-10-CM

## 2024-06-18 DIAGNOSIS — M79.604 BILATERAL LEG PAIN: ICD-10-CM

## 2024-06-18 DIAGNOSIS — I48.0 PAROXYSMAL A-FIB (HCC): ICD-10-CM

## 2024-06-18 DIAGNOSIS — I25.10 CORONARY ARTERY DISEASE INVOLVING NATIVE HEART WITHOUT ANGINA PECTORIS, UNSPECIFIED VESSEL OR LESION TYPE: ICD-10-CM

## 2024-06-18 DIAGNOSIS — I48.20 CHRONIC ATRIAL FIBRILLATION (HCC): Primary | ICD-10-CM

## 2024-06-18 DIAGNOSIS — E66.09 CLASS 2 OBESITY DUE TO EXCESS CALORIES WITHOUT SERIOUS COMORBIDITY WITH BODY MASS INDEX (BMI) OF 35.0 TO 35.9 IN ADULT: ICD-10-CM

## 2024-06-18 DIAGNOSIS — E78.2 MIXED HYPERLIPIDEMIA: ICD-10-CM

## 2024-06-18 PROCEDURE — 3077F SYST BP >= 140 MM HG: CPT | Performed by: NURSE PRACTITIONER

## 2024-06-18 PROCEDURE — 3079F DIAST BP 80-89 MM HG: CPT | Performed by: NURSE PRACTITIONER

## 2024-06-18 PROCEDURE — 99214 OFFICE O/P EST MOD 30 MIN: CPT | Performed by: NURSE PRACTITIONER

## 2024-06-18 ASSESSMENT — ENCOUNTER SYMPTOMS
BACK PAIN: 1
SHORTNESS OF BREATH: 0

## 2024-06-18 NOTE — PROGRESS NOTES
23 overlapping stents followed by post stent balloon dilation using   3.5 X 12 balloon   2. SVG to Om and LIMA to LAd are potent   3. LVEDP was 15 mmHG   4. RCA and Circ are occluded   5. LAD is diffusely diseased , Diag and LAD are filled by   LIMA graft.   6. left main has 80% distal stenosis   Pt is referred to Cardiac Rehab Phase 2 as OP    LHC:2/10/23  The left main is a large tubular vessel proximally distally has about 90% stenosis and divides into the circumflex and the LAD  The LAD is patent competitive flow is seen from the LIMA  Circumflex OM's are occluded  RCA is occluded however seem to be filling from collaterals from the left  The SVG to the RCA is occluded which was stented about 2-1/2 years ago  SVG to circumflex is patent has no significant disease  The LIMA to the LAD is patent her the LAD itself has some mild disease  EF is about 40% with inferior wall hypokinesis     The SVG appears to be occluded filling from collaterals patient is pain-free right now hence will manage medically with antiplatelet therapy and will follow  At this point no intervention is needed    The ASCVD Risk score (Petey DK, et al., 2019) failed to calculate for the following reasons:    The patient has a prior MI or stroke diagnosis    Assessment/ Plan:     Venus reflux  -Significant reflux of right GSV SF J tributary note of left GSV at knee and mid calf, significant reflux of left CFV. Patient had ablation in the past, no edema.     HTN (hypertension)   -Slight elevation. continue with Coreg 12.5 mg BID, lisinopril 40 mg daily, and hydralazine 100 mg TID.  Continue with Bumex 2 mg twice daily and Zaroxolyn to daily 2 hrs after Bumex. Reports getting home B/P monitor.      Paroxysmal atrial fibrillation   -Chads-Vas is 6, continue with Coumadin for anticoagulation and Plavix.  Recent EKG shows normal sinus rhythm.    Leg pain  -mild edema and redness to bilateral lower legs, L>R.  Multiple comorbidities will get

## 2024-06-24 ENCOUNTER — ANTI-COAG VISIT (OUTPATIENT)
Dept: PHARMACY | Age: 64
End: 2024-06-24
Payer: MEDICARE

## 2024-06-24 PROCEDURE — 99212 OFFICE O/P EST SF 10 MIN: CPT

## 2024-06-24 PROCEDURE — 85610 PROTHROMBIN TIME: CPT

## 2024-06-24 PROCEDURE — 36416 COLLJ CAPILLARY BLOOD SPEC: CPT

## 2024-06-24 NOTE — PROGRESS NOTES
Medication Management Service  [facility]  [phone]    Visit Date: 6/24/2024   Subjective:       Josiah Parr is a 64 y.o. male who presents to clinic today for anticoagulation monitoring and adjustment.    Patient seen in clinic for warfarin management due to  Indication:   atrial fibrillation.   INR goal: of 2.0-3.0.  Duration of therapy: indefinite.    Patient reports the following:   Adherent with regimen  Missed or extra doses:  None   Bleeding or thromboembolic side effects:  None  Significant medication changes:  None  Significant dietary changes: None  Significant alcohol or tobacco changes: None  Significant recent illness, disease state changes, or hospitalization:  None  Upcoming surgeries or procedures:  None  Falls: None           Assessment and Plan     PT/INR done in office per protocol.   INR today is 1.8, subtherapeutic.        Discussed dose options and patient preference to take 4mg for 3 consecutive days instead of spreading out Mon-Wed-Fri to help with compliance  Plan: Increase weekly dose ~6% to warfarin 6mg daily except 4mg on Saturdays, Sundays, and Mondays.      Recheck INR in 2 week(s).     Patient verbalized understanding of dosing directions and information discussed. Dosing schedule given to patient including phone number, appointment date, and time. Progress note sent to referring office.    Electronically signed by Ciera Chan RPH on 6/24/24     For Pharmacy Admin Tracking Only    Intervention Detail: Dose Adjustment: 1, reason: Therapy Optimization  Total # of Interventions Recommended: 1  Total # of Interventions Accepted: 1  Time Spent (min): 15

## 2024-07-03 ENCOUNTER — TELEPHONE (OUTPATIENT)
Dept: CARDIOLOGY CLINIC | Age: 64
End: 2024-07-03

## 2024-07-03 NOTE — TELEPHONE ENCOUNTER
Called pt and left VM (consent on file) with results. Reminded of next appt.     Vascular US Duplex Lower Extremity Arteries Bilateral   Interpretation Summary      Right side findings: Resting LORA is 1.03.    Left side findings: Resting LORA is 1.00.    No evidnece of significant arterial occlusive disease.     Optimize medications

## 2024-07-05 ENCOUNTER — HOSPITAL ENCOUNTER (OUTPATIENT)
Age: 64
Discharge: HOME | End: 2024-07-05
Payer: MEDICARE

## 2024-07-05 DIAGNOSIS — I10 PRIMARY HYPERTENSION: ICD-10-CM

## 2024-07-05 DIAGNOSIS — E11.9 TYPE 2 DIABETES MELLITUS WITHOUT COMPLICATION, WITHOUT LONG-TERM CURRENT USE OF INSULIN (HCC): Chronic | ICD-10-CM

## 2024-07-05 DIAGNOSIS — I48.0 PAROXYSMAL A-FIB (HCC): ICD-10-CM

## 2024-07-05 DIAGNOSIS — I50.22 CHRONIC SYSTOLIC CONGESTIVE HEART FAILURE (HCC): ICD-10-CM

## 2024-07-05 DIAGNOSIS — N18.4 CHRONIC KIDNEY DISEASE, STAGE IV (SEVERE) (HCC): ICD-10-CM

## 2024-07-05 DIAGNOSIS — F17.200 SMOKING: Chronic | ICD-10-CM

## 2024-07-05 LAB
ALBUMIN SERPL-MCNC: 4.3 GM/DL (ref 3.4–5)
ANION GAP SERPL CALCULATED.3IONS-SCNC: 12 MMOL/L (ref 7–16)
BACTERIA: NEGATIVE /HPF
BASOPHILS ABSOLUTE: 0.1 K/CU MM
BASOPHILS RELATIVE PERCENT: 1.1 % (ref 0–1)
BILIRUBIN, URINE: NEGATIVE MG/DL
BLOOD, URINE: NEGATIVE
BUN SERPL-MCNC: 30 MG/DL (ref 6–23)
CALCIUM SERPL-MCNC: 9.6 MG/DL (ref 8.3–10.6)
CHLORIDE BLD-SCNC: 104 MMOL/L (ref 99–110)
CLARITY, UA: CLEAR
CO2: 27 MMOL/L (ref 21–32)
COLOR, UA: YELLOW
CREAT SERPL-MCNC: 1.9 MG/DL (ref 0.9–1.3)
CREATININE URINE: 44.5 MG/DL (ref 39–259)
DIFFERENTIAL TYPE: ABNORMAL
EOSINOPHILS ABSOLUTE: 0.3 K/CU MM
EOSINOPHILS RELATIVE PERCENT: 4.5 % (ref 0–3)
GFR, ESTIMATED: 39 ML/MIN/1.73M2
GLUCOSE SERPL-MCNC: 196 MG/DL (ref 70–99)
GLUCOSE URINE: >1000 MG/DL
HCT VFR BLD CALC: 49.6 % (ref 42–52)
HEMOGLOBIN: 15.7 GM/DL (ref 13.5–18)
HYALINE CASTS: 0 /LPF
IMMATURE NEUTROPHIL %: 0.3 % (ref 0–0.43)
KETONES, URINE: NEGATIVE MG/DL
LEUKOCYTE ESTERASE, URINE: NEGATIVE
LYMPHOCYTES ABSOLUTE: 1.4 K/CU MM
LYMPHOCYTES RELATIVE PERCENT: 19 % (ref 24–44)
MCH RBC QN AUTO: 29.1 PG (ref 27–31)
MCHC RBC AUTO-ENTMCNC: 31.7 % (ref 32–36)
MCV RBC AUTO: 91.9 FL (ref 78–100)
MONOCYTES ABSOLUTE: 0.7 K/CU MM
MONOCYTES RELATIVE PERCENT: 8.8 % (ref 0–4)
MUCUS: ABNORMAL HPF
NEUTROPHILS ABSOLUTE: 4.9 K/CU MM
NEUTROPHILS RELATIVE PERCENT: 66.3 % (ref 36–66)
NITRITE URINE, QUANTITATIVE: NEGATIVE
NUCLEATED RBC %: 0 %
PDW BLD-RTO: 14.4 % (ref 11.7–14.9)
PH, URINE: 5.5 (ref 5–8)
PHOSPHORUS: 2.9 MG/DL (ref 2.5–4.9)
PLATELET # BLD: 210 K/CU MM (ref 140–440)
PMV BLD AUTO: 11.1 FL (ref 7.5–11.1)
POTASSIUM SERPL-SCNC: 4 MMOL/L (ref 3.5–5.1)
PROT/CREAT RATIO, UR: 4.3
PROTEIN UA: 100 MG/DL
RBC # BLD: 5.4 M/CU MM (ref 4.6–6.2)
RBC URINE: 1 /HPF (ref 0–3)
SODIUM BLD-SCNC: 143 MMOL/L (ref 135–145)
SPECIFIC GRAVITY UA: 1.02 (ref 1–1.03)
TOTAL IMMATURE NEUTOROPHIL: 0.02 K/CU MM
TOTAL NUCLEATED RBC: 0 K/CU MM
TRICHOMONAS: ABNORMAL /HPF
URINE TOTAL PROTEIN: 190.4 MG/DL
UROBILINOGEN, URINE: 0.2 MG/DL (ref 0.2–1)
WBC # BLD: 7.4 K/CU MM (ref 4–10.5)
WBC UA: 3 /HPF (ref 0–2)

## 2024-07-05 PROCEDURE — 84156 ASSAY OF PROTEIN URINE: CPT

## 2024-07-05 PROCEDURE — 84100 ASSAY OF PHOSPHORUS: CPT

## 2024-07-05 PROCEDURE — 81001 URINALYSIS AUTO W/SCOPE: CPT

## 2024-07-05 PROCEDURE — 36415 COLL VENOUS BLD VENIPUNCTURE: CPT

## 2024-07-05 PROCEDURE — 82570 ASSAY OF URINE CREATININE: CPT

## 2024-07-05 PROCEDURE — 82040 ASSAY OF SERUM ALBUMIN: CPT

## 2024-07-05 PROCEDURE — 85025 COMPLETE CBC W/AUTO DIFF WBC: CPT

## 2024-07-05 PROCEDURE — 80048 BASIC METABOLIC PNL TOTAL CA: CPT

## 2024-07-08 ENCOUNTER — ANTI-COAG VISIT (OUTPATIENT)
Dept: PHARMACY | Age: 64
End: 2024-07-08
Payer: MEDICARE

## 2024-07-08 LAB
INTERNATIONAL NORMALIZATION RATIO, POC: 3.1
POC INR: 3.1 INDEX
PROTHROMBIN TIME, POC: 36.8 SECONDS (ref 10–14.3)
PROTHROMBIN TIME, POC: NORMAL

## 2024-07-08 PROCEDURE — 99212 OFFICE O/P EST SF 10 MIN: CPT

## 2024-07-08 PROCEDURE — 85610 PROTHROMBIN TIME: CPT

## 2024-07-08 PROCEDURE — 36416 COLLJ CAPILLARY BLOOD SPEC: CPT

## 2024-07-08 NOTE — PROGRESS NOTES
Medication Management Service  [facility]  [phone]    Visit Date: 7/8/2024   Subjective:       Josiah Parr is a 64 y.o. male who presents to clinic today for anticoagulation monitoring and adjustment.    Patient seen in clinic for warfarin management due to  Indication:   atrial fibrillation.   INR goal: of 2.0-3.0.  Duration of therapy: indefinite.    Patient reports the following:   Adherent with regimen  Missed or extra doses:  None   Bleeding or thromboembolic side effects:  None  Significant medication changes:  None  Significant dietary changes: None  Significant alcohol or tobacco changes: None  Significant recent illness, disease state changes, or hospitalization:  None  Upcoming surgeries or procedures:  None  Falls: None           Assessment and Plan     PT/INR done in office per protocol.   INR today is 3.1, supratherapeutic.        He took 8mg yesterday AM and reports lats week took 6mg on MWF. Instructed dose was 6mg daily except 4mg on Saturdays, Sundays, and Mondays.   He took 6mg already today.   Plan: Take warfarin 4mg tomorrow then  will continue with maintenance regimen of warfarin 6mg daily except 4mg on Saturdays, Sundays, and Mondays.     Recheck INR in 1.5 week(s).     Patient verbalized understanding of dosing directions and information discussed. Dosing schedule given to patient including phone number, appointment date, and time. Progress note sent to referring office.    Electronically signed by Ciera Chan RPH on 7/8/24     For Pharmacy Admin Tracking Only    Intervention Detail: Dose Adjustment: 1, reason: Therapy Optimization  Total # of Interventions Recommended: 1  Total # of Interventions Accepted: 1  Time Spent (min): 15

## 2024-07-17 PROBLEM — N18.32 STAGE 3B CHRONIC KIDNEY DISEASE (HCC): Status: ACTIVE | Noted: 2024-07-17

## 2024-07-18 ENCOUNTER — ANTI-COAG VISIT (OUTPATIENT)
Dept: PHARMACY | Age: 64
End: 2024-07-18
Payer: MEDICARE

## 2024-07-18 LAB
INTERNATIONAL NORMALIZATION RATIO, POC: 2.6
POC INR: 2.6 INDEX
PROTHROMBIN TIME, POC: 31 SECONDS (ref 10–14.3)
PROTHROMBIN TIME, POC: NORMAL

## 2024-07-18 PROCEDURE — 85610 PROTHROMBIN TIME: CPT

## 2024-07-18 PROCEDURE — 99211 OFF/OP EST MAY X REQ PHY/QHP: CPT

## 2024-07-18 NOTE — PROGRESS NOTES
Medication Management Service  [facility]  [phone]    Visit Date: 7/18/2024   Subjective:       Josiah Parr is a 64 y.o. male who presents to clinic today for anticoagulation monitoring and adjustment.    Patient seen in clinic for warfarin management due to  Indication:   atrial fibrillation.   INR goal: of 2.0-3.0.  Duration of therapy: indefinite.    Patient reports the following:   Adherent with regimen  Missed or extra doses:  None   Bleeding or thromboembolic side effects:  None  Significant medication changes:  None  Significant dietary changes: None  Significant alcohol or tobacco changes: None  Significant recent illness, disease state changes, or hospitalization:  None  Upcoming surgeries or procedures:  None  Falls: None           Assessment and Plan     PT/INR done in office per protocol.   INR today is 2.6, therapeutic.          Plan:  Will continue current regimen of warfarin 6mg daily except 4mg on Saturdays, Sundays, and Mondays.     Recheck INR in 3 week(s).     Patient verbalized understanding of dosing directions and information discussed. Dosing schedule given to patient including phone number, appointment date, and time. Progress note sent to referring office.    Electronically signed by Ciera Chan RPH on 7/18/24     For Pharmacy Admin Tracking Only    Intervention Detail:   Total # of Interventions Recommended:   Total # of Interventions Accepted:   Time Spent (min): 15

## 2024-07-23 ENCOUNTER — OFFICE VISIT (OUTPATIENT)
Dept: CARDIOLOGY CLINIC | Age: 64
End: 2024-07-23
Payer: MEDICARE

## 2024-07-23 VITALS
BODY MASS INDEX: 34.22 KG/M2 | WEIGHT: 266.6 LBS | HEART RATE: 75 BPM | OXYGEN SATURATION: 98 % | HEIGHT: 74 IN | DIASTOLIC BLOOD PRESSURE: 74 MMHG | SYSTOLIC BLOOD PRESSURE: 138 MMHG

## 2024-07-23 DIAGNOSIS — M79.605 BILATERAL LEG PAIN: ICD-10-CM

## 2024-07-23 DIAGNOSIS — E66.09 CLASS 2 OBESITY DUE TO EXCESS CALORIES WITHOUT SERIOUS COMORBIDITY WITH BODY MASS INDEX (BMI) OF 35.0 TO 35.9 IN ADULT: ICD-10-CM

## 2024-07-23 DIAGNOSIS — E78.2 MIXED HYPERLIPIDEMIA: ICD-10-CM

## 2024-07-23 DIAGNOSIS — M79.604 BILATERAL LEG PAIN: ICD-10-CM

## 2024-07-23 DIAGNOSIS — I48.0 PAROXYSMAL A-FIB (HCC): Primary | ICD-10-CM

## 2024-07-23 DIAGNOSIS — I25.10 CORONARY ARTERY DISEASE INVOLVING NATIVE HEART WITHOUT ANGINA PECTORIS, UNSPECIFIED VESSEL OR LESION TYPE: ICD-10-CM

## 2024-07-23 DIAGNOSIS — I25.5 ISCHEMIC CARDIOMYOPATHY: ICD-10-CM

## 2024-07-23 DIAGNOSIS — I87.2 VENOUS REFLUX: ICD-10-CM

## 2024-07-23 DIAGNOSIS — I10 PRIMARY HYPERTENSION: ICD-10-CM

## 2024-07-23 PROCEDURE — 3078F DIAST BP <80 MM HG: CPT | Performed by: NURSE PRACTITIONER

## 2024-07-23 PROCEDURE — 3075F SYST BP GE 130 - 139MM HG: CPT | Performed by: NURSE PRACTITIONER

## 2024-07-23 PROCEDURE — 99214 OFFICE O/P EST MOD 30 MIN: CPT | Performed by: NURSE PRACTITIONER

## 2024-07-23 ASSESSMENT — ENCOUNTER SYMPTOMS
BACK PAIN: 1
SHORTNESS OF BREATH: 0

## 2024-07-23 NOTE — PROGRESS NOTES
LEOLA, 3.0 X 18 and 2.5 X 23 overlapping stents followed by post stent balloon dilation using   3.5 X 12 balloon   2. SVG to Om and LIMA to LAd are potent   3. LVEDP was 15 mmHG   4. RCA and Circ are occluded   5. LAD is diffusely diseased , Diag and LAD are filled by   LIMA graft.   6. left main has 80% distal stenosis   Pt is referred to Cardiac Rehab Phase 2 as OP    LHC:2/10/23  The left main is a large tubular vessel proximally distally has about 90% stenosis and divides into the circumflex and the LAD  The LAD is patent competitive flow is seen from the LIMA  Circumflex OM's are occluded  RCA is occluded however seem to be filling from collaterals from the left  The SVG to the RCA is occluded which was stented about 2-1/2 years ago  SVG to circumflex is patent has no significant disease  The LIMA to the LAD is patent her the LAD itself has some mild disease  EF is about 40% with inferior wall hypokinesis     The SVG appears to be occluded filling from collaterals patient is pain-free right now hence will manage medically with antiplatelet therapy and will follow. At this point no intervention is needed.    LORA:7/3/24  Right side findings: Resting LORA is 1.03.    Left side findings: Resting LORA is 1.00.    No evidnece of significant arterial occlusive disease.    The ASCVD Risk score (Petey DK, et al., 2019) failed to calculate for the following reasons:    The patient has a prior MI or stroke diagnosis    Assessment/ Plan:     Venus reflux  -Significant reflux of right GSV SF J tributary note of left GSV at knee and mid calf, significant reflux of left CFV. Patient had ablation in the past, no edema.     HTN (hypertension)   -Stable continue with Coreg 12.5 mg BID, lisinopril 40 mg daily, and hydralazine 100 mg TID.  Continue with Bumex 2 mg twice daily and Zaroxolyn to daily 2 hrs after Bumex. Reports getting home B/P monitor.      Paroxysmal atrial fibrillation   -Chads-Vas is 6, continue with Coumadin for

## 2024-07-30 ENCOUNTER — TELEPHONE (OUTPATIENT)
Dept: CARDIOLOGY CLINIC | Age: 64
End: 2024-07-30

## 2024-07-31 NOTE — TELEPHONE ENCOUNTER
Prior Authorization approval received for [facility] for dates 1/1/24-12/31/24.  Scanned into Epic.

## 2024-08-08 ENCOUNTER — ANTI-COAG VISIT (OUTPATIENT)
Dept: PHARMACY | Age: 64
End: 2024-08-08
Payer: MEDICARE

## 2024-08-08 LAB
INTERNATIONAL NORMALIZATION RATIO, POC: 2.1
POC INR: 2.1 INDEX
PROTHROMBIN TIME, POC: 25.4 SECONDS (ref 10–14.3)
PROTHROMBIN TIME, POC: NORMAL

## 2024-08-08 PROCEDURE — 85610 PROTHROMBIN TIME: CPT

## 2024-08-08 PROCEDURE — 99212 OFFICE O/P EST SF 10 MIN: CPT

## 2024-08-08 RX ORDER — WARFARIN SODIUM 4 MG/1
TABLET ORAL
Qty: 117 TABLET | Refills: 1 | Status: SHIPPED | OUTPATIENT
Start: 2024-08-08

## 2024-08-08 NOTE — PROGRESS NOTES
Medication Management Service  [facility]  [phone]    Visit Date: 8/8/2024   Subjective:       Josiah Parr is a 64 y.o. male who presents to clinic today for anticoagulation monitoring and adjustment.    Patient seen in clinic for warfarin management due to  Indication:   atrial fibrillation.   INR goal: of 2.0-3.0.  Duration of therapy: indefinite.    Patient reports the following:   Adherent with regimen  Missed or extra doses:  None   Bleeding or thromboembolic side effects:  None  Significant medication changes:  None  Significant dietary changes: None  Significant alcohol or tobacco changes: None  Significant recent illness, disease state changes, or hospitalization:  None  Upcoming surgeries or procedures:  None  Falls: None           Assessment and Plan     PT/INR done in office per protocol.   INR today is 2.1, therapeutic.        Refill needed  Plan:  Will continue current regimen of warfarin 6mg daily except 4mg on Saturdays, Sundays, and Mondays  .     Erx sent to Pharmacy   Recheck INR in 4.5 week(s).     Patient verbalized understanding of dosing directions and information discussed. Dosing schedule given to patient including phone number, appointment date, and time. Progress note sent to referring office.    Electronically signed by Ciera Chan RPH on 8/8/24     For Pharmacy Admin Tracking Only    Intervention Detail: Refill(s) Provided  Total # of Interventions Recommended: 1  Total # of Interventions Accepted: 1  Time Spent (min): 15

## 2024-08-30 ENCOUNTER — TELEPHONE (OUTPATIENT)
Dept: PHARMACY | Age: 64
End: 2024-08-30

## 2024-08-30 NOTE — TELEPHONE ENCOUNTER
Refill requested from [facility].     Erx sent 8/8/24.     Called [facility] and verified Erx was received and can be filled.     For Pharmacy Admin Tracking Only    Intervention Detail:   Total # of Interventions Recommended:   Total # of Interventions Accepted:   Time Spent (min): 10

## 2024-09-09 ENCOUNTER — TELEPHONE (OUTPATIENT)
Dept: PHARMACY | Age: 64
End: 2024-09-09

## 2024-09-19 ENCOUNTER — TELEPHONE (OUTPATIENT)
Dept: PHARMACY | Age: 64
End: 2024-09-19

## 2024-09-26 ENCOUNTER — ANTI-COAG VISIT (OUTPATIENT)
Dept: PHARMACY | Age: 64
End: 2024-09-26
Payer: MEDICARE

## 2024-09-26 LAB
INTERNATIONAL NORMALIZATION RATIO, POC: 2.6
POC INR: 2.6 (ref 0.9–1.2)
PROTHROMBIN TIME, POC: 31.7
PROTHROMBIN TIME, POC: 31.7 SEC (ref 10–14.3)

## 2024-09-26 PROCEDURE — 85610 PROTHROMBIN TIME: CPT

## 2024-09-26 PROCEDURE — 99211 OFF/OP EST MAY X REQ PHY/QHP: CPT

## 2024-09-26 RX ORDER — METHOCARBAMOL 500 MG/1
500 TABLET, FILM COATED ORAL 3 TIMES DAILY
COMMUNITY
Start: 2024-09-17

## 2024-09-26 RX ORDER — OXYCODONE AND ACETAMINOPHEN 5; 325 MG/1; MG/1
1 TABLET ORAL EVERY 6 HOURS PRN
COMMUNITY
Start: 2024-09-25

## 2024-09-30 ENCOUNTER — TELEPHONE (OUTPATIENT)
Dept: PHARMACY | Age: 64
End: 2024-09-30

## 2024-09-30 RX ORDER — WARFARIN SODIUM 4 MG/1
TABLET ORAL
Qty: 117 TABLET | Refills: 1 | OUTPATIENT
Start: 2024-09-30

## 2024-09-30 NOTE — TELEPHONE ENCOUNTER
Teams message from [facility] that patient needs refill. Chart reviewed, pharmacy had called for dose verification.   Called [facility].   Spoke to Mayur Parikh with [facility]. Verified dose is still the same as when last warfarin Rx was sent:  Warfarin 4 mg (4 mg x 1) every Sun, Mon, Sat; 6 mg (4 mg x 1.5) all other days       For Pharmacy Admin Tracking Only    Intervention Detail: Refill(s) Provided  Total # of Interventions Recommended: 1  Total # of Interventions Accepted: 1  Time Spent (min): 10

## 2024-10-24 ENCOUNTER — TELEPHONE (OUTPATIENT)
Dept: PHARMACY | Age: 64
End: 2024-10-24

## 2024-10-24 NOTE — TELEPHONE ENCOUNTER
No show. Called patient, he had physical therapy. Scheduled for 11/4.     For Pharmacy Admin Tracking Only    Intervention Detail:   Total # of Interventions Recommended:   Total # of Interventions Accepted:   Time Spent (min): 5

## 2024-10-28 ENCOUNTER — TELEPHONE (OUTPATIENT)
Dept: PHARMACY | Age: 64
End: 2024-10-28

## 2024-10-28 NOTE — TELEPHONE ENCOUNTER
Patient left VM requesting next appointment info. Returned call. No answer. Left VM with next appointment date and time.    For Pharmacy Admin Tracking Only    Time Spent (min): 5

## 2024-11-04 ENCOUNTER — TELEPHONE (OUTPATIENT)
Dept: PHARMACY | Age: 64
End: 2024-11-04

## 2024-11-04 NOTE — TELEPHONE ENCOUNTER
Rescheduled for 11/7, he didn't get message to schedule in enough time to schedule ride last week.     For Pharmacy Admin Tracking Only    Intervention Detail:   Total # of Interventions Recommended:   Total # of Interventions Accepted:   Time Spent (min): 5

## 2024-11-07 ENCOUNTER — ANTI-COAG VISIT (OUTPATIENT)
Dept: PHARMACY | Age: 64
End: 2024-11-07
Payer: MEDICARE

## 2024-11-07 LAB
INTERNATIONAL NORMALIZATION RATIO, POC: 2.2
POC INR: 2.2 (ref 0.9–1.2)
PROTHROMBIN TIME, POC: 26.5
PROTHROMBIN TIME, POC: 26.5 SEC (ref 10–14.3)

## 2024-11-07 PROCEDURE — 85610 PROTHROMBIN TIME: CPT

## 2024-11-07 PROCEDURE — 99211 OFF/OP EST MAY X REQ PHY/QHP: CPT

## 2024-11-07 NOTE — PROGRESS NOTES
Medication Management Service  [facility]  [phone]    Visit Date: 11/7/2024   Subjective:       Josiah Parr is a 64 y.o. male who presents to clinic today for anticoagulation monitoring and adjustment.    Patient seen in clinic for warfarin management due to  Indication:   atrial fibrillation.   INR goal: of 2.0-3.0.  Duration of therapy: indefinite.    Patient reports the following:   Adherent with regimen  Missed or extra doses:  None   Bleeding or thromboembolic side effects:  None  Significant medication changes:  None  Significant dietary changes: None  Significant alcohol or tobacco changes: None  Significant recent illness, disease state changes, or hospitalization:  None  Upcoming surgeries or procedures:  None  Falls: fell out of chair when fell asleep- no injury           Assessment and Plan     PT/INR done in office per protocol.   INR today is 2.2, therapeutic.          Plan:  Will continue current regimen of warfarin 4mg daily on Saturdays, Sundays, Mondays and 6mg on all other days.     Recheck INR in 4 week(s).     Patient verbalized understanding of dosing directions and information discussed. Dosing schedule given to patient including phone number, appointment date, and time. Progress note sent to referring office.    Electronically signed by Ciera Chan RPH on 11/1/24     For Pharmacy Admin Tracking Only    Intervention Detail:   Total # of Interventions Recommended:   Total # of Interventions Accepted:   Time Spent (min): 15

## 2024-12-05 ENCOUNTER — TELEPHONE (OUTPATIENT)
Dept: PHARMACY | Age: 64
End: 2024-12-05

## 2024-12-05 NOTE — TELEPHONE ENCOUNTER
Patient left message to cancel due to he is hospitalized in [facility]. Patient states he will call back to reschedule.     For Pharmacy Admin Tracking Only    Intervention Detail:   Total # of Interventions Recommended:   Total # of Interventions Accepted:   Time Spent (min): 5

## 2024-12-19 ENCOUNTER — TELEPHONE (OUTPATIENT)
Dept: PHARMACY | Age: 64
End: 2024-12-19

## 2024-12-19 NOTE — TELEPHONE ENCOUNTER
Called patient. Scheduled 1/9/25. Patient refused to schedule prior due to transportation issues.     For Pharmacy Admin Tracking Only    Time Spent (min): 5

## 2025-01-09 ENCOUNTER — ANTI-COAG VISIT (OUTPATIENT)
Dept: PHARMACY | Age: 65
End: 2025-01-09
Payer: MEDICARE

## 2025-01-09 LAB
INTERNATIONAL NORMALIZATION RATIO, POC: 2.1
POC INR: 2.1 (ref 0.9–1.2)
PROTHROMBIN TIME, POC: 25.3
PROTHROMBIN TIME, POC: 25.3 SEC (ref 10–14.3)

## 2025-01-09 PROCEDURE — 85610 PROTHROMBIN TIME: CPT

## 2025-01-09 PROCEDURE — 99211 OFF/OP EST MAY X REQ PHY/QHP: CPT

## 2025-01-09 NOTE — PROGRESS NOTES
Medication Management Service  [facility]  [phone]    Visit Date: 1/9/2025   Subjective:       Josiah Parr is a 64 y.o. male who presents to clinic today for anticoagulation monitoring and adjustment.    Patient seen in clinic for warfarin management due to  Indication:   atrial fibrillation.   INR goal: of 2.0-3.0.  Duration of therapy: indefinite.    Patient reports the following:   Adherent with regimen  Missed or extra doses:  None   Bleeding or thromboembolic side effects:  None  Significant medication changes: Doxycycline and prednisone - now complete  Significant dietary changes: None  Significant alcohol or tobacco changes: None  Significant recent illness, disease state changes, or hospitalization: [facility] 12/3 for chest pain/COPD exacerbation, [facility] 12/9 for leg swelling/COPD exacerbation, [facility] 12/17 for back pain  Upcoming surgeries or procedures:  None  Falls: None           Assessment and Plan     PT/INR done in office per protocol.   INR today is 2.1, therapeutic.          Plan:  Will continue current regimen of warfarin 6mg daily except 4mg on Sunday, Monday, and Saturday.     Recheck INR in 4 week(s).     Patient verbalized understanding of dosing directions and information discussed. Dosing schedule given to patient including phone number, appointment date, and time. Progress note sent to referring office.    Electronically signed by Antoinette Navas RPH on 1/9/25     For Pharmacy Admin Tracking Only    Total # of Interventions Recommended: 0  Total # of Interventions Accepted: 0  Time Spent (min): 15

## 2025-01-15 ENCOUNTER — HOSPITAL ENCOUNTER (OUTPATIENT)
Dept: CT IMAGING | Age: 65
Discharge: HOME | End: 2025-01-15
Attending: INTERNAL MEDICINE

## 2025-01-15 DIAGNOSIS — Z00.6 EXAMINATION FOR NORMAL COMPARISON OR CONTROL IN CLINICAL RESEARCH: ICD-10-CM

## 2025-01-17 RX ORDER — BUMETANIDE 2 MG/1
2 TABLET ORAL 2 TIMES DAILY
Qty: 60 TABLET | Refills: 5 | Status: SHIPPED | OUTPATIENT
Start: 2025-01-17

## 2025-01-17 RX ORDER — BUMETANIDE 2 MG/1
2 TABLET ORAL 2 TIMES DAILY
Qty: 60 TABLET | Refills: 5 | OUTPATIENT
Start: 2025-01-17

## 2025-01-20 ENCOUNTER — HOSPITAL ENCOUNTER (OUTPATIENT)
Dept: PET IMAGING | Age: 65
Discharge: HOME | End: 2025-01-20
Attending: INTERNAL MEDICINE
Payer: MEDICARE

## 2025-01-20 DIAGNOSIS — R91.1 LUNG NODULE: ICD-10-CM

## 2025-01-20 PROCEDURE — A9609 HC RX DIAGNOSTIC RADIOPHARMACEUTICAL: HCPCS | Performed by: INTERNAL MEDICINE

## 2025-01-20 PROCEDURE — 78815 PET IMAGE W/CT SKULL-THIGH: CPT

## 2025-01-20 PROCEDURE — 2500000003 HC RX 250 WO HCPCS: Performed by: INTERNAL MEDICINE

## 2025-01-20 PROCEDURE — 3430000000 HC RX DIAGNOSTIC RADIOPHARMACEUTICAL: Performed by: INTERNAL MEDICINE

## 2025-01-20 RX ORDER — SODIUM CHLORIDE 0.9 % (FLUSH) 0.9 %
5-40 SYRINGE (ML) INJECTION 2 TIMES DAILY
Status: DISCONTINUED | OUTPATIENT
Start: 2025-01-20 | End: 2025-01-21 | Stop reason: HOSPADM

## 2025-01-20 RX ORDER — FLUDEOXYGLUCOSE F 18 200 MCI/ML
12.04 INJECTION, SOLUTION INTRAVENOUS
Status: COMPLETED | OUTPATIENT
Start: 2025-01-20 | End: 2025-01-20

## 2025-01-20 RX ADMIN — SODIUM CHLORIDE, PRESERVATIVE FREE 10 ML: 5 INJECTION INTRAVENOUS at 08:15

## 2025-01-20 RX ADMIN — FLUDEOXYGLUCOSE F 18 12.04 MILLICURIE: 200 INJECTION, SOLUTION INTRAVENOUS at 08:15

## 2025-01-21 NOTE — ED TRIAGE NOTES
Pt arrives with complaints of shortness of breath since having COVID 14 days ago. He states that last night was the worst because he felt like he couldn't catch his breath. He did not sleep well. Tried to call his PCP and no one returned his call. He denies any pain, no fever or chills but is concerned about his breathing, history of CHF and did state that he had \"pneumonia\" about 1 month before getting COVID, no peripheral swelling noted, he does sound slightly congested. Type 2 diabetes mellitus with hyperglycemia

## 2025-01-28 ENCOUNTER — HOSPITAL ENCOUNTER (OUTPATIENT)
Dept: PULMONOLOGY | Age: 65
Discharge: HOME | End: 2025-01-28
Attending: INTERNAL MEDICINE
Payer: MEDICARE

## 2025-01-28 DIAGNOSIS — J43.2 CENTRILOBULAR EMPHYSEMA (HCC): ICD-10-CM

## 2025-01-28 LAB
DLCO %PRED: 52 %
DLCO PRED: NORMAL
DLCO/VA %PRED: NORMAL
DLCO/VA PRED: NORMAL
DLCO/VA: NORMAL
DLCO: NORMAL
EXPIRATORY TIME-POST: NORMAL
EXPIRATORY TIME: NORMAL
FEF 25-75 %CHNG: 48
FEF 25-75 POST %PRED: 41 %
FEF 25-75% %PRED-PRE: 28 L/SEC
FEF 25-75% PRED: NORMAL
FEF 25-75-POST: NORMAL
FEF 25-75-PRE: NORMAL
FEV1 %PRED-POST: 53 %
FEV1 %PRED-PRE: 48 %
FEV1 PRED: NORMAL
FEV1-POST: NORMAL
FEV1-PRE: NORMAL
FEV1/FVC %PRED-POST: 76 %
FEV1/FVC %PRED-PRE: 80 %
FEV1/FVC PRED: NORMAL
FEV1/FVC-POST: NORMAL
FEV1/FVC-PRE: NORMAL
FVC %PRED-POST: 69 L
FVC %PRED-PRE: 59 %
FVC PRED: NORMAL
FVC-POST: NORMAL
FVC-PRE: NORMAL
GAW %PRED: NORMAL
GAW PRED: NORMAL
GAW: NORMAL
IC PRE %PRED: NORMAL
IC PRED: NORMAL
IC: NORMAL
MEP: NORMAL
MIP: NORMAL
MVV %PRED-PRE: NORMAL
MVV PRED: NORMAL
MVV-PRE: NORMAL
PEF %PRED-POST: NORMAL
PEF %PRED-PRE: NORMAL
PEF PRED: NORMAL
PEF%CHNG: NORMAL
PEF-POST: NORMAL
PEF-PRE: NORMAL
RAW %PRED: NORMAL
RAW PRED: NORMAL
RAW: NORMAL
RV PRE %PRED: NORMAL
RV PRED: NORMAL
RV: NORMAL
SVC %PRED: 59 %
SVC PRED: NORMAL
SVC: NORMAL
TLC PRE %PRED: 97 %
TLC PRED: NORMAL
TLC: NORMAL
VA %PRED: NORMAL
VA PRED: NORMAL
VA: NORMAL
VTG %PRED: NORMAL
VTG PRED: NORMAL
VTG: NORMAL

## 2025-01-28 PROCEDURE — 94060 EVALUATION OF WHEEZING: CPT

## 2025-01-28 PROCEDURE — 94729 DIFFUSING CAPACITY: CPT

## 2025-01-28 PROCEDURE — 94726 PLETHYSMOGRAPHY LUNG VOLUMES: CPT

## 2025-01-28 ASSESSMENT — PULMONARY FUNCTION TESTS
FEV1_PERCENT_PREDICTED_PRE: 48
FEV1/FVC_PERCENT_PREDICTED_PRE: 80
FEV1/FVC_PERCENT_PREDICTED_POST: 76
FVC_PERCENT_PREDICTED_PRE: 59
FEV1_PERCENT_PREDICTED_POST: 53
FVC_PERCENT_PREDICTED_POST: 69

## 2025-02-03 ENCOUNTER — TELEPHONE (OUTPATIENT)
Dept: PHARMACY | Age: 65
End: 2025-02-03

## 2025-02-03 NOTE — TELEPHONE ENCOUNTER
Patient left message to change next appointment.   Returned call, rescheduled for 2/10/25.     For Pharmacy Admin Tracking Only    Intervention Detail:   Total # of Interventions Recommended:   Total # of Interventions Accepted:   Time Spent (min): 5

## 2025-02-10 ENCOUNTER — ANTI-COAG VISIT (OUTPATIENT)
Dept: PHARMACY | Age: 65
End: 2025-02-10
Payer: MEDICARE

## 2025-02-10 LAB
INTERNATIONAL NORMALIZATION RATIO, POC: 1.8
POC INR: 1.8 (ref 0.9–1.2)
PROTHROMBIN TIME, POC: 21.7
PROTHROMBIN TIME, POC: 21.7 SEC (ref 10–14.3)

## 2025-02-10 PROCEDURE — 85610 PROTHROMBIN TIME: CPT

## 2025-02-10 PROCEDURE — 99211 OFF/OP EST MAY X REQ PHY/QHP: CPT

## 2025-02-10 NOTE — PROGRESS NOTES
Medication Management Service  [facility]  [phone]    Visit Date: 2/10/2025   Subjective:       Josiah Parr is a 64 y.o. male who presents to clinic today for anticoagulation monitoring and adjustment.    Patient seen in clinic for warfarin management due to  Indication:   atrial fibrillation.   INR goal: of 2.0-3.0.  Duration of therapy: indefinite.    Patient reports the following:   Adherent with regimen  Missed or extra doses:  missed a day about 1.5 weeks ago  Bleeding or thromboembolic side effects:  None  Significant medication changes:  None  Significant dietary changes: None  Significant alcohol or tobacco changes: None  Significant recent illness, disease state changes, or hospitalization:  None  Upcoming surgeries or procedures:  None  Falls: None           Assessment and Plan     PT/INR done in office per protocol.   INR today is 1.8, subtherapeutic.          Plan:  Will continue current regimen of warfarin 4mg daily except 6mg on Saturdays, Sundays, and Mondays.     Recheck INR in 2.5 week(s).     Patient verbalized understanding of dosing directions and information discussed. Dosing schedule given to patient including phone number, appointment date, and time. Progress note sent to referring office.    Electronically signed by Ciera Chan RPH on 2/10/25     For Pharmacy Admin Tracking Only    Intervention Detail:   Total # of Interventions Recommended:   Total # of Interventions Accepted:   Time Spent (min): 15

## 2025-02-26 RX ORDER — WARFARIN SODIUM 4 MG/1
TABLET ORAL
Qty: 117 TABLET | Refills: 1 | OUTPATIENT
Start: 2025-02-26

## 2025-02-27 ENCOUNTER — ANTI-COAG VISIT (OUTPATIENT)
Dept: PHARMACY | Age: 65
End: 2025-02-27
Payer: MEDICARE

## 2025-02-27 LAB
INTERNATIONAL NORMALIZATION RATIO, POC: 2.8
POC INR: 2.8 (ref 0.9–1.2)
PROTHROMBIN TIME, POC: 33.5
PROTHROMBIN TIME, POC: 33.5 SEC (ref 10–14.3)

## 2025-02-27 PROCEDURE — 85610 PROTHROMBIN TIME: CPT

## 2025-02-27 PROCEDURE — 99211 OFF/OP EST MAY X REQ PHY/QHP: CPT

## 2025-02-27 NOTE — PROGRESS NOTES
Medication Management Service  [facility]  [phone]    Visit Date: 2/27/2025   Subjective:       Josiah Parr is a 64 y.o. male who presents to clinic today for anticoagulation monitoring and adjustment.    Patient seen in clinic for warfarin management due to  Indication:   atrial fibrillation.   INR goal: of 2.0-3.0.  Duration of therapy: indefinite.    Patient reports the following:   Adherent with regimen  Missed or extra doses:  None   Bleeding or thromboembolic side effects:  None  Significant medication changes:  None  Significant dietary changes: None  Significant alcohol or tobacco changes: None  Significant recent illness, disease state changes, or hospitalization:  None  Upcoming surgeries or procedures:  None  Falls: None           Assessment and Plan     PT/INR done in office per protocol.   INR today is 2.8, therapeutic.          Plan:  Will continue current regimen of warfarin 6mg daily except 4mg Sundays, Mondays, and Saturdays.     Recheck INR in 3 week(s).     Patient verbalized understanding of dosing directions and information discussed. Dosing schedule given to patient including phone number, appointment date, and time. Progress note sent to referring office.    Electronically signed by Yonathan Babb RPH on 2/27/25     For Pharmacy Admin Tracking Only    Total # of Interventions Recommended: 0  Total # of Interventions Accepted: 0  Time Spent (min): 15

## 2025-03-04 RX ORDER — WARFARIN SODIUM 4 MG/1
TABLET ORAL
Qty: 117 TABLET | Refills: 1 | OUTPATIENT
Start: 2025-03-04

## 2025-03-12 RX ORDER — WARFARIN SODIUM 4 MG/1
TABLET ORAL
Qty: 117 TABLET | Refills: 1 | OUTPATIENT
Start: 2025-03-12

## 2025-03-13 ENCOUNTER — TELEPHONE (OUTPATIENT)
Dept: CARDIOLOGY CLINIC | Age: 65
End: 2025-03-13

## 2025-03-13 NOTE — TELEPHONE ENCOUNTER
Cardiologist: Dr. Morris  Surgeon: Dr. Jackson  Surgery: Spinal injection  Anesthesia: none  Date: Pending  FAX# [phone]  Ph# [phone]    Hold Coumadin for 5 days prior    Last OV 7/23/2024 w/Julio    [place] reflux  -Significant reflux of right GSV SF J tributary note of left GSV at knee and mid calf, significant reflux of left CFV. Patient had ablation in the past, no edema.      HTN (hypertension)   -Stable continue with Coreg 12.5 mg BID, lisinopril 40 mg daily, and hydralazine 100 mg TID.  Continue with Bumex 2 mg twice daily and Zaroxolyn to daily 2 hrs after Bumex. Reports getting home B/P monitor.      Paroxysmal atrial fibrillation   -Chads-Vas is 6, continue with Coumadin for anticoagulation and Plavix.  Recent EKG shows normal sinus rhythm.     Obesity   -Discussed the importance of diet and exercise and assisting with weight loss.  Patient informed of ideal body weight and high risk mortality associated with obesity.  Patient voices understanding.      Leg pain  -LORA normal. Likely diabetic neuropathy.      CAD (coronary artery disease)    Ischemic cardiomyopathy   -MUGA scan in January 2021 showed EF 36%.  Patient appears euvolemic.  Recent EF 55 to 60%.  Continue with aggressive diuresing with Bumex 2 mg BID and metolazone to 2.5 mg daily 2 hours after Bumex.  (Positive for Cocaine on hospitalization 1/9/24).     Mixed hyperlipidemia   -At or near goal no, YOG128 (12/8/23). Will start Repatha.   -He is to continue current medicat/ions (Lipitor 80 mg and Zetia) Hepatic function panel WNL. No abdominal pain. No myalgias.        Last EKG- 1/9/2024        Echo- 4/25/2023   Left ventricular systolic function is normal with an ejection fraction of   55-60%.   Moderate concentric left ventricular hypertrophy.   Mildly dilated left and right atrium.   No significant valvular disease noted.   Normal pulmonary artery pressure.   No evidence of pericardial effusion.      Cath- 2/10/2023  The left main is a

## 2025-03-19 RX ORDER — WARFARIN SODIUM 4 MG/1
TABLET ORAL
Qty: 117 TABLET | Refills: 1 | OUTPATIENT
Start: 2025-03-19

## 2025-03-20 ENCOUNTER — ANTI-COAG VISIT (OUTPATIENT)
Dept: PHARMACY | Age: 65
End: 2025-03-20
Payer: MEDICARE

## 2025-03-20 LAB
INTERNATIONAL NORMALIZATION RATIO, POC: 3.1
PROTHROMBIN TIME, POC: 37.4

## 2025-03-20 PROCEDURE — 99211 OFF/OP EST MAY X REQ PHY/QHP: CPT

## 2025-03-20 PROCEDURE — 85610 PROTHROMBIN TIME: CPT

## 2025-03-20 RX ORDER — WARFARIN SODIUM 4 MG/1
TABLET ORAL
Qty: 117 TABLET | Refills: 1 | Status: SHIPPED | OUTPATIENT
Start: 2025-03-20

## 2025-03-20 NOTE — PROGRESS NOTES
Medication Management Service  [facility]  [phone]    Visit Date: 3/20/2025   Subjective:       Josiah Parr is a 64 y.o. male who presents to clinic today for anticoagulation monitoring and adjustment.    Patient seen in clinic for warfarin management due to  Indication:   atrial fibrillation.   INR goal: of 2.0-3.0.  Duration of therapy: indefinite.    Patient reports the following:   Adherent with regimen  Missed or extra doses:  None   Bleeding or thromboembolic side effects:  None  Significant medication changes:  None  Significant dietary changes: None  Significant alcohol or tobacco changes: None  Significant recent illness, disease state changes, or hospitalization:  None  Upcoming surgeries or procedures:  None  Falls: None           Assessment and Plan     PT/INR done in office per protocol.   INR today is 3.1, supratherapeutic, just above goal range.   He plans to check with his new Rx insurance to see if Eliquis is covered and will talk to Dr. Morris about switching at next office visit.   Plan:  Will continue current regimen of warfarin 4mg on Saturday, Sunday, and Monday and 6mg all other days.     Recheck INR in 4 week(s).     Patient verbalized understanding of dosing directions and information discussed. Dosing schedule given to patient including phone number, appointment date, and time. Progress note sent to referring office.    Electronically signed by Ciera Chan RPH on 3/20/25     For Pharmacy Admin Tracking Only    Intervention Detail:   Total # of Interventions Recommended:   Total # of Interventions Accepted:   Time Spent (min): 15

## 2025-03-21 LAB
POC INR: 3.1 (ref 0.9–1.2)
PROTHROMBIN TIME, POC: 37.4 SEC (ref 10–14.3)

## 2025-03-24 ENCOUNTER — OFFICE VISIT (OUTPATIENT)
Dept: CARDIOLOGY CLINIC | Age: 65
End: 2025-03-24
Payer: MEDICARE

## 2025-03-24 VITALS
SYSTOLIC BLOOD PRESSURE: 136 MMHG | BODY MASS INDEX: 35.16 KG/M2 | DIASTOLIC BLOOD PRESSURE: 82 MMHG | WEIGHT: 274 LBS | HEIGHT: 74 IN | HEART RATE: 72 BPM

## 2025-03-24 DIAGNOSIS — E66.09 CLASS 2 OBESITY DUE TO EXCESS CALORIES WITHOUT SERIOUS COMORBIDITY WITH BODY MASS INDEX (BMI) OF 35.0 TO 35.9 IN ADULT: ICD-10-CM

## 2025-03-24 DIAGNOSIS — I48.20 CHRONIC ATRIAL FIBRILLATION (HCC): ICD-10-CM

## 2025-03-24 DIAGNOSIS — I25.5 ISCHEMIC CARDIOMYOPATHY: ICD-10-CM

## 2025-03-24 DIAGNOSIS — I25.10 CORONARY ARTERY DISEASE INVOLVING NATIVE HEART WITHOUT ANGINA PECTORIS, UNSPECIFIED VESSEL OR LESION TYPE: ICD-10-CM

## 2025-03-24 DIAGNOSIS — I48.0 PAROXYSMAL ATRIAL FIBRILLATION (HCC): Primary | ICD-10-CM

## 2025-03-24 DIAGNOSIS — I10 PRIMARY HYPERTENSION: ICD-10-CM

## 2025-03-24 DIAGNOSIS — F19.10 SUBSTANCE ABUSE: ICD-10-CM

## 2025-03-24 DIAGNOSIS — E66.812 CLASS 2 OBESITY DUE TO EXCESS CALORIES WITHOUT SERIOUS COMORBIDITY WITH BODY MASS INDEX (BMI) OF 35.0 TO 35.9 IN ADULT: ICD-10-CM

## 2025-03-24 PROCEDURE — 93000 ELECTROCARDIOGRAM COMPLETE: CPT | Performed by: INTERNAL MEDICINE

## 2025-03-24 PROCEDURE — 3079F DIAST BP 80-89 MM HG: CPT | Performed by: INTERNAL MEDICINE

## 2025-03-24 PROCEDURE — 99214 OFFICE O/P EST MOD 30 MIN: CPT | Performed by: INTERNAL MEDICINE

## 2025-03-24 PROCEDURE — 3075F SYST BP GE 130 - 139MM HG: CPT | Performed by: INTERNAL MEDICINE

## 2025-03-24 NOTE — PROGRESS NOTES
CLINICAL STAFF DOCUMENTATION    Dr. Refugio Parr  1960  [id number]    Have you had any Chest Pain recently? - No  If Yes DO EKG         Have you had any Shortness of Breath - Yes  When did it begin? - Years   If Yes - When on exertion  How many flights of stairs can you go up without having SOB? -   Are you on Oxygen during the day or at night? -   How many liters of Oxygen are you on? -   How many pillows do you sleep with under your head? -     Have you had any dizziness - No    Have you had any palpitations recently? - No  Jasvir you have any edema - swelling in No         Is the patient on any of the following medications -   If Yes DO EKG - Needs done every 3 months    When did you have your last labs drawn 2024  What doctor ordered   Do we have the labs in their chart Yes  If we do not have the labs, ask where they were drawn     If we do not have these labs, you are retrieve these labs for the provider!    Do you have a surgery or procedure scheduled in the near future - back injection  If Yes- DO EKG  If Yes - Who is the surgery with? Pain clinic  Phone number of physician   Fax number of physician   Type of surgery back injection  GIVE THIS INFORMATION TO ARIEL CARRERO AND SHELBY CONLEY    Do use tobacco products? - Yes  Do you drink alcohol? - No  Do you use any illicit drugs? - No  Caffeine? - Yes  How much caffeine? .3 cups 3 pops   cups       
eliquis.last INR WAS 3.1, WILL RECOMMEND TO STOP COUAMDIN AND START ELQIUIS IN 3 DAYS  Dyslipidemia: continue statins  All labs, medications and tests reviewed, continue all other medications of all above medical condition listed as is.    [unfilled]

## 2025-03-24 NOTE — PATIENT INSTRUCTIONS
Thank you for allowing us to care for you today!   We want to ensure we can follow your treatment plan and we strive to give you the best outcomes and experience possible.   If you ever have a life threatening emergency and call 911 - for an ambulance (EMS)  REMEMBER  Our providers can only care for you at:   [facility] or [facility]   Even if you have someone take you or you drive yourself we can only care for you in a [facility] facility. Our providers are not setup at the other healthcare locations!    PLEASE CALL OUR OFFICE DURING NORMAL BUSINESS HOURS  Monday through Friday 8 am to 5 pm  AFTER HOURS the physician on-call cannot help with scheduling, rescheduling, procedure instruction questions or any type of medication need or issue.  [facility] P:[phone] - [facility] P:[phone] - [facility] P:[phone]      If you receive a survey:  We would appreciate you taking the time to share your experience concerning your provider visit in the office.    These surveys are confidential!  We are eager to improve and are counting on you to share your feedback so we can ensure you get the best care possible.

## 2025-03-26 ENCOUNTER — TELEPHONE (OUTPATIENT)
Dept: CARDIOLOGY CLINIC | Age: 65
End: 2025-03-26

## 2025-04-17 ENCOUNTER — TELEPHONE (OUTPATIENT)
Dept: PHARMACY | Age: 65
End: 2025-04-17

## 2025-04-17 ENCOUNTER — ANTI-COAG VISIT (OUTPATIENT)
Dept: PHARMACY | Age: 65
End: 2025-04-17

## 2025-04-17 NOTE — TELEPHONE ENCOUNTER
Patient came to appointment today and informed us he stopped warfarin and was put on Eliquis on 3/27.  Patient still came to appointment because he thought he still needed an INR while taking the Eliquis. I discussed with the patient that their was no need to check INR/have appointment today because Eliquis doesn't require the same monitoring as Warfarin.  Instructed patient to follow up with Dr for further monitoring.     For Pharmacy Admin Tracking Only    Time Spent (min): 15

## 2025-04-17 NOTE — PROGRESS NOTES
Medication Management Service  [facility]  [phone]    Visit Date: 2025   Subjective:       Josiah Parr is a 64 y.o. male who presents to clinic today for anticoagulation monitoring and adjustment.    Patient seen in clinic for warfarin management due to  Indication:   {INDICATIONS; ANTICOAGULATION:51391}.   INR goal: of {Blank single:78580::\"2.5-3.5\",\"2.0-3.0\",\"1.8-2.5\"}.  Duration of therapy: {DURATION OF ANTICOA}.    Patient reports the following:   Adherent with regimen  Missed or extra doses:  None   Bleeding or thromboembolic side effects:  None  Significant medication changes:  None  Significant dietary changes: None  Significant alcohol or tobacco changes: None  Significant recent illness, disease state changes, or hospitalization:  None  Upcoming surgeries or procedures:  None  Falls: None           Assessment and Plan     PT/INR done in office per protocol.   INR today is ***, {INR TYPE:4593375904}.          Plan:  Will continue current regimen of warfarin ***.     Recheck INR in *** week(s).     Patient verbalized understanding of dosing directions and information discussed. Dosing schedule given to patient including phone number, appointment date, and time. Progress note sent to referring office.    Electronically signed by Yonathan Babb RPH on 25     {[facility]/Retail Pharmacy Notes:[id number]}

## 2025-04-18 ENCOUNTER — TELEPHONE (OUTPATIENT)
Dept: CARDIOLOGY CLINIC | Age: 65
End: 2025-04-18

## 2025-04-18 NOTE — TELEPHONE ENCOUNTER
Calling patient that has not had ECHO scheduled or performed yet that was ordered on 3/24/2025 by .     Transferred call to Ayesha and she managed to get the patient scheduled for testing.

## 2025-05-21 PROBLEM — I50.43 CHF (CONGESTIVE HEART FAILURE), NYHA CLASS I, ACUTE ON CHRONIC, COMBINED (HCC): Status: RESOLVED | Noted: 2022-10-02 | Resolved: 2025-05-21

## 2025-05-21 PROBLEM — I48.0 PAROXYSMAL A-FIB (HCC): Status: RESOLVED | Noted: 2022-07-15 | Resolved: 2025-05-21

## 2025-05-28 NOTE — PROGRESS NOTES
MRN: [id number]  Name: Josiah Parr  : 1960    Insurance: Payor: [employer] /  /  /      Phone #: [phone]  Provider: TREY Valencia CNP     Date of Visit: 2025    Reason for visit:  1 mo f/u  ** new start eliquis / echo results / clearance   Recent Hospitalization Date:    Reason for Hospitalization:    Last EK2025   Type of Device:       Vitals BP HR O2% WT HT ORTHO BP LYING ORTHO BP SITTING ORTHO BP SITTING   Today's Findings           Patients work up- Check List     Testing Last Date Completed Date Expected  ([facility] [place]) Additional Notes    MA to document For provider to complete Either MA or Provider    Carotid Duplex  STAT 1 WK 6 MTH       THIS WK 2 WK 1 YEAR     Cardiac CTA  STAT 1 WK 6 MTH       THIS WK 2 WK 1 YEAR     Cardiac CT Calcium scoring  STAT 1 WK 6 MTH       THIS WK 2 WK 1 YEAR     CTA Chest, Abdomen & Pelvis  STAT 1 WK 6 MTH       THIS WK 2 WK 1 YEAR     CT Chest IV w/ Contrast  STAT 1 WK 6 MTH       THIS WK 2 WK 1 YEAR     CT Chest w/o Contrast  STAT 1 WK 6 MTH       THIS WK 2 WK 1 YEAR     CXR  STAT 1 WK 6 MTH       THIS WK 2 WK 1 YEAR     ECHO 05/15/2025 Stress Complete Limited     MRI- Cardiac  STAT 1 WK 6 MTH       THIS WK 2 WK 1 YEAR     MUGA Scan  STAT 1 WK 6 MTH       THIS WK 2 WK 1 YEAR     Nuclear Stress  Lexiscan Cardiolite     PFT  STAT 1 WK 6 MTH       THIS WK 2 WK 1 YEAR     Treadmill Stress Test  STAT 1 WK 6 MTH       THIS WK 2 WK 1 YEAR     Vascular Duplex 2024 Lower: Right Left Bilat       Upper: Right Left Bilat     Other Test Not Listed:    Monitors Last Date Completed Day's Request/Ordered     Holter  Short term 24 hours 48 hours      Long term 3 days 7 days 14 days   Event   (1-30 days)      Procedures Last Date Performed Procedure Details Date Expected   Additional Notes    ASD Closure        Carotid Angio        Cardioversion        Heart Cath  R L R&L      Peripheral Angio  R L      PFO Closure        PTCA/PCI

## 2025-06-03 ENCOUNTER — HOSPITAL ENCOUNTER (OUTPATIENT)
Dept: CT IMAGING | Age: 65
Discharge: HOME | End: 2025-06-03
Attending: INTERNAL MEDICINE
Payer: MEDICARE

## 2025-06-03 DIAGNOSIS — R91.1 LUNG NODULE: ICD-10-CM

## 2025-06-03 PROCEDURE — 71250 CT THORAX DX C-: CPT

## 2025-06-04 ENCOUNTER — OFFICE VISIT (OUTPATIENT)
Dept: CARDIOLOGY CLINIC | Age: 65
End: 2025-06-04
Payer: MEDICARE

## 2025-06-04 ENCOUNTER — LAB (OUTPATIENT)
Dept: CARDIOLOGY CLINIC | Age: 65
End: 2025-06-04

## 2025-06-04 VITALS
WEIGHT: 270.6 LBS | OXYGEN SATURATION: 97 % | HEIGHT: 74 IN | SYSTOLIC BLOOD PRESSURE: 110 MMHG | BODY MASS INDEX: 34.73 KG/M2 | DIASTOLIC BLOOD PRESSURE: 68 MMHG | HEART RATE: 67 BPM

## 2025-06-04 DIAGNOSIS — E78.2 MIXED HYPERLIPIDEMIA: ICD-10-CM

## 2025-06-04 DIAGNOSIS — I50.42 CHRONIC COMBINED SYSTOLIC AND DIASTOLIC CONGESTIVE HEART FAILURE (HCC): ICD-10-CM

## 2025-06-04 DIAGNOSIS — I25.10 CORONARY ARTERY DISEASE INVOLVING NATIVE HEART WITHOUT ANGINA PECTORIS, UNSPECIFIED VESSEL OR LESION TYPE: ICD-10-CM

## 2025-06-04 DIAGNOSIS — I10 PRIMARY HYPERTENSION: ICD-10-CM

## 2025-06-04 DIAGNOSIS — I87.2 VENOUS REFLUX: Primary | ICD-10-CM

## 2025-06-04 DIAGNOSIS — I48.0 PAF (PAROXYSMAL ATRIAL FIBRILLATION) (HCC): ICD-10-CM

## 2025-06-04 PROCEDURE — 99214 OFFICE O/P EST MOD 30 MIN: CPT | Performed by: NURSE PRACTITIONER

## 2025-06-04 PROCEDURE — 3074F SYST BP LT 130 MM HG: CPT | Performed by: NURSE PRACTITIONER

## 2025-06-04 PROCEDURE — 36415 COLL VENOUS BLD VENIPUNCTURE: CPT | Performed by: NURSE PRACTITIONER

## 2025-06-04 PROCEDURE — 3078F DIAST BP <80 MM HG: CPT | Performed by: NURSE PRACTITIONER

## 2025-06-04 RX ORDER — SITAGLIPTIN 100 MG/1
1 TABLET ORAL DAILY
COMMUNITY

## 2025-06-04 RX ORDER — ASPIRIN 81 MG/1
81 TABLET ORAL DAILY
Qty: 90 TABLET | Refills: 3 | Status: SHIPPED | OUTPATIENT
Start: 2025-06-04

## 2025-06-04 RX ORDER — PANTOPRAZOLE SODIUM 40 MG/1
40 TABLET, DELAYED RELEASE ORAL DAILY
COMMUNITY
Start: 2025-03-26

## 2025-06-04 RX ORDER — RANOLAZINE 500 MG/1
500 TABLET, EXTENDED RELEASE ORAL 2 TIMES DAILY
Qty: 60 TABLET | Refills: 3 | Status: CANCELLED | OUTPATIENT
Start: 2025-06-04

## 2025-06-04 RX ORDER — SITAGLIPTIN 100 MG/1
100 TABLET, FILM COATED ORAL DAILY
COMMUNITY
Start: 2025-04-23

## 2025-06-04 RX ORDER — CYCLOBENZAPRINE HCL 10 MG
10 TABLET ORAL PRN
COMMUNITY
Start: 2025-05-07

## 2025-06-04 RX ORDER — POTASSIUM CHLORIDE 1500 MG/1
40 TABLET, EXTENDED RELEASE ORAL DAILY
COMMUNITY
Start: 2025-04-07 | End: 2025-06-04

## 2025-06-04 ASSESSMENT — ENCOUNTER SYMPTOMS
BACK PAIN: 1
SHORTNESS OF BREATH: 0

## 2025-06-04 NOTE — PROGRESS NOTES
[place] [address]  Phone: [phone]    Fax [phone]    Jose Guadalupe Manzanares MD, [facility]  Ciaran Olmedo MD, [facility]   Honorio Dutta MD, [facility] MD Refugio Grayson MD, [facility]  Carlos Ingram MD, [facility]    Charbel Unger MD, [facility]  Sujit Zaldivar MD, [facility]  Charito García, APRN  Michellejose Martinez, APRN  Mavisleelee Pepe, APRN  Julio Snyder, APRN       6/4/2025    RE: Josiah Parr  (1960)                               TO:  Erwin Villareal MD  The primary cardiologist is Dr. Morris     CC:   1. Venous reflux    2. Chronic combined systolic and diastolic congestive heart failure (HCC)    3. PAF (paroxysmal atrial fibrillation) (HCC)    4. Coronary artery disease involving native heart without angina pectoris, unspecified vessel or lesion type    5. Primary hypertension    6. Mixed hyperlipidemia          Patient denies all of the following:  Chest Pain  Palpitations  Dizziness  Syncope      HPI: Thank you for involving me in taking care of your patient Josiah Parr, who is a  65 y.o. year old male with a history as listed above. Patient presents to the office for follow up on CAD, HTN, CHF, A-fib, TAA, venous insufficiency and hyperlipidemia. Patient had venous ablation of right lower extremity on 8/1/22 and has some improvement in edema and pain.  Last [facility] in 2023 showed 2/4 patent bypass graft.  Patient had PCI of RCA in 2019, then CABG x 4 in 2020 LIMA-LAD, SVG to RCA/PDA, SVG to OM.  Had PCI of SVG to RCA/PDA with multiple stents overlapping 6 months post CABG. patient has a history of drug-induced cardiomyopathy in 2021.  Recent echocardiogram shows improved LVEF.       Vitals:    06/04/25 0916   BP: 110/68   Pulse: 67   SpO2: 97%         Current Outpatient Medications   Medication Sig Dispense Refill    JANUVIA 100 MG tablet Take 1 tablet by mouth daily      SITagliptin 100 MG TABS Take 1 tablet by mouth daily      pantoprazole (PROTONIX)

## 2025-06-04 NOTE — PATIENT INSTRUCTIONS
Thank you for allowing us to care for you today!   We want to ensure we can follow your treatment plan and we strive to give you the best outcomes and experience possible.   If you ever have a life threatening emergency and call 911 - for an ambulance (EMS)  REMEMBER  Our providers can only care for you at:   [facility] or [facility]   Even if you have someone take you or you drive yourself we can only care for you in a [facility] facility. Our providers are not setup at the other healthcare locations!    PLEASE CALL OUR OFFICE DURING NORMAL BUSINESS HOURS  Monday through Friday 8 am to 5 pm  AFTER HOURS the physician on-call cannot help with scheduling, rescheduling, procedure instruction questions or any type of medication need or issue.  [facility] P:[phone] - [facility] P:[phone] - [facility] P:[phone]      If you receive a survey:  We would appreciate you taking the time to share your experience concerning your provider visit in the office.    These surveys are confidential!  We are eager to improve and are counting on you to share your feedback so we can ensure you get the best care possible.

## 2025-06-10 ENCOUNTER — LAB (OUTPATIENT)
Dept: CARDIOLOGY CLINIC | Age: 65
End: 2025-06-10
Payer: MEDICARE

## 2025-06-10 DIAGNOSIS — I25.10 CORONARY ARTERY DISEASE INVOLVING NATIVE HEART WITHOUT ANGINA PECTORIS, UNSPECIFIED VESSEL OR LESION TYPE: Primary | ICD-10-CM

## 2025-06-10 PROCEDURE — 36415 COLL VENOUS BLD VENIPUNCTURE: CPT | Performed by: INTERNAL MEDICINE

## 2025-06-11 LAB
CHOLEST SERPL-MCNC: 143 MG/DL (ref 0–199)
HDLC SERPL-MCNC: 37 MG/DL (ref 40–60)
LDLC SERPL CALC-MCNC: 69 MG/DL
TRIGL SERPL-MCNC: 186 MG/DL (ref 0–150)
VLDLC SERPL CALC-MCNC: 37 MG/DL

## 2025-06-13 ENCOUNTER — RESULTS FOLLOW-UP (OUTPATIENT)
Dept: CARDIOLOGY CLINIC | Age: 65
End: 2025-06-13

## 2025-06-13 NOTE — TELEPHONE ENCOUNTER
Called patient and notified them that their cholesterol is well-controlled and Julio is wanting them to continue with current medications. I also reminded the patient of next OV with  12/4. Patient voiced understanding.     ----- Message from TREY Sr CNP sent at 6/13/2025 12:13 PM EDT -----  Cholesterol is well-controlled continue with current medications.

## 2025-07-03 ENCOUNTER — TRANSCRIBE ORDERS (OUTPATIENT)
Dept: ADMINISTRATIVE | Age: 65
End: 2025-07-03

## 2025-07-03 DIAGNOSIS — K21.9 GASTROESOPHAGEAL REFLUX DISEASE, UNSPECIFIED WHETHER ESOPHAGITIS PRESENT: Primary | ICD-10-CM

## 2025-07-08 ENCOUNTER — TELEPHONE (OUTPATIENT)
Dept: CARDIOLOGY CLINIC | Age: 65
End: 2025-07-08

## 2025-07-08 NOTE — TELEPHONE ENCOUNTER
Pt received Repatha in the mail and is not sure if he is to take this. States neither Alexandra or Julio spoke with him about the Repatha.

## 2025-07-09 NOTE — TELEPHONE ENCOUNTER
Patient had previously told me he was unaware he is suppose to take the Repatha every 14 days. Continue with medication.

## 2025-07-10 ENCOUNTER — TELEPHONE (OUTPATIENT)
Dept: CARDIOLOGY CLINIC | Age: 65
End: 2025-07-10

## 2025-07-10 NOTE — TELEPHONE ENCOUNTER
Pt given verbal instructions for Repatha pen. Also offered pt to come to office to show instructions in person and how to use Repatha pen. Pt v/u.

## 2025-07-24 ENCOUNTER — HOSPITAL ENCOUNTER (OUTPATIENT)
Dept: GENERAL RADIOLOGY | Age: 65
Discharge: HOME | End: 2025-07-24
Payer: MEDICARE

## 2025-07-24 DIAGNOSIS — J90 PLEURAL EFFUSION: ICD-10-CM

## 2025-07-24 PROCEDURE — 71046 X-RAY EXAM CHEST 2 VIEWS: CPT

## 2025-08-01 DIAGNOSIS — I48.0 PAROXYSMAL ATRIAL FIBRILLATION (HCC): ICD-10-CM

## 2025-08-14 ENCOUNTER — TELEPHONE (OUTPATIENT)
Dept: CARDIOLOGY CLINIC | Age: 65
End: 2025-08-14

## 2025-09-05 RX ORDER — EVOLOCUMAB 140 MG/ML
INJECTION, SOLUTION SUBCUTANEOUS
Qty: 2 ML | Refills: 3 | Status: SHIPPED | OUTPATIENT
Start: 2025-09-05

## (undated) DEVICE — 6 FOOT DISPOSABLE EXTENSION CABLE WITH SAFE CONNECT / SCREW-DOWN

## (undated) DEVICE — INTENDED FOR TISSUE SEPARATION, AND OTHER PROCEDURES THAT REQUIRE A SHARP SURGICAL BLADE TO PUNCTURE OR CUT.: Brand: BARD-PARKER ® STAINLESS STEEL BLADES

## (undated) DEVICE — CONMED ACCESSORY ELECTRODE, NEEDLE WITH EXTENDED INSULATION: Brand: CONMED

## (undated) DEVICE — DRAIN SURG SGL COLL PT TB FOR ATS BG OASIS

## (undated) DEVICE — SUTURE VCRL SZ 3-0 L27IN ABSRB UD L17MM RB-1 1/2 CIR J215H

## (undated) DEVICE — ELECTRODE ES AD CRDLSS PT RET REM POLYHESIVE

## (undated) DEVICE — SHEET,T,THYROID,STERILE: Brand: MEDLINE

## (undated) DEVICE — Z INACTIVE USE 2540311 LEAD PACE L475MM CHN A OR V MYOCARDIAL STEROID ELUT SIL

## (undated) DEVICE — DEVICE ABLAT CARD BPLR RF SYS CARDIOBLATE BP2

## (undated) DEVICE — MARKER SURG SKIN UTIL REGULAR/FINE 2 TIP W/ RUL AND 9 LBL

## (undated) DEVICE — 3M™ IOBAN™ 2 ANTIMICROBIAL INCISE DRAPE 6648EZ: Brand: IOBAN™ 2

## (undated) DEVICE — THORACIC CATHETER, STRAIGHT, SILICONE, WITH CLOTSTOP®: Brand: AXIOM® ATRAUM™ WITH CLOTSTOP®

## (undated) DEVICE — TAPE MED W1/8XL30IN WHT POLY

## (undated) DEVICE — SUTURE S STL SZ 5 L18IN NONABSORBABLE SIL V-40 L48MM 1/2 M650G

## (undated) DEVICE — COVER US PRB W12XL244CM SURGICAL INTRAOPERATIVE PLAS TAPR L

## (undated) DEVICE — ADHESIVE SKIN CLSR 0.7ML TOP DERMBND ADV

## (undated) DEVICE — TUBING, SUCTION, 9/32" X 10', STRAIGHT: Brand: MEDLINE

## (undated) DEVICE — SHEET,DRAPE,40X58,STERILE: Brand: MEDLINE

## (undated) DEVICE — GLOVE ORANGE PI 8   MSG9080

## (undated) DEVICE — APPLICATOR MEDICATED 26 CC SOLUTION HI LT ORNG CHLORAPREP

## (undated) DEVICE — GOWN,SIRUS,FABRNF,RAGLAN,XL,ST,28/CS: Brand: MEDLINE

## (undated) DEVICE — BLADE OPHTH GRN ROUNDED TIP 1 SIDE SHRP GRINDLESS MINI-BLDE

## (undated) DEVICE — SUTURE PROL 7-0 L18IN NONABSORBABLE BLU L9.3MM BV-1 3/8 CIR M8701

## (undated) DEVICE — GAUZE,SPONGE,4"X4",16PLY,XRAY,STRL,LF: Brand: MEDLINE

## (undated) DEVICE — WAX SURG 2.5GM HEMSTAT BNE BEESWAX PARAFFIN ISO PALMITATE

## (undated) DEVICE — TOTAL TRAY, 16FR 10ML SIL FOLEY, URN: Brand: MEDLINE

## (undated) DEVICE — PENCIL ES CRD L10FT HND SWCHING ROCK SWCH W/ EDGE COAT BLDE

## (undated) DEVICE — SUTURE PROL SZ 4-0 L36IN NONABSORBABLE BLU L26MM SH 1/2 CIR 8521H

## (undated) DEVICE — SUTURE S STL SZ 5 L18IN NONABSORBABLE SIL L48MM CCS 1/4 CIR M657G

## (undated) DEVICE — GLOVE SURG SZ 6 THK91MIL LTX FREE SYN POLYISOPRENE ANTI

## (undated) DEVICE — Z INACTIVE USE 2660664 SOLUTION IRRIG 3000ML 0.9% SOD CHL USP UROMATIC PLAS CONT

## (undated) DEVICE — SUTURE ETHIB EXCL X BR GRN V-7 DA 2-0 30 PX977 PX977H

## (undated) DEVICE — DRAPE SHEET ULTRAGARD: Brand: MEDLINE

## (undated) DEVICE — SUTURE ETHBND EXCEL SZ 2-0 L36IN NONABSORBABLE GRN L26MM SH X523H

## (undated) DEVICE — CONNECTOR DRNGE W3/8-0.5XH3/16XL3/16IN 2:1 SIL CARD STR

## (undated) DEVICE — APPLICATOR MEDICATED L4IN PVP IOD SAT PREP SOL PD SWABSTK

## (undated) DEVICE — ROTATING SURGICAL PUNCHES, 1 PER POUCH: Brand: A&E MEDICAL / ROTATING SURGICAL PUNCHES

## (undated) DEVICE — TUBING, SUCTION, 3/16" X 10', STRAIGHT: Brand: MEDLINE

## (undated) DEVICE — TOWEL,OR,DSP,ST,BLUE,STD,6/PK,12PK/CS: Brand: MEDLINE

## (undated) DEVICE — ADAPTER IV TBNG CLR POLYCARB DBL M LUERLOCK

## (undated) DEVICE — SUTURE NRLN SZ 1 L18IN NONABSORBABLE BLK L36MM CT-1 1/2 CIR C520D

## (undated) DEVICE — GOWN,ECLIPSE,POLYRNF,BRTHSLV,L,30/CS: Brand: MEDLINE

## (undated) DEVICE — GLOVE ORANGE PI 7 1/2   MSG9075

## (undated) DEVICE — BLADE CLIPPER GEN PURP NS

## (undated) DEVICE — ELECTRODE ES L4IN PTFE INSUL BLDE W/ SFTY SL DISP EDGE

## (undated) DEVICE — PROBE ABLAT L10CM DISPOSABLE CRYOFLEX 10 S

## (undated) DEVICE — MPS® DELIVERY SET W/ARREST AGENT AND ADDITIVE CASSETTES, HEAT EXCHANGER & 10 FT. DELIVERY TUBING: Brand: MPS

## (undated) DEVICE — OPEN HEART PACK: Brand: MEDLINE INDUSTRIES, INC.

## (undated) DEVICE — CATHETER,URETHRAL,REDRUBBER,STRL,16FR: Brand: MEDLINE

## (undated) DEVICE — CLIP SM RED INTERN HMOCLP TITAN LIGATING

## (undated) DEVICE — GLOVE SURG SZ 75 CRM LTX FREE POLYISOPRENE POLYMER BEAD ANTI

## (undated) DEVICE — RETRACTOR SURG INSRT SUT HLD OCTOBASE

## (undated) DEVICE — YANKAUER,FLEXIBLE HANDLE,REGLR CAPACITY: Brand: MEDLINE INDUSTRIES, INC.

## (undated) DEVICE — SUTURE VCRL 2-0 L36IN ABSRB UD CTX L48MM 1/2 CIR TAPERPOINT J979H

## (undated) DEVICE — SOLUTION IV IRRIG POUR BRL 0.9% SODIUM CHL 2F7124

## (undated) DEVICE — BLADE ES L2.75IN ELASTOMERIC COAT DURABLE BEND UPTO 90DEG

## (undated) DEVICE — SUTURE VCRL SZ 5-0 L18IN ABSRB UD L13MM P-3 3/8 CIR PRIM J493G

## (undated) DEVICE — COUNTER NDL 30 COUNT FOAM STRP SGL MAG

## (undated) DEVICE — BAG AUTOTRNS 600ML BLD SGL CHMBR 3 PRT PLAS DEHP PVC

## (undated) DEVICE — SUTURE VCRL SZ 2 L27IN ABSRB UD L65MM TP-1 1/2 CIR J849G

## (undated) DEVICE — GLOVE SURG SZ 7 L12IN FNGR THK94MIL TRNSLUC YEL LTX HYDRGEL

## (undated) DEVICE — SUTURE NONABSORBABLE MONOFILAMENT 6-0 C-1 4X18 IN PROLENE M8718

## (undated) DEVICE — SPONGE LAP W18XL18IN WHT COT 4 PLY FLD STRUNG RADPQ DISP ST

## (undated) DEVICE — SUTURE SURGLON SZ 1 L30IN NONABSORBABLE BLK NO NDL NYL PRE 8886191971

## (undated) DEVICE — GOWN,SIRUS,FABRNF,RAGLAN,L,ST,30/CS: Brand: MEDLINE

## (undated) DEVICE — STRIP SKIN CLSR W0.25XL4IN WHT SPUNBOUND FBR NYL HI ADH

## (undated) DEVICE — LOOP VES W25MM THK1MM MAXI RED SIL FLD REPELLENT 100 PER

## (undated) DEVICE — SUTURE PROL SZ 6-0 L18IN NONABSORBABLE BLU L13MM C-1 3/8 8718H

## (undated) DEVICE — CABLE PACE LNG L12IN CHN A OR V SM CLP EPG TEMP DISP

## (undated) DEVICE — CANNULA ART 20FR NVENT 3 8IN CONN EOPA 3D

## (undated) DEVICE — GLOVE SURG SZ 65 THK91MIL LTX FREE SYN POLYISOPRENE

## (undated) DEVICE — SUTURE MCRYL SZ 3-0 L27IN ABSRB UD L24MM PS-1 3/8 CIR PRIM Y936H